# Patient Record
Sex: FEMALE | Race: WHITE | Employment: UNEMPLOYED
[De-identification: names, ages, dates, MRNs, and addresses within clinical notes are randomized per-mention and may not be internally consistent; named-entity substitution may affect disease eponyms.]

---

## 2016-02-23 LAB
CONTROL: NORMAL
HEMOCCULT STL QL: NORMAL

## 2017-01-02 PROBLEM — E87.1 HYPONATREMIA: Status: ACTIVE | Noted: 2017-01-02

## 2017-01-02 PROBLEM — J15.7 PNEUMONIA OF RIGHT LOWER LOBE DUE TO MYCOPLASMA PNEUMONIAE: Status: ACTIVE | Noted: 2017-01-02

## 2017-01-02 PROBLEM — E87.6 HYPOKALEMIA: Status: ACTIVE | Noted: 2017-01-02

## 2017-01-03 PROBLEM — J84.9 INTERSTITIAL PNEUMONIA (HCC): Status: ACTIVE | Noted: 2017-01-02

## 2017-01-04 PROBLEM — J96.00 ACUTE RESPIRATORY FAILURE (HCC): Status: ACTIVE | Noted: 2017-01-04

## 2017-01-09 ENCOUNTER — TELEPHONE (OUTPATIENT)
Dept: INTERNAL MEDICINE | Facility: CLINIC | Age: 62
End: 2017-01-09

## 2017-01-23 ENCOUNTER — OFFICE VISIT (OUTPATIENT)
Dept: INTERNAL MEDICINE | Facility: CLINIC | Age: 62
End: 2017-01-23

## 2017-01-23 VITALS
HEIGHT: 70 IN | WEIGHT: 163 LBS | BODY MASS INDEX: 23.34 KG/M2 | DIASTOLIC BLOOD PRESSURE: 72 MMHG | TEMPERATURE: 97.6 F | SYSTOLIC BLOOD PRESSURE: 114 MMHG | RESPIRATION RATE: 18 BRPM | HEART RATE: 91 BPM

## 2017-01-23 DIAGNOSIS — G44.201 ACUTE INTRACTABLE TENSION-TYPE HEADACHE: ICD-10-CM

## 2017-01-23 DIAGNOSIS — I10 ESSENTIAL HYPERTENSION: ICD-10-CM

## 2017-01-23 DIAGNOSIS — J18.9 PRIMARY ATYPICAL PNEUMONIA: Primary | ICD-10-CM

## 2017-01-23 DIAGNOSIS — E03.9 ACQUIRED HYPOTHYROIDISM: ICD-10-CM

## 2017-01-23 DIAGNOSIS — E78.00 PURE HYPERCHOLESTEROLEMIA: ICD-10-CM

## 2017-01-23 DIAGNOSIS — K21.9 GASTROESOPHAGEAL REFLUX DISEASE, ESOPHAGITIS PRESENCE NOT SPECIFIED: ICD-10-CM

## 2017-01-23 PROCEDURE — 99214 OFFICE O/P EST MOD 30 MIN: CPT | Performed by: HOSPITALIST

## 2017-01-23 RX ORDER — OMEPRAZOLE 20 MG/1
20 CAPSULE, DELAYED RELEASE ORAL DAILY
Qty: 30 CAPSULE | Refills: 11 | Status: SHIPPED | OUTPATIENT
Start: 2017-01-23 | End: 2017-05-01 | Stop reason: SDUPTHER

## 2017-01-23 RX ORDER — LEVOTHYROXINE SODIUM 112 UG/1
112 TABLET ORAL DAILY
Qty: 30 TABLET | Refills: 0 | Status: SHIPPED | OUTPATIENT
Start: 2017-01-23 | End: 2017-05-01 | Stop reason: SDUPTHER

## 2017-01-23 RX ORDER — SUMATRIPTAN 100 MG/1
TABLET, FILM COATED ORAL
Qty: 30 TABLET | Refills: 3 | Status: SHIPPED | OUTPATIENT
Start: 2017-01-23 | End: 2017-05-01 | Stop reason: SDUPTHER

## 2017-01-23 RX ORDER — ATORVASTATIN CALCIUM 40 MG/1
40 TABLET, FILM COATED ORAL DAILY
Qty: 90 TABLET | Refills: 3 | Status: SHIPPED | OUTPATIENT
Start: 2017-01-23 | End: 2017-05-01 | Stop reason: SDUPTHER

## 2017-01-23 RX ORDER — AMLODIPINE BESYLATE 10 MG/1
10 TABLET ORAL DAILY
Qty: 30 TABLET | Refills: 5 | Status: SHIPPED | OUTPATIENT
Start: 2017-01-23 | End: 2017-05-01 | Stop reason: ALTCHOICE

## 2017-02-09 ENCOUNTER — HOSPITAL ENCOUNTER (OUTPATIENT)
Age: 62
Discharge: HOME | End: 2017-02-09
Payer: COMMERCIAL

## 2017-02-09 DIAGNOSIS — E03.9 ACQUIRED HYPOTHYROIDISM: ICD-10-CM

## 2017-02-09 LAB
THYROXINE, FREE: 1.54 NG/DL (ref 0.93–1.7)
TSH SERPL DL<=0.05 MIU/L-ACNC: 0.01 MIU/L (ref 0.3–5)

## 2017-02-09 PROCEDURE — 84443 ASSAY THYROID STIM HORMONE: CPT

## 2017-02-09 PROCEDURE — 84439 ASSAY OF FREE THYROXINE: CPT

## 2017-02-09 PROCEDURE — 36415 COLL VENOUS BLD VENIPUNCTURE: CPT

## 2017-02-24 ENCOUNTER — OFFICE VISIT (OUTPATIENT)
Dept: PODIATRY | Facility: CLINIC | Age: 62
End: 2017-02-24

## 2017-02-24 ENCOUNTER — HOSPITAL ENCOUNTER (EMERGENCY)
Age: 62
Discharge: HOME | End: 2017-02-24
Attending: EMERGENCY MEDICINE
Payer: COMMERCIAL

## 2017-02-24 VITALS
DIASTOLIC BLOOD PRESSURE: 71 MMHG | BODY MASS INDEX: 23.48 KG/M2 | HEART RATE: 93 BPM | SYSTOLIC BLOOD PRESSURE: 108 MMHG | WEIGHT: 164 LBS | HEIGHT: 70 IN

## 2017-02-24 VITALS
DIASTOLIC BLOOD PRESSURE: 72 MMHG | HEART RATE: 92 BPM | HEIGHT: 70 IN | BODY MASS INDEX: 23.48 KG/M2 | WEIGHT: 164 LBS | TEMPERATURE: 98 F | OXYGEN SATURATION: 96 % | RESPIRATION RATE: 16 BRPM | SYSTOLIC BLOOD PRESSURE: 116 MMHG

## 2017-02-24 DIAGNOSIS — I82.402 ACUTE DEEP VEIN THROMBOSIS (DVT) OF LEFT LOWER EXTREMITY, UNSPECIFIED VEIN (HCC): Primary | ICD-10-CM

## 2017-02-24 DIAGNOSIS — M79.605 PAIN IN LEFT LEG: ICD-10-CM

## 2017-02-24 DIAGNOSIS — R60.0 BILATERAL LEG EDEMA: Primary | ICD-10-CM

## 2017-02-24 DIAGNOSIS — I89.0 LYMPHEDEMA OF LEFT LOWER EXTREMITY: ICD-10-CM

## 2017-02-24 DIAGNOSIS — I87.2 VENOUS INSUFFICIENCY: ICD-10-CM

## 2017-02-24 PROCEDURE — 93970 EXTREMITY STUDY: CPT

## 2017-02-24 PROCEDURE — 99283 EMERGENCY DEPT VISIT LOW MDM: CPT

## 2017-02-24 PROCEDURE — 99213 OFFICE O/P EST LOW 20 MIN: CPT | Performed by: PODIATRIST

## 2017-02-24 ASSESSMENT — ENCOUNTER SYMPTOMS
VOMITING: 0
BACK PAIN: 0
DIARRHEA: 0
SORE THROAT: 0
SHORTNESS OF BREATH: 0
EYE PAIN: 0
ABDOMINAL PAIN: 0
COLOR CHANGE: 0
BACK PAIN: 0
COUGH: 0
DIARRHEA: 0
NAUSEA: 0
NAUSEA: 0
SHORTNESS OF BREATH: 0

## 2017-03-02 DIAGNOSIS — Z12.11 COLON CANCER SCREENING: Primary | ICD-10-CM

## 2017-04-03 ENCOUNTER — HOSPITAL ENCOUNTER (OUTPATIENT)
Dept: GENERAL RADIOLOGY | Age: 62
Discharge: HOME | End: 2017-04-03
Payer: COMMERCIAL

## 2017-04-03 ENCOUNTER — HOSPITAL ENCOUNTER (OUTPATIENT)
Age: 62
Discharge: HOME | End: 2017-04-03
Payer: COMMERCIAL

## 2017-04-03 DIAGNOSIS — J90 PLEURAL EFFUSION: ICD-10-CM

## 2017-04-03 PROCEDURE — 71020 XR CHEST STANDARD TWO VW: CPT

## 2017-04-20 DIAGNOSIS — E03.9 ACQUIRED HYPOTHYROIDISM: ICD-10-CM

## 2017-04-20 RX ORDER — LEVOTHYROXINE SODIUM 112 UG/1
TABLET ORAL
Qty: 30 TABLET | Refills: 0 | OUTPATIENT
Start: 2017-04-20

## 2017-05-01 ENCOUNTER — OFFICE VISIT (OUTPATIENT)
Dept: INTERNAL MEDICINE | Age: 62
End: 2017-05-01
Payer: COMMERCIAL

## 2017-05-01 VITALS
HEIGHT: 70 IN | HEART RATE: 86 BPM | BODY MASS INDEX: 24.62 KG/M2 | SYSTOLIC BLOOD PRESSURE: 112 MMHG | DIASTOLIC BLOOD PRESSURE: 75 MMHG | WEIGHT: 172 LBS

## 2017-05-01 DIAGNOSIS — E03.9 ACQUIRED HYPOTHYROIDISM: ICD-10-CM

## 2017-05-01 DIAGNOSIS — G44.201 ACUTE INTRACTABLE TENSION-TYPE HEADACHE: ICD-10-CM

## 2017-05-01 DIAGNOSIS — Z12.39 BREAST CANCER SCREENING: Primary | ICD-10-CM

## 2017-05-01 DIAGNOSIS — I10 ESSENTIAL HYPERTENSION: ICD-10-CM

## 2017-05-01 DIAGNOSIS — E78.00 PURE HYPERCHOLESTEROLEMIA: ICD-10-CM

## 2017-05-01 DIAGNOSIS — K21.9 GASTROESOPHAGEAL REFLUX DISEASE, ESOPHAGITIS PRESENCE NOT SPECIFIED: ICD-10-CM

## 2017-05-01 PROCEDURE — 99213 OFFICE O/P EST LOW 20 MIN: CPT | Performed by: HOSPITALIST

## 2017-05-01 RX ORDER — SUMATRIPTAN 100 MG/1
TABLET, FILM COATED ORAL
Qty: 30 TABLET | Refills: 3 | Status: SHIPPED | OUTPATIENT
Start: 2017-05-01 | End: 2017-06-12 | Stop reason: SDUPTHER

## 2017-05-01 RX ORDER — LEVOTHYROXINE SODIUM 112 UG/1
112 TABLET ORAL DAILY
Qty: 30 TABLET | Refills: 0 | Status: CANCELLED | OUTPATIENT
Start: 2017-05-01

## 2017-05-01 RX ORDER — OMEPRAZOLE 20 MG/1
20 CAPSULE, DELAYED RELEASE ORAL DAILY
Qty: 30 CAPSULE | Refills: 11 | Status: SHIPPED | OUTPATIENT
Start: 2017-05-01 | End: 2017-06-12 | Stop reason: SDUPTHER

## 2017-05-01 RX ORDER — HYDROCHLOROTHIAZIDE 12.5 MG/1
12.5 TABLET ORAL DAILY
Qty: 30 TABLET | Refills: 0 | Status: SHIPPED | OUTPATIENT
Start: 2017-05-01 | End: 2017-05-31 | Stop reason: SDUPTHER

## 2017-05-01 RX ORDER — LEVOTHYROXINE SODIUM 0.1 MG/1
100 TABLET ORAL DAILY
Qty: 60 TABLET | Refills: 1 | Status: SHIPPED | OUTPATIENT
Start: 2017-05-01 | End: 2017-06-12 | Stop reason: SDUPTHER

## 2017-05-01 RX ORDER — AMLODIPINE BESYLATE 10 MG/1
10 TABLET ORAL DAILY
Qty: 30 TABLET | Refills: 5 | Status: CANCELLED | OUTPATIENT
Start: 2017-05-01

## 2017-05-01 RX ORDER — ATORVASTATIN CALCIUM 40 MG/1
40 TABLET, FILM COATED ORAL DAILY
Qty: 90 TABLET | Refills: 3 | Status: SHIPPED | OUTPATIENT
Start: 2017-05-01 | End: 2017-06-12 | Stop reason: SDUPTHER

## 2017-05-01 ASSESSMENT — PATIENT HEALTH QUESTIONNAIRE - PHQ9
SUM OF ALL RESPONSES TO PHQ QUESTIONS 1-9: 5
7. TROUBLE CONCENTRATING ON THINGS, SUCH AS READING THE NEWSPAPER OR WATCHING TELEVISION: 1
1. LITTLE INTEREST OR PLEASURE IN DOING THINGS: 1
4. FEELING TIRED OR HAVING LITTLE ENERGY: 1
SUM OF ALL RESPONSES TO PHQ9 QUESTIONS 1 & 2: 2
10. IF YOU CHECKED OFF ANY PROBLEMS, HOW DIFFICULT HAVE THESE PROBLEMS MADE IT FOR YOU TO DO YOUR WORK, TAKE CARE OF THINGS AT HOME, OR GET ALONG WITH OTHER PEOPLE: 0
5. POOR APPETITE OR OVEREATING: 1
8. MOVING OR SPEAKING SO SLOWLY THAT OTHER PEOPLE COULD HAVE NOTICED. OR THE OPPOSITE, BEING SO FIGETY OR RESTLESS THAT YOU HAVE BEEN MOVING AROUND A LOT MORE THAN USUAL: 0
6. FEELING BAD ABOUT YOURSELF - OR THAT YOU ARE A FAILURE OR HAVE LET YOURSELF OR YOUR FAMILY DOWN: 0
9. THOUGHTS THAT YOU WOULD BE BETTER OFF DEAD, OR OF HURTING YOURSELF: 0
2. FEELING DOWN, DEPRESSED OR HOPELESS: 1
3. TROUBLE FALLING OR STAYING ASLEEP: 0

## 2017-05-25 DIAGNOSIS — G40.909 SEIZURE DISORDER (HCC): ICD-10-CM

## 2017-05-25 DIAGNOSIS — F41.1 ANXIETY STATE: ICD-10-CM

## 2017-05-27 RX ORDER — PAROXETINE HYDROCHLORIDE 25 MG/1
TABLET, FILM COATED, EXTENDED RELEASE ORAL
Qty: 30 TABLET | Refills: 0 | Status: SHIPPED | OUTPATIENT
Start: 2017-05-27 | End: 2017-06-12 | Stop reason: SDUPTHER

## 2017-05-27 RX ORDER — LEVETIRACETAM 1000 MG/1
TABLET ORAL
Qty: 180 TABLET | Refills: 0 | Status: SHIPPED | OUTPATIENT
Start: 2017-05-27 | End: 2017-06-12 | Stop reason: SDUPTHER

## 2017-05-31 DIAGNOSIS — I10 ESSENTIAL HYPERTENSION: ICD-10-CM

## 2017-06-02 RX ORDER — HYDROCHLOROTHIAZIDE 12.5 MG/1
TABLET ORAL
Qty: 30 TABLET | Refills: 0 | Status: SHIPPED | OUTPATIENT
Start: 2017-06-02 | End: 2017-06-28 | Stop reason: SDUPTHER

## 2017-06-08 ENCOUNTER — HOSPITAL ENCOUNTER (OUTPATIENT)
Age: 62
Discharge: HOME | End: 2017-06-08
Payer: COMMERCIAL

## 2017-06-08 DIAGNOSIS — E03.9 ACQUIRED HYPOTHYROIDISM: ICD-10-CM

## 2017-06-08 LAB
THYROXINE, FREE: 1.55 NG/DL (ref 0.93–1.7)
TSH SERPL DL<=0.05 MIU/L-ACNC: <0.01 MIU/L (ref 0.3–5)

## 2017-06-08 PROCEDURE — 84439 ASSAY OF FREE THYROXINE: CPT

## 2017-06-08 PROCEDURE — 36415 COLL VENOUS BLD VENIPUNCTURE: CPT

## 2017-06-08 PROCEDURE — 84443 ASSAY THYROID STIM HORMONE: CPT

## 2017-06-12 ENCOUNTER — OFFICE VISIT (OUTPATIENT)
Dept: INTERNAL MEDICINE | Age: 62
End: 2017-06-12
Payer: COMMERCIAL

## 2017-06-12 VITALS
BODY MASS INDEX: 23.48 KG/M2 | HEART RATE: 95 BPM | HEIGHT: 70 IN | DIASTOLIC BLOOD PRESSURE: 77 MMHG | SYSTOLIC BLOOD PRESSURE: 118 MMHG | WEIGHT: 164 LBS

## 2017-06-12 DIAGNOSIS — R25.1 SHAKING: ICD-10-CM

## 2017-06-12 DIAGNOSIS — I89.0 LYMPHEDEMA OF LEFT LOWER EXTREMITY: ICD-10-CM

## 2017-06-12 DIAGNOSIS — G40.909 SEIZURE DISORDER (HCC): ICD-10-CM

## 2017-06-12 DIAGNOSIS — E03.9 ACQUIRED HYPOTHYROIDISM: ICD-10-CM

## 2017-06-12 DIAGNOSIS — Z86.73 H/O: STROKE: ICD-10-CM

## 2017-06-12 DIAGNOSIS — F41.1 ANXIETY STATE: ICD-10-CM

## 2017-06-12 DIAGNOSIS — K21.9 GASTROESOPHAGEAL REFLUX DISEASE, ESOPHAGITIS PRESENCE NOT SPECIFIED: ICD-10-CM

## 2017-06-12 PROBLEM — J96.00 ACUTE RESPIRATORY FAILURE (HCC): Status: RESOLVED | Noted: 2017-01-04 | Resolved: 2017-06-12

## 2017-06-12 PROBLEM — E87.6 HYPOKALEMIA: Status: RESOLVED | Noted: 2017-01-02 | Resolved: 2017-06-12

## 2017-06-12 PROBLEM — E87.1 HYPONATREMIA: Status: RESOLVED | Noted: 2017-01-02 | Resolved: 2017-06-12

## 2017-06-12 PROCEDURE — 99213 OFFICE O/P EST LOW 20 MIN: CPT | Performed by: STUDENT IN AN ORGANIZED HEALTH CARE EDUCATION/TRAINING PROGRAM

## 2017-06-12 RX ORDER — PAROXETINE HYDROCHLORIDE 25 MG/1
25 TABLET, FILM COATED, EXTENDED RELEASE ORAL EVERY EVENING
Qty: 30 TABLET | Refills: 0 | Status: SHIPPED | OUTPATIENT
Start: 2017-06-12 | End: 2017-08-01 | Stop reason: SDUPTHER

## 2017-06-12 RX ORDER — OMEPRAZOLE 20 MG/1
20 CAPSULE, DELAYED RELEASE ORAL DAILY
Qty: 30 CAPSULE | Refills: 11 | Status: SHIPPED | OUTPATIENT
Start: 2017-06-12 | End: 2018-02-06 | Stop reason: SDUPTHER

## 2017-06-12 RX ORDER — OXYBUTYNIN CHLORIDE 5 MG/1
5 TABLET ORAL 3 TIMES DAILY
Qty: 90 TABLET | Refills: 5 | Status: SHIPPED | OUTPATIENT
Start: 2017-06-12 | End: 2018-02-06 | Stop reason: SDUPTHER

## 2017-06-12 RX ORDER — ATORVASTATIN CALCIUM 40 MG/1
40 TABLET, FILM COATED ORAL DAILY
Qty: 90 TABLET | Refills: 3 | Status: SHIPPED | OUTPATIENT
Start: 2017-06-12 | End: 2017-11-07 | Stop reason: SDUPTHER

## 2017-06-12 RX ORDER — LEVOTHYROXINE SODIUM 0.07 MG/1
75 TABLET ORAL DAILY
Qty: 30 TABLET | Refills: 1 | Status: SHIPPED | OUTPATIENT
Start: 2017-06-12 | End: 2017-09-11 | Stop reason: SDUPTHER

## 2017-06-12 RX ORDER — ASPIRIN 81 MG/1
81 TABLET ORAL DAILY
Qty: 30 TABLET | Refills: 11 | Status: SHIPPED | OUTPATIENT
Start: 2017-06-12 | End: 2018-02-06 | Stop reason: SDUPTHER

## 2017-06-12 RX ORDER — SUMATRIPTAN 100 MG/1
TABLET, FILM COATED ORAL
Qty: 10 TABLET | Refills: 3 | Status: SHIPPED | OUTPATIENT
Start: 2017-06-12 | End: 2017-08-01 | Stop reason: SDUPTHER

## 2017-06-12 RX ORDER — LEVETIRACETAM 1000 MG/1
1000 TABLET ORAL 2 TIMES DAILY
Qty: 180 TABLET | Refills: 0 | Status: SHIPPED | OUTPATIENT
Start: 2017-06-12 | End: 2017-09-11 | Stop reason: SDUPTHER

## 2017-06-27 ENCOUNTER — OFFICE VISIT (OUTPATIENT)
Dept: NEUROLOGY | Age: 62
End: 2017-06-27
Payer: COMMERCIAL

## 2017-06-27 VITALS
HEART RATE: 92 BPM | DIASTOLIC BLOOD PRESSURE: 79 MMHG | WEIGHT: 165 LBS | HEIGHT: 70 IN | SYSTOLIC BLOOD PRESSURE: 113 MMHG | BODY MASS INDEX: 23.62 KG/M2

## 2017-06-27 DIAGNOSIS — G40.909 SEIZURE DISORDER (HCC): Primary | ICD-10-CM

## 2017-06-27 PROCEDURE — 99214 OFFICE O/P EST MOD 30 MIN: CPT | Performed by: PSYCHIATRY & NEUROLOGY

## 2017-06-27 RX ORDER — ACYCLOVIR 400 MG/1
400 TABLET ORAL 4 TIMES DAILY
COMMUNITY
End: 2017-11-07 | Stop reason: ALTCHOICE

## 2017-06-28 DIAGNOSIS — I10 ESSENTIAL HYPERTENSION: ICD-10-CM

## 2017-06-29 ENCOUNTER — TELEPHONE (OUTPATIENT)
Dept: INTERNAL MEDICINE | Age: 62
End: 2017-06-29

## 2017-07-01 RX ORDER — HYDROCHLOROTHIAZIDE 12.5 MG/1
TABLET ORAL
Qty: 30 TABLET | Refills: 1 | Status: SHIPPED | OUTPATIENT
Start: 2017-07-01 | End: 2017-08-01 | Stop reason: SDUPTHER

## 2017-08-01 ENCOUNTER — OFFICE VISIT (OUTPATIENT)
Dept: INTERNAL MEDICINE | Age: 62
End: 2017-08-01
Payer: COMMERCIAL

## 2017-08-01 ENCOUNTER — HOSPITAL ENCOUNTER (OUTPATIENT)
Age: 62
Setting detail: SPECIMEN
Discharge: HOME | End: 2017-08-01
Payer: COMMERCIAL

## 2017-08-01 VITALS
HEART RATE: 78 BPM | HEIGHT: 70 IN | BODY MASS INDEX: 23.48 KG/M2 | WEIGHT: 164 LBS | SYSTOLIC BLOOD PRESSURE: 119 MMHG | DIASTOLIC BLOOD PRESSURE: 78 MMHG

## 2017-08-01 DIAGNOSIS — E03.9 ACQUIRED HYPOTHYROIDISM: ICD-10-CM

## 2017-08-01 DIAGNOSIS — F41.1 ANXIETY STATE: ICD-10-CM

## 2017-08-01 DIAGNOSIS — I10 ESSENTIAL HYPERTENSION: Primary | ICD-10-CM

## 2017-08-01 PROBLEM — J84.9 INTERSTITIAL PNEUMONIA (HCC): Status: RESOLVED | Noted: 2017-01-02 | Resolved: 2017-08-01

## 2017-08-01 LAB
THYROXINE, FREE: 1.19 NG/DL (ref 0.93–1.7)
TSH SERPL DL<=0.05 MIU/L-ACNC: 0.08 MIU/L (ref 0.3–5)

## 2017-08-01 PROCEDURE — 99213 OFFICE O/P EST LOW 20 MIN: CPT | Performed by: STUDENT IN AN ORGANIZED HEALTH CARE EDUCATION/TRAINING PROGRAM

## 2017-08-01 PROCEDURE — 84443 ASSAY THYROID STIM HORMONE: CPT

## 2017-08-01 PROCEDURE — 84439 ASSAY OF FREE THYROXINE: CPT

## 2017-08-01 PROCEDURE — 36415 COLL VENOUS BLD VENIPUNCTURE: CPT

## 2017-08-01 RX ORDER — SUMATRIPTAN 100 MG/1
TABLET, FILM COATED ORAL
Qty: 10 TABLET | Refills: 3 | Status: SHIPPED | OUTPATIENT
Start: 2017-08-01 | End: 2018-02-06 | Stop reason: SDUPTHER

## 2017-08-01 RX ORDER — PAROXETINE HYDROCHLORIDE 25 MG/1
25 TABLET, FILM COATED, EXTENDED RELEASE ORAL EVERY EVENING
Qty: 30 TABLET | Refills: 0 | Status: SHIPPED | OUTPATIENT
Start: 2017-08-01 | End: 2017-08-30 | Stop reason: SDUPTHER

## 2017-08-01 RX ORDER — HYDROCHLOROTHIAZIDE 12.5 MG/1
TABLET ORAL
Qty: 30 TABLET | Refills: 3 | Status: SHIPPED | OUTPATIENT
Start: 2017-08-01 | End: 2017-11-07 | Stop reason: SDUPTHER

## 2017-08-22 ENCOUNTER — TELEPHONE (OUTPATIENT)
Dept: INTERNAL MEDICINE | Age: 62
End: 2017-08-22

## 2017-08-24 ENCOUNTER — TELEPHONE (OUTPATIENT)
Dept: INTERNAL MEDICINE | Age: 62
End: 2017-08-24

## 2017-08-30 DIAGNOSIS — F41.1 ANXIETY STATE: ICD-10-CM

## 2017-08-31 RX ORDER — PAROXETINE HYDROCHLORIDE 25 MG/1
TABLET, FILM COATED, EXTENDED RELEASE ORAL
Qty: 30 TABLET | Refills: 0 | Status: SHIPPED | OUTPATIENT
Start: 2017-08-31 | End: 2017-10-12 | Stop reason: SDUPTHER

## 2017-09-11 DIAGNOSIS — G40.909 SEIZURE DISORDER (HCC): ICD-10-CM

## 2017-09-11 DIAGNOSIS — E03.9 ACQUIRED HYPOTHYROIDISM: ICD-10-CM

## 2017-09-12 RX ORDER — LEVOTHYROXINE SODIUM 0.07 MG/1
75 TABLET ORAL DAILY
Qty: 30 TABLET | Refills: 1 | Status: SHIPPED | OUTPATIENT
Start: 2017-09-12 | End: 2017-11-07 | Stop reason: SDUPTHER

## 2017-09-12 RX ORDER — LEVETIRACETAM 1000 MG/1
1000 TABLET ORAL 2 TIMES DAILY
Qty: 180 TABLET | Refills: 0 | Status: SHIPPED | OUTPATIENT
Start: 2017-09-12 | End: 2018-02-06 | Stop reason: SDUPTHER

## 2017-10-12 DIAGNOSIS — F41.1 ANXIETY STATE: ICD-10-CM

## 2017-10-13 RX ORDER — PAROXETINE HYDROCHLORIDE 25 MG/1
25 TABLET, FILM COATED, EXTENDED RELEASE ORAL EVERY EVENING
Qty: 30 TABLET | Refills: 0 | Status: SHIPPED | OUTPATIENT
Start: 2017-10-13 | End: 2017-10-30 | Stop reason: ALTCHOICE

## 2017-10-30 ENCOUNTER — TELEPHONE (OUTPATIENT)
Dept: INTERNAL MEDICINE | Age: 62
End: 2017-10-30

## 2017-10-30 RX ORDER — PAROXETINE 30 MG/1
30 TABLET, FILM COATED ORAL DAILY
Qty: 30 TABLET | Refills: 3 | Status: SHIPPED | OUTPATIENT
Start: 2017-10-30 | End: 2018-02-06 | Stop reason: SDUPTHER

## 2017-10-30 NOTE — TELEPHONE ENCOUNTER
PA request received for PAXIL ER. This is non-formulary. Pt must try/fail/have contraindication to formulary agent(s):Please order regular paxil not ER.  ER is not covered by insurance

## 2017-11-07 ENCOUNTER — OFFICE VISIT (OUTPATIENT)
Dept: INTERNAL MEDICINE | Age: 62
End: 2017-11-07
Payer: COMMERCIAL

## 2017-11-07 ENCOUNTER — HOSPITAL ENCOUNTER (OUTPATIENT)
Age: 62
Setting detail: SPECIMEN
Discharge: HOME | End: 2017-11-07
Payer: COMMERCIAL

## 2017-11-07 VITALS
HEART RATE: 82 BPM | WEIGHT: 173 LBS | HEIGHT: 70 IN | SYSTOLIC BLOOD PRESSURE: 138 MMHG | OXYGEN SATURATION: 98 % | DIASTOLIC BLOOD PRESSURE: 86 MMHG | BODY MASS INDEX: 24.77 KG/M2

## 2017-11-07 DIAGNOSIS — Z12.9 SCREENING FOR CANCER: ICD-10-CM

## 2017-11-07 DIAGNOSIS — I10 ESSENTIAL HYPERTENSION: Primary | ICD-10-CM

## 2017-11-07 DIAGNOSIS — Z09 FOLLOW UP: ICD-10-CM

## 2017-11-07 DIAGNOSIS — R25.1 TREMOR OF BOTH HANDS: ICD-10-CM

## 2017-11-07 DIAGNOSIS — N39.41 URGE INCONTINENCE: ICD-10-CM

## 2017-11-07 DIAGNOSIS — E03.9 ACQUIRED HYPOTHYROIDISM: ICD-10-CM

## 2017-11-07 DIAGNOSIS — F41.9 ANXIETY: ICD-10-CM

## 2017-11-07 PROCEDURE — 3017F COLORECTAL CA SCREEN DOC REV: CPT | Performed by: STUDENT IN AN ORGANIZED HEALTH CARE EDUCATION/TRAINING PROGRAM

## 2017-11-07 PROCEDURE — G8484 FLU IMMUNIZE NO ADMIN: HCPCS | Performed by: STUDENT IN AN ORGANIZED HEALTH CARE EDUCATION/TRAINING PROGRAM

## 2017-11-07 PROCEDURE — 1036F TOBACCO NON-USER: CPT | Performed by: STUDENT IN AN ORGANIZED HEALTH CARE EDUCATION/TRAINING PROGRAM

## 2017-11-07 PROCEDURE — G8420 CALC BMI NORM PARAMETERS: HCPCS | Performed by: STUDENT IN AN ORGANIZED HEALTH CARE EDUCATION/TRAINING PROGRAM

## 2017-11-07 PROCEDURE — 3014F SCREEN MAMMO DOC REV: CPT | Performed by: STUDENT IN AN ORGANIZED HEALTH CARE EDUCATION/TRAINING PROGRAM

## 2017-11-07 PROCEDURE — G8427 DOCREV CUR MEDS BY ELIG CLIN: HCPCS | Performed by: STUDENT IN AN ORGANIZED HEALTH CARE EDUCATION/TRAINING PROGRAM

## 2017-11-07 PROCEDURE — 99214 OFFICE O/P EST MOD 30 MIN: CPT | Performed by: STUDENT IN AN ORGANIZED HEALTH CARE EDUCATION/TRAINING PROGRAM

## 2017-11-07 RX ORDER — ATORVASTATIN CALCIUM 40 MG/1
40 TABLET, FILM COATED ORAL DAILY
Qty: 90 TABLET | Refills: 3 | Status: SHIPPED | OUTPATIENT
Start: 2017-11-07 | End: 2018-02-06 | Stop reason: SDUPTHER

## 2017-11-07 RX ORDER — LEVOTHYROXINE SODIUM 0.05 MG/1
50 TABLET ORAL DAILY
Qty: 30 TABLET | Refills: 3 | Status: SHIPPED | OUTPATIENT
Start: 2017-11-07 | End: 2018-02-06 | Stop reason: SDUPTHER

## 2017-11-07 RX ORDER — HYDROCHLOROTHIAZIDE 12.5 MG/1
TABLET ORAL
Qty: 30 TABLET | Refills: 3 | Status: SHIPPED | OUTPATIENT
Start: 2017-11-07 | End: 2018-02-06 | Stop reason: SDUPTHER

## 2017-11-07 NOTE — PROGRESS NOTES
Hypertension Visit Information    BP Readings from Last 3 Encounters:   08/01/17 119/78   06/27/17 113/79   06/12/17 118/77       LDL Cholesterol (mg/dL)   Date Value   08/18/2016 99     HDL (mg/dL)   Date Value   08/18/2016 71     BUN (mg/dL)   Date Value   01/08/2017 6 (L)     CREATININE (mg/dL)   Date Value   01/08/2017 0.54     Glucose (mg/dL)   Date Value   01/08/2017 106 (H)   03/27/2012 68 (L)              Have you changed or started any medications since your last visit including any over-the-counter medicines, vitamins, or herbal medicines? no   Are you having any side effects from any of your medications? -  no  Have you stopped taking any of your medications? Is so, why? -  no    Have you seen any other physician or provider since your last visit? No  Have you had any other diagnostic tests since your last visit? Yes - Records Obtained, Pt had labs done on 8/1/17  Have you been seen in the emergency room and/or had an admission to a hospital since we last saw you? No  Have you had your routine dental cleaning in the past 6 months? no    Have you activated your [facility] account? If not, what are your barriers?  No: Declined     Patient Care Team:  Radha Calles MD as PCP - General (Internal Medicine)    Medical History Review  Past Medical, Family, and Social History reviewed and does contribute to the patient presenting condition    Health Maintenance   Topic Date Due    Cervical cancer screen  08/12/2017    Flu vaccine (1) 09/01/2017    Colon Cancer Screen FIT/FOBT  02/01/2018 (Originally 2/23/2017)    Breast cancer screen  01/25/2018    Lipid screen  08/18/2021    DTaP/Tdap/Td vaccine (2 - Td) 11/23/2025    Zostavax vaccine  Completed    Hepatitis C screen  Completed    HIV screen  Completed

## 2017-11-07 NOTE — PROGRESS NOTES
[facility]  [facility] [address] [facility]  [address]  Dept: [phone]  Dept Fax: [phone]    Office Progress/Follow Up Note  Date of patient's visit: 11/7/2017  Patient's Name:  Roxana Bolanos YOB: 1955            Patient Care Team:  Anatoliy Eastman MD as PCP - General (Internal Medicine)    REASON FOR VISIT: Routine outpatient follow up    HISTORY OF PRESENT ILLNESS:      Chief Complaint   Patient presents with    Hypertension     Pt last seen on 8/1/17   [facility] Maintenance     Due for Colon Screen, PAP, and Flu Shot. Discuss with pt. History was obtained from the patient. Roxana Bolanos is a 58 y.o. is here for a blood pressure follow up. Blood pressure controlled on HCTZ. Patient has hand tremors, worsened when she is more anxious, saw neurologist recommended observation. patient has appointment with Dr. Zeinab Yen on 11/9 but he is out of town so they have to move the appointment. Patient taking synthroid for hypothyroidism. Patient followed with neurologist for right cortical ischemic stroke in 1998 leading to seizures. Patient takes Keppra 1 g BID. No breakthrough seizure.         Patient Active Problem List   Diagnosis    Hypothyroidism    Hypertension    Seizure disorder ([facility] [address]    Urge incontinence    Anxiety    GERD (gastroesophageal reflux disease)    H/O: CVA (cerebrovascular accident)    Headache    Hammer toe of left foot    Tremor of both hands         Health Maintenance Due   Topic Date Due    Colon Cancer Screen FIT/FOBT  02/23/2017    Cervical cancer screen  08/12/2017    Flu vaccine (1) 09/01/2017         No Known Allergies      MEDICATIONS:      Current Outpatient Prescriptions   Medication Sig Dispense Refill    PARoxetine (PAXIL) 30 MG tablet Take 1 tablet by mouth daily 30 tablet 3    levothyroxine (SYNTHROID) 75 MCG tablet Take 1 tablet by mouth daily 30 tablet 1    levETIRAcetam (KEPPRA) 1000 MG tablet Take 1 tablet by mouth 2 times daily 180 tablet 0    hydrochlorothiazide (HYDRODIURIL) 12.5 MG tablet TAKE ONE TABLET BY MOUTH ONCE DAILY 30 tablet 3    SUMAtriptan (IMITREX) 100 MG tablet TAKE ONE TABLET BY MOUTH once daily as needed for migraine headache 10 tablet 3    aspirin EC 81 MG EC tablet Take 1 tablet by mouth daily 30 tablet 11    atorvastatin (LIPITOR) 40 MG tablet Take 1 tablet by mouth daily 90 tablet 3    omeprazole (PRILOSEC) 20 MG delayed release capsule Take 1 capsule by mouth daily 30 capsule 11    oxybutynin (DITROPAN) 5 MG tablet Take 1 tablet by mouth 3 times daily 90 tablet 5     No current facility-administered medications for this visit. SOCIAL HISTORY    Reviewed and no change from previous record. Efrain Burks  reports that she has never smoked.  She has never used smokeless tobacco.    FAMILY HISTORY:    Reviewed and No change from previous visit    REVIEW OF SYSTEMS:    CONSTITUTIONAL: Denies: fever, chills  CARDIOVASCULAR: Denies: chest pain, dyspnea on exertion  RESPIRATORY: Denies: cough, hemoptysis, shortness of breath  GI: Denies: Denies: abdominal pain, flank pain  : Denies: Denies: dysuria, frequency/urgency  NEURO: Denies: dizzy/vertigo, headache  MUSCULOSKELETAL: hand tremor on and off  SKIN: Denies: rash, itching    PHYSICAL EXAM:      Vitals:    11/07/17 0905   BP: 138/86   Site: Right Arm   Position: Sitting   Cuff Size: Medium Adult   Pulse: 82   SpO2: 98%   Weight: 173 lb (78.5 kg)   Height: 5' 10\" (1.778 m)     BP Readings from Last 3 Encounters:   11/07/17 138/86   08/01/17 119/78   06/27/17 113/79      General appearance - alert, well appearing, and in no distress  Chest - clear to auscultation, no wheezes, rales or rhonchi, symmetric air entry  Heart - normal rate, regular rhythm, normal S1, S2, no murmurs, rubs, clicks or gallops  Abdomen - soft, nontender, nondistended, no masses or organomegaly  Neurological - alert, oriented, normal speech, no of prescribed medications. Barriers to medication compliance addressed. All patient questions answered. Pt voiced understanding. · Patient given educational materials - see patient instructions    Cate Zhang MD  Internal Medicine Resident  [facility]  11/7/2017, 9:37 AM   Attending Physician Statement  I have discussed the care of Chalo Espinoza, including pertinent history and exam findings,  with the resident. I have reviewed the key elements of all parts of the encounter with the resident. I agree with the assessment, plan and orders as documented by the resident. Pt seen discussed and examined. (GC Modifier) Resting tremor. TSH low. Will decrease synthroid. Pelvic exam grossly unremarkable. Cervix grossly intact. Paps awaits.

## 2017-11-07 NOTE — PATIENT INSTRUCTIONS
Medications e-scribed to pharmacy of patient's choice. OC-Light hemoccult collection kit given to patient and procedure explained. LABORATORY INSTRUCTIONS    Your doctor has ordered blood or urine testing. You can get this testing done at the Lab located on the first floor of the [facility], or at any other [facility]. Please stop at Main Registration, before going to the lab, as you must be registered first.     Please get this lab done before your next visit. You may eat or drink before this test.    Return To Clinic 3/13/18. After Visit Summary given and reviewed. JF    It is very important for your care that you keep your appointment. If for some reason you are unable to keep your appointment it is equally important that you call our office at [phone] to cancel your appointment and reschedule. Failure to do so may result in your termination from our practice.

## 2017-11-15 LAB — CYTOLOGY REPORT: NORMAL

## 2017-12-11 ENCOUNTER — HOSPITAL ENCOUNTER (OUTPATIENT)
Age: 62
Discharge: HOME | End: 2017-12-11
Payer: COMMERCIAL

## 2017-12-11 DIAGNOSIS — E03.9 ACQUIRED HYPOTHYROIDISM: ICD-10-CM

## 2017-12-11 LAB — TSH SERPL DL<=0.05 MIU/L-ACNC: 2.48 MIU/L (ref 0.3–5)

## 2017-12-11 PROCEDURE — 84443 ASSAY THYROID STIM HORMONE: CPT

## 2017-12-11 PROCEDURE — 36415 COLL VENOUS BLD VENIPUNCTURE: CPT

## 2017-12-20 ENCOUNTER — TELEPHONE (OUTPATIENT)
Dept: INTERNAL MEDICINE | Age: 62
End: 2017-12-20

## 2017-12-20 NOTE — TELEPHONE ENCOUNTER
Pt given OC-Light Hemoccult test kit 11/7/17; it has not been returned within  2 weeks. PC to pt; LM; letter sent to pt.

## 2018-01-14 ENCOUNTER — HOSPITAL ENCOUNTER (INPATIENT)
Age: 63
LOS: 7 days | Discharge: NURSING, REHAB OR LONG-TERM CARE | DRG: 720 | End: 2018-01-21
Attending: EMERGENCY MEDICINE | Admitting: INTERNAL MEDICINE
Payer: COMMERCIAL

## 2018-01-14 ENCOUNTER — APPOINTMENT (OUTPATIENT)
Dept: GENERAL RADIOLOGY | Age: 63
DRG: 720 | End: 2018-01-14
Payer: COMMERCIAL

## 2018-01-14 ENCOUNTER — APPOINTMENT (OUTPATIENT)
Dept: CT IMAGING | Age: 63
DRG: 720 | End: 2018-01-14
Payer: COMMERCIAL

## 2018-01-14 ENCOUNTER — ANESTHESIA (OUTPATIENT)
Dept: ICU | Age: 63
DRG: 720 | End: 2018-01-14
Payer: COMMERCIAL

## 2018-01-14 ENCOUNTER — ANESTHESIA EVENT (OUTPATIENT)
Dept: ICU | Age: 63
DRG: 720 | End: 2018-01-14
Payer: COMMERCIAL

## 2018-01-14 DIAGNOSIS — A41.9 SEPSIS, DUE TO UNSPECIFIED ORGANISM: ICD-10-CM

## 2018-01-14 DIAGNOSIS — R79.89 INCREASED AMMONIA LEVEL: ICD-10-CM

## 2018-01-14 DIAGNOSIS — N30.00 ACUTE CYSTITIS WITHOUT HEMATURIA: ICD-10-CM

## 2018-01-14 DIAGNOSIS — R56.9 SEIZURE (HCC): ICD-10-CM

## 2018-01-14 DIAGNOSIS — R50.9 FEVER, UNSPECIFIED FEVER CAUSE: ICD-10-CM

## 2018-01-14 DIAGNOSIS — R41.82 ALTERED MENTAL STATUS, UNSPECIFIED ALTERED MENTAL STATUS TYPE: Primary | ICD-10-CM

## 2018-01-14 DIAGNOSIS — R77.8 ELEVATED TROPONIN: ICD-10-CM

## 2018-01-14 PROBLEM — R65.21 SEPTIC SHOCK (HCC): Status: ACTIVE | Noted: 2018-01-14

## 2018-01-14 PROBLEM — N39.0 UTI (URINARY TRACT INFECTION): Status: ACTIVE | Noted: 2018-01-14

## 2018-01-14 LAB
-: ABNORMAL
ABSOLUTE EOS #: 0.2 K/UL (ref 0–0.4)
ABSOLUTE IMMATURE GRANULOCYTE: ABNORMAL K/UL (ref 0–0.3)
ABSOLUTE LYMPH #: 1.1 K/UL (ref 1–4.8)
ABSOLUTE MONO #: 0.4 K/UL (ref 0.1–1.3)
ACETAMINOPHEN LEVEL: <10 UG/ML (ref 10–30)
ALBUMIN SERPL-MCNC: 3.5 G/DL (ref 3.5–5.2)
ALBUMIN/GLOBULIN RATIO: NORMAL (ref 1–2.5)
ALLEN TEST: ABNORMAL
ALLEN TEST: ABNORMAL
ALP BLD-CCNC: 82 U/L (ref 35–104)
ALT SERPL-CCNC: 18 U/L (ref 5–33)
AMMONIA: 100 UMOL/L (ref 11–51)
AMORPHOUS: ABNORMAL
ANION GAP SERPL CALCULATED.3IONS-SCNC: 16 MMOL/L (ref 9–17)
APPEARANCE CSF: CLEAR
AST SERPL-CCNC: 30 U/L
BACTERIA: ABNORMAL
BASOPHILS # BLD: 1 % (ref 0–2)
BASOPHILS ABSOLUTE: 0.1 K/UL (ref 0–0.2)
BILIRUB SERPL-MCNC: 0.56 MG/DL (ref 0.3–1.2)
BILIRUBIN URINE: NEGATIVE
BNP INTERPRETATION: NORMAL
BUN BLDV-MCNC: 10 MG/DL (ref 8–23)
BUN/CREAT BLD: NORMAL (ref 9–20)
CALCIUM SERPL-MCNC: 8.8 MG/DL (ref 8.6–10.4)
CARBOXYHEMOGLOBIN: 0.7 % (ref 0–5)
CARBOXYHEMOGLOBIN: 7.5 % (ref 0–5)
CASTS UA: ABNORMAL /LPF
CHLORIDE BLD-SCNC: 103 MMOL/L (ref 98–107)
CO2: 23 MMOL/L (ref 20–31)
COLOR: YELLOW
COMMENT UA: ABNORMAL
CREAT SERPL-MCNC: 0.63 MG/DL (ref 0.5–0.9)
CREAT SERPL-MCNC: 0.76 MG/DL (ref 0.5–0.9)
CRYPTOCOCCUS NEOFORMANS/GATTI CSF FILM ARR.: NOT DETECTED
CRYSTALS, UA: ABNORMAL /HPF
CYTOMEGALOVIRUS (CMV) CSF FILM ARRAY: NOT DETECTED
DIFFERENTIAL TYPE: ABNORMAL
DIRECT EXAM: NORMAL
ENTEROVIRUS CSF FILM ARRAY: NOT DETECTED
EOSINOPHILS RELATIVE PERCENT: 4 % (ref 0–4)
EPITHELIAL CELLS UA: ABNORMAL /HPF
ESCHERICHIA COLI K1 CSF FILM ARRAY: NOT DETECTED
ETHANOL PERCENT: <0.01 %
ETHANOL: <10 MG/DL
FIO2: 100
FIO2: ABNORMAL
GFR AFRICAN AMERICAN: >60 ML/MIN
GFR AFRICAN AMERICAN: >60 ML/MIN
GFR NON-AFRICAN AMERICAN: >60 ML/MIN
GFR NON-AFRICAN AMERICAN: >60 ML/MIN
GFR SERPL CREATININE-BSD FRML MDRD: NORMAL ML/MIN/{1.73_M2}
GLUCOSE BLD-MCNC: 97 MG/DL (ref 70–99)
GLUCOSE URINE: NEGATIVE
GLUCOSE, CSF: 55 MG/DL (ref 40–70)
HAEMOPHILUS INFLUENZA CSF FILM ARRAY: NOT DETECTED
HCO3 ARTERIAL: 25.6 MMOL/L (ref 22–26)
HCO3 VENOUS: 21.6 MMOL/L (ref 24–30)
HCT VFR BLD CALC: 41.7 % (ref 36–46)
HEMOGLOBIN: 13.9 G/DL (ref 12–16)
HHV-6 (HERPESVIRUS 6) CSF FILM ARRAY: NOT DETECTED
HSV-1 CSF FILM ARRAY: NOT DETECTED
HSV-2 CSF FILM ARRAY: NOT DETECTED
IMMATURE GRANULOCYTES: ABNORMAL %
INR BLD: 1.1
KEPPRA: 31 UG/ML
KETONES, URINE: NEGATIVE
LACTIC ACID, WHOLE BLOOD: NORMAL MMOL/L (ref 0.7–2.1)
LACTIC ACID: 1.6 MMOL/L (ref 0.5–2.2)
LACTIC ACID: 2.9 MMOL/L (ref 0.5–2.2)
LEUKOCYTE ESTERASE, URINE: ABNORMAL
LIPASE: 38 U/L (ref 13–60)
LISTERIA MONOCYTOGENES CSF FILM ARRAY: NOT DETECTED
LYMPHOCYTES # BLD: 22 % (ref 24–44)
Lab: NORMAL
MAGNESIUM: 1.8 MG/DL (ref 1.6–2.6)
MCH RBC QN AUTO: 30.8 PG (ref 26–34)
MCHC RBC AUTO-ENTMCNC: 33.3 G/DL (ref 31–37)
MCV RBC AUTO: 92.6 FL (ref 80–100)
METHEMOGLOBIN: 0.9 % (ref 0–1.9)
METHEMOGLOBIN: 1 % (ref 0–1.9)
MODE: ABNORMAL
MODE: ABNORMAL
MONOCYTES # BLD: 7 % (ref 1–7)
MUCUS: ABNORMAL
NEGATIVE BASE EXCESS, ART: ABNORMAL MMOL/L (ref 0–2)
NEGATIVE BASE EXCESS, VEN: 1.5 MMOL/L (ref 0–2)
NEISSERIA MENIGITIDIS CSF FILM ARRAY: NOT DETECTED
NITRITE, URINE: POSITIVE
NOTIFICATION TIME: ABNORMAL
NOTIFICATION TIME: ABNORMAL
NOTIFICATION: ABNORMAL
NOTIFICATION: ABNORMAL
O2 DEVICE/FLOW/%: ABNORMAL
O2 DEVICE/FLOW/%: ABNORMAL
O2 SAT, ARTERIAL: 98.7 % (ref 95–98)
O2 SAT, VEN: 91.8 % (ref 60–85)
OTHER OBSERVATIONS UA: ABNORMAL
OXYHEMOGLOBIN: ABNORMAL % (ref 95–98)
OXYHEMOGLOBIN: ABNORMAL % (ref 95–98)
PARECHOVIRUS CSF FILM ARRAY: NOT DETECTED
PARTIAL THROMBOPLASTIN TIME: 21.3 SEC (ref 23–31)
PATIENT TEMP: 37
PATIENT TEMP: 37
PCO2 ARTERIAL: 40.7 MMHG (ref 35–45)
PCO2, ART, TEMP ADJ: ABNORMAL (ref 35–45)
PCO2, VEN, TEMP ADJ: ABNORMAL MMHG (ref 39–55)
PCO2, VEN: 27.2 (ref 39–55)
PDW BLD-RTO: 14 % (ref 11.5–14.9)
PEEP/CPAP: 5
PEEP/CPAP: ABNORMAL
PH ARTERIAL: 7.41 (ref 7.35–7.45)
PH UA: 6.5 (ref 5–8)
PH VENOUS: 7.51 (ref 7.32–7.42)
PH, ART, TEMP ADJ: ABNORMAL (ref 7.35–7.45)
PH, VEN, TEMP ADJ: ABNORMAL (ref 7.32–7.42)
PHENYTOIN DATE LAST DOSE: ABNORMAL
PHENYTOIN DOSE AMOUNT: ABNORMAL
PHENYTOIN DOSE TIME: ABNORMAL
PHENYTOIN FREE: <0.1 UG/ML (ref 1–2)
PHENYTOIN LEVEL: <0.8 UG/ML (ref 10–20)
PLATELET # BLD: 235 K/UL (ref 150–450)
PLATELET ESTIMATE: ABNORMAL
PMV BLD AUTO: 9.6 FL (ref 6–12)
PO2 ARTERIAL: 398 MMHG (ref 80–100)
PO2, ART, TEMP ADJ: ABNORMAL MMHG (ref 80–100)
PO2, VEN, TEMP ADJ: ABNORMAL MMHG (ref 30–50)
PO2, VEN: 115 (ref 30–50)
POSITIVE BASE EXCESS, ART: 0.9 MMOL/L (ref 0–2)
POSITIVE BASE EXCESS, VEN: ABNORMAL MMOL/L (ref 0–2)
POTASSIUM SERPL-SCNC: 4 MMOL/L (ref 3.7–5.3)
PRO-BNP: 229 PG/ML
PROTEIN CSF: 25 MG/DL (ref 15–45)
PROTEIN UA: NEGATIVE
PROTHROMBIN TIME: 11.4 SEC (ref 9.7–12)
PSV: ABNORMAL
PSV: ABNORMAL
PT. POSITION: ABNORMAL
PT. POSITION: ABNORMAL
RBC # BLD: 4.51 M/UL (ref 4–5.2)
RBC # BLD: ABNORMAL 10*6/UL
RBC CSF: 14 /MM3
RBC UA: ABNORMAL /HPF
RENAL EPITHELIAL, UA: ABNORMAL /HPF
RESPIRATORY RATE: 18
RESPIRATORY RATE: ABNORMAL
SALICYLATE LEVEL: <1 MG/DL (ref 3–10)
SAMPLE SITE: ABNORMAL
SAMPLE SITE: ABNORMAL
SEG NEUTROPHILS: 66 % (ref 36–66)
SEGMENTED NEUTROPHILS ABSOLUTE COUNT: 3.3 K/UL (ref 1.3–9.1)
SET RATE: 18
SET RATE: ABNORMAL
SODIUM BLD-SCNC: 142 MMOL/L (ref 135–144)
SOURCE: NORMAL
SPECIFIC GRAVITY UA: 1.01 (ref 1–1.03)
SPECIMEN DESCRIPTION: NORMAL
SPECIMEN DESCRIPTION: NORMAL
STATUS: NORMAL
STREPTOCOCCUS AGALACTIAE CSF FILM ARRAY: NOT DETECTED
STREPTOCOCCUS PNEUMONIAE CSF FILM ARRAY: NOT DETECTED
SUPERNAT COLOR CSF: COLORLESS
TEXT FOR RESPIRATORY: ABNORMAL
TEXT FOR RESPIRATORY: ABNORMAL
TOTAL HB: ABNORMAL G/DL (ref 12–16)
TOTAL HB: ABNORMAL G/DL (ref 12–16)
TOTAL PROTEIN: 6.6 G/DL (ref 6.4–8.3)
TOTAL RATE: 18
TOTAL RATE: ABNORMAL
TRICHOMONAS: ABNORMAL
TRICYCLIC ANTIDEP,URINE: NEGATIVE
TROPONIN INTERP: ABNORMAL
TROPONIN INTERP: ABNORMAL
TROPONIN T: 0.04 NG/ML
TROPONIN T: 0.11 NG/ML
TUBE NUMBER CSF: 3
TURBIDITY: ABNORMAL
URINE HGB: ABNORMAL
UROBILINOGEN, URINE: NORMAL
VARICELLA-ZOSTER CSF FILM ARRAY: NOT DETECTED
VOLUME CSF: 4.5
VT: 500
VT: ABNORMAL
WBC # BLD: 5 K/UL (ref 3.5–11)
WBC # BLD: ABNORMAL 10*3/UL
WBC CSF: 0 /MM3
WBC UA: ABNORMAL /HPF
XANTHOCHROMIA: ABNORMAL
YEAST: ABNORMAL

## 2018-01-14 PROCEDURE — 2580000003 HC RX 258: Performed by: FAMILY MEDICINE

## 2018-01-14 PROCEDURE — 2580000003 HC RX 258: Performed by: PSYCHIATRY & NEUROLOGY

## 2018-01-14 PROCEDURE — 99285 EMERGENCY DEPT VISIT HI MDM: CPT

## 2018-01-14 PROCEDURE — 0BH18EZ INSERTION OF ENDOTRACHEAL AIRWAY INTO TRACHEA, VIA NATURAL OR ARTIFICIAL OPENING ENDOSCOPIC: ICD-10-PCS | Performed by: INTERNAL MEDICINE

## 2018-01-14 PROCEDURE — 71045 X-RAY EXAM CHEST 1 VIEW: CPT

## 2018-01-14 PROCEDURE — 36620 INSERTION CATHETER ARTERY: CPT | Performed by: ANESTHESIOLOGY

## 2018-01-14 PROCEDURE — 87186 SC STD MICRODIL/AGAR DIL: CPT

## 2018-01-14 PROCEDURE — 82565 ASSAY OF CREATININE: CPT

## 2018-01-14 PROCEDURE — 6370000000 HC RX 637 (ALT 250 FOR IP): Performed by: EMERGENCY MEDICINE

## 2018-01-14 PROCEDURE — 80186 ASSAY OF PHENYTOIN FREE: CPT

## 2018-01-14 PROCEDURE — 82805 BLOOD GASES W/O2 SATURATION: CPT

## 2018-01-14 PROCEDURE — 87205 SMEAR GRAM STAIN: CPT

## 2018-01-14 PROCEDURE — 2500000003 HC RX 250 WO HCPCS

## 2018-01-14 PROCEDURE — 5A1945Z RESPIRATORY VENTILATION, 24-96 CONSECUTIVE HOURS: ICD-10-PCS | Performed by: INTERNAL MEDICINE

## 2018-01-14 PROCEDURE — 85610 PROTHROMBIN TIME: CPT

## 2018-01-14 PROCEDURE — 99223 1ST HOSP IP/OBS HIGH 75: CPT | Performed by: INTERNAL MEDICINE

## 2018-01-14 PROCEDURE — 87149 DNA/RNA DIRECT PROBE: CPT

## 2018-01-14 PROCEDURE — 86403 PARTICLE AGGLUT ANTBDY SCRN: CPT

## 2018-01-14 PROCEDURE — 83605 ASSAY OF LACTIC ACID: CPT

## 2018-01-14 PROCEDURE — 6360000002 HC RX W HCPCS: Performed by: EMERGENCY MEDICINE

## 2018-01-14 PROCEDURE — 82140 ASSAY OF AMMONIA: CPT

## 2018-01-14 PROCEDURE — 84157 ASSAY OF PROTEIN OTHER: CPT

## 2018-01-14 PROCEDURE — 36415 COLL VENOUS BLD VENIPUNCTURE: CPT

## 2018-01-14 PROCEDURE — 87086 URINE CULTURE/COLONY COUNT: CPT

## 2018-01-14 PROCEDURE — 2500000003 HC RX 250 WO HCPCS: Performed by: FAMILY MEDICINE

## 2018-01-14 PROCEDURE — 2580000003 HC RX 258: Performed by: EMERGENCY MEDICINE

## 2018-01-14 PROCEDURE — 36556 INSERT NON-TUNNEL CV CATH: CPT

## 2018-01-14 PROCEDURE — 82800 BLOOD PH: CPT

## 2018-01-14 PROCEDURE — G0480 DRUG TEST DEF 1-7 CLASSES: HCPCS

## 2018-01-14 PROCEDURE — 80185 ASSAY OF PHENYTOIN TOTAL: CPT

## 2018-01-14 PROCEDURE — 94002 VENT MGMT INPAT INIT DAY: CPT

## 2018-01-14 PROCEDURE — 87804 INFLUENZA ASSAY W/OPTIC: CPT

## 2018-01-14 PROCEDURE — 83735 ASSAY OF MAGNESIUM: CPT

## 2018-01-14 PROCEDURE — 83880 ASSAY OF NATRIURETIC PEPTIDE: CPT

## 2018-01-14 PROCEDURE — S0028 INJECTION, FAMOTIDINE, 20 MG: HCPCS | Performed by: FAMILY MEDICINE

## 2018-01-14 PROCEDURE — 87070 CULTURE OTHR SPECIMN AEROBIC: CPT

## 2018-01-14 PROCEDURE — 94762 N-INVAS EAR/PLS OXIMTRY CONT: CPT

## 2018-01-14 PROCEDURE — 6360000002 HC RX W HCPCS

## 2018-01-14 PROCEDURE — 6360000002 HC RX W HCPCS: Performed by: PSYCHIATRY & NEUROLOGY

## 2018-01-14 PROCEDURE — 85025 COMPLETE CBC W/AUTO DIFF WBC: CPT

## 2018-01-14 PROCEDURE — 70450 CT HEAD/BRAIN W/O DYE: CPT

## 2018-01-14 PROCEDURE — 87015 SPECIMEN INFECT AGNT CONCNTJ: CPT

## 2018-01-14 PROCEDURE — 87088 URINE BACTERIA CULTURE: CPT

## 2018-01-14 PROCEDURE — 2000000000 HC ICU R&B

## 2018-01-14 PROCEDURE — 84484 ASSAY OF TROPONIN QUANT: CPT

## 2018-01-14 PROCEDURE — 89050 BODY FLUID CELL COUNT: CPT

## 2018-01-14 PROCEDURE — 36600 WITHDRAWAL OF ARTERIAL BLOOD: CPT

## 2018-01-14 PROCEDURE — 85730 THROMBOPLASTIN TIME PARTIAL: CPT

## 2018-01-14 PROCEDURE — 36556 INSERT NON-TUNNEL CV CATH: CPT | Performed by: ANESTHESIOLOGY

## 2018-01-14 PROCEDURE — 87483 CNS DNA AMP PROBE TYPE 12-25: CPT

## 2018-01-14 PROCEDURE — 93005 ELECTROCARDIOGRAM TRACING: CPT

## 2018-01-14 PROCEDURE — 31500 INSERT EMERGENCY AIRWAY: CPT

## 2018-01-14 PROCEDURE — 83690 ASSAY OF LIPASE: CPT

## 2018-01-14 PROCEDURE — 82945 GLUCOSE OTHER FLUID: CPT

## 2018-01-14 PROCEDURE — 87040 BLOOD CULTURE FOR BACTERIA: CPT

## 2018-01-14 PROCEDURE — 80307 DRUG TEST PRSMV CHEM ANLYZR: CPT

## 2018-01-14 PROCEDURE — 02HV33Z INSERTION OF INFUSION DEVICE INTO SUPERIOR VENA CAVA, PERCUTANEOUS APPROACH: ICD-10-PCS | Performed by: INTERNAL MEDICINE

## 2018-01-14 PROCEDURE — 36620 INSERTION CATHETER ARTERY: CPT

## 2018-01-14 PROCEDURE — 62270 DX LMBR SPI PNXR: CPT

## 2018-01-14 PROCEDURE — 80053 COMPREHEN METABOLIC PANEL: CPT

## 2018-01-14 PROCEDURE — 81001 URINALYSIS AUTO W/SCOPE: CPT

## 2018-01-14 PROCEDURE — 87102 FUNGUS ISOLATION CULTURE: CPT

## 2018-01-14 PROCEDURE — 80177 DRUG SCRN QUAN LEVETIRACETAM: CPT

## 2018-01-14 PROCEDURE — 99291 CRITICAL CARE FIRST HOUR: CPT | Performed by: INTERNAL MEDICINE

## 2018-01-14 PROCEDURE — 94770 HC ETCO2 MONITOR DAILY: CPT

## 2018-01-14 PROCEDURE — 72125 CT NECK SPINE W/O DYE: CPT

## 2018-01-14 RX ORDER — AMLODIPINE BESYLATE 10 MG/1
10 TABLET ORAL DAILY
COMMUNITY
End: 2018-02-06 | Stop reason: SDUPTHER

## 2018-01-14 RX ORDER — CHLORHEXIDINE GLUCONATE 0.12 MG/ML
15 RINSE ORAL 2 TIMES DAILY
Status: DISCONTINUED | OUTPATIENT
Start: 2018-01-15 | End: 2018-01-17

## 2018-01-14 RX ORDER — ASPIRIN 300 MG/1
300 SUPPOSITORY RECTAL ONCE
Status: COMPLETED | OUTPATIENT
Start: 2018-01-14 | End: 2018-01-14

## 2018-01-14 RX ORDER — LEVETIRACETAM 500 MG/1
1000 TABLET ORAL 2 TIMES DAILY
Status: DISCONTINUED | OUTPATIENT
Start: 2018-01-14 | End: 2018-01-14

## 2018-01-14 RX ORDER — PHENYTOIN SODIUM 50 MG/ML
1000 INJECTION, SOLUTION INTRAMUSCULAR; INTRAVENOUS ONCE
Status: COMPLETED | OUTPATIENT
Start: 2018-01-14 | End: 2018-01-14

## 2018-01-14 RX ORDER — 0.9 % SODIUM CHLORIDE 0.9 %
1000 INTRAVENOUS SOLUTION INTRAVENOUS ONCE
Status: COMPLETED | OUTPATIENT
Start: 2018-01-14 | End: 2018-01-14

## 2018-01-14 RX ORDER — CIPROFLOXACIN 2 MG/ML
400 INJECTION, SOLUTION INTRAVENOUS ONCE
Status: DISCONTINUED | OUTPATIENT
Start: 2018-01-14 | End: 2018-01-14

## 2018-01-14 RX ORDER — FENTANYL CITRATE 50 UG/ML
25 INJECTION, SOLUTION INTRAMUSCULAR; INTRAVENOUS
Status: DISCONTINUED | OUTPATIENT
Start: 2018-01-14 | End: 2018-01-17

## 2018-01-14 RX ORDER — SODIUM CHLORIDE 0.9 % (FLUSH) 0.9 %
10 SYRINGE (ML) INJECTION PRN
Status: DISCONTINUED | OUTPATIENT
Start: 2018-01-14 | End: 2018-01-21 | Stop reason: HOSPADM

## 2018-01-14 RX ORDER — POTASSIUM CHLORIDE 20MEQ/15ML
40 LIQUID (ML) ORAL PRN
Status: DISCONTINUED | OUTPATIENT
Start: 2018-01-14 | End: 2018-01-15

## 2018-01-14 RX ORDER — LEVOTHYROXINE SODIUM 0.05 MG/1
50 TABLET ORAL DAILY
Status: DISCONTINUED | OUTPATIENT
Start: 2018-01-14 | End: 2018-01-14

## 2018-01-14 RX ORDER — LEVOTHYROXINE SODIUM ANHYDROUS 100 UG/5ML
25 INJECTION, POWDER, LYOPHILIZED, FOR SOLUTION INTRAVENOUS
Status: DISCONTINUED | OUTPATIENT
Start: 2018-01-15 | End: 2018-01-18

## 2018-01-14 RX ORDER — ACETAMINOPHEN 500 MG
1000 TABLET ORAL ONCE
Status: DISCONTINUED | OUTPATIENT
Start: 2018-01-14 | End: 2018-01-14

## 2018-01-14 RX ORDER — ALBUTEROL SULFATE 90 UG/1
4 AEROSOL, METERED RESPIRATORY (INHALATION) EVERY 6 HOURS PRN
Status: DISCONTINUED | OUTPATIENT
Start: 2018-01-14 | End: 2018-01-21 | Stop reason: HOSPADM

## 2018-01-14 RX ORDER — ETOMIDATE 2 MG/ML
INJECTION INTRAVENOUS
Status: DISCONTINUED
Start: 2018-01-14 | End: 2018-01-14 | Stop reason: WASHOUT

## 2018-01-14 RX ORDER — SODIUM CHLORIDE 0.9 % (FLUSH) 0.9 %
10 SYRINGE (ML) INJECTION EVERY 12 HOURS SCHEDULED
Status: DISCONTINUED | OUTPATIENT
Start: 2018-01-14 | End: 2018-01-21 | Stop reason: HOSPADM

## 2018-01-14 RX ORDER — DOPAMINE HYDROCHLORIDE 160 MG/100ML
2.5 INJECTION, SOLUTION INTRAVENOUS CONTINUOUS
Status: DISCONTINUED | OUTPATIENT
Start: 2018-01-14 | End: 2018-01-14

## 2018-01-14 RX ORDER — PHENYTOIN SODIUM 50 MG/ML
1000 INJECTION, SOLUTION INTRAMUSCULAR; INTRAVENOUS ONCE
Status: DISCONTINUED | OUTPATIENT
Start: 2018-01-14 | End: 2018-01-14

## 2018-01-14 RX ORDER — SODIUM CHLORIDE 9 MG/ML
INJECTION, SOLUTION INTRAVENOUS CONTINUOUS
Status: DISCONTINUED | OUTPATIENT
Start: 2018-01-14 | End: 2018-01-17

## 2018-01-14 RX ORDER — POTASSIUM CHLORIDE 20 MEQ/1
40 TABLET, EXTENDED RELEASE ORAL PRN
Status: DISCONTINUED | OUTPATIENT
Start: 2018-01-14 | End: 2018-01-15

## 2018-01-14 RX ORDER — ONDANSETRON 2 MG/ML
4 INJECTION INTRAMUSCULAR; INTRAVENOUS EVERY 6 HOURS PRN
Status: DISCONTINUED | OUTPATIENT
Start: 2018-01-14 | End: 2018-01-21 | Stop reason: HOSPADM

## 2018-01-14 RX ORDER — ASPIRIN 81 MG/1
324 TABLET, CHEWABLE ORAL ONCE
Status: DISCONTINUED | OUTPATIENT
Start: 2018-01-14 | End: 2018-01-14

## 2018-01-14 RX ORDER — DOCUSATE SODIUM 100 MG/1
100 CAPSULE, LIQUID FILLED ORAL 2 TIMES DAILY PRN
Status: DISCONTINUED | OUTPATIENT
Start: 2018-01-14 | End: 2018-01-21 | Stop reason: HOSPADM

## 2018-01-14 RX ORDER — POTASSIUM CHLORIDE 7.45 MG/ML
10 INJECTION INTRAVENOUS PRN
Status: DISCONTINUED | OUTPATIENT
Start: 2018-01-14 | End: 2018-01-15

## 2018-01-14 RX ADMIN — Medication 10 ML: at 22:00

## 2018-01-14 RX ADMIN — ACETAMINOPHEN 975 MG: 650 SUPPOSITORY RECTAL at 14:48

## 2018-01-14 RX ADMIN — MIDAZOLAM HYDROCHLORIDE 2 MG/HR: 5 INJECTION, SOLUTION INTRAMUSCULAR; INTRAVENOUS at 13:48

## 2018-01-14 RX ADMIN — Medication 1000 MG: at 21:47

## 2018-01-14 RX ADMIN — SODIUM CHLORIDE 1000 ML: 9 INJECTION, SOLUTION INTRAVENOUS at 16:30

## 2018-01-14 RX ADMIN — CEFTRIAXONE SODIUM 2 G: 2 INJECTION, POWDER, FOR SOLUTION INTRAMUSCULAR; INTRAVENOUS at 14:51

## 2018-01-14 RX ADMIN — SODIUM CHLORIDE 1000 ML: 9 INJECTION, SOLUTION INTRAVENOUS at 13:15

## 2018-01-14 RX ADMIN — ASPIRIN 300 MG: 300 SUPPOSITORY RECTAL at 14:49

## 2018-01-14 RX ADMIN — PHENYTOIN SODIUM 1000 MG: 50 INJECTION INTRAMUSCULAR; INTRAVENOUS at 15:02

## 2018-01-14 RX ADMIN — ACYCLOVIR SODIUM 785 MG: 50 INJECTION, SOLUTION INTRAVENOUS at 16:01

## 2018-01-14 RX ADMIN — VANCOMYCIN HYDROCHLORIDE 1250 MG: 10 INJECTION, POWDER, LYOPHILIZED, FOR SOLUTION INTRAVENOUS at 15:56

## 2018-01-14 RX ADMIN — FAMOTIDINE 20 MG: 10 INJECTION, SOLUTION INTRAVENOUS at 22:04

## 2018-01-14 ASSESSMENT — PULMONARY FUNCTION TESTS
PIF_VALUE: 10
PIF_VALUE: 18
PIF_VALUE: 17
PIF_VALUE: 11
PIF_VALUE: 15
PIF_VALUE: 19
PIF_VALUE: 18
PIF_VALUE: 16
PIF_VALUE: 23

## 2018-01-14 ASSESSMENT — PAIN SCALES - GENERAL: PAINLEVEL_OUTOF10: 0

## 2018-01-14 NOTE — ED PROVIDER NOTES
[address] [facility] ED  eMERGENCY dEPARTMENT eNCOUnter    Pt Name: Grace Kent  MRN: [id number]  YOB: 1955  Date of evaluation: 1/14/18  PCP: Chato Vicente MD    [address] [place] [address]       Chief Complaint   Patient presents with    Altered Mental Status       HISTORY OF PRESENT ILLNESS    Grace Kent is a 58 y.o. female who presents With altered mental status. Patient has a history of seizures. She was found upstairs by family minimally responsive. EMS states that when they arrived patient was minimally responsive to painful stimuli. She did have 2 witnessed 5 minute seizures. Unsure how much time was in between each seizure. History is very limited. Unsure if there is any fall or trauma. EMS did give 2 doses of intranasal Versed prior to arrival.  Blood sugar was normal per EMS. Patient was found to be febrile by EMS. She does have a history of seizures and apparently takes Keppra and Dilantin. EMS states she has been minimally responsive to pain throughout entire transport. She has been nonverbal.  No other additional history able to be obtained. Family is not present. History is limited due to patient condition. REVIEW OF SYSTEMS       Unable to obtain due to patient condition. PAST MEDICAL HISTORY    has a past medical history of Ankle fracture, left; Anxiety; Arthritis; ETOH abuse; Frequency of urination; GERD (gastroesophageal reflux disease); Head injury; Headache(784.0); Hypertension; Hypotension; Hypothyroidism; Numbness and tingling; Osteoporosis; Peripheral vascular disease ([address] Pneumonia; Reflux; Seizures ([address] Shingles; Stroke [facility]); TIA (transient ischemic attack); Tremor; UTI (urinary tract infection); Varicose veins; and Wears glasses. SURGICAL HISTORY      has a past surgical history that includes Bunionectomy (Right); Tubal ligation; other surgical history (2/23/15); and Hammer toe surgery (Left, 07/06/2016).       CURRENT MEDICATIONS       Previous Medications distress. She has no wheezes. Abdominal: Soft. Bowel sounds are normal. She exhibits no distension. There is no tenderness. Musculoskeletal: She exhibits no edema. Lymphadenopathy:     She has no cervical adenopathy. Neurological: She appears lethargic. Unresponsive to verbal or painful stimuli. Not withdrawing or making any posturing movements to pain. Pupils are 3 mm and sluggish. No corneal response on my exam.   Skin: Skin is warm and dry. No rash noted. Nursing note and vitals reviewed. DIFFERENTIAL DIAGNOSIS/MDM:   Differential Diagnosis for Altered Mental Status:    Differential diagnosis is extensive, and includes but is not limited to: Ingestion, infectious, trauma, seizure, electrolytes, encephalopathy, insulin, opiates, uremia, toxins, tumor, thyrotoxicosis, psychiatric, sepsis, stroke, N/V, seizure, headache, elderly age, alcohol / drugs / toxidromes, signs of trauma    In this patient She is febrile and toxic in appearance. Unsure if she is postictal at this point or altered from some other reason. We'll get very broad workup including trauma workup with CT head and cervical spine, septic workup and metabolic workup. We'll also get cardiac workup. At this time patient is protecting her airway. Oxygen saturation was initially 80% on room air but now in the mid 90s on nonrebreather. She is breathing spontaneously. GCS is less than 8 however patient is clearly protecting her airway. We'll observe patient for a short time to see if she makes any improvement in case this is a postictal period or sequela from the Versed administration. If patient shows no improvement over the next several minutes we'll intubate for airway protection and decreased mentation. Sepsis alert also called due to tachycardia, fever and likely source of infection.       DIAGNOSTIC RESULTS     EKG: All EKG's are interpreted by the Emergency Department Physician who either signs or Co-signs this chart in

## 2018-01-14 NOTE — PROGRESS NOTES
[address] [facility]    HISTORY AND PHYSICAL EXAMINATION            Date:   1/14/2018  Patient name:  Mago Olson  Date of admission:  1/14/2018  1:00 PM  MRN:   [id number]  Account:  [de-identified]  YOB: 1955  PCP:    Dandre Asencio MD  Room:   A[facility]  Code Status:    Prior    Chief Complaint:     Chief Complaint   Patient presents with    Altered Mental Status       History Obtained From:     electronic medical record, reason patient could not give history:  altered mental status    History of Present Illness: The patient is a 58 y.o. female with PMH hypothyroidism, HTN, seizure d/o, urge incontinence, h/o CVA, GERD who presents with Altered Mental Status   and she is admitted to the hospital for the management of sepsis and seizures     Pt was found to be unresponsive at her home by EMS and per family, pt had 2 seizures 5 minutes each. She has h/o of seizure disorder on keppra. EMS administered versed x 2, reported temp 105F, FSBS 147. In the ED, pt had temp 103.4, RR 26, , /65, VBG 7.5/27.2/115/21.6, lactic acid 2.9, UA large leuk est and pos nitrites, keppra level wnl.  CT showed remote infarct right hemisphere. Pt was intubated, BCx2 taken, given 1 g dilantin, 1L bolus, started on versed drip and pt started on empiric coverage for meningitis with vanc/rocephin. Lumbar tap to be performed in ED. Past Medical History:     Past Medical History:   Diagnosis Date    Ankle fracture, left     Anxiety 2/4/2014    Arthritis     ETOH abuse     Frequency of urination ?  GERD (gastroesophageal reflux disease) 2/4/2014    Head injury     after seizure/stroke pt fell to fall and hit head-2004    Headache(784.0)     Hypertension     Hypotension ?     Hypothyroidism     Numbness and tingling     fingers    Osteoporosis     Peripheral vascular disease ([address]     Pneumonia     Reflux     Seizures (HCC) last one 2004    Shingles     Stroke [facility]) 1998    stroke and seizure together    TIA (transient ischemic attack)     1996    Tremor     UTI (urinary tract infection)     once    Varicose veins     Wears glasses         Past Surgical History:     Past Surgical History:   Procedure Laterality Date    BUNIONECTOMY Right     HAMMER TOE SURGERY Left 07/06/2016    ON 2ND, 3rd, 4th,  and  5TH TOES OF LEFT FOOT    OTHER SURGICAL HISTORY  2/23/15    orif left ankle with synthes and intraoperative c- arm    TUBAL LIGATION          Medications Prior to Admission:     Prior to Admission medications    Medication Sig Start Date End Date Taking? Authorizing Provider   levothyroxine (SYNTHROID) 50 MCG tablet Take 1 tablet by mouth daily 11/7/17   Jonathan Byrne MD   atorvastatin (LIPITOR) 40 MG tablet Take 1 tablet by mouth daily 11/7/17   Jonathan Byrne MD   hydrochlorothiazide (HYDRODIURIL) 12.5 MG tablet TAKE ONE TABLET BY MOUTH ONCE DAILY 11/7/17   Jonathan Byrne MD   PARoxetine (PAXIL) 30 MG tablet Take 1 tablet by mouth daily 10/30/17   Darren Buck MD   levETIRAcetam (KEPPRA) 1000 MG tablet Take 1 tablet by mouth 2 times daily 9/12/17   Darren Buck MD   SUMAtriptan (IMITREX) 100 MG tablet TAKE ONE TABLET BY MOUTH once daily as needed for migraine headache 8/1/17   Jonathan Byrne MD   aspirin EC 81 MG EC tablet Take 1 tablet by mouth daily 6/12/17   Matt Najjar, MD   omeprazole (PRILOSEC) 20 MG delayed release capsule Take 1 capsule by mouth daily 6/12/17   Matt Najjar, MD   oxybutynin (DITROPAN) 5 MG tablet Take 1 tablet by mouth 3 times daily 6/12/17   Matt Najjar, MD        Allergies:     Review of patient's allergies indicates no known allergies. Social History:     Tobacco:    reports that she has never smoked. She has never used smokeless tobacco.  Alcohol:      reports that she does not drink alcohol. Drug Use:  reports that she does not use drugs.     Family History: Family History   Problem Relation Age of Onset    Cervical Cancer Mother     Heart Disease Father     Heart Disease Maternal Grandfather     Heart Disease Paternal Grandfather        Review of Systems:     Positive and Negative as described in HPI. Review of Systems   Unable to perform ROS: Mental status change         Physical Exam:   BP (!) 88/49   Pulse 97   Temp 103.4 °F (39.7 °C) (Temporal)   Resp 18   Ht 5' 10\" (1.778 m)   Wt 173 lb (78.5 kg)   SpO2 97%   BMI 24.82 kg/m²   Temp (24hrs), Av.4 °F (39.7 °C), Min:103.4 °F (39.7 °C), Max:103.4 °F (39.7 °C)    No results for input(s): POCGLU in the last 72 hours. No intake or output data in the 24 hours ending 18 1529    Physical Exam   Constitutional:   Unresponsive female on vent   HENT:   Head: Normocephalic and atraumatic. Eyes: Pupils are equal, round, and reactive to light. Cardiovascular:   Tachycardic but no M/G/R   Pulmonary/Chest:   Vent sounds but otherwise CTAB   Abdominal: Soft. She exhibits no distension. Musculoskeletal: She exhibits no edema.   CR<2s   Neurological:   unresponsive   Skin: Skin is warm and dry.          Investigations:      Laboratory Testing:  Recent Results (from the past 24 hour(s))   CBC Auto Differential    Collection Time: 18  1:10 PM   Result Value Ref Range    WBC 5.0 3.5 - 11.0 k/uL    RBC 4.51 4.0 - 5.2 m/uL    Hemoglobin 13.9 12.0 - 16.0 g/dL    Hematocrit 41.7 36 - 46 %    MCV 92.6 80 - 100 fL    MCH 30.8 26 - 34 pg    MCHC 33.3 31 - 37 g/dL    RDW 14.0 11.5 - 14.9 %    Platelets 113 233 - 691 k/uL    MPV 9.6 6.0 - 12.0 fL    Differential Type NOT REPORTED     Seg Neutrophils 66 36 - 66 %    Lymphocytes 22 (L) 24 - 44 %    Monocytes 7 1 - 7 %    Eosinophils % 4 0 - 4 %    Basophils 1 0 - 2 %    Immature Granulocytes NOT REPORTED 0 %    Segs Absolute 3.30 1.3 - 9.1 k/uL    Absolute Lymph # 1.10 1.0 - 4.8 k/uL    Absolute Mono # 0.40 0.1 - 1.3 k/uL    Absolute Eos # 0.20 0.0 - 0.4 1:10 PM   Result Value Ref Range    Phenytoin Lvl <0.8 (L) 10.0 - 20.0 ug/mL    Phenytoin Dose Amount UNKNOWN     Phenytoin Date Last Dose UNKNOWN     Phenytoin Dose Time UNKNOWN    Lactic Acid    Collection Time: 01/14/18  1:10 PM   Result Value Ref Range    Lactic Acid 2.9 (H) 0.5 - 2.2 mmol/L   Blood Gas, Venous    Collection Time: 01/14/18  1:15 PM   Result Value Ref Range    pH, Olman 7.508 (H) 7.320 - 7.420    pCO2, Olman 27.2 (L) 39.0 - 55.0    pO2, Olman 115.0 (H) 30.0 - 50.0    HCO3, Venous 21.6 (L) 24.0 - 30.0 mmol/L    Positive Base Excess, Olman NOT REPORTED 0.0 - 2.0 mmol/L    Negative Base Excess, Olman 1.5 0.0 - 2.0 mmol/L    O2 Sat, Olman 91.8 (H) 60.0 - 85.0 %    Total Hb NOT REPORTED 12.0 - 16.0 g/dl    Oxyhemoglobin NOT REPORTED 95.0 - 98.0 %    Carboxyhemoglobin 7.5 (H) 0.0 - 5.0 %    Methemoglobin 0.9 0.0 - 1.9 %    Pt Temp 37.0     pH, Olman, Temp Adj NOT REPORTED 7.320 - 7.420    pCO2, Olman, Temp Adj NOT REPORTED 39.0 - 55.0 mmHg    pO2, Olman, Temp Adj NOT REPORTED 30.0 - 50.0 mmHg    O2 Device/Flow/% NOT REPORTED     Respiratory Rate NOT REPORTED     Panda Test NOT REPORTED     Sample Site NOT REPORTED     Pt.  Position NOT REPORTED     Mode NOT REPORTED     Set Rate NOT REPORTED     Total Rate NOT REPORTED     VT NOT REPORTED     FIO2 NOT REPORTED     Peep/Cpap NOT REPORTED     PSV NOT REPORTED     Text for Respiratory NOT REPORTED     NOTIFICATION NOT REPORTED     NOTIFICATION TIME NOT REPORTED    BLOOD GAS, ARTERIAL    Collection Time: 01/14/18  2:15 PM   Result Value Ref Range    pH, Arterial 7.407 7.350 - 7.450    pCO2, Arterial 40.7 35.0 - 45.0 mmHg    pO2, Arterial 398.0 (H) 80.0 - 100.0 mmHg    HCO3, Arterial 25.6 22.0 - 26.0 mmol/L    Positive Base Excess, Art 0.9 0.0 - 2.0 mmol/L    Negative Base Excess, Art NOT REPORTED 0.0 - 2.0 mmol/L    O2 Sat, Arterial 98.7 (H) 95 - 98 %    Total Hb NOT REPORTED 12.0 - 16.0 g/dl    Oxyhemoglobin NOT REPORTED 95.0 - 98.0 %    Carboxyhemoglobin 0.7 0.0 - 5.0 % ([phone]     Special Requests NOT REPORTED     Direct Exam PRESUMPTIVE NEGATIVE for Influenza A + B antigens. Direct Exam       PCR testing to confirm this result is available upon request.  Specimen will be    Direct Exam        saved in the laboratory for 7 days. Please call [phone] if PCR testing is    Direct Exam  indicated. Status FINAL 01/14/2018        Imaging/Diagnostics:  Ct Head Wo Contrast    Result Date: 1/14/2018  EXAMINATION: CT OF THE HEAD WITHOUT CONTRAST  1/14/2018 1:35 pm TECHNIQUE: CT of the head was performed without the administration of intravenous contrast. Dose modulation, iterative reconstruction, and/or weight based adjustment of the mA/kV was utilized to reduce the radiation dose to as low as reasonably achievable. COMPARISON: MR brain from 03/25/2013, noncontrast head CT performed on 02/27/2008 HISTORY: ORDERING SYSTEM PROVIDED HISTORY: [facility] TECHNOLOGIST PROVIDED HISTORY: Ordering Physician Provided Reason for Exam: Pt came to ER unresponsive, seizing at home unwitnessed, unknown is any injury, pt has had 2 episodes since under medical care EMT/Hospital. Acuity: Acute Type of Exam: Initial 60-year-old female with acute altered mental status ; came to a ER unresponsive and was seizing at home FINDINGS: BRAIN/VENTRICLES: Stable remote infarct involving the right cerebral hemisphere at the right frontal, right temporal, and right parietal lobes. This was present and is unchanged dating back to 02/27/2006. Mild diffuse prominence of the sulci, cisterns, and extra-axial spaces. Foramen magnum and 4th ventricle appear patent. Atherosclerotic calcification of the parasellar carotid arteries. No abnormal extra-axial fluid collection identified. No clear evidence for acute intracranial hemorrhage or midline shift. Underlying mild periventricular and subcortical white matter hypoattenuation, most consistent with mild chronic small vessel ischemic white matter disease.

## 2018-01-14 NOTE — H&P
in bilateral lower lobe opacities, right greater the left. This may represent atelectasis. Xr Chest Portable    Result Date: 1/14/2018  EXAMINATION: SINGLE VIEW OF THE CHEST 1/14/2018 1:36 pm COMPARISON: April 3, 2017 HISTORY: ORDERING SYSTEM PROVIDED HISTORY: [facility] TECHNOLOGIST PROVIDED HISTORY: Reason for exam:->AMS Ordering Physician Provided Reason for Exam: AMS. Acuity: Unknown Type of Exam: Unknown FINDINGS: Shallow inflation. The cardiomediastinal silhouette is unchanged in appearance. Sequela of old granulomatous disease in the mediastinum is noted. Linear opacities in the lung bases are noted. There is no consolidation, pneumothorax, or evidence of edema. No effusion is appreciated. The osseous structures are unchanged in appearance. Shallow lung inflation with findings consistent with subsegmental atelectasis in the lung bases.          Assessment :      Primary Problem  Septic shock [facility])    Active Hospital Problems    Diagnosis Date Noted    Altered mental status [R41.82] 01/14/2018    UTI (urinary tract infection) [N39.0] 01/14/2018    Septic shock ([facility] [address] [A41.9, R65.21] 01/14/2018    H/O: CVA (cerebrovascular accident) [Z86.73] 05/06/2014    GERD (gastroesophageal reflux disease) [K21.9] 02/04/2014    Seizure disorder ([facility] [address] [G40.909] 03/27/2012    Hypertension [I10] 03/27/2012    Urge incontinence [N39.41] 03/27/2012    Hypothyroidism [E03.9] 03/27/2012       Plan:     Patient status Admit as inpatient in the  Medical ICU    Septic shock 2/2 UTI or meningitis  - likely cause of seizures  - temp 103.4, RR 26, , /65, VBG 7.5/27.2/115/21.6, lactic acid 2.9, UA large leuk est and pos nitrites, flu neg, tropx 1 0.04  - pt on vent and versed  - lumbar tap in ED, f/u CSF studies  - IV vanc/rocephin  - 1L bolus, [email or web address]  - BCx2, f/u UCx  - trend trop, lactic acid  - pulm and neuro consult  - resp eval and treat   - I/Os    Elevated trop r/o ACS  - initial trop 0.04 -> 0.11  - CXR: interval inc in bilateral lower lobe opacities R greater than L  - cardio consult  - f/u EKG    Seizure disorder  - 5 min seizure x 2  - CT head: remote infarcts in right hemisphere, atherosclerosis of vessels, no bleed  - on keppra, keppra level wnl  - 1 g dilantin given  - versed drip  - neuro consult    HTN, hypothyroid  - hold PO meds    Consultations:   PHARMACY TO DOSE VANCOMYCIN  IP CONSULT TO NEUROLOGY  IP CONSULT TO INTERNAL MEDICINE  IP CONSULT TO PULMONOLOGY  IP CONSULT TO SOCIAL WORK  PHARMACY TO DOSE VANCOMYCIN    Patient is admitted as inpatient status because of co-morbidities listed above, severity of signs and symptoms as outlined, requirement for current medical therapies and most importantly because of direct risk to patient if care not provided in a hospital setting. Bettyjo Schilder, MD  1/14/2018  3:29 PM    Copy sent to Dr. Virl Closs, MD  Attending Physician Statement  Patient seen and examined  I have discussed the care of the patient, including pertinent history and exam findings,  with the resident. I have reviewed the key elements of all parts of the encounter with the resident. I agree with the assessment, plan and orders as documented by the resident. cc time >30 min    Caleb Pimentel

## 2018-01-14 NOTE — ANESTHESIA PROCEDURE NOTES
Central Venous Line:    A central venous line was placed using surface landmarks, in the ICU for the following indication(s): central venous access. 1/14/2018 3:52 PM1/14/2018 6:02 PM    Sterility preparation included the following: hand hygiene performed prior to procedure, maximum sterile barriers used and sterile technique used to drape from head to toe. The patient was placed in Trendelenburg position. The right subclavian vein was prepped. The site was prepped with Chloraprep. A 7.5 Fr (size), 20 (length), triple lumen was placed. During the procedure, the following specific steps were taken: target vein identified, needle advanced into vein and blood aspirated and guidewire advanced into vein. Post insertion care included: all ports aspirated, all ports flushed easily, guidewire removed intact, Biopatch applied, line sutured in place and dressing applied. During the procedure the patient experienced: patient tolerated procedure well with no complications.       Anesthesia type: local  Staffing  Anesthesiologist: Fanta Butler  Performed: Anesthesiologist   Preanesthetic Checklist  Completed: patient identified, site marked, surgical consent, pre-op evaluation, timeout performed, IV checked, risks and benefits discussed, monitors and equipment checked, anesthesia consent given, oxygen available and patient being monitored

## 2018-01-14 NOTE — PROGRESS NOTES
Pharmacy Note  Vancomycin Consult    Chavo Figueroa is a 58 y.o. female started on Vancomycin; consult received from Dr. Maria Luisa Troy to manage therapy. Also receiving the following antibiotics: rocephin, cipro. Patient Active Problem List   Diagnosis    Hypothyroidism    Hypertension    Se[facility] ([facility] [address]    Urge incontinence    Anxiety    GERD (gastroesophageal reflux disease)    H/O: CVA (cerebrovascular accident)    Headache    Hammer toe of left foot    Tremor of both hands       Allergies:  Review of patient's allergies indicates no known allergies. Temp max: 39.7    Recent Labs      01/14/18   1310   BUN  10       Recent Labs      01/14/18   1310   CREATININE  0.76       Recent Labs      01/14/18   1310   WBC  5.0       No intake or output data in the 24 hours ending 01/14/18 1421      Ht Readings from Last 1 Encounters:   01/14/18 5' 10\" (1.778 m)        Wt Readings from Last 1 Encounters:   01/14/18 173 lb (78.5 kg)         Body mass index is 24.82 kg/m². Estimated Creatinine Clearance: 83 mL/min (based on SCr of 0.76 mg/dL). Assessment/Plan:  Will initiate vancomycin 1250 mg IV every 12 hours. Timing of trough level will be determined based on culture results, renal function, and clinical response. Thank you for the consult. Will continue to follow.    Janes Alexander RPh  1/14/2018  2:22 PM

## 2018-01-15 ENCOUNTER — APPOINTMENT (OUTPATIENT)
Dept: MRI IMAGING | Age: 63
DRG: 720 | End: 2018-01-15
Payer: COMMERCIAL

## 2018-01-15 ENCOUNTER — APPOINTMENT (OUTPATIENT)
Dept: GENERAL RADIOLOGY | Age: 63
DRG: 720 | End: 2018-01-15
Payer: COMMERCIAL

## 2018-01-15 LAB
ABSOLUTE EOS #: 0 K/UL (ref 0–0.4)
ABSOLUTE IMMATURE GRANULOCYTE: ABNORMAL K/UL (ref 0–0.3)
ABSOLUTE LYMPH #: 1 K/UL (ref 1–4.8)
ABSOLUTE MONO #: 1.1 K/UL (ref 0.1–1.3)
ALLEN TEST: NORMAL
ANION GAP SERPL CALCULATED.3IONS-SCNC: 12 MMOL/L (ref 9–17)
BASOPHILS # BLD: 0 % (ref 0–2)
BASOPHILS ABSOLUTE: 0 K/UL (ref 0–0.2)
BUN BLDV-MCNC: 10 MG/DL (ref 8–23)
BUN/CREAT BLD: ABNORMAL (ref 9–20)
CALCIUM SERPL-MCNC: 6.9 MG/DL (ref 8.6–10.4)
CARBOXYHEMOGLOBIN: 0.8 %
CHLORIDE BLD-SCNC: 110 MMOL/L (ref 98–107)
CO2: 21 MMOL/L (ref 20–31)
CREAT SERPL-MCNC: 0.59 MG/DL (ref 0.5–0.9)
CULTURE: ABNORMAL
CULTURE: ABNORMAL
DIFFERENTIAL TYPE: ABNORMAL
EKG ATRIAL RATE: 136 BPM
EKG P AXIS: 156 DEGREES
EKG P-R INTERVAL: 104 MS
EKG Q-T INTERVAL: 314 MS
EKG QRS DURATION: 106 MS
EKG QTC CALCULATION (BAZETT): 472 MS
EKG R AXIS: 22 DEGREES
EKG T AXIS: -136 DEGREES
EKG VENTRICULAR RATE: 136 BPM
EOSINOPHILS RELATIVE PERCENT: 0 % (ref 0–4)
FIO2: 35
GFR AFRICAN AMERICAN: >60 ML/MIN
GFR NON-AFRICAN AMERICAN: >60 ML/MIN
GFR SERPL CREATININE-BSD FRML MDRD: ABNORMAL ML/MIN/{1.73_M2}
GFR SERPL CREATININE-BSD FRML MDRD: ABNORMAL ML/MIN/{1.73_M2}
GLUCOSE BLD-MCNC: 84 MG/DL (ref 70–99)
HCO3 ARTERIAL: 22.1 MMOL/L
HCT VFR BLD CALC: 33.8 % (ref 36–46)
HEMOGLOBIN: 11.2 G/DL (ref 12–16)
IMMATURE GRANULOCYTES: ABNORMAL %
LV EF: 55 %
LVEF MODALITY: NORMAL
LYMPHOCYTES # BLD: 12 % (ref 24–44)
Lab: ABNORMAL
MAGNESIUM: 1.5 MG/DL (ref 1.6–2.6)
MCH RBC QN AUTO: 30.5 PG (ref 26–34)
MCHC RBC AUTO-ENTMCNC: 33.2 G/DL (ref 31–37)
MCV RBC AUTO: 91.7 FL (ref 80–100)
METHEMOGLOBIN: 0.6 %
MODE: NORMAL
MONOCYTES # BLD: 14 % (ref 1–7)
NEGATIVE BASE EXCESS, ART: 1.7 MMOL/L (ref 0–2)
NOTIFICATION TIME: NORMAL
NOTIFICATION: NORMAL
O2 DEVICE/FLOW/%: NORMAL
O2 SAT, ARTERIAL: 94.6 %
ORGANISM: ABNORMAL
OXYHEMOGLOBIN: NORMAL % (ref 95–98)
PATIENT TEMP: 37
PCO2 ARTERIAL: 31.2 MMHG
PCO2, ART, TEMP ADJ: NORMAL
PDW BLD-RTO: 14.2 % (ref 11.5–14.9)
PEEP/CPAP: 5
PH ARTERIAL: 7.46
PH, ART, TEMP ADJ: NORMAL
PLATELET # BLD: 148 K/UL (ref 150–450)
PLATELET ESTIMATE: ABNORMAL
PMV BLD AUTO: 9.6 FL (ref 6–12)
PO2 ARTERIAL: 71.8 MMHG
PO2, ART, TEMP ADJ: NORMAL MMHG
POSITIVE BASE EXCESS, ART: NORMAL MMOL/L (ref 0–2)
POTASSIUM SERPL-SCNC: 3.1 MMOL/L (ref 3.7–5.3)
PSV: NORMAL
PT. POSITION: NORMAL
RBC # BLD: 3.69 M/UL (ref 4–5.2)
RBC # BLD: ABNORMAL 10*6/UL
RESPIRATORY RATE: NORMAL
SAMPLE SITE: NORMAL
SEG NEUTROPHILS: 74 % (ref 36–66)
SEGMENTED NEUTROPHILS ABSOLUTE COUNT: 5.7 K/UL (ref 1.3–9.1)
SET RATE: 18
SODIUM BLD-SCNC: 143 MMOL/L (ref 135–144)
SPECIMEN DESCRIPTION: ABNORMAL
SPECIMEN DESCRIPTION: ABNORMAL
STATUS: ABNORMAL
TEXT FOR RESPIRATORY: NORMAL
TOTAL HB: NORMAL G/DL (ref 12–16)
TOTAL RATE: 18
TROPONIN INTERP: ABNORMAL
TROPONIN T: 0.08 NG/ML
TSH SERPL DL<=0.05 MIU/L-ACNC: 1.5 MIU/L (ref 0.3–5)
VT: 500
WBC # BLD: 7.8 K/UL (ref 3.5–11)
WBC # BLD: ABNORMAL 10*3/UL

## 2018-01-15 PROCEDURE — 87899 AGENT NOS ASSAY W/OPTIC: CPT

## 2018-01-15 PROCEDURE — 6360000002 HC RX W HCPCS: Performed by: INTERNAL MEDICINE

## 2018-01-15 PROCEDURE — 6370000000 HC RX 637 (ALT 250 FOR IP): Performed by: INTERNAL MEDICINE

## 2018-01-15 PROCEDURE — 6360000002 HC RX W HCPCS: Performed by: EMERGENCY MEDICINE

## 2018-01-15 PROCEDURE — 87449 NOS EACH ORGANISM AG IA: CPT

## 2018-01-15 PROCEDURE — 87186 SC STD MICRODIL/AGAR DIL: CPT

## 2018-01-15 PROCEDURE — 82805 BLOOD GASES W/O2 SATURATION: CPT

## 2018-01-15 PROCEDURE — 6360000002 HC RX W HCPCS: Performed by: RADIOLOGY

## 2018-01-15 PROCEDURE — 80048 BASIC METABOLIC PNL TOTAL CA: CPT

## 2018-01-15 PROCEDURE — 2580000003 HC RX 258: Performed by: RADIOLOGY

## 2018-01-15 PROCEDURE — 86738 MYCOPLASMA ANTIBODY: CPT

## 2018-01-15 PROCEDURE — 2000000000 HC ICU R&B

## 2018-01-15 PROCEDURE — 2580000003 HC RX 258: Performed by: PSYCHIATRY & NEUROLOGY

## 2018-01-15 PROCEDURE — 97161 PT EVAL LOW COMPLEX 20 MIN: CPT

## 2018-01-15 PROCEDURE — 2500000003 HC RX 250 WO HCPCS: Performed by: INTERNAL MEDICINE

## 2018-01-15 PROCEDURE — 93306 TTE W/DOPPLER COMPLETE: CPT

## 2018-01-15 PROCEDURE — 99291 CRITICAL CARE FIRST HOUR: CPT | Performed by: INTERNAL MEDICINE

## 2018-01-15 PROCEDURE — 87205 SMEAR GRAM STAIN: CPT

## 2018-01-15 PROCEDURE — 94762 N-INVAS EAR/PLS OXIMTRY CONT: CPT

## 2018-01-15 PROCEDURE — G8979 MOBILITY GOAL STATUS: HCPCS

## 2018-01-15 PROCEDURE — 83735 ASSAY OF MAGNESIUM: CPT

## 2018-01-15 PROCEDURE — 2500000003 HC RX 250 WO HCPCS: Performed by: FAMILY MEDICINE

## 2018-01-15 PROCEDURE — S0028 INJECTION, FAMOTIDINE, 20 MG: HCPCS | Performed by: FAMILY MEDICINE

## 2018-01-15 PROCEDURE — G8978 MOBILITY CURRENT STATUS: HCPCS

## 2018-01-15 PROCEDURE — 94003 VENT MGMT INPAT SUBQ DAY: CPT

## 2018-01-15 PROCEDURE — 2580000003 HC RX 258: Performed by: EMERGENCY MEDICINE

## 2018-01-15 PROCEDURE — 2580000003 HC RX 258: Performed by: INTERNAL MEDICINE

## 2018-01-15 PROCEDURE — 87070 CULTURE OTHR SPECIMN AEROBIC: CPT

## 2018-01-15 PROCEDURE — 94770 HC ETCO2 MONITOR DAILY: CPT

## 2018-01-15 PROCEDURE — 86403 PARTICLE AGGLUT ANTBDY SCRN: CPT

## 2018-01-15 PROCEDURE — 36600 WITHDRAWAL OF ARTERIAL BLOOD: CPT

## 2018-01-15 PROCEDURE — 6360000002 HC RX W HCPCS: Performed by: FAMILY MEDICINE

## 2018-01-15 PROCEDURE — 36415 COLL VENOUS BLD VENIPUNCTURE: CPT

## 2018-01-15 PROCEDURE — 71045 X-RAY EXAM CHEST 1 VIEW: CPT

## 2018-01-15 PROCEDURE — 2580000003 HC RX 258: Performed by: FAMILY MEDICINE

## 2018-01-15 PROCEDURE — 6360000002 HC RX W HCPCS: Performed by: PSYCHIATRY & NEUROLOGY

## 2018-01-15 PROCEDURE — 85025 COMPLETE CBC W/AUTO DIFF WBC: CPT

## 2018-01-15 PROCEDURE — 84443 ASSAY THYROID STIM HORMONE: CPT

## 2018-01-15 PROCEDURE — 84484 ASSAY OF TROPONIN QUANT: CPT

## 2018-01-15 RX ORDER — ACETAMINOPHEN 650 MG/1
650 SUPPOSITORY RECTAL EVERY 4 HOURS PRN
Status: DISCONTINUED | OUTPATIENT
Start: 2018-01-15 | End: 2018-01-21 | Stop reason: HOSPADM

## 2018-01-15 RX ORDER — MAGNESIUM SULFATE 1 G/100ML
1 INJECTION INTRAVENOUS ONCE
Status: COMPLETED | OUTPATIENT
Start: 2018-01-15 | End: 2018-01-15

## 2018-01-15 RX ORDER — POTASSIUM CHLORIDE 7.45 MG/ML
10 INJECTION INTRAVENOUS PRN
Status: DISCONTINUED | OUTPATIENT
Start: 2018-01-15 | End: 2018-01-18

## 2018-01-15 RX ORDER — 0.9 % SODIUM CHLORIDE 0.9 %
1000 INTRAVENOUS SOLUTION INTRAVENOUS ONCE
Status: COMPLETED | OUTPATIENT
Start: 2018-01-15 | End: 2018-01-15

## 2018-01-15 RX ADMIN — LEVOTHYROXINE SODIUM ANHYDROUS 25 MCG: 100 INJECTION, POWDER, LYOPHILIZED, FOR SOLUTION INTRAVENOUS at 10:12

## 2018-01-15 RX ADMIN — Medication 15 ML: at 20:38

## 2018-01-15 RX ADMIN — MIDAZOLAM HYDROCHLORIDE 2 MG/HR: 5 INJECTION, SOLUTION INTRAMUSCULAR; INTRAVENOUS at 18:06

## 2018-01-15 RX ADMIN — Medication 1000 MG: at 10:12

## 2018-01-15 RX ADMIN — NOREPINEPHRINE BITARTRATE 2 MCG/MIN: 1 INJECTION, SOLUTION, CONCENTRATE INTRAVENOUS at 05:27

## 2018-01-15 RX ADMIN — VANCOMYCIN HYDROCHLORIDE 1250 MG: 1 INJECTION, POWDER, LYOPHILIZED, FOR SOLUTION INTRAVENOUS at 04:21

## 2018-01-15 RX ADMIN — ENOXAPARIN SODIUM 40 MG: 40 INJECTION SUBCUTANEOUS at 10:20

## 2018-01-15 RX ADMIN — POTASSIUM CHLORIDE: 149 INJECTION, SOLUTION, CONCENTRATE INTRAVENOUS at 10:12

## 2018-01-15 RX ADMIN — VANCOMYCIN HYDROCHLORIDE 1250 MG: 1 INJECTION, POWDER, LYOPHILIZED, FOR SOLUTION INTRAVENOUS at 18:49

## 2018-01-15 RX ADMIN — ACYCLOVIR SODIUM 850 MG: 50 INJECTION, SOLUTION INTRAVENOUS at 10:35

## 2018-01-15 RX ADMIN — SODIUM CHLORIDE: 9 INJECTION, SOLUTION INTRAVENOUS at 18:05

## 2018-01-15 RX ADMIN — PIPERACILLIN SODIUM,TAZOBACTAM SODIUM 3.38 G: 3; .375 INJECTION, POWDER, FOR SOLUTION INTRAVENOUS at 13:12

## 2018-01-15 RX ADMIN — Medication 10 ML: at 10:38

## 2018-01-15 RX ADMIN — Medication 1000 MG: at 20:57

## 2018-01-15 RX ADMIN — FAMOTIDINE 20 MG: 10 INJECTION, SOLUTION INTRAVENOUS at 20:41

## 2018-01-15 RX ADMIN — MAGNESIUM SULFATE HEPTAHYDRATE 1 G: 1 INJECTION, SOLUTION INTRAVENOUS at 06:37

## 2018-01-15 RX ADMIN — PIPERACILLIN SODIUM,TAZOBACTAM SODIUM 3.38 G: 3; .375 INJECTION, POWDER, FOR SOLUTION INTRAVENOUS at 20:38

## 2018-01-15 RX ADMIN — SODIUM CHLORIDE 1000 ML: 9 INJECTION, SOLUTION INTRAVENOUS at 13:14

## 2018-01-15 RX ADMIN — Medication 15 ML: at 01:47

## 2018-01-15 RX ADMIN — FAMOTIDINE 20 MG: 10 INJECTION, SOLUTION INTRAVENOUS at 10:20

## 2018-01-15 RX ADMIN — Medication 10 ML: at 20:41

## 2018-01-15 RX ADMIN — SODIUM CHLORIDE: 9 INJECTION, SOLUTION INTRAVENOUS at 04:21

## 2018-01-15 ASSESSMENT — PAIN SCALES - GENERAL
PAINLEVEL_OUTOF10: 0

## 2018-01-15 ASSESSMENT — PULMONARY FUNCTION TESTS
PIF_VALUE: 9
PIF_VALUE: 17
PIF_VALUE: 32
PIF_VALUE: 14
PIF_VALUE: 17
PIF_VALUE: 34
PIF_VALUE: 17
PIF_VALUE: 15
PIF_VALUE: 10
PIF_VALUE: 15
PIF_VALUE: 19
PIF_VALUE: 17
PIF_VALUE: 7
PIF_VALUE: 15
PIF_VALUE: 17
PIF_VALUE: 18
PIF_VALUE: 7
PIF_VALUE: 19

## 2018-01-15 NOTE — CONSULTS
throughout entire transport. She has been nonverbal.  No other additional history able to be obtained. Family is not present.     History is limited due to patient condition.     REVIEW OF SYSTEMS        Unable to obtain due to patient condition.     PAST MEDICAL HISTORY    has a past medical history of Ankle fracture, left; Anxiety; Arthritis; ETOH abuse; Frequency of urination; GERD (gastroesophageal reflux disease); Head injury; Headache(784.0); Hypertension; Hypotension; Hypothyroidism; Numbness and tingling; Osteoporosis; Peripheral vascular disease ([facility] [address] Pneumonia; Reflux; Seizures ([facility] [address] Shingles; Stroke [facility]); TIA (transient ischemic attack); Tremor; UTI (urinary tract infection); Varicose veins; and Wears glasses.        SURGICAL HISTORY      has a past surgical history that includes Bunionectomy (Right); Tubal ligation; other surgical history (2/23/15); and Hammer toe surgery (Left, 07/06/2016).             PHYSICAL EXAM:       BP (!) 119/94   Pulse 99   Temp 100.4 °F (38 °C) (Core)   Resp 22   Ht 5' 10\" (1.778 m)   Wt 187 lb 6.3 oz (85 kg)   SpO2 94%   BMI 26.89 kg/m²       LABS AND IMAGING:       Admission on 01/14/2018   No results displayed because visit has over 200 results. Results for Laurie Naik (MRN [id number]) as of 1/15/2018 16:01   Ref.  Range 1/14/2018 13:10   Acetaminophen Level Latest Ref Range: 10 - 30 ug/mL <10 (L)   Levetiracetam Latest Units: ug/mL 31   Phenytoin Lvl Latest Ref Range: 10.0 - 20.0 ug/mL <0.8 (L)   Phenytoin, Free Latest Ref Range: 1.0 - 2.0 ug/mL <2.6 (L)   Salicylate Lvl Latest Ref Range: 3 - 10 mg/dL <1 (L)     Radiology Review:  Ct Head Wo Contrast    Result Date: 1/14/2018  EXAMINATION: CT OF THE HEAD WITHOUT CONTRAST  1/14/2018 1:35 pm TECHNIQUE: CT of the head was performed without the administration of intravenous contrast. Dose modulation, iterative reconstruction, and/or weight based adjustment of the mA/kV was utilized to reduce the radiation assuming there are no contraindications to MRI. 2. No clear evidence for acute intracranial hemorrhage or midline shift. 3. Soft tissue swelling of the left eyelid. Please correlate with direct visualization/ exam.     Ct Cervical Spine Wo Contrast    Result Date: 1/14/2018  EXAMINATION: CT OF THE CERVICAL SPINE WITHOUT CONTRAST 1/14/2018 1:35 pm TECHNIQUE: CT of the cervical spine was performed without the administration of intravenous contrast. Multiplanar reformatted images are provided for review. Dose modulation, iterative reconstruction, and/or weight based adjustment of the mA/kV was utilized to reduce the radiation dose to as low as reasonably achievable. COMPARISON: CT brain today and MRI brain March 25, 2013 HISTORY: ORDERING SYSTEM PROVIDED HISTORY: AMS, ?fall TECHNOLOGIST PROVIDED HISTORY: Ordering Physician Provided Reason for Exam: Pt came to ER unresponsive, seizing at home unwitnessed, unknown is any injury, pt has had 2 episodes since under medical care EMT/Hospital. Acuity: Acute Type of Exam: Initial FINDINGS: BONES/ALIGNMENT: There is no evidence of an acute cervical spine fracture. There is normal alignment of the cervical spine. DEGENERATIVE CHANGES: Severe degenerative disc disease and marginal osteophyte formation is present throughout the cervical spine involving C5-6, C6-7 and to a less degree C7-T1. Degenerative endplate changes with findings of chronicity of the superior endplate of T1 are noted. There is multilevel mild to moderate facet arthropathy, most notable at C4-5 on the left. SOFT TISSUES: There is no prevertebral soft tissue swelling. The visualized intracranial compartment reveals encephalomalacia in the right MCA territory, as seen on brain MRI in 2013. The visualized skull base and paranasal sinuses reveal no acute findings. Severe degenerative change is present in the temporomandibular joints. Carotid calcified atheromatous plaque is present.      1.   No acute

## 2018-01-15 NOTE — PROGRESS NOTES
[facility]    Progress Note    1/15/2018    9:57 AM    Name:   Jony Bowers  MRN:     [id number]     Acct:      [de-identified]   Room:   2002/2002-01  IP Day:  1  Admit Date:  1/14/2018  1:00 PM    PCP:   Ana M Ervin MD  Code Status:  Full Code    Subjective:     C/C:   Chief Complaint   Patient presents with    Altered Mental Status     Interval History Status: improved. Pt seen and examined at bedside. Pt responds to pain this morning. Levophed drip is at 4 mcg and MAP is stable. Pt has good UO. She has had Tmax 100.9 after transfer to ICU. On ventilator. Brief History:     The patient is a 58 y.o. female with PMH hypothyroidism, HTN, seizure d/o, urge incontinence, h/o CVA, GERD who presents with Altered Mental Status   and she is admitted to the hospital for the management of sepsis and seizures      Pt was found to be unresponsive at her home by EMS and per family, pt had 2 seizures 5 minutes each. She has h/o of seizure disorder on keppra. EMS administered versed x 2, reported temp 105F, FSBS 147. In the ED, pt had temp 103.4, RR 26, , /65, VBG 7.5/27.2/115/21.6, lactic acid 2.9, UA large leuk est and pos nitrites, keppra level wnl.  CT showed remote infarct right hemisphere. Pt was intubated, BCx2 taken, given 1 g dilantin, 1L bolus, started on versed drip and pt started on empiric coverage for meningitis with vanc/rocephin. Lumbar tap to be performed in ED. Review of Systems:     Review of Systems   Unable to perform ROS: Mental status change         Medications:      Allergies:  No Known Allergies    Current Meds:   Scheduled Meds:    piperacillin-tazobactam (ZOSYN) 3.375 g in dextrose 5% IVPB extended infusion (mini-bag)  3.375 g Intravenous Q8H    vancomycin (VANCOCIN) intermittent dosing (placeholder)   Other RX Placeholder    vancomycin  1,250 mg Intravenous Q12H    PARoxetine  30 mg Oral Daily    sodium chloride flush  10 mL Intravenous 2 times per day    famotidine (PEPCID) injection  20 mg Intravenous BID    enoxaparin  40 mg Subcutaneous Daily    acyclovir  10 mg/kg Intravenous Q8H    levothyroxine  25 mcg Intravenous QAM AC    levetiracetam  1,000 mg Intravenous Q12H    chlorhexidine  15 mL Mouth/Throat BID     Continuous Infusions:    IV infusion builder      midazolam 2 mg/hr (18 1740)    sodium chloride 100 mL/hr at 01/15/18 0421    norepinephrine 4 mcg/min (01/15/18 0714)     PRN Meds: acetaminophen, potassium chloride, sodium chloride flush, docusate sodium, ondansetron, albuterol sulfate HFA, fentanNYL    Data:     Past Medical History:   has a past medical history of Ankle fracture, left; Anxiety; Arthritis; ETOH abuse; Frequency of urination; GERD (gastroesophageal reflux disease); Head injury; Headache(784.0); Hypertension; Hypotension; Hypothyroidism; Numbness and tingling; Osteoporosis; Peripheral vascular disease ([facility] [address] Pneumonia; Reflux; Seizures ([facility] [address] Shingles; Stroke [facility]); TIA (transient ischemic attack); Tremor; UTI (urinary tract infection); Varicose veins; and Wears glasses. Social History:   reports that she has never smoked. She has never used smokeless tobacco. She reports that she does not drink alcohol or use drugs. Family History:   Family History   Problem Relation Age of Onset    Cervical Cancer Mother     Heart Disease Father     Heart Disease Maternal Grandfather     Heart Disease Paternal Grandfather        Vitals:  BP (!) 99/57   Pulse 99   Temp 100.6 °F (38.1 °C) (Core)   Resp 19   Ht 5' 10\" (1.778 m)   Wt 187 lb 6.3 oz (85 kg)   SpO2 92%   BMI 26.89 kg/m²   Temp (24hrs), Av.8 °F (37.7 °C), Min:97.8 °F (36.6 °C), Max:103.4 °F (39.7 °C)    No results for input(s): POCGLU in the last 72 hours. I/O (24Hr):     Intake/Output Summary (Last 24 hours) at 01/15/18 0934  Last data filed at 01/15/18 0456   Gross per 24 hour   Intake           2691.9 Visualized osseous structures remain unchanged. 1. Interval worsening of right basilar airspace disease, likely infiltrate/pneumonia, with small right-sided pleural effusion. Follow-up is recommended to document resolution. 2. Tubes and line as detailed above. Xr Chest Portable    Result Date: 1/15/2018  EXAMINATION: SINGLE VIEW OF THE CHEST 1/14/2018 6:41 pm COMPARISON: January 14, 2018. HISTORY: ORDERING SYSTEM PROVIDED HISTORY: CVC placement verification TECHNOLOGIST PROVIDED HISTORY: Reason for exam:->CVC placement verification Ordering Physician Provided Reason for Exam: CVC line placement Acuity: Acute Type of Exam: Initial FINDINGS: Tip of the endotracheal tube is at the level of the clavicular heads. Nasogastric tube courses below the diaphragm, with the tip not visualized. Tip of the right subclavian central venous catheter projects over the SVC. Right basilar opacity may represent atelectasis, pneumonia, and/or pleural effusion. Left lung appears clear. No evidence of pneumothorax. Cardiac and mediastinal contours are unchanged. 1. Tip of the right subclavian catheter projects over the SVC. Please note that there is a kinking of the subclavian catheter at the level of the mid right clavicle. 2. No evidence of pneumothorax. 3. Right basilar opacity may represent combination of atelectasis, pneumonia, and/or pleural effusion. Recommend radiographic follow-up to complete resolution. Xr Chest Portable    Result Date: 1/14/2018  EXAMINATION: SINGLE VIEW OF THE CHEST 1/14/2018 2:07 pm COMPARISON: Same day at 1:35 p.m. HISTORY: ORDERING SYSTEM PROVIDED HISTORY: intubation TECHNOLOGIST PROVIDED HISTORY: Reason for exam:->intubation Ordering Physician Provided Reason for Exam: et tube placement Acuity: Acute Type of Exam: Initial FINDINGS: The endotracheal tube terminates at the level of thoracic inlet, 7 cm above the german. This could be further advanced 2 cm for optimal placement.   An      Assessment:        Primary Problem  Septic shock [facility])    Active Hospital Problems    Diagnosis Date Noted    Altered mental status [R41.82] 01/14/2018    UTI (urinary tract infection) [N39.0] 01/14/2018    [facility] [A41.9, R65.21] 01/14/2018    Elevated troponin [R74.8] 01/14/2018    H/O: CVA (cerebrovascular accident) [Z86.73] 05/06/2014    GERD (gastroesophageal reflux disease) [K21.9] 02/04/2014    Seizure disorder ([facility] [G40.909] 03/27/2012    Hypertension [I10] 03/27/2012    Urge incontinence [N39.41] 03/27/2012    Hypothyroidism [E03.9] 03/27/2012       Plan:        Septic shock 2/2 UTI or PNA - stable MAP  - likely cause of seizures  - pt on vent and versed, right IJ line, receiving levophed 4 mcg/hr; Tmax 100.9 in ICU; UO 1L  - BCx x1: coag neg staph  - UCx: E. Coli >[id number]  - CXR: worsening R basilar dz concerning for PNA  - CSF studies do not show evidence of meningitis  - Lactic acid 2.9 -> 1.6  - IV vanc, start IV zosyn  - [email or web address]  - pulm consulted  - ID consult     Elevated trop  - initial trop 0.04 -> 0.11  - EKG: sinus tach, inferior infarct age undetermined  - f/u ECHO  - cardio consult: follow up 3rd troponin, most likely type 2 MI from sepsis and seizures     Seizure disorder  - 5 min seizure x 2  - CT head: remote infarcts in right hemisphere, atherosclerosis of vessels, no bleed  - on keppra, keppra level wnl  - neuro consult: IV keppra     Hypomagnesemia, hypokalemia  - Mg 1.5. K 3.1  - MgSO4 1g  - K replacement scale    HTN, hypothyroid  - hold PO meds  - IV levothyroxine  - f/u TSH    Bettyjo Schilder, MD  1/15/2018  9:57 AM   Attending Physician Statement  Patient seen and examined  I have discussed the care of the patient, including pertinent history and exam findings,  with the resident. I have reviewed the key elements of all parts of the encounter with the resident. I agree with the assessment, plan and orders as documented by the resident. cc time > 30

## 2018-01-15 NOTE — FLOWSHEET NOTE
01/14/18 1645   Vitals   Pulse 98   Resp 20   BP (!) 78/32   MAP (mmHg) 47     Residents notified regarding vitals, see orders.     Electronically signed by Marcellus Valentin RN on 1/14/2018 at 7:27 PM

## 2018-01-15 NOTE — PROGRESS NOTES
Pt admitted to ICU room 2. Pt hypotensive, fluid bolus administered (see MAR). Residents notified of continued hypotension.     Electronically signed by Karissa Barber RN on 1/14/2018 at 7:56 PM

## 2018-01-15 NOTE — PROGRESS NOTES
Dr. Froylan Vivas at bedside for arterial and central lines placement. Pt tolerated well. Discrepancy noted between BP cuff pressure and arterial line blood pressure readings. Residents notified, okay to titrate Levophed based on arterial line pressure.     Electronically signed by Yunier Patel RN on 1/14/2018 at 7:48 PM

## 2018-01-15 NOTE — FLOWSHEET NOTE
Prayer at bedside, patient vented sedated unable to respond and no family is present     01/15/18 1153   Encounter Summary   Services provided to: Patient   Referral/Consult From: [facility] Family members   Complexity of Encounter Low   Length of Encounter 15 minutes   Spiritual/Shinto   Type Spiritual support   Assessment Unable to respond   Intervention Prayer   Outcome Did not respond

## 2018-01-15 NOTE — PROGRESS NOTES
Physical Therapy    Facility/Department: [facility] ICU  Initial Assessment    NAME: Marcelino Logan  : 1955  MRN: [id number]    Date of Service: 1/15/2018    Patient Diagnosis(es): The primary encounter diagnosis was Altered mental status, unspecified altered mental status type. Diagnoses of Sepsis, due to unspecified organism [facility]), Acute cystitis without hematuria, Increased ammonia level, Elevated troponin, Seizure ([facility] [address] and Fever, unspecified fever cause were also pertinent to this visit. has a past medical history of Ankle fracture, left; Anxiety; Arthritis; ETOH abuse; Frequency of urination; GERD (gastroesophageal reflux disease); Head injury; Headache(784.0); Hypertension; Hypotension; Hypothyroidism; Numbness and tingling; Osteoporosis; Peripheral vascular disease ([facility] [address] Pneumonia; Reflux; Seizures ([facility] [address] Shingles; Stroke [facility]); TIA (transient ischemic attack); Tremor; UTI (urinary tract infection); Varicose veins; and Wears glasses. has a past surgical history that includes Bunionectomy (Right); Tubal ligation; other surgical history (2/23/15); and Hammer toe surgery (Left, 2016).     Restrictions  Restrictions/Precautions  Restrictions/Precautions: Fall Risk, Seizure (on vent, right subclavian catheter- art line, ET tube, (+) troponins 0.11, hx head injury 2004, hx CVA and alcohol abuse)  Implants present? : Metal implants (ORIF left ankle)  Vision/Hearing  Vision: Impaired  Vision Exceptions: Wears glasses at all times     Subjective  General  Patient assessed for rehabilitation services?: Yes  Response To Previous Treatment: Not applicable  Family / Caregiver Present: No  Referring Practitioner: Dr. Andrey Roblero  Referral Date : 18  Diagnosis: altered mental status, sepsis, acute cystitis, increased ammonia level, elevated troponins, seizure, fever  Follows Commands: Impaired (sedated and on vent)  Other (Comment): OK PER NURSE EPI W- proceed w/ PT evautaion but no ROM to right UE due to debilitation  Specific instructions for Next Treatment: 1-15-18 PROM bilateral LEs x 5-10 reps, sedated and on vent, NO ACTIVE ROM RIGHT UE DUE TO ART LINE  Prognosis: Fair  Decision Making: Low Complexity  History: pt was found unresponsive by family in upstairs bedroom  Exam: ROM  Clinical Presentation: bedside eval only for PROM as pt is sedated and on vent  Patient Education: PROM  Barriers to Learning: sedation for vent  REQUIRES PT FOLLOW UP: Yes  Activity Tolerance  Activity Tolerance: Patient limited by cognitive status (sedated and on vent)  PT Equipment Recommendations  Equipment Needed:  (undetermined)     Discharge Recommendations:  [address]  Times per week: 3-5 times per week/ 2 weeks  Times per day:  (3-5 times per week/ 2 weeks)  Specific instructions for Next Treatment: 1-15-18 PROM bilateral LEs x 5-10 reps, sedated and on vent, NO ACTIVE ROM RIGHT UE DUE TO ART LINE  Current Treatment Recommendations: ROM, Transfer Training, Functional Mobility Training, Patient/Caregiver Education & Training, Balance Training, Endurance Training, Safety Education & Training  Safety Devices  Type of devices: Left in bed    G-Code  PT G-Codes  Functional Assessment Tool Used: [place] Functional Outcomes  Score: 0  Functional Limitation: Mobility: Walking and moving around  Mobility: Walking and Moving Around Current Status (): 100 percent impaired, limited or restricted  Mobility: Walking and Moving Around Goal Status ():  At least 80 percent but less than 100 percent impaired, limited or restricted  OutComes Score                                           AM-PAC Score             Goals  Short term goals  Time Frame for Short term goals: 3-5 times per week/ 2 weeks  Short term goal 1: pt to tolerate PROM and progress AA/AROM bilateral LEs completeing 10-20 reps  Short term goal 2: pt to tolerate 1/2 hour of therapuetic exercise and activity  Short term goal 3: max x

## 2018-01-15 NOTE — PROGRESS NOTES
[facility]: DEVAN DE LEON   OCCUPATIONAL THERAPY MISSED TREATMENT NOTE   INPATIENT   Date: 1/15/18  Patient Name: Prabha Torres       Room:   MRN: [id number]   Account #: [de-identified]    : 1955  (64 y.o.)  Gender: female      REASON FOR MISSED TREATMENT:  Patient unable to participate   -   Other - Pt on a vent and in restraints. Defer OT evaluation until patient able to participate in functional activities.      Keenan Dominguez

## 2018-01-15 NOTE — FLOWSHEET NOTE
01/15/18 1345   Provider Notification   Reason for Communication Evaluate;New orders   Provider Name Dr Hazel Mon   Provider Notification Physician   Method of Communication Call   Response See orders   updated that patient left side and right leg responding to painful stimuli. Right upper extremity not responding to painful stimuli. Nothing in chart stating this is patients baseline (history of CVA). Family could not provide information about any CVA residual either. Patient intubated and on 2mg versed. Orders for EEG and continue to monitor at this time.

## 2018-01-15 NOTE — CONSULTS
Pulmonary & Critical Care Medicine ICU Consultation    [facility] / HPI:                The patient is a 58 y.o. female with significant past medical history of seizures, aclochol abuse, CVA, and pneumonia who presented to the ED yesterday s/p having 2 witnessed 5 minute seizures. Pt was found to be unresponsive at her home by EMS. She has h/o of seizure disorder on keppra. In the ED, pt had temp 103.4, RR 26, , /65, VBG 7.5/27.2/115/21.6, lactic acid 2.9, UA large leuk est and pos nitrites, keppra level wnl. Pt was intubated and is currently on vent. Blood culture 1/2  positive for coag neg  Staph, urine culture positive for E. coli. CXR shows right basilar airspace disease most likely pneumonia. Pt is on versed drip  At 2 mg/h and pt started on empiric Abx with ID consultation. Pt is now on  Levophed for septic shock with sources including pneumonia and UTI. Past Medical History:      Diagnosis Date    Ankle fracture, left     Anxiety 2/4/2014    Arthritis     ETOH abuse     Frequency of urination ?  GERD (gastroesophageal reflux disease) 2/4/2014    Head injury     after seizure/stroke pt fell to fall and hit head-2004    Headache(784.0)     Hypertension     Hypotension ?     Hypothyroidism     Numbness and tingling     fingers    Osteoporosis     Peripheral vascular disease ([address]     Pneumonia     Reflux     Seizures ([address]     last one 2004    Shingles     Stroke [facility]) 1998    stroke and seizure together    TIA (transient ischemic attack)     1996    Tremor     UTI (urinary tract infection)     once    Varicose veins     Wears glasses       Past Surgical History:        Procedure Laterality Date    BUNIONECTOMY Right     HAMMER TOE SURGERY Left 07/06/2016    ON 2ND, 3rd, 4th,  and  5TH TOES OF LEFT FOOT    OTHER SURGICAL HISTORY  2/23/15    orif left ankle with synthes and intraoperative c- arm    TUBAL LIGATION         Social History:    TOBACCO:

## 2018-01-16 ENCOUNTER — APPOINTMENT (OUTPATIENT)
Dept: GENERAL RADIOLOGY | Age: 63
DRG: 720 | End: 2018-01-16
Payer: COMMERCIAL

## 2018-01-16 LAB
ABSOLUTE EOS #: 0 K/UL (ref 0–0.4)
ABSOLUTE IMMATURE GRANULOCYTE: ABNORMAL K/UL (ref 0–0.3)
ABSOLUTE LYMPH #: 0.9 K/UL (ref 1–4.8)
ABSOLUTE MONO #: 0.9 K/UL (ref 0.1–1.3)
ALLEN TEST: ABNORMAL
ANION GAP SERPL CALCULATED.3IONS-SCNC: 11 MMOL/L (ref 9–17)
BAL DIFF COMMENT: ABNORMAL
BASOPHILS # BLD: 0 % (ref 0–2)
BASOPHILS ABSOLUTE: 0 K/UL (ref 0–0.2)
BUN BLDV-MCNC: 9 MG/DL (ref 8–23)
BUN/CREAT BLD: ABNORMAL (ref 9–20)
CALCIUM SERPL-MCNC: 6.7 MG/DL (ref 8.6–10.4)
CARBOXYHEMOGLOBIN: 0.8 %
CHLORIDE BLD-SCNC: 111 MMOL/L (ref 98–107)
CO2: 21 MMOL/L (ref 20–31)
COLUMNAR EPIS BAL: PRESENT
CREAT SERPL-MCNC: 0.49 MG/DL (ref 0.5–0.9)
DIFFERENTIAL TYPE: ABNORMAL
DIRECT EXAM: NORMAL
EKG ATRIAL RATE: 99 BPM
EKG P AXIS: 36 DEGREES
EKG P-R INTERVAL: 84 MS
EKG Q-T INTERVAL: 374 MS
EKG QRS DURATION: 94 MS
EKG QTC CALCULATION (BAZETT): 479 MS
EKG R AXIS: 31 DEGREES
EKG T AXIS: -87 DEGREES
EKG VENTRICULAR RATE: 99 BPM
EOSINOPHILS BAL: 2 % (ref 0–1)
EOSINOPHILS RELATIVE PERCENT: 0 % (ref 0–4)
FIO2: 35
GFR AFRICAN AMERICAN: >60 ML/MIN
GFR NON-AFRICAN AMERICAN: >60 ML/MIN
GFR SERPL CREATININE-BSD FRML MDRD: ABNORMAL ML/MIN/{1.73_M2}
GFR SERPL CREATININE-BSD FRML MDRD: ABNORMAL ML/MIN/{1.73_M2}
GLUCOSE BLD-MCNC: 99 MG/DL (ref 70–99)
HCO3 ARTERIAL: 21.9 MMOL/L
HCT VFR BLD CALC: 32.3 % (ref 36–46)
HEMOGLOBIN: 10.7 G/DL (ref 12–16)
IMMATURE GRANULOCYTES: ABNORMAL %
LYMPHOCYTES # BLD: 11 % (ref 24–44)
LYMPHOCYTES, BAL: 5 % (ref 8–12)
Lab: NORMAL
Lab: NORMAL
MACROPHAGES, BAL: 2 % (ref 85–95)
MAGNESIUM: 1.8 MG/DL (ref 1.6–2.6)
MCH RBC QN AUTO: 30.9 PG (ref 26–34)
MCHC RBC AUTO-ENTMCNC: 33.3 G/DL (ref 31–37)
MCV RBC AUTO: 92.8 FL (ref 80–100)
METHEMOGLOBIN: 0.5 %
MODE: ABNORMAL
MONOCYTES # BLD: 12 % (ref 1–7)
NEGATIVE BASE EXCESS, ART: 2.2 MMOL/L (ref 0–2)
NOTIFICATION TIME: ABNORMAL
NOTIFICATION: ABNORMAL
NRBC AUTOMATED: ABNORMAL PER 100 WBC
O2 DEVICE/FLOW/%: ABNORMAL
O2 SAT, ARTERIAL: 96.2 %
OXYHEMOGLOBIN: ABNORMAL % (ref 95–98)
PATIENT TEMP: 37
PCO2 ARTERIAL: 31.6 MMHG
PCO2, ART, TEMP ADJ: ABNORMAL
PDW BLD-RTO: 14.4 % (ref 11.5–14.9)
PEEP/CPAP: 5
PH ARTERIAL: 7.45
PH, ART, TEMP ADJ: ABNORMAL
PLATELET # BLD: 125 K/UL (ref 150–450)
PLATELET ESTIMATE: ABNORMAL
PMV BLD AUTO: 9.7 FL (ref 6–12)
PO2 ARTERIAL: 83.2 MMHG
PO2, ART, TEMP ADJ: ABNORMAL MMHG
POSITIVE BASE EXCESS, ART: ABNORMAL MMOL/L (ref 0–2)
POTASSIUM SERPL-SCNC: 2.9 MMOL/L (ref 3.7–5.3)
POTASSIUM SERPL-SCNC: 3.6 MMOL/L (ref 3.7–5.3)
PSV: ABNORMAL
PT. POSITION: ABNORMAL
RBC # BLD: 3.48 M/UL (ref 4–5.2)
RBC # BLD: ABNORMAL 10*6/UL
RESPIRATORY RATE: 17
SAMPLE SITE: ABNORMAL
SEG NEUTROPHILS: 77 % (ref 36–66)
SEGMENTED NEUTROPHILS ABSOLUTE COUNT: 5.8 K/UL (ref 1.3–9.1)
SEGMENTED NEUTROPHILS, BAL: 91 % (ref 0–10)
SET RATE: 14
SODIUM BLD-SCNC: 143 MMOL/L (ref 135–144)
SPECIMEN DESCRIPTION: NORMAL
STATUS: NORMAL
STATUS: NORMAL
TEXT FOR RESPIRATORY: ABNORMAL
TOTAL HB: ABNORMAL G/DL (ref 12–16)
TOTAL RATE: 17
VANCOMYCIN TROUGH DATE LAST DOSE: NORMAL
VANCOMYCIN TROUGH DOSE AMOUNT: NORMAL
VANCOMYCIN TROUGH TIME LAST DOSE: 553
VANCOMYCIN TROUGH: 15.8 UG/ML (ref 10–20)
VT: 500
WBC # BLD: 7.6 K/UL (ref 3.5–11)
WBC # BLD: ABNORMAL 10*3/UL

## 2018-01-16 PROCEDURE — 99291 CRITICAL CARE FIRST HOUR: CPT | Performed by: INTERNAL MEDICINE

## 2018-01-16 PROCEDURE — 6360000002 HC RX W HCPCS: Performed by: INTERNAL MEDICINE

## 2018-01-16 PROCEDURE — 94003 VENT MGMT INPAT SUBQ DAY: CPT

## 2018-01-16 PROCEDURE — 80048 BASIC METABOLIC PNL TOTAL CA: CPT

## 2018-01-16 PROCEDURE — 87300 AG DETECTION POLYVAL IF: CPT

## 2018-01-16 PROCEDURE — 88108 CYTOPATH CONCENTRATE TECH: CPT

## 2018-01-16 PROCEDURE — 2500000003 HC RX 250 WO HCPCS: Performed by: INTERNAL MEDICINE

## 2018-01-16 PROCEDURE — 94762 N-INVAS EAR/PLS OXIMTRY CONT: CPT

## 2018-01-16 PROCEDURE — 2580000003 HC RX 258: Performed by: STUDENT IN AN ORGANIZED HEALTH CARE EDUCATION/TRAINING PROGRAM

## 2018-01-16 PROCEDURE — S0028 INJECTION, FAMOTIDINE, 20 MG: HCPCS | Performed by: FAMILY MEDICINE

## 2018-01-16 PROCEDURE — 6370000000 HC RX 637 (ALT 250 FOR IP): Performed by: INTERNAL MEDICINE

## 2018-01-16 PROCEDURE — 89051 BODY FLUID CELL COUNT: CPT

## 2018-01-16 PROCEDURE — 87205 SMEAR GRAM STAIN: CPT

## 2018-01-16 PROCEDURE — 80202 ASSAY OF VANCOMYCIN: CPT

## 2018-01-16 PROCEDURE — 87106 FUNGI IDENTIFICATION YEAST: CPT

## 2018-01-16 PROCEDURE — 85025 COMPLETE CBC W/AUTO DIFF WBC: CPT

## 2018-01-16 PROCEDURE — 94640 AIRWAY INHALATION TREATMENT: CPT

## 2018-01-16 PROCEDURE — 2000000000 HC ICU R&B

## 2018-01-16 PROCEDURE — 84132 ASSAY OF SERUM POTASSIUM: CPT

## 2018-01-16 PROCEDURE — 87206 SMEAR FLUORESCENT/ACID STAI: CPT

## 2018-01-16 PROCEDURE — 87102 FUNGUS ISOLATION CULTURE: CPT

## 2018-01-16 PROCEDURE — 2580000003 HC RX 258: Performed by: FAMILY MEDICINE

## 2018-01-16 PROCEDURE — 2580000003 HC RX 258: Performed by: INTERNAL MEDICINE

## 2018-01-16 PROCEDURE — 71045 X-RAY EXAM CHEST 1 VIEW: CPT

## 2018-01-16 PROCEDURE — 6360000002 HC RX W HCPCS: Performed by: FAMILY MEDICINE

## 2018-01-16 PROCEDURE — 2580000003 HC RX 258: Performed by: PSYCHIATRY & NEUROLOGY

## 2018-01-16 PROCEDURE — 87015 SPECIMEN INFECT AGNT CONCNTJ: CPT

## 2018-01-16 PROCEDURE — 93005 ELECTROCARDIOGRAM TRACING: CPT

## 2018-01-16 PROCEDURE — 2500000003 HC RX 250 WO HCPCS: Performed by: FAMILY MEDICINE

## 2018-01-16 PROCEDURE — 87116 MYCOBACTERIA CULTURE: CPT

## 2018-01-16 PROCEDURE — 6360000002 HC RX W HCPCS: Performed by: EMERGENCY MEDICINE

## 2018-01-16 PROCEDURE — 36600 WITHDRAWAL OF ARTERIAL BLOOD: CPT

## 2018-01-16 PROCEDURE — 87140 CULTURE TYPE IMMUNOFLUORESC: CPT

## 2018-01-16 PROCEDURE — 87255 GENET VIRUS ISOLATE HSV: CPT

## 2018-01-16 PROCEDURE — 2580000003 HC RX 258: Performed by: EMERGENCY MEDICINE

## 2018-01-16 PROCEDURE — 0B9F8ZX DRAINAGE OF RIGHT LOWER LUNG LOBE, VIA NATURAL OR ARTIFICIAL OPENING ENDOSCOPIC, DIAGNOSTIC: ICD-10-PCS | Performed by: INTERNAL MEDICINE

## 2018-01-16 PROCEDURE — 87252 VIRUS INOCULATION TISSUE: CPT

## 2018-01-16 PROCEDURE — 6370000000 HC RX 637 (ALT 250 FOR IP): Performed by: FAMILY MEDICINE

## 2018-01-16 PROCEDURE — 82805 BLOOD GASES W/O2 SATURATION: CPT

## 2018-01-16 PROCEDURE — 6360000002 HC RX W HCPCS: Performed by: STUDENT IN AN ORGANIZED HEALTH CARE EDUCATION/TRAINING PROGRAM

## 2018-01-16 PROCEDURE — 6360000002 HC RX W HCPCS: Performed by: PSYCHIATRY & NEUROLOGY

## 2018-01-16 PROCEDURE — 87081 CULTURE SCREEN ONLY: CPT

## 2018-01-16 PROCEDURE — 3609027000 HC BRONCHOSCOPY: Performed by: INTERNAL MEDICINE

## 2018-01-16 PROCEDURE — 87070 CULTURE OTHR SPECIMN AEROBIC: CPT

## 2018-01-16 PROCEDURE — 99152 MOD SED SAME PHYS/QHP 5/>YRS: CPT | Performed by: INTERNAL MEDICINE

## 2018-01-16 PROCEDURE — 83735 ASSAY OF MAGNESIUM: CPT

## 2018-01-16 PROCEDURE — 94770 HC ETCO2 MONITOR DAILY: CPT

## 2018-01-16 PROCEDURE — 36415 COLL VENOUS BLD VENIPUNCTURE: CPT

## 2018-01-16 PROCEDURE — 97110 THERAPEUTIC EXERCISES: CPT

## 2018-01-16 RX ORDER — MIDAZOLAM HYDROCHLORIDE 1 MG/ML
INJECTION INTRAMUSCULAR; INTRAVENOUS PRN
Status: DISCONTINUED | OUTPATIENT
Start: 2018-01-16 | End: 2018-01-16 | Stop reason: HOSPADM

## 2018-01-16 RX ORDER — FENTANYL CITRATE 50 UG/ML
INJECTION, SOLUTION INTRAMUSCULAR; INTRAVENOUS PRN
Status: DISCONTINUED | OUTPATIENT
Start: 2018-01-16 | End: 2018-01-16 | Stop reason: HOSPADM

## 2018-01-16 RX ORDER — 0.9 % SODIUM CHLORIDE 0.9 %
500 INTRAVENOUS SOLUTION INTRAVENOUS ONCE
Status: COMPLETED | OUTPATIENT
Start: 2018-01-16 | End: 2018-01-16

## 2018-01-16 RX ORDER — POTASSIUM CHLORIDE 7.45 MG/ML
60 INJECTION INTRAVENOUS ONCE
Status: DISCONTINUED | OUTPATIENT
Start: 2018-01-16 | End: 2018-01-16

## 2018-01-16 RX ORDER — ALBUTEROL SULFATE 2.5 MG/3ML
2.5 SOLUTION RESPIRATORY (INHALATION) EVERY 6 HOURS PRN
Status: DISCONTINUED | OUTPATIENT
Start: 2018-01-16 | End: 2018-01-21 | Stop reason: HOSPADM

## 2018-01-16 RX ADMIN — FENTANYL CITRATE 50 MCG: 50 INJECTION INTRAMUSCULAR; INTRAVENOUS at 12:45

## 2018-01-16 RX ADMIN — SODIUM CHLORIDE 500 ML: 9 INJECTION, SOLUTION INTRAVENOUS at 16:42

## 2018-01-16 RX ADMIN — Medication 10 ML: at 20:42

## 2018-01-16 RX ADMIN — Medication 1000 MG: at 20:39

## 2018-01-16 RX ADMIN — FAMOTIDINE 20 MG: 10 INJECTION, SOLUTION INTRAVENOUS at 09:58

## 2018-01-16 RX ADMIN — VANCOMYCIN HYDROCHLORIDE 1250 MG: 1 INJECTION, POWDER, LYOPHILIZED, FOR SOLUTION INTRAVENOUS at 16:42

## 2018-01-16 RX ADMIN — Medication 15 ML: at 20:42

## 2018-01-16 RX ADMIN — PAROXETINE HYDROCHLORIDE 30 MG: 20 TABLET, FILM COATED ORAL at 09:59

## 2018-01-16 RX ADMIN — FAMOTIDINE 20 MG: 10 INJECTION, SOLUTION INTRAVENOUS at 20:42

## 2018-01-16 RX ADMIN — POTASSIUM CHLORIDE: 2 INJECTION, SOLUTION, CONCENTRATE INTRAVENOUS at 11:31

## 2018-01-16 RX ADMIN — PIPERACILLIN SODIUM,TAZOBACTAM SODIUM 3.38 G: 3; .375 INJECTION, POWDER, FOR SOLUTION INTRAVENOUS at 18:21

## 2018-01-16 RX ADMIN — POTASSIUM CHLORIDE: 149 INJECTION, SOLUTION, CONCENTRATE INTRAVENOUS at 06:13

## 2018-01-16 RX ADMIN — Medication 15 ML: at 09:58

## 2018-01-16 RX ADMIN — PIPERACILLIN SODIUM,TAZOBACTAM SODIUM 3.38 G: 3; .375 INJECTION, POWDER, FOR SOLUTION INTRAVENOUS at 09:58

## 2018-01-16 RX ADMIN — Medication 10 ML: at 09:59

## 2018-01-16 RX ADMIN — PIPERACILLIN SODIUM,TAZOBACTAM SODIUM 3.38 G: 3; .375 INJECTION, POWDER, FOR SOLUTION INTRAVENOUS at 02:31

## 2018-01-16 RX ADMIN — VANCOMYCIN HYDROCHLORIDE 1250 MG: 1 INJECTION, POWDER, LYOPHILIZED, FOR SOLUTION INTRAVENOUS at 05:53

## 2018-01-16 RX ADMIN — LEVOTHYROXINE SODIUM ANHYDROUS 25 MCG: 100 INJECTION, POWDER, LYOPHILIZED, FOR SOLUTION INTRAVENOUS at 09:58

## 2018-01-16 RX ADMIN — Medication 1000 MG: at 11:28

## 2018-01-16 RX ADMIN — ENOXAPARIN SODIUM 40 MG: 40 INJECTION SUBCUTANEOUS at 09:58

## 2018-01-16 RX ADMIN — SODIUM CHLORIDE: 9 INJECTION, SOLUTION INTRAVENOUS at 04:13

## 2018-01-16 RX ADMIN — ALBUTEROL SULFATE 2.5 MG: 2.5 SOLUTION RESPIRATORY (INHALATION) at 15:29

## 2018-01-16 ASSESSMENT — PULMONARY FUNCTION TESTS
PIF_VALUE: 13
PIF_VALUE: 13
PIF_VALUE: 12
PIF_VALUE: 13
PIF_VALUE: 13
PIF_VALUE: 12
PIF_VALUE: 8
PIF_VALUE: 13
PIF_VALUE: 12
PIF_VALUE: 10
PIF_VALUE: 13
PIF_VALUE: 12
PIF_VALUE: 12
PIF_VALUE: 9
PIF_VALUE: 12
PIF_VALUE: 13
PIF_VALUE: 14
PIF_VALUE: 12
PIF_VALUE: 12
PIF_VALUE: 16
PIF_VALUE: 10
PIF_VALUE: 13
PIF_VALUE: 13
PIF_VALUE: 12
PIF_VALUE: 13
PIF_VALUE: 12
PIF_VALUE: 11
PIF_VALUE: 13
PIF_VALUE: 17
PIF_VALUE: 11
PIF_VALUE: 12
PIF_VALUE: 12
PIF_VALUE: 17
PIF_VALUE: 12
PIF_VALUE: 13

## 2018-01-16 ASSESSMENT — PAIN SCALES - GENERAL
PAINLEVEL_OUTOF10: 0
PAINLEVEL_OUTOF10: 5

## 2018-01-16 NOTE — CONSULTS
Keppra at this time. Her orders reflect the use of  Keppra only. We will follow clinically with you. MRI scan of the brain without and with contrast is recommended as well as  EEG is ordered for today.     Thank you for asking us to see the patient,        Live Bell    D: 01/15/2018 16:17:54       T: 01/15/2018 16:20:37     [email or web address]  Job#: [id number]     Doc#: [id number]    CC:  Odalis Houser MD

## 2018-01-16 NOTE — PROGRESS NOTES
1.1       Objective:   Vitals: /75   Pulse 85   Temp 99.4 °F (37.4 °C) (Axillary)   Resp 20   Ht 5' 10\" (1.778 m)   Wt 187 lb 6.3 oz (85 kg)   SpO2 99%   BMI 26.89 kg/m²   General appearance: intubated on sedation  HEENT: Head: Normocephalic, no lesions, without obvious abnormality.   Neck: no adenopathy, no carotid bruit, no JVD, supple, symmetrical, trachea midline and thyroid not enlarged, symmetric, no tenderness/mass/nodules  Lungs: clear to auscultation bilaterally  Heart: regular rate and rhythm, S1, S2 normal, no murmur, click, rub or gallop  Abdomen: soft, non-tender; bowel sounds normal; no masses,  no organomegaly  Extremities: extremities normal, atraumatic, no cyanosis or edema  Neurologic: Mental status: Alert, oriented, thought content appropriate    Echo 1/15/18-  EF > 55%, mild LVH, DD, LAD, MAC, Mild MR, Mild TR, elevated CVP      Assessment / Acute Cardiac Problems:   Patient Active Problem List:     Hypothyroidism     Hypertension     Seizure disorder ([facility]     Urge incontinence     Anxiety     GERD (gastroesophageal reflux disease)     H/O: CVA (cerebrovascular accident)     Headache     Hammer toe of left foot     Tremor of both hands     Altered mental status     UTI (urinary tract infection)     Septic shock (HCC)     Elevated troponin    - Elevated troponin- likely type 2 due to current medication condition- no trend to suggest ischemia  - Ac Resp failure  - Sepsis, hypotension, resolving  - Seizures  - AMS  - Echo preserved LV EF  - Abnormal ECG concerning for ischemia- however, there was some artifact that may affect interpretation     Plan of Treatment:   - repeat ECG today  - plan for possible stress testing when acute issues improve  - pulm/cc following- on vent and IV ABX  - neurology following- EEG/MRI planned  - will continue to follow    Thank you for allowing me to participate in the care of this patient, please do not hesitate to call if you have any

## 2018-01-16 NOTE — FLOWSHEET NOTE
Spoke back with Dr. Usama Perez and instructed to administer bolus per Dr. Milena Khalil.  Electronically signed by Chris Quinteros RN on 1/16/2018 at 5:44 PM

## 2018-01-16 NOTE — PROGRESS NOTES
ICU Progress Note (Vent)   Pulmonary and Critical Care Specialists    Patient - Kary Mcclellan,  Age - 58 y.o.    - 1955      Room Number -    MRN -  [id number]   [facility]  Date of Admission -  2018  1:00 PM    Events of Past 24 Hours   Pt sedated on vent, on 2 mg/h Versed drip  Opens eyes to voice, and following commands  Temp   Moderate tan secretions  No tube feed, no BM  Adequate urine output    Vitals    height is 5' 10\" (1.778 m) and weight is 187 lb 6.3 oz (85 kg). Her axillary temperature is 99.4 °F (37.4 °C). Her blood pressure is 126/75 and her pulse is 91. Her respiration is 19 and oxygen saturation is 100%. Temperature Range: Temp: 99.4 °F (37.4 °C) Temp  Av.4 °F (38 °C)  Min: 99.4 °F (37.4 °C)  Max: 101.3 °F (38.5 °C)  BP Range:  Systolic (93MJM), JVW:056 , Min:111 , FAL:909     Diastolic (95VLV), SNO:04, Min:59, Max:94    Pulse Range: Pulse  Av.9  Min: 82  Max: 103  Respiration Range: Resp  Av.5  Min: 16  Max: 24  Current Pulse Ox[de-identified]  SpO2: 100 %  24HR Pulse Ox Range:  SpO2  Av.4 %  Min: 93 %  Max: 100 %  Oxygen Amount and Delivery: O2 Flow Rate (L/min): 15 L/min      Wt Readings from Last 3 Encounters:   18 187 lb 6.3 oz (85 kg)   17 173 lb (78.5 kg)   17 164 lb (74.4 kg)     I/O     Intake/Output Summary (Last 24 hours) at 18 0841  Last data filed at 18 0421   Gross per 24 hour   Intake          4450.78 ml   Output             1350 ml   Net          3100.78 ml     I/O last 3 completed shifts: In: 4450.8 [I.V.:4300.8;  IV Piggyback:150]  Out: 1350 [Urine:1350]     DRAIN/TUBE OUTPUT:     Invasive Lines   ETT Day -   3  Arterial line - day 2    Mechanical Ventilation Data   SETTINGS (Comprehensive)  Vent Information  Ventilator Started: Yes  Ventilation Day(s): 1  Vent Type: Servo i  Vent Mode: PS/CPAP  Vt Ordered: 500 mL  Vt Exhaled: 518 mL  Rate Set: 12 bmp  Rate Measured: 21.1 br/min  Minute Volume: 11.5 Liters  Pressure Support: 7 cmH20  FiO2 : 30 %  Peak Inspiratory Pressure: 13 cmH2O  I:E Ratio: 1:1.33  Sensitivity: 5  PEEP/CPAP: 5  I Time/ I Time %: 1.3 s  High Pressure Alarm: 40 cmH2O  Low Minute Volume Alarm: 5 L/min  High Respiratory Rate: 32 br/min  Mean Airway Pressure: 7.8 cmH20  Plateau Pressure: 19 cmH20  Dynamic Compliance: 79 mL/cmH2O  Total PEEP: 10 cmH20  Additional Respiratory  Assessments  Pulse: 91  Resp: 19  SpO2: 100 %  End Tidal CO2: 22 (%)  Position: Semi-Crane's  HME (Heat moisture exchanger): Yes  Circuit Condensation: Drained  Oral Care: Lip moisturizer applied, Mouth swabbed, Mouth moisturizer, Mouth suctioned  Subglottic Suction Done?: Yes  Cuff Pressure (cm H2O): 22 cm H2O       ABGs: Lab Results   Component Value Date    PHART 7.448 01/16/2018    PO2ART 83.2 01/16/2018    DLS5RPM 31.6 01/16/2018       Lab Results   Component Value Date    MODE PRVC 01/16/2018         Medications   IV   midazolam 3 mg/hr (01/15/18 7177)    sodium chloride 100 mL/hr at 01/16/18 0413    norepinephrine Stopped (01/15/18 1317)      potassium chloride  60 mEq Intravenous Once    piperacillin-tazobactam (ZOSYN) 3.375 g in dextrose 5% IVPB extended infusion (mini-bag)  3.375 g Intravenous Q8H    vancomycin (VANCOCIN) intermittent dosing (placeholder)   Other RX Placeholder    vancomycin  1,250 mg Intravenous Q12H    PARoxetine  30 mg Oral Daily    sodium chloride flush  10 mL Intravenous 2 times per day    famotidine (PEPCID) injection  20 mg Intravenous BID    enoxaparin  40 mg Subcutaneous Daily    levothyroxine  25 mcg Intravenous QAM AC    levetiracetam  1,000 mg Intravenous Q12H    chlorhexidine  15 mL Mouth/Throat BID       Diet/Nutrition        Exam   VITALS    height is 5' 10\" (1.778 m) and weight is 187 lb 6.3 oz (85 kg). Her axillary temperature is 99.4 °F (37.4 °C). Her blood pressure is 126/75 and her pulse is 91.  Her respiration is 19 and oxygen saturation

## 2018-01-16 NOTE — FLOWSHEET NOTE
01/15/18 1935   Encounter Summary   Services provided to: Patient   Referral/Consult From: Rounding   Complexity of Encounter Low   Length of Encounter 15 minutes   Spiritual/Islam   Type Spiritual support   Assessment Unable to respond   Intervention Prayer   Outcome Did not respond

## 2018-01-17 ENCOUNTER — APPOINTMENT (OUTPATIENT)
Dept: MRI IMAGING | Age: 63
DRG: 720 | End: 2018-01-17
Payer: COMMERCIAL

## 2018-01-17 ENCOUNTER — APPOINTMENT (OUTPATIENT)
Dept: GENERAL RADIOLOGY | Age: 63
DRG: 720 | End: 2018-01-17
Payer: COMMERCIAL

## 2018-01-17 LAB
ABSOLUTE EOS #: 0.1 K/UL (ref 0–0.4)
ABSOLUTE IMMATURE GRANULOCYTE: ABNORMAL K/UL (ref 0–0.3)
ABSOLUTE LYMPH #: 1.2 K/UL (ref 1–4.8)
ABSOLUTE MONO #: 0.7 K/UL (ref 0.1–1.3)
ANION GAP SERPL CALCULATED.3IONS-SCNC: 11 MMOL/L (ref 9–17)
BASOPHILS # BLD: 1 % (ref 0–2)
BASOPHILS ABSOLUTE: 0 K/UL (ref 0–0.2)
BUN BLDV-MCNC: 8 MG/DL (ref 8–23)
BUN/CREAT BLD: ABNORMAL (ref 9–20)
CALCIUM IONIZED: 1.15 MMOL/L (ref 1.13–1.33)
CALCIUM SERPL-MCNC: 7 MG/DL (ref 8.6–10.4)
CHLORIDE BLD-SCNC: 112 MMOL/L (ref 98–107)
CO2: 18 MMOL/L (ref 20–31)
CREAT SERPL-MCNC: 0.43 MG/DL (ref 0.5–0.9)
CULTURE: ABNORMAL
CULTURE: NORMAL
CULTURE: NORMAL
DIFFERENTIAL TYPE: ABNORMAL
DIRECT EXAM: ABNORMAL
DIRECT EXAM: NORMAL
EKG ATRIAL RATE: 96 BPM
EKG P-R INTERVAL: 96 MS
EKG Q-T INTERVAL: 364 MS
EKG QRS DURATION: 94 MS
EKG QTC CALCULATION (BAZETT): 459 MS
EKG R AXIS: 32 DEGREES
EKG T AXIS: -33 DEGREES
EKG VENTRICULAR RATE: 96 BPM
EOSINOPHILS RELATIVE PERCENT: 1 % (ref 0–4)
GFR AFRICAN AMERICAN: >60 ML/MIN
GFR NON-AFRICAN AMERICAN: >60 ML/MIN
GFR SERPL CREATININE-BSD FRML MDRD: ABNORMAL ML/MIN/{1.73_M2}
GFR SERPL CREATININE-BSD FRML MDRD: ABNORMAL ML/MIN/{1.73_M2}
GLUCOSE BLD-MCNC: 92 MG/DL (ref 70–99)
HCT VFR BLD CALC: 33.9 % (ref 36–46)
HEMOGLOBIN: 11.1 G/DL (ref 12–16)
IMMATURE GRANULOCYTES: ABNORMAL %
LYMPHOCYTES # BLD: 18 % (ref 24–44)
Lab: ABNORMAL
Lab: NORMAL
MAGNESIUM: 1.8 MG/DL (ref 1.6–2.6)
MCH RBC QN AUTO: 30.6 PG (ref 26–34)
MCHC RBC AUTO-ENTMCNC: 32.9 G/DL (ref 31–37)
MCV RBC AUTO: 93 FL (ref 80–100)
MONOCYTES # BLD: 11 % (ref 1–7)
MYCOPLASMA PNEUMONIAE IGM: 0.86
NRBC AUTOMATED: ABNORMAL PER 100 WBC
ORGANISM: ABNORMAL
PDW BLD-RTO: 14.3 % (ref 11.5–14.9)
PLATELET # BLD: 134 K/UL (ref 150–450)
PLATELET ESTIMATE: ABNORMAL
PMV BLD AUTO: 9.9 FL (ref 6–12)
POTASSIUM SERPL-SCNC: 3.7 MMOL/L (ref 3.7–5.3)
RBC # BLD: 3.64 M/UL (ref 4–5.2)
RBC # BLD: ABNORMAL 10*6/UL
SEG NEUTROPHILS: 69 % (ref 36–66)
SEGMENTED NEUTROPHILS ABSOLUTE COUNT: 4.8 K/UL (ref 1.3–9.1)
SODIUM BLD-SCNC: 141 MMOL/L (ref 135–144)
SPECIMEN DESCRIPTION: ABNORMAL
SPECIMEN DESCRIPTION: ABNORMAL
SPECIMEN DESCRIPTION: NORMAL
SPECIMEN DESCRIPTION: NORMAL
STATUS: ABNORMAL
STATUS: NORMAL
WBC # BLD: 6.8 K/UL (ref 3.5–11)
WBC # BLD: ABNORMAL 10*3/UL

## 2018-01-17 PROCEDURE — 6360000002 HC RX W HCPCS: Performed by: INTERNAL MEDICINE

## 2018-01-17 PROCEDURE — 2060000000 HC ICU INTERMEDIATE R&B

## 2018-01-17 PROCEDURE — 85025 COMPLETE CBC W/AUTO DIFF WBC: CPT

## 2018-01-17 PROCEDURE — 2580000003 HC RX 258: Performed by: FAMILY MEDICINE

## 2018-01-17 PROCEDURE — 2580000003 HC RX 258: Performed by: PSYCHIATRY & NEUROLOGY

## 2018-01-17 PROCEDURE — 6360000002 HC RX W HCPCS: Performed by: PSYCHIATRY & NEUROLOGY

## 2018-01-17 PROCEDURE — 2580000003 HC RX 258: Performed by: EMERGENCY MEDICINE

## 2018-01-17 PROCEDURE — 2580000003 HC RX 258: Performed by: INTERNAL MEDICINE

## 2018-01-17 PROCEDURE — 83735 ASSAY OF MAGNESIUM: CPT

## 2018-01-17 PROCEDURE — 6370000000 HC RX 637 (ALT 250 FOR IP): Performed by: FAMILY MEDICINE

## 2018-01-17 PROCEDURE — 2580000003 HC RX 258: Performed by: RADIOLOGY

## 2018-01-17 PROCEDURE — 80048 BASIC METABOLIC PNL TOTAL CA: CPT

## 2018-01-17 PROCEDURE — 2500000003 HC RX 250 WO HCPCS: Performed by: INTERNAL MEDICINE

## 2018-01-17 PROCEDURE — S0028 INJECTION, FAMOTIDINE, 20 MG: HCPCS | Performed by: FAMILY MEDICINE

## 2018-01-17 PROCEDURE — 6360000004 HC RX CONTRAST MEDICATION: Performed by: INTERNAL MEDICINE

## 2018-01-17 PROCEDURE — 99233 SBSQ HOSP IP/OBS HIGH 50: CPT | Performed by: INTERNAL MEDICINE

## 2018-01-17 PROCEDURE — 94640 AIRWAY INHALATION TREATMENT: CPT

## 2018-01-17 PROCEDURE — 82330 ASSAY OF CALCIUM: CPT

## 2018-01-17 PROCEDURE — A9579 GAD-BASE MR CONTRAST NOS,1ML: HCPCS | Performed by: INTERNAL MEDICINE

## 2018-01-17 PROCEDURE — 6370000000 HC RX 637 (ALT 250 FOR IP): Performed by: RADIOLOGY

## 2018-01-17 PROCEDURE — 94762 N-INVAS EAR/PLS OXIMTRY CONT: CPT

## 2018-01-17 PROCEDURE — 71045 X-RAY EXAM CHEST 1 VIEW: CPT

## 2018-01-17 PROCEDURE — 6360000002 HC RX W HCPCS: Performed by: EMERGENCY MEDICINE

## 2018-01-17 PROCEDURE — 2500000003 HC RX 250 WO HCPCS: Performed by: FAMILY MEDICINE

## 2018-01-17 PROCEDURE — 70551 MRI BRAIN STEM W/O DYE: CPT

## 2018-01-17 PROCEDURE — 6360000002 HC RX W HCPCS: Performed by: FAMILY MEDICINE

## 2018-01-17 RX ORDER — SULFAMETHOXAZOLE AND TRIMETHOPRIM 800; 160 MG/1; MG/1
1 TABLET ORAL EVERY 12 HOURS SCHEDULED
Status: DISCONTINUED | OUTPATIENT
Start: 2018-01-17 | End: 2018-01-21 | Stop reason: HOSPADM

## 2018-01-17 RX ORDER — ASPIRIN 81 MG/1
81 TABLET ORAL DAILY
Status: DISCONTINUED | OUTPATIENT
Start: 2018-01-17 | End: 2018-01-21 | Stop reason: HOSPADM

## 2018-01-17 RX ORDER — AMLODIPINE BESYLATE 10 MG/1
10 TABLET ORAL DAILY
Status: DISCONTINUED | OUTPATIENT
Start: 2018-01-17 | End: 2018-01-21 | Stop reason: HOSPADM

## 2018-01-17 RX ORDER — SODIUM CHLORIDE 450 MG/100ML
INJECTION, SOLUTION INTRAVENOUS CONTINUOUS
Status: DISCONTINUED | OUTPATIENT
Start: 2018-01-17 | End: 2018-01-21 | Stop reason: HOSPADM

## 2018-01-17 RX ORDER — FUROSEMIDE 10 MG/ML
20 INJECTION INTRAMUSCULAR; INTRAVENOUS ONCE
Status: COMPLETED | OUTPATIENT
Start: 2018-01-17 | End: 2018-01-17

## 2018-01-17 RX ORDER — ATORVASTATIN CALCIUM 40 MG/1
40 TABLET, FILM COATED ORAL DAILY
Status: DISCONTINUED | OUTPATIENT
Start: 2018-01-17 | End: 2018-01-21 | Stop reason: HOSPADM

## 2018-01-17 RX ORDER — HYDROCHLOROTHIAZIDE 12.5 MG/1
12.5 CAPSULE, GELATIN COATED ORAL DAILY
Status: DISCONTINUED | OUTPATIENT
Start: 2018-01-17 | End: 2018-01-21 | Stop reason: HOSPADM

## 2018-01-17 RX ADMIN — ALBUTEROL SULFATE 2.5 MG: 2.5 SOLUTION RESPIRATORY (INHALATION) at 00:31

## 2018-01-17 RX ADMIN — LEVOTHYROXINE SODIUM ANHYDROUS 25 MCG: 100 INJECTION, POWDER, LYOPHILIZED, FOR SOLUTION INTRAVENOUS at 06:13

## 2018-01-17 RX ADMIN — ALBUTEROL SULFATE 2.5 MG: 2.5 SOLUTION RESPIRATORY (INHALATION) at 09:00

## 2018-01-17 RX ADMIN — PAROXETINE HYDROCHLORIDE 30 MG: 20 TABLET, FILM COATED ORAL at 08:41

## 2018-01-17 RX ADMIN — SODIUM CHLORIDE: 4.5 INJECTION, SOLUTION INTRAVENOUS at 08:54

## 2018-01-17 RX ADMIN — SODIUM CHLORIDE: 9 INJECTION, SOLUTION INTRAVENOUS at 01:21

## 2018-01-17 RX ADMIN — ASPIRIN 81 MG: 81 TABLET, COATED ORAL at 15:27

## 2018-01-17 RX ADMIN — FAMOTIDINE 20 MG: 10 INJECTION, SOLUTION INTRAVENOUS at 08:41

## 2018-01-17 RX ADMIN — FUROSEMIDE 20 MG: 10 INJECTION, SOLUTION INTRAVENOUS at 10:13

## 2018-01-17 RX ADMIN — SODIUM CHLORIDE: 4.5 INJECTION, SOLUTION INTRAVENOUS at 12:50

## 2018-01-17 RX ADMIN — Medication 10 ML: at 08:22

## 2018-01-17 RX ADMIN — FAMOTIDINE 20 MG: 10 INJECTION, SOLUTION INTRAVENOUS at 21:31

## 2018-01-17 RX ADMIN — Medication 1000 MG: at 08:22

## 2018-01-17 RX ADMIN — Medication 10 ML: at 08:21

## 2018-01-17 RX ADMIN — AMLODIPINE BESYLATE 10 MG: 10 TABLET ORAL at 15:27

## 2018-01-17 RX ADMIN — ENOXAPARIN SODIUM 40 MG: 40 INJECTION SUBCUTANEOUS at 08:41

## 2018-01-17 RX ADMIN — VANCOMYCIN HYDROCHLORIDE 1250 MG: 1 INJECTION, POWDER, LYOPHILIZED, FOR SOLUTION INTRAVENOUS at 05:08

## 2018-01-17 RX ADMIN — Medication 1000 MG: at 21:30

## 2018-01-17 RX ADMIN — PIPERACILLIN SODIUM,TAZOBACTAM SODIUM 3.38 G: 3; .375 INJECTION, POWDER, FOR SOLUTION INTRAVENOUS at 01:23

## 2018-01-17 RX ADMIN — ATORVASTATIN CALCIUM 40 MG: 40 TABLET, FILM COATED ORAL at 15:27

## 2018-01-17 RX ADMIN — PIPERACILLIN SODIUM,TAZOBACTAM SODIUM 3.38 G: 3; .375 INJECTION, POWDER, FOR SOLUTION INTRAVENOUS at 08:06

## 2018-01-17 RX ADMIN — GADOTERIDOL 18 ML: 279.3 INJECTION, SOLUTION INTRAVENOUS at 22:28

## 2018-01-17 RX ADMIN — SULFAMETHOXAZOLE AND TRIMETHOPRIM 1 TABLET: 800; 160 TABLET ORAL at 21:31

## 2018-01-17 RX ADMIN — HYDROCHLOROTHIAZIDE 12.5 MG: 12.5 CAPSULE ORAL at 15:27

## 2018-01-17 ASSESSMENT — PAIN SCALES - GENERAL
PAINLEVEL_OUTOF10: 0

## 2018-01-17 NOTE — PROGRESS NOTES
ICU Progress Note (Non-Vent)  Pulmonary and Critical Care Specialists    Patient - Joshua Mahmood,  Age - 58 y.o.    - 1955      Room Number -    MRN -  [id number]   Acct # - [de-identified]  Date of Admission -  2018  1:00 PM     FU: acute resp failure    Events of Past 24 Hours   Pt was extubated yesterday which was well tolerated. Satting well on 3.5L nasal canula  Pt remains with AMS and is confused  Straight cathed this morning with 350cc palmira urine  Little SOB earlier, not much cough  No fever, chills  On full liquid diet, no dysphagia reported    Vitals    height is 5' 10\" (1.778 m) and weight is 187 lb 6.3 oz (85 kg). Her oral temperature is 98.5 °F (36.9 °C). Her blood pressure is 124/79 and her pulse is 74. Her respiration is 16 and oxygen saturation is 81% (abnormal). Temperature Range: Temp: 98.5 °F (36.9 °C) Temp  Av.2 °F (37.3 °C)  Min: 98.4 °F (36.9 °C)  Max: 100.4 °F (38 °C)  BP Range:  Systolic (26XGX), PXU:775 , Min:95 , XUD:300     Diastolic (94NLQ), SGS:26, Min:54, Max:89    Pulse Range: Pulse  Av  Min: 65  Max: 107  Respiration Range: Resp  Av.4  Min: 12  Max: 28  Current Pulse Ox[de-identified]  SpO2: (!) 81 %  24HR Pulse Ox Range:  SpO2  Av.3 %  Min: 81 %  Max: 100 %  Oxygen Amount and Delivery: O2 Flow Rate (L/min): 3.5 L/min    Wt Readings from Last 3 Encounters:   18 187 lb 6.3 oz (85 kg)   17 173 lb (78.5 kg)   17 164 lb (74.4 kg)     I/O     Intake/Output Summary (Last 24 hours) at 18 0745  Last data filed at 18 0543   Gross per 24 hour   Intake          4216.51 ml   Output             1800 ml   Net          2416.51 ml       Invasive Lines   Extubated yesterday without complication. On 3.5L O2 nasal canula.      Medications      piperacillin-tazobactam (ZOSYN) 3.375 g in dextrose 5% IVPB extended infusion (mini-bag)  3.375 g Intravenous Q8H    vancomycin (VANCOCIN) intermittent dosing (placeholder)   Other RX Placeholder    vancomycin  1,250 mg Intravenous Q12H    PARoxetine  30 mg Oral Daily    sodium chloride flush  10 mL Intravenous 2 times per day    famotidine (PEPCID) injection  20 mg Intravenous BID    enoxaparin  40 mg Subcutaneous Daily    levothyroxine  25 mcg Intravenous QAM AC    levetiracetam  1,000 mg Intravenous Q12H    chlorhexidine  15 mL Mouth/Throat BID     albuterol, dexmedetomidine (PRECEDEX) IV infusion, acetaminophen, potassium chloride, sodium chloride flush, docusate sodium, ondansetron, albuterol sulfate HFA, fentanNYL  IV Drips/Infusions   dexmedetomidine (PRECEDEX) IV infusion      sodium chloride 100 mL/hr at 01/17/18 0121       Exam      Constitutional - Alert, oriented to name and place but unsure why she is here  General Appearance  well developed, well nourished  HEENT -normocephalic, atraumatic. PERRL  Lungs - Chest expands equally. Coarse breath sounds with diffuse wheezing  Cardiovascular - Heart sounds are normal.  normal rate and rhythm regular, no murmur, gallop or rub. Abdomen - soft, nontender, nondistended, no masses or organomegaly  Neurologic - CN II-XII are grossly intact. There are no focal motor or sensory deficits. Patellar reflexes symmetric.   Skin - no bruising or bleeding  Extremities - no cyanosis, clubbing or edema    Lab Results   CBC   Lab Results   Component Value Date    WBC 6.8 01/17/2018    RBC 3.64 01/17/2018    RBC 4.21 10/04/2011    HGB 11.1 01/17/2018    HCT 33.9 01/17/2018     01/17/2018     10/04/2011    MCV 93.0 01/17/2018    MCH 30.6 01/17/2018    MCHC 32.9 01/17/2018    RDW 14.3 01/17/2018    METASPCT 2 01/07/2017    LYMPHOPCT 18 01/17/2018    MONOPCT 11 01/17/2018    MYELOPCT 1 01/08/2017    BASOPCT 1 01/17/2018    MONOSABS 0.70 01/17/2018    LYMPHSABS 1.20 01/17/2018    EOSABS 0.10 01/17/2018    BASOSABS 0.00 01/17/2018    DIFFTYPE NOT REPORTED 01/17/2018       [facility]

## 2018-01-17 NOTE — FLOWSHEET NOTE
01/17/18 1330   Encounter Summary   Services provided to: Patient   Referral/Consult From: Rounding   Continue Visiting (1-17-18)   Complexity of Encounter Low   Length of Encounter 15 minutes   Spiritual/Nondenominational   Type Spiritual support   Assessment Approachable   Intervention Prayer;Sustaining presence/ Ministry of presence   Outcome Did not respond

## 2018-01-17 NOTE — OP NOTE
[address]                   [address] [facility], [address]                                 OPERATIVE REPORT    PATIENT NAME: Neda Paul                    :        1955  MED REC NO:   [id number]                              ROOM:       2002  ACCOUNT NO:   [de-identified]                           ADMIT DATE: 2018  PROVIDER:     Tony Everett        DATE OF PROCEDURE:  2018    PREPROCEDURE DIAGNOSIS:  Acute respiratory failure with hypoxia and  possible right lower lobe pneumonia. POSTPROCEDURE DIAGNOSIS:  Acute respiratory failure with hypoxia and  possible right lower lobe pneumonia. PROCEDURE:  Bedside bronchoscopy. SURGEON:  Tony Everett MD    ANESTHESIA:  Text. INDICATIONS:  Consent was obtained from Charanjit, her sister, she is also the  POA. Discussed with her over the phone, talked to her about the procedure  and side effects of bleeding, infection, lung collapse, and irregular heart  beats. Agreed to proceed. OPERATIVE PROCEDURE:  The patient was on vent and she was placed on 100%  FiO2. She is already on versed 3 mg per hour drip. We gave an extra 1 mg  bolus of versed as well as 50 mcg of fentanyl. The patient tolerated the  procedure well, was given lidocaine 1% through the procedure, like 2-4 mL. She had some small amount of thick secretion in the trachea. She has a  wide german and the right bronchial tree was explored first.  Significant  hyperemia, swelling and redness noted in the right middle lobe and right  lower lobe and a biopsy was obtained from the right lower lobe. The  patient has no bleeding, but she has hyperemia as well as easy to ooze with  any suctioning. A 15 mL of bloody fluid was obtained from the right lower  lobe and stent.   Left bronchial tree was explored and left upper lobe and  also the lingula and the _____ lower lobe, no obvious lesion at the  segmental level and lavage was

## 2018-01-17 NOTE — FLOWSHEET NOTE
01/17/18 0436   Provider Notification   Reason for Communication New orders   Provider Name Chinedu Mendez   Provider Notification Nurse Practitioner   Method of Communication Call   Response See orders   Notification Time 8098   Notified that pt has not urinated for over 12 hours and has been placed on the bedpan multiple times.

## 2018-01-17 NOTE — PROGRESS NOTES
Dr. Filipe Sylvester stated okay to complete pt's MRI as long as there are no contraindications even though EEG has not yet been completed. He will see patient tonight and re-evaluate EEG.     Electronically signed by Demetria Soulier, RN on 1/17/2018 at 4:24 PM

## 2018-01-17 NOTE — PROGRESS NOTES
[facility]   Progress Note                   Date:   1/17/2018  Patient name: Surendra Mittal  Date of admission:  1/14/2018  1:00 PM  MRN:   [id number]  YOB: 1955  PCP: Luisito Jackson MD    Reason for Admission:     Subjective:       Clinical Changes / Abnormalities: extubated yesterday after bronch. In bed, somewhat confused, but more alert than yesterday. Medications:   Scheduled Meds:   piperacillin-tazobactam (ZOSYN) 3.375 g in dextrose 5% IVPB extended infusion (mini-bag)  3.375 g Intravenous Q8H    vancomycin (VANCOCIN) intermittent dosing (placeholder)   Other RX Placeholder    vancomycin  1,250 mg Intravenous Q12H    PARoxetine  30 mg Oral Daily    sodium chloride flush  10 mL Intravenous 2 times per day    famotidine (PEPCID) injection  20 mg Intravenous BID    enoxaparin  40 mg Subcutaneous Daily    levothyroxine  25 mcg Intravenous QAM AC    levetiracetam  1,000 mg Intravenous Q12H     Continuous Infusions:   sodium chloride 40 mL/hr at 01/17/18 0915     CBC:   Recent Labs      01/15/18   0402  01/16/18   0437  01/17/18   0400   WBC  7.8  7.6  6.8   HGB  11.2*  10.7*  11.1*   PLT  148*  125*  134*     BMP:    Recent Labs      01/15/18   0402  01/16/18   0437  01/16/18   1847  01/17/18   0400   NA  143  143   --   141   K  3.1*  2.9*  3.6*  3.7   CL  110*  111*   --   112*   CO2  21  21   --   18*   BUN  10  9   --   8   CREATININE  0.59  0.49*   --   0.43*   GLUCOSE  84  99   --   92     Hepatic:   Recent Labs      01/14/18   1310   AST  30   ALT  18   BILITOT  0.56   ALKPHOS  82     Troponin: No results for input(s): TROPONINI in the last 72 hours. BNP: No results for input(s): BNP in the last 72 hours. Lipids: No results for input(s): CHOL, HDL in the last 72 hours.     Invalid input(s): LDLCALCU  INR:   Recent Labs      01/14/18   1310   INR  1.1       Objective:   Vitals: /79   Pulse 74   Temp 97.6 °F (36.4 °C) (Oral)   Resp 16   Ht 5' 10\"

## 2018-01-17 NOTE — PROGRESS NOTES
2416.51        Labs:    [unfilled]    Lab Results   Component Value Date/Time    SPECIAL NOT REPORTED 01/16/2018 01:44 PM     Lab Results   Component Value Date/Time    CULTURE Pending 01/16/2018 01:44 PM       [unfilled]    Radiology:    Ct Head Wo Contrast    Result Date: 1/14/2018  EXAMINATION: CT OF THE HEAD WITHOUT CONTRAST  1/14/2018 1:35 pm TECHNIQUE: CT of the head was performed without the administration of intravenous contrast. Dose modulation, iterative reconstruction, and/or weight based adjustment of the mA/kV was utilized to reduce the radiation dose to as low as reasonably achievable. COMPARISON: MR brain from 03/25/2013, noncontrast head CT performed on 02/27/2008 HISTORY: ORDERING SYSTEM PROVIDED HISTORY: [facility] TECHNOLOGIST PROVIDED HISTORY: Ordering Physician Provided Reason for Exam: Pt came to ER unresponsive, seizing at home unwitnessed, unknown is any injury, pt has had 2 episodes since under medical care EMT/Hospital. Acuity: Acute Type of Exam: Initial 58-year-old female with acute altered mental status ; came to a ER unresponsive and was seizing at home FINDINGS: BRAIN/VENTRICLES: Stable remote infarct involving the right cerebral hemisphere at the right frontal, right temporal, and right parietal lobes. This was present and is unchanged dating back to 02/27/2006. Mild diffuse prominence of the sulci, cisterns, and extra-axial spaces. Foramen magnum and 4th ventricle appear patent. Atherosclerotic calcification of the parasellar carotid arteries. No abnormal extra-axial fluid collection identified. No clear evidence for acute intracranial hemorrhage or midline shift. Underlying mild periventricular and subcortical white matter hypoattenuation, most consistent with mild chronic small vessel ischemic white matter disease. ORBITS: The visualized portion of the orbits demonstrate no acute abnormality.  SINUSES: The visualized paranasal sinuses and mastoid air cells demonstrate no acute

## 2018-01-17 NOTE — PROGRESS NOTES
[address] Neurology  Progress Note    Requesting Physician:  Ann Kaiser MD/Gaetano Deleon MD     CHIEF COMPLAINT:     Follow up for:      HISTORY OF PRESENT ILLNESS:                 The patient is a 58 y.o. female who presents with     Allergies:  Review of patient's allergies indicates no known allergies. Current Medications:   Scheduled Meds:   amLODIPine  10 mg Oral Daily    aspirin EC  81 mg Oral Daily    atorvastatin  40 mg Oral Daily    hydrochlorothiazide  12.5 mg Oral Daily    sulfamethoxazole-trimethoprim  1 tablet Oral 2 times per day    PARoxetine  30 mg Oral Daily    sodium chloride flush  10 mL Intravenous 2 times per day    famotidine (PEPCID) injection  20 mg Intravenous BID    enoxaparin  40 mg Subcutaneous Daily    levothyroxine  25 mcg Intravenous QAM AC    levetiracetam  1,000 mg Intravenous Q12H     Continuous Infusions:   sodium chloride 40 mL/hr at 01/17/18 1250     PRN Meds:.albuterol, acetaminophen, potassium chloride, sodium chloride flush, docusate sodium, ondansetron, albuterol sulfate HFA     REVIEW OF SYSTEMS:             PHYSICAL EXAM:       BP (!) 151/80   Pulse 90   Temp 98.5 °F (36.9 °C) (Oral)   Resp 17   Ht 5' 10\" (1.778 m)   Wt 187 lb 6.3 oz (85 kg)   SpO2 94%   BMI 26.89 kg/m²       LABS AND IMAGING:       Admission on 01/14/2018   No results displayed because visit has over 200 results. Radiology Review:  Ct Head Wo Contrast    Result Date: 1/14/2018  EXAMINATION: CT OF THE HEAD WITHOUT CONTRAST  1/14/2018 1:35 pm TECHNIQUE: CT of the head was performed without the administration of intravenous contrast. Dose modulation, iterative reconstruction, and/or weight based adjustment of the mA/kV was utilized to reduce the radiation dose to as low as reasonably achievable.  COMPARISON: MR brain from 03/25/2013, noncontrast head CT performed on 02/27/2008 HISTORY: ORDERING SYSTEM PROVIDED HISTORY: [facility] TECHNOLOGIST PROVIDED direct visualization/ exam.     Ct Cervical Spine Wo Contrast    Result Date: 1/14/2018  EXAMINATION: CT OF THE CERVICAL SPINE WITHOUT CONTRAST 1/14/2018 1:35 pm TECHNIQUE: CT of the cervical spine was performed without the administration of intravenous contrast. Multiplanar reformatted images are provided for review. Dose modulation, iterative reconstruction, and/or weight based adjustment of the mA/kV was utilized to reduce the radiation dose to as low as reasonably achievable. COMPARISON: CT brain today and MRI brain March 25, 2013 HISTORY: ORDERING SYSTEM PROVIDED HISTORY: AMS, ?fall TECHNOLOGIST PROVIDED HISTORY: Ordering Physician Provided Reason for Exam: Pt came to ER unresponsive, seizing at home unwitnessed, unknown is any injury, pt has had 2 episodes since under medical care EMT/Hospital. Acuity: Acute Type of Exam: Initial FINDINGS: BONES/ALIGNMENT: There is no evidence of an acute cervical spine fracture. There is normal alignment of the cervical spine. DEGENERATIVE CHANGES: Severe degenerative disc disease and marginal osteophyte formation is present throughout the cervical spine involving C5-6, C6-7 and to a less degree C7-T1. Degenerative endplate changes with findings of chronicity of the superior endplate of T1 are noted. There is multilevel mild to moderate facet arthropathy, most notable at C4-5 on the left. SOFT TISSUES: There is no prevertebral soft tissue swelling. The visualized intracranial compartment reveals encephalomalacia in the right MCA territory, as seen on brain MRI in 2013. The visualized skull base and paranasal sinuses reveal no acute findings. Severe degenerative change is present in the temporomandibular joints. Carotid calcified atheromatous plaque is present. 1.   No acute abnormality of the cervical spine. 2.  Multilevel severe disc disease and moderately severe facet arthropathy. 3.  Chronic findings in the partially visualized brain.          Patient Active

## 2018-01-18 LAB
ABSOLUTE EOS #: 0 K/UL (ref 0–0.4)
ABSOLUTE IMMATURE GRANULOCYTE: ABNORMAL K/UL (ref 0–0.3)
ABSOLUTE LYMPH #: 0.9 K/UL (ref 1–4.8)
ABSOLUTE MONO #: 1.3 K/UL (ref 0.1–1.3)
ANION GAP SERPL CALCULATED.3IONS-SCNC: 13 MMOL/L (ref 9–17)
BASOPHILS # BLD: 0 % (ref 0–2)
BASOPHILS ABSOLUTE: 0 K/UL (ref 0–0.2)
BUN BLDV-MCNC: 5 MG/DL (ref 8–23)
BUN/CREAT BLD: ABNORMAL (ref 9–20)
CALCIUM SERPL-MCNC: 8 MG/DL (ref 8.6–10.4)
CHLORIDE BLD-SCNC: 106 MMOL/L (ref 98–107)
CO2: 24 MMOL/L (ref 20–31)
CREAT SERPL-MCNC: 0.48 MG/DL (ref 0.5–0.9)
CULTURE: ABNORMAL
CULTURE: NO GROWTH
DIFFERENTIAL TYPE: ABNORMAL
DIRECT EXAM: ABNORMAL
DIRECT EXAM: ABNORMAL
EOSINOPHILS RELATIVE PERCENT: 0 % (ref 0–4)
FLUID DIFF COMMENT: NORMAL
GFR AFRICAN AMERICAN: >60 ML/MIN
GFR NON-AFRICAN AMERICAN: >60 ML/MIN
GFR SERPL CREATININE-BSD FRML MDRD: ABNORMAL ML/MIN/{1.73_M2}
GFR SERPL CREATININE-BSD FRML MDRD: ABNORMAL ML/MIN/{1.73_M2}
GLUCOSE BLD-MCNC: 136 MG/DL (ref 70–99)
HCT VFR BLD CALC: 35.9 % (ref 36–46)
HEMOGLOBIN: 12.2 G/DL (ref 12–16)
IMMATURE GRANULOCYTES: ABNORMAL %
LYMPHOCYTES # BLD: 8 % (ref 24–44)
Lab: ABNORMAL
Lab: ABNORMAL
MAGNESIUM: 1.8 MG/DL (ref 1.6–2.6)
MAGNESIUM: 2.1 MG/DL (ref 1.6–2.6)
MCH RBC QN AUTO: 31.3 PG (ref 26–34)
MCHC RBC AUTO-ENTMCNC: 34.1 G/DL (ref 31–37)
MCV RBC AUTO: 91.8 FL (ref 80–100)
MONOCYTES # BLD: 12 % (ref 1–7)
NRBC AUTOMATED: ABNORMAL PER 100 WBC
PDW BLD-RTO: 14.2 % (ref 11.5–14.9)
PLATELET # BLD: 212 K/UL (ref 150–450)
PLATELET ESTIMATE: ABNORMAL
PMV BLD AUTO: 8.8 FL (ref 6–12)
POTASSIUM SERPL-SCNC: 2.6 MMOL/L (ref 3.7–5.3)
POTASSIUM SERPL-SCNC: 3.1 MMOL/L (ref 3.7–5.3)
RBC # BLD: 3.91 M/UL (ref 4–5.2)
RBC # BLD: ABNORMAL 10*6/UL
RBC, BAL: 2250 /MM3
SEG NEUTROPHILS: 80 % (ref 36–66)
SEGMENTED NEUTROPHILS ABSOLUTE COUNT: 8.4 K/UL (ref 1.3–9.1)
SODIUM BLD-SCNC: 143 MMOL/L (ref 135–144)
SPECIMEN DESCRIPTION: ABNORMAL
SPECIMEN TYPE: NORMAL
STATUS: ABNORMAL
STATUS: ABNORMAL
WBC # BLD: 10.6 K/UL (ref 3.5–11)
WBC # BLD: ABNORMAL 10*3/UL
WBC, BAL: 1050 /MM3

## 2018-01-18 PROCEDURE — 97165 OT EVAL LOW COMPLEX 30 MIN: CPT

## 2018-01-18 PROCEDURE — 2500000003 HC RX 250 WO HCPCS: Performed by: INTERNAL MEDICINE

## 2018-01-18 PROCEDURE — S0028 INJECTION, FAMOTIDINE, 20 MG: HCPCS | Performed by: FAMILY MEDICINE

## 2018-01-18 PROCEDURE — 6360000002 HC RX W HCPCS: Performed by: INTERNAL MEDICINE

## 2018-01-18 PROCEDURE — 85025 COMPLETE CBC W/AUTO DIFF WBC: CPT

## 2018-01-18 PROCEDURE — 84132 ASSAY OF SERUM POTASSIUM: CPT

## 2018-01-18 PROCEDURE — 99232 SBSQ HOSP IP/OBS MODERATE 35: CPT | Performed by: INTERNAL MEDICINE

## 2018-01-18 PROCEDURE — 2580000003 HC RX 258: Performed by: INTERNAL MEDICINE

## 2018-01-18 PROCEDURE — 94761 N-INVAS EAR/PLS OXIMETRY MLT: CPT

## 2018-01-18 PROCEDURE — 6370000000 HC RX 637 (ALT 250 FOR IP): Performed by: INTERNAL MEDICINE

## 2018-01-18 PROCEDURE — 80048 BASIC METABOLIC PNL TOTAL CA: CPT

## 2018-01-18 PROCEDURE — G8988 SELF CARE GOAL STATUS: HCPCS

## 2018-01-18 PROCEDURE — 6370000000 HC RX 637 (ALT 250 FOR IP): Performed by: RADIOLOGY

## 2018-01-18 PROCEDURE — 97116 GAIT TRAINING THERAPY: CPT

## 2018-01-18 PROCEDURE — 2060000000 HC ICU INTERMEDIATE R&B

## 2018-01-18 PROCEDURE — 97535 SELF CARE MNGMENT TRAINING: CPT

## 2018-01-18 PROCEDURE — 36592 COLLECT BLOOD FROM PICC: CPT

## 2018-01-18 PROCEDURE — 6360000002 HC RX W HCPCS: Performed by: FAMILY MEDICINE

## 2018-01-18 PROCEDURE — 2580000003 HC RX 258: Performed by: FAMILY MEDICINE

## 2018-01-18 PROCEDURE — 97110 THERAPEUTIC EXERCISES: CPT

## 2018-01-18 PROCEDURE — G8987 SELF CARE CURRENT STATUS: HCPCS

## 2018-01-18 PROCEDURE — 83735 ASSAY OF MAGNESIUM: CPT

## 2018-01-18 PROCEDURE — 6370000000 HC RX 637 (ALT 250 FOR IP): Performed by: FAMILY MEDICINE

## 2018-01-18 PROCEDURE — 2500000003 HC RX 250 WO HCPCS: Performed by: FAMILY MEDICINE

## 2018-01-18 PROCEDURE — 94640 AIRWAY INHALATION TREATMENT: CPT

## 2018-01-18 RX ORDER — POTASSIUM CHLORIDE 20 MEQ/1
40 TABLET, EXTENDED RELEASE ORAL PRN
Status: DISCONTINUED | OUTPATIENT
Start: 2018-01-18 | End: 2018-01-21 | Stop reason: HOSPADM

## 2018-01-18 RX ORDER — LEVOTHYROXINE SODIUM 0.05 MG/1
50 TABLET ORAL DAILY
Status: DISCONTINUED | OUTPATIENT
Start: 2018-01-18 | End: 2018-01-21 | Stop reason: HOSPADM

## 2018-01-18 RX ORDER — POTASSIUM CHLORIDE 20MEQ/15ML
40 LIQUID (ML) ORAL PRN
Status: DISCONTINUED | OUTPATIENT
Start: 2018-01-18 | End: 2018-01-21 | Stop reason: HOSPADM

## 2018-01-18 RX ORDER — POTASSIUM CHLORIDE 7.45 MG/ML
10 INJECTION INTRAVENOUS PRN
Status: DISCONTINUED | OUTPATIENT
Start: 2018-01-18 | End: 2018-01-21 | Stop reason: HOSPADM

## 2018-01-18 RX ORDER — LEVETIRACETAM 500 MG/1
1000 TABLET ORAL 2 TIMES DAILY
Status: DISCONTINUED | OUTPATIENT
Start: 2018-01-18 | End: 2018-01-21 | Stop reason: HOSPADM

## 2018-01-18 RX ORDER — ALBUTEROL SULFATE 2.5 MG/3ML
2.5 SOLUTION RESPIRATORY (INHALATION) 4 TIMES DAILY
Status: DISCONTINUED | OUTPATIENT
Start: 2018-01-18 | End: 2018-01-21 | Stop reason: HOSPADM

## 2018-01-18 RX ADMIN — LEVOTHYROXINE SODIUM ANHYDROUS 25 MCG: 100 INJECTION, POWDER, LYOPHILIZED, FOR SOLUTION INTRAVENOUS at 05:45

## 2018-01-18 RX ADMIN — LEVETIRACETAM 1000 MG: 500 TABLET ORAL at 09:17

## 2018-01-18 RX ADMIN — ALBUTEROL SULFATE 2.5 MG: 2.5 SOLUTION RESPIRATORY (INHALATION) at 11:24

## 2018-01-18 RX ADMIN — FAMOTIDINE 20 MG: 10 INJECTION, SOLUTION INTRAVENOUS at 09:17

## 2018-01-18 RX ADMIN — ENOXAPARIN SODIUM 40 MG: 40 INJECTION SUBCUTANEOUS at 09:17

## 2018-01-18 RX ADMIN — AMLODIPINE BESYLATE 10 MG: 10 TABLET ORAL at 09:16

## 2018-01-18 RX ADMIN — SULFAMETHOXAZOLE AND TRIMETHOPRIM 1 TABLET: 800; 160 TABLET ORAL at 21:31

## 2018-01-18 RX ADMIN — ALBUTEROL SULFATE 2.5 MG: 2.5 SOLUTION RESPIRATORY (INHALATION) at 19:18

## 2018-01-18 RX ADMIN — LEVETIRACETAM 1000 MG: 500 TABLET ORAL at 21:31

## 2018-01-18 RX ADMIN — ALBUTEROL SULFATE 2.5 MG: 2.5 SOLUTION RESPIRATORY (INHALATION) at 06:55

## 2018-01-18 RX ADMIN — SULFAMETHOXAZOLE AND TRIMETHOPRIM 1 TABLET: 800; 160 TABLET ORAL at 09:17

## 2018-01-18 RX ADMIN — LEVOTHYROXINE SODIUM 50 MCG: 50 TABLET ORAL at 09:17

## 2018-01-18 RX ADMIN — ATORVASTATIN CALCIUM 40 MG: 40 TABLET, FILM COATED ORAL at 09:17

## 2018-01-18 RX ADMIN — Medication 10 ML: at 09:18

## 2018-01-18 RX ADMIN — FAMOTIDINE 20 MG: 10 INJECTION, SOLUTION INTRAVENOUS at 21:31

## 2018-01-18 RX ADMIN — PAROXETINE HYDROCHLORIDE 30 MG: 20 TABLET, FILM COATED ORAL at 09:17

## 2018-01-18 RX ADMIN — ALBUTEROL SULFATE 2.5 MG: 2.5 SOLUTION RESPIRATORY (INHALATION) at 15:25

## 2018-01-18 RX ADMIN — ASPIRIN 81 MG: 81 TABLET, COATED ORAL at 09:17

## 2018-01-18 RX ADMIN — Medication 10 ML: at 21:31

## 2018-01-18 RX ADMIN — ACETAMINOPHEN 650 MG: 650 SUPPOSITORY RECTAL at 21:31

## 2018-01-18 RX ADMIN — POTASSIUM CHLORIDE: 149 INJECTION, SOLUTION, CONCENTRATE INTRAVENOUS at 19:05

## 2018-01-18 RX ADMIN — HYDROCHLOROTHIAZIDE 12.5 MG: 12.5 CAPSULE ORAL at 09:17

## 2018-01-18 ASSESSMENT — PAIN SCALES - GENERAL
PAINLEVEL_OUTOF10: 2
PAINLEVEL_OUTOF10: 5
PAINLEVEL_OUTOF10: 0

## 2018-01-18 ASSESSMENT — ENCOUNTER SYMPTOMS
SPUTUM PRODUCTION: 0
VOMITING: 0
NAUSEA: 0
SHORTNESS OF BREATH: 0
ABDOMINAL PAIN: 0
COUGH: 1
DIARRHEA: 0
CONSTIPATION: 0

## 2018-01-18 ASSESSMENT — PAIN DESCRIPTION - LOCATION
LOCATION: GROIN
LOCATION: HEAD

## 2018-01-18 ASSESSMENT — PAIN DESCRIPTION - ORIENTATION: ORIENTATION: RIGHT;LEFT

## 2018-01-18 ASSESSMENT — PAIN DESCRIPTION - DESCRIPTORS: DESCRIPTORS: DISCOMFORT

## 2018-01-18 ASSESSMENT — PAIN DESCRIPTION - PAIN TYPE: TYPE: ACUTE PAIN

## 2018-01-18 ASSESSMENT — PAIN SCALES - WONG BAKER: WONGBAKER_NUMERICALRESPONSE: 4

## 2018-01-18 NOTE — PROGRESS NOTES
F/U for seizures. None further. Remains on Keppra 1000 mg BID. MRI brain w/ old stroke. Nothing acute. Now out of ICU. Alert. Up in bed, eating breakfast and watching TV. Intact speech. Normal CN. Knows \"[facility]. \"  Moving symmetrically. Seems stable neuro-wise. No additional testing from our standpoint. Call please if needed further. Thank you.     /68   Pulse 87   Temp 98 °F (36.7 °C) (Oral)   Resp 18   Ht 5' 10\" (1.778 m)   Wt 187 lb 6.3 oz (85 kg)   SpO2 92%   BMI 26.89 kg/m²

## 2018-01-18 NOTE — PROGRESS NOTES
[id number] [facility]    Progress Note    1/18/2018    9:41 AM    Name:   Kartik Baker  MRN:     [id number]     Acct:      [de-identified]   Room:   [address] [place] [address] Day:  4  Admit Date:  1/14/2018  1:00 PM    PCP:   Ninfa Rosario MD  Code Status:  Full Code    Subjective:     C/C:   Chief Complaint   Patient presents with    Altered Mental Status     Interval History Status: improved. Pt seen and examined at bedside. Pt is responsive, AOx1 (Ether Castor), able to follow commands but still has some confusion/memory issues. Per nurse in ICU she has memory issues at baseline and has h/o of right CVA. Pt states feels much better than yesterday. Denies fevers/chills, CP, SOB. Satting well on 2 L NC. Brief History:     The patient is a 58 y.o. female with PMH hypothyroidism, HTN, seizure d/o, urge incontinence, h/o CVA, GERD who presents with Altered Mental Status   and she is admitted to the hospital for the management of sepsis and seizures      Pt was found to be unresponsive at her home by EMS and per family, pt had 2 seizures 5 minutes each. She has h/o of seizure disorder on keppra. EMS administered versed x 2, reported temp 105F, FSBS 147. In the ED, pt had temp 103.4, RR 26, , /65, VBG 7.5/27.2/115/21.6, lactic acid 2.9, UA large leuk est and pos nitrites, keppra level wnl.  CT showed remote infarct right hemisphere. Pt was intubated, BCx2 taken, given 1 g dilantin, 1L bolus, started on versed drip and pt started on empiric coverage for meningitis with vanc/rocephin. Lumbar tap to be performed in ED. Review of Systems:     Review of Systems   Unable to perform ROS: Mental status change   Constitutional: Negative for chills, fever and malaise/fatigue. Respiratory: Positive for cough. Negative for sputum production and shortness of breath. Cardiovascular: Negative for chest pain.    Gastrointestinal: Negative for abdominal pain, (85 kg)   SpO2 92%   BMI 26.89 kg/m²   Temp (24hrs), Av.3 °F (36.8 °C), Min:98 °F (36.7 °C), Max:98.6 °F (37 °C)    No results for input(s): POCGLU in the last 72 hours. I/O (24Hr): Intake/Output Summary (Last 24 hours) at 18 0941  Last data filed at 18 0544   Gross per 24 hour   Intake              960 ml   Output              451 ml   Net              509 ml       Labs:    [unfilled]    Lab Results   Component Value Date/Time    SPECIAL NOT REPORTED 2018 01:44 PM    SPECIAL NOT REPORTED 2018 01:44 PM    SPECIAL NOT REPORTED 2018 01:44 PM     Lab Results   Component Value Date/Time    CULTURE NO GROWTH 2018 01:44 PM    CULTURE  2018 01:44 PM     Performed at [address] [place] [address] [phone]    CULTURE  2018 01:44 PM     NEGATIVE:  No Herpes Simplex virus detected by Enzyme Linked Virus Inducible    CULTURE  system culture. at day 1 2018 01:44 PM    CULTURE  2018 01:44 PM     Performed at [address] [place] [address] [phone]    CULTURE Pending 2018 01:44 PM       [facility]    Radiology:    Ct Head Wo Contrast    Result Date: 2018  EXAMINATION: CT OF THE HEAD WITHOUT CONTRAST  2018 1:35 pm TECHNIQUE: CT of the head was performed without the administration of intravenous contrast. Dose modulation, iterative reconstruction, and/or weight based adjustment of the mA/kV was utilized to reduce the radiation dose to as low as reasonably achievable.  COMPARISON: MR brain from 2013, noncontrast head CT performed on 2008 HISTORY: ORDERING SYSTEM PROVIDED HISTORY: AMS TECHNOLOGIST PROVIDED HISTORY: Ordering Physician Provided Reason for Exam: Pt came to ER unresponsive, seizing at home unwitnessed, unknown is any injury, pt has had 2 episodes since under medical care EMT/Hospital. Acuity: Acute Type of Exam: Initial 49-year-old female with acute altered mental status ; came to a ER unresponsive and was seizing at home FINDINGS: BRAIN/VENTRICLES: Stable remote infarct involving the right cerebral hemisphere at the right frontal, right temporal, and right parietal lobes. This was present and is unchanged dating back to 02/27/2006. Mild diffuse prominence of the sulci, cisterns, and extra-axial spaces. Foramen magnum and 4th ventricle appear patent. Atherosclerotic calcification of the parasellar carotid arteries. No abnormal extra-axial fluid collection identified. No clear evidence for acute intracranial hemorrhage or midline shift. Underlying mild periventricular and subcortical white matter hypoattenuation, most consistent with mild chronic small vessel ischemic white matter disease. ORBITS: The visualized portion of the orbits demonstrate no acute abnormality. SINUSES: The visualized paranasal sinuses and mastoid air cells demonstrate no acute abnormality. SOFT TISSUES/SKULL:  Calvarium appears grossly intact. Soft tissue swelling of the left eyelid. Visualized soft tissues otherwise grossly unremarkable. 1. Remote infarcts in the right frontal, temporal, and parietal lobes throughout the right cerebral hemisphere, unchanged dating back to 02/27/2006. Mild underlying chronic small vessel ischemic white matter disease. Atherosclerotic calcification of the vasculature. If there is clinical concern for acute on chronic ischemia, an MRI with diffusion-weighted imaging would be a more sensitive exam assuming there are no contraindications to MRI. 2. No clear evidence for acute intracranial hemorrhage or midline shift. 3. Soft tissue swelling of the left eyelid.   Please correlate with direct visualization/ exam.     Ct Cervical Spine Wo Contrast    Result Date: 1/14/2018  EXAMINATION: CT OF THE CERVICAL SPINE WITHOUT CONTRAST 1/14/2018 1:35 pm TECHNIQUE: CT of the cervical spine was performed without the administration of intravenous contrast. Multiplanar reformatted images are provided for review. Dose modulation, iterative reconstruction, and/or weight based adjustment of the mA/kV was utilized to reduce the radiation dose to as low as reasonably achievable. COMPARISON: CT brain today and MRI brain March 25, 2013 HISTORY: ORDERING SYSTEM PROVIDED HISTORY: AMS, ?fall TECHNOLOGIST PROVIDED HISTORY: Ordering Physician Provided Reason for Exam: Pt came to ER unresponsive, seizing at home unwitnessed, unknown is any injury, pt has had 2 episodes since under medical care EMT/Hospital. Acuity: Acute Type of Exam: Initial FINDINGS: BONES/ALIGNMENT: There is no evidence of an acute cervical spine fracture. There is normal alignment of the cervical spine. DEGENERATIVE CHANGES: Severe degenerative disc disease and marginal osteophyte formation is present throughout the cervical spine involving C5-6, C6-7 and to a less degree C7-T1. Degenerative endplate changes with findings of chronicity of the superior endplate of T1 are noted. There is multilevel mild to moderate facet arthropathy, most notable at C4-5 on the left. SOFT TISSUES: There is no prevertebral soft tissue swelling. The visualized intracranial compartment reveals encephalomalacia in the right MCA territory, as seen on brain MRI in 2013. The visualized skull base and paranasal sinuses reveal no acute findings. Severe degenerative change is present in the temporomandibular joints. Carotid calcified atheromatous plaque is present. 1.   No acute abnormality of the cervical spine. 2.  Multilevel severe disc disease and moderately severe facet arthropathy. 3.  Chronic findings in the partially visualized brain.      Xr Chest Portable    Result Date: 1/15/2018  EXAMINATION: SINGLE VIEW OF THE CHEST 1/15/2018 6:36 am COMPARISON: 01/14/2018, 1827 hours HISTORY: ORDERING SYSTEM PROVIDED HISTORY: while on vent TECHNOLOGIST PROVIDED HISTORY: Reason for exam:->while on vent Ordering Physician Provided Reason for Exam: vent appreciated. The osseous structures are unchanged in appearance. Shallow lung inflation with findings consistent with subsegmental atelectasis in the lung bases. Physical Examination:        Physical Exam  Constitutional:   Pt is now off vent and levophed, right IJ line CDI, bird removed. Pt AOx2, in NAD  HENT: right neck has vesicles  Head: Normocephalic and atraumatic. Eyes: Pupils are equal, round, and reactive to light. Cardiovascular: Normal RRR, no MGR  Pulmonary/Chest:   Right sided rales  Abdominal: Soft. She exhibits no distension.    Musculoskeletal: She exhibits no edema.   CR<2s   Neurological:   AOx2, moves extremities spontaneously  Skin: Skin is warm and dry.        Assessment:        Primary Problem  Septic shock [facility])    Active Hospital Problems    Diagnosis Date Noted    Altered mental status [R41.82] 01/14/2018    UTI (urinary tract infection) [N39.0] 01/14/2018    Septic shock ([facility] [A41.9, R65.21] 01/14/2018    Elevated troponin [R74.8] 01/14/2018    Hypokalemia [E87.6] 01/02/2017    H/O: CVA (cerebrovascular accident) [Z86.73] 05/06/2014    GERD (gastroesophageal reflux disease) [K21.9] 02/04/2014    Seizure disorder ([facility] [G40.909] 03/27/2012    Hypertension [I10] 03/27/2012    Urge incontinence [N39.41] 03/27/2012    Hypothyroidism [E03.9] 03/27/2012       Plan:        Septic shock 2/2 UTI and PNA - resolving  - most likely UTI sepsis inducing seizures with aspiration PNA; afebrile, no leukocytosis  - BCx x1: coag neg staph (likely contaminant)  - UCx: E. Coli >[id number]  - Sputum Cx: light MRSA growth  - bactrim PO  - ID and pulm consulted  - discharge planning    Hypokalemia  - K 3.1, most likely from lasix yesterday  - replace per protocol    Herpes zoster?  - right neck has vesicles but pt having no pain  - had 2 doses of IV acyclovir in ED for suspected encephalitis     Seizure disorder  - No further clinical seizures   - MRI: no acute abn, old right CVA  - on keppra BID  - neuro signed off    HTN, hypothyroid  - restart PO meds    Richard Maldonado MD  1/18/2018  9:41 AM   Attending Physician Statement  Patient seen and examined  I have discussed the care of the patient, including pertinent history and exam findings,  with the resident. I have reviewed the key elements of all parts of the encounter with the resident. I agree with the assessment, plan and orders as documented by the resident.     Susan Pimentel

## 2018-01-18 NOTE — PROGRESS NOTES
30 mg Oral Daily    sodium chloride flush  10 mL Intravenous 2 times per day    famotidine (PEPCID) injection  20 mg Intravenous BID    enoxaparin  40 mg Subcutaneous Daily     potassium chloride **OR** potassium chloride **OR** potassium chloride, albuterol, acetaminophen, sodium chloride flush, docusate sodium, ondansetron, albuterol sulfate HFA  IV Drips/Infusions   sodium chloride 10 mL/hr at 01/17/18 1958       Exam      Constitutional - Alert, disoriented to time & person  General Appearance  well developed, well nourished  HEENT -normocephalic, atraumatic. PERRL  Lungs - Chest expands equally. Coarse breath sounds, no wheezing  Cardiovascular - Heart sounds are normal.  normal rate and rhythm regular, no murmur, gallop or rub.   Abdomen - soft, nontender, nondistended, no masses or organomegaly  Neurologic - awake, following commands  Skin - no bruising or bleeding  Extremities - no cyanosis, clubbing or edema    Lab Results   CBC     Lab Results   Component Value Date    WBC 10.6 01/18/2018    RBC 3.91 01/18/2018    RBC 4.21 10/04/2011    HGB 12.2 01/18/2018    HCT 35.9 01/18/2018     01/18/2018     10/04/2011    MCV 91.8 01/18/2018    MCH 31.3 01/18/2018    MCHC 34.1 01/18/2018    RDW 14.2 01/18/2018    METASPCT 2 01/07/2017    LYMPHOPCT 8 01/18/2018    MONOPCT 12 01/18/2018    MYELOPCT 1 01/08/2017    BASOPCT 0 01/18/2018    MONOSABS 1.30 01/18/2018    LYMPHSABS 0.90 01/18/2018    EOSABS 0.00 01/18/2018    BASOSABS 0.00 01/18/2018    DIFFTYPE NOT REPORTED 01/18/2018       BMP   Lab Results   Component Value Date     01/18/2018    K 3.1 01/18/2018     01/18/2018    CO2 24 01/18/2018    BUN 5 01/18/2018    CREATININE 0.48 01/18/2018    GLUCOSE 136 01/18/2018    GLUCOSE 68 03/27/2012       LFTS  Lab Results   Component Value Date    ALKPHOS 82 01/14/2018    ALT 18 01/14/2018    AST 30 01/14/2018    PROT 6.6 01/14/2018    BILITOT 0.56 01/14/2018    BILIDIR 0.10 10/04/2011    BERNABEILI

## 2018-01-18 NOTE — PROGRESS NOTES
Dr. Nohemy Adams via perfect serve to notify that potassium was 2.6 as ordered by sliding scale. No orders received.

## 2018-01-18 NOTE — PROGRESS NOTES
OCCUPATIONAL THERAPY -  PAC  Daily Activity  Used For G-Code Determination  Date: 2018  Patient Name: Reid Miranda      MRN: [id number]  Account #: [de-identified]  : 1955  (58 y.o.)Gender: female     How much help from another person does the patient currently need? (Higher the score, the more Independent) Admission   Score Goal  Score Discharge  Score   1. Putting on and taking off regular lower body clothing? 2 3    2. Bathing (including washing, rinsing, drying)? 2 3    3. Toileting, which includes using toilet, bedpan, or urinal? 2 3    4. Putting on and taking off regular upper body clothing? 2 3    5. Taking care of personal grooming such as brushing teeth? 3 4    6. Eating meals? 3 4    Total Score -  Modifier    14  CL 20  CJ    OT Self-Care Functions Charge #  G Code   [id number]  OT  [id number]  OT  [id number]  OT        1. Unable =  Total/Dependent Assist    2. A Lot =   Maximum/Moderate Assist   3. A Little =  Minimum/Contact Guard Assist/Supervision     4.  None =  Modified [place]/Independent    Raw Score Scale Score Scale Score Standard Error Approximate Degree of Functional Impairment Modifier   24 57.54 7.36 0% CH   23 51.12 5.88 16% CI   22-20 47.10-42.03 4.81-3.55 26%-38% CJ   19-15 40.22-34.69 3.20-2.65 43%-56% CK   14-10 33.39-27.13 2.61-2.96 60%-75% CL   9-7 25.33-20.13 3.17-3.68 80%-92% CM   6 17.07 3.74 100% CN         Randy Vilchis, OT

## 2018-01-18 NOTE — PROGRESS NOTES
[address]   Occupational Therapy Evaluation  Date: 18  Patient Name: Sasha Mckay       Room: 0163/0610-78  MRN: [id number]  Account: [de-identified]   : 1955  (64 y.o.) Gender: female     Referring Practitioner: Dr Radene Osler  Diagnosis: Altered Mental Status, Seizure,  Respiratory Failure   Additional Pertinent Hx: REmote Right MCA Infarct; Ankle fracture with ORIF     Treatment Diagnosis: Impaired ability to care for self    Past Medical History:  has a past medical history of Ankle fracture, left; Anxiety; Arthritis; ETOH abuse; Frequency of urination; GERD (gastroesophageal reflux disease); Head injury; Headache(784.0); Hypertension; Hypotension; Hypothyroidism; Numbness and tingling; Osteoporosis; Peripheral vascular disease ([facility] Pneumonia; Reflux; Seizures ([facility] Shingles; Stroke [facility]); TIA (transient ischemic attack); Tremor; UTI (urinary tract infection); Varicose veins; and Wears glasses. Past Surgical History:   has a past surgical history that includes Bunionectomy (Right); Tubal ligation; other surgical history (2/23/15); Hammer toe surgery (Left, 2016); and [facility] incl fluor gdnce dx w/cell washg spx (N/A, 2018).     Restrictions  Restrictions/Precautions: Fall Risk, Seizure, General Precautions, Isolation, Contact Precautions  Implants present? : Metal implants     Vitals  Temp: 98 °F (36.7 °C)  Pulse: 87  Resp: 18  BP: 137/68  Height: 5' 10\" (177.8 cm)  Weight: 187 lb 6.3 oz (85 kg)  BMI (Calculated): 26.9  Oxygen Therapy  SpO2: 92 %  Pulse Oximeter Device Mode: Intermittent  Pulse Oximeter Device Location: Finger  O2 Device: Nasal cannula  FiO2 : 30 %  O2 Flow Rate (L/min): 2 L/min  End Tidal CO2: 22 (%)  Level of Consciousness: Alert    Subjective  Subjective: Alert   Comments: Displaying some expressive communication problems - repeating the same response to multiple questions but the response is not appropriate as an answer 'she/it just sits there'

## 2018-01-18 NOTE — PROGRESS NOTES
effusion is seen.   Ejection fraction:  >55 %  Stress Test:   Cardiac Angiography:             Assessment / Acute Cardiac Problems:       Patient Active Problem List:     Hypothyroidism     Hypertension     Seizure disorder ([address]     Urge incontinence     Anxiety     GERD (gastroesophageal reflux disease)     H/O: CVA (cerebrovascular accident)     Headache     Hammer toe of left foot     Hypokalemia     Tremor of both hands     Altered mental status     UTI (urinary tract infection)     Septic shock (HCC)     Elevated troponin      Plan of Treatment:   ELEVATED TROP , POSSIBLE TYPE 2   ECHO NORMAL LVEF >55% , NO SEGMENTAL ABNORMALITIES   NEEDS ISCHEMIA WORK UP ONCE MUCH IMPROVED       Kari Cortez [facility] Cardiology  [phone]

## 2018-01-18 NOTE — PROGRESS NOTES
(L/min): (P) 2 L/min        Bed Mobility  Bridging: (P) Supervision  Rolling: (P) Rolling Left;Rolling Right;Stand by assistance  Supine to Sit: (P) Stand by assistance  Sit to Supine: (P) Supervision  Scooting: (P) Stand by assistance  Comment: (P) HOB elevated about 30*      Transfers:  Sit to Stand: (P) Minimal Assistance (Repeated cues required for safe hand placement)  Stand to sit: (P) Contact guard assistance (Cues for hand placement)  Lateral Transfers: (P) Contact guard assistance (Verbal, tactile cues req'd for sequencing. )   Comment: RW        Ambulation 1  Surface: (P) level tile  Device: (P) Rolling Walker  Other Apparatus: (P) O2  Assistance: (P) Contact guard assistance  Quality of Gait: (P) Initially unable to shift weight, move foot (\"thinking about it too much\"). Short lateral steps, cues for sequencing with RW, LEs. No LOB, but pt reports & demo's increased UE tremors after effort. Distance: (P) 3 feet side stepping to [facility]  Comments: (P) Practiced weight-shifting prior to amb. Posture: (P) Good  Sitting - Static: (P) Good (EOB, no UE or back support)  Sitting - Dynamic: (P) Good;- (EOB, no UE or back support)  Standing - Static: (P) Fair (RW)  Standing - Dynamic: (P) Fair;- (Requires assist to move RW appropriately)        Other exercises?: (P) Yes  Other exercises 1: (P) Supine B LE ther ex, 15-20x each, AROM (Cues to maintain progress)  Other exercises 2: (P) Seated B LE ther ex, 15x each, AROM  Other exercises 3: (P) Dangled EOB, 15 min., SBA/Supervision        PT Equipment Recommendations  Other: (P) TBD       Assessment  Activity Tolerance: (P) Patient Tolerated treatment well;Patient limited by endurance; Patient limited by cognitive status   Body structures, Functions, Activity limitations: (P) Decreased functional mobility ; Decreased ROM; Decreased strength;Decreased safe awareness;Decreased cognition;Decreased ADL status; Decreased endurance;Decreased balance  Assessment: (P)

## 2018-01-18 NOTE — CARE COORDINATION
ONGOING DISCHARGE PLAN:    LSW spoke with pt's sister, would like AOO placement at d/c, missy started  Pt more alert today, was extubated, transferred to progressive unit yest  Is here with resolved acute resp failure with hypoxia, septic shock resolved  UTI Pneumonia. cxr in am   Will continue to follow for additional discharge needs.     Electronically signed by Courtney Kenney RN on 1/18/2018 at 4:28 PM

## 2018-01-19 ENCOUNTER — APPOINTMENT (OUTPATIENT)
Dept: GENERAL RADIOLOGY | Age: 63
DRG: 720 | End: 2018-01-19
Payer: COMMERCIAL

## 2018-01-19 ENCOUNTER — APPOINTMENT (OUTPATIENT)
Dept: NUCLEAR MEDICINE | Age: 63
DRG: 720 | End: 2018-01-19
Payer: COMMERCIAL

## 2018-01-19 LAB
ABSOLUTE BANDS #: 0.4 K/UL (ref 0–1)
ABSOLUTE EOS #: 0.63 K/UL (ref 0–0.4)
ABSOLUTE IMMATURE GRANULOCYTE: ABNORMAL K/UL (ref 0–0.3)
ABSOLUTE LYMPH #: 0.87 K/UL (ref 1–4.8)
ABSOLUTE MONO #: 1.03 K/UL (ref 0.1–1.3)
ANION GAP SERPL CALCULATED.3IONS-SCNC: 11 MMOL/L (ref 9–17)
BANDS: 5 % (ref 0–10)
BASOPHILS # BLD: 1 % (ref 0–2)
BASOPHILS ABSOLUTE: 0.08 K/UL (ref 0–0.2)
BUN BLDV-MCNC: 5 MG/DL (ref 8–23)
BUN/CREAT BLD: ABNORMAL (ref 9–20)
CALCIUM SERPL-MCNC: 7.9 MG/DL (ref 8.6–10.4)
CHLORIDE BLD-SCNC: 105 MMOL/L (ref 98–107)
CO2: 25 MMOL/L (ref 20–31)
CREAT SERPL-MCNC: 0.57 MG/DL (ref 0.5–0.9)
DIFFERENTIAL TYPE: ABNORMAL
EOSINOPHILS RELATIVE PERCENT: 8 % (ref 0–4)
GFR AFRICAN AMERICAN: >60 ML/MIN
GFR NON-AFRICAN AMERICAN: >60 ML/MIN
GFR SERPL CREATININE-BSD FRML MDRD: ABNORMAL ML/MIN/{1.73_M2}
GFR SERPL CREATININE-BSD FRML MDRD: ABNORMAL ML/MIN/{1.73_M2}
GLUCOSE BLD-MCNC: 89 MG/DL (ref 70–99)
HCT VFR BLD CALC: 33.5 % (ref 36–46)
HEMOGLOBIN: 11.1 G/DL (ref 12–16)
IMMATURE GRANULOCYTES: ABNORMAL %
LYMPHOCYTES # BLD: 11 % (ref 24–44)
MAGNESIUM: 1.7 MG/DL (ref 1.6–2.6)
MCH RBC QN AUTO: 30.5 PG (ref 26–34)
MCHC RBC AUTO-ENTMCNC: 33.2 G/DL (ref 31–37)
MCV RBC AUTO: 91.9 FL (ref 80–100)
MONOCYTES # BLD: 13 % (ref 1–7)
MORPHOLOGY: ABNORMAL
NRBC AUTOMATED: ABNORMAL PER 100 WBC
PDW BLD-RTO: 14.2 % (ref 11.5–14.9)
PLATELET # BLD: 188 K/UL (ref 150–450)
PLATELET ESTIMATE: ABNORMAL
PMV BLD AUTO: 8.8 FL (ref 6–12)
POTASSIUM SERPL-SCNC: 3.2 MMOL/L (ref 3.7–5.3)
RBC # BLD: 3.65 M/UL (ref 4–5.2)
RBC # BLD: ABNORMAL 10*6/UL
SEG NEUTROPHILS: 62 % (ref 36–66)
SEGMENTED NEUTROPHILS ABSOLUTE COUNT: 4.89 K/UL (ref 1.3–9.1)
SODIUM BLD-SCNC: 141 MMOL/L (ref 135–144)
WBC # BLD: 7.9 K/UL (ref 3.5–11)
WBC # BLD: ABNORMAL 10*3/UL

## 2018-01-19 PROCEDURE — S0028 INJECTION, FAMOTIDINE, 20 MG: HCPCS | Performed by: FAMILY MEDICINE

## 2018-01-19 PROCEDURE — 85025 COMPLETE CBC W/AUTO DIFF WBC: CPT

## 2018-01-19 PROCEDURE — 97530 THERAPEUTIC ACTIVITIES: CPT

## 2018-01-19 PROCEDURE — 6360000002 HC RX W HCPCS: Performed by: INTERNAL MEDICINE

## 2018-01-19 PROCEDURE — 99232 SBSQ HOSP IP/OBS MODERATE 35: CPT | Performed by: INTERNAL MEDICINE

## 2018-01-19 PROCEDURE — 3430000000 HC RX DIAGNOSTIC RADIOPHARMACEUTICAL: Performed by: INTERNAL MEDICINE

## 2018-01-19 PROCEDURE — 6370000000 HC RX 637 (ALT 250 FOR IP): Performed by: RADIOLOGY

## 2018-01-19 PROCEDURE — 83735 ASSAY OF MAGNESIUM: CPT

## 2018-01-19 PROCEDURE — 2580000003 HC RX 258: Performed by: FAMILY MEDICINE

## 2018-01-19 PROCEDURE — A9500 TC99M SESTAMIBI: HCPCS | Performed by: INTERNAL MEDICINE

## 2018-01-19 PROCEDURE — 78452 HT MUSCLE IMAGE SPECT MULT: CPT

## 2018-01-19 PROCEDURE — 97110 THERAPEUTIC EXERCISES: CPT

## 2018-01-19 PROCEDURE — 2580000003 HC RX 258: Performed by: INTERNAL MEDICINE

## 2018-01-19 PROCEDURE — 6360000002 HC RX W HCPCS: Performed by: FAMILY MEDICINE

## 2018-01-19 PROCEDURE — 80048 BASIC METABOLIC PNL TOTAL CA: CPT

## 2018-01-19 PROCEDURE — 2060000000 HC ICU INTERMEDIATE R&B

## 2018-01-19 PROCEDURE — 94640 AIRWAY INHALATION TREATMENT: CPT

## 2018-01-19 PROCEDURE — 71045 X-RAY EXAM CHEST 1 VIEW: CPT

## 2018-01-19 PROCEDURE — 94761 N-INVAS EAR/PLS OXIMETRY MLT: CPT

## 2018-01-19 PROCEDURE — 6370000000 HC RX 637 (ALT 250 FOR IP): Performed by: FAMILY MEDICINE

## 2018-01-19 PROCEDURE — 2500000003 HC RX 250 WO HCPCS: Performed by: FAMILY MEDICINE

## 2018-01-19 RX ORDER — SODIUM CHLORIDE 0.9 % (FLUSH) 0.9 %
10 SYRINGE (ML) INJECTION PRN
Status: DISCONTINUED | OUTPATIENT
Start: 2018-01-19 | End: 2018-01-21 | Stop reason: HOSPADM

## 2018-01-19 RX ADMIN — POTASSIUM CHLORIDE 40 MEQ: 20 TABLET, EXTENDED RELEASE ORAL at 09:11

## 2018-01-19 RX ADMIN — Medication 10 ML: at 20:32

## 2018-01-19 RX ADMIN — SULFAMETHOXAZOLE AND TRIMETHOPRIM 1 TABLET: 800; 160 TABLET ORAL at 20:32

## 2018-01-19 RX ADMIN — SULFAMETHOXAZOLE AND TRIMETHOPRIM 1 TABLET: 800; 160 TABLET ORAL at 09:11

## 2018-01-19 RX ADMIN — ASPIRIN 81 MG: 81 TABLET, COATED ORAL at 09:11

## 2018-01-19 RX ADMIN — FAMOTIDINE 20 MG: 10 INJECTION, SOLUTION INTRAVENOUS at 09:11

## 2018-01-19 RX ADMIN — ALBUTEROL SULFATE 2.5 MG: 2.5 SOLUTION RESPIRATORY (INHALATION) at 08:39

## 2018-01-19 RX ADMIN — AMLODIPINE BESYLATE 10 MG: 10 TABLET ORAL at 09:11

## 2018-01-19 RX ADMIN — LEVETIRACETAM 1000 MG: 500 TABLET ORAL at 09:11

## 2018-01-19 RX ADMIN — TETRAKIS(2-METHOXYISOBUTYLISOCYANIDE)COPPER(I) TETRAFLUOROBORATE 37.3 MILLICURIE: 1 INJECTION, POWDER, LYOPHILIZED, FOR SOLUTION INTRAVENOUS at 12:30

## 2018-01-19 RX ADMIN — FAMOTIDINE 20 MG: 10 INJECTION, SOLUTION INTRAVENOUS at 20:32

## 2018-01-19 RX ADMIN — LEVETIRACETAM 1000 MG: 500 TABLET ORAL at 20:32

## 2018-01-19 RX ADMIN — ALBUTEROL SULFATE 2.5 MG: 2.5 SOLUTION RESPIRATORY (INHALATION) at 16:35

## 2018-01-19 RX ADMIN — HYDROCHLOROTHIAZIDE 12.5 MG: 12.5 CAPSULE ORAL at 09:12

## 2018-01-19 RX ADMIN — PAROXETINE HYDROCHLORIDE 30 MG: 20 TABLET, FILM COATED ORAL at 09:11

## 2018-01-19 RX ADMIN — LEVOTHYROXINE SODIUM 50 MCG: 50 TABLET ORAL at 05:56

## 2018-01-19 RX ADMIN — Medication 10 ML: at 09:10

## 2018-01-19 RX ADMIN — ALBUTEROL SULFATE 2.5 MG: 2.5 SOLUTION RESPIRATORY (INHALATION) at 21:28

## 2018-01-19 RX ADMIN — ATORVASTATIN CALCIUM 40 MG: 40 TABLET, FILM COATED ORAL at 09:11

## 2018-01-19 RX ADMIN — ALBUTEROL SULFATE 2.5 MG: 2.5 SOLUTION RESPIRATORY (INHALATION) at 12:53

## 2018-01-19 RX ADMIN — ENOXAPARIN SODIUM 40 MG: 40 INJECTION SUBCUTANEOUS at 09:10

## 2018-01-19 RX ADMIN — Medication 10 ML: at 12:30

## 2018-01-19 ASSESSMENT — ENCOUNTER SYMPTOMS
SPUTUM PRODUCTION: 0
ABDOMINAL PAIN: 0
CONSTIPATION: 0
DIARRHEA: 0
COUGH: 1
VOMITING: 0
NAUSEA: 0
SHORTNESS OF BREATH: 0

## 2018-01-19 NOTE — PROGRESS NOTES
Physical Therapy  [address]   Physical Therapy Progress Note    Date: 18  Patient Name: July Glasgow       Room: 2394/5543-04  MRN: [id number]   Account: [de-identified]   : 1955  (64 y.o.)   Gender: female     Referring Practitioner: Dr Ashley Roberson  Diagnosis: Altered Mental Status, Seizure,  Respiratory Failure   Restrictions/Precautions: Fall Risk, Seizure  Implants present? : Metal implants   Past Medical History:  has a past medical history of Ankle fracture, left; Anxiety; Arthritis; ETOH abuse; Frequency of urination; GERD (gastroesophageal reflux disease); Head injury; Headache(784.0); Hypertension; Hypotension; Hypothyroidism; Numbness and tingling; Osteoporosis; Peripheral vascular disease ([facility] [address] Pneumonia; Reflux; Seizures ([facility] [address] Shingles; Stroke [facility]); TIA (transient ischemic attack); Tremor; UTI (urinary tract infection); Varicose veins; and Wears glasses. Past Surgical History:   has a past surgical history that includes Bunionectomy (Right); Tubal ligation; other surgical history (2/23/15); Hammer toe surgery (Left, 2016); and [facility] incl fluor gdnce dx w/cell washg spx (N/A, 2018). Overall Orientation Status: Impaired  Orientation Level: Disoriented to time, Oriented to place, Oriented to person, Disoriented to situation  Restrictions/Precautions  Restrictions/Precautions: Fall Risk;Seizure  Implants present? : Metal implants    Subjective: Patient is agreeable for PT this PM.  Reports she has trouble with her short term memory. Hx. of CVA  Comments: OK for PT per RN;  1:1 present in room.     Vital Signs  Pulse: 89  Heart Rate Source: Monitor  Patient Currently in Pain: Denies        Oxygen Therapy  SpO2: 95 %  O2 Device: Nasal cannula  O2 Flow Rate (L/min): 1.5 L/min          Bed Mobility:   Bridging: Supervision  Rolling: Rolling Left;Rolling Right;Stand by assistance  Supine to Sit: Stand by assistance  Sit to Supine: Contact guard assistance  Scooting: Stand by assistance      Transfers:  Sit to Stand: Contact guard assistance (Repeated cues required for safe hand placement)  Stand to sit: Contact guard assistance (Cues for hand placement)           Lateral Transfers: Contact guard assistance (Verbal, tactile cues req'd for sequencing. )     Ambulation 1  Surface: level tile  Device: Rolling Walker  Other Apparatus: O2  Assistance: Contact guard assistance  Quality of Gait: No LOB or knee buckle. Posterior lean when standing. Distance: 2 lateral steps to [facility]  Comments: Practiced weight-shifting prior to amb. Posture: Good  Sitting - Static: Fair (Posterior lean; required bilat. UE support)  Sitting - Dynamic: Fair (Posterior lean; required bilat UE support)  Standing - Static: Fair;- (RW)  Standing - Dynamic: Fair;- (Requires assist to move RW appropriately)     Other exercises?: Yes  Other exercises 2: Seated B LE ther ex, 15x each, AROM  Other exercises 3: Dangled EOB, 10 mins. Posterior lean   CGA            Activity Tolerance: Patient Tolerated treatment well;Patient limited by endurance; Patient limited by cognitive status  PT Equipment Recommendations  Other: TBD       Assessment  Activity Tolerance: Patient Tolerated treatment well;Patient limited by endurance; Patient limited by cognitive status   Body structures, Functions, Activity limitations: Decreased functional mobility ; Decreased ROM; Decreased strength;Decreased safe awareness;Decreased cognition;Decreased ADL status; Decreased endurance;Decreased balance  Assessment: cognition issues, impulsive     Type of devices: Call light within reach;Gait belt;Left in bed (1:1 present in room)  Restraints  Initially in place: No     Plan  Times per week: 3-5 times per week/ 2 weeks  Times per day:  (3-5 times per week/ 2 weeks)  Current Treatment Recommendations: ROM, Transfer Training, Functional Mobility Training, Patient/Caregiver Education & Training, Balance Training,

## 2018-01-19 NOTE — CARE COORDINATION
RUTH received a call from Tiara in admissions with [facility] [place] [facility] and she reported that the pre- cert for this patient has been authorized. Should the patient be ready to DC over the weekend, please complete the followin. Ensure that the ELENA/AVS is completed and signed. 2.  Print and fax the AVS to [facility] at [phone]    3. Set up transportation if needed with [employer] [phone] or [employer] [phone]    4. Call the facility @ [phone] to inform of DC/transport time and to give report. 5. Inform patient and family of DC/transport time. RUTH started the 6708 form, however, it will need completed prior to DC/admission to that facility. It is not necessary to fax updates/clinicals to this facility, the only exception being the AVS.  RUTH following.

## 2018-01-19 NOTE — PROGRESS NOTES
ICU Progress Note (Non-Vent)  Pulmonary and Critical Care Specialists    Patient - Jassi Pandey,  Age - 58 y.o.    - 1955      Room Number - 2124/2124-01   [facility] -  [id number]   [facility]  Date of Admission -  2018  1:00 PM     FU: acute resp failure    Events of Past 24 Hours   extubated  , on 2 L nasal canula  awake, alert  Denies SOB , little cough/dry  No fever, chills  Denies dysphagia    Vitals    height is 5' 10\" (1.778 m) and weight is 211 lb 13.8 oz (96.1 kg). Her oral temperature is 99 °F (37.2 °C). Her blood pressure is 133/88 and her pulse is 100. Her respiration is 18 and oxygen saturation is 96%. Temperature Range: Temp: 99 °F (37.2 °C) Temp  Av °F (37.2 °C)  Min: 98.6 °F (37 °C)  Max: 99.3 °F (37.4 °C)  BP Range:  Systolic (17SUR), UDS:171 , Min:112 , RXW:787     Diastolic (37IUB), ZBA:35, Min:67, Max:88    Pulse Range: Pulse  Av.7  Min: 93  Max: 100  Respiration Range: Resp  Av.7  Min: 16  Max: 18  Current Pulse Ox[de-identified]  SpO2: 96 %  24HR Pulse Ox Range:  SpO2  Av %  Min: 94 %  Max: 99 %  Oxygen Amount and Delivery: O2 Flow Rate (L/min): 1.5 L/min    Wt Readings from Last 3 Encounters:   18 211 lb 13.8 oz (96.1 kg)   17 173 lb (78.5 kg)   17 164 lb (74.4 kg)     I/O       Intake/Output Summary (Last 24 hours) at 18 1443  Last data filed at 18 0604   Gross per 24 hour   Intake              751 ml   Output                0 ml   Net              751 ml       Invasive Lines   Extubated yesterday without complication. On 3.5L O2 nasal canula.      Medications      levothyroxine  50 mcg Oral Daily    levETIRAcetam  1,000 mg Oral BID    albuterol  2.5 mg Nebulization 4x Daily    amLODIPine  10 mg Oral Daily    aspirin EC  81 mg Oral Daily    atorvastatin  40 mg Oral Daily    hydrochlorothiazide  12.5 mg Oral Daily    sulfamethoxazole-trimethoprim  1 tablet Oral 2 times per day    PARoxetine  30 mg Oral Daily    sodium chloride flush  10 mL Intravenous 2 times per day    famotidine (PEPCID) injection  20 mg Intravenous BID    enoxaparin  40 mg Subcutaneous Daily     sodium chloride flush, potassium chloride **OR** potassium chloride **OR** potassium chloride, albuterol, acetaminophen, sodium chloride flush, docusate sodium, ondansetron, albuterol sulfate HFA  IV Drips/Infusions   sodium chloride 10 mL/hr at 01/17/18 1958       Exam      Constitutional - Alert, no distress  General Appearance  well developed, well nourished  HEENT -normocephalic, atraumatic. PERRL  Lungs - Chest expands equally. Coarse sounds, no wheezing  Cardiovascular - Heart sounds are normal.  normal rate and rhythm regular, no murmur, gallop or rub.   Abdomen - soft, nontender, nondistended, no masses or organomegaly  Neurologic - awake, following commands  Skin - no bruising or bleeding  Extremities - no cyanosis, clubbing , + minimal edema    Lab Results   CBC     Lab Results   Component Value Date    WBC 7.9 01/19/2018    RBC 3.65 01/19/2018    RBC 4.21 10/04/2011    HGB 11.1 01/19/2018    HCT 33.5 01/19/2018     01/19/2018     10/04/2011    MCV 91.9 01/19/2018    MCH 30.5 01/19/2018    MCHC 33.2 01/19/2018    RDW 14.2 01/19/2018    METASPCT 2 01/07/2017    LYMPHOPCT 11 01/19/2018    MONOPCT 13 01/19/2018    MYELOPCT 1 01/08/2017    BASOPCT 1 01/19/2018    MONOSABS 1.03 01/19/2018    LYMPHSABS 0.87 01/19/2018    EOSABS 0.63 01/19/2018    BASOSABS 0.08 01/19/2018    DIFFTYPE NOT REPORTED 01/19/2018       BMP   Lab Results   Component Value Date     01/19/2018    K 3.2 01/19/2018     01/19/2018    CO2 25 01/19/2018    BUN 5 01/19/2018    CREATININE 0.57 01/19/2018    GLUCOSE 89 01/19/2018    GLUCOSE 68 03/27/2012       LFTS  Lab Results   Component Value Date    ALKPHOS 82 01/14/2018    ALT 18 01/14/2018    AST 30 01/14/2018    PROT 6.6 01/14/2018    BILITOT 0.56 01/14/2018    BILIDIR 0.10 10/04/2011    IBILI 0.22 10/04/2011    LABALBU 3.5 01/14/2018    LABALBU 4.1 10/04/2011       ABG ABGs:   Lab Results   Component Value Date    PHART 7.448 01/16/2018    PO2ART 83.2 01/16/2018    KKN3SJR 31.6 01/16/2018       Lab Results   Component Value Date    MODE PRVC 01/16/2018         INR  No results for input(s): PROTIME, INR in the last 72 hours. APTT  No results for input(s): APTT in the last 72 hours. Lactic Acid  Lab Results   Component Value Date    LACTA 1.6 01/14/2018    LACTA 2.9 01/14/2018    LACTA 1.2 01/02/2017        Cultures   Respiratory Culture: sputum light growth MRSA, BAL neg so far  Viral Resp. Panel: neg so far  Fungus Respiratory Culture: In Process  Legionella Culture: In Process    Radiology     Much better, small infiltrate R base  (See actual reports for details)         Assessment/Plan         Acute respiratory failure with hypoxia, resolved  -Extubated 1/17,   - Wean from O2 as tolerated     Septic Shock resolved. UTI and Pneumonia. Resp.  Culture MRSA on sputum  - Continue Abx per ID  - Ecoli UTI,          Seizure disorder  - On Keppra  - Neuro on board       Right pleural effusion & infiltrate : much improved      Electronically signed by Vadim Haddad MD on 1/19/2018 at 2:43 PM    Electronically signed by Vadim Haddad MD on 1/19/2018 at 2:43 PM

## 2018-01-19 NOTE — PROGRESS NOTES
[facility]    Progress Note    1/19/2018    9:40 AM    Name:   Chavo Figueroa  MRN:     [id number]     Acct:      [de-identified]   Room:   [facility] Day:  5  Admit Date:  1/14/2018  1:00 PM    PCP:   Asad Melendez MD  Code Status:  Full Code    Subjective:     C/C:   Chief Complaint   Patient presents with    Altered Mental Status     Interval History Status: improved. Pt seen and examined at bedside. Pt is responsive, AOx1 ([employer]), able to follow commands but still has some confusion/memory issues. Per nurse in ICU she has memory issues at baseline and has h/o of right CVA. Pt states feels much better than yesterday. Denies fevers/chills, CP, SOB. Satting well on 2 L NC. Brief History:     The patient is a 58 y.o. female with PMH hypothyroidism, HTN, seizure d/o, urge incontinence, h/o CVA, GERD who presents with Altered Mental Status   and she is admitted to the hospital for the management of sepsis and seizures      Pt was found to be unresponsive at her home by EMS and per family, pt had 2 seizures 5 minutes each. She has h/o of seizure disorder on keppra. EMS administered versed x 2, reported temp 105F, FSBS 147. In the ED, pt had temp 103.4, RR 26, , /65, VBG 7.5/27.2/115/21.6, lactic acid 2.9, UA large leuk est and pos nitrites, keppra level wnl.  CT showed remote infarct right hemisphere. Pt was intubated, BCx2 taken, given 1 g dilantin, 1L bolus, started on versed drip and pt started on empiric coverage for meningitis with vanc/rocephin. Lumbar tap to be performed in ED. feels better   less congested   no fever   no diarrhea     no seizure  Review of Systems:     Review of Systems   Unable to perform ROS: Mental status change   Constitutional: Negative for chills, fever and malaise/fatigue. Respiratory: Positive for cough. Negative for sputum production and shortness of breath.     Cardiovascular:

## 2018-01-20 LAB
ABSOLUTE EOS #: 0.4 K/UL (ref 0–0.4)
ABSOLUTE IMMATURE GRANULOCYTE: ABNORMAL K/UL (ref 0–0.3)
ABSOLUTE LYMPH #: 0.9 K/UL (ref 1–4.8)
ABSOLUTE MONO #: 1.2 K/UL (ref 0.1–1.3)
ANION GAP SERPL CALCULATED.3IONS-SCNC: 13 MMOL/L (ref 9–17)
BASOPHILS # BLD: 0 % (ref 0–2)
BASOPHILS ABSOLUTE: 0 K/UL (ref 0–0.2)
BUN BLDV-MCNC: 7 MG/DL (ref 8–23)
BUN/CREAT BLD: ABNORMAL (ref 9–20)
CALCIUM SERPL-MCNC: 8.1 MG/DL (ref 8.6–10.4)
CHLORIDE BLD-SCNC: 104 MMOL/L (ref 98–107)
CO2: 25 MMOL/L (ref 20–31)
CREAT SERPL-MCNC: 0.59 MG/DL (ref 0.5–0.9)
CULTURE: NORMAL
CULTURE: NORMAL
DIFFERENTIAL TYPE: ABNORMAL
EOSINOPHILS RELATIVE PERCENT: 4 % (ref 0–4)
GFR AFRICAN AMERICAN: >60 ML/MIN
GFR NON-AFRICAN AMERICAN: >60 ML/MIN
GFR SERPL CREATININE-BSD FRML MDRD: ABNORMAL ML/MIN/{1.73_M2}
GFR SERPL CREATININE-BSD FRML MDRD: ABNORMAL ML/MIN/{1.73_M2}
GLUCOSE BLD-MCNC: 80 MG/DL (ref 70–99)
HCT VFR BLD CALC: 34.5 % (ref 36–46)
HEMOGLOBIN: 11.5 G/DL (ref 12–16)
IMMATURE GRANULOCYTES: ABNORMAL %
LYMPHOCYTES # BLD: 11 % (ref 24–44)
Lab: NORMAL
MAGNESIUM: 1.8 MG/DL (ref 1.6–2.6)
MCH RBC QN AUTO: 30.7 PG (ref 26–34)
MCHC RBC AUTO-ENTMCNC: 33.4 G/DL (ref 31–37)
MCV RBC AUTO: 92.1 FL (ref 80–100)
MONOCYTES # BLD: 14 % (ref 1–7)
NRBC AUTOMATED: ABNORMAL PER 100 WBC
PDW BLD-RTO: 14.5 % (ref 11.5–14.9)
PLATELET # BLD: 204 K/UL (ref 150–450)
PLATELET ESTIMATE: ABNORMAL
PMV BLD AUTO: 9.1 FL (ref 6–12)
POTASSIUM SERPL-SCNC: 3.7 MMOL/L (ref 3.7–5.3)
RBC # BLD: 3.75 M/UL (ref 4–5.2)
RBC # BLD: ABNORMAL 10*6/UL
SEG NEUTROPHILS: 71 % (ref 36–66)
SEGMENTED NEUTROPHILS ABSOLUTE COUNT: 6.3 K/UL (ref 1.3–9.1)
SODIUM BLD-SCNC: 142 MMOL/L (ref 135–144)
SPECIMEN DESCRIPTION: NORMAL
SPECIMEN DESCRIPTION: NORMAL
STATUS: NORMAL
WBC # BLD: 8.9 K/UL (ref 3.5–11)
WBC # BLD: ABNORMAL 10*3/UL

## 2018-01-20 PROCEDURE — 6370000000 HC RX 637 (ALT 250 FOR IP): Performed by: FAMILY MEDICINE

## 2018-01-20 PROCEDURE — 6370000000 HC RX 637 (ALT 250 FOR IP): Performed by: RADIOLOGY

## 2018-01-20 PROCEDURE — 80048 BASIC METABOLIC PNL TOTAL CA: CPT

## 2018-01-20 PROCEDURE — 94640 AIRWAY INHALATION TREATMENT: CPT

## 2018-01-20 PROCEDURE — 94761 N-INVAS EAR/PLS OXIMETRY MLT: CPT

## 2018-01-20 PROCEDURE — 1200000000 HC SEMI PRIVATE

## 2018-01-20 PROCEDURE — 85025 COMPLETE CBC W/AUTO DIFF WBC: CPT

## 2018-01-20 PROCEDURE — 36592 COLLECT BLOOD FROM PICC: CPT

## 2018-01-20 PROCEDURE — 6360000002 HC RX W HCPCS: Performed by: FAMILY MEDICINE

## 2018-01-20 PROCEDURE — 2500000003 HC RX 250 WO HCPCS: Performed by: FAMILY MEDICINE

## 2018-01-20 PROCEDURE — 6360000002 HC RX W HCPCS: Performed by: INTERNAL MEDICINE

## 2018-01-20 PROCEDURE — 83735 ASSAY OF MAGNESIUM: CPT

## 2018-01-20 PROCEDURE — 99232 SBSQ HOSP IP/OBS MODERATE 35: CPT | Performed by: INTERNAL MEDICINE

## 2018-01-20 PROCEDURE — S0028 INJECTION, FAMOTIDINE, 20 MG: HCPCS | Performed by: FAMILY MEDICINE

## 2018-01-20 PROCEDURE — 2580000003 HC RX 258: Performed by: FAMILY MEDICINE

## 2018-01-20 RX ORDER — AMINOPHYLLINE DIHYDRATE 25 MG/ML
100 INJECTION, SOLUTION INTRAVENOUS
Status: CANCELLED | OUTPATIENT
Start: 2018-01-22 | End: 2018-01-22

## 2018-01-20 RX ORDER — SULFAMETHOXAZOLE AND TRIMETHOPRIM 400; 80 MG/1; MG/1
1 TABLET ORAL DAILY
Qty: 10 TABLET | Refills: 0 | Status: SHIPPED | OUTPATIENT
Start: 2018-01-20 | End: 2018-01-30

## 2018-01-20 RX ORDER — NITROGLYCERIN 0.4 MG/1
0.4 TABLET SUBLINGUAL EVERY 5 MIN PRN
Status: CANCELLED | OUTPATIENT
Start: 2018-01-20 | End: 2018-01-21

## 2018-01-20 RX ORDER — SODIUM CHLORIDE 0.9 % (FLUSH) 0.9 %
10 SYRINGE (ML) INJECTION PRN
Status: CANCELLED | OUTPATIENT
Start: 2018-01-22 | End: 2018-01-22

## 2018-01-20 RX ORDER — 0.9 % SODIUM CHLORIDE 0.9 %
250 INTRAVENOUS SOLUTION INTRAVENOUS ONCE
Status: CANCELLED | OUTPATIENT
Start: 2018-01-22

## 2018-01-20 RX ORDER — SODIUM CHLORIDE 0.9 % (FLUSH) 0.9 %
10 SYRINGE (ML) INJECTION PRN
Status: CANCELLED | OUTPATIENT
Start: 2018-01-20 | End: 2018-01-21

## 2018-01-20 RX ORDER — 0.9 % SODIUM CHLORIDE 0.9 %
250 INTRAVENOUS SOLUTION INTRAVENOUS ONCE
Status: CANCELLED | OUTPATIENT
Start: 2018-01-20 | End: 2018-01-20

## 2018-01-20 RX ORDER — METOPROLOL TARTRATE 5 MG/5ML
2.5 INJECTION INTRAVENOUS PRN
Status: CANCELLED | OUTPATIENT
Start: 2018-01-20 | End: 2018-01-21

## 2018-01-20 RX ORDER — METOPROLOL TARTRATE 5 MG/5ML
2.5 INJECTION INTRAVENOUS PRN
Status: CANCELLED | OUTPATIENT
Start: 2018-01-22 | End: 2018-01-22

## 2018-01-20 RX ORDER — AMINOPHYLLINE DIHYDRATE 25 MG/ML
100 INJECTION, SOLUTION INTRAVENOUS
Status: CANCELLED | OUTPATIENT
Start: 2018-01-20 | End: 2018-01-20

## 2018-01-20 RX ORDER — NITROGLYCERIN 0.4 MG/1
0.4 TABLET SUBLINGUAL EVERY 5 MIN PRN
Status: CANCELLED | OUTPATIENT
Start: 2018-01-22 | End: 2018-01-22

## 2018-01-20 RX ADMIN — FAMOTIDINE 20 MG: 10 INJECTION, SOLUTION INTRAVENOUS at 11:31

## 2018-01-20 RX ADMIN — ATORVASTATIN CALCIUM 40 MG: 40 TABLET, FILM COATED ORAL at 11:29

## 2018-01-20 RX ADMIN — ALBUTEROL SULFATE 2.5 MG: 2.5 SOLUTION RESPIRATORY (INHALATION) at 07:07

## 2018-01-20 RX ADMIN — HYDROCHLOROTHIAZIDE 12.5 MG: 12.5 CAPSULE ORAL at 11:29

## 2018-01-20 RX ADMIN — ENOXAPARIN SODIUM 40 MG: 40 INJECTION SUBCUTANEOUS at 11:30

## 2018-01-20 RX ADMIN — ALBUTEROL SULFATE 2.5 MG: 2.5 SOLUTION RESPIRATORY (INHALATION) at 19:24

## 2018-01-20 RX ADMIN — SULFAMETHOXAZOLE AND TRIMETHOPRIM 1 TABLET: 800; 160 TABLET ORAL at 11:28

## 2018-01-20 RX ADMIN — AMLODIPINE BESYLATE 10 MG: 10 TABLET ORAL at 11:29

## 2018-01-20 RX ADMIN — LEVOTHYROXINE SODIUM 50 MCG: 50 TABLET ORAL at 05:38

## 2018-01-20 RX ADMIN — FAMOTIDINE 20 MG: 10 INJECTION, SOLUTION INTRAVENOUS at 23:26

## 2018-01-20 RX ADMIN — ALBUTEROL SULFATE 2.5 MG: 2.5 SOLUTION RESPIRATORY (INHALATION) at 14:59

## 2018-01-20 RX ADMIN — Medication 10 ML: at 11:30

## 2018-01-20 RX ADMIN — ASPIRIN 81 MG: 81 TABLET, COATED ORAL at 11:28

## 2018-01-20 RX ADMIN — ALBUTEROL SULFATE 2.5 MG: 2.5 SOLUTION RESPIRATORY (INHALATION) at 10:54

## 2018-01-20 RX ADMIN — SULFAMETHOXAZOLE AND TRIMETHOPRIM 1 TABLET: 800; 160 TABLET ORAL at 23:26

## 2018-01-20 RX ADMIN — PAROXETINE HYDROCHLORIDE 30 MG: 20 TABLET, FILM COATED ORAL at 11:29

## 2018-01-20 RX ADMIN — LEVETIRACETAM 1000 MG: 500 TABLET ORAL at 23:26

## 2018-01-20 RX ADMIN — LEVETIRACETAM 1000 MG: 500 TABLET ORAL at 11:28

## 2018-01-20 NOTE — PROGRESS NOTES
Pulmonary Progress Note  Pulmonary and Critical Care Specialists      Patient - Jimmie Lee,  Age - 58 y.o.    - 1955      Room Number - 2124/2124-01   N -  [id number]   [facility]  Date of Admission -  2018  1:00 PM        Consulting Nikki Gilliam MD  Primary Care Physician - Idalia Gramajo MD     SUBJECTIVE   No distress  On room air    OBJECTIVE   VITALS    height is 5' 10\" (1.778 m) and weight is 210 lb 1.6 oz (95.3 kg). Her oral temperature is 98.2 °F (36.8 °C). Her blood pressure is 138/88 and her pulse is 89. Her respiration is 16 and oxygen saturation is 94%. Body mass index is 30.15 kg/m². Temperature Range: Temp: 98.2 °F (36.8 °C) Temp  Av.3 °F (36.8 °C)  Min: 97.9 °F (36.6 °C)  Max: 98.8 °F (37.1 °C)  BP Range:  Systolic (81WKA), RYT:259 , Min:120 , ASA:682     Diastolic (85SBE), RYE:68, Min:65, Max:89    Pulse Range: Pulse  Av.5  Min: 85  Max: 89  Respiration Range: Resp  Av.3  Min: 16  Max: 18  Current Pulse Ox[de-identified]  SpO2: 94 %  24HR Pulse Ox Range:  SpO2  Av.1 %  Min: 94 %  Max: 97 %  Oxygen Amount and Delivery: O2 Flow Rate (L/min): 2 L/min    Wt Readings from Last 3 Encounters:   18 210 lb 1.6 oz (95.3 kg)   17 173 lb (78.5 kg)   17 164 lb (74.4 kg)       I/O (24 Hours)    Intake/Output Summary (Last 24 hours) at 18 1319  Last data filed at 18 0549   Gross per 24 hour   Intake              328 ml   Output                0 ml   Net              328 ml       EXAM     General Appearance  Awake, alert, oriented, in no acute distress  HEENT - normocephalic, atraumatic. Neck - Supple,  trachea midline   Lungs - trace crackles at bases  Heart Exam:PMI normal. No lifts, heaves, or thrills. RRR. No murmurs, clicks, gallops, or rubs  Abdomen Exam: Abdomen soft, non-tender.  BS normal  Extremity Exam: no edema    MEDS      levothyroxine  50 mcg Oral Daily    levETIRAcetam  1,000 mg Oral BID   

## 2018-01-20 NOTE — PLAN OF CARE
Problem: Pain:  Goal: Control of acute pain  Control of acute pain   Outcome: Met This Shift  Pt. Had no complaints for pain this portion of shift. Problem: Discharge Planning:  Goal: Participates in care planning  Participates in care planning   Outcome: Met This Shift  Pt. States she would like to go to SNF to received therapy after d/c.  Spoke with pt. On what she would like and relayed all wishes to SW.
Problem: Pain:  Goal: Pain level will decrease  Pain level will decrease   Outcome: Ongoing      Problem: Risk for Impaired Skin Integrity  Goal: Tissue integrity - skin and mucous membranes  Structural intactness and normal physiological function of skin and  mucous membranes. Outcome: Ongoing      Problem: Falls - Risk of  Goal: Absence of falls  Outcome: Met This Shift  No falls this shift, fall precautions in place.
Problem: Pain:  Goal: Pain level will decrease  Pain level will decrease   Outcome: Ongoing      Problem: Risk for Impaired Skin Integrity  Goal: Tissue integrity - skin and mucous membranes  Structural intactness and normal physiological function of skin and  mucous membranes. Outcome: Ongoing  No area of skin breakdown noted. Reposition patient frequently to prevent skin breakdown    Problem: Falls - Risk of  Goal: Absence of falls  Outcome: Ongoing   attempts to get oob. Safe environment provided. Bed down and locked. Reminded constantly to stay in bed.  Bed alarm on
Problem: Pain:  Goal: Pain level will decrease  Pain level will decrease   Outcome: Ongoing  Patient denies pain this shift. Problem: Risk for Impaired Skin Integrity  Goal: Tissue integrity - skin and mucous membranes  Structural intactness and normal physiological function of skin and  mucous membranes. Outcome: Ongoing  Patient has no new breakdown this shift. Patient on a waffle mattress.      Problem: Falls - Risk of  Goal: Absence of falls  Outcome: Met This Shift      Problem: Restraint Use - Nonviolent/Non-Self-Destructive Behavior:  Goal: Absence of restraint indications  Absence of restraint indications    Outcome: Met This Shift
Problem: Pain:  Goal: Pain level will decrease  Pain level will decrease   Outcome: Ongoing  Patient denies pain, no medication given this shift    Problem: Risk for Impaired Skin Integrity  Goal: Tissue integrity - skin and mucous membranes  Structural intactness and normal physiological function of skin and  mucous membranes. Outcome: Ongoing  Patient frequently repositions per self, keep skin clean and dry.  Continue to monitor for signs of breakdown    Problem: Falls - Risk of  Goal: Absence of falls  Outcome: Ongoing  Patient continues to be confused, and remains with a telesitter, Continue fall precautions
Ongoing  Patient still fixating on objects not in the room; will continue to monitor pt's anxiety. Problem: Gas Exchange - Impaired:  Goal: Levels of oxygenation will improve  Levels of oxygenation will improve   Outcome: Ongoing  Patient resp rate 18 breath/min; pulse ox 95-96% on 2L oxygen via nasal canula; lung sounds clear bilaterally; will continue to monitor oxygenation. Problem: Mental Status - Impaired:  Goal: Mental status will be restored to baseline  Mental status will be restored to baseline   Outcome: Ongoing  Patient alert and oriented to person, place, and situation; disoriented to time. Will continue to monitor mental status. Problem: Skin Integrity - Impaired:  Goal: Will show no infection signs and symptoms  Will show no infection signs and symptoms   Outcome: Ongoing  Patient remains afebrile; WBC count is within normal limits; no signs of erythema, edema, or warmth. Will continue to monitor for signs/symptoms of infection.

## 2018-01-20 NOTE — PROGRESS NOTES
Writer and EKG tech go up to floor to get patient and bring patient down for stress test by bed. Writer speaks with patient and patient's primary nurse Alo Pardo reports patient needs to be able to sign consent for stress test and be able to give adequate feedback on symptoms as well as be able to mentally process what is going on with the test.  Primary nurse reports patient was seeing dogs yesterday and calling out frequently and therefore now has a 1:1 sitter. Guille Santos RN asks patient orientation questions. Pt is able to state name and place but not month or situation. No POA listed to obtain consent. Writer pages Dr. Garth Mckeon supervising cardiologist and group assigned to patients case and explains situation. Writer meets Dr. Garth Mckeon and Guille Santos RN at patients room. Dr. Garth Mckeon examines patient and asks questions including orientation questions. Patient does not know where she is at this time and does not provide accurate information about where she lives, although she does state her name correctly. Dr. Garth Mckeon makes decision for patient not to have stress test today. Pt needs placement per Guille Santos RN so patient not being discharged today or tomorrow. Dr. Garth Mckeon reports to change stress test order to Monday with NPO after midnight Sunday and Patient will have stress test Monday or Tuesday of if needed as an outpatient. Nuclear medicine aware, Stress lab department aware, patient aware as able.

## 2018-01-20 NOTE — PROGRESS NOTES
[facility]   Progress Note                   Date:   1/20/2018  Patient name: Rhonda Rebolledo  Date of admission:  1/14/2018  1:00 PM  MRN:   [id number]  YOB: 1955  PCP: Virl Closs, MD    Reason for Admission:     Subjective:       Clinical Changes / Abnormalities: No chest pain or SOB. Medications:   Scheduled Meds:   levothyroxine  50 mcg Oral Daily    levETIRAcetam  1,000 mg Oral BID    albuterol  2.5 mg Nebulization 4x Daily    amLODIPine  10 mg Oral Daily    aspirin EC  81 mg Oral Daily    atorvastatin  40 mg Oral Daily    hydrochlorothiazide  12.5 mg Oral Daily    sulfamethoxazole-trimethoprim  1 tablet Oral 2 times per day    PARoxetine  30 mg Oral Daily    sodium chloride flush  10 mL Intravenous 2 times per day    famotidine (PEPCID) injection  20 mg Intravenous BID    enoxaparin  40 mg Subcutaneous Daily     Continuous Infusions:   sodium chloride 10 mL/hr at 01/17/18 1958     CBC:   Recent Labs      01/18/18   0534  01/19/18   0514  01/20/18   0532   WBC  10.6  7.9  8.9   HGB  12.2  11.1*  11.5*   PLT  212  188  204     BMP:    Recent Labs      01/18/18   0534  01/18/18   1625  01/19/18   0514  01/20/18   0532   NA  143   --   141  142   K  3.1*  2.6*  3.2*  3.7   CL  106   --   105  104   CO2  24   --   25  25   BUN  5*   --   5*  7*   CREATININE  0.48*   --   0.57  0.59   GLUCOSE  136*   --   89  80     Hepatic: No results for input(s): AST, ALT, ALB, BILITOT, ALKPHOS in the last 72 hours. Troponin: No results for input(s): TROPONINI in the last 72 hours. BNP: No results for input(s): BNP in the last 72 hours. Lipids: No results for input(s): CHOL, HDL in the last 72 hours. Invalid input(s): LDLCALCU  INR: No results for input(s): INR in the last 72 hours.     Objective:   Vitals: /76   Pulse 89   Temp 97.9 °F (36.6 °C) (Oral)   Resp 18   Ht 5' 10\" (1.778 m)   Wt 210 lb 1.6 oz (95.3 kg)   SpO2 94%   BMI 30.15 kg/m²   General appearance: alert and cooperative with exam  HEENT: Head: Normocephalic, no lesions, without obvious abnormality. Neck: no adenopathy, no carotid bruit, no JVD, supple, symmetrical, trachea midline and thyroid not enlarged, symmetric, no tenderness/mass/nodules  Lungs: clear to auscultation bilaterally  Heart: regular rate and rhythm, S1, S2 normal, no murmur, click, rub or gallop  Abdomen: soft, non-tender; bowel sounds normal; no masses,  no organomegaly  Extremities: extremities normal, atraumatic, no cyanosis or edema    Echo 1/15/2018  Summary  Patient supine, on ventilator. Normal left ventricle size and function with an estimated EF > 55%. No segmental wall motion abnormalities seen. Mild left ventricular hypertrophy. Evidence of diastolic dysfunction. Left atrial dilatation. Normal right ventricular size and function. Mitral annular calcification is seen. Mild mitral regurgitation. Mild tricuspid regurgitation. Estimated right ventricular systolic pressure  is 38 mmHg. Mildly elevated right ventricular systolic pressure. IVC Increased diameter and impaired or no inspiratory variation indicating  elevated RA filling pressure (i.e. CVP) . No significant pericardial effusion is seen. Assessment / Acute Cardiac Problems/Plan   1. Mildly elevated troponin. Most likely type 2 from sepsis. The patient is asymptomatic, echo showed preserved EF, was supposed to have a stress test today, was not NPO and still have mental status changes, will plan for stress test when other issues resolve, possibly on Monday. 2. Sepsis/UTI and hypotension. Treat per primary service.            Taye Antoine MD  [facility]  [phone]

## 2018-01-20 NOTE — DISCHARGE SUMMARY
[address] [facility]    Patient name:  Jassi Pandey  YOB: 1955  Primary Care Physician: Echo Whiting MD    Date of admission:  1/14/2018  1:00 PM  Date of discharge:     DISCHARGE DIAGNOSES       Principal Problem:    Septic shock ([facility] [address]  Active Problems:    Hypothyroidism    Hypertension    Seizure disorder ([facility] [address]    Urge incontinence    GERD (gastroesophageal reflux disease)    H/O: CVA (cerebrovascular accident)    Hypokalemia    Altered mental status    UTI (urinary tract infection)    Elevated troponin      HOSPITAL COURSE       Seizure from uti    sepsis    mrsa   treated  With iv atb    Resolved   stress per cardio in progress   f/u 2nd part     On keppra   d/c on bactrim po  Consultants:  - cardio.  Neurology/ pilm    Procedures:bronchoscopy    DISCHARGE MEDICATIONS        [facility] [place] 2[place] Medication Instructions [id number]    Printed on:01/20/18 1018   Medication Information                      amLODIPine (NORVASC) 10 MG tablet  Take 10 mg by mouth daily             aspirin EC 81 MG EC tablet  Take 1 tablet by mouth daily             atorvastatin (LIPITOR) 40 MG tablet  Take 1 tablet by mouth daily             hydrochlorothiazide (HYDRODIURIL) 12.5 MG tablet  TAKE ONE TABLET BY MOUTH ONCE DAILY             levETIRAcetam (KEPPRA) 1000 MG tablet  Take 1 tablet by mouth 2 times daily             levothyroxine (SYNTHROID) 50 MCG tablet  Take 1 tablet by mouth daily             omeprazole (PRILOSEC) 20 MG delayed release capsule  Take 1 capsule by mouth daily             oxybutynin (DITROPAN) 5 MG tablet  Take 1 tablet by mouth 3 times daily             PARoxetine (PAXIL) 30 MG tablet  Take 1 tablet by mouth daily             sulfamethoxazole-trimethoprim (BACTRIM) 400-80 MG per tablet  Take 1 tablet by mouth daily for 10 days             SUMAtriptan (IMITREX) 100 MG tablet  TAKE ONE TABLET BY MOUTH once daily

## 2018-01-21 ENCOUNTER — APPOINTMENT (OUTPATIENT)
Dept: GENERAL RADIOLOGY | Age: 63
DRG: 720 | End: 2018-01-21
Payer: COMMERCIAL

## 2018-01-21 VITALS
BODY MASS INDEX: 29.01 KG/M2 | WEIGHT: 202.6 LBS | OXYGEN SATURATION: 98 % | HEIGHT: 70 IN | HEART RATE: 91 BPM | RESPIRATION RATE: 16 BRPM | TEMPERATURE: 98.1 F | DIASTOLIC BLOOD PRESSURE: 65 MMHG | SYSTOLIC BLOOD PRESSURE: 117 MMHG

## 2018-01-21 LAB
ABSOLUTE EOS #: 0.5 K/UL (ref 0–0.4)
ABSOLUTE IMMATURE GRANULOCYTE: ABNORMAL K/UL (ref 0–0.3)
ABSOLUTE LYMPH #: 0.8 K/UL (ref 1–4.8)
ABSOLUTE MONO #: 0.8 K/UL (ref 0.1–1.3)
ANION GAP SERPL CALCULATED.3IONS-SCNC: 13 MMOL/L (ref 9–17)
BASOPHILS # BLD: 0 % (ref 0–2)
BASOPHILS ABSOLUTE: 0 K/UL (ref 0–0.2)
BUN BLDV-MCNC: 7 MG/DL (ref 8–23)
BUN/CREAT BLD: ABNORMAL (ref 9–20)
CALCIUM SERPL-MCNC: 8 MG/DL (ref 8.6–10.4)
CHLORIDE BLD-SCNC: 102 MMOL/L (ref 98–107)
CO2: 25 MMOL/L (ref 20–31)
CREAT SERPL-MCNC: 0.53 MG/DL (ref 0.5–0.9)
DIFFERENTIAL TYPE: ABNORMAL
EOSINOPHILS RELATIVE PERCENT: 8 % (ref 0–4)
GFR AFRICAN AMERICAN: >60 ML/MIN
GFR NON-AFRICAN AMERICAN: >60 ML/MIN
GFR SERPL CREATININE-BSD FRML MDRD: ABNORMAL ML/MIN/{1.73_M2}
GFR SERPL CREATININE-BSD FRML MDRD: ABNORMAL ML/MIN/{1.73_M2}
GLUCOSE BLD-MCNC: 83 MG/DL (ref 70–99)
HCT VFR BLD CALC: 32.2 % (ref 36–46)
HEMOGLOBIN: 11 G/DL (ref 12–16)
IMMATURE GRANULOCYTES: ABNORMAL %
LYMPHOCYTES # BLD: 12 % (ref 24–44)
MAGNESIUM: 1.6 MG/DL (ref 1.6–2.6)
MCH RBC QN AUTO: 31.5 PG (ref 26–34)
MCHC RBC AUTO-ENTMCNC: 34.1 G/DL (ref 31–37)
MCV RBC AUTO: 92.5 FL (ref 80–100)
MONOCYTES # BLD: 12 % (ref 1–7)
NRBC AUTOMATED: ABNORMAL PER 100 WBC
PDW BLD-RTO: 14.6 % (ref 11.5–14.9)
PLATELET # BLD: 199 K/UL (ref 150–450)
PLATELET ESTIMATE: ABNORMAL
PMV BLD AUTO: 8.7 FL (ref 6–12)
POTASSIUM SERPL-SCNC: 3.2 MMOL/L (ref 3.7–5.3)
RBC # BLD: 3.49 M/UL (ref 4–5.2)
RBC # BLD: ABNORMAL 10*6/UL
SEG NEUTROPHILS: 68 % (ref 36–66)
SEGMENTED NEUTROPHILS ABSOLUTE COUNT: 4.8 K/UL (ref 1.3–9.1)
SODIUM BLD-SCNC: 140 MMOL/L (ref 135–144)
WBC # BLD: 7 K/UL (ref 3.5–11)
WBC # BLD: ABNORMAL 10*3/UL

## 2018-01-21 PROCEDURE — 80048 BASIC METABOLIC PNL TOTAL CA: CPT

## 2018-01-21 PROCEDURE — 94664 DEMO&/EVAL PT USE INHALER: CPT

## 2018-01-21 PROCEDURE — 6370000000 HC RX 637 (ALT 250 FOR IP): Performed by: FAMILY MEDICINE

## 2018-01-21 PROCEDURE — 94640 AIRWAY INHALATION TREATMENT: CPT

## 2018-01-21 PROCEDURE — 94760 N-INVAS EAR/PLS OXIMETRY 1: CPT

## 2018-01-21 PROCEDURE — 71046 X-RAY EXAM CHEST 2 VIEWS: CPT

## 2018-01-21 PROCEDURE — S0028 INJECTION, FAMOTIDINE, 20 MG: HCPCS | Performed by: FAMILY MEDICINE

## 2018-01-21 PROCEDURE — 85025 COMPLETE CBC W/AUTO DIFF WBC: CPT

## 2018-01-21 PROCEDURE — 99239 HOSP IP/OBS DSCHRG MGMT >30: CPT | Performed by: INTERNAL MEDICINE

## 2018-01-21 PROCEDURE — 2500000003 HC RX 250 WO HCPCS: Performed by: FAMILY MEDICINE

## 2018-01-21 PROCEDURE — 2580000003 HC RX 258: Performed by: FAMILY MEDICINE

## 2018-01-21 PROCEDURE — 83735 ASSAY OF MAGNESIUM: CPT

## 2018-01-21 PROCEDURE — 6360000002 HC RX W HCPCS: Performed by: FAMILY MEDICINE

## 2018-01-21 PROCEDURE — 6370000000 HC RX 637 (ALT 250 FOR IP): Performed by: RADIOLOGY

## 2018-01-21 PROCEDURE — 6360000002 HC RX W HCPCS: Performed by: INTERNAL MEDICINE

## 2018-01-21 RX ORDER — TAMSULOSIN HYDROCHLORIDE 0.4 MG/1
0.4 CAPSULE ORAL NIGHTLY
Status: DISCONTINUED | OUTPATIENT
Start: 2018-01-21 | End: 2018-01-21

## 2018-01-21 RX ADMIN — LEVETIRACETAM 1000 MG: 500 TABLET ORAL at 09:09

## 2018-01-21 RX ADMIN — ASPIRIN 81 MG: 81 TABLET, COATED ORAL at 09:09

## 2018-01-21 RX ADMIN — ALBUTEROL SULFATE 2.5 MG: 2.5 SOLUTION RESPIRATORY (INHALATION) at 11:11

## 2018-01-21 RX ADMIN — Medication 10 ML: at 09:12

## 2018-01-21 RX ADMIN — AMLODIPINE BESYLATE 10 MG: 10 TABLET ORAL at 09:09

## 2018-01-21 RX ADMIN — LEVOTHYROXINE SODIUM 50 MCG: 50 TABLET ORAL at 05:55

## 2018-01-21 RX ADMIN — FAMOTIDINE 20 MG: 10 INJECTION, SOLUTION INTRAVENOUS at 09:10

## 2018-01-21 RX ADMIN — PAROXETINE HYDROCHLORIDE 30 MG: 20 TABLET, FILM COATED ORAL at 14:07

## 2018-01-21 RX ADMIN — ATORVASTATIN CALCIUM 40 MG: 40 TABLET, FILM COATED ORAL at 09:10

## 2018-01-21 RX ADMIN — ALBUTEROL SULFATE 2.5 MG: 2.5 SOLUTION RESPIRATORY (INHALATION) at 16:15

## 2018-01-21 RX ADMIN — ENOXAPARIN SODIUM 40 MG: 40 INJECTION SUBCUTANEOUS at 09:10

## 2018-01-21 RX ADMIN — HYDROCHLOROTHIAZIDE 12.5 MG: 12.5 CAPSULE ORAL at 09:10

## 2018-01-21 RX ADMIN — SULFAMETHOXAZOLE AND TRIMETHOPRIM 1 TABLET: 800; 160 TABLET ORAL at 09:10

## 2018-01-21 NOTE — DISCHARGE SUMMARY
[address] [facility]    Patient name:  James Lombardo  YOB: 1955  Primary Care Physician: Cristian Robles MD    Date of admission:  1/14/2018  1:00 PM  Date of discharge:     DISCHARGE DIAGNOSES       Principal Problem:    Septic shock ([facility] [address]  Active Problems:    Hypothyroidism    Hypertension    Seizure disorder ([facility] [address]    Urge incontinence    GERD (gastroesophageal reflux disease)    H/O: CVA (cerebrovascular accident)    Hypokalemia    Altered mental status    UTI (urinary tract infection)    Elevated troponin      HOSPITAL COURSE       Seizure from uti    sepsis    mrsa   treated  With iv atb    Resolved   stress per cardio in progress   f/u 2nd part     On keppra   d/c on bactrim po  Consultants:  - cardio.  Neurology/ pilm    Procedures:bronchoscopy    DISCHARGE MEDICATIONS        [facility] Medication Instructions [id number]    Printed on:01/21/18 1912   Medication Information                      amLODIPine (NORVASC) 10 MG tablet  Take 10 mg by mouth daily             aspirin EC 81 MG EC tablet  Take 1 tablet by mouth daily             atorvastatin (LIPITOR) 40 MG tablet  Take 1 tablet by mouth daily             hydrochlorothiazide (HYDRODIURIL) 12.5 MG tablet  TAKE ONE TABLET BY MOUTH ONCE DAILY             levETIRAcetam (KEPPRA) 1000 MG tablet  Take 1 tablet by mouth 2 times daily             levothyroxine (SYNTHROID) 50 MCG tablet  Take 1 tablet by mouth daily             omeprazole (PRILOSEC) 20 MG delayed release capsule  Take 1 capsule by mouth daily             oxybutynin (DITROPAN) 5 MG tablet  Take 1 tablet by mouth 3 times daily             PARoxetine (PAXIL) 30 MG tablet  Take 1 tablet by mouth daily             sulfamethoxazole-trimethoprim (BACTRIM) 400-80 MG per tablet  Take 1 tablet by mouth daily for 10 days             SUMAtriptan (IMITREX) 100 MG tablet  TAKE ONE TABLET BY MOUTH once daily as needed for migraine headache                 DISPOSITION AND FOLLOW-UP     Disposition: home    Condition: Stable     Diet:  Regular diet     Activity: As tolerated     Follow-up:   with Asad Melendez MD,    Discharge time spent on pt and paperworki more than [address], [address] [place] [address].    Phone [phone]   Fax: [phone]  Answering Service: [phone]

## 2018-01-21 NOTE — PROGRESS NOTES
levETIRAcetam  1,000 mg Oral BID    albuterol  2.5 mg Nebulization 4x Daily    amLODIPine  10 mg Oral Daily    aspirin EC  81 mg Oral Daily    atorvastatin  40 mg Oral Daily    hydrochlorothiazide  12.5 mg Oral Daily    sulfamethoxazole-trimethoprim  1 tablet Oral 2 times per day    PARoxetine  30 mg Oral Daily    sodium chloride flush  10 mL Intravenous 2 times per day    famotidine (PEPCID) injection  20 mg Intravenous BID    enoxaparin  40 mg Subcutaneous Daily      sodium chloride 10 mL/hr at 01/17/18 1958     sodium chloride flush, potassium chloride **OR** potassium chloride **OR** potassium chloride, albuterol, acetaminophen, sodium chloride flush, docusate sodium, ondansetron, albuterol sulfate HFA    LABS   CBC   Recent Labs      01/21/18   0548   WBC  7.0   HGB  11.0*   HCT  32.2*   MCV  92.5   PLT  199     BMP: Lab Results   Component Value Date     01/21/2018    K 3.2 01/21/2018     01/21/2018    CO2 25 01/21/2018    BUN 7 01/21/2018    LABALBU 3.5 01/14/2018    LABALBU 4.1 10/04/2011    CREATININE 0.53 01/21/2018    CALCIUM 8.0 01/21/2018    GFRAA >60 01/21/2018    LABGLOM >60 01/21/2018     ABGs:  Lab Results   Component Value Date    PHART 7.448 01/16/2018    PO2ART 83.2 01/16/2018    GSR8GIB 31.6 01/16/2018    Lab Results   Component Value Date    MODE PRVC 01/16/2018     Ionized Calcium:  No results found for: IONCA  Magnesium:    Lab Results   Component Value Date    MG 1.6 01/21/2018     Phosphorus:  No results found for: PHOS     LIVER PROFILE No results for input(s): AST, ALT, LIPASE, BILIDIR, BILITOT, ALKPHOS in the last 72 hours. Invalid input(s): AMYLASE,  ALB  INR No results for input(s): INR in the last 72 hours.   PTT   Lab Results   Component Value Date    APTT 21.3 (L) 01/14/2018         RADIOLOGY     (See actual reports for details)    ASSESSMENT/PLAN     Patient Active Problem List   Diagnosis    Hypothyroidism    Hypertension    Seizure disorder ([facility] [address]   

## 2018-01-22 LAB
CULTURE: 2
CULTURE: NORMAL
Lab: NORMAL
SPECIMEN DESCRIPTION: NORMAL
SPECIMEN DESCRIPTION: NORMAL
STATUS: NORMAL

## 2018-01-23 LAB
CULTURE: ABNORMAL
LV EF: 35 %
LVEF MODALITY: NORMAL
Lab: ABNORMAL
SPECIMEN DESCRIPTION: ABNORMAL
SPECIMEN DESCRIPTION: ABNORMAL
STATUS: ABNORMAL

## 2018-02-06 ENCOUNTER — OFFICE VISIT (OUTPATIENT)
Dept: INTERNAL MEDICINE | Age: 63
End: 2018-02-06
Payer: COMMERCIAL

## 2018-02-06 VITALS
BODY MASS INDEX: 24.48 KG/M2 | DIASTOLIC BLOOD PRESSURE: 88 MMHG | HEART RATE: 87 BPM | HEIGHT: 70 IN | WEIGHT: 171 LBS | SYSTOLIC BLOOD PRESSURE: 124 MMHG

## 2018-02-06 DIAGNOSIS — E03.9 ACQUIRED HYPOTHYROIDISM: Primary | ICD-10-CM

## 2018-02-06 DIAGNOSIS — Z86.73 H/O: STROKE: ICD-10-CM

## 2018-02-06 DIAGNOSIS — G43.009 MIGRAINE WITHOUT AURA AND WITHOUT STATUS MIGRAINOSUS, NOT INTRACTABLE: ICD-10-CM

## 2018-02-06 DIAGNOSIS — F41.1 GENERALIZED ANXIETY DISORDER: ICD-10-CM

## 2018-02-06 DIAGNOSIS — Z12.39 BREAST CANCER SCREENING: ICD-10-CM

## 2018-02-06 DIAGNOSIS — K21.9 GASTROESOPHAGEAL REFLUX DISEASE, ESOPHAGITIS PRESENCE NOT SPECIFIED: ICD-10-CM

## 2018-02-06 DIAGNOSIS — N39.41 URGE INCONTINENCE: ICD-10-CM

## 2018-02-06 DIAGNOSIS — E87.6 HYPOKALEMIA: ICD-10-CM

## 2018-02-06 DIAGNOSIS — G40.909 SEIZURE DISORDER (HCC): ICD-10-CM

## 2018-02-06 DIAGNOSIS — I10 ESSENTIAL HYPERTENSION: ICD-10-CM

## 2018-02-06 PROBLEM — R77.8 ELEVATED TROPONIN: Status: RESOLVED | Noted: 2018-01-14 | Resolved: 2018-02-06

## 2018-02-06 PROBLEM — R65.21 SEPTIC SHOCK (HCC): Status: RESOLVED | Noted: 2018-01-14 | Resolved: 2018-02-06

## 2018-02-06 PROBLEM — N39.0 UTI (URINARY TRACT INFECTION): Status: RESOLVED | Noted: 2018-01-14 | Resolved: 2018-02-06

## 2018-02-06 PROBLEM — A41.9 SEPTIC SHOCK (HCC): Status: RESOLVED | Noted: 2018-01-14 | Resolved: 2018-02-06

## 2018-02-06 PROBLEM — R41.82 ALTERED MENTAL STATUS: Status: RESOLVED | Noted: 2018-01-14 | Resolved: 2018-02-06

## 2018-02-06 PROBLEM — R79.89 ELEVATED TROPONIN: Status: RESOLVED | Noted: 2018-01-14 | Resolved: 2018-02-06

## 2018-02-06 PROCEDURE — G8420 CALC BMI NORM PARAMETERS: HCPCS | Performed by: HOSPITALIST

## 2018-02-06 PROCEDURE — G8484 FLU IMMUNIZE NO ADMIN: HCPCS | Performed by: HOSPITALIST

## 2018-02-06 PROCEDURE — 1036F TOBACCO NON-USER: CPT | Performed by: HOSPITALIST

## 2018-02-06 PROCEDURE — 1111F DSCHRG MED/CURRENT MED MERGE: CPT | Performed by: HOSPITALIST

## 2018-02-06 PROCEDURE — 3017F COLORECTAL CA SCREEN DOC REV: CPT | Performed by: HOSPITALIST

## 2018-02-06 PROCEDURE — 99213 OFFICE O/P EST LOW 20 MIN: CPT | Performed by: HOSPITALIST

## 2018-02-06 PROCEDURE — 3014F SCREEN MAMMO DOC REV: CPT | Performed by: HOSPITALIST

## 2018-02-06 PROCEDURE — G8427 DOCREV CUR MEDS BY ELIG CLIN: HCPCS | Performed by: HOSPITALIST

## 2018-02-06 RX ORDER — OMEPRAZOLE 20 MG/1
20 CAPSULE, DELAYED RELEASE ORAL DAILY
Qty: 30 CAPSULE | Refills: 2 | Status: CANCELLED | OUTPATIENT
Start: 2018-02-06

## 2018-02-06 RX ORDER — HYDROCHLOROTHIAZIDE 12.5 MG/1
12.5 TABLET ORAL DAILY
Qty: 30 TABLET | Refills: 2 | Status: CANCELLED | OUTPATIENT
Start: 2018-02-06

## 2018-02-06 RX ORDER — PAROXETINE 30 MG/1
30 TABLET, FILM COATED ORAL DAILY
Qty: 30 TABLET | Refills: 2 | Status: SHIPPED | OUTPATIENT
Start: 2018-02-06 | End: 2018-07-18 | Stop reason: SDUPTHER

## 2018-02-06 RX ORDER — LEVOTHYROXINE SODIUM 0.05 MG/1
50 TABLET ORAL DAILY
Qty: 30 TABLET | Refills: 2 | Status: SHIPPED | OUTPATIENT
Start: 2018-02-06 | End: 2018-07-18 | Stop reason: SDUPTHER

## 2018-02-06 RX ORDER — LEVOTHYROXINE SODIUM 0.05 MG/1
50 TABLET ORAL DAILY
Qty: 30 TABLET | Refills: 2 | Status: CANCELLED | OUTPATIENT
Start: 2018-02-06

## 2018-02-06 RX ORDER — OXYBUTYNIN CHLORIDE 5 MG/1
5 TABLET ORAL 3 TIMES DAILY
Qty: 90 TABLET | Refills: 2 | Status: CANCELLED | OUTPATIENT
Start: 2018-02-06

## 2018-02-06 RX ORDER — LEVETIRACETAM 1000 MG/1
1000 TABLET ORAL 2 TIMES DAILY
Qty: 60 TABLET | Refills: 2 | Status: ON HOLD | OUTPATIENT
Start: 2018-02-06 | End: 2018-05-17 | Stop reason: HOSPADM

## 2018-02-06 RX ORDER — AMLODIPINE BESYLATE 10 MG/1
10 TABLET ORAL DAILY
Qty: 30 TABLET | Refills: 2 | Status: SHIPPED | OUTPATIENT
Start: 2018-02-06 | End: 2018-06-16 | Stop reason: SDUPTHER

## 2018-02-06 RX ORDER — ASPIRIN 81 MG/1
81 TABLET ORAL DAILY
Qty: 30 TABLET | Refills: 2 | Status: CANCELLED | OUTPATIENT
Start: 2018-02-06

## 2018-02-06 RX ORDER — PAROXETINE 30 MG/1
30 TABLET, FILM COATED ORAL DAILY
Qty: 30 TABLET | Refills: 2 | Status: CANCELLED | OUTPATIENT
Start: 2018-02-06

## 2018-02-06 RX ORDER — ATORVASTATIN CALCIUM 40 MG/1
40 TABLET, FILM COATED ORAL DAILY
Qty: 30 TABLET | Refills: 2 | Status: SHIPPED | OUTPATIENT
Start: 2018-02-06 | End: 2018-07-18 | Stop reason: SDUPTHER

## 2018-02-06 RX ORDER — SUMATRIPTAN 100 MG/1
100 TABLET, FILM COATED ORAL DAILY PRN
Qty: 10 TABLET | Refills: 2 | Status: SHIPPED | OUTPATIENT
Start: 2018-02-06 | End: 2019-02-07 | Stop reason: SDUPTHER

## 2018-02-06 RX ORDER — OMEPRAZOLE 20 MG/1
20 CAPSULE, DELAYED RELEASE ORAL DAILY
Qty: 30 CAPSULE | Refills: 2 | Status: SHIPPED | OUTPATIENT
Start: 2018-02-06 | End: 2018-06-16 | Stop reason: SDUPTHER

## 2018-02-06 RX ORDER — AMLODIPINE BESYLATE 10 MG/1
10 TABLET ORAL DAILY
Qty: 30 TABLET | Refills: 2 | Status: CANCELLED | OUTPATIENT
Start: 2018-02-06

## 2018-02-06 RX ORDER — LEVETIRACETAM 1000 MG/1
1000 TABLET ORAL 2 TIMES DAILY
Qty: 60 TABLET | Refills: 2 | Status: CANCELLED | OUTPATIENT
Start: 2018-02-06

## 2018-02-06 RX ORDER — HYDROCHLOROTHIAZIDE 12.5 MG/1
12.5 TABLET ORAL DAILY
Qty: 30 TABLET | Refills: 2 | Status: SHIPPED | OUTPATIENT
Start: 2018-02-06 | End: 2018-07-18 | Stop reason: SDUPTHER

## 2018-02-06 RX ORDER — ASPIRIN 81 MG/1
81 TABLET ORAL DAILY
Qty: 30 TABLET | Refills: 2 | Status: SHIPPED | OUTPATIENT
Start: 2018-02-06 | End: 2019-02-07 | Stop reason: SDUPTHER

## 2018-02-06 RX ORDER — OXYBUTYNIN CHLORIDE 5 MG/1
5 TABLET ORAL 3 TIMES DAILY
Qty: 90 TABLET | Refills: 2 | Status: SHIPPED | OUTPATIENT
Start: 2018-02-06 | End: 2018-06-16 | Stop reason: SDUPTHER

## 2018-02-06 RX ORDER — SUMATRIPTAN 100 MG/1
100 TABLET, FILM COATED ORAL DAILY PRN
Qty: 10 TABLET | Refills: 2 | Status: CANCELLED | OUTPATIENT
Start: 2018-02-06

## 2018-02-06 RX ORDER — ATORVASTATIN CALCIUM 40 MG/1
40 TABLET, FILM COATED ORAL DAILY
Qty: 30 TABLET | Refills: 2 | Status: CANCELLED | OUTPATIENT
Start: 2018-02-06

## 2018-02-06 NOTE — PROGRESS NOTES
Results   Component Value Date    LABA1C 5.1 08/18/2016     MICROALBUMIN URINE: No results found for: MICROALBUR  FASTING LIPID PANEL:  Lab Results   Component Value Date    CHOL 183 08/18/2016    HDL 71 08/18/2016    TRIG 64 08/18/2016     LIVER PROFILE:  Lab Results   Component Value Date    ALT 18 01/14/2018    AST 30 01/14/2018    PROT 6.6 01/14/2018    BILITOT 0.56 01/14/2018    BILIDIR 0.10 10/04/2011    LABALBU 3.5 01/14/2018    LABALBU 4.1 10/04/2011      THYROID FUNCTION:   Lab Results   Component Value Date    TSH 1.50 01/15/2018      URINE ANALYSIS: No results found for: LABURIN  ASSESSMENT AND PLAN:    Patient had a couple of issues that needed addressed:  UTI resolved. Pneumonia Resolved. Refills on medications. 1. Acquired hypothyroidism  - levothyroxine (SYNTHROID) 50 MCG tablet; Take 1 tablet by mouth daily  Dispense: 30 tablet; Refill: 2    2. Essential hypertension  - hydrochlorothiazide (HYDRODIURIL) 12.5 MG tablet; Take 1 tablet by mouth daily  Dispense: 30 tablet; Refill: 2  - amLODIPine (NORVASC) 10 MG tablet; Take 1 tablet by mouth daily  Dispense: 30 tablet; Refill: 2  - DC DISCHARGE MEDS RECONCILED W/ CURRENT OUTPATIENT MED LIST    3. Seizure disorder  - levETIRAcetam (KEPPRA) 1000 MG tablet; Take 1 tablet by mouth 2 times daily  Dispense: 60 tablet; Refill: 2    4. H/O: stroke  - aspirin EC 81 MG EC tablet; Take 1 tablet by mouth daily  Dispense: 30 tablet; Refill: 2    5. Gastroesophageal reflux disease, esophagitis presence not specified  - omeprazole (PRILOSEC) 20 MG delayed release capsule; Take 1 capsule by mouth daily  Dispense: 30 capsule; Refill: 2    6. Urge incontinence  - oxybutynin (DITROPAN) 5 MG tablet; Take 1 tablet by mouth 3 times daily  Dispense: 90 tablet; Refill: 2    7. Hypokalemia  - Basic Metabolic Panel; Future    8. Breast cancer screening  - CAYETANO DIGITAL SCREEN W CAD BILATERAL; Future    9.  Migraine without aura and without status migrainosus, not intractable  On sumatriptan as needed    10. Generalized anxiety disorder  On paxil. Doing fine. FOLLOW UP:       1. Cedric Allen received counseling on the following healthy behaviors: nutrition  2. Patient given educational materials - see patient instructions  3. Discussed use, benefit, and side effects of prescribed medications. Barriers to medication compliance addressed. All patient questions answered. Pt voiced understanding. 4.  Reviewed prior labs and health maintenance  5. Continue current medications, diet and exercise.     [address]  PGY-3  Resident, Internal Medicine  2/6/2018, 9:43 AM

## 2018-02-06 NOTE — PROGRESS NOTES
Visit Information    Have you changed or started any medications since your last visit including any over-the-counter medicines, vitamins, or herbal medicines? yes - vitamins, fish oil, E   Have you stopped taking any of your medications? Is so, why? -  no  Are you having any side effects from any of your medications? - no    Have you seen any other physician or provider since your last visit?  no   Have you had any other diagnostic tests since your last visit? yes -    Have you been seen in the emergency room and/or had an admission in a hospital since we last saw you?  yes - [facility]   Have you had your routine dental cleaning in the past 6 months?  no     Do you have an active MyChart account? If no, what is the barrier?   No:     Patient Care Team:  Stephania Fitzgerald MD as PCP - General (Internal Medicine)    Medical History Review  Past Medical, Family, and Social History reviewed and does contribute to the patient presenting condition    Health Maintenance   Topic Date Due    Colon Cancer Screen FIT/FOBT  02/23/2017    Flu vaccine (1) 09/01/2017    Breast cancer screen  01/25/2018    TSH testing  01/15/2019    Potassium monitoring  01/21/2019    Creatinine monitoring  01/21/2019    Cervical cancer screen  11/07/2020    Lipid screen  08/18/2021    DTaP/Tdap/Td vaccine (2 - Td) 11/23/2025    Zostavax vaccine  Completed    Hepatitis C screen  Completed    HIV screen  Completed

## 2018-02-12 LAB
CULTURE: NORMAL
CULTURE: NORMAL
Lab: NORMAL
SPECIMEN DESCRIPTION: NORMAL
SPECIMEN DESCRIPTION: NORMAL
STATUS: NORMAL

## 2018-02-14 LAB — LEGIONELLA SPECIES CULTURE: NORMAL

## 2018-03-05 LAB
CULTURE: NORMAL
CULTURE: NORMAL
DIRECT EXAM: NORMAL
Lab: NORMAL
SPECIMEN DESCRIPTION: NORMAL
SPECIMEN DESCRIPTION: NORMAL
STATUS: NORMAL

## 2018-05-13 ENCOUNTER — HOSPITAL ENCOUNTER (INPATIENT)
Age: 63
LOS: 5 days | Discharge: NURSING, REHAB OR LONG-TERM CARE | DRG: 053 | End: 2018-05-18
Attending: EMERGENCY MEDICINE | Admitting: INTERNAL MEDICINE
Payer: COMMERCIAL

## 2018-05-13 ENCOUNTER — APPOINTMENT (OUTPATIENT)
Dept: GENERAL RADIOLOGY | Age: 63
DRG: 053 | End: 2018-05-13
Payer: COMMERCIAL

## 2018-05-13 ENCOUNTER — APPOINTMENT (OUTPATIENT)
Dept: CT IMAGING | Age: 63
DRG: 053 | End: 2018-05-13
Payer: COMMERCIAL

## 2018-05-13 DIAGNOSIS — G40.901 STATUS EPILEPTICUS (HCC): Primary | ICD-10-CM

## 2018-05-13 DIAGNOSIS — E87.6 HYPOKALEMIA: ICD-10-CM

## 2018-05-13 LAB
ABSOLUTE EOS #: 0 K/UL (ref 0–0.4)
ABSOLUTE IMMATURE GRANULOCYTE: ABNORMAL K/UL (ref 0–0.3)
ABSOLUTE LYMPH #: 0.78 K/UL (ref 1–4.8)
ABSOLUTE MONO #: 0.24 K/UL (ref 0.1–1.3)
ALBUMIN SERPL-MCNC: 3.4 G/DL (ref 3.5–5.2)
ALBUMIN/GLOBULIN RATIO: ABNORMAL (ref 1–2.5)
ALLEN TEST: ABNORMAL
ALP BLD-CCNC: 54 U/L (ref 35–104)
ALT SERPL-CCNC: 11 U/L (ref 5–33)
AMMONIA: 26 UMOL/L (ref 11–51)
AMPHETAMINE SCREEN URINE: NEGATIVE
ANION GAP SERPL CALCULATED.3IONS-SCNC: 13 MMOL/L (ref 9–17)
AST SERPL-CCNC: 21 U/L
BARBITURATE SCREEN URINE: NEGATIVE
BASOPHILS # BLD: 0 % (ref 0–2)
BASOPHILS ABSOLUTE: 0 K/UL (ref 0–0.2)
BENZODIAZEPINE SCREEN, URINE: NEGATIVE
BILIRUB SERPL-MCNC: 0.47 MG/DL (ref 0.3–1.2)
BILIRUBIN DIRECT: 0.17 MG/DL
BILIRUBIN URINE: NEGATIVE
BILIRUBIN, INDIRECT: 0.3 MG/DL (ref 0–1)
BUN BLDV-MCNC: 6 MG/DL (ref 8–23)
BUN/CREAT BLD: ABNORMAL (ref 9–20)
BUPRENORPHINE URINE: NORMAL
CALCIUM SERPL-MCNC: 8.9 MG/DL (ref 8.6–10.4)
CANNABINOID SCREEN URINE: NEGATIVE
CARBOXYHEMOGLOBIN: 0.7 % (ref 0–5)
CHLORIDE BLD-SCNC: 104 MMOL/L (ref 98–107)
CO2: 27 MMOL/L (ref 20–31)
COCAINE METABOLITE, URINE: NEGATIVE
COLOR: YELLOW
COMMENT UA: NORMAL
CREAT SERPL-MCNC: 0.75 MG/DL (ref 0.5–0.9)
DIFFERENTIAL TYPE: ABNORMAL
EKG ATRIAL RATE: 81 BPM
EKG P AXIS: 55 DEGREES
EKG P-R INTERVAL: 126 MS
EKG Q-T INTERVAL: 398 MS
EKG QRS DURATION: 88 MS
EKG QTC CALCULATION (BAZETT): 462 MS
EKG R AXIS: 33 DEGREES
EKG T AXIS: 26 DEGREES
EKG VENTRICULAR RATE: 81 BPM
EOSINOPHILS RELATIVE PERCENT: 0 % (ref 0–4)
ETHANOL PERCENT: <0.01 %
ETHANOL: <10 MG/DL
FIO2: 40
GFR AFRICAN AMERICAN: >60 ML/MIN
GFR NON-AFRICAN AMERICAN: >60 ML/MIN
GFR SERPL CREATININE-BSD FRML MDRD: ABNORMAL ML/MIN/{1.73_M2}
GFR SERPL CREATININE-BSD FRML MDRD: ABNORMAL ML/MIN/{1.73_M2}
GLOBULIN: ABNORMAL G/DL (ref 1.5–3.8)
GLUCOSE BLD-MCNC: 80 MG/DL (ref 65–105)
GLUCOSE BLD-MCNC: 88 MG/DL (ref 70–99)
GLUCOSE URINE: NEGATIVE
HCO3 ARTERIAL: 26.9 MMOL/L (ref 22–26)
HCT VFR BLD CALC: 38 % (ref 36–46)
HEMOGLOBIN: 12.9 G/DL (ref 12–16)
IMMATURE GRANULOCYTES: ABNORMAL %
KEPPRA: 47 UG/ML
KETONES, URINE: NEGATIVE
LEUKOCYTE ESTERASE, URINE: NEGATIVE
LYMPHOCYTES # BLD: 26 % (ref 24–44)
MAGNESIUM: 1.8 MG/DL (ref 1.6–2.6)
MCH RBC QN AUTO: 30.8 PG (ref 26–34)
MCHC RBC AUTO-ENTMCNC: 33.9 G/DL (ref 31–37)
MCV RBC AUTO: 90.6 FL (ref 80–100)
MDMA URINE: NORMAL
METHADONE SCREEN, URINE: NEGATIVE
METHAMPHETAMINE, URINE: NORMAL
METHEMOGLOBIN: 0.7 % (ref 0–1.9)
MODE: ABNORMAL
MONOCYTES # BLD: 8 % (ref 1–7)
MORPHOLOGY: NORMAL
NEGATIVE BASE EXCESS, ART: ABNORMAL MMOL/L (ref 0–2)
NITRITE, URINE: NEGATIVE
NOTIFICATION TIME: ABNORMAL
NOTIFICATION: ABNORMAL
NRBC AUTOMATED: ABNORMAL PER 100 WBC
O2 DEVICE/FLOW/%: ABNORMAL
O2 SAT, ARTERIAL: 97.9 % (ref 95–98)
OPIATES, URINE: NEGATIVE
OXYCODONE SCREEN URINE: NEGATIVE
OXYHEMOGLOBIN: ABNORMAL % (ref 95–98)
PATIENT TEMP: 37
PCO2 ARTERIAL: 40.1 MMHG (ref 35–45)
PCO2, ART, TEMP ADJ: ABNORMAL (ref 35–45)
PDW BLD-RTO: 13.4 % (ref 11.5–14.9)
PEEP/CPAP: 5
PH ARTERIAL: 7.44 (ref 7.35–7.45)
PH UA: 7.5 (ref 5–8)
PH, ART, TEMP ADJ: ABNORMAL (ref 7.35–7.45)
PHENCYCLIDINE, URINE: NEGATIVE
PLATELET # BLD: 187 K/UL (ref 150–450)
PLATELET ESTIMATE: ABNORMAL
PMV BLD AUTO: 9.3 FL (ref 6–12)
PO2 ARTERIAL: 127 MMHG (ref 80–100)
PO2, ART, TEMP ADJ: ABNORMAL MMHG (ref 80–100)
POSITIVE BASE EXCESS, ART: 2.7 MMOL/L (ref 0–2)
POTASSIUM SERPL-SCNC: 2.9 MMOL/L (ref 3.7–5.3)
POTASSIUM SERPL-SCNC: 3.5 MMOL/L (ref 3.7–5.3)
POTASSIUM SERPL-SCNC: 3.7 MMOL/L (ref 3.7–5.3)
PROPOXYPHENE, URINE: NORMAL
PROTEIN UA: NEGATIVE
PSV: ABNORMAL
PT. POSITION: ABNORMAL
RBC # BLD: 4.2 M/UL (ref 4–5.2)
RBC # BLD: ABNORMAL 10*6/UL
RESPIRATORY RATE: ABNORMAL
SAMPLE SITE: ABNORMAL
SEG NEUTROPHILS: 66 % (ref 36–66)
SEGMENTED NEUTROPHILS ABSOLUTE COUNT: 1.98 K/UL (ref 1.3–9.1)
SET RATE: 14
SODIUM BLD-SCNC: 144 MMOL/L (ref 135–144)
SPECIFIC GRAVITY UA: 1.01 (ref 1–1.03)
TEST INFORMATION: NORMAL
TEXT FOR RESPIRATORY: ABNORMAL
TOTAL CK: 81 U/L (ref 26–192)
TOTAL HB: ABNORMAL G/DL (ref 12–16)
TOTAL PROTEIN: 6.1 G/DL (ref 6.4–8.3)
TOTAL RATE: 14
TRICYCLIC ANTIDEPRESSANTS, UR: NORMAL
TROPONIN INTERP: NORMAL
TROPONIN T: <0.03 NG/ML
TSH SERPL DL<=0.05 MIU/L-ACNC: 2.73 MIU/L (ref 0.3–5)
TURBIDITY: CLEAR
URINE HGB: NEGATIVE
UROBILINOGEN, URINE: NORMAL
VT: 500
WBC # BLD: 3 K/UL (ref 3.5–11)
WBC # BLD: ABNORMAL 10*3/UL

## 2018-05-13 PROCEDURE — 36600 WITHDRAWAL OF ARTERIAL BLOOD: CPT

## 2018-05-13 PROCEDURE — 99223 1ST HOSP IP/OBS HIGH 75: CPT | Performed by: INTERNAL MEDICINE

## 2018-05-13 PROCEDURE — 84484 ASSAY OF TROPONIN QUANT: CPT

## 2018-05-13 PROCEDURE — 82805 BLOOD GASES W/O2 SATURATION: CPT

## 2018-05-13 PROCEDURE — 93005 ELECTROCARDIOGRAM TRACING: CPT

## 2018-05-13 PROCEDURE — 86403 PARTICLE AGGLUT ANTBDY SCRN: CPT

## 2018-05-13 PROCEDURE — 83735 ASSAY OF MAGNESIUM: CPT

## 2018-05-13 PROCEDURE — 84443 ASSAY THYROID STIM HORMONE: CPT

## 2018-05-13 PROCEDURE — 80048 BASIC METABOLIC PNL TOTAL CA: CPT

## 2018-05-13 PROCEDURE — 70450 CT HEAD/BRAIN W/O DYE: CPT

## 2018-05-13 PROCEDURE — 6360000002 HC RX W HCPCS: Performed by: EMERGENCY MEDICINE

## 2018-05-13 PROCEDURE — 85025 COMPLETE CBC W/AUTO DIFF WBC: CPT

## 2018-05-13 PROCEDURE — 2000000000 HC ICU R&B

## 2018-05-13 PROCEDURE — 94770 HC ETCO2 MONITOR DAILY: CPT

## 2018-05-13 PROCEDURE — 82947 ASSAY GLUCOSE BLOOD QUANT: CPT

## 2018-05-13 PROCEDURE — 2580000003 HC RX 258: Performed by: EMERGENCY MEDICINE

## 2018-05-13 PROCEDURE — 6360000002 HC RX W HCPCS: Performed by: INTERNAL MEDICINE

## 2018-05-13 PROCEDURE — 99285 EMERGENCY DEPT VISIT HI MDM: CPT

## 2018-05-13 PROCEDURE — 84132 ASSAY OF SERUM POTASSIUM: CPT

## 2018-05-13 PROCEDURE — 51702 INSERT TEMP BLADDER CATH: CPT

## 2018-05-13 PROCEDURE — 81003 URINALYSIS AUTO W/O SCOPE: CPT

## 2018-05-13 PROCEDURE — 36415 COLL VENOUS BLD VENIPUNCTURE: CPT

## 2018-05-13 PROCEDURE — 80307 DRUG TEST PRSMV CHEM ANLYZR: CPT

## 2018-05-13 PROCEDURE — 5A1945Z RESPIRATORY VENTILATION, 24-96 CONSECUTIVE HOURS: ICD-10-PCS | Performed by: EMERGENCY MEDICINE

## 2018-05-13 PROCEDURE — 2580000003 HC RX 258: Performed by: PSYCHIATRY & NEUROLOGY

## 2018-05-13 PROCEDURE — 82140 ASSAY OF AMMONIA: CPT

## 2018-05-13 PROCEDURE — 6360000002 HC RX W HCPCS: Performed by: STUDENT IN AN ORGANIZED HEALTH CARE EDUCATION/TRAINING PROGRAM

## 2018-05-13 PROCEDURE — 6360000002 HC RX W HCPCS: Performed by: PSYCHIATRY & NEUROLOGY

## 2018-05-13 PROCEDURE — 94002 VENT MGMT INPAT INIT DAY: CPT

## 2018-05-13 PROCEDURE — 71045 X-RAY EXAM CHEST 1 VIEW: CPT

## 2018-05-13 PROCEDURE — 87205 SMEAR GRAM STAIN: CPT

## 2018-05-13 PROCEDURE — G0480 DRUG TEST DEF 1-7 CLASSES: HCPCS

## 2018-05-13 PROCEDURE — 87070 CULTURE OTHR SPECIMN AEROBIC: CPT

## 2018-05-13 PROCEDURE — 82550 ASSAY OF CK (CPK): CPT

## 2018-05-13 PROCEDURE — 2500000003 HC RX 250 WO HCPCS: Performed by: PSYCHIATRY & NEUROLOGY

## 2018-05-13 PROCEDURE — 94640 AIRWAY INHALATION TREATMENT: CPT

## 2018-05-13 PROCEDURE — 0BH17EZ INSERTION OF ENDOTRACHEAL AIRWAY INTO TRACHEA, VIA NATURAL OR ARTIFICIAL OPENING: ICD-10-PCS | Performed by: EMERGENCY MEDICINE

## 2018-05-13 PROCEDURE — 2500000003 HC RX 250 WO HCPCS: Performed by: EMERGENCY MEDICINE

## 2018-05-13 PROCEDURE — 2500000003 HC RX 250 WO HCPCS: Performed by: STUDENT IN AN ORGANIZED HEALTH CARE EDUCATION/TRAINING PROGRAM

## 2018-05-13 PROCEDURE — S0028 INJECTION, FAMOTIDINE, 20 MG: HCPCS | Performed by: STUDENT IN AN ORGANIZED HEALTH CARE EDUCATION/TRAINING PROGRAM

## 2018-05-13 PROCEDURE — 94762 N-INVAS EAR/PLS OXIMTRY CONT: CPT

## 2018-05-13 PROCEDURE — 31500 INSERT EMERGENCY AIRWAY: CPT

## 2018-05-13 PROCEDURE — 87186 SC STD MICRODIL/AGAR DIL: CPT

## 2018-05-13 PROCEDURE — 6360000002 HC RX W HCPCS

## 2018-05-13 PROCEDURE — 80177 DRUG SCRN QUAN LEVETIRACETAM: CPT

## 2018-05-13 PROCEDURE — 72125 CT NECK SPINE W/O DYE: CPT

## 2018-05-13 PROCEDURE — 2580000003 HC RX 258: Performed by: STUDENT IN AN ORGANIZED HEALTH CARE EDUCATION/TRAINING PROGRAM

## 2018-05-13 PROCEDURE — 80076 HEPATIC FUNCTION PANEL: CPT

## 2018-05-13 RX ORDER — LORAZEPAM 2 MG/ML
INJECTION INTRAMUSCULAR
Status: COMPLETED
Start: 2018-05-13 | End: 2018-05-13

## 2018-05-13 RX ORDER — PROPOFOL 10 MG/ML
10 INJECTION, EMULSION INTRAVENOUS ONCE
Status: COMPLETED | OUTPATIENT
Start: 2018-05-13 | End: 2018-05-13

## 2018-05-13 RX ORDER — MIDAZOLAM HYDROCHLORIDE 1 MG/ML
2 INJECTION INTRAMUSCULAR; INTRAVENOUS ONCE
Status: COMPLETED | OUTPATIENT
Start: 2018-05-13 | End: 2018-05-13

## 2018-05-13 RX ORDER — ONDANSETRON 2 MG/ML
4 INJECTION INTRAMUSCULAR; INTRAVENOUS EVERY 6 HOURS PRN
Status: DISCONTINUED | OUTPATIENT
Start: 2018-05-13 | End: 2018-05-18 | Stop reason: HOSPADM

## 2018-05-13 RX ORDER — LEVOTHYROXINE SODIUM ANHYDROUS 100 UG/5ML
25 INJECTION, POWDER, LYOPHILIZED, FOR SOLUTION INTRAVENOUS DAILY
Status: DISCONTINUED | OUTPATIENT
Start: 2018-05-13 | End: 2018-05-16

## 2018-05-13 RX ORDER — ROCURONIUM BROMIDE 10 MG/ML
INJECTION, SOLUTION INTRAVENOUS DAILY PRN
Status: DISCONTINUED | OUTPATIENT
Start: 2018-05-13 | End: 2018-05-18 | Stop reason: HOSPADM

## 2018-05-13 RX ORDER — SODIUM CHLORIDE 0.9 % (FLUSH) 0.9 %
10 SYRINGE (ML) INJECTION EVERY 12 HOURS SCHEDULED
Status: DISCONTINUED | OUTPATIENT
Start: 2018-05-13 | End: 2018-05-18 | Stop reason: HOSPADM

## 2018-05-13 RX ORDER — ALBUTEROL SULFATE 2.5 MG/3ML
2.5 SOLUTION RESPIRATORY (INHALATION) EVERY 6 HOURS
Status: DISCONTINUED | OUTPATIENT
Start: 2018-05-13 | End: 2018-05-15

## 2018-05-13 RX ORDER — POTASSIUM CHLORIDE 7.45 MG/ML
10 INJECTION INTRAVENOUS
Status: DISCONTINUED | OUTPATIENT
Start: 2018-05-13 | End: 2018-05-13

## 2018-05-13 RX ORDER — THIAMINE HYDROCHLORIDE 100 MG/ML
100 INJECTION, SOLUTION INTRAMUSCULAR; INTRAVENOUS DAILY
Status: DISCONTINUED | OUTPATIENT
Start: 2018-05-13 | End: 2018-05-16

## 2018-05-13 RX ORDER — SODIUM CHLORIDE 0.9 % (FLUSH) 0.9 %
10 SYRINGE (ML) INJECTION PRN
Status: DISCONTINUED | OUTPATIENT
Start: 2018-05-13 | End: 2018-05-18 | Stop reason: HOSPADM

## 2018-05-13 RX ORDER — SODIUM CHLORIDE 9 MG/ML
INJECTION, SOLUTION INTRAVENOUS CONTINUOUS
Status: DISCONTINUED | OUTPATIENT
Start: 2018-05-13 | End: 2018-05-18 | Stop reason: HOSPADM

## 2018-05-13 RX ORDER — 0.9 % SODIUM CHLORIDE 0.9 %
1000 INTRAVENOUS SOLUTION INTRAVENOUS ONCE
Status: COMPLETED | OUTPATIENT
Start: 2018-05-13 | End: 2018-05-13

## 2018-05-13 RX ORDER — LORAZEPAM 2 MG/ML
2 INJECTION INTRAMUSCULAR EVERY 5 MIN PRN
Status: DISCONTINUED | OUTPATIENT
Start: 2018-05-13 | End: 2018-05-13

## 2018-05-13 RX ORDER — PROPOFOL 10 MG/ML
INJECTION, EMULSION INTRAVENOUS CONTINUOUS PRN
Status: DISCONTINUED | OUTPATIENT
Start: 2018-05-13 | End: 2018-05-15

## 2018-05-13 RX ORDER — POTASSIUM CHLORIDE 7.45 MG/ML
10 INJECTION INTRAVENOUS PRN
Status: DISCONTINUED | OUTPATIENT
Start: 2018-05-13 | End: 2018-05-18

## 2018-05-13 RX ORDER — PROPOFOL 10 MG/ML
10 INJECTION, EMULSION INTRAVENOUS
Status: DISCONTINUED | OUTPATIENT
Start: 2018-05-13 | End: 2018-05-15

## 2018-05-13 RX ADMIN — POTASSIUM CHLORIDE 10 MEQ: 10 INJECTION, SOLUTION INTRAVENOUS at 19:34

## 2018-05-13 RX ADMIN — MIDAZOLAM HYDROCHLORIDE 2 MG: 2 INJECTION, SOLUTION INTRAMUSCULAR; INTRAVENOUS at 08:42

## 2018-05-13 RX ADMIN — SODIUM CHLORIDE 1000 ML: 9 INJECTION, SOLUTION INTRAVENOUS at 06:27

## 2018-05-13 RX ADMIN — LORAZEPAM 2 MG: 2 INJECTION INTRAMUSCULAR; INTRAVENOUS at 06:14

## 2018-05-13 RX ADMIN — POTASSIUM CHLORIDE 10 MEQ: 10 INJECTION, SOLUTION INTRAVENOUS at 18:40

## 2018-05-13 RX ADMIN — ALBUTEROL SULFATE 2.5 MG: 2.5 SOLUTION RESPIRATORY (INHALATION) at 19:40

## 2018-05-13 RX ADMIN — LEVOTHYROXINE SODIUM ANHYDROUS 25 MCG: 100 INJECTION, POWDER, LYOPHILIZED, FOR SOLUTION INTRAVENOUS at 10:04

## 2018-05-13 RX ADMIN — LORAZEPAM 2 MG: 2 INJECTION INTRAMUSCULAR at 06:14

## 2018-05-13 RX ADMIN — LEVETIRACETAM 1000 MG: 500 INJECTION, SOLUTION, CONCENTRATE INTRAVENOUS at 06:26

## 2018-05-13 RX ADMIN — Medication 10 ML: at 09:20

## 2018-05-13 RX ADMIN — PROPOFOL 150 MG: 10 INJECTION, EMULSION INTRAVENOUS at 06:38

## 2018-05-13 RX ADMIN — ALBUTEROL SULFATE 2.5 MG: 2.5 SOLUTION RESPIRATORY (INHALATION) at 14:53

## 2018-05-13 RX ADMIN — LEVETIRACETAM 500 MG: 100 INJECTION, SOLUTION INTRAVENOUS at 08:22

## 2018-05-13 RX ADMIN — POTASSIUM CHLORIDE 10 MEQ: 10 INJECTION, SOLUTION INTRAVENOUS at 17:25

## 2018-05-13 RX ADMIN — SODIUM CHLORIDE: 9 INJECTION, SOLUTION INTRAVENOUS at 09:24

## 2018-05-13 RX ADMIN — FAMOTIDINE 20 MG: 10 INJECTION INTRAVENOUS at 21:28

## 2018-05-13 RX ADMIN — POTASSIUM CHLORIDE 10 MEQ: 10 INJECTION, SOLUTION INTRAVENOUS at 10:47

## 2018-05-13 RX ADMIN — THIAMINE HYDROCHLORIDE 100 MG: 100 INJECTION, SOLUTION INTRAMUSCULAR; INTRAVENOUS at 10:04

## 2018-05-13 RX ADMIN — ROCURONIUM BROMIDE 75 MG: 10 INJECTION INTRAVENOUS at 06:39

## 2018-05-13 RX ADMIN — POTASSIUM CHLORIDE 10 MEQ: 10 INJECTION, SOLUTION INTRAVENOUS at 12:06

## 2018-05-13 RX ADMIN — PROPOFOL 10 MCG/KG/MIN: 10 INJECTION, EMULSION INTRAVENOUS at 06:55

## 2018-05-13 RX ADMIN — ENOXAPARIN SODIUM 40 MG: 40 INJECTION SUBCUTANEOUS at 10:04

## 2018-05-13 RX ADMIN — LEVETIRACETAM 750 MG: 100 INJECTION, SOLUTION INTRAVENOUS at 21:50

## 2018-05-13 RX ADMIN — FAMOTIDINE 20 MG: 10 INJECTION INTRAVENOUS at 10:04

## 2018-05-13 RX ADMIN — PROPOFOL 40 MCG/KG/MIN: 10 INJECTION, EMULSION INTRAVENOUS at 09:20

## 2018-05-13 RX ADMIN — POTASSIUM CHLORIDE 10 MEQ: 10 INJECTION, SOLUTION INTRAVENOUS at 09:13

## 2018-05-13 RX ADMIN — MIDAZOLAM HYDROCHLORIDE 1 MG/HR: 5 INJECTION, SOLUTION INTRAMUSCULAR; INTRAVENOUS at 09:54

## 2018-05-13 RX ADMIN — LORAZEPAM 2 MG: 2 INJECTION INTRAMUSCULAR at 06:24

## 2018-05-13 RX ADMIN — Medication 10 ML: at 21:28

## 2018-05-13 RX ADMIN — FOLIC ACID: 5 INJECTION, SOLUTION INTRAMUSCULAR; INTRAVENOUS; SUBCUTANEOUS at 10:05

## 2018-05-13 RX ADMIN — LORAZEPAM 2 MG: 2 INJECTION INTRAMUSCULAR; INTRAVENOUS at 06:24

## 2018-05-13 RX ADMIN — POTASSIUM CHLORIDE 10 MEQ: 10 INJECTION, SOLUTION INTRAVENOUS at 08:05

## 2018-05-13 RX ADMIN — POTASSIUM CHLORIDE 10 MEQ: 10 INJECTION, SOLUTION INTRAVENOUS at 20:39

## 2018-05-13 ASSESSMENT — PULMONARY FUNCTION TESTS
PIF_VALUE: 17
PIF_VALUE: 15
PIF_VALUE: 19
PIF_VALUE: 15
PIF_VALUE: 17
PIF_VALUE: 15
PIF_VALUE: 21
PIF_VALUE: 16
PIF_VALUE: 16
PIF_VALUE: 19
PIF_VALUE: 16

## 2018-05-14 ENCOUNTER — APPOINTMENT (OUTPATIENT)
Dept: GENERAL RADIOLOGY | Age: 63
DRG: 053 | End: 2018-05-14
Payer: COMMERCIAL

## 2018-05-14 ENCOUNTER — APPOINTMENT (OUTPATIENT)
Dept: MRI IMAGING | Age: 63
DRG: 053 | End: 2018-05-14
Payer: COMMERCIAL

## 2018-05-14 LAB
ABSOLUTE EOS #: 0 K/UL (ref 0–0.4)
ABSOLUTE IMMATURE GRANULOCYTE: ABNORMAL K/UL (ref 0–0.3)
ABSOLUTE LYMPH #: 1.1 K/UL (ref 1–4.8)
ABSOLUTE MONO #: 1 K/UL (ref 0.1–1.3)
ALLEN TEST: ABNORMAL
ANION GAP SERPL CALCULATED.3IONS-SCNC: 11 MMOL/L (ref 9–17)
BASOPHILS # BLD: 1 % (ref 0–2)
BASOPHILS ABSOLUTE: 0 K/UL (ref 0–0.2)
BUN BLDV-MCNC: 5 MG/DL (ref 8–23)
BUN/CREAT BLD: ABNORMAL (ref 9–20)
CALCIUM SERPL-MCNC: 7.9 MG/DL (ref 8.6–10.4)
CARBOXYHEMOGLOBIN: 2 % (ref 0–5)
CHLORIDE BLD-SCNC: 105 MMOL/L (ref 98–107)
CO2: 24 MMOL/L (ref 20–31)
CREAT SERPL-MCNC: 0.49 MG/DL (ref 0.5–0.9)
DIFFERENTIAL TYPE: ABNORMAL
EOSINOPHILS RELATIVE PERCENT: 1 % (ref 0–4)
FIO2: 25
GFR AFRICAN AMERICAN: >60 ML/MIN
GFR NON-AFRICAN AMERICAN: >60 ML/MIN
GFR SERPL CREATININE-BSD FRML MDRD: ABNORMAL ML/MIN/{1.73_M2}
GFR SERPL CREATININE-BSD FRML MDRD: ABNORMAL ML/MIN/{1.73_M2}
GLUCOSE BLD-MCNC: 101 MG/DL (ref 70–99)
HCO3 ARTERIAL: 24.3 MMOL/L (ref 22–26)
HCT VFR BLD CALC: 38.9 % (ref 36–46)
HEMOGLOBIN: 13.2 G/DL (ref 12–16)
IMMATURE GRANULOCYTES: ABNORMAL %
LYMPHOCYTES # BLD: 14 % (ref 24–44)
MAGNESIUM: 1.9 MG/DL (ref 1.6–2.6)
MCH RBC QN AUTO: 30.5 PG (ref 26–34)
MCHC RBC AUTO-ENTMCNC: 34 G/DL (ref 31–37)
MCV RBC AUTO: 89.8 FL (ref 80–100)
METHEMOGLOBIN: 1 % (ref 0–1.9)
MODE: ABNORMAL
MONOCYTES # BLD: 14 % (ref 1–7)
NEGATIVE BASE EXCESS, ART: ABNORMAL MMOL/L (ref 0–2)
NOTIFICATION TIME: ABNORMAL
NOTIFICATION: ABNORMAL
NRBC AUTOMATED: ABNORMAL PER 100 WBC
O2 DEVICE/FLOW/%: ABNORMAL
O2 SAT, ARTERIAL: 96.5 % (ref 95–98)
OXYHEMOGLOBIN: ABNORMAL % (ref 95–98)
PATIENT TEMP: 37
PCO2 ARTERIAL: 31.4 MMHG (ref 35–45)
PCO2, ART, TEMP ADJ: ABNORMAL (ref 35–45)
PDW BLD-RTO: 13.6 % (ref 11.5–14.9)
PEEP/CPAP: 5
PH ARTERIAL: 7.5 (ref 7.35–7.45)
PH, ART, TEMP ADJ: ABNORMAL (ref 7.35–7.45)
PLATELET # BLD: 174 K/UL (ref 150–450)
PLATELET ESTIMATE: ABNORMAL
PMV BLD AUTO: 9.2 FL (ref 6–12)
PO2 ARTERIAL: 157 MMHG (ref 80–100)
PO2, ART, TEMP ADJ: ABNORMAL MMHG (ref 80–100)
POSITIVE BASE EXCESS, ART: 1 MMOL/L (ref 0–2)
POTASSIUM SERPL-SCNC: 3.3 MMOL/L (ref 3.7–5.3)
PROLACTIN: 19.31 UG/L (ref 4.79–23.3)
PSV: ABNORMAL
PT. POSITION: ABNORMAL
RBC # BLD: 4.33 M/UL (ref 4–5.2)
RBC # BLD: ABNORMAL 10*6/UL
RESPIRATORY RATE: 14
SAMPLE SITE: ABNORMAL
SEG NEUTROPHILS: 70 % (ref 36–66)
SEGMENTED NEUTROPHILS ABSOLUTE COUNT: 5.2 K/UL (ref 1.3–9.1)
SET RATE: 14
SODIUM BLD-SCNC: 140 MMOL/L (ref 135–144)
TEXT FOR RESPIRATORY: ABNORMAL
TOTAL CK: 179 U/L (ref 26–192)
TOTAL HB: ABNORMAL G/DL (ref 12–16)
TOTAL RATE: 14
VT: 500
WBC # BLD: 7.3 K/UL (ref 3.5–11)
WBC # BLD: ABNORMAL 10*3/UL

## 2018-05-14 PROCEDURE — 80048 BASIC METABOLIC PNL TOTAL CA: CPT

## 2018-05-14 PROCEDURE — 94640 AIRWAY INHALATION TREATMENT: CPT

## 2018-05-14 PROCEDURE — 82550 ASSAY OF CK (CPK): CPT

## 2018-05-14 PROCEDURE — 6360000002 HC RX W HCPCS: Performed by: PSYCHIATRY & NEUROLOGY

## 2018-05-14 PROCEDURE — 97161 PT EVAL LOW COMPLEX 20 MIN: CPT

## 2018-05-14 PROCEDURE — 2000000000 HC ICU R&B

## 2018-05-14 PROCEDURE — 2580000003 HC RX 258: Performed by: EMERGENCY MEDICINE

## 2018-05-14 PROCEDURE — 36415 COLL VENOUS BLD VENIPUNCTURE: CPT

## 2018-05-14 PROCEDURE — 2580000003 HC RX 258: Performed by: STUDENT IN AN ORGANIZED HEALTH CARE EDUCATION/TRAINING PROGRAM

## 2018-05-14 PROCEDURE — G8978 MOBILITY CURRENT STATUS: HCPCS

## 2018-05-14 PROCEDURE — 71045 X-RAY EXAM CHEST 1 VIEW: CPT

## 2018-05-14 PROCEDURE — 2500000003 HC RX 250 WO HCPCS: Performed by: STUDENT IN AN ORGANIZED HEALTH CARE EDUCATION/TRAINING PROGRAM

## 2018-05-14 PROCEDURE — 70551 MRI BRAIN STEM W/O DYE: CPT

## 2018-05-14 PROCEDURE — 82805 BLOOD GASES W/O2 SATURATION: CPT

## 2018-05-14 PROCEDURE — 6360000002 HC RX W HCPCS: Performed by: INTERNAL MEDICINE

## 2018-05-14 PROCEDURE — 36600 WITHDRAWAL OF ARTERIAL BLOOD: CPT

## 2018-05-14 PROCEDURE — 94770 HC ETCO2 MONITOR DAILY: CPT

## 2018-05-14 PROCEDURE — 2580000003 HC RX 258: Performed by: PSYCHIATRY & NEUROLOGY

## 2018-05-14 PROCEDURE — 85025 COMPLETE CBC W/AUTO DIFF WBC: CPT

## 2018-05-14 PROCEDURE — 6360000002 HC RX W HCPCS: Performed by: EMERGENCY MEDICINE

## 2018-05-14 PROCEDURE — 96365 THER/PROPH/DIAG IV INF INIT: CPT

## 2018-05-14 PROCEDURE — G8979 MOBILITY GOAL STATUS: HCPCS

## 2018-05-14 PROCEDURE — 99233 SBSQ HOSP IP/OBS HIGH 50: CPT | Performed by: INTERNAL MEDICINE

## 2018-05-14 PROCEDURE — 2700000000 HC OXYGEN THERAPY PER DAY

## 2018-05-14 PROCEDURE — S0028 INJECTION, FAMOTIDINE, 20 MG: HCPCS | Performed by: STUDENT IN AN ORGANIZED HEALTH CARE EDUCATION/TRAINING PROGRAM

## 2018-05-14 PROCEDURE — 2580000003 HC RX 258: Performed by: INTERNAL MEDICINE

## 2018-05-14 PROCEDURE — 84146 ASSAY OF PROLACTIN: CPT

## 2018-05-14 PROCEDURE — 97110 THERAPEUTIC EXERCISES: CPT

## 2018-05-14 PROCEDURE — 2500000003 HC RX 250 WO HCPCS: Performed by: INTERNAL MEDICINE

## 2018-05-14 PROCEDURE — 6360000002 HC RX W HCPCS: Performed by: STUDENT IN AN ORGANIZED HEALTH CARE EDUCATION/TRAINING PROGRAM

## 2018-05-14 PROCEDURE — 94003 VENT MGMT INPAT SUBQ DAY: CPT

## 2018-05-14 PROCEDURE — 83735 ASSAY OF MAGNESIUM: CPT

## 2018-05-14 PROCEDURE — 94762 N-INVAS EAR/PLS OXIMTRY CONT: CPT

## 2018-05-14 PROCEDURE — 96375 TX/PRO/DX INJ NEW DRUG ADDON: CPT

## 2018-05-14 RX ORDER — VECURONIUM BROMIDE 1 MG/ML
10 INJECTION, POWDER, LYOPHILIZED, FOR SOLUTION INTRAVENOUS
Status: COMPLETED | OUTPATIENT
Start: 2018-05-14 | End: 2018-05-14

## 2018-05-14 RX ADMIN — ALBUTEROL SULFATE 2.5 MG: 2.5 SOLUTION RESPIRATORY (INHALATION) at 02:43

## 2018-05-14 RX ADMIN — AMPICILLIN SODIUM AND SULBACTAM SODIUM 3 G: 2; 1 INJECTION, POWDER, FOR SOLUTION INTRAMUSCULAR; INTRAVENOUS at 11:46

## 2018-05-14 RX ADMIN — VECURONIUM BROMIDE 10 MG: 1 INJECTION, POWDER, LYOPHILIZED, FOR SOLUTION INTRAVENOUS at 17:13

## 2018-05-14 RX ADMIN — LEVOTHYROXINE SODIUM ANHYDROUS 25 MCG: 100 INJECTION, POWDER, LYOPHILIZED, FOR SOLUTION INTRAVENOUS at 08:02

## 2018-05-14 RX ADMIN — LEVETIRACETAM 750 MG: 100 INJECTION, SOLUTION INTRAVENOUS at 20:06

## 2018-05-14 RX ADMIN — POTASSIUM CHLORIDE 10 MEQ: 10 INJECTION, SOLUTION INTRAVENOUS at 10:05

## 2018-05-14 RX ADMIN — ALBUTEROL SULFATE 2.5 MG: 2.5 SOLUTION RESPIRATORY (INHALATION) at 07:07

## 2018-05-14 RX ADMIN — POTASSIUM CHLORIDE 10 MEQ: 10 INJECTION, SOLUTION INTRAVENOUS at 07:50

## 2018-05-14 RX ADMIN — POTASSIUM CHLORIDE 10 MEQ: 10 INJECTION, SOLUTION INTRAVENOUS at 08:46

## 2018-05-14 RX ADMIN — ALBUTEROL SULFATE 2.5 MG: 2.5 SOLUTION RESPIRATORY (INHALATION) at 13:14

## 2018-05-14 RX ADMIN — Medication 10 ML: at 08:02

## 2018-05-14 RX ADMIN — MIDAZOLAM HYDROCHLORIDE 5 MG/HR: 5 INJECTION, SOLUTION INTRAMUSCULAR; INTRAVENOUS at 20:06

## 2018-05-14 RX ADMIN — AMPICILLIN SODIUM AND SULBACTAM SODIUM 3 G: 2; 1 INJECTION, POWDER, FOR SOLUTION INTRAMUSCULAR; INTRAVENOUS at 18:15

## 2018-05-14 RX ADMIN — Medication 10 ML: at 20:06

## 2018-05-14 RX ADMIN — ALBUTEROL SULFATE 2.5 MG: 2.5 SOLUTION RESPIRATORY (INHALATION) at 19:37

## 2018-05-14 RX ADMIN — FAMOTIDINE 20 MG: 10 INJECTION INTRAVENOUS at 20:05

## 2018-05-14 RX ADMIN — ENOXAPARIN SODIUM 40 MG: 40 INJECTION SUBCUTANEOUS at 08:02

## 2018-05-14 RX ADMIN — THIAMINE HYDROCHLORIDE 100 MG: 100 INJECTION, SOLUTION INTRAMUSCULAR; INTRAVENOUS at 08:34

## 2018-05-14 RX ADMIN — POTASSIUM CHLORIDE 10 MEQ: 10 INJECTION, SOLUTION INTRAVENOUS at 13:22

## 2018-05-14 RX ADMIN — LEVETIRACETAM 750 MG: 100 INJECTION, SOLUTION INTRAVENOUS at 10:05

## 2018-05-14 RX ADMIN — MIDAZOLAM HYDROCHLORIDE 7 MG/HR: 5 INJECTION, SOLUTION INTRAMUSCULAR; INTRAVENOUS at 03:21

## 2018-05-14 RX ADMIN — FAMOTIDINE 20 MG: 10 INJECTION INTRAVENOUS at 08:02

## 2018-05-14 ASSESSMENT — PULMONARY FUNCTION TESTS
PIF_VALUE: 13
PIF_VALUE: 15
PIF_VALUE: 14
PIF_VALUE: 15
PIF_VALUE: 14
PIF_VALUE: 17
PIF_VALUE: 16

## 2018-05-15 ENCOUNTER — APPOINTMENT (OUTPATIENT)
Dept: GENERAL RADIOLOGY | Age: 63
DRG: 053 | End: 2018-05-15
Payer: COMMERCIAL

## 2018-05-15 LAB
ABSOLUTE EOS #: 0.1 K/UL (ref 0–0.4)
ABSOLUTE IMMATURE GRANULOCYTE: ABNORMAL K/UL (ref 0–0.3)
ABSOLUTE LYMPH #: 0.9 K/UL (ref 1–4.8)
ABSOLUTE MONO #: 0.8 K/UL (ref 0.1–1.3)
ALLEN TEST: NORMAL
ANION GAP SERPL CALCULATED.3IONS-SCNC: 10 MMOL/L (ref 9–17)
BASOPHILS # BLD: 1 % (ref 0–2)
BASOPHILS ABSOLUTE: 0 K/UL (ref 0–0.2)
BUN BLDV-MCNC: 5 MG/DL (ref 8–23)
BUN/CREAT BLD: ABNORMAL (ref 9–20)
CALCIUM IONIZED: 1.18 MMOL/L (ref 1.13–1.33)
CALCIUM SERPL-MCNC: 6.5 MG/DL (ref 8.6–10.4)
CARBOXYHEMOGLOBIN: 1 % (ref 0–5)
CHLORIDE BLD-SCNC: 110 MMOL/L (ref 98–107)
CO2: 20 MMOL/L (ref 20–31)
CREAT SERPL-MCNC: <0.4 MG/DL (ref 0.5–0.9)
DIFFERENTIAL TYPE: ABNORMAL
EOSINOPHILS RELATIVE PERCENT: 1 % (ref 0–4)
FIO2: NORMAL
GFR AFRICAN AMERICAN: ABNORMAL ML/MIN
GFR NON-AFRICAN AMERICAN: ABNORMAL ML/MIN
GFR SERPL CREATININE-BSD FRML MDRD: ABNORMAL ML/MIN/{1.73_M2}
GFR SERPL CREATININE-BSD FRML MDRD: ABNORMAL ML/MIN/{1.73_M2}
GLUCOSE BLD-MCNC: 109 MG/DL (ref 70–99)
HCO3 ARTERIAL: 24 MMOL/L (ref 22–26)
HCT VFR BLD CALC: 36.1 % (ref 36–46)
HEMOGLOBIN: 12.2 G/DL (ref 12–16)
IMMATURE GRANULOCYTES: ABNORMAL %
LYMPHOCYTES # BLD: 15 % (ref 24–44)
MAGNESIUM: 1.9 MG/DL (ref 1.6–2.6)
MCH RBC QN AUTO: 30.8 PG (ref 26–34)
MCHC RBC AUTO-ENTMCNC: 33.7 G/DL (ref 31–37)
MCV RBC AUTO: 91.5 FL (ref 80–100)
METHEMOGLOBIN: 0.4 % (ref 0–1.9)
MODE: NORMAL
MONOCYTES # BLD: 13 % (ref 1–7)
NEGATIVE BASE EXCESS, ART: 0.2 MMOL/L (ref 0–2)
NOTIFICATION TIME: NORMAL
NOTIFICATION: NORMAL
NRBC AUTOMATED: ABNORMAL PER 100 WBC
O2 DEVICE/FLOW/%: NORMAL
O2 SAT, ARTERIAL: 96.6 % (ref 95–98)
OXYHEMOGLOBIN: NORMAL % (ref 95–98)
PATIENT TEMP: 37
PCO2 ARTERIAL: 35.3 MMHG (ref 35–45)
PCO2, ART, TEMP ADJ: NORMAL (ref 35–45)
PDW BLD-RTO: 13.7 % (ref 11.5–14.9)
PEEP/CPAP: 5
PH ARTERIAL: 7.44 (ref 7.35–7.45)
PH, ART, TEMP ADJ: NORMAL (ref 7.35–7.45)
PLATELET # BLD: 144 K/UL (ref 150–450)
PLATELET ESTIMATE: ABNORMAL
PMV BLD AUTO: 9.2 FL (ref 6–12)
PO2 ARTERIAL: 86.5 MMHG (ref 80–100)
PO2, ART, TEMP ADJ: NORMAL MMHG (ref 80–100)
POSITIVE BASE EXCESS, ART: NORMAL MMOL/L (ref 0–2)
POTASSIUM SERPL-SCNC: 3.1 MMOL/L (ref 3.7–5.3)
PSV: NORMAL
PT. POSITION: NORMAL
RBC # BLD: 3.94 M/UL (ref 4–5.2)
RBC # BLD: ABNORMAL 10*6/UL
RESPIRATORY RATE: NORMAL
SAMPLE SITE: NORMAL
SEG NEUTROPHILS: 70 % (ref 36–66)
SEGMENTED NEUTROPHILS ABSOLUTE COUNT: 4.1 K/UL (ref 1.3–9.1)
SET RATE: 14
SODIUM BLD-SCNC: 140 MMOL/L (ref 135–144)
TEXT FOR RESPIRATORY: NORMAL
TOTAL HB: NORMAL G/DL (ref 12–16)
TOTAL RATE: 18
VT: 450
WBC # BLD: 5.9 K/UL (ref 3.5–11)
WBC # BLD: ABNORMAL 10*3/UL

## 2018-05-15 PROCEDURE — 6370000000 HC RX 637 (ALT 250 FOR IP): Performed by: STUDENT IN AN ORGANIZED HEALTH CARE EDUCATION/TRAINING PROGRAM

## 2018-05-15 PROCEDURE — 82805 BLOOD GASES W/O2 SATURATION: CPT

## 2018-05-15 PROCEDURE — 83735 ASSAY OF MAGNESIUM: CPT

## 2018-05-15 PROCEDURE — 51798 US URINE CAPACITY MEASURE: CPT | Performed by: NURSE PRACTITIONER

## 2018-05-15 PROCEDURE — S0028 INJECTION, FAMOTIDINE, 20 MG: HCPCS | Performed by: STUDENT IN AN ORGANIZED HEALTH CARE EDUCATION/TRAINING PROGRAM

## 2018-05-15 PROCEDURE — 94762 N-INVAS EAR/PLS OXIMTRY CONT: CPT

## 2018-05-15 PROCEDURE — 6360000002 HC RX W HCPCS: Performed by: STUDENT IN AN ORGANIZED HEALTH CARE EDUCATION/TRAINING PROGRAM

## 2018-05-15 PROCEDURE — 94760 N-INVAS EAR/PLS OXIMETRY 1: CPT

## 2018-05-15 PROCEDURE — 2580000003 HC RX 258: Performed by: PSYCHIATRY & NEUROLOGY

## 2018-05-15 PROCEDURE — 6360000002 HC RX W HCPCS: Performed by: INTERNAL MEDICINE

## 2018-05-15 PROCEDURE — 82330 ASSAY OF CALCIUM: CPT

## 2018-05-15 PROCEDURE — 71045 X-RAY EXAM CHEST 1 VIEW: CPT

## 2018-05-15 PROCEDURE — 85025 COMPLETE CBC W/AUTO DIFF WBC: CPT

## 2018-05-15 PROCEDURE — 6360000002 HC RX W HCPCS: Performed by: PSYCHIATRY & NEUROLOGY

## 2018-05-15 PROCEDURE — 94640 AIRWAY INHALATION TREATMENT: CPT

## 2018-05-15 PROCEDURE — 2000000000 HC ICU R&B

## 2018-05-15 PROCEDURE — 80048 BASIC METABOLIC PNL TOTAL CA: CPT

## 2018-05-15 PROCEDURE — 94003 VENT MGMT INPAT SUBQ DAY: CPT

## 2018-05-15 PROCEDURE — 2580000003 HC RX 258: Performed by: INTERNAL MEDICINE

## 2018-05-15 PROCEDURE — 36600 WITHDRAWAL OF ARTERIAL BLOOD: CPT

## 2018-05-15 PROCEDURE — 99233 SBSQ HOSP IP/OBS HIGH 50: CPT | Performed by: INTERNAL MEDICINE

## 2018-05-15 PROCEDURE — 94770 HC ETCO2 MONITOR DAILY: CPT

## 2018-05-15 PROCEDURE — 36415 COLL VENOUS BLD VENIPUNCTURE: CPT

## 2018-05-15 PROCEDURE — 51798 US URINE CAPACITY MEASURE: CPT

## 2018-05-15 PROCEDURE — 2500000003 HC RX 250 WO HCPCS: Performed by: STUDENT IN AN ORGANIZED HEALTH CARE EDUCATION/TRAINING PROGRAM

## 2018-05-15 PROCEDURE — 2580000003 HC RX 258: Performed by: STUDENT IN AN ORGANIZED HEALTH CARE EDUCATION/TRAINING PROGRAM

## 2018-05-15 PROCEDURE — 51701 INSERT BLADDER CATHETER: CPT

## 2018-05-15 PROCEDURE — 6370000000 HC RX 637 (ALT 250 FOR IP): Performed by: INTERNAL MEDICINE

## 2018-05-15 RX ORDER — IPRATROPIUM BROMIDE AND ALBUTEROL SULFATE 2.5; .5 MG/3ML; MG/3ML
1 SOLUTION RESPIRATORY (INHALATION) EVERY 4 HOURS PRN
Status: DISCONTINUED | OUTPATIENT
Start: 2018-05-15 | End: 2018-05-18 | Stop reason: HOSPADM

## 2018-05-15 RX ORDER — IPRATROPIUM BROMIDE AND ALBUTEROL SULFATE 2.5; .5 MG/3ML; MG/3ML
1 SOLUTION RESPIRATORY (INHALATION) 3 TIMES DAILY
Status: DISCONTINUED | OUTPATIENT
Start: 2018-05-15 | End: 2018-05-18 | Stop reason: HOSPADM

## 2018-05-15 RX ADMIN — AMPICILLIN SODIUM AND SULBACTAM SODIUM 3 G: 2; 1 INJECTION, POWDER, FOR SOLUTION INTRAMUSCULAR; INTRAVENOUS at 04:59

## 2018-05-15 RX ADMIN — POTASSIUM CHLORIDE 10 MEQ: 10 INJECTION, SOLUTION INTRAVENOUS at 10:53

## 2018-05-15 RX ADMIN — SODIUM CHLORIDE: 9 INJECTION, SOLUTION INTRAVENOUS at 16:46

## 2018-05-15 RX ADMIN — VANCOMYCIN HYDROCHLORIDE 1000 MG: 1 INJECTION, POWDER, LYOPHILIZED, FOR SOLUTION INTRAVENOUS at 17:49

## 2018-05-15 RX ADMIN — IPRATROPIUM BROMIDE AND ALBUTEROL SULFATE 1 AMPULE: .5; 3 SOLUTION RESPIRATORY (INHALATION) at 13:13

## 2018-05-15 RX ADMIN — LEVETIRACETAM 750 MG: 100 INJECTION, SOLUTION INTRAVENOUS at 09:13

## 2018-05-15 RX ADMIN — LEVETIRACETAM 750 MG: 100 INJECTION, SOLUTION INTRAVENOUS at 20:21

## 2018-05-15 RX ADMIN — MAGNESIUM HYDROXIDE 30 ML: 400 SUSPENSION ORAL at 08:33

## 2018-05-15 RX ADMIN — FAMOTIDINE 20 MG: 10 INJECTION INTRAVENOUS at 08:33

## 2018-05-15 RX ADMIN — FAMOTIDINE 20 MG: 10 INJECTION INTRAVENOUS at 20:21

## 2018-05-15 RX ADMIN — ALBUTEROL SULFATE 2.5 MG: 2.5 SOLUTION RESPIRATORY (INHALATION) at 07:40

## 2018-05-15 RX ADMIN — THIAMINE HYDROCHLORIDE 100 MG: 100 INJECTION, SOLUTION INTRAMUSCULAR; INTRAVENOUS at 08:33

## 2018-05-15 RX ADMIN — SODIUM CHLORIDE: 9 INJECTION, SOLUTION INTRAVENOUS at 01:08

## 2018-05-15 RX ADMIN — ENOXAPARIN SODIUM 40 MG: 40 INJECTION SUBCUTANEOUS at 08:33

## 2018-05-15 RX ADMIN — AMPICILLIN SODIUM AND SULBACTAM SODIUM 3 G: 2; 1 INJECTION, POWDER, FOR SOLUTION INTRAMUSCULAR; INTRAVENOUS at 17:13

## 2018-05-15 RX ADMIN — IPRATROPIUM BROMIDE AND ALBUTEROL SULFATE 1 AMPULE: .5; 3 SOLUTION RESPIRATORY (INHALATION) at 19:59

## 2018-05-15 RX ADMIN — POTASSIUM CHLORIDE 10 MEQ: 10 INJECTION, SOLUTION INTRAVENOUS at 09:39

## 2018-05-15 RX ADMIN — ALBUTEROL SULFATE 2.5 MG: 2.5 SOLUTION RESPIRATORY (INHALATION) at 03:20

## 2018-05-15 RX ADMIN — Medication 10 ML: at 20:21

## 2018-05-15 RX ADMIN — LEVOTHYROXINE SODIUM ANHYDROUS 25 MCG: 100 INJECTION, POWDER, LYOPHILIZED, FOR SOLUTION INTRAVENOUS at 05:58

## 2018-05-15 RX ADMIN — POTASSIUM CHLORIDE 10 MEQ: 10 INJECTION, SOLUTION INTRAVENOUS at 08:32

## 2018-05-15 RX ADMIN — AMPICILLIN SODIUM AND SULBACTAM SODIUM 3 G: 2; 1 INJECTION, POWDER, FOR SOLUTION INTRAMUSCULAR; INTRAVENOUS at 11:44

## 2018-05-15 RX ADMIN — AMPICILLIN SODIUM AND SULBACTAM SODIUM 3 G: 2; 1 INJECTION, POWDER, FOR SOLUTION INTRAMUSCULAR; INTRAVENOUS at 01:07

## 2018-05-15 RX ADMIN — POTASSIUM CHLORIDE 10 MEQ: 10 INJECTION, SOLUTION INTRAVENOUS at 07:17

## 2018-05-15 ASSESSMENT — PULMONARY FUNCTION TESTS
PIF_VALUE: 16
PIF_VALUE: 12
PIF_VALUE: 19
PIF_VALUE: 12
PIF_VALUE: 6
PIF_VALUE: 12
PIF_VALUE: 12
PIF_VALUE: 13

## 2018-05-15 ASSESSMENT — PAIN SCALES - WONG BAKER
WONGBAKER_NUMERICALRESPONSE: 0

## 2018-05-16 PROBLEM — E44.0 MODERATE MALNUTRITION (HCC): Chronic | Status: ACTIVE | Noted: 2018-05-16

## 2018-05-16 LAB
ABSOLUTE EOS #: 0 K/UL (ref 0–0.4)
ABSOLUTE IMMATURE GRANULOCYTE: ABNORMAL K/UL (ref 0–0.3)
ABSOLUTE LYMPH #: 0.9 K/UL (ref 1–4.8)
ABSOLUTE MONO #: 0.9 K/UL (ref 0.1–1.3)
ANION GAP SERPL CALCULATED.3IONS-SCNC: 14 MMOL/L (ref 9–17)
BASOPHILS # BLD: 1 % (ref 0–2)
BASOPHILS ABSOLUTE: 0 K/UL (ref 0–0.2)
BUN BLDV-MCNC: 5 MG/DL (ref 8–23)
BUN/CREAT BLD: ABNORMAL (ref 9–20)
CALCIUM SERPL-MCNC: 8.1 MG/DL (ref 8.6–10.4)
CHLORIDE BLD-SCNC: 100 MMOL/L (ref 98–107)
CO2: 22 MMOL/L (ref 20–31)
CREAT SERPL-MCNC: <0.4 MG/DL (ref 0.5–0.9)
DIFFERENTIAL TYPE: ABNORMAL
EOSINOPHILS RELATIVE PERCENT: 1 % (ref 0–4)
GFR AFRICAN AMERICAN: ABNORMAL ML/MIN
GFR NON-AFRICAN AMERICAN: ABNORMAL ML/MIN
GFR SERPL CREATININE-BSD FRML MDRD: ABNORMAL ML/MIN/{1.73_M2}
GFR SERPL CREATININE-BSD FRML MDRD: ABNORMAL ML/MIN/{1.73_M2}
GLUCOSE BLD-MCNC: 103 MG/DL (ref 70–99)
HCT VFR BLD CALC: 39.1 % (ref 36–46)
HEMOGLOBIN: 13.3 G/DL (ref 12–16)
IMMATURE GRANULOCYTES: ABNORMAL %
LYMPHOCYTES # BLD: 12 % (ref 24–44)
MCH RBC QN AUTO: 30.6 PG (ref 26–34)
MCHC RBC AUTO-ENTMCNC: 34.1 G/DL (ref 31–37)
MCV RBC AUTO: 89.8 FL (ref 80–100)
MONOCYTES # BLD: 12 % (ref 1–7)
NRBC AUTOMATED: ABNORMAL PER 100 WBC
PDW BLD-RTO: 13.4 % (ref 11.5–14.9)
PLATELET # BLD: 170 K/UL (ref 150–450)
PLATELET ESTIMATE: ABNORMAL
PMV BLD AUTO: 9.6 FL (ref 6–12)
POTASSIUM SERPL-SCNC: 4.2 MMOL/L (ref 3.7–5.3)
RBC # BLD: 4.36 M/UL (ref 4–5.2)
RBC # BLD: ABNORMAL 10*6/UL
SEG NEUTROPHILS: 74 % (ref 36–66)
SEGMENTED NEUTROPHILS ABSOLUTE COUNT: 5.5 K/UL (ref 1.3–9.1)
SODIUM BLD-SCNC: 136 MMOL/L (ref 135–144)
WBC # BLD: 7.4 K/UL (ref 3.5–11)
WBC # BLD: ABNORMAL 10*3/UL

## 2018-05-16 PROCEDURE — 2060000000 HC ICU INTERMEDIATE R&B

## 2018-05-16 PROCEDURE — 6370000000 HC RX 637 (ALT 250 FOR IP): Performed by: STUDENT IN AN ORGANIZED HEALTH CARE EDUCATION/TRAINING PROGRAM

## 2018-05-16 PROCEDURE — 6360000002 HC RX W HCPCS: Performed by: INTERNAL MEDICINE

## 2018-05-16 PROCEDURE — 94762 N-INVAS EAR/PLS OXIMTRY CONT: CPT

## 2018-05-16 PROCEDURE — S0028 INJECTION, FAMOTIDINE, 20 MG: HCPCS | Performed by: STUDENT IN AN ORGANIZED HEALTH CARE EDUCATION/TRAINING PROGRAM

## 2018-05-16 PROCEDURE — 6370000000 HC RX 637 (ALT 250 FOR IP): Performed by: PSYCHIATRY & NEUROLOGY

## 2018-05-16 PROCEDURE — 6360000002 HC RX W HCPCS: Performed by: STUDENT IN AN ORGANIZED HEALTH CARE EDUCATION/TRAINING PROGRAM

## 2018-05-16 PROCEDURE — 97535 SELF CARE MNGMENT TRAINING: CPT

## 2018-05-16 PROCEDURE — 2580000003 HC RX 258: Performed by: STUDENT IN AN ORGANIZED HEALTH CARE EDUCATION/TRAINING PROGRAM

## 2018-05-16 PROCEDURE — 2580000003 HC RX 258: Performed by: INTERNAL MEDICINE

## 2018-05-16 PROCEDURE — 80048 BASIC METABOLIC PNL TOTAL CA: CPT

## 2018-05-16 PROCEDURE — 2500000003 HC RX 250 WO HCPCS: Performed by: STUDENT IN AN ORGANIZED HEALTH CARE EDUCATION/TRAINING PROGRAM

## 2018-05-16 PROCEDURE — 99233 SBSQ HOSP IP/OBS HIGH 50: CPT | Performed by: INTERNAL MEDICINE

## 2018-05-16 PROCEDURE — 97164 PT RE-EVAL EST PLAN CARE: CPT

## 2018-05-16 PROCEDURE — 85025 COMPLETE CBC W/AUTO DIFF WBC: CPT

## 2018-05-16 PROCEDURE — 97165 OT EVAL LOW COMPLEX 30 MIN: CPT

## 2018-05-16 PROCEDURE — 36415 COLL VENOUS BLD VENIPUNCTURE: CPT

## 2018-05-16 PROCEDURE — 97116 GAIT TRAINING THERAPY: CPT

## 2018-05-16 PROCEDURE — 94640 AIRWAY INHALATION TREATMENT: CPT

## 2018-05-16 PROCEDURE — 6370000000 HC RX 637 (ALT 250 FOR IP): Performed by: INTERNAL MEDICINE

## 2018-05-16 RX ORDER — ASPIRIN 81 MG/1
81 TABLET ORAL DAILY
Status: DISCONTINUED | OUTPATIENT
Start: 2018-05-16 | End: 2018-05-18 | Stop reason: HOSPADM

## 2018-05-16 RX ORDER — LEVETIRACETAM 750 MG/1
750 TABLET ORAL 2 TIMES DAILY
Status: DISCONTINUED | OUTPATIENT
Start: 2018-05-16 | End: 2018-05-18 | Stop reason: HOSPADM

## 2018-05-16 RX ORDER — ATORVASTATIN CALCIUM 40 MG/1
40 TABLET, FILM COATED ORAL NIGHTLY
Status: DISCONTINUED | OUTPATIENT
Start: 2018-05-16 | End: 2018-05-18 | Stop reason: HOSPADM

## 2018-05-16 RX ORDER — HYDROCHLOROTHIAZIDE 25 MG/1
12.5 TABLET ORAL DAILY
Status: DISCONTINUED | OUTPATIENT
Start: 2018-05-16 | End: 2018-05-18 | Stop reason: HOSPADM

## 2018-05-16 RX ORDER — AMLODIPINE BESYLATE 10 MG/1
10 TABLET ORAL DAILY
Status: DISCONTINUED | OUTPATIENT
Start: 2018-05-16 | End: 2018-05-18 | Stop reason: HOSPADM

## 2018-05-16 RX ORDER — LEVOTHYROXINE SODIUM 0.05 MG/1
50 TABLET ORAL DAILY
Status: DISCONTINUED | OUTPATIENT
Start: 2018-05-16 | End: 2018-05-18 | Stop reason: HOSPADM

## 2018-05-16 RX ORDER — OXYBUTYNIN CHLORIDE 5 MG/1
5 TABLET ORAL 3 TIMES DAILY
Status: DISCONTINUED | OUTPATIENT
Start: 2018-05-16 | End: 2018-05-18 | Stop reason: HOSPADM

## 2018-05-16 RX ORDER — THIAMINE MONONITRATE (VIT B1) 100 MG
100 TABLET ORAL DAILY
Status: DISCONTINUED | OUTPATIENT
Start: 2018-05-16 | End: 2018-05-18 | Stop reason: HOSPADM

## 2018-05-16 RX ADMIN — LEVETIRACETAM 750 MG: 750 TABLET ORAL at 09:32

## 2018-05-16 RX ADMIN — IPRATROPIUM BROMIDE AND ALBUTEROL SULFATE 1 AMPULE: .5; 3 SOLUTION RESPIRATORY (INHALATION) at 08:57

## 2018-05-16 RX ADMIN — AMPICILLIN SODIUM AND SULBACTAM SODIUM 3 G: 2; 1 INJECTION, POWDER, FOR SOLUTION INTRAMUSCULAR; INTRAVENOUS at 11:29

## 2018-05-16 RX ADMIN — IPRATROPIUM BROMIDE AND ALBUTEROL SULFATE 1 AMPULE: .5; 3 SOLUTION RESPIRATORY (INHALATION) at 19:46

## 2018-05-16 RX ADMIN — AMPICILLIN SODIUM AND SULBACTAM SODIUM 3 G: 2; 1 INJECTION, POWDER, FOR SOLUTION INTRAMUSCULAR; INTRAVENOUS at 05:02

## 2018-05-16 RX ADMIN — HYDROCHLOROTHIAZIDE 12.5 MG: 25 TABLET ORAL at 09:32

## 2018-05-16 RX ADMIN — OXYBUTYNIN CHLORIDE 5 MG: 5 TABLET ORAL at 20:44

## 2018-05-16 RX ADMIN — VANCOMYCIN HYDROCHLORIDE 1000 MG: 1 INJECTION, POWDER, LYOPHILIZED, FOR SOLUTION INTRAVENOUS at 06:14

## 2018-05-16 RX ADMIN — VANCOMYCIN HYDROCHLORIDE 1000 MG: 1 INJECTION, POWDER, LYOPHILIZED, FOR SOLUTION INTRAVENOUS at 20:43

## 2018-05-16 RX ADMIN — SODIUM CHLORIDE: 9 INJECTION, SOLUTION INTRAVENOUS at 06:15

## 2018-05-16 RX ADMIN — LEVETIRACETAM 750 MG: 750 TABLET ORAL at 20:44

## 2018-05-16 RX ADMIN — ASPIRIN 81 MG: 81 TABLET, COATED ORAL at 09:33

## 2018-05-16 RX ADMIN — IPRATROPIUM BROMIDE AND ALBUTEROL SULFATE 1 AMPULE: .5; 3 SOLUTION RESPIRATORY (INHALATION) at 13:36

## 2018-05-16 RX ADMIN — AMLODIPINE BESYLATE 10 MG: 10 TABLET ORAL at 09:33

## 2018-05-16 RX ADMIN — LEVOTHYROXINE SODIUM ANHYDROUS 25 MCG: 100 INJECTION, POWDER, LYOPHILIZED, FOR SOLUTION INTRAVENOUS at 06:17

## 2018-05-16 RX ADMIN — OXYBUTYNIN CHLORIDE 5 MG: 5 TABLET ORAL at 15:06

## 2018-05-16 RX ADMIN — FAMOTIDINE 20 MG: 10 INJECTION INTRAVENOUS at 09:32

## 2018-05-16 RX ADMIN — ATORVASTATIN CALCIUM 40 MG: 40 TABLET, FILM COATED ORAL at 20:44

## 2018-05-16 RX ADMIN — THIAMINE HCL TAB 100 MG 100 MG: 100 TAB at 11:30

## 2018-05-16 RX ADMIN — PAROXETINE HYDROCHLORIDE 30 MG: 20 TABLET, FILM COATED ORAL at 09:33

## 2018-05-16 RX ADMIN — ENOXAPARIN SODIUM 40 MG: 40 INJECTION SUBCUTANEOUS at 09:32

## 2018-05-16 RX ADMIN — FAMOTIDINE 20 MG: 10 INJECTION INTRAVENOUS at 20:44

## 2018-05-16 RX ADMIN — OXYBUTYNIN CHLORIDE 5 MG: 5 TABLET ORAL at 09:33

## 2018-05-16 RX ADMIN — AMPICILLIN SODIUM AND SULBACTAM SODIUM 3 G: 2; 1 INJECTION, POWDER, FOR SOLUTION INTRAMUSCULAR; INTRAVENOUS at 18:04

## 2018-05-16 RX ADMIN — AMPICILLIN SODIUM AND SULBACTAM SODIUM 3 G: 2; 1 INJECTION, POWDER, FOR SOLUTION INTRAMUSCULAR; INTRAVENOUS at 01:24

## 2018-05-16 RX ADMIN — LEVOTHYROXINE SODIUM 50 MCG: 50 TABLET ORAL at 09:32

## 2018-05-16 ASSESSMENT — PAIN SCALES - WONG BAKER
WONGBAKER_NUMERICALRESPONSE: 0

## 2018-05-16 ASSESSMENT — PAIN DESCRIPTION - PAIN TYPE
TYPE: ACUTE PAIN
TYPE: ACUTE PAIN

## 2018-05-16 ASSESSMENT — PAIN SCALES - GENERAL
PAINLEVEL_OUTOF10: 0
PAINLEVEL_OUTOF10: 8
PAINLEVEL_OUTOF10: 8

## 2018-05-16 ASSESSMENT — PAIN DESCRIPTION - LOCATION
LOCATION: NECK
LOCATION: NECK

## 2018-05-16 ASSESSMENT — ENCOUNTER SYMPTOMS
NAUSEA: 0
ABDOMINAL PAIN: 0
SPUTUM PRODUCTION: 0
SHORTNESS OF BREATH: 0
COUGH: 1
VOMITING: 0

## 2018-05-17 ENCOUNTER — TELEPHONE (OUTPATIENT)
Dept: INTERNAL MEDICINE | Age: 63
End: 2018-05-17

## 2018-05-17 LAB
ABSOLUTE EOS #: 0.1 K/UL (ref 0–0.4)
ABSOLUTE IMMATURE GRANULOCYTE: ABNORMAL K/UL (ref 0–0.3)
ABSOLUTE LYMPH #: 1 K/UL (ref 1–4.8)
ABSOLUTE MONO #: 0.7 K/UL (ref 0.1–1.3)
ANION GAP SERPL CALCULATED.3IONS-SCNC: 12 MMOL/L (ref 9–17)
BASOPHILS # BLD: 0 % (ref 0–2)
BASOPHILS ABSOLUTE: 0 K/UL (ref 0–0.2)
BUN BLDV-MCNC: 8 MG/DL (ref 8–23)
BUN/CREAT BLD: ABNORMAL (ref 9–20)
CALCIUM SERPL-MCNC: 8.4 MG/DL (ref 8.6–10.4)
CHLORIDE BLD-SCNC: 101 MMOL/L (ref 98–107)
CO2: 24 MMOL/L (ref 20–31)
CREAT SERPL-MCNC: 0.47 MG/DL (ref 0.5–0.9)
CULTURE: ABNORMAL
DIFFERENTIAL TYPE: ABNORMAL
DIRECT EXAM: ABNORMAL
EOSINOPHILS RELATIVE PERCENT: 2 % (ref 0–4)
GFR AFRICAN AMERICAN: >60 ML/MIN
GFR NON-AFRICAN AMERICAN: >60 ML/MIN
GFR SERPL CREATININE-BSD FRML MDRD: ABNORMAL ML/MIN/{1.73_M2}
GFR SERPL CREATININE-BSD FRML MDRD: ABNORMAL ML/MIN/{1.73_M2}
GLUCOSE BLD-MCNC: 110 MG/DL (ref 70–99)
HCT VFR BLD CALC: 38.3 % (ref 36–46)
HEMOGLOBIN: 12.7 G/DL (ref 12–16)
IMMATURE GRANULOCYTES: ABNORMAL %
LYMPHOCYTES # BLD: 20 % (ref 24–44)
Lab: ABNORMAL
MCH RBC QN AUTO: 30 PG (ref 26–34)
MCHC RBC AUTO-ENTMCNC: 33.1 G/DL (ref 31–37)
MCV RBC AUTO: 90.8 FL (ref 80–100)
MONOCYTES # BLD: 14 % (ref 1–7)
NRBC AUTOMATED: ABNORMAL PER 100 WBC
ORGANISM: ABNORMAL
PDW BLD-RTO: 13.7 % (ref 11.5–14.9)
PLATELET # BLD: 175 K/UL (ref 150–450)
PLATELET ESTIMATE: ABNORMAL
PMV BLD AUTO: 9.7 FL (ref 6–12)
POTASSIUM SERPL-SCNC: 3.6 MMOL/L (ref 3.7–5.3)
RBC # BLD: 4.22 M/UL (ref 4–5.2)
RBC # BLD: ABNORMAL 10*6/UL
SEG NEUTROPHILS: 64 % (ref 36–66)
SEGMENTED NEUTROPHILS ABSOLUTE COUNT: 3.3 K/UL (ref 1.3–9.1)
SODIUM BLD-SCNC: 137 MMOL/L (ref 135–144)
SPECIMEN DESCRIPTION: ABNORMAL
SPECIMEN DESCRIPTION: ABNORMAL
STATUS: ABNORMAL
VANCOMYCIN TROUGH DATE LAST DOSE: NORMAL
VANCOMYCIN TROUGH DOSE AMOUNT: NORMAL
VANCOMYCIN TROUGH TIME LAST DOSE: 2043
VANCOMYCIN TROUGH: 13.3 UG/ML (ref 10–20)
WBC # BLD: 5.1 K/UL (ref 3.5–11)
WBC # BLD: ABNORMAL 10*3/UL

## 2018-05-17 PROCEDURE — 99239 HOSP IP/OBS DSCHRG MGMT >30: CPT | Performed by: INTERNAL MEDICINE

## 2018-05-17 PROCEDURE — 94761 N-INVAS EAR/PLS OXIMETRY MLT: CPT

## 2018-05-17 PROCEDURE — 2580000003 HC RX 258: Performed by: INTERNAL MEDICINE

## 2018-05-17 PROCEDURE — 6370000000 HC RX 637 (ALT 250 FOR IP): Performed by: STUDENT IN AN ORGANIZED HEALTH CARE EDUCATION/TRAINING PROGRAM

## 2018-05-17 PROCEDURE — 80202 ASSAY OF VANCOMYCIN: CPT

## 2018-05-17 PROCEDURE — 36415 COLL VENOUS BLD VENIPUNCTURE: CPT

## 2018-05-17 PROCEDURE — 6360000002 HC RX W HCPCS: Performed by: STUDENT IN AN ORGANIZED HEALTH CARE EDUCATION/TRAINING PROGRAM

## 2018-05-17 PROCEDURE — 6360000002 HC RX W HCPCS: Performed by: INTERNAL MEDICINE

## 2018-05-17 PROCEDURE — 2580000003 HC RX 258: Performed by: STUDENT IN AN ORGANIZED HEALTH CARE EDUCATION/TRAINING PROGRAM

## 2018-05-17 PROCEDURE — 2500000003 HC RX 250 WO HCPCS: Performed by: STUDENT IN AN ORGANIZED HEALTH CARE EDUCATION/TRAINING PROGRAM

## 2018-05-17 PROCEDURE — 6370000000 HC RX 637 (ALT 250 FOR IP): Performed by: INTERNAL MEDICINE

## 2018-05-17 PROCEDURE — 1200000000 HC SEMI PRIVATE

## 2018-05-17 PROCEDURE — S0028 INJECTION, FAMOTIDINE, 20 MG: HCPCS | Performed by: STUDENT IN AN ORGANIZED HEALTH CARE EDUCATION/TRAINING PROGRAM

## 2018-05-17 PROCEDURE — 80048 BASIC METABOLIC PNL TOTAL CA: CPT

## 2018-05-17 PROCEDURE — 6370000000 HC RX 637 (ALT 250 FOR IP): Performed by: PSYCHIATRY & NEUROLOGY

## 2018-05-17 PROCEDURE — 85025 COMPLETE CBC W/AUTO DIFF WBC: CPT

## 2018-05-17 PROCEDURE — 94640 AIRWAY INHALATION TREATMENT: CPT

## 2018-05-17 RX ORDER — DOXYCYCLINE HYCLATE 100 MG/1
100 CAPSULE ORAL 2 TIMES DAILY
Qty: 20 CAPSULE | Refills: 0 | Status: SHIPPED | OUTPATIENT
Start: 2018-05-17 | End: 2018-05-27

## 2018-05-17 RX ORDER — LEVETIRACETAM 750 MG/1
750 TABLET ORAL 2 TIMES DAILY
Qty: 60 TABLET | Refills: 3 | Status: SHIPPED | OUTPATIENT
Start: 2018-05-17 | End: 2018-07-18 | Stop reason: SDUPTHER

## 2018-05-17 RX ADMIN — OXYBUTYNIN CHLORIDE 5 MG: 5 TABLET ORAL at 16:43

## 2018-05-17 RX ADMIN — VANCOMYCIN HYDROCHLORIDE 1250 MG: 1 INJECTION, POWDER, LYOPHILIZED, FOR SOLUTION INTRAVENOUS at 20:00

## 2018-05-17 RX ADMIN — SODIUM CHLORIDE: 9 INJECTION, SOLUTION INTRAVENOUS at 00:50

## 2018-05-17 RX ADMIN — IPRATROPIUM BROMIDE AND ALBUTEROL SULFATE 1 AMPULE: .5; 3 SOLUTION RESPIRATORY (INHALATION) at 19:54

## 2018-05-17 RX ADMIN — AMPICILLIN SODIUM AND SULBACTAM SODIUM 3 G: 2; 1 INJECTION, POWDER, FOR SOLUTION INTRAMUSCULAR; INTRAVENOUS at 12:34

## 2018-05-17 RX ADMIN — AMPICILLIN SODIUM AND SULBACTAM SODIUM 3 G: 2; 1 INJECTION, POWDER, FOR SOLUTION INTRAMUSCULAR; INTRAVENOUS at 00:49

## 2018-05-17 RX ADMIN — LEVETIRACETAM 750 MG: 750 TABLET ORAL at 21:09

## 2018-05-17 RX ADMIN — IPRATROPIUM BROMIDE AND ALBUTEROL SULFATE 1 AMPULE: .5; 3 SOLUTION RESPIRATORY (INHALATION) at 08:33

## 2018-05-17 RX ADMIN — LEVOTHYROXINE SODIUM 50 MCG: 50 TABLET ORAL at 06:28

## 2018-05-17 RX ADMIN — PAROXETINE HYDROCHLORIDE 30 MG: 20 TABLET, FILM COATED ORAL at 09:23

## 2018-05-17 RX ADMIN — ATORVASTATIN CALCIUM 40 MG: 40 TABLET, FILM COATED ORAL at 21:09

## 2018-05-17 RX ADMIN — AMLODIPINE BESYLATE 10 MG: 10 TABLET ORAL at 09:23

## 2018-05-17 RX ADMIN — Medication 10 ML: at 21:09

## 2018-05-17 RX ADMIN — SODIUM CHLORIDE: 9 INJECTION, SOLUTION INTRAVENOUS at 20:18

## 2018-05-17 RX ADMIN — OXYBUTYNIN CHLORIDE 5 MG: 5 TABLET ORAL at 21:09

## 2018-05-17 RX ADMIN — VANCOMYCIN HYDROCHLORIDE 1250 MG: 1 INJECTION, POWDER, LYOPHILIZED, FOR SOLUTION INTRAVENOUS at 06:28

## 2018-05-17 RX ADMIN — ENOXAPARIN SODIUM 40 MG: 40 INJECTION SUBCUTANEOUS at 09:23

## 2018-05-17 RX ADMIN — ASPIRIN 81 MG: 81 TABLET, COATED ORAL at 09:23

## 2018-05-17 RX ADMIN — AMPICILLIN SODIUM AND SULBACTAM SODIUM 3 G: 2; 1 INJECTION, POWDER, FOR SOLUTION INTRAMUSCULAR; INTRAVENOUS at 16:42

## 2018-05-17 RX ADMIN — FAMOTIDINE 20 MG: 10 INJECTION INTRAVENOUS at 09:23

## 2018-05-17 RX ADMIN — IPRATROPIUM BROMIDE AND ALBUTEROL SULFATE 1 AMPULE: .5; 3 SOLUTION RESPIRATORY (INHALATION) at 12:12

## 2018-05-17 RX ADMIN — OXYBUTYNIN CHLORIDE 5 MG: 5 TABLET ORAL at 09:23

## 2018-05-17 RX ADMIN — LEVETIRACETAM 750 MG: 750 TABLET ORAL at 09:27

## 2018-05-17 RX ADMIN — HYDROCHLOROTHIAZIDE 12.5 MG: 25 TABLET ORAL at 09:23

## 2018-05-17 RX ADMIN — AMPICILLIN SODIUM AND SULBACTAM SODIUM 3 G: 2; 1 INJECTION, POWDER, FOR SOLUTION INTRAMUSCULAR; INTRAVENOUS at 05:25

## 2018-05-17 RX ADMIN — THIAMINE HCL TAB 100 MG 100 MG: 100 TAB at 09:23

## 2018-05-17 RX ADMIN — FAMOTIDINE 20 MG: 10 INJECTION INTRAVENOUS at 21:09

## 2018-05-18 VITALS
OXYGEN SATURATION: 94 % | TEMPERATURE: 97.5 F | SYSTOLIC BLOOD PRESSURE: 106 MMHG | HEART RATE: 78 BPM | RESPIRATION RATE: 14 BRPM | BODY MASS INDEX: 23.05 KG/M2 | HEIGHT: 69 IN | DIASTOLIC BLOOD PRESSURE: 59 MMHG | WEIGHT: 155.65 LBS

## 2018-05-18 LAB
ABSOLUTE EOS #: 0.15 K/UL (ref 0–0.4)
ABSOLUTE IMMATURE GRANULOCYTE: ABNORMAL K/UL (ref 0–0.3)
ABSOLUTE LYMPH #: 1.13 K/UL (ref 1–4.8)
ABSOLUTE MONO #: 0.69 K/UL (ref 0.1–1.3)
ANION GAP SERPL CALCULATED.3IONS-SCNC: 13 MMOL/L (ref 9–17)
BASOPHILS # BLD: 0 % (ref 0–2)
BASOPHILS ABSOLUTE: 0 K/UL (ref 0–0.2)
BUN BLDV-MCNC: 6 MG/DL (ref 8–23)
BUN/CREAT BLD: ABNORMAL (ref 9–20)
CALCIUM SERPL-MCNC: 8.7 MG/DL (ref 8.6–10.4)
CHLORIDE BLD-SCNC: 103 MMOL/L (ref 98–107)
CO2: 23 MMOL/L (ref 20–31)
CREAT SERPL-MCNC: 0.47 MG/DL (ref 0.5–0.9)
DIFFERENTIAL TYPE: ABNORMAL
EOSINOPHILS RELATIVE PERCENT: 3 % (ref 0–4)
GFR AFRICAN AMERICAN: >60 ML/MIN
GFR NON-AFRICAN AMERICAN: >60 ML/MIN
GFR SERPL CREATININE-BSD FRML MDRD: ABNORMAL ML/MIN/{1.73_M2}
GFR SERPL CREATININE-BSD FRML MDRD: ABNORMAL ML/MIN/{1.73_M2}
GLUCOSE BLD-MCNC: 96 MG/DL (ref 70–99)
HCT VFR BLD CALC: 37.6 % (ref 36–46)
HEMOGLOBIN: 12.6 G/DL (ref 12–16)
IMMATURE GRANULOCYTES: ABNORMAL %
LYMPHOCYTES # BLD: 23 % (ref 24–44)
MCH RBC QN AUTO: 30.7 PG (ref 26–34)
MCHC RBC AUTO-ENTMCNC: 33.5 G/DL (ref 31–37)
MCV RBC AUTO: 91.6 FL (ref 80–100)
MONOCYTES # BLD: 14 % (ref 1–7)
MORPHOLOGY: ABNORMAL
NRBC AUTOMATED: ABNORMAL PER 100 WBC
PDW BLD-RTO: 13.5 % (ref 11.5–14.9)
PLATELET # BLD: 181 K/UL (ref 150–450)
PLATELET ESTIMATE: ABNORMAL
PMV BLD AUTO: 9.7 FL (ref 6–12)
POTASSIUM SERPL-SCNC: 3.2 MMOL/L (ref 3.7–5.3)
RBC # BLD: 4.11 M/UL (ref 4–5.2)
RBC # BLD: ABNORMAL 10*6/UL
SEG NEUTROPHILS: 60 % (ref 36–66)
SEGMENTED NEUTROPHILS ABSOLUTE COUNT: 2.93 K/UL (ref 1.3–9.1)
SODIUM BLD-SCNC: 139 MMOL/L (ref 135–144)
WBC # BLD: 4.9 K/UL (ref 3.5–11)
WBC # BLD: ABNORMAL 10*3/UL

## 2018-05-18 PROCEDURE — 80048 BASIC METABOLIC PNL TOTAL CA: CPT

## 2018-05-18 PROCEDURE — 2500000003 HC RX 250 WO HCPCS: Performed by: STUDENT IN AN ORGANIZED HEALTH CARE EDUCATION/TRAINING PROGRAM

## 2018-05-18 PROCEDURE — 99239 HOSP IP/OBS DSCHRG MGMT >30: CPT | Performed by: INTERNAL MEDICINE

## 2018-05-18 PROCEDURE — 36415 COLL VENOUS BLD VENIPUNCTURE: CPT

## 2018-05-18 PROCEDURE — 85025 COMPLETE CBC W/AUTO DIFF WBC: CPT

## 2018-05-18 PROCEDURE — 6370000000 HC RX 637 (ALT 250 FOR IP): Performed by: STUDENT IN AN ORGANIZED HEALTH CARE EDUCATION/TRAINING PROGRAM

## 2018-05-18 PROCEDURE — 6370000000 HC RX 637 (ALT 250 FOR IP): Performed by: INTERNAL MEDICINE

## 2018-05-18 PROCEDURE — 6360000002 HC RX W HCPCS: Performed by: INTERNAL MEDICINE

## 2018-05-18 PROCEDURE — 6360000002 HC RX W HCPCS: Performed by: STUDENT IN AN ORGANIZED HEALTH CARE EDUCATION/TRAINING PROGRAM

## 2018-05-18 PROCEDURE — S0028 INJECTION, FAMOTIDINE, 20 MG: HCPCS | Performed by: STUDENT IN AN ORGANIZED HEALTH CARE EDUCATION/TRAINING PROGRAM

## 2018-05-18 PROCEDURE — 6370000000 HC RX 637 (ALT 250 FOR IP): Performed by: PSYCHIATRY & NEUROLOGY

## 2018-05-18 PROCEDURE — 2580000003 HC RX 258: Performed by: INTERNAL MEDICINE

## 2018-05-18 RX ORDER — POTASSIUM CHLORIDE 20 MEQ/1
40 TABLET, EXTENDED RELEASE ORAL ONCE
Status: COMPLETED | OUTPATIENT
Start: 2018-05-18 | End: 2018-05-18

## 2018-05-18 RX ADMIN — ASPIRIN 81 MG: 81 TABLET, COATED ORAL at 07:50

## 2018-05-18 RX ADMIN — ENOXAPARIN SODIUM 40 MG: 40 INJECTION SUBCUTANEOUS at 07:51

## 2018-05-18 RX ADMIN — AMPICILLIN SODIUM AND SULBACTAM SODIUM 3 G: 2; 1 INJECTION, POWDER, FOR SOLUTION INTRAMUSCULAR; INTRAVENOUS at 06:31

## 2018-05-18 RX ADMIN — FAMOTIDINE 20 MG: 10 INJECTION INTRAVENOUS at 07:50

## 2018-05-18 RX ADMIN — VANCOMYCIN HYDROCHLORIDE 1250 MG: 1 INJECTION, POWDER, LYOPHILIZED, FOR SOLUTION INTRAVENOUS at 07:51

## 2018-05-18 RX ADMIN — OXYBUTYNIN CHLORIDE 5 MG: 5 TABLET ORAL at 07:50

## 2018-05-18 RX ADMIN — THIAMINE HCL TAB 100 MG 100 MG: 100 TAB at 07:50

## 2018-05-18 RX ADMIN — AMPICILLIN SODIUM AND SULBACTAM SODIUM 3 G: 2; 1 INJECTION, POWDER, FOR SOLUTION INTRAMUSCULAR; INTRAVENOUS at 00:37

## 2018-05-18 RX ADMIN — LEVOTHYROXINE SODIUM 50 MCG: 50 TABLET ORAL at 07:50

## 2018-05-18 RX ADMIN — LEVETIRACETAM 750 MG: 750 TABLET ORAL at 07:50

## 2018-05-18 RX ADMIN — POTASSIUM CHLORIDE 40 MEQ: 20 TABLET, EXTENDED RELEASE ORAL at 10:41

## 2018-05-18 RX ADMIN — AMLODIPINE BESYLATE 10 MG: 10 TABLET ORAL at 07:50

## 2018-05-18 RX ADMIN — PAROXETINE HYDROCHLORIDE 30 MG: 20 TABLET, FILM COATED ORAL at 07:50

## 2018-05-18 RX ADMIN — HYDROCHLOROTHIAZIDE 12.5 MG: 25 TABLET ORAL at 07:50

## 2018-05-18 ASSESSMENT — PAIN SCALES - GENERAL: PAINLEVEL_OUTOF10: 0

## 2018-05-19 ENCOUNTER — CARE COORDINATION (OUTPATIENT)
Dept: CASE MANAGEMENT | Age: 63
End: 2018-05-19

## 2018-05-22 ENCOUNTER — CARE COORDINATION (OUTPATIENT)
Dept: CASE MANAGEMENT | Age: 63
End: 2018-05-22

## 2018-06-16 DIAGNOSIS — K21.9 GASTROESOPHAGEAL REFLUX DISEASE, ESOPHAGITIS PRESENCE NOT SPECIFIED: ICD-10-CM

## 2018-06-16 DIAGNOSIS — I10 ESSENTIAL HYPERTENSION: ICD-10-CM

## 2018-06-16 DIAGNOSIS — N39.41 URGE INCONTINENCE: ICD-10-CM

## 2018-06-18 ENCOUNTER — CARE COORDINATION (OUTPATIENT)
Dept: CASE MANAGEMENT | Age: 63
End: 2018-06-18

## 2018-06-18 RX ORDER — OXYBUTYNIN CHLORIDE 5 MG/1
TABLET ORAL
Qty: 90 TABLET | Refills: 2 | Status: SHIPPED | OUTPATIENT
Start: 2018-06-18 | End: 2018-11-05 | Stop reason: SDUPTHER

## 2018-06-18 RX ORDER — AMLODIPINE BESYLATE 10 MG/1
TABLET ORAL
Qty: 30 TABLET | Refills: 2 | Status: SHIPPED | OUTPATIENT
Start: 2018-06-18 | End: 2018-10-14 | Stop reason: SDUPTHER

## 2018-06-18 RX ORDER — OMEPRAZOLE 20 MG/1
CAPSULE, DELAYED RELEASE ORAL
Qty: 30 CAPSULE | Refills: 2 | Status: SHIPPED | OUTPATIENT
Start: 2018-06-18 | End: 2018-09-28 | Stop reason: SDUPTHER

## 2018-06-19 ENCOUNTER — CARE COORDINATION (OUTPATIENT)
Dept: CASE MANAGEMENT | Age: 63
End: 2018-06-19

## 2018-06-21 ENCOUNTER — HOSPITAL ENCOUNTER (OUTPATIENT)
Dept: WOMENS IMAGING | Age: 63
Discharge: HOME | End: 2018-06-23
Payer: COMMERCIAL

## 2018-06-21 DIAGNOSIS — Z12.39 BREAST CANCER SCREENING: ICD-10-CM

## 2018-06-21 PROCEDURE — 77063 BREAST TOMOSYNTHESIS BI: CPT

## 2018-06-27 ENCOUNTER — CARE COORDINATION (OUTPATIENT)
Dept: CASE MANAGEMENT | Age: 63
End: 2018-06-27

## 2018-06-27 DIAGNOSIS — E03.9 ACQUIRED HYPOTHYROIDISM: Primary | Chronic | ICD-10-CM

## 2018-06-27 PROCEDURE — 1111F DSCHRG MED/CURRENT MED MERGE: CPT

## 2018-06-29 ENCOUNTER — HOSPITAL ENCOUNTER (OUTPATIENT)
Dept: WOMENS IMAGING | Age: 63
Discharge: HOME | End: 2018-07-01
Payer: COMMERCIAL

## 2018-06-29 DIAGNOSIS — R92.8 ABNORMAL MAMMOGRAM: ICD-10-CM

## 2018-06-29 PROCEDURE — 76642 ULTRASOUND BREAST LIMITED: CPT

## 2018-06-29 PROCEDURE — G0279 TOMOSYNTHESIS, MAMMO: HCPCS

## 2018-06-29 PROCEDURE — 77065 DX MAMMO INCL CAD UNI: CPT

## 2018-07-18 ENCOUNTER — OFFICE VISIT (OUTPATIENT)
Dept: INTERNAL MEDICINE | Age: 63
End: 2018-07-18
Payer: COMMERCIAL

## 2018-07-18 ENCOUNTER — HOSPITAL ENCOUNTER (OUTPATIENT)
Age: 63
Setting detail: SPECIMEN
Discharge: HOME | End: 2018-07-18
Payer: COMMERCIAL

## 2018-07-18 VITALS
SYSTOLIC BLOOD PRESSURE: 94 MMHG | HEART RATE: 82 BPM | BODY MASS INDEX: 21.9 KG/M2 | WEIGHT: 153 LBS | HEIGHT: 70 IN | DIASTOLIC BLOOD PRESSURE: 66 MMHG

## 2018-07-18 DIAGNOSIS — I10 ESSENTIAL HYPERTENSION: ICD-10-CM

## 2018-07-18 DIAGNOSIS — G40.909 SEIZURE DISORDER (HCC): Primary | Chronic | ICD-10-CM

## 2018-07-18 DIAGNOSIS — Z23 NEED FOR PROPHYLACTIC VACCINATION AND INOCULATION AGAINST VARICELLA: ICD-10-CM

## 2018-07-18 DIAGNOSIS — G43.009 MIGRAINE WITHOUT AURA AND WITHOUT STATUS MIGRAINOSUS, NOT INTRACTABLE: ICD-10-CM

## 2018-07-18 DIAGNOSIS — G40.909 SEIZURE DISORDER (HCC): Chronic | ICD-10-CM

## 2018-07-18 DIAGNOSIS — Z86.73 H/O: CVA (CEREBROVASCULAR ACCIDENT): ICD-10-CM

## 2018-07-18 DIAGNOSIS — E03.9 ACQUIRED HYPOTHYROIDISM: ICD-10-CM

## 2018-07-18 LAB
ALBUMIN SERPL-MCNC: 4.4 G/DL (ref 3.5–5.2)
ALBUMIN/GLOBULIN RATIO: 1.3 (ref 1–2.5)
ALP BLD-CCNC: 64 U/L (ref 35–104)
ALT SERPL-CCNC: 17 U/L (ref 5–33)
ANION GAP SERPL CALCULATED.3IONS-SCNC: 15 MMOL/L (ref 9–17)
AST SERPL-CCNC: 30 U/L
BILIRUB SERPL-MCNC: 0.79 MG/DL (ref 0.3–1.2)
BUN BLDV-MCNC: 6 MG/DL (ref 8–23)
BUN/CREAT BLD: ABNORMAL (ref 9–20)
CALCIUM SERPL-MCNC: 9.5 MG/DL (ref 8.6–10.4)
CHLORIDE BLD-SCNC: 101 MMOL/L (ref 98–107)
CO2: 25 MMOL/L (ref 20–31)
CREAT SERPL-MCNC: 0.67 MG/DL (ref 0.5–0.9)
GFR AFRICAN AMERICAN: >60 ML/MIN
GFR NON-AFRICAN AMERICAN: >60 ML/MIN
GFR SERPL CREATININE-BSD FRML MDRD: ABNORMAL ML/MIN/{1.73_M2}
GFR SERPL CREATININE-BSD FRML MDRD: ABNORMAL ML/MIN/{1.73_M2}
GLUCOSE BLD-MCNC: 88 MG/DL (ref 70–99)
HCT VFR BLD CALC: 43.8 % (ref 36.3–47.1)
HEMOGLOBIN: 14.7 G/DL (ref 11.9–15.1)
KEPPRA: 61 UG/ML
MCH RBC QN AUTO: 29.8 PG (ref 25.2–33.5)
MCHC RBC AUTO-ENTMCNC: 33.6 G/DL (ref 28.4–34.8)
MCV RBC AUTO: 88.7 FL (ref 82.6–102.9)
NRBC AUTOMATED: 0 PER 100 WBC
PDW BLD-RTO: 13.4 % (ref 11.8–14.4)
PLATELET # BLD: NORMAL K/UL (ref 138–453)
PLATELET, FLUORESCENCE: NORMAL K/UL (ref 138–453)
PLATELET, IMMATURE FRACTION: NORMAL % (ref 1.1–10.3)
PMV BLD AUTO: NORMAL FL (ref 8.1–13.5)
POTASSIUM SERPL-SCNC: 3 MMOL/L (ref 3.7–5.3)
RBC # BLD: 4.94 M/UL (ref 3.95–5.11)
SODIUM BLD-SCNC: 141 MMOL/L (ref 135–144)
TOTAL PROTEIN: 7.8 G/DL (ref 6.4–8.3)
WBC # BLD: 5.9 K/UL (ref 3.5–11.3)

## 2018-07-18 PROCEDURE — 80053 COMPREHEN METABOLIC PANEL: CPT

## 2018-07-18 PROCEDURE — 36415 COLL VENOUS BLD VENIPUNCTURE: CPT

## 2018-07-18 PROCEDURE — 3017F COLORECTAL CA SCREEN DOC REV: CPT | Performed by: INTERNAL MEDICINE

## 2018-07-18 PROCEDURE — 99213 OFFICE O/P EST LOW 20 MIN: CPT | Performed by: INTERNAL MEDICINE

## 2018-07-18 PROCEDURE — 85055 RETICULATED PLATELET ASSAY: CPT

## 2018-07-18 PROCEDURE — 99214 OFFICE O/P EST MOD 30 MIN: CPT | Performed by: INTERNAL MEDICINE

## 2018-07-18 PROCEDURE — 1036F TOBACCO NON-USER: CPT | Performed by: INTERNAL MEDICINE

## 2018-07-18 PROCEDURE — G8420 CALC BMI NORM PARAMETERS: HCPCS | Performed by: INTERNAL MEDICINE

## 2018-07-18 PROCEDURE — 85027 COMPLETE CBC AUTOMATED: CPT

## 2018-07-18 PROCEDURE — 80177 DRUG SCRN QUAN LEVETIRACETAM: CPT

## 2018-07-18 PROCEDURE — G8427 DOCREV CUR MEDS BY ELIG CLIN: HCPCS | Performed by: INTERNAL MEDICINE

## 2018-07-18 RX ORDER — LEVETIRACETAM 750 MG/1
750 TABLET ORAL 2 TIMES DAILY
Qty: 60 TABLET | Refills: 3 | Status: SHIPPED | OUTPATIENT
Start: 2018-07-18 | End: 2018-07-25 | Stop reason: SDUPTHER

## 2018-07-18 RX ORDER — LEVOTHYROXINE SODIUM 0.05 MG/1
50 TABLET ORAL DAILY
Qty: 30 TABLET | Refills: 2 | Status: SHIPPED | OUTPATIENT
Start: 2018-07-18 | End: 2018-11-23 | Stop reason: SDUPTHER

## 2018-07-18 RX ORDER — ATORVASTATIN CALCIUM 40 MG/1
40 TABLET, FILM COATED ORAL DAILY
Qty: 30 TABLET | Refills: 2 | Status: SHIPPED | OUTPATIENT
Start: 2018-07-18 | End: 2019-02-07 | Stop reason: SDUPTHER

## 2018-07-18 RX ORDER — PAROXETINE 30 MG/1
30 TABLET, FILM COATED ORAL DAILY
Qty: 30 TABLET | Refills: 2 | Status: SHIPPED | OUTPATIENT
Start: 2018-07-18 | End: 2018-11-05 | Stop reason: SDUPTHER

## 2018-07-18 RX ORDER — HYDROCHLOROTHIAZIDE 12.5 MG/1
12.5 TABLET ORAL DAILY
Qty: 30 TABLET | Refills: 2 | Status: SHIPPED | OUTPATIENT
Start: 2018-07-18 | End: 2019-02-07 | Stop reason: SDUPTHER

## 2018-07-18 RX ORDER — SUMATRIPTAN 100 MG/1
100 TABLET, FILM COATED ORAL DAILY PRN
Qty: 10 TABLET | Refills: 2 | Status: CANCELLED | OUTPATIENT
Start: 2018-07-18

## 2018-07-18 ASSESSMENT — PATIENT HEALTH QUESTIONNAIRE - PHQ9
SUM OF ALL RESPONSES TO PHQ9 QUESTIONS 1 & 2: 0
1. LITTLE INTEREST OR PLEASURE IN DOING THINGS: 0
2. FEELING DOWN, DEPRESSED OR HOPELESS: 0
SUM OF ALL RESPONSES TO PHQ QUESTIONS 1-9: 0

## 2018-07-18 NOTE — PATIENT INSTRUCTIONS
Your medications for this visit were escribed to your preferred pharmacy. Your script for shingrix has been printed and given to you, you can take it to the pharmacy of your choice. OC-Light hemoccult collection kit (ifit) given to patient and procedure explained. Avs was given and reviewed appt card given with next appt. It is very important that you keep this appointment, if you need to cancel please call [phone] with any questions.  MM

## 2018-07-18 NOTE — PROGRESS NOTES
[facility]    Progress Note    Date of patient's visit: 7/18/2018    Patient's Name:  Nitza Soto    YOB: 1955            Patient Care Team:  Teerza Epps MD as PCP - General (Internal Medicine)  Loren Brooke MD as PCP - [facility] Attributed Provider    REASON FOR VISIT: Routine outpatient follow     Chief Complaint   Patient presents with    Hypertension    Health Maintenance     IFIT, shingels pended         HISTORY OF PRESENT ILLNESS:    History was obtained from the patient. Nitza Soto is a 61 y.o. is here for Follow-up. She has a known history of seizure disorder. She has been admitted twice in the last 6 months for status epilepticus. She was intubated this last admission in May and then was also treated for aspiration pneumonia with MRSA. She was in rehab after discharge. She has not had any seizures since then. She was on Keppra in the past which was increased during this hospitalization. She has a follow-up with neurology next month. Currently denies any complaints. She denies alcohol abuse. She says she has been in remission for several years. She is here for follow-up on her chronic medical problems and medication refills. She has a history of CVA in the past.  No other etiology was found in the hospital.    She has a history of migraines and takes Imitrex as needed. She has history of hypertension. She is on amlodipine and HCTZ. She always has hypokalemia. She does says she's been feeling a little dizzy occasionally. I've advised her to hold hhdrochlorothiazide for now. She does have a blood pressure monitor at home and have advised her to check her blood pressures and call within 2 weeks. Past Medical History:   Diagnosis Date    Ankle fracture, left     Anxiety 2/4/2014    Arthritis     ETOH abuse     Frequency of urination ?     GERD (gastroesophageal reflux disease) 2/4/2014    Head injury     after seizure/stroke pt fell to fall and hit head-2004    Headache(784.0)     Hypertension     Hypotension ?  Hypothyroidism     Numbness and tingling     fingers    Osteoporosis     Peripheral vascular disease ([address]     Pneumonia     Reflux     Seizures ([facility] [address]     last one 2004    Shingles     Stroke [facility]) 1998    stroke and seizure together    TIA (transient ischemic attack)     1996    Tremor     UTI (urinary tract infection)     once    Varicose veins     Wears glasses        Past Surgical History:   Procedure Laterality Date    BUNIONECTOMY Right     HAMMER TOE SURGERY Left 07/06/2016    ON 2ND, 3rd, 4th,  and  5TH TOES OF LEFT FOOT    OTHER SURGICAL HISTORY  2/23/15    orif left ankle with synthes and intraoperative c- arm    HI [address] INCL FLUOR GDNCE DX W/CELL WASHG SPX N/A 1/16/2018    BRONCHOSCOPY WITH WASHINGS AT PATIENT BEDSIDE ICU performed by Ruchi Loaiza MD at [address]    No Known Allergies    MEDICATIONS:      Current Outpatient Prescriptions on File Prior to Visit   Medication Sig Dispense Refill    omeprazole (PRILOSEC) 20 MG delayed release capsule TAKE ONE CAPSULE BY MOUTH ONCE DAILY 30 capsule 2    amLODIPine (NORVASC) 10 MG tablet TAKE ONE TABLET BY MOUTH ONCE DAILY 30 tablet 2    oxybutynin (DITROPAN) 5 MG tablet TAKE ONE TABLET BY MOUTH THREE TIMES DAILY 90 tablet 2    SUMAtriptan (IMITREX) 100 MG tablet Take 1 tablet by mouth daily as needed for Migraine 10 tablet 2    aspirin EC 81 MG EC tablet Take 1 tablet by mouth daily 30 tablet 2     No current facility-administered medications on file prior to visit. SOCIAL HISTORY    Reviewed and no change from previous record. Lynn Haro  reports that she has never smoked.  She has never used smokeless tobacco.    FAMILY HISTORY:    Reviewed and No change from previous visit    HEALTH MAINTENANCE DUE:      Health Maintenance Due   Topic Date Due    Shingles Vaccine (1 of 2 - 2 Dose Series) 03/28/2005    Colon Cancer Screen FIT/FOBT  02/23/2017       REVIEW OF SYSTEMS:    12 point review of symptoms completed and found to be normal except noted in the HPI    Review of Systems   Constitutional: Negative for fever, malaise/fatigue and weight loss. HENT: Negative for congestion and sore throat. Eyes: Negative for blurred vision and redness. Respiratory: Negative for cough, sputum production, shortness of breath and stridor. Cardiovascular: Negative for chest pain, palpitations and leg swelling. Gastrointestinal: Negative for abdominal pain, constipation and nausea. Genitourinary: Positive for urgency. Negative for dysuria, frequency and hematuria. Skin: Negative for itching and rash. Neurological: Positive for dizziness, seizures and headaches. Negative for tremors and loss of consciousness. Endo/Heme/Allergies: Negative for polydipsia. Does not bruise/bleed easily. Psychiatric/Behavioral: Negative for depression and substance abuse. PHYSICAL EXAM:      Vitals:    07/18/18 0946   BP: 94/66   Site: Left Arm   Position: Sitting   Cuff Size: Medium Adult   Pulse: 82   Weight: 153 lb (69.4 kg)   Height: 5' 10\" (1.778 m)     Body mass index is 21.95 kg/m². BP Readings from Last 3 Encounters:   07/18/18 94/66   05/18/18 (!) 106/59   02/06/18 124/88        Wt Readings from Last 3 Encounters:   07/18/18 153 lb (69.4 kg)   05/17/18 155 lb 10.3 oz (70.6 kg)   02/06/18 171 lb (77.6 kg)       Physical Exam      HENT:  Normocephalic, Atraumatic, Oropharynx moist, Neck- Normal range of motion, No tenderness, Supple  Eyes:  PERRL, EOMI, Conjunctiva normal, No discharge. Respiratory:  Normal breath sounds, No respiratory distress, No wheezing, No chest tenderness. Cardiovascular:  Normal heart rate, Normal rhythm, No murmurs  GI:  Bowel sounds normal, Soft, No tenderness, No masses  :  No CVA tenderness.   Musculoskeletal:  Intact distal pulses, No edema, No tenderness  Integument:  Warm, Dry, No erythema, No rash. Lymphatic:  No lymphadenopathy noted. Neurologic:  Alert & oriented x 3, Normal motor function, Normal sensory function, No focal deficits noted. Psychiatric:  Affect normal    LABORATORY FINDINGS:    CBC:  Lab Results   Component Value Date    WBC 4.9 05/18/2018    HGB 12.6 05/18/2018     05/18/2018     10/04/2011     BMP:    Lab Results   Component Value Date     05/18/2018    K 3.2 05/18/2018     05/18/2018    CO2 23 05/18/2018    BUN 6 05/18/2018    CREATININE 0.47 05/18/2018    GLUCOSE 96 05/18/2018    GLUCOSE 68 03/27/2012     HEMOGLOBIN A1C:   Lab Results   Component Value Date    LABA1C 5.1 08/18/2016     MICROALBUMIN URINE: No results found for: MICROALBUR  FASTING LIPID PANEL:  Lab Results   Component Value Date    CHOL 183 08/18/2016    HDL 71 08/18/2016    TRIG 64 08/18/2016     Lab Results   Component Value Date    LDLCHOLESTEROL 99 08/18/2016       LIVER PROFILE:  Lab Results   Component Value Date    ALT 11 05/13/2018    AST 21 05/13/2018    PROT 6.1 05/13/2018    BILITOT 0.47 05/13/2018    BILIDIR 0.17 05/13/2018    LABALBU 3.4 05/13/2018    LABALBU 4.1 10/04/2011      THYROID FUNCTION:   Lab Results   Component Value Date    TSH 2.73 05/13/2018      URINE ANALYSIS: No results found for: LABURIN  ASSESSMENT AND PLAN:    1. Seizure disorder (HCC)    - levETIRAcetam (KEPPRA) 750 MG tablet; Take 1 tablet by mouth 2 times daily  Dispense: 60 tablet; Refill: 3  - Comprehensive Metabolic Panel; Future  - CBC; Future  - Levetiracetam Level; Future    2. Essential hypertension    - hydrochlorothiazide (HYDRODIURIL) 12.5 MG tablet; Take 1 tablet by mouth daily  Dispense: 30 tablet; Refill: 2  - Comprehensive Metabolic Panel; Future    3. Acquired hypothyroidism    - levothyroxine (SYNTHROID) 50 MCG tablet; Take 1 tablet by mouth daily  Dispense: 30 tablet; Refill: 2    4.  H/O: CVA (cerebrovascular accident)  Asa    -

## 2018-07-22 ASSESSMENT — ENCOUNTER SYMPTOMS
ABDOMINAL PAIN: 0
SPUTUM PRODUCTION: 0
STRIDOR: 0
BLURRED VISION: 0
COUGH: 0
SHORTNESS OF BREATH: 0
EYE REDNESS: 0
NAUSEA: 0
CONSTIPATION: 0
SORE THROAT: 0

## 2018-07-23 DIAGNOSIS — E87.6 HYPOKALEMIA: Primary | ICD-10-CM

## 2018-07-23 RX ORDER — POTASSIUM CHLORIDE 750 MG/1
20 TABLET, EXTENDED RELEASE ORAL DAILY
Qty: 60 TABLET | Refills: 3 | Status: SHIPPED | OUTPATIENT
Start: 2018-07-23 | End: 2019-02-07 | Stop reason: SDUPTHER

## 2018-07-24 ENCOUNTER — TELEPHONE (OUTPATIENT)
Dept: INTERNAL MEDICINE | Age: 63
End: 2018-07-24

## 2018-07-24 DIAGNOSIS — G40.909 SEIZURE DISORDER (HCC): Chronic | ICD-10-CM

## 2018-07-25 RX ORDER — LEVETIRACETAM 1000 MG/1
1000 TABLET ORAL 2 TIMES DAILY
Qty: 60 TABLET | Refills: 2 | Status: ON HOLD | OUTPATIENT
Start: 2018-07-25 | End: 2018-08-06 | Stop reason: HOSPADM

## 2018-07-25 NOTE — TELEPHONE ENCOUNTER
PC to Dr. Wanda Wagoner only saw patient in the hosptial in May and she will not be following at their office due to insurance restrictions. She saw Dr. Millicent Griffin on 6/27/17 (note in EPIC) and her Keppra was increased to 1000 mg twice daily. Patient said she has been taking this dosage until Keppra refill was called in as 750 mg twice daily. She has an appt with Dr. Millicent Griffin on 8/20/18. Med list is most current med list from hospitalization in May BUT taking Keppra 1000 mg BID instead of Keppra 750 mg.   Please advise

## 2018-07-25 NOTE — TELEPHONE ENCOUNTER
The recent discharge summary from hospital in May is showing Keppra 750 mg 2/day. Please get notes from neurology.  Can I have accurate med list? Thanks  Is she no longer seeing her neurologist?

## 2018-07-30 ENCOUNTER — APPOINTMENT (OUTPATIENT)
Dept: CT IMAGING | Age: 63
DRG: 053 | End: 2018-07-30
Payer: COMMERCIAL

## 2018-07-30 ENCOUNTER — HOSPITAL ENCOUNTER (INPATIENT)
Age: 63
LOS: 8 days | Discharge: NURSING, REHAB OR LONG-TERM CARE | DRG: 053 | End: 2018-08-07
Attending: EMERGENCY MEDICINE | Admitting: INTERNAL MEDICINE
Payer: COMMERCIAL

## 2018-07-30 ENCOUNTER — APPOINTMENT (OUTPATIENT)
Dept: GENERAL RADIOLOGY | Age: 63
DRG: 053 | End: 2018-07-30
Payer: COMMERCIAL

## 2018-07-30 DIAGNOSIS — G40.909 SEIZURE DISORDER (HCC): Chronic | ICD-10-CM

## 2018-07-30 DIAGNOSIS — G40.901 STATUS EPILEPTICUS (HCC): Primary | ICD-10-CM

## 2018-07-30 LAB
-: ABNORMAL
ABSOLUTE EOS #: 0 K/UL (ref 0–0.4)
ABSOLUTE IMMATURE GRANULOCYTE: ABNORMAL K/UL (ref 0–0.3)
ABSOLUTE LYMPH #: 0.31 K/UL (ref 1–4.8)
ABSOLUTE MONO #: 1.09 K/UL (ref 0.1–1.3)
ALBUMIN SERPL-MCNC: 4.3 G/DL (ref 3.5–5.2)
ALBUMIN/GLOBULIN RATIO: ABNORMAL (ref 1–2.5)
ALLEN TEST: ABNORMAL
ALP BLD-CCNC: 62 U/L (ref 35–104)
ALT SERPL-CCNC: 22 U/L (ref 5–33)
AMORPHOUS: ABNORMAL
AMPHETAMINE SCREEN URINE: NEGATIVE
ANION GAP SERPL CALCULATED.3IONS-SCNC: 17 MMOL/L (ref 9–17)
AST SERPL-CCNC: 53 U/L
BACTERIA: ABNORMAL
BARBITURATE SCREEN URINE: NEGATIVE
BASOPHILS # BLD: 0 % (ref 0–2)
BASOPHILS ABSOLUTE: 0 K/UL (ref 0–0.2)
BENZODIAZEPINE SCREEN, URINE: NEGATIVE
BILIRUB SERPL-MCNC: 0.83 MG/DL (ref 0.3–1.2)
BILIRUBIN URINE: NEGATIVE
BUN BLDV-MCNC: 8 MG/DL (ref 8–23)
BUN/CREAT BLD: ABNORMAL (ref 9–20)
BUPRENORPHINE URINE: NORMAL
CALCIUM SERPL-MCNC: 9.4 MG/DL (ref 8.6–10.4)
CANNABINOID SCREEN URINE: NEGATIVE
CARBOXYHEMOGLOBIN: 0.7 % (ref 0–5)
CASTS UA: ABNORMAL /LPF
CHLORIDE BLD-SCNC: 106 MMOL/L (ref 98–107)
CO2: 20 MMOL/L (ref 20–31)
COCAINE METABOLITE, URINE: NEGATIVE
COLOR: YELLOW
COMMENT UA: ABNORMAL
CREAT SERPL-MCNC: 0.69 MG/DL (ref 0.5–0.9)
CRYSTALS, UA: ABNORMAL /HPF
DIFFERENTIAL TYPE: ABNORMAL
EKG ATRIAL RATE: 97 BPM
EKG P AXIS: 70 DEGREES
EKG P-R INTERVAL: 120 MS
EKG Q-T INTERVAL: 366 MS
EKG QRS DURATION: 88 MS
EKG QTC CALCULATION (BAZETT): 464 MS
EKG R AXIS: 27 DEGREES
EKG T AXIS: 16 DEGREES
EKG VENTRICULAR RATE: 97 BPM
EOSINOPHILS RELATIVE PERCENT: 0 % (ref 0–4)
EPITHELIAL CELLS UA: ABNORMAL /HPF
ETHANOL PERCENT: <0.01 %
ETHANOL: <10 MG/DL
FIO2: 35
GFR AFRICAN AMERICAN: >60 ML/MIN
GFR NON-AFRICAN AMERICAN: >60 ML/MIN
GFR SERPL CREATININE-BSD FRML MDRD: ABNORMAL ML/MIN/{1.73_M2}
GFR SERPL CREATININE-BSD FRML MDRD: ABNORMAL ML/MIN/{1.73_M2}
GLUCOSE BLD-MCNC: 127 MG/DL (ref 70–99)
GLUCOSE URINE: NEGATIVE
HCO3 ARTERIAL: 21.8 MMOL/L (ref 22–26)
HCT VFR BLD CALC: 44 % (ref 36–46)
HEMOGLOBIN: 14.6 G/DL (ref 12–16)
IMMATURE GRANULOCYTES: ABNORMAL %
KEPPRA: <2 UG/ML
KETONES, URINE: ABNORMAL
LEUKOCYTE ESTERASE, URINE: NEGATIVE
LYMPHOCYTES # BLD: 2 % (ref 24–44)
MAGNESIUM: 2.2 MG/DL (ref 1.6–2.6)
MCH RBC QN AUTO: 30.5 PG (ref 26–34)
MCHC RBC AUTO-ENTMCNC: 33.1 G/DL (ref 31–37)
MCV RBC AUTO: 92.2 FL (ref 80–100)
MDMA URINE: NORMAL
METHADONE SCREEN, URINE: NEGATIVE
METHAMPHETAMINE, URINE: NORMAL
METHEMOGLOBIN: 0.7 % (ref 0–1.9)
MODE: ABNORMAL
MONOCYTES # BLD: 7 % (ref 1–7)
MORPHOLOGY: NORMAL
MUCUS: ABNORMAL
MYOGLOBIN: 4750 NG/ML (ref 25–58)
NEGATIVE BASE EXCESS, ART: 3.7 MMOL/L (ref 0–2)
NITRITE, URINE: NEGATIVE
NOTIFICATION TIME: ABNORMAL
NOTIFICATION: ABNORMAL
NRBC AUTOMATED: ABNORMAL PER 100 WBC
O2 DEVICE/FLOW/%: ABNORMAL
O2 SAT, ARTERIAL: 97.8 % (ref 95–98)
OPIATES, URINE: NEGATIVE
OTHER OBSERVATIONS UA: ABNORMAL
OXYCODONE SCREEN URINE: NEGATIVE
OXYHEMOGLOBIN: ABNORMAL % (ref 95–98)
PATIENT TEMP: 37
PCO2 ARTERIAL: 39.2 MMHG (ref 35–45)
PCO2, ART, TEMP ADJ: ABNORMAL (ref 35–45)
PDW BLD-RTO: 14.6 % (ref 11.5–14.9)
PEEP/CPAP: 5
PH ARTERIAL: 7.35 (ref 7.35–7.45)
PH UA: 6 (ref 5–8)
PH, ART, TEMP ADJ: ABNORMAL (ref 7.35–7.45)
PHENCYCLIDINE, URINE: NEGATIVE
PHOSPHORUS: 3.4 MG/DL (ref 2.6–4.5)
PLATELET # BLD: 231 K/UL (ref 150–450)
PLATELET ESTIMATE: ABNORMAL
PMV BLD AUTO: 9.1 FL (ref 6–12)
PO2 ARTERIAL: 127 MMHG (ref 80–100)
PO2, ART, TEMP ADJ: ABNORMAL MMHG (ref 80–100)
POSITIVE BASE EXCESS, ART: ABNORMAL MMOL/L (ref 0–2)
POTASSIUM SERPL-SCNC: 3.6 MMOL/L (ref 3.7–5.3)
PROPOXYPHENE, URINE: NORMAL
PROTEIN UA: NEGATIVE
PSV: ABNORMAL
PT. POSITION: ABNORMAL
RBC # BLD: 4.77 M/UL (ref 4–5.2)
RBC # BLD: ABNORMAL 10*6/UL
RBC UA: ABNORMAL /HPF
RENAL EPITHELIAL, UA: ABNORMAL /HPF
RESPIRATORY RATE: ABNORMAL
SAMPLE SITE: ABNORMAL
SEG NEUTROPHILS: 91 % (ref 36–66)
SEGMENTED NEUTROPHILS ABSOLUTE COUNT: 14.1 K/UL (ref 1.3–9.1)
SET RATE: 14
SODIUM BLD-SCNC: 143 MMOL/L (ref 135–144)
SPECIFIC GRAVITY UA: 1.01 (ref 1–1.03)
TEST INFORMATION: NORMAL
TEXT FOR RESPIRATORY: ABNORMAL
TOTAL CK: 1384 U/L (ref 26–192)
TOTAL HB: ABNORMAL G/DL (ref 12–16)
TOTAL PROTEIN: 7.6 G/DL (ref 6.4–8.3)
TOTAL RATE: 18
TRICHOMONAS: ABNORMAL
TRICYCLIC ANTIDEPRESSANTS, UR: NORMAL
TROPONIN INTERP: ABNORMAL
TROPONIN T: <0.03 NG/ML
TSH SERPL DL<=0.05 MIU/L-ACNC: 1.56 MIU/L (ref 0.3–5)
TURBIDITY: ABNORMAL
URINE HGB: ABNORMAL
UROBILINOGEN, URINE: NORMAL
VT: 500
WBC # BLD: 15.5 K/UL (ref 3.5–11)
WBC # BLD: ABNORMAL 10*3/UL
WBC UA: ABNORMAL /HPF
YEAST: ABNORMAL

## 2018-07-30 PROCEDURE — 94002 VENT MGMT INPAT INIT DAY: CPT

## 2018-07-30 PROCEDURE — 83735 ASSAY OF MAGNESIUM: CPT

## 2018-07-30 PROCEDURE — 84484 ASSAY OF TROPONIN QUANT: CPT

## 2018-07-30 PROCEDURE — 82550 ASSAY OF CK (CPK): CPT

## 2018-07-30 PROCEDURE — 71045 X-RAY EXAM CHEST 1 VIEW: CPT

## 2018-07-30 PROCEDURE — 87040 BLOOD CULTURE FOR BACTERIA: CPT

## 2018-07-30 PROCEDURE — 99291 CRITICAL CARE FIRST HOUR: CPT | Performed by: INTERNAL MEDICINE

## 2018-07-30 PROCEDURE — 94770 HC ETCO2 MONITOR DAILY: CPT

## 2018-07-30 PROCEDURE — 84100 ASSAY OF PHOSPHORUS: CPT

## 2018-07-30 PROCEDURE — 2000000000 HC ICU R&B

## 2018-07-30 PROCEDURE — 5A1945Z RESPIRATORY VENTILATION, 24-96 CONSECUTIVE HOURS: ICD-10-PCS | Performed by: EMERGENCY MEDICINE

## 2018-07-30 PROCEDURE — 87070 CULTURE OTHR SPECIMN AEROBIC: CPT

## 2018-07-30 PROCEDURE — 36600 WITHDRAWAL OF ARTERIAL BLOOD: CPT

## 2018-07-30 PROCEDURE — G0480 DRUG TEST DEF 1-7 CLASSES: HCPCS

## 2018-07-30 PROCEDURE — 81001 URINALYSIS AUTO W/SCOPE: CPT

## 2018-07-30 PROCEDURE — 80053 COMPREHEN METABOLIC PANEL: CPT

## 2018-07-30 PROCEDURE — 99285 EMERGENCY DEPT VISIT HI MDM: CPT

## 2018-07-30 PROCEDURE — 70450 CT HEAD/BRAIN W/O DYE: CPT

## 2018-07-30 PROCEDURE — 0BH17EZ INSERTION OF ENDOTRACHEAL AIRWAY INTO TRACHEA, VIA NATURAL OR ARTIFICIAL OPENING: ICD-10-PCS | Performed by: INTERNAL MEDICINE

## 2018-07-30 PROCEDURE — 82805 BLOOD GASES W/O2 SATURATION: CPT

## 2018-07-30 PROCEDURE — 80177 DRUG SCRN QUAN LEVETIRACETAM: CPT

## 2018-07-30 PROCEDURE — 2580000003 HC RX 258: Performed by: EMERGENCY MEDICINE

## 2018-07-30 PROCEDURE — 2500000003 HC RX 250 WO HCPCS: Performed by: STUDENT IN AN ORGANIZED HEALTH CARE EDUCATION/TRAINING PROGRAM

## 2018-07-30 PROCEDURE — 72125 CT NECK SPINE W/O DYE: CPT

## 2018-07-30 PROCEDURE — 80307 DRUG TEST PRSMV CHEM ANLYZR: CPT

## 2018-07-30 PROCEDURE — 95822 EEG COMA OR SLEEP ONLY: CPT

## 2018-07-30 PROCEDURE — 6360000002 HC RX W HCPCS: Performed by: STUDENT IN AN ORGANIZED HEALTH CARE EDUCATION/TRAINING PROGRAM

## 2018-07-30 PROCEDURE — 83874 ASSAY OF MYOGLOBIN: CPT

## 2018-07-30 PROCEDURE — 85025 COMPLETE CBC W/AUTO DIFF WBC: CPT

## 2018-07-30 PROCEDURE — 36415 COLL VENOUS BLD VENIPUNCTURE: CPT

## 2018-07-30 PROCEDURE — 96375 TX/PRO/DX INJ NEW DRUG ADDON: CPT

## 2018-07-30 PROCEDURE — 87205 SMEAR GRAM STAIN: CPT

## 2018-07-30 PROCEDURE — S0028 INJECTION, FAMOTIDINE, 20 MG: HCPCS | Performed by: STUDENT IN AN ORGANIZED HEALTH CARE EDUCATION/TRAINING PROGRAM

## 2018-07-30 PROCEDURE — 94762 N-INVAS EAR/PLS OXIMTRY CONT: CPT

## 2018-07-30 PROCEDURE — 6360000002 HC RX W HCPCS: Performed by: EMERGENCY MEDICINE

## 2018-07-30 PROCEDURE — 0BH17EZ INSERTION OF ENDOTRACHEAL AIRWAY INTO TRACHEA, VIA NATURAL OR ARTIFICIAL OPENING: ICD-10-PCS | Performed by: EMERGENCY MEDICINE

## 2018-07-30 PROCEDURE — 93005 ELECTROCARDIOGRAM TRACING: CPT

## 2018-07-30 PROCEDURE — 6360000002 HC RX W HCPCS

## 2018-07-30 PROCEDURE — 6360000002 HC RX W HCPCS: Performed by: INTERNAL MEDICINE

## 2018-07-30 PROCEDURE — 96365 THER/PROPH/DIAG IV INF INIT: CPT

## 2018-07-30 PROCEDURE — 84443 ASSAY THYROID STIM HORMONE: CPT

## 2018-07-30 PROCEDURE — 2580000003 HC RX 258: Performed by: STUDENT IN AN ORGANIZED HEALTH CARE EDUCATION/TRAINING PROGRAM

## 2018-07-30 PROCEDURE — 2580000003 HC RX 258: Performed by: INTERNAL MEDICINE

## 2018-07-30 RX ORDER — PROPOFOL 10 MG/ML
10 INJECTION, EMULSION INTRAVENOUS ONCE
Status: COMPLETED | OUTPATIENT
Start: 2018-07-30 | End: 2018-07-30

## 2018-07-30 RX ORDER — PROPOFOL 10 MG/ML
INJECTION, EMULSION INTRAVENOUS
Status: DISPENSED
Start: 2018-07-30 | End: 2018-07-31

## 2018-07-30 RX ORDER — LORAZEPAM 2 MG/ML
4 INJECTION INTRAMUSCULAR ONCE
Status: COMPLETED | OUTPATIENT
Start: 2018-07-30 | End: 2018-07-30

## 2018-07-30 RX ORDER — SODIUM CHLORIDE 9 MG/ML
INJECTION, SOLUTION INTRAVENOUS CONTINUOUS
Status: DISCONTINUED | OUTPATIENT
Start: 2018-07-30 | End: 2018-07-30

## 2018-07-30 RX ORDER — LIDOCAINE HYDROCHLORIDE 40 MG/ML
4 INJECTION, SOLUTION RETROBULBAR; TOPICAL
Status: DISCONTINUED | OUTPATIENT
Start: 2018-07-30 | End: 2018-08-07 | Stop reason: HOSPADM

## 2018-07-30 RX ORDER — SODIUM CHLORIDE 0.9 % (FLUSH) 0.9 %
10 SYRINGE (ML) INJECTION EVERY 12 HOURS SCHEDULED
Status: DISCONTINUED | OUTPATIENT
Start: 2018-07-30 | End: 2018-08-06

## 2018-07-30 RX ORDER — LORAZEPAM 2 MG/ML
1 INJECTION INTRAMUSCULAR EVERY 4 HOURS PRN
Status: DISCONTINUED | OUTPATIENT
Start: 2018-07-30 | End: 2018-08-07 | Stop reason: HOSPADM

## 2018-07-30 RX ORDER — PROPOFOL 10 MG/ML
10 INJECTION, EMULSION INTRAVENOUS
Status: DISCONTINUED | OUTPATIENT
Start: 2018-07-30 | End: 2018-08-02

## 2018-07-30 RX ORDER — SODIUM CHLORIDE 0.9 % (FLUSH) 0.9 %
10 SYRINGE (ML) INJECTION PRN
Status: DISCONTINUED | OUTPATIENT
Start: 2018-07-30 | End: 2018-08-06

## 2018-07-30 RX ORDER — ONDANSETRON 2 MG/ML
4 INJECTION INTRAMUSCULAR; INTRAVENOUS EVERY 6 HOURS PRN
Status: DISCONTINUED | OUTPATIENT
Start: 2018-07-30 | End: 2018-08-07 | Stop reason: HOSPADM

## 2018-07-30 RX ORDER — PHENYTOIN SODIUM 50 MG/ML
20 INJECTION, SOLUTION INTRAMUSCULAR; INTRAVENOUS ONCE
Status: DISCONTINUED | OUTPATIENT
Start: 2018-07-30 | End: 2018-07-30 | Stop reason: SDUPTHER

## 2018-07-30 RX ORDER — LORAZEPAM 2 MG/ML
INJECTION INTRAMUSCULAR
Status: DISPENSED
Start: 2018-07-30 | End: 2018-07-31

## 2018-07-30 RX ORDER — SUCCINYLCHOLINE CHLORIDE 20 MG/ML
INJECTION INTRAMUSCULAR; INTRAVENOUS DAILY PRN
Status: COMPLETED | OUTPATIENT
Start: 2018-07-30 | End: 2018-07-30

## 2018-07-30 RX ORDER — LORAZEPAM 2 MG/ML
1 INJECTION INTRAMUSCULAR ONCE
Status: DISCONTINUED | OUTPATIENT
Start: 2018-07-30 | End: 2018-07-30

## 2018-07-30 RX ORDER — PHENYTOIN SODIUM 50 MG/ML
200 INJECTION, SOLUTION INTRAMUSCULAR; INTRAVENOUS 2 TIMES DAILY
Status: DISCONTINUED | OUTPATIENT
Start: 2018-07-31 | End: 2018-07-31

## 2018-07-30 RX ORDER — LEVOTHYROXINE SODIUM ANHYDROUS 100 UG/5ML
25 INJECTION, POWDER, LYOPHILIZED, FOR SOLUTION INTRAVENOUS DAILY
Status: DISCONTINUED | OUTPATIENT
Start: 2018-07-30 | End: 2018-08-04

## 2018-07-30 RX ORDER — ACETYLCYSTEINE 200 MG/ML
600 SOLUTION ORAL; RESPIRATORY (INHALATION)
Status: DISCONTINUED | OUTPATIENT
Start: 2018-07-30 | End: 2018-07-31

## 2018-07-30 RX ORDER — 0.9 % SODIUM CHLORIDE 0.9 %
1000 INTRAVENOUS SOLUTION INTRAVENOUS ONCE
Status: COMPLETED | OUTPATIENT
Start: 2018-07-30 | End: 2018-07-30

## 2018-07-30 RX ORDER — LORAZEPAM 2 MG/ML
2 INJECTION INTRAMUSCULAR ONCE
Status: COMPLETED | OUTPATIENT
Start: 2018-07-30 | End: 2018-07-30

## 2018-07-30 RX ORDER — POTASSIUM CHLORIDE 7.45 MG/ML
10 INJECTION INTRAVENOUS PRN
Status: DISCONTINUED | OUTPATIENT
Start: 2018-07-30 | End: 2018-08-07 | Stop reason: HOSPADM

## 2018-07-30 RX ORDER — ALBUTEROL SULFATE 90 UG/1
2 AEROSOL, METERED RESPIRATORY (INHALATION)
Status: DISCONTINUED | OUTPATIENT
Start: 2018-07-30 | End: 2018-08-07 | Stop reason: HOSPADM

## 2018-07-30 RX ORDER — SODIUM CHLORIDE 0.9 % (FLUSH) 0.9 %
10 SYRINGE (ML) INJECTION PRN
Status: DISCONTINUED | OUTPATIENT
Start: 2018-07-30 | End: 2018-08-07 | Stop reason: HOSPADM

## 2018-07-30 RX ORDER — ALBUTEROL SULFATE 90 UG/1
2 AEROSOL, METERED RESPIRATORY (INHALATION)
Status: DISCONTINUED | OUTPATIENT
Start: 2018-07-30 | End: 2018-07-30

## 2018-07-30 RX ORDER — ACETAMINOPHEN 325 MG/1
650 TABLET ORAL EVERY 4 HOURS PRN
Status: DISCONTINUED | OUTPATIENT
Start: 2018-07-30 | End: 2018-08-07 | Stop reason: HOSPADM

## 2018-07-30 RX ORDER — SODIUM CHLORIDE 9 MG/ML
INJECTION, SOLUTION INTRAVENOUS CONTINUOUS
Status: DISCONTINUED | OUTPATIENT
Start: 2018-07-30 | End: 2018-08-05

## 2018-07-30 RX ORDER — SODIUM CHLORIDE 0.9 % (FLUSH) 0.9 %
10 SYRINGE (ML) INJECTION EVERY 12 HOURS SCHEDULED
Status: DISCONTINUED | OUTPATIENT
Start: 2018-07-30 | End: 2018-08-07 | Stop reason: HOSPADM

## 2018-07-30 RX ORDER — LORAZEPAM 2 MG/ML
2 INJECTION INTRAMUSCULAR EVERY 4 HOURS PRN
Status: DISCONTINUED | OUTPATIENT
Start: 2018-07-30 | End: 2018-08-07 | Stop reason: HOSPADM

## 2018-07-30 RX ADMIN — SODIUM CHLORIDE 1000 ML: 9 INJECTION, SOLUTION INTRAVENOUS at 12:41

## 2018-07-30 RX ADMIN — ENOXAPARIN SODIUM 40 MG: 40 INJECTION SUBCUTANEOUS at 17:24

## 2018-07-30 RX ADMIN — PHENYTOIN SODIUM 1360 MG: 50 INJECTION INTRAMUSCULAR; INTRAVENOUS at 14:13

## 2018-07-30 RX ADMIN — PROPOFOL 15 MCG/KG/MIN: 10 INJECTION, EMULSION INTRAVENOUS at 16:20

## 2018-07-30 RX ADMIN — FOLIC ACID: 5 INJECTION, SOLUTION INTRAMUSCULAR; INTRAVENOUS; SUBCUTANEOUS at 17:23

## 2018-07-30 RX ADMIN — LEVOTHYROXINE SODIUM ANHYDROUS 25 MCG: 100 INJECTION, POWDER, LYOPHILIZED, FOR SOLUTION INTRAVENOUS at 17:23

## 2018-07-30 RX ADMIN — FAMOTIDINE 20 MG: 10 INJECTION, SOLUTION INTRAVENOUS at 20:31

## 2018-07-30 RX ADMIN — SODIUM CHLORIDE 1000 ML: 9 INJECTION, SOLUTION INTRAVENOUS at 14:21

## 2018-07-30 RX ADMIN — LORAZEPAM 4 MG: 2 INJECTION INTRAMUSCULAR; INTRAVENOUS at 13:50

## 2018-07-30 RX ADMIN — LORAZEPAM 2 MG: 2 INJECTION INTRAMUSCULAR; INTRAVENOUS at 12:30

## 2018-07-30 RX ADMIN — SODIUM CHLORIDE: 9 INJECTION, SOLUTION INTRAVENOUS at 16:20

## 2018-07-30 RX ADMIN — LEVETIRACETAM 1000 MG: 500 INJECTION, SOLUTION, CONCENTRATE INTRAVENOUS at 12:41

## 2018-07-30 RX ADMIN — PIPERACILLIN SODIUM AND TAZOBACTAM SODIUM 3.38 G: 3; .375 INJECTION, POWDER, LYOPHILIZED, FOR SOLUTION INTRAVENOUS at 18:37

## 2018-07-30 RX ADMIN — Medication 120 MG: at 13:51

## 2018-07-30 RX ADMIN — PROPOFOL 10 MCG/KG/MIN: 10 INJECTION, EMULSION INTRAVENOUS at 14:04

## 2018-07-30 ASSESSMENT — PULMONARY FUNCTION TESTS
PIF_VALUE: 13
PIF_VALUE: 17
PIF_VALUE: 15
PIF_VALUE: 16

## 2018-07-30 ASSESSMENT — PAIN SCALES - PAIN ASSESSMENT IN ADVANCED DEMENTIA (PAINAD)
FACIALEXPRESSION: 0
NEGVOCALIZATION: 0
CONSOLABILITY: 0
TOTALSCORE: 0
BODYLANGUAGE: 0
BREATHING: 0

## 2018-07-30 NOTE — ED NOTES
Spoke with sister Sanket Mann again. Informed her of pts room #. Informed her of intubation. States pt has been intubated twice recently d/t seizures.      Enrique Hudson RN  07/30/18 1408       Enrique Hudson RN  07/30/18 3734

## 2018-07-30 NOTE — ED NOTES
Patient brought to ED by [facility], received in report that they were called to patients home for a seizure, they stated upon their arrival patient was not seizing. They established an IV but did not give any medications. Patient arrives to ED and withdrawals from pain, respirations are even non-labored.       Shiela Beck RN  07/30/18 8205

## 2018-07-30 NOTE — H&P
once    Varicose veins     Wears glasses         Past Surgical History:     Past Surgical History:   Procedure Laterality Date    BUNIONECTOMY Right     HAMMER TOE SURGERY Left 07/06/2016    ON 2ND, 3rd, 4th,  and  5TH TOES OF LEFT FOOT    OTHER SURGICAL HISTORY  2/23/15    orif left ankle with synthes and intraoperative c- arm    [facility] INCL FLUOR GDNCE DX W/CELL WASHG SPX N/A 1/16/2018    BRONCHOSCOPY WITH WASHINGS AT PATIENT BEDSIDE ICU performed by Ellen Ivory MD at [address] [place] [address]          Medications Prior to Admission:     Prior to Admission medications    Medication Sig Start Date End Date Taking? Authorizing Provider   aspirin EC 81 MG EC tablet Take 1 tablet by mouth daily 2/6/18  Yes Susanna Anderson MD   levETIRAcetam (KEPPRA) 1000 MG tablet Take 1 tablet by mouth 2 times daily 7/25/18   Price Litten, MD   potassium chloride (KLOR-CON M) 10 MEQ extended release tablet Take 2 tablets by mouth daily 7/23/18   Price Litten, MD   PARoxetine (PAXIL) 30 MG tablet Take 1 tablet by mouth daily 7/18/18   Price Litten, MD   hydrochlorothiazide (HYDRODIURIL) 12.5 MG tablet Take 1 tablet by mouth daily 7/18/18   Price Litten, MD   atorvastatin (LIPITOR) 40 MG tablet Take 1 tablet by mouth daily 7/18/18   Price Litten, MD   levothyroxine (SYNTHROID) 50 MCG tablet Take 1 tablet by mouth daily 7/18/18   Price Litten, MD   zoster recombinant adjuvanted vaccine (SHINGRIX) 50 MCG SUSR injection 50 MCG IM then repeat 2-6 months.  7/18/18 7/18/19  Price Litten, MD   omeprazole (PRILOSEC) 20 MG delayed release capsule TAKE ONE CAPSULE BY MOUTH ONCE DAILY 6/18/18   Aiyana Wilkinson MD   amLODIPine (NORVASC) 10 MG tablet TAKE ONE TABLET BY MOUTH ONCE DAILY 6/18/18   Aiyana Wilkinson MD   oxybutynin (DITROPAN) 5 MG tablet TAKE ONE TABLET BY MOUTH THREE TIMES DAILY 6/18/18   Aiyana Wilkinson MD   SUMAtriptan (IMITREX) 100 MG tablet Take 1 tablet by mouth daily as needed for Migraine 2/6/18 Susanna Roth MD        Allergies:     Patient has no known allergies. Social History:     Tobacco:    reports that she has never smoked. She has never used smokeless tobacco.  Alcohol:      reports that she does not drink alcohol. Drug Use:  reports that she does not use drugs. Family History:     Family History   Problem Relation Age of Onset    Cervical Cancer Mother     Heart Disease Father     Heart Disease Maternal Grandfather     Heart Disease Paternal Grandfather        Review of Systems:     Positive and Negative as described in HPI. Review of Systems   Unable to perform ROS: Mental status change       Physical Exam:   BP 98/60   Pulse 81   Temp 99.3 °F (37.4 °C) (Axillary)   Resp 18   Ht 5' 10\" (1.778 m)   Wt 146 lb 3.2 oz (66.3 kg)   SpO2 100%   BMI 20.98 kg/m²   Temp (24hrs), Av.2 °F (36.8 °C), Min:97.4 °F (36.3 °C), Max:99.3 °F (37.4 °C)    No results for input(s): POCGLU in the last 72 hours. Intake/Output Summary (Last 24 hours) at 18 1828  Last data filed at 18 1350   Gross per 24 hour   Intake             1000 ml   Output             1000 ml   Net                0 ml       Physical Exam   Cardiovascular: Normal rate and regular rhythm. Pulmonary/Chest: Effort normal and breath sounds normal. No respiratory distress. Neurological:   Unresponsive, withdraws from pain. Low frequency truncal and limb tremor.         Investigations:      Laboratory Testing:  Recent Results (from the past 24 hour(s))   EKG 12 Lead    Collection Time: 18 12:21 PM   Result Value Ref Range    Ventricular Rate 97 BPM    Atrial Rate 97 BPM    P-R Interval 120 ms    QRS Duration 88 ms    Q-T Interval 366 ms    QTc Calculation (Bazett) 464 ms    P Axis 70 degrees    R Axis 27 degrees    T Axis 16 degrees   CBC Auto Differential    Collection Time: 18 12:40 PM   Result Value Ref Range    WBC 15.5 (H) 3.5 - 11.0 k/uL    RBC 4.77 4.0 - 5.2 m/uL    Hemoglobin 14.6 12.0 - Albumin/Globulin Ratio NOT REPORTED 1.0 - 2.5    GFR Non-African American >60 >60 mL/min    GFR African American >60 >60 mL/min    GFR Comment          GFR Staging NOT REPORTED    TSH without Reflex    Collection Time: 07/30/18 12:40 PM   Result Value Ref Range    TSH 1.56 0.30 - 5.00 mIU/L   PHOSPHORUS    Collection Time: 07/30/18 12:40 PM   Result Value Ref Range    Phosphorus 3.4 2.6 - 4.5 mg/dL   Urinalysis    Collection Time: 07/30/18  1:50 PM   Result Value Ref Range    Color, UA YELLOW YEL    Turbidity UA CLOUDY (A) CLEAR    Glucose, Ur NEGATIVE NEG    Bilirubin Urine NEGATIVE NEG    Ketones, Urine TRACE (A) NEG    Specific Gary, UA 1.010 1.000 - 1.030    Urine Hgb LARGE (A) NEG    pH, UA 6.0 5.0 - 8.0    Protein, UA NEGATIVE NEG    Urobilinogen, Urine Normal NORM    Nitrite, Urine NEGATIVE NEG    Leukocyte Esterase, Urine NEGATIVE NEG    Urinalysis Comments NOT REPORTED    Microscopic Urinalysis    Collection Time: 07/30/18  1:50 PM   Result Value Ref Range    -          WBC, UA 2 TO 5 /HPF    RBC, UA 0 TO 2 /HPF    Casts UA NOT REPORTED /LPF    Crystals UA NOT REPORTED NONE /HPF    Epithelial Cells UA 0 TO 2 /HPF    Renal Epithelial, Urine NOT REPORTED 0 /HPF    Bacteria, UA FEW (A) NONE    Mucus, UA NOT REPORTED NONE    Trichomonas, UA NOT REPORTED NONE    Amorphous, UA 1+ (A) NONE    Other Observations UA NOT REPORTED NREQ    Yeast, UA NOT REPORTED NONE   BLOOD GAS, ARTERIAL    Collection Time: 07/30/18  2:30 PM   Result Value Ref Range    pH, Arterial 7.354 7.350 - 7.450    pCO2, Arterial 39.2 35.0 - 45.0 mmHg    pO2, Arterial 127.0 (H) 80.0 - 100.0 mmHg    HCO3, Arterial 21.8 (L) 22.0 - 26.0 mmol/L    Positive Base Excess, Art NOT REPORTED 0.0 - 2.0 mmol/L    Negative Base Excess, Art 3.7 (H) 0.0 - 2.0 mmol/L    O2 Sat, Arterial 97.8 95 - 98 %    Total Hb NOT REPORTED 12.0 - 16.0 g/dl    Oxyhemoglobin NOT REPORTED 95.0 - 98.0 %    Carboxyhemoglobin 0.7 0 - 5 %    Methemoglobin 0.7 0.0 - 1.9 %    Pt Temp 37.0     pH, Art, Temp Adj NOT REPORTED 7.350 - 7.450    pCO2, Art, Temp Adj NOT REPORTED 35.0 - 45.0    pO2, Art, Temp Adj NOT REPORTED 80.0 - 100.0 mmHg    O2 Device/Flow/% VENTILATOR     Respiratory Rate NOT REPORTED     Panda Test PASS     Sample Site Right Radial Artery     Pt. Position SEMI-FOWLERS     Mode PRVC     Set Rate 14     Total Rate 18          FIO2 35     Peep/Cpap 5     PSV NOT REPORTED     Text for Respiratory NOT REPORTED     NOTIFICATION NOT REPORTED     NOTIFICATION TIME NOT REPORTED    Drug Scr, Abuse, Ur    Collection Time: 07/30/18  5:44 PM   Result Value Ref Range    Amphetamine Screen, Ur NEGATIVE NEG    Barbiturate Screen, Ur NEGATIVE NEG    Benzodiazepine Screen, Urine NEGATIVE NEG    Cocaine Metabolite, Urine NEGATIVE NEG    Methadone Screen, Urine NEGATIVE NEG    Opiates, Urine NEGATIVE NEG    Phencyclidine, Urine NEGATIVE NEG    Propoxyphene, Urine NOT REPORTED NEG    Cannabinoid Scrn, Ur NEGATIVE NEG    Oxycodone Screen, Ur NEGATIVE NEG    Methamphetamine, Urine NOT REPORTED NEG    Tricyclic Antidepressants, Urine NOT REPORTED NEG    MDMA, Urine NOT REPORTED NEG    Buprenorphine Urine NOT REPORTED NEG    Test Information       Assay provides medical screening only. The absence of expected drug(s) and/or       Imaging/Diagnostics:  Ct Head Wo Contrast    Result Date: 7/30/2018  EXAMINATION: CT OF THE HEAD WITHOUT CONTRAST  7/30/2018 12:58 pm TECHNIQUE: CT of the head was performed without the administration of intravenous contrast. Dose modulation, iterative reconstruction, and/or weight based adjustment of the mA/kV was utilized to reduce the radiation dose to as low as reasonably achievable.  COMPARISON: 05/13/2018 HISTORY: ORDERING SYSTEM PROVIDED HISTORY: AMS from seizure TECHNOLOGIST PROVIDED HISTORY: Ordering Physician Provided Reason for Exam: new onset seizures Acuity: Acute Type of Exam: Initial FINDINGS: BRAIN/VENTRICLES: There is no acute intracranial hemorrhage, narrowing at C6-7. Disc osteophyte complexes at C5-C6 and C6-C7 cause mild impingement of the ventral thecal sac at these levels. SOFT TISSUES: There is no prevertebral soft tissue swelling. Visualized lung apices are well aerated. Multilevel degenerative disc disease and hypertrophic degenerative changes of the cervical spine, but no CT evidence for acute osseous abnormality. Xr Chest Portable    Result Date: 7/30/2018  EXAMINATION: SINGLE XRAY VIEW OF THE CHEST 7/30/2018 2:17 pm COMPARISON: Today at 12:55 p.m. and May 15, 2018 HISTORY: ORDERING SYSTEM PROVIDED HISTORY: Chest Pain TECHNOLOGIST PROVIDED HISTORY: Reason for exam:->Chest Pain Ordering Physician Provided Reason for Exam: post intubation Acuity: Acute Type of Exam: Initial FINDINGS: The endotracheal tube terminates at the level of the clavicular heads, 8 cm above the german. The enteric tube terminates in the body of the stomach. Shallow lung inflation. Linear opacities in the lung bases are again noted. There is no evidence for pneumothorax or focal area of consolidation. No effusion is appreciated. The cardiac and mediastinal contours are unchanged in appearance. Sequela of old granulomatous disease is noted. Intubation with the endotracheal tip at the level of the clavicles. Shallow inflation with findings consistent with basilar atelectasis. Xr Chest Portable    Result Date: 7/30/2018  EXAMINATION: SINGLE XRAY VIEW OF THE CHEST 7/30/2018 12:32 pm COMPARISON: May 15, 2018 HISTORY: ORDERING SYSTEM PROVIDED HISTORY: eval TECHNOLOGIST PROVIDED HISTORY: Reason for exam:->eval Ordering Physician Provided Reason for Exam: seizures Acuity: Unknown Type of Exam: Unknown FINDINGS: One image obtained. Cardiac monitoring leads overlie the chest.  Interval extubation and removal of enteric tube. The cardiomediastinal contours are stable. No pleural effusion or pneumothorax. Bibasilar atelectasis without focal airspace consolidation.   No

## 2018-07-30 NOTE — ED NOTES
Bed: 08  Expected date:   Expected time:   Means of arrival:   Comments:     Enrique Hudson RN  07/30/18 [id number]

## 2018-07-30 NOTE — ED NOTES
Spoke with patients sister, Myla Aguilera, who pt lives with. Sister states the LKW was 1030 pm. States since it was so late and she hadn't been out of her room so went to check on her at approx. 11am. States pt was wedged on her right side in a small area between the bed and the wall when she found her.       Destiny Plummer RN  07/30/18 [id number]

## 2018-07-30 NOTE — CONSULTS
[place] [address] [facility]      Requesting Physician:  Estela Reynolds MD/Gaetano Ca MD     CHIEF COMPLAINT:     This lady was previously seen by us however cannot access record. Presented with status epilepticus last known well the night before. Required IV Keppra and Dilantin and multiple doses of lorazepam to control her seizures. Review of her medications at home include Keppra 1000 mg twice a day and propoxyphene. History of previous infarction right hemisphere. Since coming to the ICU has not moved her right side. On ventilation and sedation with propofol. No clinical seizures observed. .  Details of her social past, family history are not known. Well-built middle-aged lady lying in bed. On ventilation and sedation. No response to noxious stimuli however minimal to/5 withdrawal left upper and lower extremity to noxious stimuli later. Trace left corneal reflex, absent right side. Does not gag. No evidence of nuchal stiffness or other meningeal signs. Trace symmetric reflexes. Limited cardiac and carotid auscultation is unremarkable  Assessment:  Status epilepticus. Radiographic evidence of right hemispheric infarction. Clinically displaying the right hemiplegia. Recommendations:  When possible, EEG. Would recommend MRI scan of the brain with contrast in the future. Presently will continue with Keppra at 1250 mg twice a day and Dilantin 200 mg twice a day. Reassessment following withdrawal of sedation and ventilation. EEG pending to rule out non-convulsive status epilepticus. Past records were reviewed. EEG slow before, 2 months ago. Previous EEG 2015 24 hour unremarkable. MRI scan of the brain shows a right hemispheric infarction in May 2018. No family members at bedside. Calcium and magnesium levels were satisfactory including glucose levels.       HISTORY OF PRESENT ILLNESS:                 The patient is a 61 y.o. female who presents with     Allergies:  Patient Morphology 07/30/2018 NOT REPORTED   Final    Platelet Estimate 16/00/3621 NOT REPORTED   Final    Seg Neutrophils 07/30/2018 91* 36 - 66 % Final    Lymphocytes 07/30/2018 2* 24 - 44 % Final    Monocytes 07/30/2018 7  1 - 7 % Final    Eosinophils % 07/30/2018 0  0 - 4 % Final    Basophils 07/30/2018 0  0 - 2 % Final    Segs Absolute 07/30/2018 14.10* 1.3 - 9.1 k/uL Final    Absolute Lymph # 07/30/2018 0.31* 1.0 - 4.8 k/uL Final    Absolute Mono # 07/30/2018 1.09  0.1 - 1.3 k/uL Final    Absolute Eos # 07/30/2018 0.00  0.0 - 0.4 k/uL Final    Basophils # 07/30/2018 0.00  0.0 - 0.2 k/uL Final    Morphology 07/30/2018 Normal   Final    Ventricular Rate 07/30/2018 97  BPM Preliminary    Atrial Rate 07/30/2018 97  BPM Preliminary    P-R Interval 07/30/2018 120  ms Preliminary    QRS Duration 07/30/2018 88  ms Preliminary    Q-T Interval 07/30/2018 366  ms Preliminary    QTc Calculation (Bazett) 07/30/2018 464  ms Preliminary    P Axis 07/30/2018 70  degrees Preliminary    R Axis 07/30/2018 27  degrees Preliminary    T Axis 07/30/2018 16  degrees Preliminary    Ethanol 07/30/2018 <10  <10 mg/dL Final    Ethanol percent 07/30/2018 <0.010  % Final    Magnesium 07/30/2018 2.2  1.6 - 2.6 mg/dL Final    Color, UA 07/30/2018 YELLOW  YEL Final    Turbidity UA 07/30/2018 CLOUDY* CLEAR Final    Glucose, Ur 07/30/2018 NEGATIVE  NEG Final    Bilirubin Urine 07/30/2018 NEGATIVE  NEG Final    Ketones, Urine 07/30/2018 TRACE* NEG Final    Specific Gravity, UA 07/30/2018 1.010  1.000 - 1.030 Final    Urine Hgb 07/30/2018 LARGE* NEG Final    pH, UA 07/30/2018 6.0  5.0 - 8.0 Final    Protein, UA 07/30/2018 NEGATIVE  NEG Final    Urobilinogen, Urine 07/30/2018 Normal  NORM Final    Nitrite, Urine 07/30/2018 NEGATIVE  NEG Final    Leukocyte Esterase, Urine 07/30/2018 NEGATIVE  NEG Final    Urinalysis Comments 07/30/2018 NOT REPORTED   Final    Total CK 07/30/2018 1384* 26 - 192 U/L Final    Troponin T [email or web address]            GFR Staging 07/30/2018 NOT REPORTED   Final    TSH 07/30/2018 1.56  0.30 - 5.00 mIU/L Final    - 07/30/2018        Final    WBC, UA 07/30/2018 2 TO 5  /HPF Final    RBC, UA 07/30/2018 0 TO 2  /HPF Final    Casts UA 07/30/2018 NOT REPORTED  /LPF Final    Crystals UA 07/30/2018 NOT REPORTED  NONE /HPF Final    Epithelial Cells UA 07/30/2018 0 TO 2  /HPF Final    Renal Epithelial, Urine 07/30/2018 NOT REPORTED  0 /HPF Final    Bacteria, UA 07/30/2018 FEW* NONE Final    Mucus, UA 07/30/2018 NOT REPORTED  NONE Final    Trichomonas, UA 07/30/2018 NOT REPORTED  NONE Final    Amorphous, UA 07/30/2018 1+* NONE Final    Other Observations UA 07/30/2018 NOT REPORTED  NREQ Final    Yeast, UA 07/30/2018 NOT REPORTED  NONE Final    pH, Arterial 07/30/2018 7.354  7.350 - 7.450 Final    pCO2, Arterial 07/30/2018 39.2  35.0 - 45.0 mmHg Final    pO2, Arterial 07/30/2018 127.0* 80.0 - 100.0 mmHg Final    HCO3, Arterial 07/30/2018 21.8* 22.0 - 26.0 mmol/L Final    Positive Base Excess, Art 07/30/2018 NOT REPORTED  0.0 - 2.0 mmol/L Final    Negative Base Excess, Art 07/30/2018 3.7* 0.0 - 2.0 mmol/L Final    O2 Sat, Arterial 07/30/2018 97.8  95 - 98 % Final    Total Hb 07/30/2018 NOT REPORTED  12.0 - 16.0 g/dl Final    Oxyhemoglobin 07/30/2018 NOT REPORTED  95.0 - 98.0 % Final    Carboxyhemoglobin 07/30/2018 0.7  0 - 5 % Final    Comment:       Reference Range:  Non-Smokers     0-2%  Average Smoker  2-4%  Heavy Smoker    <10%            Methemoglobin 07/30/2018 0.7  0.0 - 1.9 % Final    Pt Temp 07/30/2018 37.0   Final    pH, Art, Temp Adj 07/30/2018 NOT REPORTED  7.350 - 7.450 Final    pCO2, Art, Temp Adj 07/30/2018 NOT REPORTED  35.0 - 45.0 Final    pO2, Art, Temp Adj 07/30/2018 NOT REPORTED  80.0 - 100.0 mmHg Final    O2 Device/Flow/% 07/30/2018 VENTILATOR   Final    Respiratory Rate 07/30/2018 NOT REPORTED   Final   Michael Cristopher Test 07/30/2018 Date: 7/30/2018  EXAMINATION: CT OF THE CERVICAL SPINE WITHOUT CONTRAST 7/30/2018 12:58 pm TECHNIQUE: CT of the cervical spine was performed without the administration of intravenous contrast. Multiplanar reformatted images are provided for review. Dose modulation, iterative reconstruction, and/or weight based adjustment of the mA/kV was utilized to reduce the radiation dose to as low as reasonably achievable. COMPARISON: Cervical spine CT from 05/13/2018 HISTORY: ORDERING SYSTEM PROVIDED HISTORY: fall TECHNOLOGIST PROVIDED HISTORY: Ordering Physician Provided Reason for Exam: new onset seizures Acuity: Acute Type of Exam: Initial FINDINGS: BONES/ALIGNMENT:  There is straightening of the normal cervical lordosis which may relate to patient positioning or muscle spasm. There is no evidence of an acute cervical spine fracture. There is normal alignment of the cervical spine. DEGENERATIVE CHANGES: There is multilevel degenerative disc disease, advanced at C5-C6 and C6-7. There also hypertrophic degenerative changes facet and uncovertebral joints throughout the cervical spine as well as the atlantoaxial joint. These findings contribute to moderate bilateral neural foraminal narrowing at C5-C6 and moderate left neural foraminal narrowing at C6-7. Disc osteophyte complexes at C5-C6 and C6-C7 cause mild impingement of the ventral thecal sac at these levels. SOFT TISSUES: There is no prevertebral soft tissue swelling. Visualized lung apices are well aerated. Multilevel degenerative disc disease and hypertrophic degenerative changes of the cervical spine, but no CT evidence for acute osseous abnormality.        1. No acute intracranial abnormality.  Specifically, no acute infarction. 2. Right middle cerebral artery territory encephalomalacia related to prior   infarction. 3. Sequela of mild chronic microvascular ischemic changes.           IMPRESSION:  This study is significant for mild diffuse slowing.

## 2018-07-30 NOTE — PROGRESS NOTES
Patient wounds assessed on admission. Abrasions noted on rt elbow x2, rt eyebrow, rt breast, rt hip, rt knuckle, and lt mid back. Rt facial swelling is noted as well. Deep tissue injuries to both the lt inner and rt outer ankles noted. Bruising to the rt lower back noted.

## 2018-07-31 ENCOUNTER — APPOINTMENT (OUTPATIENT)
Dept: GENERAL RADIOLOGY | Age: 63
DRG: 053 | End: 2018-07-31
Payer: COMMERCIAL

## 2018-07-31 LAB
ALLEN TEST: NORMAL
ANION GAP SERPL CALCULATED.3IONS-SCNC: 12 MMOL/L (ref 9–17)
BUN BLDV-MCNC: 7 MG/DL (ref 8–23)
BUN/CREAT BLD: ABNORMAL (ref 9–20)
CALCIUM SERPL-MCNC: 8.2 MG/DL (ref 8.6–10.4)
CARBOXYHEMOGLOBIN: 1.2 % (ref 0–5)
CHLORIDE BLD-SCNC: 107 MMOL/L (ref 98–107)
CO2: 23 MMOL/L (ref 20–31)
CREAT SERPL-MCNC: 0.57 MG/DL (ref 0.5–0.9)
FIO2: 30
GFR AFRICAN AMERICAN: >60 ML/MIN
GFR NON-AFRICAN AMERICAN: >60 ML/MIN
GFR SERPL CREATININE-BSD FRML MDRD: ABNORMAL ML/MIN/{1.73_M2}
GFR SERPL CREATININE-BSD FRML MDRD: ABNORMAL ML/MIN/{1.73_M2}
GLUCOSE BLD-MCNC: 95 MG/DL (ref 70–99)
HCO3 ARTERIAL: 25 MMOL/L (ref 22–26)
HCT VFR BLD CALC: 38.8 % (ref 36–46)
HEMOGLOBIN: 12.8 G/DL (ref 12–16)
LACTIC ACID, WHOLE BLOOD: NORMAL MMOL/L (ref 0.7–2.1)
LACTIC ACID: 1.5 MMOL/L (ref 0.5–2.2)
MAGNESIUM: 2.1 MG/DL (ref 1.6–2.6)
MCH RBC QN AUTO: 30.3 PG (ref 26–34)
MCHC RBC AUTO-ENTMCNC: 33.1 G/DL (ref 31–37)
MCV RBC AUTO: 91.6 FL (ref 80–100)
METHEMOGLOBIN: 0.6 % (ref 0–1.9)
MODE: NORMAL
NEGATIVE BASE EXCESS, ART: NORMAL MMOL/L (ref 0–2)
NOTIFICATION TIME: NORMAL
NOTIFICATION: NORMAL
NRBC AUTOMATED: NORMAL PER 100 WBC
O2 DEVICE/FLOW/%: NORMAL
O2 SAT, ARTERIAL: 96.6 % (ref 95–98)
OXYHEMOGLOBIN: NORMAL % (ref 95–98)
PATIENT TEMP: 37
PCO2 ARTERIAL: 37.7 MMHG (ref 35–45)
PCO2, ART, TEMP ADJ: NORMAL (ref 35–45)
PDW BLD-RTO: 14.9 % (ref 11.5–14.9)
PEEP/CPAP: NORMAL
PH ARTERIAL: 7.43 (ref 7.35–7.45)
PH, ART, TEMP ADJ: NORMAL (ref 7.35–7.45)
PLATELET # BLD: 183 K/UL (ref 150–450)
PMV BLD AUTO: 9.3 FL (ref 6–12)
PO2 ARTERIAL: 91.4 MMHG (ref 80–100)
PO2, ART, TEMP ADJ: NORMAL MMHG (ref 80–100)
POSITIVE BASE EXCESS, ART: 0.7 MMOL/L (ref 0–2)
POTASSIUM SERPL-SCNC: 3.1 MMOL/L (ref 3.7–5.3)
POTASSIUM SERPL-SCNC: 4 MMOL/L (ref 3.7–5.3)
PSV: NORMAL
PT. POSITION: NORMAL
RBC # BLD: 4.24 M/UL (ref 4–5.2)
RESPIRATORY RATE: 14
SAMPLE SITE: NORMAL
SET RATE: 14
SODIUM BLD-SCNC: 142 MMOL/L (ref 135–144)
TEXT FOR RESPIRATORY: NORMAL
TOTAL HB: NORMAL G/DL (ref 12–16)
TOTAL RATE: 14
VT: 500
WBC # BLD: 9 K/UL (ref 3.5–11)

## 2018-07-31 PROCEDURE — 6360000002 HC RX W HCPCS: Performed by: STUDENT IN AN ORGANIZED HEALTH CARE EDUCATION/TRAINING PROGRAM

## 2018-07-31 PROCEDURE — 82805 BLOOD GASES W/O2 SATURATION: CPT

## 2018-07-31 PROCEDURE — 86403 PARTICLE AGGLUT ANTBDY SCRN: CPT

## 2018-07-31 PROCEDURE — 87186 SC STD MICRODIL/AGAR DIL: CPT

## 2018-07-31 PROCEDURE — 99291 CRITICAL CARE FIRST HOUR: CPT | Performed by: INTERNAL MEDICINE

## 2018-07-31 PROCEDURE — 6360000002 HC RX W HCPCS: Performed by: INTERNAL MEDICINE

## 2018-07-31 PROCEDURE — S0028 INJECTION, FAMOTIDINE, 20 MG: HCPCS | Performed by: STUDENT IN AN ORGANIZED HEALTH CARE EDUCATION/TRAINING PROGRAM

## 2018-07-31 PROCEDURE — 2000000000 HC ICU R&B

## 2018-07-31 PROCEDURE — 2580000003 HC RX 258: Performed by: NURSE PRACTITIONER

## 2018-07-31 PROCEDURE — 6360000002 HC RX W HCPCS: Performed by: PSYCHIATRY & NEUROLOGY

## 2018-07-31 PROCEDURE — 85027 COMPLETE CBC AUTOMATED: CPT

## 2018-07-31 PROCEDURE — 36415 COLL VENOUS BLD VENIPUNCTURE: CPT

## 2018-07-31 PROCEDURE — 2580000003 HC RX 258: Performed by: STUDENT IN AN ORGANIZED HEALTH CARE EDUCATION/TRAINING PROGRAM

## 2018-07-31 PROCEDURE — 94762 N-INVAS EAR/PLS OXIMTRY CONT: CPT

## 2018-07-31 PROCEDURE — 83605 ASSAY OF LACTIC ACID: CPT

## 2018-07-31 PROCEDURE — 36600 WITHDRAWAL OF ARTERIAL BLOOD: CPT

## 2018-07-31 PROCEDURE — C9254 INJECTION, LACOSAMIDE: HCPCS | Performed by: PSYCHIATRY & NEUROLOGY

## 2018-07-31 PROCEDURE — 71045 X-RAY EXAM CHEST 1 VIEW: CPT

## 2018-07-31 PROCEDURE — 94003 VENT MGMT INPAT SUBQ DAY: CPT

## 2018-07-31 PROCEDURE — 2500000003 HC RX 250 WO HCPCS: Performed by: STUDENT IN AN ORGANIZED HEALTH CARE EDUCATION/TRAINING PROGRAM

## 2018-07-31 PROCEDURE — 80048 BASIC METABOLIC PNL TOTAL CA: CPT

## 2018-07-31 PROCEDURE — 83735 ASSAY OF MAGNESIUM: CPT

## 2018-07-31 PROCEDURE — 2580000003 HC RX 258: Performed by: PSYCHIATRY & NEUROLOGY

## 2018-07-31 PROCEDURE — 84132 ASSAY OF SERUM POTASSIUM: CPT

## 2018-07-31 PROCEDURE — 94770 HC ETCO2 MONITOR DAILY: CPT

## 2018-07-31 PROCEDURE — 95822 EEG COMA OR SLEEP ONLY: CPT

## 2018-07-31 PROCEDURE — 2580000003 HC RX 258: Performed by: INTERNAL MEDICINE

## 2018-07-31 PROCEDURE — 2700000000 HC OXYGEN THERAPY PER DAY

## 2018-07-31 RX ORDER — 0.9 % SODIUM CHLORIDE 0.9 %
500 INTRAVENOUS SOLUTION INTRAVENOUS ONCE
Status: COMPLETED | OUTPATIENT
Start: 2018-07-31 | End: 2018-07-31

## 2018-07-31 RX ADMIN — SODIUM CHLORIDE: 9 INJECTION, SOLUTION INTRAVENOUS at 21:29

## 2018-07-31 RX ADMIN — PIPERACILLIN SODIUM AND TAZOBACTAM SODIUM 3.38 G: 3; .375 INJECTION, POWDER, LYOPHILIZED, FOR SOLUTION INTRAVENOUS at 17:00

## 2018-07-31 RX ADMIN — LEVETIRACETAM 1250 MG: 100 INJECTION, SOLUTION INTRAVENOUS at 20:53

## 2018-07-31 RX ADMIN — DEXTROSE MONOHYDRATE 100 MG: 50 INJECTION, SOLUTION INTRAVENOUS at 21:23

## 2018-07-31 RX ADMIN — POTASSIUM CHLORIDE 10 MEQ: 10 INJECTION, SOLUTION INTRAVENOUS at 05:42

## 2018-07-31 RX ADMIN — FOLIC ACID: 5 INJECTION, SOLUTION INTRAMUSCULAR; INTRAVENOUS; SUBCUTANEOUS at 09:26

## 2018-07-31 RX ADMIN — FAMOTIDINE 20 MG: 10 INJECTION, SOLUTION INTRAVENOUS at 09:25

## 2018-07-31 RX ADMIN — PROPOFOL 15 MCG/KG/MIN: 10 INJECTION, EMULSION INTRAVENOUS at 04:12

## 2018-07-31 RX ADMIN — PHENYTOIN SODIUM 200 MG: 50 INJECTION INTRAMUSCULAR; INTRAVENOUS at 09:25

## 2018-07-31 RX ADMIN — POTASSIUM CHLORIDE 10 MEQ: 10 INJECTION, SOLUTION INTRAVENOUS at 12:55

## 2018-07-31 RX ADMIN — LEVOTHYROXINE SODIUM ANHYDROUS 25 MCG: 100 INJECTION, POWDER, LYOPHILIZED, FOR SOLUTION INTRAVENOUS at 06:19

## 2018-07-31 RX ADMIN — FAMOTIDINE 20 MG: 10 INJECTION, SOLUTION INTRAVENOUS at 20:52

## 2018-07-31 RX ADMIN — PIPERACILLIN SODIUM AND TAZOBACTAM SODIUM 3.38 G: 3; .375 INJECTION, POWDER, LYOPHILIZED, FOR SOLUTION INTRAVENOUS at 02:00

## 2018-07-31 RX ADMIN — ENOXAPARIN SODIUM 40 MG: 40 INJECTION SUBCUTANEOUS at 09:58

## 2018-07-31 RX ADMIN — LEVETIRACETAM 1250 MG: 100 INJECTION, SOLUTION INTRAVENOUS at 09:25

## 2018-07-31 RX ADMIN — PROPOFOL 10 MCG/KG/MIN: 10 INJECTION, EMULSION INTRAVENOUS at 20:51

## 2018-07-31 RX ADMIN — SODIUM CHLORIDE 500 ML: 9 INJECTION, SOLUTION INTRAVENOUS at 02:57

## 2018-07-31 RX ADMIN — POTASSIUM CHLORIDE 10 MEQ: 10 INJECTION, SOLUTION INTRAVENOUS at 07:28

## 2018-07-31 RX ADMIN — PIPERACILLIN SODIUM AND TAZOBACTAM SODIUM 3.38 G: 3; .375 INJECTION, POWDER, LYOPHILIZED, FOR SOLUTION INTRAVENOUS at 12:33

## 2018-07-31 ASSESSMENT — PULMONARY FUNCTION TESTS
PIF_VALUE: 12
PIF_VALUE: 12
PIF_VALUE: 18
PIF_VALUE: 17
PIF_VALUE: 15
PIF_VALUE: 17
PIF_VALUE: 12
PIF_VALUE: 18
PIF_VALUE: 12
PIF_VALUE: 17
PIF_VALUE: 12

## 2018-07-31 NOTE — PROGRESS NOTES
[facility] Wound Ostomy Continence Nurse  Consult Note       NAME:  Monae Saavedra  MEDICAL RECORD NUMBER:  [id number]  AGE: 61 y.o. GENDER: female  : 1955  TODAY'S DATE:  2018    Subjective   Reason for WOCN Evaluation and Assessment: wounds to right lateral and left medial ankle      Monae Saavedra is a 61 y.o. female referred by:   [] Physician  [] Nursing  [] Other:     Wound Identification:  Wound Type: friction  Contributing Factors: chronic pressure, decreased mobility, decreased tissue oxygenation and frequent sliding of feet over sheets    Wound History: unknown  Current Wound Care Treatment:  mepilex foam dressings    Patient Goal of Care:  [x] Wound Healing  [] Odor Control  [] Palliative Care  [] Pain Control   [] Other:         PAST MEDICAL HISTORY        Diagnosis Date    Ankle fracture, left     Anxiety 2014    Arthritis     ETOH abuse     Frequency of urination ?  GERD (gastroesophageal reflux disease) 2014    Head injury     after seizure/stroke pt fell to fall and hit head-    Headache(784.0)     Hypertension     Hypotension ?     Hypothyroidism     Numbness and tingling     fingers    Osteoporosis     Peripheral vascular disease ([address]     Pneumonia     Reflux     Seizures [facility] [address]     last one     Shingles     Stroke [facility])     stroke and seizure together    TIA (transient ischemic attack)         Tremor     UTI (urinary tract infection)     once    Varicose veins     Wears glasses        PAST SURGICAL HISTORY    Past Surgical History:   Procedure Laterality Date    BUNIONECTOMY Right     HAMMER TOE SURGERY Left 2016    ON 2ND, 3rd, 4th,  and  5TH TOES OF LEFT FOOT    OTHER SURGICAL HISTORY  2/23/15    orif left ankle with synthes and intraoperative c- arm    [facility] INCL FLUOR GDNCE DX W/CELL WASHG SPX N/A 2018    BRONCHOSCOPY WITH WASHINGS AT PATIENT BEDSIDE ICU performed by Samantha Booker MD at [facility] LIGATION         FAMILY HISTORY    Family History   Problem Relation Age of Onset    Cervical Cancer Mother     Heart Disease Father     Heart Disease Maternal Grandfather     Heart Disease Paternal Grandfather        SOCIAL HISTORY    Social History   Substance Use Topics    Smoking status: Never Smoker    Smokeless tobacco: Never Used    Alcohol use No      Comment: quit 5065, was alcoholic       ALLERGIES    No Known Allergies    MEDICATIONS    No current facility-administered medications on file prior to encounter. Current Outpatient Prescriptions on File Prior to Encounter   Medication Sig Dispense Refill    aspirin EC 81 MG EC tablet Take 1 tablet by mouth daily 30 tablet 2    levETIRAcetam (KEPPRA) 1000 MG tablet Take 1 tablet by mouth 2 times daily 60 tablet 2    potassium chloride (KLOR-CON M) 10 MEQ extended release tablet Take 2 tablets by mouth daily 60 tablet 3    PARoxetine (PAXIL) 30 MG tablet Take 1 tablet by mouth daily 30 tablet 2    hydrochlorothiazide (HYDRODIURIL) 12.5 MG tablet Take 1 tablet by mouth daily 30 tablet 2    atorvastatin (LIPITOR) 40 MG tablet Take 1 tablet by mouth daily 30 tablet 2    levothyroxine (SYNTHROID) 50 MCG tablet Take 1 tablet by mouth daily 30 tablet 2    zoster recombinant adjuvanted vaccine (SHINGRIX) 50 MCG SUSR injection 50 MCG IM then repeat 2-6 months.  0.5 mL 1    omeprazole (PRILOSEC) 20 MG delayed release capsule TAKE ONE CAPSULE BY MOUTH ONCE DAILY 30 capsule 2    amLODIPine (NORVASC) 10 MG tablet TAKE ONE TABLET BY MOUTH ONCE DAILY 30 tablet 2    oxybutynin (DITROPAN) 5 MG tablet TAKE ONE TABLET BY MOUTH THREE TIMES DAILY 90 tablet 2    SUMAtriptan (IMITREX) 100 MG tablet Take 1 tablet by mouth daily as needed for Migraine 10 tablet 2       Objective    /61   Pulse 70   Temp 98.2 °F (36.8 °C) (Oral)   Resp 14   Ht 5' 10\" (1.778 m)   Wt 148 lb 3.2 oz (67.2 kg)   SpO2 100%   BMI 21.26 kg/m²     LABS:  WBC:    Lab Results

## 2018-07-31 NOTE — FLOWSHEET NOTE
07/30/18 2147 07/30/18 2150   Vitals   Pulse 78 80   Resp 14 14   /72 98/68   MAP (mmHg) --  77   BP Location Right upper arm Right lower arm   BP Upper/Lower Upper Lower   BP Method Manual Automatic     Patient noted to be having low blood pressure on monitor. Manual /72. RN switched to smaller blood pressure cuff and placed on lower forearm. BP reading 98/68.

## 2018-07-31 NOTE — PROGRESS NOTES
infusion (mini-bag)  3.375 g Intravenous Q8H     Continuous Infusions:    sodium chloride 125 mL/hr at 18 1620    propofol 15 mcg/kg/min (18 0412)     PRN Meds: sodium chloride flush, acetaminophen, sodium chloride flush, magnesium hydroxide, ondansetron, potassium chloride, acetylcysteine, lidocaine PF, albuterol sulfate HFA **AND** ipratropium **AND** MDI Treatment, LORazepam **OR** LORazepam    Data:     Past Medical History:   has a past medical history of Ankle fracture, left; Anxiety; Arthritis; ETOH abuse; Frequency of urination; GERD (gastroesophageal reflux disease); Head injury; Headache(784.0); Hypertension; Hypotension; Hypothyroidism; Numbness and tingling; Osteoporosis; Peripheral vascular disease ([facility] [address] Pneumonia; Reflux; Seizures ([facility] [address] Shingles; Stroke [facility]); TIA (transient ischemic attack); Tremor; UTI (urinary tract infection); Varicose veins; and Wears glasses. Social History:   reports that she has never smoked. She has never used smokeless tobacco. She reports that she does not drink alcohol or use drugs. Family History:   Family History   Problem Relation Age of Onset    Cervical Cancer Mother     Heart Disease Father     Heart Disease Maternal Grandfather     Heart Disease Paternal Grandfather        Vitals:  /61   Pulse 63 Comment: Simultaneous filing. User may not have seen previous data. Temp 98.2 °F (36.8 °C) (Oral)   Resp 14 Comment: Simultaneous filing. User may not have seen previous data. Ht 5' 10\" (1.778 m)   Wt 148 lb 3.2 oz (67.2 kg)   SpO2 100%   BMI 21.26 kg/m²   Temp (24hrs), Av.4 °F (36.9 °C), Min:97.4 °F (36.3 °C), Max:99.3 °F (37.4 °C)    No results for input(s): POCGLU in the last 72 hours. I/O (24Hr):     Intake/Output Summary (Last 24 hours) at 18 0724  Last data filed at 18 0534   Gross per 24 hour   Intake          3410.77 ml   Output             2100 ml   Net          1310.77 ml       Labs:    [unfilled]    Lab dose to as low as reasonably achievable. COMPARISON: Cervical spine CT from 05/13/2018 HISTORY: ORDERING SYSTEM PROVIDED HISTORY: fall TECHNOLOGIST PROVIDED HISTORY: Ordering Physician Provided Reason for Exam: new onset seizures Acuity: Acute Type of Exam: Initial FINDINGS: BONES/ALIGNMENT:  There is straightening of the normal cervical lordosis which may relate to patient positioning or muscle spasm. There is no evidence of an acute cervical spine fracture. There is normal alignment of the cervical spine. DEGENERATIVE CHANGES: There is multilevel degenerative disc disease, advanced at C5-C6 and C6-7. There also hypertrophic degenerative changes facet and uncovertebral joints throughout the cervical spine as well as the atlantoaxial joint. These findings contribute to moderate bilateral neural foraminal narrowing at C5-C6 and moderate left neural foraminal narrowing at C6-7. Disc osteophyte complexes at C5-C6 and C6-C7 cause mild impingement of the ventral thecal sac at these levels. SOFT TISSUES: There is no prevertebral soft tissue swelling. Visualized lung apices are well aerated. Multilevel degenerative disc disease and hypertrophic degenerative changes of the cervical spine, but no CT evidence for acute osseous abnormality. Xr Chest Portable    Result Date: 7/30/2018  EXAMINATION: SINGLE XRAY VIEW OF THE CHEST 7/30/2018 2:17 pm COMPARISON: Today at 12:55 p.m. and May 15, 2018 HISTORY: ORDERING SYSTEM PROVIDED HISTORY: Chest Pain TECHNOLOGIST PROVIDED HISTORY: Reason for exam:->Chest Pain Ordering Physician Provided Reason for Exam: post intubation Acuity: Acute Type of Exam: Initial FINDINGS: The endotracheal tube terminates at the level of the clavicular heads, 8 cm above the german. The enteric tube terminates in the body of the stomach. Shallow lung inflation. Linear opacities in the lung bases are again noted. There is no evidence for pneumothorax or focal area of consolidation.   No

## 2018-07-31 NOTE — CARE COORDINATION
[address] Transitions Interview     2018    Patient: Alyson Goss Patient : 1955   MRN: [id number]  Reason for Admission: There are no discharge diagnoses documented for the most recent discharge. RARS: Readmission Risk Score: 18       Patient on vent, sleeping when writer visited, wean trial today, contact information left in room on table, will follow//JU      Readmission Risk  Patient Active Problem List   Diagnosis    Acquired hypothyroidism    Essential hypertension    Seizure disorder ([facility] [address]    Urge incontinence    Generalized anxiety disorder    Gastroesophageal reflux disease without esophagitis    H/O: CVA (cerebrovascular accident)    Migraine without aura and without status migrainosus, not intractable    Hammer toe of left foot    Tremor of both hands    Status epilepticus ([facility] [address]    Moderate malnutrition [facility])       Inpatient Assessment  Care Transitions Summary    Care Transitions Inpatient Review  Medication Review  Do you have all of your prescriptions and are they filled?:  Yes   Housing Review  Social Support  Durable Medical Equipment  Functional Review  Hearing and Vision  Care Transitions Interventions         Follow Up  Future Appointments  Date Time Provider Carlos Matta   2018 1:00 PM Tammy Olivas MD Neuro Spec [facility]   2018 9:20 AM Julia Eastman MD [address] [place] [address] [address] Maintenance  There are no preventive care reminders to display for this patient.     Whit Castillo RN

## 2018-07-31 NOTE — FLOWSHEET NOTE
07/31/18 0209   Provider Notification   Reason for Communication Review case;New orders   Provider Name Ansley Archer   Provider Notification Nurse Practitioner   Method of Communication Face to face   Response See orders   Notification Time 0200     Korin meza NP notified of low urine output. Order received for 500ml bolus.

## 2018-07-31 NOTE — PROGRESS NOTES
achievable. COMPARISON: Cervical spine CT from 05/13/2018 HISTORY: ORDERING SYSTEM PROVIDED HISTORY: fall TECHNOLOGIST PROVIDED HISTORY: Ordering Physician Provided Reason for Exam: new onset seizures Acuity: Acute Type of Exam: Initial FINDINGS: BONES/ALIGNMENT:  There is straightening of the normal cervical lordosis which may relate to patient positioning or muscle spasm. There is no evidence of an acute cervical spine fracture. There is normal alignment of the cervical spine. DEGENERATIVE CHANGES: There is multilevel degenerative disc disease, advanced at C5-C6 and C6-7. There also hypertrophic degenerative changes facet and uncovertebral joints throughout the cervical spine as well as the atlantoaxial joint. These findings contribute to moderate bilateral neural foraminal narrowing at C5-C6 and moderate left neural foraminal narrowing at C6-7. Disc osteophyte complexes at C5-C6 and C6-C7 cause mild impingement of the ventral thecal sac at these levels. SOFT TISSUES: There is no prevertebral soft tissue swelling. Visualized lung apices are well aerated. Multilevel degenerative disc disease and hypertrophic degenerative changes of the cervical spine, but no CT evidence for acute osseous abnormality. ASSESSMENT AND PLAN:       Patient Active Problem List   Diagnosis    Acquired hypothyroidism    Essential hypertension    Seizure disorder ([address]    Urge incontinence    Generalized anxiety disorder    Gastroesophageal reflux disease without esophagitis    H/O: CVA (cerebrovascular accident)    Migraine without aura and without status migrainosus, not intractable    Hammer toe of left foot    Tremor of both hands    Status epilepticus ([address]    Moderate malnutrition ([address]         Shaye Chandler M.D.   Neurology  7/31/2018  7:23 PM

## 2018-07-31 NOTE — CONSULTS
SYSTEMS:  Cannot be obtained. PHYSICAL EXAMINATION:  GENERAL APPEARANCE:  Middle-aged female, currently on the ventilator,  sedated. VITAL SIGNS:  Her temperature is 99.3. Her respiratory rate is set at 14. She is breathing about 18. Her pulse is in the 80s. Her blood pressure is  110/77 and oxygen saturation is 100% on 30% FIO2. HEENT:  She is intubated orally. LUNGS:  Diminished without any wheezes or rhonchi. CARDIAC SYSTEM:  S1 and S2.  ABDOMINAL EXAM:  Soft, nontender, and nondistended. EXTREMITIES:  As mentioned above, she does have multiple abrasions. There  is no edema. NEUROLOGIC:  She is sedated, currently not actively seizing. LABORATORY DATA:  Electrolytes are notable for potassium of 3.6. Her BUN  is 8 and creatinine 0.69. Her total CK is 1384. Her white count is 15.5  with an hemoglobin and hematocrit of 14.6 and 44, and platelet count of  296. Chest x-ray shows endotracheal tube is too high and there is evidence of  bibasilar what appears to be atelectasis versus infiltrate. Her most recent arterial blood gas after being on the ventilator showed a  pH of 7.35 with a pCO2 of 39 and pO2 127. IMPRESSION:  My impression is that the patient once again has had seizure  status epilepticus and was unresponsive. The issue is that how long she  was unresponsive and whether or not there has been additional damage. She  needs full ventilator support and wean to watch of her active seizing. She  will be on DVT and GI prophylaxis. We will check a sputum culture and  start empirically on Zosyn for possible aspiration. We will defer all  antiseizure management to Neurology. Her endotracheal tube has been  advanced approximately 3 cm. Of note, her tox screen was negative for any  alcohol. There is no family currently here. Critical care time spent was  35 minutes.         Alessio Núñez    D: 07/30/2018 18:06:53       T: 07/30/2018 18:11:02     [email or web address]  Job#: [id number]

## 2018-07-31 NOTE — PROGRESS NOTES
ICU Progress Note (Vent)   [facility]    Patient - Imani Deluca,  Age - 61 y.o.    - 1955      Room Number -    N -  [id number]   [facility]  Date of Admission -  2018 11:50 AM    Events of Past 24 Hours   Episodic low blood pressure low urine output overnight, also possibly seizing    Vitals    height is 5' 10\" (1.778 m) and weight is 148 lb 3.2 oz (67.2 kg). Her oral temperature is 98.2 °F (36.8 °C). Her blood pressure is 118/61 and her pulse is 63. Her respiration is 14 and oxygen saturation is 100%. Temperature Range: Temp: 98.2 °F (36.8 °C) Temp  Av.4 °F (36.9 °C)  Min: 97.4 °F (36.3 °C)  Max: 99.3 °F (37.4 °C)  BP Range:  Systolic (64OLX), KZW:977 , Min:84 , CMR:530     Diastolic (02ODO), DGZ:43, Min:44, Max:84    Pulse Range: Pulse  Av.5  Min: 62  Max: 110  Respiration Range: Resp  Av  Min: 9  Max: 26  Current Pulse Ox[de-identified]  SpO2: 100 %  24HR Pulse Ox Range:  SpO2  Av.4 %  Min: 88 %  Max: 100 %  Oxygen Amount and Delivery: O2 Flow Rate (L/min): 2 L/min      Wt Readings from Last 3 Encounters:   18 148 lb 3.2 oz (67.2 kg)   18 153 lb (69.4 kg)   18 155 lb 10.3 oz (70.6 kg)     I/O     Intake/Output Summary (Last 24 hours) at 18 0742  Last data filed at 18 0534   Gross per 24 hour   Intake          3410.77 ml   Output             2100 ml   Net          1310.77 ml     I/O last 3 completed shifts: In: 3410.8 [I.V.:1810.8;  IV Piggyback:1600]  Out: 2100 [Urine:; Emesis/NG output:75]     DRAIN/TUBE OUTPUT:     Invasive Lines   ETT Day -   2  Lines -  0    ICP PRESSURE RANGE:  No Data Recorded  CVP PRESSURE RANGE:  No Data Recorded  Mechanical Ventilation Data   SETTINGS (Comprehensive)  Vent Information  Ventilator Started: Yes  Ventilation Day(s): 2  Vent Type: Servo i  Vent Mode: PRVC  Vt Ordered: 500 mL  Rate Set: 14 bmp  FiO2 : 30 %  Sensitivity: 5  PEEP/CPAP: 5  I Time/ I Time %: 0.9 s  Cuff Pressure (cm H2O): 24 cm H2O  Humidification Source: HME  Nitric Oxide/Epoprostenol In Use?: No  Additional Respiratory  Assessments  Pulse: 63 (Simultaneous filing. User may not have seen previous data.)  Resp: 14 (Simultaneous filing. User may not have seen previous data.)  SpO2: 100 %  End Tidal CO2: 30 (%)  Position: Semi-Crane's  Humidification Source: HME  Oral Care Completed?: Yes  Oral Care: Mouth suctioned  Cuff Pressure (cm H2O): 24 cm H2O       ABGs: Lab Results   Component Value Date    PHART 7.429 07/31/2018    PO2ART 91.4 07/31/2018    QNR9XEF 37.7 07/31/2018       Lab Results   Component Value Date    MODE PRVC 07/31/2018         Medications   IV   sodium chloride 125 mL/hr at 07/30/18 1620    propofol 10 mcg/kg/min (07/31/18 0739)      sodium chloride flush  10 mL Intravenous 2 times per day    sodium chloride flush  10 mL Intravenous 2 times per day    enoxaparin  40 mg Subcutaneous Daily    famotidine (PEPCID) injection  20 mg Intravenous BID    levothyroxine  25 mcg Intravenous Daily    folic acid, thiamine, multi-vitamin with vitamin K infusion   Intravenous Daily    levetiracetam  1,250 mg Intravenous BID    phenytoin  200 mg Intravenous BID    piperacillin-tazobactam (ZOSYN) 3.375 g in dextrose 5% IVPB extended infusion (mini-bag)  3.375 g Intravenous Q8H       Diet/Nutrition   Diet NPO Effective Now    Exam   VITALS    height is 5' 10\" (1.778 m) and weight is 148 lb 3.2 oz (67.2 kg). Her oral temperature is 98.2 °F (36.8 °C). Her blood pressure is 118/61 and her pulse is 63. Her respiration is 14 and oxygen saturation is 100%. Ventilator Settings (Basic)  Vent Mode: PRVC Rate Set: 14 bmp/Vt Ordered: 500 mL/ /FiO2 : 30 %    Constitutional - Sedated, Unresponsive  General Appearance  well developed, well nourished  HEENT - Life support devices in place (ET, OG),normocephalic, atraumatic.  PERRLA  Lungs - Chest expands equally, no wheezes, rales or

## 2018-08-01 ENCOUNTER — APPOINTMENT (OUTPATIENT)
Dept: GENERAL RADIOLOGY | Age: 63
DRG: 053 | End: 2018-08-01
Payer: COMMERCIAL

## 2018-08-01 LAB
ALLEN TEST: ABNORMAL
ANION GAP SERPL CALCULATED.3IONS-SCNC: 13 MMOL/L (ref 9–17)
BUN BLDV-MCNC: 5 MG/DL (ref 8–23)
BUN/CREAT BLD: ABNORMAL (ref 9–20)
CALCIUM SERPL-MCNC: 7.9 MG/DL (ref 8.6–10.4)
CARBOXYHEMOGLOBIN: 1.2 % (ref 0–5)
CHLORIDE BLD-SCNC: 104 MMOL/L (ref 98–107)
CO2: 22 MMOL/L (ref 20–31)
CREAT SERPL-MCNC: 0.47 MG/DL (ref 0.5–0.9)
FIO2: 30
GFR AFRICAN AMERICAN: >60 ML/MIN
GFR NON-AFRICAN AMERICAN: >60 ML/MIN
GFR SERPL CREATININE-BSD FRML MDRD: ABNORMAL ML/MIN/{1.73_M2}
GFR SERPL CREATININE-BSD FRML MDRD: ABNORMAL ML/MIN/{1.73_M2}
GLUCOSE BLD-MCNC: 90 MG/DL (ref 70–99)
HCO3 ARTERIAL: 24.7 MMOL/L (ref 22–26)
HCT VFR BLD CALC: 39.3 % (ref 36–46)
HEMOGLOBIN: 13.2 G/DL (ref 12–16)
LACTIC ACID, WHOLE BLOOD: NORMAL MMOL/L (ref 0.7–2.1)
LACTIC ACID: 1.3 MMOL/L (ref 0.5–2.2)
MAGNESIUM: 2 MG/DL (ref 1.6–2.6)
MCH RBC QN AUTO: 30.8 PG (ref 26–34)
MCHC RBC AUTO-ENTMCNC: 33.5 G/DL (ref 31–37)
MCV RBC AUTO: 91.9 FL (ref 80–100)
METHEMOGLOBIN: 0.3 % (ref 0–1.9)
MODE: ABNORMAL
NEGATIVE BASE EXCESS, ART: ABNORMAL MMOL/L (ref 0–2)
NOTIFICATION TIME: ABNORMAL
NOTIFICATION: ABNORMAL
NRBC AUTOMATED: NORMAL PER 100 WBC
O2 DEVICE/FLOW/%: ABNORMAL
O2 SAT, ARTERIAL: 98 % (ref 95–98)
OXYHEMOGLOBIN: ABNORMAL % (ref 95–98)
PATIENT TEMP: 37
PCO2 ARTERIAL: 34.4 MMHG (ref 35–45)
PCO2, ART, TEMP ADJ: ABNORMAL (ref 35–45)
PDW BLD-RTO: 14.8 % (ref 11.5–14.9)
PEEP/CPAP: 5
PH ARTERIAL: 7.46 (ref 7.35–7.45)
PH, ART, TEMP ADJ: ABNORMAL (ref 7.35–7.45)
PLATELET # BLD: 169 K/UL (ref 150–450)
PMV BLD AUTO: 9 FL (ref 6–12)
PO2 ARTERIAL: 109 MMHG (ref 80–100)
PO2, ART, TEMP ADJ: ABNORMAL MMHG (ref 80–100)
POSITIVE BASE EXCESS, ART: 0.9 MMOL/L (ref 0–2)
POTASSIUM SERPL-SCNC: 3.2 MMOL/L (ref 3.7–5.3)
POTASSIUM SERPL-SCNC: 3.6 MMOL/L (ref 3.7–5.3)
PSV: ABNORMAL
PT. POSITION: ABNORMAL
RBC # BLD: 4.28 M/UL (ref 4–5.2)
RESPIRATORY RATE: ABNORMAL
SAMPLE SITE: ABNORMAL
SET RATE: 14
SODIUM BLD-SCNC: 139 MMOL/L (ref 135–144)
TEXT FOR RESPIRATORY: ABNORMAL
TOTAL HB: ABNORMAL G/DL (ref 12–16)
TOTAL RATE: 14
VT: 500
WBC # BLD: 7.5 K/UL (ref 3.5–11)

## 2018-08-01 PROCEDURE — 6360000002 HC RX W HCPCS: Performed by: STUDENT IN AN ORGANIZED HEALTH CARE EDUCATION/TRAINING PROGRAM

## 2018-08-01 PROCEDURE — 85027 COMPLETE CBC AUTOMATED: CPT

## 2018-08-01 PROCEDURE — 36415 COLL VENOUS BLD VENIPUNCTURE: CPT

## 2018-08-01 PROCEDURE — 84132 ASSAY OF SERUM POTASSIUM: CPT

## 2018-08-01 PROCEDURE — C9254 INJECTION, LACOSAMIDE: HCPCS | Performed by: PSYCHIATRY & NEUROLOGY

## 2018-08-01 PROCEDURE — 6360000002 HC RX W HCPCS: Performed by: PSYCHIATRY & NEUROLOGY

## 2018-08-01 PROCEDURE — 2500000003 HC RX 250 WO HCPCS: Performed by: NURSE PRACTITIONER

## 2018-08-01 PROCEDURE — 2580000003 HC RX 258: Performed by: STUDENT IN AN ORGANIZED HEALTH CARE EDUCATION/TRAINING PROGRAM

## 2018-08-01 PROCEDURE — 83605 ASSAY OF LACTIC ACID: CPT

## 2018-08-01 PROCEDURE — S0028 INJECTION, FAMOTIDINE, 20 MG: HCPCS | Performed by: STUDENT IN AN ORGANIZED HEALTH CARE EDUCATION/TRAINING PROGRAM

## 2018-08-01 PROCEDURE — 94770 HC ETCO2 MONITOR DAILY: CPT

## 2018-08-01 PROCEDURE — 2500000003 HC RX 250 WO HCPCS: Performed by: INTERNAL MEDICINE

## 2018-08-01 PROCEDURE — 2500000003 HC RX 250 WO HCPCS: Performed by: STUDENT IN AN ORGANIZED HEALTH CARE EDUCATION/TRAINING PROGRAM

## 2018-08-01 PROCEDURE — 2000000000 HC ICU R&B

## 2018-08-01 PROCEDURE — 83735 ASSAY OF MAGNESIUM: CPT

## 2018-08-01 PROCEDURE — 80048 BASIC METABOLIC PNL TOTAL CA: CPT

## 2018-08-01 PROCEDURE — 2580000003 HC RX 258: Performed by: INTERNAL MEDICINE

## 2018-08-01 PROCEDURE — 6360000002 HC RX W HCPCS: Performed by: INTERNAL MEDICINE

## 2018-08-01 PROCEDURE — 36600 WITHDRAWAL OF ARTERIAL BLOOD: CPT

## 2018-08-01 PROCEDURE — 99233 SBSQ HOSP IP/OBS HIGH 50: CPT | Performed by: INTERNAL MEDICINE

## 2018-08-01 PROCEDURE — 2580000003 HC RX 258: Performed by: PSYCHIATRY & NEUROLOGY

## 2018-08-01 PROCEDURE — 82805 BLOOD GASES W/O2 SATURATION: CPT

## 2018-08-01 PROCEDURE — 94003 VENT MGMT INPAT SUBQ DAY: CPT

## 2018-08-01 PROCEDURE — 94762 N-INVAS EAR/PLS OXIMTRY CONT: CPT

## 2018-08-01 PROCEDURE — 71045 X-RAY EXAM CHEST 1 VIEW: CPT

## 2018-08-01 PROCEDURE — 2700000000 HC OXYGEN THERAPY PER DAY

## 2018-08-01 RX ORDER — ENALAPRILAT 2.5 MG/2ML
1.25 INJECTION INTRAVENOUS EVERY 6 HOURS PRN
Status: DISCONTINUED | OUTPATIENT
Start: 2018-08-01 | End: 2018-08-07 | Stop reason: HOSPADM

## 2018-08-01 RX ORDER — METOPROLOL TARTRATE 5 MG/5ML
5 INJECTION INTRAVENOUS EVERY 6 HOURS
Status: DISCONTINUED | OUTPATIENT
Start: 2018-08-01 | End: 2018-08-04

## 2018-08-01 RX ADMIN — METOPROLOL TARTRATE 5 MG: 5 INJECTION, SOLUTION INTRAVENOUS at 15:22

## 2018-08-01 RX ADMIN — POTASSIUM CHLORIDE 10 MEQ: 10 INJECTION, SOLUTION INTRAVENOUS at 08:00

## 2018-08-01 RX ADMIN — LEVETIRACETAM 1250 MG: 100 INJECTION, SOLUTION INTRAVENOUS at 21:13

## 2018-08-01 RX ADMIN — DEXTROSE MONOHYDRATE 100 MG: 50 INJECTION, SOLUTION INTRAVENOUS at 21:13

## 2018-08-01 RX ADMIN — PIPERACILLIN SODIUM AND TAZOBACTAM SODIUM 3.38 G: 3; .375 INJECTION, POWDER, LYOPHILIZED, FOR SOLUTION INTRAVENOUS at 19:10

## 2018-08-01 RX ADMIN — SODIUM CHLORIDE: 9 INJECTION, SOLUTION INTRAVENOUS at 05:54

## 2018-08-01 RX ADMIN — POTASSIUM CHLORIDE 10 MEQ: 10 INJECTION, SOLUTION INTRAVENOUS at 09:52

## 2018-08-01 RX ADMIN — DEXTROSE MONOHYDRATE 100 MG: 50 INJECTION, SOLUTION INTRAVENOUS at 08:52

## 2018-08-01 RX ADMIN — METOPROLOL TARTRATE 5 MG: 5 INJECTION, SOLUTION INTRAVENOUS at 21:13

## 2018-08-01 RX ADMIN — PIPERACILLIN SODIUM AND TAZOBACTAM SODIUM 3.38 G: 3; .375 INJECTION, POWDER, LYOPHILIZED, FOR SOLUTION INTRAVENOUS at 03:00

## 2018-08-01 RX ADMIN — POTASSIUM CHLORIDE 10 MEQ: 10 INJECTION, SOLUTION INTRAVENOUS at 11:05

## 2018-08-01 RX ADMIN — FAMOTIDINE 20 MG: 10 INJECTION, SOLUTION INTRAVENOUS at 08:04

## 2018-08-01 RX ADMIN — METOPROLOL TARTRATE 5 MG: 5 INJECTION, SOLUTION INTRAVENOUS at 09:53

## 2018-08-01 RX ADMIN — FOLIC ACID: 5 INJECTION, SOLUTION INTRAMUSCULAR; INTRAVENOUS; SUBCUTANEOUS at 08:09

## 2018-08-01 RX ADMIN — FAMOTIDINE 20 MG: 10 INJECTION, SOLUTION INTRAVENOUS at 21:13

## 2018-08-01 RX ADMIN — PIPERACILLIN SODIUM AND TAZOBACTAM SODIUM 3.38 G: 3; .375 INJECTION, POWDER, LYOPHILIZED, FOR SOLUTION INTRAVENOUS at 09:52

## 2018-08-01 RX ADMIN — ENALAPRILAT 1.25 MG: 1.25 INJECTION INTRAVENOUS at 11:07

## 2018-08-01 RX ADMIN — LEVOTHYROXINE SODIUM ANHYDROUS 25 MCG: 100 INJECTION, POWDER, LYOPHILIZED, FOR SOLUTION INTRAVENOUS at 06:13

## 2018-08-01 RX ADMIN — POTASSIUM CHLORIDE 10 MEQ: 10 INJECTION, SOLUTION INTRAVENOUS at 06:12

## 2018-08-01 RX ADMIN — SODIUM CHLORIDE: 9 INJECTION, SOLUTION INTRAVENOUS at 17:51

## 2018-08-01 RX ADMIN — Medication 10 ML: at 21:13

## 2018-08-01 RX ADMIN — ENOXAPARIN SODIUM 40 MG: 40 INJECTION SUBCUTANEOUS at 08:04

## 2018-08-01 RX ADMIN — LEVETIRACETAM 1250 MG: 100 INJECTION, SOLUTION INTRAVENOUS at 08:20

## 2018-08-01 ASSESSMENT — PULMONARY FUNCTION TESTS
PIF_VALUE: 12
PIF_VALUE: 17
PIF_VALUE: 12
PIF_VALUE: 17
PIF_VALUE: 12
PIF_VALUE: 17
PIF_VALUE: 16
PIF_VALUE: 16
PIF_VALUE: 12

## 2018-08-01 NOTE — PROGRESS NOTES
ICU Progress Note (Vent)   Pulmonary [facility] Critical Care Specialists    Patient - Janes Roth,  Age - 61 y.o.    - 1955      Room Number -    MRN -  [id number]   Acct # - [de-identified]  Date of Admission -  2018 11:50 AM    Events of Past 24 Hours   No obvious active seizures noted    Vitals    height is 5' 10\" (1.778 m) and weight is 148 lb 3.2 oz (67.2 kg). Her axillary temperature is 97.9 °F (36.6 °C). Her blood pressure is 160/73 (abnormal) and her pulse is 66. Her respiration is 14 and oxygen saturation is 96%. Temperature Range: Temp: 97.9 °F (36.6 °C) Temp  Av.3 °F (36.8 °C)  Min: 97.9 °F (36.6 °C)  Max: 98.9 °F (37.2 °C)  BP Range:  Systolic (40ZJV), TCQ:544 , Min:107 , TLK:648     Diastolic (27YAA), UYK:36, Min:57, Max:94    Pulse Range: Pulse  Av.8  Min: 59  Max: 78  Respiration Range: Resp  Av.7  Min: 11  Max: 22  Current Pulse Ox[de-identified]  SpO2: 96 %  24HR Pulse Ox Range:  SpO2  Av.6 %  Min: 96 %  Max: 100 %  Oxygen Amount and Delivery: O2 Flow Rate (L/min): 2 L/min      Wt Readings from Last 3 Encounters:   18 148 lb 3.2 oz (67.2 kg)   18 153 lb (69.4 kg)   18 155 lb 10.3 oz (70.6 kg)     I/O     Intake/Output Summary (Last 24 hours) at 18 0746  Last data filed at 18 0600   Gross per 24 hour   Intake          1962.49 ml   Output             3525 ml   Net         -1562.51 ml     I/O last 3 completed shifts:   In: 1962.5 [I.V.:1652.5; IV Piggyback:310]  Out: 7039 [Urine:3525]     DRAIN/TUBE OUTPUT:     Invasive Lines   ETT Day -   3  Lines -  0    ICP PRESSURE RANGE:  No Data Recorded  CVP PRESSURE RANGE:  No Data Recorded  Mechanical Ventilation Data   SETTINGS (Comprehensive)  Vent Information  Ventilator Started: Yes  Ventilation Day(s): 3  Vent Type: Servo i  Vent Mode: PRVC  Vt Ordered: 500 mL  Rate Set: 14 bmp  Pressure Support: 7 cmH20  FiO2 : 25 %  Sensitivity: 5  PEEP/CPAP: 5  I Time/ I Time %: 0.9 s  Cuff rub.  Abdomen - soft, nontender, nondistended, no masses or organomegaly  Neurologic -cannot adequately assess due to sedation, nurses report agitation when sedation removed does not currently follow commands for   Skin - no bruising or bleeding, abrasions noted per nursing  Extremities - no cyanosis, clubbing or edema    Lab Results   CBC   Lab Results   Component Value Date    WBC 7.5 08/01/2018    RBC 4.28 08/01/2018    RBC 4.21 10/04/2011    HGB 13.2 08/01/2018    HCT 39.3 08/01/2018     08/01/2018     10/04/2011    MCV 91.9 08/01/2018    MCH 30.8 08/01/2018    MCHC 33.5 08/01/2018    RDW 14.8 08/01/2018    METASPCT 2 01/07/2017    LYMPHOPCT 2 07/30/2018    MONOPCT 7 07/30/2018    MYELOPCT 1 01/08/2017    BASOPCT 0 07/30/2018    MONOSABS 1.09 07/30/2018    LYMPHSABS 0.31 07/30/2018    EOSABS 0.00 07/30/2018    BASOSABS 0.00 07/30/2018    DIFFTYPE NOT REPORTED 07/30/2018       BMP Lab Results   Component Value Date     08/01/2018    K 3.2 08/01/2018     08/01/2018    CO2 22 08/01/2018    BUN 5 08/01/2018    CREATININE 0.47 08/01/2018    GLUCOSE 90 08/01/2018    GLUCOSE 68 03/27/2012    CALCIUM 7.9 08/01/2018       LFTS  Lab Results   Component Value Date    ALKPHOS 62 07/30/2018    ALT 22 07/30/2018    AST 53 07/30/2018    PROT 7.6 07/30/2018    BILITOT 0.83 07/30/2018    BILIDIR 0.17 05/13/2018    IBILI 0.30 05/13/2018    LABALBU 4.3 07/30/2018    LABALBU 4.1 10/04/2011       INR  No results for input(s): PROTIME, INR in the last 72 hours. APTT  No results for input(s): APTT in the last 72 hours. Lactic Acid  Lab Results   Component Value Date    LACTA 1.3 08/01/2018    LACTA 1.5 07/31/2018    LACTA 1.6 01/14/2018        BNP   No results for input(s): BNP in the last 72 hours.      Cultures       Radiology     Plain Films         Chest x-ray shows endotracheal tube in good position, minimal basilar infiltrate     SYSTEM ASSESSMENT    Status epilepticus  Possible anoxia  Acute respiratory failure due to inability to protect airway  Possible aspiration pneumonia    Neuro   Continue antiepileptics per neurology  Minimize sedation to see if there is any seizure activity  Concern for possible anoxia exists, she was found unresponsive by her family    Respiratory   Wean oxygen as tolerated.  Keep O2 sat > 88%  Tolerated CPAP and pressure support yesterday, was placed back on full support only when there was a concern of seizures, this never materialized so we will go ahead and place her back on CPAP and pressure support  We want to make sure she can protect her airway before extubation  If she is agitated made the due to anoxic encephalopathy     Hemodynamics   Blood pressures actually on the high side, will add scheduled beta blocker  Lactic acid normal  Gastrointestinal/Nutrition   If unable to extubate today, we'll start tube feeds    Renal   Decrease IV fluid to 75 L/hr    Infectious Disease   Continue Zosyn for possible aspiration    Hematology/Oncology   On DVT prophylaxis    Endocrine     Blood sugars acceptable  Social/Spiritual/DNR/Disposition/Other     No family here    Critical Care Time   35 min    Electronically signed by Gabriella Whitney MD on 8/1/2018 at 7:46 AM

## 2018-08-01 NOTE — PROGRESS NOTES
chloride 75 mL/hr at 18 0853    propofol Stopped (18 0752)     PRN Meds: enalaprilat, sodium chloride flush, acetaminophen, sodium chloride flush, magnesium hydroxide, ondansetron, potassium chloride, lidocaine PF, albuterol sulfate HFA **AND** ipratropium **AND** MDI Treatment, LORazepam **OR** LORazepam    Data:     Past Medical History:   has a past medical history of Ankle fracture, left; Anxiety; Arthritis; ETOH abuse; Frequency of urination; GERD (gastroesophageal reflux disease); Head injury; Headache(784.0); Hypertension; Hypotension; Hypothyroidism; Numbness and tingling; Osteoporosis; Peripheral vascular disease ([facility] [address] Pneumonia; Reflux; Seizures ([facility] Shingles; Stroke [facility]); TIA (transient ischemic attack); Tremor; UTI (urinary tract infection); Varicose veins; and Wears glasses. Social History:   reports that she has never smoked. She has never used smokeless tobacco. She reports that she does not drink alcohol or use drugs. Family History:   Family History   Problem Relation Age of Onset    Cervical Cancer Mother     Heart Disease Father     Heart Disease Maternal Grandfather     Heart Disease Paternal Grandfather        Vitals:  BP (!) 146/87   Pulse 64   Temp 98 °F (36.7 °C) (Axillary)   Resp 16   Ht 5' 10\" (1.778 m)   Wt 148 lb 3.2 oz (67.2 kg)   SpO2 100%   BMI 21.26 kg/m²   Temp (24hrs), Av.3 °F (36.8 °C), Min:97.9 °F (36.6 °C), Max:98.9 °F (37.2 °C)    No results for input(s): POCGLU in the last 72 hours. I/O (24Hr):     Intake/Output Summary (Last 24 hours) at 18 1601  Last data filed at 18 0600   Gross per 24 hour   Intake          1962.49 ml   Output             3525 ml   Net         -1562.51 ml       Labs:    [unfilled]    Lab Results   Component Value Date/Time    SPECIAL NOT REPORTED 2018 03:50 AM     Lab Results   Component Value Date/Time    CULTURE STAPHYLOCOCCUS AUREUS MODERATE GROWTH (A) 2018 03:50 AM [facility]    Radiology:    Ct Head Wo Contrast    Result Date: 7/30/2018  EXAMINATION: CT OF THE HEAD WITHOUT CONTRAST  7/30/2018 12:58 pm TECHNIQUE: CT of the head was performed without the administration of intravenous contrast. Dose modulation, iterative reconstruction, and/or weight based adjustment of the mA/kV was utilized to reduce the radiation dose to as low as reasonably achievable. COMPARISON: 05/13/2018 HISTORY: ORDERING SYSTEM PROVIDED HISTORY: AMS from seizure TECHNOLOGIST PROVIDED HISTORY: Ordering Physician Provided Reason for Exam: new onset seizures Acuity: Acute Type of Exam: Initial FINDINGS: BRAIN/VENTRICLES: There is no acute intracranial hemorrhage, mass effect or midline shift. No abnormal extra-axial fluid collection. Stable right MCA distribution encephalomalacia with associated mild ex vacuo dilatation right lateral ventricle consistent with remote infarct. No CT evidence of an acute infarct. No evidence of hydrocephalus. ORBITS: The visualized portion of the orbits demonstrate no acute abnormality. SINUSES: The visualized paranasal sinuses and mastoid air cells demonstrate no acute abnormality. SOFT TISSUES/SKULL:  No acute abnormality of the visualized skull or soft tissues. Stable CT brain with no acute intracranial abnormality and re- demonstration of remote right MCA infarct. Ct Cervical Spine Wo Contrast    Result Date: 7/30/2018  EXAMINATION: CT OF THE CERVICAL SPINE WITHOUT CONTRAST 7/30/2018 12:58 pm TECHNIQUE: CT of the cervical spine was performed without the administration of intravenous contrast. Multiplanar reformatted images are provided for review. Dose modulation, iterative reconstruction, and/or weight based adjustment of the mA/kV was utilized to reduce the radiation dose to as low as reasonably achievable.  COMPARISON: Cervical spine CT from 05/13/2018 HISTORY: ORDERING SYSTEM PROVIDED HISTORY: fall TECHNOLOGIST PROVIDED HISTORY: Ordering Physician Provided Reason for Exam: new onset seizures Acuity: Acute Type of Exam: Initial FINDINGS: BONES/ALIGNMENT:  There is straightening of the normal cervical lordosis which may relate to patient positioning or muscle spasm. There is no evidence of an acute cervical spine fracture. There is normal alignment of the cervical spine. DEGENERATIVE CHANGES: There is multilevel degenerative disc disease, advanced at C5-C6 and C6-7. There also hypertrophic degenerative changes facet and uncovertebral joints throughout the cervical spine as well as the atlantoaxial joint. These findings contribute to moderate bilateral neural foraminal narrowing at C5-C6 and moderate left neural foraminal narrowing at C6-7. Disc osteophyte complexes at C5-C6 and C6-C7 cause mild impingement of the ventral thecal sac at these levels. SOFT TISSUES: There is no prevertebral soft tissue swelling. Visualized lung apices are well aerated. Multilevel degenerative disc disease and hypertrophic degenerative changes of the cervical spine, but no CT evidence for acute osseous abnormality. Xr Chest Portable    Result Date: 8/1/2018  EXAMINATION: SINGLE XRAY VIEW OF THE CHEST 8/1/2018 6:54 am COMPARISON: 07/31/2018 HISTORY: ORDERING SYSTEM PROVIDED HISTORY: ETT placement TECHNOLOGIST PROVIDED HISTORY: Reason for exam:->ETT placement Ordering Physician Provided Reason for Exam: Vent protocol. Acuity: Unknown Type of Exam: Unknown Additional signs and symptoms: Vent protocol. FINDINGS: Endotracheal tube is 4.8 cm above the german. Enteric tube extends below the left hemidiaphragm and side hole is overlying the proximal stomach. Cardiomediastinal silhouette and pulmonary vasculature are within normal limits. No focal airspace consolidation, pneumothorax, or pleural effusion. No free air beneath the diaphragm. No evidence of acute osseous abnormality. No significant interval change.      Xr Chest Portable    Result Date: 7/31/2018  EXAMINATION: SINGLE XRAY VIEW OF THE CHEST 7/31/2018 6:41 am COMPARISON: Radiographs from July 30, 2018 HISTORY: ORDERING SYSTEM PROVIDED HISTORY: ETT placement TECHNOLOGIST PROVIDED HISTORY: Reason for exam:->ETT placement Ordering Physician Provided Reason for Exam: vent Acuity: Unknown Type of Exam: Unknown FINDINGS: One image obtained. Stable supporting devices. The cardiomediastinal contours are normal.  No pleural effusion or pneumothorax. Bibasilar atelectasis. No free subdiaphragmatic air. Stable exam with bibasilar atelectasis. Xr Chest Portable    Result Date: 7/30/2018  EXAMINATION: SINGLE XRAY VIEW OF THE CHEST 7/30/2018 2:17 pm COMPARISON: Today at 12:55 p.m. and May 15, 2018 HISTORY: ORDERING SYSTEM PROVIDED HISTORY: Chest Pain TECHNOLOGIST PROVIDED HISTORY: Reason for exam:->Chest Pain Ordering Physician Provided Reason for Exam: post intubation Acuity: Acute Type of Exam: Initial FINDINGS: The endotracheal tube terminates at the level of the clavicular heads, 8 cm above the german. The enteric tube terminates in the body of the stomach. Shallow lung inflation. Linear opacities in the lung bases are again noted. There is no evidence for pneumothorax or focal area of consolidation. No effusion is appreciated. The cardiac and mediastinal contours are unchanged in appearance. Sequela of old granulomatous disease is noted. Intubation with the endotracheal tip at the level of the clavicles. Shallow inflation with findings consistent with basilar atelectasis. Xr Chest Portable    Result Date: 7/30/2018  EXAMINATION: SINGLE XRAY VIEW OF THE CHEST 7/30/2018 12:32 pm COMPARISON: May 15, 2018 HISTORY: ORDERING SYSTEM PROVIDED HISTORY: eval TECHNOLOGIST PROVIDED HISTORY: Reason for exam:->eval Ordering Physician Provided Reason for Exam: seizures Acuity: Unknown Type of Exam: Unknown FINDINGS: One image obtained.  Cardiac monitoring leads overlie the chest.  Interval extubation

## 2018-08-01 NOTE — PLAN OF CARE
Problem: Restraint Use - Nonviolent/Non-Self-Destructive Behavior:  Goal: Absence of restraint indications  Absence of restraint indications   Outcome: Ongoing  Patient continues to reach for ET tube when restraints are released. Goal: Absence of restraint-related injury  Absence of restraint-related injury   Outcome: Ongoing  Skin assessment as charted. Patient's restraints released q2hrs and skin assessed for breakdown. No breakdown noted this shift. ROM performed. Problem: Health Behavior:  Goal: Ability to manage health-related needs will improve  Ability to manage health-related needs will improve   Outcome: Ongoing      Problem: Physical Regulation:  Goal: Signs of adequate cerebral perfusion will increase  Signs of adequate cerebral perfusion will increase   Outcome: Ongoing    Goal: Ability to maintain a stable neurologic state will improve  Ability to maintain a stable neurologic state will improve   Outcome: Ongoing  Patient only responding to painful stimulus at this time. Problem: Safety:  Goal: Ability to remain free from injury will improve  Ability to remain free from injury will improve   Outcome: Ongoing  The patient remained free from injury this shift. Continue to monitor closely.

## 2018-08-01 NOTE — PROGRESS NOTES
Nutrition Assessment    Type and Reason for Visit: Initial (Vent )    Nutrition Recommendations: Continue NPO as ordered. Recommend Alternate Nutrition if not extubated. Malnutrition Assessment:  · Malnutrition Status: Meets the criteria for moderate malnutrition  · Context: Chronic illness  · Findings of the 6 clinical characteristics of malnutrition (Minimum of 2 out of 6 clinical characteristics is required to make the diagnosis of moderate or severe Protein Calorie Malnutrition based on AND/ASPEN Guidelines):  1. Energy Intake-Less than or equal to 50%, greater than or equal to 1 month    2. Weight Loss-2% loss or greater, in 1 week (in 2 weeks)  3. Fat Loss-Mild subcutaneous fat loss, Orbital  4. Muscle Loss-Mild muscle mass loss, Temples (temporalis muscle), Interosseous  5. Fluid Accumulation-No significant fluid accumulation, Extremities  6.  Strength-Not measured    Nutrition Diagnosis:   · Problem: Inadequate oral intake  · Etiology: related to Cognitive or neurological impairment (seizures)     Signs and symptoms:  as evidenced by Intubation, NPO status due to medical condition, Diet history of poor intake, Weight loss    Nutrition Assessment:  · Subjective Assessment: Patient not responsive information obtained from Nurse and EMR. Nurse reported sedation is off patient not very responsive except opens one eye ocassionally, passed intubation weaning trial but was put back on CPAP and pressure support due to concern of seizures, has multiple wounds and wound care on board, if patient not extubated today may start Tube Feeding.    · Nutrition-Focused Physical Findings:    · Wound Type: Stage I, Pressure Ulcer, Multiple (Traumatic)  · Current Nutrition Therapies:  · Oral Diet Orders: NPO   · Oral Diet intake: NPO  · Oral Nutrition Supplement (ONS) Orders: None  · ONS intake: NPO  · Anthropometric Measures:  · Ht: 5' 10\" (177.8 cm)   · Current Body Wt: 148 lb 3.2 oz (67.2 kg)  · Admission Body Wt: 148 lb 3.2 oz (67.2 kg)  · % Weight Change: 3.2%, 4.8% (153 lbs 7/18, 155 lbs 10 oz 5/17),  2 weeks, 10 weeks  · Ideal Body Wt: 150 lb (68 kg), % Ideal Body 99%  · BMI Classification: BMI 18.5 - 24.9 Normal Weight  · Comparative Standards (Estimated Nutrition Needs):  · Estimated Daily Total Kcal: 2010-2144  · Estimated Daily Protein (g): 87-94    Estimated Intake vs Estimated Needs: Intake Less Than Needs    Nutrition Risk Level: High    Nutrition Interventions:   Continue NPO  Continued Inpatient Monitoring, Education Not Indicated, Coordination of Care    Nutrition Evaluation:   · Evaluation: Goals set   · Goals: Adequate nutrition provision    · Monitoring: Diet Progression, NPO Status, Wound Healing, Weight, Pertinent Labs, Chewing/Swallowing    See Adult Nutrition Doc Flowsheet for more detail.      Sara Hills RD, LD  Office phone [phone]

## 2018-08-02 ENCOUNTER — APPOINTMENT (OUTPATIENT)
Dept: GENERAL RADIOLOGY | Age: 63
DRG: 053 | End: 2018-08-02
Payer: COMMERCIAL

## 2018-08-02 ENCOUNTER — TELEPHONE (OUTPATIENT)
Dept: INTERNAL MEDICINE | Age: 63
End: 2018-08-02

## 2018-08-02 LAB
ALLEN TEST: ABNORMAL
ANION GAP SERPL CALCULATED.3IONS-SCNC: 14 MMOL/L (ref 9–17)
BUN BLDV-MCNC: 4 MG/DL (ref 8–23)
BUN/CREAT BLD: ABNORMAL (ref 9–20)
CALCIUM SERPL-MCNC: 8.6 MG/DL (ref 8.6–10.4)
CARBOXYHEMOGLOBIN: 1.5 % (ref 0–5)
CHLORIDE BLD-SCNC: 99 MMOL/L (ref 98–107)
CO2: 24 MMOL/L (ref 20–31)
CREAT SERPL-MCNC: <0.4 MG/DL (ref 0.5–0.9)
CULTURE: ABNORMAL
DIRECT EXAM: ABNORMAL
FIO2: 25
GFR AFRICAN AMERICAN: ABNORMAL ML/MIN
GFR NON-AFRICAN AMERICAN: ABNORMAL ML/MIN
GFR SERPL CREATININE-BSD FRML MDRD: ABNORMAL ML/MIN/{1.73_M2}
GFR SERPL CREATININE-BSD FRML MDRD: ABNORMAL ML/MIN/{1.73_M2}
GLUCOSE BLD-MCNC: 92 MG/DL (ref 70–99)
HCO3 ARTERIAL: 25.9 MMOL/L (ref 22–26)
HCT VFR BLD CALC: 45.9 % (ref 36–46)
HEMOGLOBIN: 15.4 G/DL (ref 12–16)
Lab: ABNORMAL
MAGNESIUM: 2 MG/DL (ref 1.6–2.6)
MCH RBC QN AUTO: 30.5 PG (ref 26–34)
MCHC RBC AUTO-ENTMCNC: 33.6 G/DL (ref 31–37)
MCV RBC AUTO: 90.7 FL (ref 80–100)
METHEMOGLOBIN: 0.4 % (ref 0–1.9)
MODE: ABNORMAL
NEGATIVE BASE EXCESS, ART: ABNORMAL MMOL/L (ref 0–2)
NOTIFICATION TIME: ABNORMAL
NOTIFICATION: ABNORMAL
NRBC AUTOMATED: NORMAL PER 100 WBC
O2 DEVICE/FLOW/%: ABNORMAL
O2 SAT, ARTERIAL: 92.4 % (ref 95–98)
ORGANISM: ABNORMAL
OXYHEMOGLOBIN: ABNORMAL % (ref 95–98)
PATIENT TEMP: 37
PCO2 ARTERIAL: 36.7 MMHG (ref 35–45)
PCO2, ART, TEMP ADJ: ABNORMAL (ref 35–45)
PDW BLD-RTO: 14.5 % (ref 11.5–14.9)
PEEP/CPAP: 5
PH ARTERIAL: 7.46 (ref 7.35–7.45)
PH, ART, TEMP ADJ: ABNORMAL (ref 7.35–7.45)
PLATELET # BLD: 204 K/UL (ref 150–450)
PMV BLD AUTO: 9.3 FL (ref 6–12)
PO2 ARTERIAL: 64 MMHG (ref 80–100)
PO2, ART, TEMP ADJ: ABNORMAL MMHG (ref 80–100)
POSITIVE BASE EXCESS, ART: 2.1 MMOL/L (ref 0–2)
POTASSIUM SERPL-SCNC: 3.1 MMOL/L (ref 3.7–5.3)
PSV: 7
PT. POSITION: ABNORMAL
RBC # BLD: 5.06 M/UL (ref 4–5.2)
RESPIRATORY RATE: ABNORMAL
SAMPLE SITE: ABNORMAL
SET RATE: ABNORMAL
SODIUM BLD-SCNC: 137 MMOL/L (ref 135–144)
SPECIMEN DESCRIPTION: ABNORMAL
STATUS: ABNORMAL
TEXT FOR RESPIRATORY: ABNORMAL
TOTAL HB: ABNORMAL G/DL (ref 12–16)
TOTAL RATE: 18
VT: ABNORMAL
WBC # BLD: 8.7 K/UL (ref 3.5–11)

## 2018-08-02 PROCEDURE — C9254 INJECTION, LACOSAMIDE: HCPCS | Performed by: PSYCHIATRY & NEUROLOGY

## 2018-08-02 PROCEDURE — 6360000002 HC RX W HCPCS: Performed by: PSYCHIATRY & NEUROLOGY

## 2018-08-02 PROCEDURE — S0028 INJECTION, FAMOTIDINE, 20 MG: HCPCS | Performed by: STUDENT IN AN ORGANIZED HEALTH CARE EDUCATION/TRAINING PROGRAM

## 2018-08-02 PROCEDURE — 2000000000 HC ICU R&B

## 2018-08-02 PROCEDURE — 80048 BASIC METABOLIC PNL TOTAL CA: CPT

## 2018-08-02 PROCEDURE — 36415 COLL VENOUS BLD VENIPUNCTURE: CPT

## 2018-08-02 PROCEDURE — 94640 AIRWAY INHALATION TREATMENT: CPT

## 2018-08-02 PROCEDURE — G9162 LANG EXPRESS CURRENT STATUS: HCPCS

## 2018-08-02 PROCEDURE — 6370000000 HC RX 637 (ALT 250 FOR IP): Performed by: INTERNAL MEDICINE

## 2018-08-02 PROCEDURE — G9160 LANG COMP GOAL STATUS: HCPCS

## 2018-08-02 PROCEDURE — G9159 LANG COMP CURRENT STATUS: HCPCS

## 2018-08-02 PROCEDURE — 94762 N-INVAS EAR/PLS OXIMTRY CONT: CPT

## 2018-08-02 PROCEDURE — 94003 VENT MGMT INPAT SUBQ DAY: CPT

## 2018-08-02 PROCEDURE — 36600 WITHDRAWAL OF ARTERIAL BLOOD: CPT

## 2018-08-02 PROCEDURE — 94664 DEMO&/EVAL PT USE INHALER: CPT

## 2018-08-02 PROCEDURE — 6360000002 HC RX W HCPCS: Performed by: STUDENT IN AN ORGANIZED HEALTH CARE EDUCATION/TRAINING PROGRAM

## 2018-08-02 PROCEDURE — 82805 BLOOD GASES W/O2 SATURATION: CPT

## 2018-08-02 PROCEDURE — 2580000003 HC RX 258: Performed by: INTERNAL MEDICINE

## 2018-08-02 PROCEDURE — 71045 X-RAY EXAM CHEST 1 VIEW: CPT

## 2018-08-02 PROCEDURE — 2500000003 HC RX 250 WO HCPCS: Performed by: INTERNAL MEDICINE

## 2018-08-02 PROCEDURE — 85027 COMPLETE CBC AUTOMATED: CPT

## 2018-08-02 PROCEDURE — 92523 SPEECH SOUND LANG COMPREHEN: CPT

## 2018-08-02 PROCEDURE — 2580000003 HC RX 258: Performed by: PSYCHIATRY & NEUROLOGY

## 2018-08-02 PROCEDURE — 6360000002 HC RX W HCPCS: Performed by: INTERNAL MEDICINE

## 2018-08-02 PROCEDURE — 6360000002 HC RX W HCPCS

## 2018-08-02 PROCEDURE — 94770 HC ETCO2 MONITOR DAILY: CPT

## 2018-08-02 PROCEDURE — G9163 LANG EXPRESS GOAL STATUS: HCPCS

## 2018-08-02 PROCEDURE — 83735 ASSAY OF MAGNESIUM: CPT

## 2018-08-02 PROCEDURE — 99233 SBSQ HOSP IP/OBS HIGH 50: CPT | Performed by: INTERNAL MEDICINE

## 2018-08-02 PROCEDURE — 2580000003 HC RX 258: Performed by: STUDENT IN AN ORGANIZED HEALTH CARE EDUCATION/TRAINING PROGRAM

## 2018-08-02 PROCEDURE — 2500000003 HC RX 250 WO HCPCS: Performed by: STUDENT IN AN ORGANIZED HEALTH CARE EDUCATION/TRAINING PROGRAM

## 2018-08-02 RX ORDER — METHYLPREDNISOLONE SODIUM SUCCINATE 125 MG/2ML
60 INJECTION, POWDER, LYOPHILIZED, FOR SOLUTION INTRAMUSCULAR; INTRAVENOUS EVERY 6 HOURS
Status: DISCONTINUED | OUTPATIENT
Start: 2018-08-02 | End: 2018-08-03

## 2018-08-02 RX ORDER — METHYLPREDNISOLONE SODIUM SUCCINATE 125 MG/2ML
INJECTION, POWDER, LYOPHILIZED, FOR SOLUTION INTRAMUSCULAR; INTRAVENOUS
Status: COMPLETED
Start: 2018-08-02 | End: 2018-08-02

## 2018-08-02 RX ORDER — METHYLPREDNISOLONE SODIUM SUCCINATE 125 MG/2ML
125 INJECTION, POWDER, LYOPHILIZED, FOR SOLUTION INTRAMUSCULAR; INTRAVENOUS ONCE
Status: COMPLETED | OUTPATIENT
Start: 2018-08-02 | End: 2018-08-02

## 2018-08-02 RX ADMIN — LEVOTHYROXINE SODIUM ANHYDROUS 25 MCG: 100 INJECTION, POWDER, LYOPHILIZED, FOR SOLUTION INTRAVENOUS at 06:05

## 2018-08-02 RX ADMIN — LEVETIRACETAM 1250 MG: 100 INJECTION, SOLUTION INTRAVENOUS at 08:04

## 2018-08-02 RX ADMIN — DEXTROSE MONOHYDRATE 100 MG: 50 INJECTION, SOLUTION INTRAVENOUS at 09:27

## 2018-08-02 RX ADMIN — SODIUM CHLORIDE: 9 INJECTION, SOLUTION INTRAVENOUS at 06:05

## 2018-08-02 RX ADMIN — RACEPINEPHRINE HYDROCHLORIDE 11.25 MG: 11.25 SOLUTION RESPIRATORY (INHALATION) at 12:00

## 2018-08-02 RX ADMIN — METHYLPREDNISOLONE SODIUM SUCCINATE 125 MG: 125 INJECTION, POWDER, LYOPHILIZED, FOR SOLUTION INTRAMUSCULAR; INTRAVENOUS at 11:56

## 2018-08-02 RX ADMIN — POTASSIUM CHLORIDE 10 MEQ: 10 INJECTION, SOLUTION INTRAVENOUS at 06:05

## 2018-08-02 RX ADMIN — METHYLPREDNISOLONE SODIUM SUCCINATE 60 MG: 125 INJECTION, POWDER, FOR SOLUTION INTRAMUSCULAR; INTRAVENOUS at 18:22

## 2018-08-02 RX ADMIN — Medication 10 ML: at 07:43

## 2018-08-02 RX ADMIN — METOPROLOL TARTRATE 5 MG: 5 INJECTION, SOLUTION INTRAVENOUS at 05:26

## 2018-08-02 RX ADMIN — LEVETIRACETAM 1250 MG: 100 INJECTION, SOLUTION INTRAVENOUS at 21:04

## 2018-08-02 RX ADMIN — METOPROLOL TARTRATE 5 MG: 5 INJECTION, SOLUTION INTRAVENOUS at 07:42

## 2018-08-02 RX ADMIN — POTASSIUM CHLORIDE 10 MEQ: 10 INJECTION, SOLUTION INTRAVENOUS at 09:31

## 2018-08-02 RX ADMIN — METHYLPREDNISOLONE SODIUM SUCCINATE 125 MG: 125 INJECTION, POWDER, FOR SOLUTION INTRAMUSCULAR; INTRAVENOUS at 11:56

## 2018-08-02 RX ADMIN — VANCOMYCIN HYDROCHLORIDE 1000 MG: 1 INJECTION, POWDER, LYOPHILIZED, FOR SOLUTION INTRAVENOUS at 22:04

## 2018-08-02 RX ADMIN — PIPERACILLIN SODIUM AND TAZOBACTAM SODIUM 3.38 G: 3; .375 INJECTION, POWDER, LYOPHILIZED, FOR SOLUTION INTRAVENOUS at 00:39

## 2018-08-02 RX ADMIN — DEXTROSE MONOHYDRATE 100 MG: 50 INJECTION, SOLUTION INTRAVENOUS at 22:00

## 2018-08-02 RX ADMIN — FAMOTIDINE 20 MG: 10 INJECTION, SOLUTION INTRAVENOUS at 07:42

## 2018-08-02 RX ADMIN — FAMOTIDINE 20 MG: 10 INJECTION, SOLUTION INTRAVENOUS at 21:05

## 2018-08-02 RX ADMIN — METOPROLOL TARTRATE 5 MG: 5 INJECTION, SOLUTION INTRAVENOUS at 13:33

## 2018-08-02 RX ADMIN — VANCOMYCIN HYDROCHLORIDE 1000 MG: 1 INJECTION, POWDER, LYOPHILIZED, FOR SOLUTION INTRAVENOUS at 11:33

## 2018-08-02 RX ADMIN — Medication 10 ML: at 21:05

## 2018-08-02 RX ADMIN — SODIUM CHLORIDE: 9 INJECTION, SOLUTION INTRAVENOUS at 11:34

## 2018-08-02 RX ADMIN — POTASSIUM CHLORIDE 10 MEQ: 10 INJECTION, SOLUTION INTRAVENOUS at 07:48

## 2018-08-02 RX ADMIN — ENOXAPARIN SODIUM 40 MG: 40 INJECTION SUBCUTANEOUS at 07:42

## 2018-08-02 RX ADMIN — METOPROLOL TARTRATE 5 MG: 5 INJECTION, SOLUTION INTRAVENOUS at 21:05

## 2018-08-02 RX ADMIN — POTASSIUM CHLORIDE 10 MEQ: 10 INJECTION, SOLUTION INTRAVENOUS at 11:34

## 2018-08-02 ASSESSMENT — PAIN SCALES - GENERAL: PAINLEVEL_OUTOF10: 0

## 2018-08-02 ASSESSMENT — PULMONARY FUNCTION TESTS
PIF_VALUE: 12

## 2018-08-02 NOTE — PROGRESS NOTES
[address] Neurology  Progress Note    Requesting Physician:  Sandra Grullon MD/Gaetano Thompson MD     CHIEF COMPLAINT:     Follow up for:    Extubated. No seizures. Slowly. Right spastic hemiparesis. No overt signs of anticonvulsant toxicity. Rehabilitation. Continue Keppra and Vimpat. Apparently, admission Keppra level was undetectable. HISTORY OF PRESENT ILLNESS:                 The patient is a 61 y.o. female who presents with     Allergies:  Patient has no known allergies. Current Medications:   Scheduled Meds:   vancomycin  1,000 mg Intravenous Q12H    vancomycin (VANCOCIN) intermittent dosing (placeholder)   Other RX Placeholder    methylPREDNISolone  60 mg Intravenous Q6H    racepinephrine HCl        metoprolol  5 mg Intravenous Q6H    lacosamide (VIMPAT) IVPB  100 mg Intravenous BID    sodium chloride flush  10 mL Intravenous 2 times per day    sodium chloride flush  10 mL Intravenous 2 times per day    enoxaparin  40 mg Subcutaneous Daily    famotidine (PEPCID) injection  20 mg Intravenous BID    levothyroxine  25 mcg Intravenous Daily    levetiracetam  1,250 mg Intravenous BID     Continuous Infusions:   sodium chloride 75 mL/hr at 08/02/18 1134     PRN Meds:.racepinephrine HCl, enalaprilat, sodium chloride flush, acetaminophen, sodium chloride flush, magnesium hydroxide, ondansetron, potassium chloride, lidocaine PF, albuterol sulfate HFA **AND** ipratropium **AND** MDI Treatment, LORazepam **OR** LORazepam     REVIEW OF SYSTEMS:             PHYSICAL EXAM:       /79   Pulse 70   Temp 98.7 °F (37.1 °C) (Axillary)   Resp 15   Ht 5' 10\" (1.778 m)   Wt 160 lb 1.6 oz (72.6 kg)   SpO2 97%   BMI 22.97 kg/m²       LABS AND IMAGING:       Admission on 07/30/2018   No results displayed because visit has over 200 results. Results for Areli Albright (MRN [id number]) as of 8/2/2018 19:50   Ref.  Range 1/14/2018 13:10 1/16/2018 17:04 5/13/2018 06:20 ORDERING SYSTEM PROVIDED HISTORY: fall TECHNOLOGIST PROVIDED HISTORY: Ordering Physician Provided Reason for Exam: new onset seizures Acuity: Acute Type of Exam: Initial FINDINGS: BONES/ALIGNMENT:  There is straightening of the normal cervical lordosis which may relate to patient positioning or muscle spasm. There is no evidence of an acute cervical spine fracture. There is normal alignment of the cervical spine. DEGENERATIVE CHANGES: There is multilevel degenerative disc disease, advanced at C5-C6 and C6-7. There also hypertrophic degenerative changes facet and uncovertebral joints throughout the cervical spine as well as the atlantoaxial joint. These findings contribute to moderate bilateral neural foraminal narrowing at C5-C6 and moderate left neural foraminal narrowing at C6-7. Disc osteophyte complexes at C5-C6 and C6-C7 cause mild impingement of the ventral thecal sac at these levels. SOFT TISSUES: There is no prevertebral soft tissue swelling. Visualized lung apices are well aerated. Multilevel degenerative disc disease and hypertrophic degenerative changes of the cervical spine, but no CT evidence for acute osseous abnormality. ASSESSMENT AND PLAN:       Patient Active Problem List   Diagnosis    Acquired hypothyroidism    Essential hypertension    Seizure disorder ([address]    Urge incontinence    Generalized anxiety disorder    Gastroesophageal reflux disease without esophagitis    H/O: CVA (cerebrovascular accident)    Migraine without aura and without status migrainosus, not intractable    Hammer toe of left foot    Tremor of both hands    Status epilepticus ([address]    Moderate malnutrition ([address]         Seda Reina M.D.   Neurology  8/2/2018  7:50 PM

## 2018-08-02 NOTE — PROGRESS NOTES
ICU Progress Note (Vent)   [facility]    Patient - Genet Banks,  Age - 61 y.o.    - 1955      Room Number -    MRN -  [id number]   [facility] - [de-identified]  Date of Admission -  2018 11:50 AM    Events of Past 24 Hours   Patient seen and examined at bedside, no acute events overnight. Patient did not show any seizure like activity. She has been off of sedation and on the seizure meds Keppra and Vimpat, both BID. Tolerated on CPAP with PS overnight and this AM. Will attempt to extubate. Vitals    height is 5' 10\" (1.778 m) and weight is 160 lb 1.6 oz (72.6 kg). Her axillary temperature is 98.2 °F (36.8 °C). Her blood pressure is 119/101 (abnormal) and her pulse is 59. Her respiration is 16 and oxygen saturation is 99%. Temperature Range: Temp: 98.2 °F (36.8 °C) Temp  Av.2 °F (36.8 °C)  Min: 97.9 °F (36.6 °C)  Max: 98.8 °F (37.1 °C)  BP Range:  Systolic (75NGB), LCR:639 , Min:110 , IPZ:645     Diastolic (33IRR), GBK:15, Min:61, Max:101    Pulse Range: Pulse  Av.9  Min: 59  Max: 73  Respiration Range: Resp  Avg: 15.1  Min: 6  Max: 23  Current Pulse Ox[de-identified]  SpO2: 99 %  24HR Pulse Ox Range:  SpO2  Av.6 %  Min: 99 %  Max: 100 %  Oxygen Amount and Delivery: O2 Flow Rate (L/min): 2 L/min      Wt Readings from Last 3 Encounters:   18 160 lb 1.6 oz (72.6 kg)   18 153 lb (69.4 kg)   18 155 lb 10.3 oz (70.6 kg)     I/O       Intake/Output Summary (Last 24 hours) at 18 0857  Last data filed at 18 0825   Gross per 24 hour   Intake             1080 ml   Output             4325 ml   Net            -3245 ml     I/O last 3 completed shifts:   In: 1080 [I.V.:1020; NG/GT:60]  Out: 4010 [Urine:4000; Emesis/NG output:10]     DRAIN/TUBE OUTPUT:     Invasive Lines   ETT Day -   3  Lines -  0    ICP PRESSURE RANGE:  No Data Recorded  CVP PRESSURE RANGE:  No Data Recorded  Mechanical Ventilation Data   SETTINGS (Comprehensive)  Vent Information  Ventilator Started: Yes  Ventilation Day(s): 4  Vent Type: Servo i  Vent Mode: CPAP  Vt Ordered: 500 mL  Rate Set: 14 bmp  Pressure Support: 7 cmH20  FiO2 : 25 %  Sensitivity: 5  PEEP/CPAP: 5  I Time/ I Time %: 0.9 s  Cuff Pressure (cm H2O): 24 cm H2O  Humidification Source: E  Nitric Oxide/Epoprostenol In Use?: No  Additional Respiratory  Assessments  Pulse: 59  Resp: 16  SpO2: 99 %  End Tidal CO2: 35 (%)  Position: Semi-Crane's  Humidification Source: HME  Oral Care Completed?: Yes  Oral Care: Mouth suctioned  Cuff Pressure (cm H2O): 24 cm H2O       ABGs:   Lab Results   Component Value Date    PHART 7.458 08/02/2018    PO2ART 64.0 08/02/2018    BXM1LUN 36.7 08/02/2018       Lab Results   Component Value Date    MODE CPAP 08/02/2018         Medications   IV   sodium chloride 75 mL/hr at 08/02/18 0605    propofol Stopped (08/01/18 0752)      metoprolol  5 mg Intravenous Q6H    lacosamide (VIMPAT) IVPB  100 mg Intravenous BID    sodium chloride flush  10 mL Intravenous 2 times per day    sodium chloride flush  10 mL Intravenous 2 times per day    enoxaparin  40 mg Subcutaneous Daily    famotidine (PEPCID) injection  20 mg Intravenous BID    levothyroxine  25 mcg Intravenous Daily    levetiracetam  1,250 mg Intravenous BID    piperacillin-tazobactam (ZOSYN) 3.375 g in dextrose 5% IVPB extended infusion (mini-bag)  3.375 g Intravenous Q8H       Diet/Nutrition   Diet NPO Effective Now    Exam   VITALS    height is 5' 10\" (1.778 m) and weight is 160 lb 1.6 oz (72.6 kg). Her axillary temperature is 98.2 °F (36.8 °C). Her blood pressure is 119/101 (abnormal) and her pulse is 59. Her respiration is 16 and oxygen saturation is 99%.    Ventilator Settings (Basic)  Vent Mode: CPAP Rate Set: 14 bmp/Vt Ordered: 500 mL/ /FiO2 : 25 %    Constitutional - Sedated  General Appearance  well developed, well nourished  HEENT - Life support devices in place (ET, OG),normocephalic, results for input(s): BNP in the last 72 hours. Cultures       Radiology     Plain Films         Chest x-ray shows endotracheal tube in good position, minimal basilar infiltrate     SYSTEM ASSESSMENT    Status epilepticus  Possible anoxia  Acute respiratory failure due to inability to protect airway  Possible aspiration pneumonia    Neuro   Continue antiepileptics per neurology  Patient is off sedation to see if there is any seizure activity  Concern for possible anoxia exists, she was found unresponsive by her family  Baseline mental status as per chart review is within normal limits    Respiratory   Wean oxygen as tolerated. Keep O2 sat > 88%  Tolerated CPAP and pressure support yesterday and overnight, will attempt to extubate this AM if patient can protect her airway  Hemodynamics   Blood pressures improved overnight and this AM    Gastrointestinal/Nutrition   Plan to extubate patient today, will assess diet afterwards    Renal   IV fluids at 75 mL    Infectious Disease   Stop Zosyn since radiographically there is no evidence of aspiration  Staph aureus moderate growth on sputum culture, CXR showed no consolidation or pleural effusions    Hematology/Oncology   On DVT prophylaxis    Endocrine   Blood glucose within normal limits    Social/Spiritual/DNR/Disposition/Other     ICU nursing staff spoke to sister this AM and explained current status    Critical Care Time   0 min    Electronically signed by Terell Mcgovern MD on 8/2/2018 at 8:57 AM    Patient seen and examined independently by me. Above discussed and I agree with resident note except where indicated in the EMR revision history. Also see my additional comments and changes indicated by discrete font, text color, italics, and/or initials. Labs, cultures, and radiographs where available were reviewed.        She is much more alert and follows commands although there is right upper extremity hemiparesis  No evidence of pneumonia, staph in sputum may be

## 2018-08-02 NOTE — PLAN OF CARE
Problem: Restraint Use - Nonviolent/Non-Self-Destructive Behavior:  Goal: Absence of restraint indications  Absence of restraint indications   Outcome: Ongoing  Pt in wrist restraints. Pt attempts to pull out ET tube when released for ROM. Goal: Absence of restraint-related injury  Absence of restraint-related injury   Outcome: Ongoing  Pt in wrist restraints. Pt attempts to pull out ET tube when released for ROM. Problem: Health Behavior:  Goal: Ability to manage health-related needs will improve  Ability to manage health-related needs will improve   Outcome: Ongoing  No seizure activity noted this shift. Problem: Nutrition  Goal: Optimal nutrition therapy  Outcome: Ongoing  Pt is currently NPO due to intubation. Problem: Risk for Impaired Skin Integrity  Goal: Tissue integrity - skin and mucous membranes  Structural intactness and normal physiological function of skin and  mucous membranes. Outcome: Ongoing  Patient turned q 2 hours when in bed. Pressure redistribution device(s) in use. Barrier cream applied when providing soren care. Problem: Falls - Risk of:  Goal: Will remain free from falls  Will remain free from falls   Outcome: Ongoing  Pt remains free from falls. Call light within reach.

## 2018-08-02 NOTE — PROGRESS NOTES
[address] [facility]    PROGRESS NOTE             8/2/2018    8:09 AM    Name:   Donte Ramos  MRN:     [id number]     Acct:      [de-identified]   Room:   2003/2003-01   Day:  3  Admit Date:  7/30/2018 11:50 AM    PCP:   Darcie Pascual MD  Code Status:  Full Code    Subjective:     C/C:   Chief Complaint   Patient presents with    Seizures   LOC     Interval History Status: improved. Patient had no seizure activity overnight. The patient tolerated CPAP with pressure overnight and has been off sedation. The patient had one period overnight where respiration dropped however the nurse was able to arouse the patient and CPAP was able to be continued. She is following verbal commands today. No major episodes of agitation this morning or overnight. The patient denies pain. Brief History:     Pat Josue is a 61 y.o. Female presenting to the ED with seizures. The patient was found by her sister likely post ictal on 7/30, she was brought to ED by EMS and restarted seizing in the ED. There she was given ativan, dilantin and keppra + intubated and sedated with propofol. She was admitted to the hospital for management of seizure disorder and status epilepticus. The patient has had history of status epilepticus in the past.     Review of Systems:     ROS: Patient is intubated   Denies pain       Medications:      Allergies:  No Known Allergies    Current Meds:   Scheduled Meds:    metoprolol  5 mg Intravenous Q6H    lacosamide (VIMPAT) IVPB  100 mg Intravenous BID    sodium chloride flush  10 mL Intravenous 2 times per day    sodium chloride flush  10 mL Intravenous 2 times per day    enoxaparin  40 mg Subcutaneous Daily    famotidine (PEPCID) injection  20 mg Intravenous BID    levothyroxine  25 mcg Intravenous Daily    levetiracetam  1,250 mg Intravenous BID    piperacillin-tazobactam (ZOSYN) 3.375 g in dextrose 5% IVPB extended infusion stomach. Shallow lung inflation. Linear opacities in the lung bases are again noted. There is no evidence for pneumothorax or focal area of consolidation. No effusion is appreciated. The cardiac and mediastinal contours are unchanged in appearance. Sequela of old granulomatous disease is noted. Intubation with the endotracheal tip at the level of the clavicles. Shallow inflation with findings consistent with basilar atelectasis. Xr Chest Portable    Result Date: 7/30/2018  EXAMINATION: SINGLE XRAY VIEW OF THE CHEST 7/30/2018 12:32 pm COMPARISON: May 15, 2018 HISTORY: ORDERING SYSTEM PROVIDED HISTORY: eval TECHNOLOGIST PROVIDED HISTORY: Reason for exam:->eval Ordering Physician Provided Reason for Exam: seizures Acuity: Unknown Type of Exam: Unknown FINDINGS: One image obtained. Cardiac monitoring leads overlie the chest.  Interval extubation and removal of enteric tube. The cardiomediastinal contours are stable. No pleural effusion or pneumothorax. Bibasilar atelectasis without focal airspace consolidation. No free subdiaphragmatic air. 1. No acute cardiopulmonary disease. 2. Bibasilar atelectasis.          Physical Examination:        Physical Exam  Constitutional:  well developed, well nourished  Neurological: alert, eyes open follows verbal commands   HEENT: normocephalic, ET tube in place, PERRLA, nontraumatic  Cardiovascular: Normal rate, rhythm, no Murmur, gallops or rubs  Pulmonary: breath sounds normal, no wheezes, on CPAP rate set 14, vt ordered 500mL FiO2 25%  Abdomen: soft, non-tender, non-distended   Skin: dry and intact  Extremities: no peripheral edema or cyanosis, right hemipalegia     Assessment:        Primary Problem  Status epilepticus [facility])    Active Hospital Problems    Diagnosis Date Noted    Status epilepticus ([facility] Loli Merck 05/13/2018    H/O: CVA (cerebrovascular accident) [Z86.73] 05/06/2014    Acquired hypothyroidism [E03.9] 03/27/2012    Essential hypertension [I10]

## 2018-08-02 NOTE — PROGRESS NOTES
Residents notified regarding stridor and meds given. To evaluate patient at bedside.     Electronically signed by Nuria Lao RN on 8/2/2018 at 1:23 PM

## 2018-08-02 NOTE — DISCHARGE SUMMARY
[address]    Discharge Summary     Patient ID: Vinie Boas  :  1955   MRN: [id number]     ACCOUNT:  [de-identified]   Patient's PCP: Carlos Grewal MD  Admit Date: 2018   Discharge Date: 2018     Length of Stay: 8  Code Status:  Full Code  Admitting Physician: Justin Vanegas MD  Discharge Physician: Herminio Miller MD     Active Discharge Diagnoses:       Primary Problem  Status epilepticus [facility])      [facility] Problems    Diagnosis Date Noted    Status epilepticus ([facility] [place] 2018    H/O: CVA (cerebrovascular accident) [Z86.73] 2014    Acquired hypothyroidism [E03.9] 2012    Essential hypertension [I10] 2012    [facility]iz[facility] ([facility] [K75.108] 2012       Admission Condition:  poor     Discharged Condition: fair    Hospital Stay:       Hospital Course:  Vinie Boas is a 61 y.o. female who was admitted for the management of  Status epilepticus ([facility] , presented to ER with Seizures    The patient has history of seizure disorder with pervious status epilepticus. She was found down by her sister on  likely post ictal and was brought to the ED by EMS. The patient started seizing again in the ED and was given ativan, dilantin and keppra. She was intubated at this time and admitted to the hospital for management of seizure and status epilepticus. Patient was extubated on 18. Treated with vancomycin for sputum culture positive MRSA. Patient had swallow study and back to normal diet.  Home on Keppra and Vimpat       Significant therapeutic interventions: intubated     Significant Diagnostic Studies:   Labs / Micro:  CBC:   Lab Results   Component Value Date    WBC 9.8 2018    RBC 4.28 2018    RBC 4.21 10/04/2011    HGB 13.3 2018    HCT 39.0 2018    MCV 91.0 2018    MCH 31.1 2018    MCHC 34.1 2018    RDW 14.9 2018  08/07/2018     10/04/2011     BMP:    Lab Results   Component Value Date    GLUCOSE 108 08/07/2018    GLUCOSE 68 03/27/2012     08/07/2018    K 3.6 08/07/2018     08/07/2018    CO2 23 08/07/2018    ANIONGAP 13 08/07/2018    BUN 9 08/07/2018    CREATININE 0.44 08/07/2018    BUNCRER NOT REPORTED 08/07/2018    CALCIUM 8.3 08/07/2018    LABGLOM >60 08/07/2018    GFRAA >60 08/07/2018    GFR      08/07/2018    GFR NOT REPORTED 08/07/2018     TSH:    Lab Results   Component Value Date    TSH 1.56 07/30/2018         blood culture: negative, sputum culture: positive for staph aureus     Radiology:    Ct Head Wo Contrast    Result Date: 7/30/2018  EXAMINATION: CT OF THE HEAD WITHOUT CONTRAST  7/30/2018 12:58 pm TECHNIQUE: CT of the head was performed without the administration of intravenous contrast. Dose modulation, iterative reconstruction, and/or weight based adjustment of the mA/kV was utilized to reduce the radiation dose to as low as reasonably achievable. COMPARISON: 05/13/2018 HISTORY: ORDERING SYSTEM PROVIDED HISTORY: AMS from seizure TECHNOLOGIST PROVIDED HISTORY: Ordering Physician Provided Reason for Exam: new onset seizures Acuity: Acute Type of Exam: Initial FINDINGS: BRAIN/VENTRICLES: There is no acute intracranial hemorrhage, mass effect or midline shift. No abnormal extra-axial fluid collection. Stable right MCA distribution encephalomalacia with associated mild ex vacuo dilatation right lateral ventricle consistent with remote infarct. No CT evidence of an acute infarct. No evidence of hydrocephalus. ORBITS: The visualized portion of the orbits demonstrate no acute abnormality. SINUSES: The visualized paranasal sinuses and mastoid air cells demonstrate no acute abnormality. SOFT TISSUES/SKULL:  No acute abnormality of the visualized skull or soft tissues. Stable CT brain with no acute intracranial abnormality and re- demonstration of remote right MCA infarct.      Ct

## 2018-08-02 NOTE — PROGRESS NOTES
Speech Language Pathology  Facility/Department: [facility] ICU  Initial Speech/Language/Cognitive Assessment    NAME: Sarina Freeman  : 1955   MRN: [id number]  ADMISSION DATE: 2018  ADMITTING DIAGNOSIS: has Acquired hypothyroidism; Essential hypertension; Seizure disorder ([address] Urge incontinence; Generalized anxiety disorder; Gastroesophageal reflux disease without esophagitis; H/O: CVA (cerebrovascular accident); Migraine without aura and without status migrainosus, not intractable; Hammer toe of left foot; Tremor of both hands; Status epilepticus ([address] and Moderate malnutrition ([address] on her problem list.    Date of Eval: 2018   Evaluating Therapist: ROBBI Ramirez    RECENT RESULTS  CT OF HEAD/MRI:   - CT brain-   Stable CT brain with no acute intracranial abnormality and re- demonstration   of remote right MCA infarct. Primary Complaint:   Found on ground at home by her sister, whom she lives with. Unknown unresponsiveness time. H/o seizure disorders. Pt. Was responsive only to painful stimuli upon arrival to ER. Pt. Has H/O CVA. Pt. Was intubated upon arrival to ER, extubated today     Pain:  Pain Assessment  Patient Currently in Pain: Denies    Assessment:      Diagnosis: Pt. demonstrates severe dysphonia for reduced vocal intensity (extubated at 1100 today), moderately reduced comprehension and expression for simple information. Pt. demonstrates very long response latency. ST to treat deficits to bring expression and comprehension to a functional level. Premorbid status is unknown, family was not present to provide. Recommendations:  Requires SLP Intervention: Yes     D/C Recommendations: SNF       Plan:   Goals:  Short-term Goals  Goal 1: Increase following simple commands and y/n questions to 60% accuracy  Goal 2: Increase orientation to 60%. Goal 3: Further assessment of cognition as pt. progress allows.     Patient/family involved in developing goals and

## 2018-08-02 NOTE — TELEPHONE ENCOUNTER
Pt given OC-Light Hemoccult test kit 7/18/18; it has not been returned within  2 weeks. PC to pt; LM; letter sent to pt.

## 2018-08-02 NOTE — LETTER
[address] [facility] [address] [facility]  Phone: [phone]  Fax: [phone]    Tereza Epps MD        August 2, 2018    [place]  [address] [address]      Dear Kristofer Luz:    During your last appointment with us on 7/18/18 with Dr. Lawrence Wong, you were given the OC-Light hemoccult test kit to provide a sample of your stool in lieu of scheduling a colonoscopy. The kit has not yet been returned to us. Please follow the instructions on the envelope provided to you with the kit and return it to the clinic either by mail or in person at your earliest convenience. We are on the 2nd floor. Keep in mind that once a sample has been collected, it is good for up to 15 days in room temperature, or 30 days refrigerated. If you have any questions or concerns, please don't hesitate to call.     Sincerely,        Tereza Epps MD

## 2018-08-03 LAB
ANION GAP SERPL CALCULATED.3IONS-SCNC: 15 MMOL/L (ref 9–17)
BUN BLDV-MCNC: 7 MG/DL (ref 8–23)
BUN/CREAT BLD: ABNORMAL (ref 9–20)
CALCIUM SERPL-MCNC: 8.6 MG/DL (ref 8.6–10.4)
CHLORIDE BLD-SCNC: 102 MMOL/L (ref 98–107)
CO2: 23 MMOL/L (ref 20–31)
CREAT SERPL-MCNC: <0.4 MG/DL (ref 0.5–0.9)
GFR AFRICAN AMERICAN: ABNORMAL ML/MIN
GFR NON-AFRICAN AMERICAN: ABNORMAL ML/MIN
GFR SERPL CREATININE-BSD FRML MDRD: ABNORMAL ML/MIN/{1.73_M2}
GFR SERPL CREATININE-BSD FRML MDRD: ABNORMAL ML/MIN/{1.73_M2}
GLUCOSE BLD-MCNC: 102 MG/DL (ref 70–99)
HCT VFR BLD CALC: 41.7 % (ref 36–46)
HEMOGLOBIN: 14 G/DL (ref 12–16)
KEPPRA: 32 UG/ML
MCH RBC QN AUTO: 30.6 PG (ref 26–34)
MCHC RBC AUTO-ENTMCNC: 33.7 G/DL (ref 31–37)
MCV RBC AUTO: 91 FL (ref 80–100)
NRBC AUTOMATED: NORMAL PER 100 WBC
PDW BLD-RTO: 14.8 % (ref 11.5–14.9)
PLATELET # BLD: 230 K/UL (ref 150–450)
PMV BLD AUTO: 9.6 FL (ref 6–12)
POTASSIUM SERPL-SCNC: 3.6 MMOL/L (ref 3.7–5.3)
PROCALCITONIN: 0.07 NG/ML
RBC # BLD: 4.58 M/UL (ref 4–5.2)
SODIUM BLD-SCNC: 140 MMOL/L (ref 135–144)
WBC # BLD: 8.4 K/UL (ref 3.5–11)

## 2018-08-03 PROCEDURE — 2580000003 HC RX 258: Performed by: STUDENT IN AN ORGANIZED HEALTH CARE EDUCATION/TRAINING PROGRAM

## 2018-08-03 PROCEDURE — 92610 EVALUATE SWALLOWING FUNCTION: CPT

## 2018-08-03 PROCEDURE — 6360000002 HC RX W HCPCS: Performed by: STUDENT IN AN ORGANIZED HEALTH CARE EDUCATION/TRAINING PROGRAM

## 2018-08-03 PROCEDURE — C9254 INJECTION, LACOSAMIDE: HCPCS | Performed by: PSYCHIATRY & NEUROLOGY

## 2018-08-03 PROCEDURE — 6360000002 HC RX W HCPCS: Performed by: INTERNAL MEDICINE

## 2018-08-03 PROCEDURE — S0028 INJECTION, FAMOTIDINE, 20 MG: HCPCS | Performed by: STUDENT IN AN ORGANIZED HEALTH CARE EDUCATION/TRAINING PROGRAM

## 2018-08-03 PROCEDURE — 80048 BASIC METABOLIC PNL TOTAL CA: CPT

## 2018-08-03 PROCEDURE — 85027 COMPLETE CBC AUTOMATED: CPT

## 2018-08-03 PROCEDURE — 2580000003 HC RX 258: Performed by: INTERNAL MEDICINE

## 2018-08-03 PROCEDURE — G8979 MOBILITY GOAL STATUS: HCPCS

## 2018-08-03 PROCEDURE — 6360000002 HC RX W HCPCS: Performed by: PSYCHIATRY & NEUROLOGY

## 2018-08-03 PROCEDURE — 2060000000 HC ICU INTERMEDIATE R&B

## 2018-08-03 PROCEDURE — 99233 SBSQ HOSP IP/OBS HIGH 50: CPT | Performed by: INTERNAL MEDICINE

## 2018-08-03 PROCEDURE — 84145 PROCALCITONIN (PCT): CPT

## 2018-08-03 PROCEDURE — 2580000003 HC RX 258: Performed by: PSYCHIATRY & NEUROLOGY

## 2018-08-03 PROCEDURE — 97163 PT EVAL HIGH COMPLEX 45 MIN: CPT

## 2018-08-03 PROCEDURE — G8978 MOBILITY CURRENT STATUS: HCPCS

## 2018-08-03 PROCEDURE — 80177 DRUG SCRN QUAN LEVETIRACETAM: CPT

## 2018-08-03 PROCEDURE — 2500000003 HC RX 250 WO HCPCS: Performed by: INTERNAL MEDICINE

## 2018-08-03 PROCEDURE — G0480 DRUG TEST DEF 1-7 CLASSES: HCPCS

## 2018-08-03 PROCEDURE — 36415 COLL VENOUS BLD VENIPUNCTURE: CPT

## 2018-08-03 PROCEDURE — 2500000003 HC RX 250 WO HCPCS: Performed by: STUDENT IN AN ORGANIZED HEALTH CARE EDUCATION/TRAINING PROGRAM

## 2018-08-03 RX ORDER — PANTOPRAZOLE SODIUM 40 MG/1
40 TABLET, DELAYED RELEASE ORAL
Status: DISCONTINUED | OUTPATIENT
Start: 2018-08-04 | End: 2018-08-07 | Stop reason: HOSPADM

## 2018-08-03 RX ADMIN — METOPROLOL TARTRATE 5 MG: 5 INJECTION, SOLUTION INTRAVENOUS at 17:28

## 2018-08-03 RX ADMIN — DEXTROSE MONOHYDRATE 100 MG: 50 INJECTION, SOLUTION INTRAVENOUS at 09:24

## 2018-08-03 RX ADMIN — ENOXAPARIN SODIUM 40 MG: 40 INJECTION SUBCUTANEOUS at 08:30

## 2018-08-03 RX ADMIN — Medication 10 ML: at 08:30

## 2018-08-03 RX ADMIN — Medication 10 ML: at 08:35

## 2018-08-03 RX ADMIN — SODIUM CHLORIDE: 9 INJECTION, SOLUTION INTRAVENOUS at 00:50

## 2018-08-03 RX ADMIN — FAMOTIDINE 20 MG: 10 INJECTION, SOLUTION INTRAVENOUS at 08:30

## 2018-08-03 RX ADMIN — LEVETIRACETAM 1250 MG: 100 INJECTION, SOLUTION INTRAVENOUS at 09:24

## 2018-08-03 RX ADMIN — METHYLPREDNISOLONE SODIUM SUCCINATE 60 MG: 125 INJECTION, POWDER, FOR SOLUTION INTRAMUSCULAR; INTRAVENOUS at 01:29

## 2018-08-03 RX ADMIN — METOPROLOL TARTRATE 5 MG: 5 INJECTION, SOLUTION INTRAVENOUS at 03:30

## 2018-08-03 RX ADMIN — VANCOMYCIN HYDROCHLORIDE 1000 MG: 1 INJECTION, POWDER, LYOPHILIZED, FOR SOLUTION INTRAVENOUS at 12:44

## 2018-08-03 RX ADMIN — DEXTROSE MONOHYDRATE 100 MG: 50 INJECTION, SOLUTION INTRAVENOUS at 21:31

## 2018-08-03 RX ADMIN — METHYLPREDNISOLONE SODIUM SUCCINATE 60 MG: 125 INJECTION, POWDER, FOR SOLUTION INTRAMUSCULAR; INTRAVENOUS at 05:31

## 2018-08-03 RX ADMIN — Medication 10 ML: at 19:55

## 2018-08-03 RX ADMIN — METOPROLOL TARTRATE 5 MG: 5 INJECTION, SOLUTION INTRAVENOUS at 08:30

## 2018-08-03 RX ADMIN — SODIUM CHLORIDE: 9 INJECTION, SOLUTION INTRAVENOUS at 17:27

## 2018-08-03 RX ADMIN — LEVOTHYROXINE SODIUM ANHYDROUS 25 MCG: 100 INJECTION, POWDER, LYOPHILIZED, FOR SOLUTION INTRAVENOUS at 05:33

## 2018-08-03 RX ADMIN — LEVETIRACETAM 1250 MG: 100 INJECTION, SOLUTION INTRAVENOUS at 19:55

## 2018-08-03 ASSESSMENT — ENCOUNTER SYMPTOMS
VOMITING: 0
HEARTBURN: 0
ABDOMINAL PAIN: 0
STRIDOR: 0
BLURRED VISION: 0
NAUSEA: 0
CONSTIPATION: 0
COUGH: 0
SORE THROAT: 0
DIARRHEA: 0

## 2018-08-03 ASSESSMENT — PAIN SCALES - GENERAL
PAINLEVEL_OUTOF10: 0

## 2018-08-03 NOTE — PROGRESS NOTES
Nutrition Assessment    Type and Reason for Visit: Reassess    Nutrition Recommendations: Continue diet as ordered. Malnutrition Assessment:  · Malnutrition Status: Meets the criteria for moderate malnutrition  · Context: Chronic illness  · Findings of the 6 clinical characteristics of malnutrition (Minimum of 2 out of 6 clinical characteristics is required to make the diagnosis of moderate or severe Protein Calorie Malnutrition based on AND/ASPEN Guidelines):  1. Energy Intake-Less than or equal to 50%, greater than or equal to 1 month    2. Weight Loss-2% loss or greater, in 1 week (in 2 weeks)  3. Fat Loss-Mild subcutaneous fat loss, Orbital  4. Muscle Loss-Mild muscle mass loss, Temples (temporalis muscle), Interosseous  5. Fluid Accumulation-No significant fluid accumulation, Extremities  6.  Strength-Not measured    Nutrition Diagnosis:   · Problem: Inadequate oral intake  · Etiology: related to Cognitive or neurological impairment (seizures)     Signs and symptoms:  as evidenced by Diet history of poor intake, Weight loss    Nutrition Assessment:  · Subjective Assessment: During ICU interdisciplinary rounds RN reports pt extubated yesterday, speech did bedside swallow study and can have a dysphagia level III diet with thin liquids. Pt transferred to PCU and followed up with nurse to see if pt ate any lunch, she indicated unfortunately didn't see a tray at all so just not sure. Writer attempted to ask pt but she was very slow to respond and unsure of what her response was. .  · Nutrition-Focused Physical Findings:    · Wound Type: Stage I, Pressure Ulcer, Multiple (Traumatic)  · Current Nutrition Therapies:  · Oral Diet Orders: Dysphagia 3   · Oral Diet intake: Unable to assess  · Anthropometric Measures:  · Ht: 5' 10\" (177.8 cm)   · Current Body Wt: 160 lb (72.6 kg) (unsure if accurate-no edema present)  · Admission Body Wt: 148 lb 3.2 oz (67.2 kg)  · % Weight Change: 3.2%, 4.8% (153 lbs 7/18, 155 lbs 10 oz 5/17),  2 weeks, 10 weeks  · Ideal Body Wt: 150 lb (68 kg), % Ideal Body 99%  · BMI Classification: BMI 18.5 - 24.9 Normal Weight  · Comparative Standards (Estimated Nutrition Needs):  · Estimated Daily Total Kcal: 2010-2144  · Estimated Daily Protein (g): 87-94    Estimated Intake vs Estimated Needs: Intake Less Than Needs    Nutrition Risk Level: High    Nutrition Interventions:   Continue current diet  Continued Inpatient Monitoring, Education Not Indicated, Coordination of Care    Nutrition Evaluation:   · Evaluation: Goals set   · Goals: Adequate nutrition provision    · Monitoring: Meal Intake, Weight, Pertinent Labs, Chewing/Swallowing    See Adult Nutrition Doc Flowsheet for more detail. Heraclio Marrero R.D. LWILMAR.   Clinical Dietitian  Pager: [phone]

## 2018-08-03 NOTE — PROGRESS NOTES
Other RX Placeholder    methylPREDNISolone  60 mg Intravenous Q6H    metoprolol  5 mg Intravenous Q6H    lacosamide (VIMPAT) IVPB  100 mg Intravenous BID    sodium chloride flush  10 mL Intravenous 2 times per day    sodium chloride flush  10 mL Intravenous 2 times per day    enoxaparin  40 mg Subcutaneous Daily    famotidine (PEPCID) injection  20 mg Intravenous BID    levothyroxine  25 mcg Intravenous Daily    levetiracetam  1,250 mg Intravenous BID     racepinephrine HCl, enalaprilat, sodium chloride flush, acetaminophen, sodium chloride flush, magnesium hydroxide, ondansetron, potassium chloride, lidocaine PF, albuterol sulfate HFA **AND** ipratropium **AND** MDI Treatment, LORazepam **OR** LORazepam  IV Drips/Infusions   sodium chloride 75 mL/hr at 08/03/18 0050       Diet/Nutrition   Diet NPO Effective Now    Exam      Constitutional - Alert, arousable, and oriented to place and person, not time, no acute distress, resting comfortably  General Appearance  well developed, well nourished  HEENT -normocephalic, atraumatic. PERRLA  Lungs - Chest expands equally, no wheezes, rales or rhonchi. Cardiovascular - Heart sounds are normal.  normal rate and rhythm regular, no murmur, gallop or rub. Abdomen - soft, nontender, nondistended, no masses or organomegaly  Neurologic - Able to move extremities actively. There is still some right sided upper extremity weakness which is improved from yesterday. She is alert and oriented and easily able to fllow commands.   Skin - no bruising or bleeding  Extremities - no cyanosis, clubbing or edema    Lab Results   CBC   Lab Results   Component Value Date    WBC 8.4 08/03/2018    RBC 4.58 08/03/2018    RBC 4.21 10/04/2011    HGB 14.0 08/03/2018    HCT 41.7 08/03/2018     08/03/2018     10/04/2011    MCV 91.0 08/03/2018    MCH 30.6 08/03/2018    MCHC 33.7 08/03/2018    RDW 14.8 08/03/2018    METASPCT 2 01/07/2017    LYMPHOPCT 2 07/30/2018    MONOPCT 7 07/30/2018    MYELOPCT 1 01/08/2017    BASOPCT 0 07/30/2018    MONOSABS 1.09 07/30/2018    LYMPHSABS 0.31 07/30/2018    EOSABS 0.00 07/30/2018    BASOSABS 0.00 07/30/2018    DIFFTYPE NOT REPORTED 07/30/2018       BMP Lab Results   Component Value Date     08/03/2018    K 3.6 08/03/2018     08/03/2018    CO2 23 08/03/2018    BUN 7 08/03/2018    CREATININE <0.40 08/03/2018    GLUCOSE 102 08/03/2018    GLUCOSE 68 03/27/2012       LFTS  Lab Results   Component Value Date    ALKPHOS 62 07/30/2018    ALT 22 07/30/2018    AST 53 07/30/2018    PROT 7.6 07/30/2018    BILITOT 0.83 07/30/2018    BILIDIR 0.17 05/13/2018    IBILI 0.30 05/13/2018    LABALBU 4.3 07/30/2018    LABALBU 4.1 10/04/2011       ABG ABGs: Lab Results   Component Value Date    PHART 7.458 08/02/2018    PO2ART 64.0 08/02/2018    NHS6EJF 36.7 08/02/2018       Lab Results   Component Value Date    MODE CPAP 08/02/2018         INR  No results for input(s): PROTIME, INR in the last 72 hours. APTT  No results for input(s): APTT in the last 72 hours. Lactic Acid  Lab Results   Component Value Date    LACTA 1.3 08/01/2018    LACTA 1.5 07/31/2018    LACTA 1.6 01/14/2018        BNP   No results for input(s): BNP in the last 72 hours. Cultures       Radiology     CXR    No plain films today  CT Scans    (See actual reports for details)      SYSTEMS ASSESSMENT    Status epilepticus  Possible anoxia  Acute respiratory failure due to inability to protect airway  Possible aspiration pneumonia    Neuro   Continue antiepileptics per neurology  Chronic right sided hemiparesis, improved from yesterday however  Baseline mental status is within normal limits as per chart review    Respiratory   Wean oxygen as tolerated.  Keep O2 sat > 88%  S/p extubation yesterday, satting well on room air  Cardiovascular   Blood pressures within normal limits    Gastrointestinal   Extubated yesterday, passed bedside swallow study without choking although swallowing

## 2018-08-03 NOTE — PROGRESS NOTES
Ankle Outer;Right Round,Red (Active)   Wound Type Wound 8/3/2018 12:27 PM   Wound Traumatic 8/2/2018  4:45 AM   Dressing Status Clean;Dry; Intact 8/3/2018 12:27 PM   Dressing Changed Changed/New 7/31/2018  3:18 PM   Dressing/Treatment Alginate; Foam 8/3/2018 12:27 PM   Wound Cleansed Rinsed/Irrigated with saline 8/3/2018  9:42 AM   Dressing Change Due 08/06/18 8/3/2018 12:27 PM   Wound Length (cm) 4.5 cm 8/3/2018  9:42 AM   Wound Width (cm) 4 cm 8/3/2018  9:42 AM   Wound Depth (cm)  0.1 8/3/2018  9:42 AM   Calculated Wound Size (cm^2) (l*w) 18 cm^2 8/3/2018  9:42 AM   Change in Wound Size % (l*w) -125 8/3/2018  9:42 AM   Wound Assessment Fibrin;Yellow;Pink 8/3/2018  9:42 AM   Drainage Amount Moderate 8/3/2018  9:42 AM   Drainage Description Serous 8/3/2018  9:42 AM   Odor None 8/3/2018  9:42 AM   Soren-wound Assessment Maceration 8/3/2018  9:42 AM   [place]%Wound Bed 50 8/3/2018  9:42 AM   Yellow%Wound Bed 50 8/3/2018  9:42 AM   Number of days: 3       Response to treatment:  Well tolerated by patient. soren wound maceration noted.  Will d/c hydrocolloid and change to alginate to wounds covered with foam due to [address] of Care: Wound 07/30/18 Other (Comment) Ankle Inner;Left Round, Red-Dressing/Treatment: Alginate, Foam  Wound 07/30/18 Other (Comment) Ankle Outer;Right Round,Red-Dressing/Treatment: Alginate, Foam    Specialty Bed Required : Yes   [] Low Air Loss   [x] Pressure Redistribution  [] Fluid Immersion  [] Bariatric  [] Total Pressure Relief  [] Other:     Current Diet: DIET GENERAL; Dysphagia III Advanced  Dietician consult:  Yes    Discharge Plan:  Placement for patient upon discharge: skilled nursing    Patient appropriate for Outpatient [address]: No    Referrals:  []   [] [facility]  [] Supplies  [] Other    Patient/Caregiver Teaching:  Level of patient/caregiver understanding able to:   [] Indicates understanding       [x] Needs reinforcement  [] Unsuccessful [] Verbal Understanding  [] Demonstrated understanding       [] No evidence of learning  [] Refused teaching         [] N/A       Electronically signed by OLVIN Bautista - CNP, CWOCN on 8/3/2018 at 1:29 PM

## 2018-08-03 NOTE — PROGRESS NOTES
Swallowing Strategies  Compensatory Swallowing Strategies: Eat/Feed slowly;Upright as possible for all oral intake;Small bites/sips    Treatment/Goals  Short-term Goals  Goal 1: Diet tolerance monitoring, advance diet to regular as pt. tolerates. Dysphagia Goals: The patient will tolerate recommended diet without observed clinical signs of aspiration    General  Behavior/Cognition: Alert; Cooperative  Respiratory Status: Room air  O2 Device: None (Room air)  Follows Directions: Simple  Dentition: Adequate  Patient Positioning: Upright in bed  Baseline Vocal Quality: Dysphonic (Pt. voice mildy dysphonic, decreased intensity, greatly improved since yesterday)  Consistencies Administered: Reg solid;Pudding - teaspoon; Thin - cup           Vision/Hearing  Vision  Vision:  (unable to assess)  Hearing  Hearing: Within functional limits    Oral Motor Deficits  Oral/Motor  Oral Motor: Exceptions to [facility]  Labial ROM: Reduced left; Reduced right  Labial Strength: Reduced    Oral Phase Dysfunction  Oral Phase  Oral Phase: WNL (prolonged mastication of dry solids. )     Indicators of Pharyngeal Phase Dysfunction   Pharyngeal Phase  Pharyngeal Phase: WNL      Education  Patient Education Response: Verbalizes understanding             Therapy Time  SLP Individual Minutes  Time In: 1100  Time Out: [address]  Minutes: 12          Kylie MARQUIS A.CCC/SLP    8/3/2018 11:17 AM

## 2018-08-03 NOTE — PLAN OF CARE
Problem: Safety:  Goal: Ability to remain free from injury will improve  Ability to remain free from injury will improve   Outcome: Ongoing  Fall assessment performed and appropriate measures implemented. Room freed from clutter. Bed in lowest position with wheels locked. Call light in place. ID band in place. Problem: Risk for Impaired Skin Integrity  Goal: Tissue integrity - skin and mucous membranes  Structural intactness and normal physiological function of skin and  mucous membranes. Outcome: Ongoing  Patient turned and repositioned every 2 hours and as needed for comfort. Skin remains dry and intact. No new skin breakdown noted. Problem: Falls - Risk of:  Goal: Will remain free from falls  Will remain free from falls   Outcome: Ongoing  Bed remains in lowest position, call light within reach. Patient remains free of falls at this time. RN will continue to monitor.

## 2018-08-03 NOTE — PROGRESS NOTES
hospitalizations d/t seizures, requiring intubation May 2018. Pt was transferred to [facility] 5/18/18 -5/28/18. Response To Previous Treatment: Not applicable  Family / Caregiver Present: No  Diagnosis: status epilepticus  Follows Commands: Impaired  Other (Comment): Delayed responses, needs extra time to process commands. Improvement noted as session continued. General Comment  Comments: Kayli Rebolledo RN ok's session. States pt is not responding consistently to questioning  Subjective  Subjective: Pt is supine in bed, noted to have head turned to left. Pt is able to rotate head beyond midline to look to R side as writer spoke. Pt denies pain, agreeable to PT  Pain Screening  Patient Currently in Pain: Denies  Vital Signs  Patient Currently in Pain: Denies  Pre Treatment Pain Screening  Intervention List: Patient able to continue with treatment  Comments / Details: no signs of pain before during or after session    Orientation  Orientation  Overall Orientation Status: Impaired  Orientation Level: Oriented to person;Disoriented to situation;Disoriented to time;Disoriented to place    Social/Functional History  Social/Functional History  Lives With: Other (comment) (sister)  Type of Home: House  Home Layout: Two level, Bed/Bath upstairs (railing to get upstairs)  Home Access: Stairs to enter with rails  Entrance Stairs - Number of Steps: 11   ADL Assistance: Independent  Homemaking Assistance: Independent  Ambulation Assistance: Independent  Transfer Assistance: Independent  Additional Comments: Social hx is provided by pt, 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   Objective     Observation/Palpation  Posture: Fair  Observation: drsg over R lateral malleolus and L medial malleolus, IV site dorsum R foot and L side neck  Edema: R distal UE and hand    PROM RLE (degrees)  RLE PROM: WNL  AROM RLE (degrees)  RLE AROM: WFL  RLE General AROM: AAROM R LE WFL  PROM LLE (degrees)  LLE PROM: WNL  AROM LLE (degrees)  LLE AROM : WFL  PROM RUE (degrees)  RUE PROM: WNL  AROM RUE (degrees)  RUE General AROM: minimal AROM noted in shldr flex and elbow ext with attempted reaching, light  strength  PROM LUE (degrees)  LUE PROM: WNL  AROM LUE (degrees)  LUE AROM : WFL  LUE General AROM: AAROM WFL L UE  Strength RLE  Comment: hip flex 4-/5, hip abd 2/5, knee ext 3+/5, knee flex 4-/5, ankle 3+/5  Strength LLE  Comment: hip flex 4-/5, hip abd at least 2/5, knee ext 4/5, knee flex 4/5, ankle 4-/5  Strength RUE  Comment: shldr flex 2-/5, elbow ext 2-/5, wrist 0/5, minimal digit flex, no digit extension  Strength LUE  Comment: shldr flex 3+/5, elbow ext 3+/5, elbow flex 3-/5, fair   Tone RLE  RLE Tone: Normotonic (no tone deficits noted during session)  Tone LLE  LLE Tone: Normotonic (no tone deficits noted during session)  Motor Control  Gross Motor?: Exceptions  Comments: limited AROM R UE and LE  Sensation  Overall Sensation Status:  (unable to assess)  Bed mobility  Supine to Sit: Moderate assistance (assist with trunk elevation)  Sit to Supine: Maximum assistance (with LEs and trunk positioning)  Scooting: Dependent/Total  Comment: Pt initiated sup>sit and sit>supine when given verbal commands  Transfers  Sit to Stand: Moderate Assistance  Stand to sit: Moderate Assistance  Comment: Stong post lean into EOB while standng, pt is unable to correct with cues and assist. Pt demo's full upright posture at trunk and LEs. Pt stood for approximately 30 secs x3        Balance  Posture: Fair  Sitting - Static: Fair  Sitting - Dynamic: Fair;-  Standing - Static: Fair;-  Standing - Dynamic: Fair;-  Comments: leans to L sitting, leans posterior standing        Assessment   Body structures, Functions, Activity limitations: Decreased functional mobility ; Decreased strength;Decreased safe awareness;Decreased cognition;Decreased endurance;Decreased balance  Assessment: Pt presents after multiple seizures requiring intubation.  Pt demo's deficits listed above. Cont per POC to prepare for d/c. Will advance POC to include gait when appropriate. Treatment Diagnosis: status epilepticus  Prognosis: Good  Decision Making: High Complexity  History: see hx  Exam: ROM, MMT, tone, balance, mobility  Patient Education: PT POC, safety, mobility  Barriers to Learning: cognition  REQUIRES PT FOLLOW UP: Yes  Activity Tolerance  Activity Tolerance: Patient Tolerated treatment well         Plan   Plan  Times per week: 6-7x/wk  Times per day: Daily  Current Treatment Recommendations: Strengthening, ROM, Balance Training, Functional Mobility Training, Endurance Training, Transfer Training, Cognitive Reorientation, Equipment Evaluation, Education, & procurement, Safety Education & Training, Positioning  Safety Devices  Type of devices: Call light within reach, Left in bed, Bed alarm in place, Nurse notified, Patient at risk for falls    G-Code  PT G-Codes  Functional Assessment Tool Used: [place] 6 clicks  Functional Limitation: Mobility: Walking and moving around  Mobility: Walking and Moving Around Current Status (): At least 40 percent but less than 60 percent impaired, limited or restricted  Mobility: Walking and Moving Around Goal Status (): At least 1 percent but less than 20 percent impaired, limited or restricted  OutComes Score                                           AM-PAC Score             Goals  Short term goals  Time Frame for Short term goals: 7 days  Short term goal 1: Increase UEs and LEs strength by 1 MMG  Short term goal 2:  Increase endurance to good   Short term goal 3: Improve sitting and standing balance to good to increase safety  Short term goal 4: Improve bed mobility to [place] x1  Short term goal 5: Improve transfers to min A x1 with appropriate device       Therapy Time   Individual Concurrent Group Co-treatment   Time In 1325         Time Out 1415         Minutes [address] Rody Adams, PT

## 2018-08-03 NOTE — PROGRESS NOTES
ICU Progress Note (Vent)   Pulmonary and Critical Care Specialists    Patient - Donte Ramos,  Age - 61 y.o.    - 1955      Room Number -    MRN -  [id number]   [facility]  Date of Admission -  2018 11:50 AM    Events of Past 24 Hours   Patient seen and examined at bedside, no acute events overnight. Patient stated that she felt a lot better, denied any SOB and stated that she slept well overnight. Denied any other concerns at this moment. Bedside swallow was done, passed without choking or coughing but swallowing was slow as per RN. Vitals    height is 5' 10\" (1.778 m) and weight is 160 lb 1.6 oz (72.6 kg). Her oral temperature is 98.2 °F (36.8 °C). Her blood pressure is 135/81 and her pulse is 70. Her respiration is 16 and oxygen saturation is 95%. Temperature Range: Temp: 98.2 °F (36.8 °C) Temp  Av.5 °F (36.9 °C)  Min: 98.2 °F (36.8 °C)  Max: 99 °F (37.2 °C)  BP Range:  Systolic (55CTP), IBU:020 , Min:128 , QKW:881     Diastolic (90ZQH), BKR:30, Min:71, Max:102    Pulse Range: Pulse  Av.5  Min: 64  Max: 75  Respiration Range: Resp  Avg: 15.8  Min: 13  Max: 22  Current Pulse Ox[de-identified]  SpO2: 95 %  24HR Pulse Ox Range:  SpO2  Av.4 %  Min: 94 %  Max: 100 %  Oxygen Amount and Delivery: O2 Flow Rate (L/min): 2 L/min      Wt Readings from Last 3 Encounters:   18 160 lb 1.6 oz (72.6 kg)   18 153 lb (69.4 kg)   18 155 lb 10.3 oz (70.6 kg)     I/O       Intake/Output Summary (Last 24 hours) at 18 0823  Last data filed at 18 0556   Gross per 24 hour   Intake          2836.75 ml   Output             1765 ml   Net          1071.75 ml     I/O last 3 completed shifts:   In: 2836.8 [I.V.:2426.8; IV Piggyback:410]  Out: 8601 [Urine:1765]     DRAIN/TUBE OUTPUT:     Invasive Lines         ICP PRESSURE RANGE:  No Data Recorded  CVP PRESSURE RANGE:  No Data Recorded  Mechanical Ventilation Data   SETTINGS (Comprehensive)  Vent Information  Ventilator Started: Yes  Ventilation Day(s): 4  Vent Type: Servo i  Vent Mode: CPAP  Vt Ordered: 500 mL  Rate Set: 14 bmp  Pressure Support: 7 cmH20  FiO2 : 25 %  Sensitivity: 5  PEEP/CPAP: 5  I Time/ I Time %: 0.9 s  Cuff Pressure (cm H2O): 24 cm H2O  Humidification Source: E  Nitric Oxide/Epoprostenol In Use?: No  Additional Respiratory  Assessments  Pulse: 70  Resp: 16  SpO2: 95 %  End Tidal CO2: 34 (%)  Position: Semi-Crane's  Humidification Source: HME  Oral Care Completed?: Yes  Oral Care: Mouth swabbed  Cuff Pressure (cm H2O): 24 cm H2O       ABGs:   Lab Results   Component Value Date    PHART 7.458 08/02/2018    PO2ART 64.0 08/02/2018    STW6QJM 36.7 08/02/2018       Lab Results   Component Value Date    MODE CPAP 08/02/2018         Medications   IV   sodium chloride 75 mL/hr at 08/03/18 0050      vancomycin  1,000 mg Intravenous Q12H    vancomycin (VANCOCIN) intermittent dosing (placeholder)   Other RX Placeholder    methylPREDNISolone  60 mg Intravenous Q6H    metoprolol  5 mg Intravenous Q6H    lacosamide (VIMPAT) IVPB  100 mg Intravenous BID    sodium chloride flush  10 mL Intravenous 2 times per day    sodium chloride flush  10 mL Intravenous 2 times per day    enoxaparin  40 mg Subcutaneous Daily    famotidine (PEPCID) injection  20 mg Intravenous BID    levothyroxine  25 mcg Intravenous Daily    levetiracetam  1,250 mg Intravenous BID       Diet/Nutrition   Diet NPO Effective Now    Exam   VITALS    height is 5' 10\" (1.778 m) and weight is 160 lb 1.6 oz (72.6 kg). Her oral temperature is 98.2 °F (36.8 °C). Her blood pressure is 135/81 and her pulse is 70. Her respiration is 16 and oxygen saturation is 95%.    Ventilator Settings (Basic)  Vent Mode: CPAP Rate Set: 14 bmp/Vt Ordered: 500 mL/ /FiO2 : 25 %    Constitutional - Alert and oriented to place and person, not time, no acute distress, resting comfortably  General Appearance  well developed, well nourished  HEENT -

## 2018-08-03 NOTE — CARE COORDINATION
DISCHARGE PLANNING NOTE:    Plan is for this patient go to go SNF - Awaiting choice from patient and family - LSW is following for this and is to contact the sister. Awaiting PT/OT/ST recs. Probable transfer out to [facility] today. Bedside swallow today. Remains on IV keppra, vimpat and IV vancomycin. Will continue to follow along with LSW for any discharge needs.      Electronically signed by Linda Pedersen RN on 8/3/2018 at 10:44 AM

## 2018-08-04 LAB
ANION GAP SERPL CALCULATED.3IONS-SCNC: 12 MMOL/L (ref 9–17)
BUN BLDV-MCNC: 8 MG/DL (ref 8–23)
BUN/CREAT BLD: ABNORMAL (ref 9–20)
CALCIUM SERPL-MCNC: 8 MG/DL (ref 8.6–10.4)
CHLORIDE BLD-SCNC: 104 MMOL/L (ref 98–107)
CO2: 23 MMOL/L (ref 20–31)
CREAT SERPL-MCNC: <0.4 MG/DL (ref 0.5–0.9)
GFR AFRICAN AMERICAN: ABNORMAL ML/MIN
GFR NON-AFRICAN AMERICAN: ABNORMAL ML/MIN
GFR SERPL CREATININE-BSD FRML MDRD: ABNORMAL ML/MIN/{1.73_M2}
GFR SERPL CREATININE-BSD FRML MDRD: ABNORMAL ML/MIN/{1.73_M2}
GLUCOSE BLD-MCNC: 100 MG/DL (ref 70–99)
HCT VFR BLD CALC: 40.9 % (ref 36–46)
HEMOGLOBIN: 13.7 G/DL (ref 12–16)
MAGNESIUM: 1.7 MG/DL (ref 1.6–2.6)
MCH RBC QN AUTO: 30.5 PG (ref 26–34)
MCHC RBC AUTO-ENTMCNC: 33.4 G/DL (ref 31–37)
MCV RBC AUTO: 91.2 FL (ref 80–100)
NRBC AUTOMATED: NORMAL PER 100 WBC
PDW BLD-RTO: 14.9 % (ref 11.5–14.9)
PLATELET # BLD: 218 K/UL (ref 150–450)
PMV BLD AUTO: 9.7 FL (ref 6–12)
POTASSIUM SERPL-SCNC: 2.9 MMOL/L (ref 3.7–5.3)
RBC # BLD: 4.48 M/UL (ref 4–5.2)
SODIUM BLD-SCNC: 139 MMOL/L (ref 135–144)
VANCOMYCIN TROUGH DATE LAST DOSE: NORMAL
VANCOMYCIN TROUGH DOSE AMOUNT: 1000
VANCOMYCIN TROUGH TIME LAST DOSE: 1244
VANCOMYCIN TROUGH: 10.4 UG/ML (ref 10–20)
WBC # BLD: 11 K/UL (ref 3.5–11)

## 2018-08-04 PROCEDURE — 83735 ASSAY OF MAGNESIUM: CPT

## 2018-08-04 PROCEDURE — 80202 ASSAY OF VANCOMYCIN: CPT

## 2018-08-04 PROCEDURE — 2500000003 HC RX 250 WO HCPCS: Performed by: INTERNAL MEDICINE

## 2018-08-04 PROCEDURE — 2060000000 HC ICU INTERMEDIATE R&B

## 2018-08-04 PROCEDURE — 6360000002 HC RX W HCPCS: Performed by: PSYCHIATRY & NEUROLOGY

## 2018-08-04 PROCEDURE — 80048 BASIC METABOLIC PNL TOTAL CA: CPT

## 2018-08-04 PROCEDURE — 6360000002 HC RX W HCPCS: Performed by: STUDENT IN AN ORGANIZED HEALTH CARE EDUCATION/TRAINING PROGRAM

## 2018-08-04 PROCEDURE — 2500000003 HC RX 250 WO HCPCS: Performed by: STUDENT IN AN ORGANIZED HEALTH CARE EDUCATION/TRAINING PROGRAM

## 2018-08-04 PROCEDURE — 85027 COMPLETE CBC AUTOMATED: CPT

## 2018-08-04 PROCEDURE — C9254 INJECTION, LACOSAMIDE: HCPCS | Performed by: PSYCHIATRY & NEUROLOGY

## 2018-08-04 PROCEDURE — 36415 COLL VENOUS BLD VENIPUNCTURE: CPT

## 2018-08-04 PROCEDURE — 99233 SBSQ HOSP IP/OBS HIGH 50: CPT | Performed by: INTERNAL MEDICINE

## 2018-08-04 PROCEDURE — 2580000003 HC RX 258: Performed by: PSYCHIATRY & NEUROLOGY

## 2018-08-04 PROCEDURE — 6360000002 HC RX W HCPCS: Performed by: INTERNAL MEDICINE

## 2018-08-04 PROCEDURE — 2580000003 HC RX 258: Performed by: INTERNAL MEDICINE

## 2018-08-04 PROCEDURE — 6370000000 HC RX 637 (ALT 250 FOR IP): Performed by: PSYCHIATRY & NEUROLOGY

## 2018-08-04 PROCEDURE — 6370000000 HC RX 637 (ALT 250 FOR IP): Performed by: STUDENT IN AN ORGANIZED HEALTH CARE EDUCATION/TRAINING PROGRAM

## 2018-08-04 RX ORDER — LEVOTHYROXINE SODIUM 0.05 MG/1
50 TABLET ORAL DAILY
Status: DISCONTINUED | OUTPATIENT
Start: 2018-08-05 | End: 2018-08-07 | Stop reason: HOSPADM

## 2018-08-04 RX ORDER — POTASSIUM CHLORIDE 20MEQ/15ML
40 LIQUID (ML) ORAL PRN
Status: DISCONTINUED | OUTPATIENT
Start: 2018-08-04 | End: 2018-08-07 | Stop reason: HOSPADM

## 2018-08-04 RX ORDER — LACOSAMIDE 100 MG/1
100 TABLET ORAL 2 TIMES DAILY
Status: DISCONTINUED | OUTPATIENT
Start: 2018-08-04 | End: 2018-08-07 | Stop reason: HOSPADM

## 2018-08-04 RX ORDER — POTASSIUM CHLORIDE 20 MEQ/1
40 TABLET, EXTENDED RELEASE ORAL PRN
Status: DISCONTINUED | OUTPATIENT
Start: 2018-08-04 | End: 2018-08-07 | Stop reason: HOSPADM

## 2018-08-04 RX ORDER — POTASSIUM CHLORIDE 7.45 MG/ML
10 INJECTION INTRAVENOUS PRN
Status: DISCONTINUED | OUTPATIENT
Start: 2018-08-04 | End: 2018-08-07 | Stop reason: HOSPADM

## 2018-08-04 RX ORDER — LACOSAMIDE 100 MG/1
50 TABLET ORAL 2 TIMES DAILY
Status: DISCONTINUED | OUTPATIENT
Start: 2018-08-04 | End: 2018-08-04

## 2018-08-04 RX ORDER — AMLODIPINE BESYLATE 10 MG/1
10 TABLET ORAL DAILY
Status: DISCONTINUED | OUTPATIENT
Start: 2018-08-04 | End: 2018-08-07 | Stop reason: HOSPADM

## 2018-08-04 RX ADMIN — METOPROLOL TARTRATE 5 MG: 5 INJECTION, SOLUTION INTRAVENOUS at 01:16

## 2018-08-04 RX ADMIN — DEXTROSE MONOHYDRATE 100 MG: 50 INJECTION, SOLUTION INTRAVENOUS at 08:47

## 2018-08-04 RX ADMIN — LEVETIRACETAM 1250 MG: 100 INJECTION, SOLUTION INTRAVENOUS at 08:47

## 2018-08-04 RX ADMIN — POTASSIUM CHLORIDE 10 MEQ: 10 INJECTION, SOLUTION INTRAVENOUS at 11:10

## 2018-08-04 RX ADMIN — POTASSIUM CHLORIDE 10 MEQ: 10 INJECTION, SOLUTION INTRAVENOUS at 10:03

## 2018-08-04 RX ADMIN — POTASSIUM CHLORIDE 10 MEQ: 10 INJECTION, SOLUTION INTRAVENOUS at 08:46

## 2018-08-04 RX ADMIN — VANCOMYCIN HYDROCHLORIDE 1500 MG: 10 INJECTION, POWDER, LYOPHILIZED, FOR SOLUTION INTRAVENOUS at 02:00

## 2018-08-04 RX ADMIN — POTASSIUM CHLORIDE 10 MEQ: 10 INJECTION, SOLUTION INTRAVENOUS at 12:33

## 2018-08-04 RX ADMIN — LACOSAMIDE 100 MG: 100 TABLET, FILM COATED ORAL at 21:03

## 2018-08-04 RX ADMIN — AMLODIPINE BESYLATE 10 MG: 10 TABLET ORAL at 15:15

## 2018-08-04 RX ADMIN — VANCOMYCIN HYDROCHLORIDE 1500 MG: 10 INJECTION, POWDER, LYOPHILIZED, FOR SOLUTION INTRAVENOUS at 15:15

## 2018-08-04 RX ADMIN — LEVOTHYROXINE SODIUM ANHYDROUS 25 MCG: 100 INJECTION, POWDER, LYOPHILIZED, FOR SOLUTION INTRAVENOUS at 05:35

## 2018-08-04 RX ADMIN — ENOXAPARIN SODIUM 40 MG: 40 INJECTION SUBCUTANEOUS at 08:46

## 2018-08-04 RX ADMIN — POTASSIUM CHLORIDE 10 MEQ: 10 INJECTION, SOLUTION INTRAVENOUS at 14:55

## 2018-08-04 RX ADMIN — POTASSIUM CHLORIDE 10 MEQ: 10 INJECTION, SOLUTION INTRAVENOUS at 13:36

## 2018-08-04 RX ADMIN — LEVETIRACETAM 1250 MG: 750 TABLET ORAL at 21:03

## 2018-08-04 RX ADMIN — METOPROLOL TARTRATE 5 MG: 5 INJECTION, SOLUTION INTRAVENOUS at 12:33

## 2018-08-04 ASSESSMENT — ENCOUNTER SYMPTOMS
SPUTUM PRODUCTION: 0
VOMITING: 0
DIARRHEA: 0
NAUSEA: 0
CONSTIPATION: 0
BLURRED VISION: 0
BACK PAIN: 0

## 2018-08-04 NOTE — PROGRESS NOTES
SW sent referral to [facility] and spoke to Tyson Weiss. Updated  PT/OT will be needed for pre-cert on Monday morning.

## 2018-08-04 NOTE — PLAN OF CARE
Problem: Risk for Impaired Skin Integrity  Goal: Tissue integrity - skin and mucous membranes  Structural intactness and normal physiological function of skin and  mucous membranes. Outcome: Ongoing  Patient turned and repositioned throughout this shift. Problem: Falls - Risk of:  Goal: Will remain free from falls  Will remain free from falls   Outcome: Ongoing  Pt instructed on how to use call light. Pt used call light appropriately. Bed is in lowest and locked position. Non-slip socks in place. No falls noted this shift.

## 2018-08-04 NOTE — PROGRESS NOTES
[address]    PROGRESS NOTE             8/4/2018    8:43 AM    Name:   Laurel Watkins  MRN:     [id number]     Acct:      [de-identified]   Room:   2083/2083[facility] Day:  5  Admit Date:  7/30/2018 11:50 AM    PCP:   Rebecca Salvador MD  Code Status:  Full Code    Subjective:     C/C:   Chief Complaint   Patient presents with    Seizures   LOC     Interval History Status: improved. Patient is seen and examined at bedside, she has not had any evidence of epileptic activity. She has no complaints today, she denies SOB or pain. Her mentation is improving, she is following verbal commands and conversing slowly. Patient states she slept well overnight. Bedside swallow study was performed without choking or coughing but swallowing was slow per RN. Brief History:     Chema Guy is a 61 y.o. Female presenting to the ED with seizures. The patient was found by her sister likely post ictal on 7/30, she was brought to ED by EMS and restarted seizing in the ED. There she was given ativan, dilantin and keppra + intubated and sedated with propofol. She was admitted to the hospital for management of seizure disorder and status epilepticus. The patient has had history of status epilepticus in the past.     Review of Systems:     Review of Systems   Constitutional: Negative for chills and fever. HENT: Negative for tinnitus. Eyes: Negative for blurred vision. Respiratory: Negative for sputum production. Cardiovascular: Negative for chest pain. Gastrointestinal: Negative for constipation, diarrhea, nausea and vomiting. Genitourinary: Negative for urgency. Musculoskeletal: Negative for back pain. Neurological: Negative for headaches. Psychiatric/Behavioral: Negative for depression. The patient does not have insomnia. Medications:      Allergies:  No Known Allergies    Current Meds:   Scheduled Meds:    vancomycin  1,500 mg Intravenous Q12H    pantoprazole  40 mg Oral QAM AC    vancomycin (VANCOCIN) intermittent dosing (placeholder)   Other RX Placeholder    metoprolol  5 mg Intravenous Q6H    lacosamide (VIMPAT) IVPB  100 mg Intravenous BID    sodium chloride flush  10 mL Intravenous 2 times per day    sodium chloride flush  10 mL Intravenous 2 times per day    enoxaparin  40 mg Subcutaneous Daily    levothyroxine  25 mcg Intravenous Daily    levetiracetam  1,250 mg Intravenous BID     Continuous Infusions:    sodium chloride 50 mL/hr at 18 1727     PRN Meds: potassium chloride **OR** potassium chloride **OR** potassium chloride, racepinephrine HCl, enalaprilat, sodium chloride flush, acetaminophen, sodium chloride flush, magnesium hydroxide, ondansetron, potassium chloride, lidocaine PF, albuterol sulfate HFA **AND** ipratropium **AND** MDI Treatment, LORazepam **OR** LORazepam    Data:     Past Medical History:   has a past medical history of Ankle fracture, left; Anxiety; Arthritis; ETOH abuse; Frequency of urination; GERD (gastroesophageal reflux disease); Head injury; Headache(784.0); Hypertension; Hypotension; Hypothyroidism; Numbness and tingling; Osteoporosis; Peripheral vascular disease ([facility] [address] Pneumonia; Reflux; Seizures ([facility] [address] Shingles; Stroke [facility]); TIA (transient ischemic attack); Tremor; UTI (urinary tract infection); Varicose veins; and Wears glasses. Social History:   reports that she has never smoked. She has never used smokeless tobacco. She reports that she does not drink alcohol or use drugs.      Family History:   Family History   Problem Relation Age of Onset    Cervical Cancer Mother     Heart Disease Father     Heart Disease Maternal Grandfather     Heart Disease Paternal Grandfather        Vitals:  /72   Pulse 75   Temp 98.4 °F (36.9 °C) (Oral)   Resp 15   Ht 5' 10\" (1.778 m)   Wt 160 lb 1.6 oz (72.6 kg)   SpO2 94%   BMI 22.97 kg/m²   Temp (24hrs), Av °F (36.7 °C), Min:97 °F (36.1 °C), Max:98.5 °F (36.9 °C)    No results for input(s): POCGLU in the last 72 hours. I/O (24Hr): Intake/Output Summary (Last 24 hours) at 08/04/18 0843  Last data filed at 08/03/18 1856   Gross per 24 hour   Intake             1000 ml   Output              300 ml   Net              700 ml       Labs:    [unfilled]    Lab Results   Component Value Date/Time    SPECIAL NOT REPORTED 07/31/2018 03:50 AM     Lab Results   Component Value Date/Time    CULTURE (A) 07/31/2018 03:50 AM     METHICILLIN RESISTANT STAPHYLOCOCCUS AUREUS MODERATE GROWTH       [unfilled]    Radiology:    Ct Head Wo Contrast    Result Date: 7/30/2018  EXAMINATION: CT OF THE HEAD WITHOUT CONTRAST  7/30/2018 12:58 pm TECHNIQUE: CT of the head was performed without the administration of intravenous contrast. Dose modulation, iterative reconstruction, and/or weight based adjustment of the mA/kV was utilized to reduce the radiation dose to as low as reasonably achievable. COMPARISON: 05/13/2018 HISTORY: ORDERING SYSTEM PROVIDED HISTORY: AMS from seizure TECHNOLOGIST PROVIDED HISTORY: Ordering Physician Provided Reason for Exam: new onset seizures Acuity: Acute Type of Exam: Initial FINDINGS: BRAIN/VENTRICLES: There is no acute intracranial hemorrhage, mass effect or midline shift. No abnormal extra-axial fluid collection. Stable right MCA distribution encephalomalacia with associated mild ex vacuo dilatation right lateral ventricle consistent with remote infarct. No CT evidence of an acute infarct. No evidence of hydrocephalus. ORBITS: The visualized portion of the orbits demonstrate no acute abnormality. SINUSES: The visualized paranasal sinuses and mastoid air cells demonstrate no acute abnormality. SOFT TISSUES/SKULL:  No acute abnormality of the visualized skull or soft tissues. Stable CT brain with no acute intracranial abnormality and re- demonstration of remote right MCA infarct.      Ct Cervical Spine Wo Contrast    Result Date: 7/30/2018  EXAMINATION: CT OF THE CERVICAL SPINE WITHOUT CONTRAST 7/30/2018 12:58 pm TECHNIQUE: CT of the cervical spine was performed without the administration of intravenous contrast. Multiplanar reformatted images are provided for review. Dose modulation, iterative reconstruction, and/or weight based adjustment of the mA/kV was utilized to reduce the radiation dose to as low as reasonably achievable. COMPARISON: Cervical spine CT from 05/13/2018 HISTORY: ORDERING SYSTEM PROVIDED HISTORY: fall TECHNOLOGIST PROVIDED HISTORY: Ordering Physician Provided Reason for Exam: new onset seizures Acuity: Acute Type of Exam: Initial FINDINGS: BONES/ALIGNMENT:  There is straightening of the normal cervical lordosis which may relate to patient positioning or muscle spasm. There is no evidence of an acute cervical spine fracture. There is normal alignment of the cervical spine. DEGENERATIVE CHANGES: There is multilevel degenerative disc disease, advanced at C5-C6 and C6-7. There also hypertrophic degenerative changes facet and uncovertebral joints throughout the cervical spine as well as the atlantoaxial joint. These findings contribute to moderate bilateral neural foraminal narrowing at C5-C6 and moderate left neural foraminal narrowing at C6-7. Disc osteophyte complexes at C5-C6 and C6-C7 cause mild impingement of the ventral thecal sac at these levels. SOFT TISSUES: There is no prevertebral soft tissue swelling. Visualized lung apices are well aerated. Multilevel degenerative disc disease and hypertrophic degenerative changes of the cervical spine, but no CT evidence for acute osseous abnormality.      Xr Chest Portable    Result Date: 8/2/2018  EXAMINATION: SINGLE XRAY VIEW OF THE CHEST 8/2/2018 6:49 am COMPARISON: AP chest from 08/01/2018 HISTORY: ORDERING SYSTEM PROVIDED HISTORY: ETT placement TECHNOLOGIST PROVIDED HISTORY: Reason for exam:->ETT placement Ordering Physician Provided Reason for Exam: on

## 2018-08-04 NOTE — PLAN OF CARE
Problem: Health Behavior:  Goal: Ability to manage health-related needs will improve  Ability to manage health-related needs will improve   Outcome: Ongoing  Pt is working towards goal     Problem: Physical Regulation:  Goal: Signs of adequate cerebral perfusion will increase  Signs of adequate cerebral perfusion will increase   Outcome: Ongoing  Pt is doing better day by day will continue to monitor   Goal: Ability to maintain a stable neurologic state will improve  Ability to maintain a stable neurologic state will improve   Outcome: Ongoing      Problem: Safety:  Goal: Ability to remain free from injury will improve  Ability to remain free from injury will improve   Outcome: Met This Shift  Pt is free from injury     Problem: Nutrition  Goal: Optimal nutrition therapy  Outcome: Ongoing  Pt is working on it     Problem: Risk for Impaired Skin Integrity  Goal: Tissue integrity - skin and mucous membranes  Structural intactness and normal physiological function of skin and  mucous membranes.    Outcome: Ongoing  Pt is sAME ADMISSION     Problem: Falls - Risk of:  Goal: Will remain free from falls  Will remain free from falls   Outcome: Met This Shift  PT IS FREE FROM FALLS   Goal: Absence of physical injury  Absence of physical injury   Outcome: Met This Shift  PT is free from physical injury

## 2018-08-04 NOTE — PROGRESS NOTES
cervical lordosis which may relate to patient positioning or muscle spasm. There is no evidence of an acute cervical spine fracture. There is normal alignment of the cervical spine. DEGENERATIVE CHANGES: There is multilevel degenerative disc disease, advanced at C5-C6 and C6-7. There also hypertrophic degenerative changes facet and uncovertebral joints throughout the cervical spine as well as the atlantoaxial joint. These findings contribute to moderate bilateral neural foraminal narrowing at C5-C6 and moderate left neural foraminal narrowing at C6-7. Disc osteophyte complexes at C5-C6 and C6-C7 cause mild impingement of the ventral thecal sac at these levels. SOFT TISSUES: There is no prevertebral soft tissue swelling. Visualized lung apices are well aerated. Multilevel degenerative disc disease and hypertrophic degenerative changes of the cervical spine, but no CT evidence for acute osseous abnormality. ASSESSMENT AND PLAN:       Patient Active Problem List   Diagnosis    Acquired hypothyroidism    Essential hypertension    Seizure disorder ([address]    Urge incontinence    Generalized anxiety disorder    Gastroesophageal reflux disease without esophagitis    H/O: CVA (cerebrovascular accident)    Migraine without aura and without status migrainosus, not intractable    Hammer toe of left foot    Tremor of both hands    Status epilepticus ([address]    Moderate malnutrition ([address]         Seda Reina M.D.   Neurology  8/4/2018  2:08 PM

## 2018-08-04 NOTE — PROGRESS NOTES
Pulmonary Progress Note  Pulmonary and Critical Care Specialists      Patient - Zayda Dennison,  Age - 61 y.o.    - 1955      Room Number - 2625/0078-56   N -  [id number]   [facility]  Date of Admission -  2018 11:50 AM        Consulting Micha Carmona MD  Primary Care Physician - Joel George MD     SUBJECTIVE   No distress  Resting quietly    OBJECTIVE   VITALS    height is 5' 10\" (1.778 m) and weight is 160 lb 1.6 oz (72.6 kg). Her oral temperature is 98.4 °F (36.9 °C). Her blood pressure is 126/81 and her pulse is 78. Her respiration is 16 and oxygen saturation is 97%. Body mass index is 22.97 kg/m². Temperature Range: Temp: 98.4 °F (36.9 °C) Temp  Av.4 °F (36.9 °C)  Min: 98.2 °F (36.8 °C)  Max: 98.5 °F (36.9 °C)  BP Range:  Systolic (99WYT), SMU:954 , Min:121 , MJR:702     Diastolic (97WGO), XZP:07, Min:71, Max:81    Pulse Range: Pulse  Av.8  Min: 70  Max: 86  Respiration Range: Resp  Avg: 15.8  Min: 15  Max: 16  Current Pulse Ox[de-identified]  SpO2: 97 %  24HR Pulse Ox Range:  SpO2  Av %  Min: 94 %  Max: 97 %  Oxygen Amount and Delivery: O2 Flow Rate (L/min): 2 L/min    Wt Readings from Last 3 Encounters:   18 160 lb 1.6 oz (72.6 kg)   18 153 lb (69.4 kg)   18 155 lb 10.3 oz (70.6 kg)       I/O (24 Hours)    Intake/Output Summary (Last 24 hours) at 18 1324  Last data filed at 18 0859   Gross per 24 hour   Intake             1000 ml   Output             1700 ml   Net             -700 ml       EXAM     General Appearance  Awake, alert, oriented, in no acute distress  HEENT - normocephalic, atraumatic. Neck - Supple,  trachea midline   Lungs - clear -- no wheezes  Heart Exam:PMI normal. No lifts, heaves, or thrills. RRR. No murmurs, clicks, gallops, or rubs  Abdomen Exam: Abdomen soft, non-tender.  BS normal.  Extremity Exam: no edema    MEDS      vancomycin  1,500 mg Intravenous Q12H    pantoprazole  40 mg Oral QAM AC    vancomycin (VANCOCIN) intermittent dosing (placeholder)   Other RX Placeholder    metoprolol  5 mg Intravenous Q6H    lacosamide (VIMPAT) IVPB  100 mg Intravenous BID    sodium chloride flush  10 mL Intravenous 2 times per day    sodium chloride flush  10 mL Intravenous 2 times per day    enoxaparin  40 mg Subcutaneous Daily    levothyroxine  25 mcg Intravenous Daily    levetiracetam  1,250 mg Intravenous BID      sodium chloride 50 mL/hr at 08/03/18 1727     potassium chloride **OR** potassium chloride **OR** potassium chloride, racepinephrine HCl, enalaprilat, sodium chloride flush, acetaminophen, sodium chloride flush, magnesium hydroxide, ondansetron, potassium chloride, lidocaine PF, albuterol sulfate HFA **AND** ipratropium **AND** MDI Treatment, LORazepam **OR** LORazepam    LABS   CBC   Recent Labs      08/04/18   0625   WBC  11.0   HGB  13.7   HCT  40.9   MCV  91.2   PLT  218     BMP: Lab Results   Component Value Date     08/04/2018    K 2.9 08/04/2018     08/04/2018    CO2 23 08/04/2018    BUN 8 08/04/2018    LABALBU 4.3 07/30/2018    LABALBU 4.1 10/04/2011    CREATININE <0.40 08/04/2018    CALCIUM 8.0 08/04/2018    GFRAA CANNOT BE CALCULATED 08/04/2018    LABGLOM CANNOT BE CALCULATED 08/04/2018     ABGs:  Lab Results   Component Value Date    PHART 7.458 08/02/2018    PO2ART 64.0 08/02/2018    JVU6KZL 36.7 08/02/2018    Lab Results   Component Value Date    MODE CPAP 08/02/2018     Ionized Calcium:  No results found for: IONCA  Magnesium:    Lab Results   Component Value Date    MG 1.7 08/04/2018     Phosphorus:    Lab Results   Component Value Date    PHOS 3.4 07/30/2018        LIVER PROFILE No results for input(s): AST, ALT, LIPASE, BILIDIR, BILITOT, ALKPHOS in the last 72 hours. Invalid input(s): AMYLASE,  ALB  INR No results for input(s): INR in the last 72 hours.   PTT   Lab Results   Component Value Date    APTT 21.3 (L) 01/14/2018         RADIOLOGY     (See actual

## 2018-08-05 ENCOUNTER — APPOINTMENT (OUTPATIENT)
Dept: GENERAL RADIOLOGY | Age: 63
DRG: 053 | End: 2018-08-05
Payer: COMMERCIAL

## 2018-08-05 LAB
-: NORMAL
ANION GAP SERPL CALCULATED.3IONS-SCNC: 14 MMOL/L (ref 9–17)
BUN BLDV-MCNC: 4 MG/DL (ref 8–23)
BUN/CREAT BLD: ABNORMAL (ref 9–20)
CALCIUM SERPL-MCNC: 8.6 MG/DL (ref 8.6–10.4)
CHLORIDE BLD-SCNC: 101 MMOL/L (ref 98–107)
CO2: 23 MMOL/L (ref 20–31)
CREAT SERPL-MCNC: <0.4 MG/DL (ref 0.5–0.9)
CULTURE: NORMAL
CULTURE: NORMAL
GFR AFRICAN AMERICAN: ABNORMAL ML/MIN
GFR NON-AFRICAN AMERICAN: ABNORMAL ML/MIN
GFR SERPL CREATININE-BSD FRML MDRD: ABNORMAL ML/MIN/{1.73_M2}
GFR SERPL CREATININE-BSD FRML MDRD: ABNORMAL ML/MIN/{1.73_M2}
GLUCOSE BLD-MCNC: 113 MG/DL (ref 70–99)
HCT VFR BLD CALC: 45 % (ref 36–46)
HEMOGLOBIN: 15 G/DL (ref 12–16)
Lab: NORMAL
Lab: NORMAL
MAGNESIUM: 1.8 MG/DL (ref 1.6–2.6)
MCH RBC QN AUTO: 30.6 PG (ref 26–34)
MCHC RBC AUTO-ENTMCNC: 33.3 G/DL (ref 31–37)
MCV RBC AUTO: 92 FL (ref 80–100)
NRBC AUTOMATED: ABNORMAL PER 100 WBC
PDW BLD-RTO: 15 % (ref 11.5–14.9)
PLATELET # BLD: 238 K/UL (ref 150–450)
PMV BLD AUTO: 9.4 FL (ref 6–12)
POTASSIUM SERPL-SCNC: 2.9 MMOL/L (ref 3.7–5.3)
RBC # BLD: 4.89 M/UL (ref 4–5.2)
REASON FOR REJECTION: NORMAL
SODIUM BLD-SCNC: 138 MMOL/L (ref 135–144)
SPECIMEN DESCRIPTION: NORMAL
SPECIMEN DESCRIPTION: NORMAL
STATUS: NORMAL
STATUS: NORMAL
VANCOMYCIN TROUGH DATE LAST DOSE: NORMAL
VANCOMYCIN TROUGH DOSE AMOUNT: NORMAL
VANCOMYCIN TROUGH TIME LAST DOSE: 324
VANCOMYCIN TROUGH: 17.7 UG/ML (ref 10–20)
WBC # BLD: 9.4 K/UL (ref 3.5–11)
ZZ NTE CLEAN UP: ORDERED TEST: NORMAL
ZZ NTE WITH NAME CLEAN UP: SPECIMEN SOURCE: NORMAL

## 2018-08-05 PROCEDURE — 80202 ASSAY OF VANCOMYCIN: CPT

## 2018-08-05 PROCEDURE — 71046 X-RAY EXAM CHEST 2 VIEWS: CPT

## 2018-08-05 PROCEDURE — 2060000000 HC ICU INTERMEDIATE R&B

## 2018-08-05 PROCEDURE — 80048 BASIC METABOLIC PNL TOTAL CA: CPT

## 2018-08-05 PROCEDURE — 2580000003 HC RX 258: Performed by: INTERNAL MEDICINE

## 2018-08-05 PROCEDURE — 6360000002 HC RX W HCPCS: Performed by: INTERNAL MEDICINE

## 2018-08-05 PROCEDURE — 6360000002 HC RX W HCPCS: Performed by: STUDENT IN AN ORGANIZED HEALTH CARE EDUCATION/TRAINING PROGRAM

## 2018-08-05 PROCEDURE — 83735 ASSAY OF MAGNESIUM: CPT

## 2018-08-05 PROCEDURE — 6370000000 HC RX 637 (ALT 250 FOR IP): Performed by: STUDENT IN AN ORGANIZED HEALTH CARE EDUCATION/TRAINING PROGRAM

## 2018-08-05 PROCEDURE — 99232 SBSQ HOSP IP/OBS MODERATE 35: CPT | Performed by: INTERNAL MEDICINE

## 2018-08-05 PROCEDURE — 2580000003 HC RX 258: Performed by: STUDENT IN AN ORGANIZED HEALTH CARE EDUCATION/TRAINING PROGRAM

## 2018-08-05 PROCEDURE — 85027 COMPLETE CBC AUTOMATED: CPT

## 2018-08-05 PROCEDURE — 6370000000 HC RX 637 (ALT 250 FOR IP): Performed by: INTERNAL MEDICINE

## 2018-08-05 PROCEDURE — 36415 COLL VENOUS BLD VENIPUNCTURE: CPT

## 2018-08-05 PROCEDURE — 84132 ASSAY OF SERUM POTASSIUM: CPT

## 2018-08-05 PROCEDURE — 6370000000 HC RX 637 (ALT 250 FOR IP): Performed by: PSYCHIATRY & NEUROLOGY

## 2018-08-05 RX ORDER — POTASSIUM CHLORIDE 20 MEQ/1
20 TABLET, EXTENDED RELEASE ORAL ONCE
Status: COMPLETED | OUTPATIENT
Start: 2018-08-05 | End: 2018-08-05

## 2018-08-05 RX ADMIN — POTASSIUM CHLORIDE 10 MEQ: 10 INJECTION, SOLUTION INTRAVENOUS at 17:32

## 2018-08-05 RX ADMIN — LACOSAMIDE 100 MG: 100 TABLET, FILM COATED ORAL at 08:18

## 2018-08-05 RX ADMIN — PANTOPRAZOLE SODIUM 40 MG: 40 TABLET, DELAYED RELEASE ORAL at 06:15

## 2018-08-05 RX ADMIN — POTASSIUM CHLORIDE 10 MEQ: 10 INJECTION, SOLUTION INTRAVENOUS at 11:56

## 2018-08-05 RX ADMIN — POTASSIUM CHLORIDE 10 MEQ: 10 INJECTION, SOLUTION INTRAVENOUS at 20:08

## 2018-08-05 RX ADMIN — POTASSIUM CHLORIDE 10 MEQ: 10 INJECTION, SOLUTION INTRAVENOUS at 13:20

## 2018-08-05 RX ADMIN — LEVOTHYROXINE SODIUM 50 MCG: 50 TABLET ORAL at 06:15

## 2018-08-05 RX ADMIN — AMLODIPINE BESYLATE 10 MG: 10 TABLET ORAL at 08:16

## 2018-08-05 RX ADMIN — POTASSIUM CHLORIDE 20 MEQ: 1500 TABLET, EXTENDED RELEASE ORAL at 13:20

## 2018-08-05 RX ADMIN — LEVETIRACETAM 1250 MG: 750 TABLET ORAL at 20:05

## 2018-08-05 RX ADMIN — POTASSIUM CHLORIDE 10 MEQ: 10 INJECTION, SOLUTION INTRAVENOUS at 18:28

## 2018-08-05 RX ADMIN — ENOXAPARIN SODIUM 40 MG: 40 INJECTION SUBCUTANEOUS at 08:16

## 2018-08-05 RX ADMIN — VANCOMYCIN HYDROCHLORIDE 1500 MG: 10 INJECTION, POWDER, LYOPHILIZED, FOR SOLUTION INTRAVENOUS at 03:24

## 2018-08-05 RX ADMIN — LACOSAMIDE 100 MG: 100 TABLET, FILM COATED ORAL at 20:05

## 2018-08-05 RX ADMIN — LEVETIRACETAM 1250 MG: 750 TABLET ORAL at 08:17

## 2018-08-05 RX ADMIN — POTASSIUM CHLORIDE 10 MEQ: 10 INJECTION, SOLUTION INTRAVENOUS at 14:47

## 2018-08-05 RX ADMIN — Medication 10 ML: at 08:17

## 2018-08-05 RX ADMIN — Medication 10 ML: at 08:16

## 2018-08-05 ASSESSMENT — ENCOUNTER SYMPTOMS
VOMITING: 0
STRIDOR: 0
DIARRHEA: 0
COUGH: 0
NAUSEA: 0
BLURRED VISION: 0

## 2018-08-05 ASSESSMENT — PAIN SCALES - GENERAL
PAINLEVEL_OUTOF10: 0
PAINLEVEL_OUTOF10: 0

## 2018-08-05 NOTE — PLAN OF CARE
Problem: Safety:  Goal: Ability to remain free from injury will improve  Ability to remain free from injury will improve   Outcome: Met This Shift  Seizure precautions maintained, bed in lowest position, call light within reach, side rail up and hourly rounding completed     Problem: Risk for Impaired Skin Integrity  Goal: Tissue integrity - skin and mucous membranes  Structural intactness and normal physiological function of skin and  mucous membranes. Outcome: Met This Shift  Skin assessment complete. Waffle mattress in place. Pt turned and repositioned every two hours with assistance from staff. Area kept free from moisture. Proper nourishment and fluids encouraged, as appropriate. Skin remains clean, dry, and intact. Will continue to monitor for additional needs and changes in skin breakdown. Problem: Falls - Risk of:  Goal: Will remain free from falls  Will remain free from falls   Outcome: Met This Shift  The patient remained free from falls this shift, call light within reach, bed in locked and lowest position. Side rails up x2. Continue to monitor closely.

## 2018-08-05 NOTE — PROGRESS NOTES
ETOH abuse and gastroesophageal reflux disease. FINDINGS: ETT tip stable approximately 4.3 cm above the german. Enteric tube tip and side hole project over the gastric fundus. Overlying ECG monitor leads, snaps and respiratory apparatus. Cardiomediastinal shadow stable. No consolidation or pleural effusion. No pneumothorax. Bones and soft tissues intact. No acute finding or interval change. Support tubes satisfactory. Xr Chest Portable    Result Date: 8/1/2018  EXAMINATION: SINGLE XRAY VIEW OF THE CHEST 8/1/2018 6:54 am COMPARISON: 07/31/2018 HISTORY: ORDERING SYSTEM PROVIDED HISTORY: ETT placement TECHNOLOGIST PROVIDED HISTORY: Reason for exam:->ETT placement Ordering Physician Provided Reason for Exam: Vent protocol. Acuity: Unknown Type of Exam: Unknown Additional signs and symptoms: Vent protocol. FINDINGS: Endotracheal tube is 4.8 cm above the german. Enteric tube extends below the left hemidiaphragm and side hole is overlying the proximal stomach. Cardiomediastinal silhouette and pulmonary vasculature are within normal limits. No focal airspace consolidation, pneumothorax, or pleural effusion. No free air beneath the diaphragm. No evidence of acute osseous abnormality. No significant interval change. Xr Chest Portable    Result Date: 7/31/2018  EXAMINATION: SINGLE XRAY VIEW OF THE CHEST 7/31/2018 6:41 am COMPARISON: Radiographs from July 30, 2018 HISTORY: ORDERING SYSTEM PROVIDED HISTORY: ETT placement TECHNOLOGIST PROVIDED HISTORY: Reason for exam:->ETT placement Ordering Physician Provided Reason for Exam: vent Acuity: Unknown Type of Exam: Unknown FINDINGS: One image obtained. Stable supporting devices. The cardiomediastinal contours are normal.  No pleural effusion or pneumothorax. Bibasilar atelectasis. No free subdiaphragmatic air. Stable exam with bibasilar atelectasis.      Xr Chest Portable    Result Date: 7/30/2018  EXAMINATION: SINGLE XRAY VIEW OF THE CHEST 7/30/2018 2:17 pm COMPARISON: Today at 12:55 p.m. and May 15, 2018 HISTORY: ORDERING SYSTEM PROVIDED HISTORY: Chest Pain TECHNOLOGIST PROVIDED HISTORY: Reason for exam:->Chest Pain Ordering Physician Provided Reason for Exam: post intubation Acuity: Acute Type of Exam: Initial FINDINGS: The endotracheal tube terminates at the level of the clavicular heads, 8 cm above the german. The enteric tube terminates in the body of the stomach. Shallow lung inflation. Linear opacities in the lung bases are again noted. There is no evidence for pneumothorax or focal area of consolidation. No effusion is appreciated. The cardiac and mediastinal contours are unchanged in appearance. Sequela of old granulomatous disease is noted. Intubation with the endotracheal tip at the level of the clavicles. Shallow inflation with findings consistent with basilar atelectasis. Xr Chest Portable    Result Date: 7/30/2018  EXAMINATION: SINGLE XRAY VIEW OF THE CHEST 7/30/2018 12:32 pm COMPARISON: May 15, 2018 HISTORY: ORDERING SYSTEM PROVIDED HISTORY: eval TECHNOLOGIST PROVIDED HISTORY: Reason for exam:->eval Ordering Physician Provided Reason for Exam: seizures Acuity: Unknown Type of Exam: Unknown FINDINGS: One image obtained. Cardiac monitoring leads overlie the chest.  Interval extubation and removal of enteric tube. The cardiomediastinal contours are stable. No pleural effusion or pneumothorax. Bibasilar atelectasis without focal airspace consolidation. No free subdiaphragmatic air. 1. No acute cardiopulmonary disease. 2. Bibasilar atelectasis. Physical Examination:        Physical Exam   Constitutional: She is well-developed, well-nourished, and in no distress. HENT:   Head: Normocephalic and atraumatic. Eyes: Pupils are equal, round, and reactive to light. Neck: No JVD present. No thyromegaly present. Cardiovascular: Normal rate and regular rhythm.     Pulmonary/Chest: Effort normal and breath sounds normal. No stridor. Abdominal: Soft. Bowel sounds are normal. She exhibits no distension. There is no tenderness. There is no guarding. Skin: Skin is warm and dry. Assessment:        Primary Problem  Status epilepticus [facility])    Active Hospital Problems    Diagnosis Date Noted    Status epilepticus ([facility] Donnie Ogden 05/13/2018    H/O: CVA (cerebrovascular accident) [Z86.73] 05/06/2014    Acquired hypothyroidism [E03.9] 03/27/2012    Essential hypertension [I10] 03/27/2012    [facility] [N17.122] 03/27/2012       Plan:        1. Status epilepticus   -continue keppra and vimpat, increased to 100mg BID per neurology   -h/o prior R sided CVA  2. HTN   -continue current medications   3. Respiratory    -CXR for altered  MS as patient started full diet and oral meds yesterday    -doing well on room air, continue breathing treatments   4. Infectious disease   -procalcitonin normal 0.07   -will de-escalate to doxy   5. Hypothyroid    -oral medications   6. GI   -diet advanced to full  7. Renal   -will discontinue fluids   8. D/C planning    -to [facility] on Monday     Shahbaz Sanchez MD  8/5/2018  8:19 AM       IM Attending    Pt seen and examined today   I have discussed the care of pt , including pertinent history and exam findings,  with the resident. I have reviewed the key elements of all parts of the encounter with the resident. I agree with the assessment, plan and orders as documented by the resident.     Electronically signed by Perri Solis MD on 8/5/2018 at 12:33 PM

## 2018-08-05 NOTE — CARE COORDINATION
DISCHARGE PLANNING NOTE:    Plan is for this patient to go to [facility]. LSW is following. Precert required. PT recs SNF - Will need updated PT notes for Monday. Will continue to follow along.      Electronically signed by José Miguel Cabrera RN on 8/5/2018 at 9:27 AM

## 2018-08-06 LAB
ANION GAP SERPL CALCULATED.3IONS-SCNC: 12 MMOL/L (ref 9–17)
BUN BLDV-MCNC: 6 MG/DL (ref 8–23)
BUN/CREAT BLD: ABNORMAL (ref 9–20)
CALCIUM SERPL-MCNC: 8.9 MG/DL (ref 8.6–10.4)
CHLORIDE BLD-SCNC: 101 MMOL/L (ref 98–107)
CO2: 23 MMOL/L (ref 20–31)
CREAT SERPL-MCNC: 0.42 MG/DL (ref 0.5–0.9)
GFR AFRICAN AMERICAN: >60 ML/MIN
GFR NON-AFRICAN AMERICAN: >60 ML/MIN
GFR SERPL CREATININE-BSD FRML MDRD: ABNORMAL ML/MIN/{1.73_M2}
GFR SERPL CREATININE-BSD FRML MDRD: ABNORMAL ML/MIN/{1.73_M2}
GLUCOSE BLD-MCNC: 106 MG/DL (ref 70–99)
HCT VFR BLD CALC: 42.5 % (ref 36–46)
HEMOGLOBIN: 14.1 G/DL (ref 12–16)
MCH RBC QN AUTO: 30.5 PG (ref 26–34)
MCHC RBC AUTO-ENTMCNC: 33.2 G/DL (ref 31–37)
MCV RBC AUTO: 91.8 FL (ref 80–100)
NRBC AUTOMATED: NORMAL PER 100 WBC
PDW BLD-RTO: 14.8 % (ref 11.5–14.9)
PLATELET # BLD: 237 K/UL (ref 150–450)
PMV BLD AUTO: 9.5 FL (ref 6–12)
POTASSIUM SERPL-SCNC: 3.7 MMOL/L (ref 3.7–5.3)
POTASSIUM SERPL-SCNC: 3.9 MMOL/L (ref 3.7–5.3)
RBC # BLD: 4.63 M/UL (ref 4–5.2)
SODIUM BLD-SCNC: 136 MMOL/L (ref 135–144)
WBC # BLD: 10 K/UL (ref 3.5–11)

## 2018-08-06 PROCEDURE — 80048 BASIC METABOLIC PNL TOTAL CA: CPT

## 2018-08-06 PROCEDURE — 85027 COMPLETE CBC AUTOMATED: CPT

## 2018-08-06 PROCEDURE — 97530 THERAPEUTIC ACTIVITIES: CPT

## 2018-08-06 PROCEDURE — 6370000000 HC RX 637 (ALT 250 FOR IP): Performed by: PSYCHIATRY & NEUROLOGY

## 2018-08-06 PROCEDURE — 6370000000 HC RX 637 (ALT 250 FOR IP): Performed by: STUDENT IN AN ORGANIZED HEALTH CARE EDUCATION/TRAINING PROGRAM

## 2018-08-06 PROCEDURE — 6370000000 HC RX 637 (ALT 250 FOR IP): Performed by: INTERNAL MEDICINE

## 2018-08-06 PROCEDURE — 2060000000 HC ICU INTERMEDIATE R&B

## 2018-08-06 PROCEDURE — 2580000003 HC RX 258: Performed by: STUDENT IN AN ORGANIZED HEALTH CARE EDUCATION/TRAINING PROGRAM

## 2018-08-06 PROCEDURE — 36415 COLL VENOUS BLD VENIPUNCTURE: CPT

## 2018-08-06 PROCEDURE — 97110 THERAPEUTIC EXERCISES: CPT

## 2018-08-06 PROCEDURE — 99232 SBSQ HOSP IP/OBS MODERATE 35: CPT | Performed by: INTERNAL MEDICINE

## 2018-08-06 PROCEDURE — 97535 SELF CARE MNGMENT TRAINING: CPT

## 2018-08-06 PROCEDURE — 97166 OT EVAL MOD COMPLEX 45 MIN: CPT

## 2018-08-06 PROCEDURE — 6360000002 HC RX W HCPCS: Performed by: STUDENT IN AN ORGANIZED HEALTH CARE EDUCATION/TRAINING PROGRAM

## 2018-08-06 RX ORDER — LACOSAMIDE 100 MG/1
100 TABLET ORAL 2 TIMES DAILY
Qty: 60 TABLET | Refills: 1 | Status: SHIPPED | OUTPATIENT
Start: 2018-08-06 | End: 2018-08-24 | Stop reason: SDUPTHER

## 2018-08-06 RX ORDER — LEVETIRACETAM 250 MG/1
1250 TABLET ORAL 2 TIMES DAILY
Qty: 60 TABLET | Refills: 3 | Status: SHIPPED | OUTPATIENT
Start: 2018-08-06 | End: 2018-10-03 | Stop reason: SDUPTHER

## 2018-08-06 RX ADMIN — LEVETIRACETAM 1250 MG: 750 TABLET ORAL at 20:25

## 2018-08-06 RX ADMIN — LACOSAMIDE 100 MG: 100 TABLET, FILM COATED ORAL at 20:25

## 2018-08-06 RX ADMIN — LEVOTHYROXINE SODIUM 50 MCG: 50 TABLET ORAL at 06:08

## 2018-08-06 RX ADMIN — Medication 10 ML: at 11:15

## 2018-08-06 RX ADMIN — PANTOPRAZOLE SODIUM 40 MG: 40 TABLET, DELAYED RELEASE ORAL at 06:08

## 2018-08-06 RX ADMIN — ENOXAPARIN SODIUM 40 MG: 40 INJECTION SUBCUTANEOUS at 11:14

## 2018-08-06 RX ADMIN — LEVETIRACETAM 1250 MG: 750 TABLET ORAL at 11:08

## 2018-08-06 RX ADMIN — LACOSAMIDE 100 MG: 100 TABLET, FILM COATED ORAL at 11:07

## 2018-08-06 RX ADMIN — AMLODIPINE BESYLATE 10 MG: 10 TABLET ORAL at 11:14

## 2018-08-06 RX ADMIN — Medication 10 ML: at 20:25

## 2018-08-06 ASSESSMENT — ENCOUNTER SYMPTOMS
DIARRHEA: 0
VOMITING: 0
BLURRED VISION: 0
COUGH: 0
NAUSEA: 0
SHORTNESS OF BREATH: 0
ABDOMINAL PAIN: 0

## 2018-08-06 NOTE — PROGRESS NOTES
08/06/2018     08/06/2018    CO2 23 08/06/2018    BUN 6 08/06/2018    CREATININE 0.42 08/06/2018    GLUCOSE 106 08/06/2018    GLUCOSE 68 03/27/2012    CALCIUM 8.9 08/06/2018          Lab Results   Component Value Date/Time    CULTURE (A) 07/31/2018 03:50 AM     METHICILLIN RESISTANT STAPHYLOCOCCUS AUREUS MODERATE GROWTH         Radiology:    Xr Chest Standard (2 Vw)    Result Date: 8/5/2018  EXAMINATION: TWO VIEWS OF THE CHEST 8/5/2018 10:18 am COMPARISON: August 2, 2018 HISTORY: ORDERING SYSTEM PROVIDED HISTORY: r/o pneumonia, altered MS TECHNOLOGIST PROVIDED HISTORY: Reason for exam:->r/o pneumonia, altered MS Ordering Physician Provided Reason for Exam: Altered mental status. Possible pneumonia Acuity: Acute Type of Exam: Initial Additional signs and symptoms: Altered mental status. Possible pneumonia FINDINGS: Endotracheal and nasogastric tubes have been removed. Elevation of the left hemidiaphragm is unchanged. No focal consolidation. No cardiomegaly. Tortuous aorta. No pulmonary edema. Removal of the endotracheal and nasogastric tubes. No acute pulmonary process. Ct Head Wo Contrast    Result Date: 7/30/2018  EXAMINATION: CT OF THE HEAD WITHOUT CONTRAST  7/30/2018 12:58 pm TECHNIQUE: CT of the head was performed without the administration of intravenous contrast. Dose modulation, iterative reconstruction, and/or weight based adjustment of the mA/kV was utilized to reduce the radiation dose to as low as reasonably achievable. COMPARISON: 05/13/2018 HISTORY: ORDERING SYSTEM PROVIDED HISTORY: AMS from seizure TECHNOLOGIST PROVIDED HISTORY: Ordering Physician Provided Reason for Exam: new onset seizures Acuity: Acute Type of Exam: Initial FINDINGS: BRAIN/VENTRICLES: There is no acute intracranial hemorrhage, mass effect or midline shift. No abnormal extra-axial fluid collection.   Stable right MCA distribution encephalomalacia with associated mild ex vacuo dilatation right lateral ventricle swelling. Visualized lung apices are well aerated. Multilevel degenerative disc disease and hypertrophic degenerative changes of the cervical spine, but no CT evidence for acute osseous abnormality. Xr Chest Portable    Result Date: 8/2/2018  EXAMINATION: SINGLE XRAY VIEW OF THE CHEST 8/2/2018 6:49 am COMPARISON: AP chest from 08/01/2018 HISTORY: ORDERING SYSTEM PROVIDED HISTORY: ETT placement TECHNOLOGIST PROVIDED HISTORY: Reason for exam:->ETT placement Ordering Physician Provided Reason for Exam: on vent Acuity: Acute Type of Exam: Initial Additional history includes hypertension, ETOH abuse and gastroesophageal reflux disease. FINDINGS: ETT tip stable approximately 4.3 cm above the german. Enteric tube tip and side hole project over the gastric fundus. Overlying ECG monitor leads, snaps and respiratory apparatus. Cardiomediastinal shadow stable. No consolidation or pleural effusion. No pneumothorax. Bones and soft tissues intact. No acute finding or interval change. Support tubes satisfactory. Xr Chest Portable    Result Date: 8/1/2018  EXAMINATION: SINGLE XRAY VIEW OF THE CHEST 8/1/2018 6:54 am COMPARISON: 07/31/2018 HISTORY: ORDERING SYSTEM PROVIDED HISTORY: ETT placement TECHNOLOGIST PROVIDED HISTORY: Reason for exam:->ETT placement Ordering Physician Provided Reason for Exam: Vent protocol. Acuity: Unknown Type of Exam: Unknown Additional signs and symptoms: Vent protocol. FINDINGS: Endotracheal tube is 4.8 cm above the german. Enteric tube extends below the left hemidiaphragm and side hole is overlying the proximal stomach. Cardiomediastinal silhouette and pulmonary vasculature are within normal limits. No focal airspace consolidation, pneumothorax, or pleural effusion. No free air beneath the diaphragm. No evidence of acute osseous abnormality. No significant interval change.      Xr Chest Portable    Result Date: 7/31/2018  EXAMINATION: SINGLE XRAY VIEW OF THE CHEST 7/31/2018 stable. No pleural effusion or pneumothorax. Bibasilar atelectasis without focal airspace consolidation. No free subdiaphragmatic air. 1. No acute cardiopulmonary disease. 2. Bibasilar atelectasis. Physical Examination:        Physical Exam   Constitutional: She is well-developed, well-nourished, and in no distress. Neck: Neck supple. Cardiovascular: Normal rate, regular rhythm and normal heart sounds. Pulmonary/Chest: Effort normal and breath sounds normal.   Abdominal: Soft. Bowel sounds are normal.   Musculoskeletal: She exhibits no edema. Neurological: She is alert. Skin: Skin is warm and dry. Vitals reviewed. Assessment:        Primary Problem  Status epilepticus [facility])    Active Hospital Problems    Diagnosis Date Noted    Status epilepticus ([facility] Vanessa Soria 05/13/2018    H/O: CVA (cerebrovascular accident) [Z86.73] 05/06/2014    Acquired hypothyroidism [E03.9] 03/27/2012    Essential hypertension [I10] 03/27/2012    Seizure disorder ([facility] [S60.514] 03/27/2012       Plan:        1. Status epilepticus   -Patient ready for dishcharge  -Discharge with keppra and vimpat, 100mg BID  -h/o prior R sided CVA  2. HTN              -continue current medications   3. Respiratory    -Chest x-ray shows no new findings               -Full diet and meds               -doing well on room air, continue breathing treatments   4. Infectious disease              -procalcitonin normal 0.07               5. Hypothyroid                -oral medications   6. GI              -diet advanced to full  7. Renal              -discontinued fluids   8. D/C planning               -to [facility], MD  8/6/2018  11:40 AM       IM Attending    Pt seen and examined today   I have discussed the care of pt , including pertinent history and exam findings,  with the resident. I have reviewed the key elements of all parts of the encounter with the resident.   I agree with the assessment, plan and orders as documented by the resident.     Electronically signed by Haydee Israel MD

## 2018-08-06 NOTE — PROGRESS NOTES
Pulmonary Progress Note  Pulmonary and Critical Care Specialists      Patient - Genet Banks,  Age - 61 y.o.    - 1955      Room Number - 1641/6326-73   N -  [id number]   Acct # - [de-identified]  Date of Admission -  2018 11:50 AM        Consulting Maia Medina MD  Primary Care Physician - Donna James MD     SUBJECTIVE   Remains stable--- no distress  Breathing easy    OBJECTIVE   VITALS    height is 5' 10\" (1.778 m) and weight is 163 lb 2.3 oz (74 kg). Her oral temperature is 97.5 °F (36.4 °C). Her blood pressure is 109/64 and her pulse is 90. Her respiration is 17 and oxygen saturation is 94%. Body mass index is 23.41 kg/m². Temperature Range: Temp: 97.5 °F (36.4 °C) Temp  Av °F (36.7 °C)  Min: 97.5 °F (36.4 °C)  Max: 98.6 °F (37 °C)  BP Range:  Systolic (97JZU), BZV:638 , Min:109 , JQE:403     Diastolic (85TTH), NNX:42, Min:64, Max:82    Pulse Range: Pulse  Av.3  Min: 84  Max: 90  Respiration Range: Resp  Av.5  Min: 15  Max: 18  Current Pulse Ox[de-identified]  SpO2: 94 %  24HR Pulse Ox Range:  SpO2  Av.8 %  Min: 94 %  Max: 96 %  Oxygen Amount and Delivery: O2 Flow Rate (L/min): 2 L/min    Wt Readings from Last 3 Encounters:   18 163 lb 2.3 oz (74 kg)   18 153 lb (69.4 kg)   18 155 lb 10.3 oz (70.6 kg)       I/O (24 Hours)    Intake/Output Summary (Last 24 hours) at 18 0945  Last data filed at 18 0806   Gross per 24 hour   Intake              500 ml   Output                0 ml   Net              500 ml       EXAM     General Appearance  Awake, alert, oriented, in no acute distress  HEENT - normocephalic, atraumatic. Neck - Supple,  trachea midline   Lungs - coarse  Heart Exam:PMI normal. No lifts, heaves, or thrills. RRR. No murmurs, clicks, gallops, or rubs  Abdomen Exam: Abdomen soft, non-tender.    Extremity Exam: no edema  MEDS      levETIRAcetam  1,250 mg Oral BID    levothyroxine  50 mcg Oral Daily    amLODIPine 10 mg Oral Daily    lacosamide  100 mg Oral BID    pantoprazole  40 mg Oral QAM AC    sodium chloride flush  10 mL Intravenous 2 times per day    sodium chloride flush  10 mL Intravenous 2 times per day    enoxaparin  40 mg Subcutaneous Daily       potassium chloride **OR** potassium chloride **OR** potassium chloride, racepinephrine HCl, enalaprilat, sodium chloride flush, acetaminophen, sodium chloride flush, magnesium hydroxide, ondansetron, potassium chloride, lidocaine PF, albuterol sulfate HFA **AND** ipratropium **AND** MDI Treatment, LORazepam **OR** LORazepam    LABS   CBC   Recent Labs      08/06/18   0556   WBC  10.0   HGB  14.1   HCT  42.5   MCV  91.8   PLT  237     BMP: Lab Results   Component Value Date     08/06/2018    K 3.7 08/06/2018     08/06/2018    CO2 23 08/06/2018    BUN 6 08/06/2018    LABALBU 4.3 07/30/2018    LABALBU 4.1 10/04/2011    CREATININE 0.42 08/06/2018    CALCIUM 8.9 08/06/2018    GFRAA >60 08/06/2018    LABGLOM >60 08/06/2018     ABGs:  Lab Results   Component Value Date    PHART 7.458 08/02/2018    PO2ART 64.0 08/02/2018    UJA7NST 36.7 08/02/2018    Lab Results   Component Value Date    MODE CPAP 08/02/2018     Ionized Calcium:  No results found for: IONCA  Magnesium:    Lab Results   Component Value Date    MG 1.8 08/05/2018     Phosphorus:    Lab Results   Component Value Date    PHOS 3.4 07/30/2018        LIVER PROFILE No results for input(s): AST, ALT, LIPASE, BILIDIR, BILITOT, ALKPHOS in the last 72 hours. Invalid input(s): AMYLASE,  ALB  INR No results for input(s): INR in the last 72 hours.   PTT   Lab Results   Component Value Date    APTT 21.3 (L) 01/14/2018         RADIOLOGY     (See actual reports for details)    ASSESSMENT/PLAN     Patient Active Problem List   Diagnosis    Acquired hypothyroidism    Essential hypertension    Seizure disorder ([facility]    Urge incontinence    Generalized anxiety disorder    Gastroesophageal reflux disease

## 2018-08-06 NOTE — PROGRESS NOTES
Physical Therapy  Facility/Department: [facility]ESS[facility]  Daily Treatment Note  NAME: Marvin Yancey  : 1955  MRN: [id number]    Date of Service: 2018    Discharge Recommendations:  [address] [facility] (SNF vs Rehab depending on tolerance of future PT sessions and home set up)        Patient Diagnosis(es): The primary encounter diagnosis was Status epilepticus ([facility] A diagnosis of Seizure disorder [facility]) was also pertinent to this visit. has a past medical history of Ankle fracture, left; Anxiety; Arthritis; ETOH abuse; Frequency of urination; GERD (gastroesophageal reflux disease); Head injury; Headache(784.0); Hypertension; Hypotension; Hypothyroidism; Numbness and tingling; Osteoporosis; Peripheral vascular disease ([facility] Pneumonia; Reflux; Seizures ([facility] Shingles; Stroke [facility]); TIA (transient ischemic attack); Tremor; UTI (urinary tract infection); Varicose veins; and Wears glasses. has a past surgical history that includes Bunionectomy (Right); Tubal ligation; other surgical history (2/23/15); Hammer toe surgery (Left, 2016); and [facility] incl fluor gdnce dx w/cell washg spx (N/A, 2018). Restrictions  Restrictions/Precautions  Restrictions/Precautions: Seizure, Fall Risk, Modified Diet, Contact Precautions  Required Braces or Orthoses?: No  Implants present? : Metal implants (ORIF L ankle)  Position Activity Restriction  Other position/activity restrictions: Bed alarm, telemetry, MRSA in sputum, No actvity order documented- pt returned to bed after session d/t seizure precautions  Subjective   General  Additional Pertinent Hx: Pt presents to ER after being found down at home, last known contact 12 hrs prior. Found to be post ictal on admit , sustained second seizure in ER. Pt intubated at that time. Multiple recent hospitalizations d/t seizures, requiring intubation May 2018. Pt was transferred to [facility] 18 -18.   Response To Previous Treatment:

## 2018-08-06 NOTE — CARE COORDINATION
ONGOING DISCHARGE PLAN:    Plan remains for LSW to continue to follow for DC to [facility]. Will need updated PT notes from Today. Will continue to follow for additional discharge needs.     Electronically signed by Lida Mota RN on 8/6/2018 at 11:19 AM

## 2018-08-06 NOTE — CARE COORDINATION
RUTH noted that this patient was agreeable to go to [facility] [employer] [facility]. RUTH called and spoke with Jorge Munoz in admissions and she verified that they had received the referral.  She requested OT and PT notes for the pre-cert process to get started. SW did request that OT and PT see this patient for a pre- cert pending. RUTH will continue to follow.

## 2018-08-06 NOTE — PROGRESS NOTES
[address]   Occupational Therapy Evaluation  Date: 18  Patient Name: Justen Diaz       Room: 5221/7668-27  MRN: [id number]  Account: [de-identified]   : 1955  (64 y.o.) Gender: female     Discharge Recommendations:  Subacute/Skilled Nursing Facility  Equipment Needed: Yes  Other: BSC (for main), pt states she can move bed to main, grab bars for toilet       Diagnosis: seizure, L hemiparesis,  admit , was intubated, history of CVA , per pt Oct 2017 with L weak, CT head negative, Multilevel degenerative disc disease and hypertrophic degenerative changes of the cervical spine, but no CT evidence for acute osseous abnormality. Past Medical History:  has a past medical history of Ankle fracture, left; Anxiety; Arthritis; ETOH abuse; Frequency of urination; GERD (gastroesophageal reflux disease); Head injury; Headache(784.0); Hypertension; Hypotension; Hypothyroidism; Numbness and tingling; Osteoporosis; Peripheral vascular disease ([address] Pneumonia; Reflux; Seizures ([facility] [address] Shingles; Stroke [facility]); TIA (transient ischemic attack); Tremor; UTI (urinary tract infection); Varicose veins; and Wears glasses. Past Surgical History:   has a past surgical history that includes Bunionectomy (Right); Tubal ligation; other surgical history (2/23/15); Hammer toe surgery (Left, 2016); and pr [facility] incl fluor gdnce dx w/cell washg spx (N/A, 2018).     Restrictions  Restrictions/Precautions: Seizure, Fall Risk, Modified Diet, Contact Precautions  Implants present? : Metal implants (ORIF L ankle)  Other position/activity restrictions: Bed alarm, telemetry, MRSA in sputum, No actvity order documented- pt returned to bed after session d/t seizure precautions, diet just upgraded to full  Required Braces or Orthoses?: No     Vitals  Temp: 99.1 °F (37.3 °C)  Pulse: 79  Resp: 16  BP: 121/67  Height: 5' 10\" (177.8 cm)  Weight: 163 lb 2.3 oz (74 kg)  BMI (Calculated): 23.5  Oxygen 5: everett 5-6 min stand with UE support and min A    Plan  Safety Devices  Safety Devices in place: Yes  Type of devices: Bed alarm in place, Left in bed, Call light within reach (seizure precaution- left in bed with padded rails)     Plan  Times per week: 4-5  Times per day: Daily  Current Treatment Recommendations: Strengthening, ROM, Balance Training, Functional Mobility Training, Endurance Training, Cognitive Reorientation, Safety Education & Training, Self-Care / ADL, Cognitive/Perceptual Training, Equipment Evaluation, Education, & procurement, Patient/Caregiver Education & Training    OT Equipment Recommendations  Equipment Needed: Yes  Other: BSC (for main), pt states she can move bed to main, grab bars for toilet  OT Individual Minutes  Time In: 1310  Time Out: [address]  Minutes: 29  OT Co-Treatment Minutes  Time In: 1244  Time Out: [address] [facility] [address]: 26    Electronically signed by Trinidad Estrada OT on 8/6/18 at 3:07 PM

## 2018-08-07 VITALS
RESPIRATION RATE: 19 BRPM | HEIGHT: 70 IN | DIASTOLIC BLOOD PRESSURE: 64 MMHG | BODY MASS INDEX: 23.36 KG/M2 | WEIGHT: 163.14 LBS | SYSTOLIC BLOOD PRESSURE: 100 MMHG | HEART RATE: 86 BPM | OXYGEN SATURATION: 96 % | TEMPERATURE: 98.8 F

## 2018-08-07 LAB
ANION GAP SERPL CALCULATED.3IONS-SCNC: 13 MMOL/L (ref 9–17)
BUN BLDV-MCNC: 9 MG/DL (ref 8–23)
BUN/CREAT BLD: ABNORMAL (ref 9–20)
CALCIUM SERPL-MCNC: 8.3 MG/DL (ref 8.6–10.4)
CHLORIDE BLD-SCNC: 102 MMOL/L (ref 98–107)
CO2: 23 MMOL/L (ref 20–31)
CREAT SERPL-MCNC: 0.44 MG/DL (ref 0.5–0.9)
GFR AFRICAN AMERICAN: >60 ML/MIN
GFR NON-AFRICAN AMERICAN: >60 ML/MIN
GFR SERPL CREATININE-BSD FRML MDRD: ABNORMAL ML/MIN/{1.73_M2}
GFR SERPL CREATININE-BSD FRML MDRD: ABNORMAL ML/MIN/{1.73_M2}
GLUCOSE BLD-MCNC: 108 MG/DL (ref 70–99)
HCT VFR BLD CALC: 39 % (ref 36–46)
HEMOGLOBIN: 13.3 G/DL (ref 12–16)
LACOSAMIDE: 5.8 UG/ML (ref 5–10)
MCH RBC QN AUTO: 31.1 PG (ref 26–34)
MCHC RBC AUTO-ENTMCNC: 34.1 G/DL (ref 31–37)
MCV RBC AUTO: 91 FL (ref 80–100)
NRBC AUTOMATED: NORMAL PER 100 WBC
PDW BLD-RTO: 14.9 % (ref 11.5–14.9)
PLATELET # BLD: 227 K/UL (ref 150–450)
PMV BLD AUTO: 9.7 FL (ref 6–12)
POTASSIUM SERPL-SCNC: 3.6 MMOL/L (ref 3.7–5.3)
RBC # BLD: 4.28 M/UL (ref 4–5.2)
SODIUM BLD-SCNC: 138 MMOL/L (ref 135–144)
WBC # BLD: 9.8 K/UL (ref 3.5–11)

## 2018-08-07 PROCEDURE — 6370000000 HC RX 637 (ALT 250 FOR IP): Performed by: PSYCHIATRY & NEUROLOGY

## 2018-08-07 PROCEDURE — 6370000000 HC RX 637 (ALT 250 FOR IP): Performed by: INTERNAL MEDICINE

## 2018-08-07 PROCEDURE — 97535 SELF CARE MNGMENT TRAINING: CPT

## 2018-08-07 PROCEDURE — 99239 HOSP IP/OBS DSCHRG MGMT >30: CPT | Performed by: INTERNAL MEDICINE

## 2018-08-07 PROCEDURE — 80048 BASIC METABOLIC PNL TOTAL CA: CPT

## 2018-08-07 PROCEDURE — 36415 COLL VENOUS BLD VENIPUNCTURE: CPT

## 2018-08-07 PROCEDURE — 85027 COMPLETE CBC AUTOMATED: CPT

## 2018-08-07 PROCEDURE — 6360000002 HC RX W HCPCS: Performed by: STUDENT IN AN ORGANIZED HEALTH CARE EDUCATION/TRAINING PROGRAM

## 2018-08-07 PROCEDURE — 6370000000 HC RX 637 (ALT 250 FOR IP): Performed by: STUDENT IN AN ORGANIZED HEALTH CARE EDUCATION/TRAINING PROGRAM

## 2018-08-07 PROCEDURE — 2580000003 HC RX 258: Performed by: STUDENT IN AN ORGANIZED HEALTH CARE EDUCATION/TRAINING PROGRAM

## 2018-08-07 PROCEDURE — 92526 ORAL FUNCTION THERAPY: CPT

## 2018-08-07 RX ADMIN — LACOSAMIDE 100 MG: 100 TABLET, FILM COATED ORAL at 10:12

## 2018-08-07 RX ADMIN — ACETAMINOPHEN 650 MG: 325 TABLET, FILM COATED ORAL at 15:23

## 2018-08-07 RX ADMIN — PANTOPRAZOLE SODIUM 40 MG: 40 TABLET, DELAYED RELEASE ORAL at 06:16

## 2018-08-07 RX ADMIN — LEVOTHYROXINE SODIUM 50 MCG: 50 TABLET ORAL at 06:16

## 2018-08-07 RX ADMIN — ENOXAPARIN SODIUM 40 MG: 40 INJECTION SUBCUTANEOUS at 10:12

## 2018-08-07 RX ADMIN — LEVETIRACETAM 1250 MG: 750 TABLET ORAL at 10:12

## 2018-08-07 RX ADMIN — AMLODIPINE BESYLATE 10 MG: 10 TABLET ORAL at 10:12

## 2018-08-07 RX ADMIN — Medication 10 ML: at 10:12

## 2018-08-07 ASSESSMENT — ENCOUNTER SYMPTOMS
VOMITING: 0
HEARTBURN: 0
ABDOMINAL PAIN: 0
NAUSEA: 0
COUGH: 1
SORE THROAT: 0
SPUTUM PRODUCTION: 0
STRIDOR: 0
BACK PAIN: 0
SHORTNESS OF BREATH: 0
BLURRED VISION: 0

## 2018-08-07 ASSESSMENT — PAIN SCALES - GENERAL
PAINLEVEL_OUTOF10: 4
PAINLEVEL_OUTOF10: 5

## 2018-08-07 NOTE — PROGRESS NOTES
[address]    PROGRESS NOTE             8/7/2018    8:05 AM    Name:   Monae Saavedra  MRN:     [id number]     Acct:      [de-identified]   Room:   [facility] [place] [address] Day:  8  Admit Date:  7/30/2018 11:50 AM    PCP:   Hayden Roach MD  Code Status:  Full Code    Subjective:     C/C:   Chief Complaint   Patient presents with    Seizures   LOC   Interval History Status: not changed. Patient was seen and examined at bedside. Patient is alert an oriented and mentation seems improved. She had no acute overnight events. No obvious seizure activity. Patient is tolerating PO intake. Patient does have a headache this morning but she states this is a normal type of headache for her and has not yet received tylenol. Brief History:     Rafiq Santamaria is a 61 y.o. Female presenting to the ED with seizures. The patient was found by her sister likely post ictal on 7/30, she was brought to ED by EMS and restarted seizing in the ED. There she was given ativan, dilantin and keppra + intubated and sedated with propofol. She was admitted to the hospital for management of seizure disorder and status epilepticus. The patient has had history of status epilepticus in the past.     Review of Systems:     Review of Systems   Constitutional: Negative for chills and fever. HENT: Negative for sore throat. Eyes: Negative for blurred vision. Respiratory: Positive for cough. Negative for sputum production, shortness of breath and stridor. Cardiovascular: Negative for chest pain and leg swelling. Gastrointestinal: Negative for abdominal pain, heartburn, nausea and vomiting. Genitourinary: Negative for dysuria and urgency. Musculoskeletal: Negative for back pain. Neurological: Positive for headaches. Medications:      Allergies:  No Known Allergies    Current Meds:   Scheduled Meds:    levETIRAcetam  1,250 mg Oral BID    levothyroxine  50 mcg infarct. No evidence of hydrocephalus. ORBITS: The visualized portion of the orbits demonstrate no acute abnormality. SINUSES: The visualized paranasal sinuses and mastoid air cells demonstrate no acute abnormality. SOFT TISSUES/SKULL:  No acute abnormality of the visualized skull or soft tissues. Stable CT brain with no acute intracranial abnormality and re- demonstration of remote right MCA infarct. Ct Cervical Spine Wo Contrast    Result Date: 7/30/2018  EXAMINATION: CT OF THE CERVICAL SPINE WITHOUT CONTRAST 7/30/2018 12:58 pm TECHNIQUE: CT of the cervical spine was performed without the administration of intravenous contrast. Multiplanar reformatted images are provided for review. Dose modulation, iterative reconstruction, and/or weight based adjustment of the mA/kV was utilized to reduce the radiation dose to as low as reasonably achievable. COMPARISON: Cervical spine CT from 05/13/2018 HISTORY: ORDERING SYSTEM PROVIDED HISTORY: fall TECHNOLOGIST PROVIDED HISTORY: Ordering Physician Provided Reason for Exam: new onset seizures Acuity: Acute Type of Exam: Initial FINDINGS: BONES/ALIGNMENT:  There is straightening of the normal cervical lordosis which may relate to patient positioning or muscle spasm. There is no evidence of an acute cervical spine fracture. There is normal alignment of the cervical spine. DEGENERATIVE CHANGES: There is multilevel degenerative disc disease, advanced at C5-C6 and C6-7. There also hypertrophic degenerative changes facet and uncovertebral joints throughout the cervical spine as well as the atlantoaxial joint. These findings contribute to moderate bilateral neural foraminal narrowing at C5-C6 and moderate left neural foraminal narrowing at C6-7. Disc osteophyte complexes at C5-C6 and C6-C7 cause mild impingement of the ventral thecal sac at these levels. SOFT TISSUES: There is no prevertebral soft tissue swelling. Visualized lung apices are well aerated.      Multilevel degenerative disc disease and hypertrophic degenerative changes of the cervical spine, but no CT evidence for acute osseous abnormality. Xr Chest Portable    Result Date: 8/2/2018  EXAMINATION: SINGLE XRAY VIEW OF THE CHEST 8/2/2018 6:49 am COMPARISON: AP chest from 08/01/2018 HISTORY: ORDERING SYSTEM PROVIDED HISTORY: ETT placement TECHNOLOGIST PROVIDED HISTORY: Reason for exam:->ETT placement Ordering Physician Provided Reason for Exam: on vent Acuity: Acute Type of Exam: Initial Additional history includes hypertension, ETOH abuse and gastroesophageal reflux disease. FINDINGS: ETT tip stable approximately 4.3 cm above the german. Enteric tube tip and side hole project over the gastric fundus. Overlying ECG monitor leads, snaps and respiratory apparatus. Cardiomediastinal shadow stable. No consolidation or pleural effusion. No pneumothorax. Bones and soft tissues intact. No acute finding or interval change. Support tubes satisfactory. Xr Chest Portable    Result Date: 8/1/2018  EXAMINATION: SINGLE XRAY VIEW OF THE CHEST 8/1/2018 6:54 am COMPARISON: 07/31/2018 HISTORY: ORDERING SYSTEM PROVIDED HISTORY: ETT placement TECHNOLOGIST PROVIDED HISTORY: Reason for exam:->ETT placement Ordering Physician Provided Reason for Exam: Vent protocol. Acuity: Unknown Type of Exam: Unknown Additional signs and symptoms: Vent protocol. FINDINGS: Endotracheal tube is 4.8 cm above the german. Enteric tube extends below the left hemidiaphragm and side hole is overlying the proximal stomach. Cardiomediastinal silhouette and pulmonary vasculature are within normal limits. No focal airspace consolidation, pneumothorax, or pleural effusion. No free air beneath the diaphragm. No evidence of acute osseous abnormality. No significant interval change.      Xr Chest Portable    Result Date: 7/31/2018  EXAMINATION: SINGLE XRAY VIEW OF THE CHEST 7/31/2018 6:41 am COMPARISON: Radiographs from July 30, 2018 HISTORY: ORDERING SYSTEM PROVIDED HISTORY: ETT placement TECHNOLOGIST PROVIDED HISTORY: Reason for exam:->ETT placement Ordering Physician Provided Reason for Exam: vent Acuity: Unknown Type of Exam: Unknown FINDINGS: One image obtained. Stable supporting devices. The cardiomediastinal contours are normal.  No pleural effusion or pneumothorax. Bibasilar atelectasis. No free subdiaphragmatic air. Stable exam with bibasilar atelectasis. Xr Chest Portable    Result Date: 7/30/2018  EXAMINATION: SINGLE XRAY VIEW OF THE CHEST 7/30/2018 2:17 pm COMPARISON: Today at 12:55 p.m. and May 15, 2018 HISTORY: ORDERING SYSTEM PROVIDED HISTORY: Chest Pain TECHNOLOGIST PROVIDED HISTORY: Reason for exam:->Chest Pain Ordering Physician Provided Reason for Exam: post intubation Acuity: Acute Type of Exam: Initial FINDINGS: The endotracheal tube terminates at the level of the clavicular heads, 8 cm above the german. The enteric tube terminates in the body of the stomach. Shallow lung inflation. Linear opacities in the lung bases are again noted. There is no evidence for pneumothorax or focal area of consolidation. No effusion is appreciated. The cardiac and mediastinal contours are unchanged in appearance. Sequela of old granulomatous disease is noted. Intubation with the endotracheal tip at the level of the clavicles. Shallow inflation with findings consistent with basilar atelectasis. Xr Chest Portable    Result Date: 7/30/2018  EXAMINATION: SINGLE XRAY VIEW OF THE CHEST 7/30/2018 12:32 pm COMPARISON: May 15, 2018 HISTORY: ORDERING SYSTEM PROVIDED HISTORY: eval TECHNOLOGIST PROVIDED HISTORY: Reason for exam:->eval Ordering Physician Provided Reason for Exam: seizures Acuity: Unknown Type of Exam: Unknown FINDINGS: One image obtained. Cardiac monitoring leads overlie the chest.  Interval extubation and removal of enteric tube. The cardiomediastinal contours are stable. No pleural effusion or pneumothorax.   Bibasilar atelectasis

## 2018-08-07 NOTE — PROGRESS NOTES
[facility] Wound Ostomy Continence Nurse  Consult Note       NAME:  Genet Banks  MEDICAL RECORD NUMBER:  [id number]  AGE: 61 y.o. GENDER: female  : 1955  TODAY'S DATE:  2018    Subjective   Reason for WOCN Evaluation and Assessment:  Wounds to right lateral and left medial ankles      Genet Banks is a 61 y.o. female referred by:   [] Physician  [] Nursing  [] Other:     Wound Identification:  Wound Type: friction  Contributing Factors: chronic pressure, decreased mobility and decreased tissue oxygenation    Wound History: unknown- patient more awake at today's visit and is unable to state how wounds developed  Current Wound Care Treatment:   Calcium alginate to wounds, covered with foam dressings    Patient Goal of Care:  [] Wound Healing  [] Odor Control  [] Palliative Care  [] Pain Control   [] Other:         PAST MEDICAL HISTORY        Diagnosis Date    Ankle fracture, left     Anxiety 2014    Arthritis     ETOH abuse     Frequency of urination ?  GERD (gastroesophageal reflux disease) 2014    Head injury     after seizure/stroke pt fell to fall and hit head-    Headache(784.0)     Hypertension     Hypotension ?     Hypothyroidism     Numbness and tingling     fingers    Osteoporosis     Peripheral vascular disease ([address]     Pneumonia     Reflux     Seizures ([address]     last one     Shingles     Stroke [facility]     stroke and seizure together    TIA (transient ischemic attack)         Tremor     UTI (urinary tract infection)     once    Varicose veins     Wears glasses        PAST SURGICAL HISTORY    Past Surgical History:   Procedure Laterality Date    BUNIONECTOMY Right     HAMMER TOE SURGERY Left 2016    ON 2ND, 3rd, 4th,  and  5TH TOES OF LEFT FOOT    OTHER SURGICAL HISTORY  2/23/15    orif left ankle with synthes and intraoperative c- arm    AR BRNCHSC INCL FLUOR GDNCE DX W/CELL WASHG SPX N/A 2018    BRONCHOSCOPY WITH WASHINGS AT PATIENT BEDSIDE ICU performed by Hayley Demarco MD at [address]    Family History   Problem Relation Age of Onset    Cervical Cancer Mother     Heart Disease Father     Heart Disease Maternal Grandfather     Heart Disease Paternal Grandfather        SOCIAL HISTORY    Social History   Substance Use Topics    Smoking status: Never Smoker    Smokeless tobacco: Never Used    Alcohol use No      Comment: quit 6809, was alcoholic       ALLERGIES    No Known Allergies    MEDICATIONS    No current facility-administered medications on file prior to encounter. Current Outpatient Prescriptions on File Prior to Encounter   Medication Sig Dispense Refill    aspirin EC 81 MG EC tablet Take 1 tablet by mouth daily 30 tablet 2    potassium chloride (KLOR-CON M) 10 MEQ extended release tablet Take 2 tablets by mouth daily 60 tablet 3    PARoxetine (PAXIL) 30 MG tablet Take 1 tablet by mouth daily 30 tablet 2    hydrochlorothiazide (HYDRODIURIL) 12.5 MG tablet Take 1 tablet by mouth daily 30 tablet 2    atorvastatin (LIPITOR) 40 MG tablet Take 1 tablet by mouth daily 30 tablet 2    levothyroxine (SYNTHROID) 50 MCG tablet Take 1 tablet by mouth daily 30 tablet 2    zoster recombinant adjuvanted vaccine (SHINGRIX) 50 MCG SUSR injection 50 MCG IM then repeat 2-6 months.  0.5 mL 1    omeprazole (PRILOSEC) 20 MG delayed release capsule TAKE ONE CAPSULE BY MOUTH ONCE DAILY 30 capsule 2    amLODIPine (NORVASC) 10 MG tablet TAKE ONE TABLET BY MOUTH ONCE DAILY 30 tablet 2    oxybutynin (DITROPAN) 5 MG tablet TAKE ONE TABLET BY MOUTH THREE TIMES DAILY 90 tablet 2    SUMAtriptan (IMITREX) 100 MG tablet Take 1 tablet by mouth daily as needed for Migraine 10 tablet 2       Objective    BP (!) 115/58   Pulse 88   Temp 99 °F (37.2 °C) (Oral)   Resp 19   Ht 5' 10\" (1.778 m)   Wt 163 lb 2.3 oz (74 kg)   SpO2 93%   BMI 23.41 kg/m²     LABS:  WBC:    Lab Results   Component Value Red%Wound Bed 50 8/7/2018  1:56 PM   Yellow%Wound Bed 50 8/7/2018  1:56 PM   Number of days: 7       Wound 07/30/18 Other (Comment) Ankle Outer;Right Round,Red (Active)   Wound Type Wound 8/7/2018  1:56 PM   Wound Traumatic 8/4/2018  8:00 AM   Dressing Status Changed 8/7/2018  1:56 PM   Dressing Changed Changed/New 8/7/2018  1:56 PM   Dressing/Treatment Alginate; Foam 8/7/2018  1:56 PM   Wound Cleansed Rinsed/Irrigated with saline 8/7/2018  1:56 PM   Dressing Change Due 08/10/18 8/7/2018  1:56 PM   Wound Length (cm) 4 cm 8/7/2018  1:56 PM   Wound Width (cm) 4 cm 8/7/2018  1:56 PM   Wound Depth (cm)  0.1 8/3/2018  9:42 AM   Calculated Wound Size (cm^2) (l*w) 16 cm^2 8/7/2018  1:56 PM   Change in Wound Size % (l*w) -100 8/7/2018  1:56 PM   Wound Assessment Clean;Pink;Epithelialization 8/7/2018  1:56 PM   Drainage Amount Small 8/7/2018  1:56 PM   Drainage Description Serous 8/7/2018  1:56 PM   Odor None 8/7/2018  1:56 PM   Corrie-wound Assessment Maceration 8/3/2018  9:42 AM   Sundown%Wound Bed 50 8/3/2018  9:42 AM   Yellow%Wound Bed 50 8/3/2018  9:42 AM   Number of days: 7       Response to treatment:  Well tolerated by patient.          Plan   Plan of Care: Wound 07/30/18 Other (Comment) Ankle Inner;Left Round, Red-Dressing/Treatment: Foam, Alginate  Wound 07/30/18 Other (Comment) Ankle Outer;Right Round,Red-Dressing/Treatment: Alginate, Foam    Specialty Bed Required : Yes   [] Low Air Loss   [x] Pressure Redistribution  [] Fluid Immersion  [] Bariatric  [] Total Pressure Relief  [] Other:     Current Diet: DIET GENERAL;      Discharge Plan:  Placement for patient upon discharge: skilled nursing    Patient appropriate for Outpatient [facility] [place] [address]: No    Referrals:  []   [] [facility]  [] Supplies  [] Other    Patient/Caregiver Teaching:  Level of patient/caregiver understanding able to:   [] Indicates understanding       [] Needs reinforcement  [] Unsuccessful      [x] Verbal Understanding  []

## 2018-08-07 NOTE — PLAN OF CARE
Problem: Health Behavior:  Goal: Ability to manage health-related needs will improve  Ability to manage health-related needs will improve   Outcome: Ongoing      Problem: Risk for Impaired Skin Integrity  Goal: Tissue integrity - skin and mucous membranes  Structural intactness and normal physiological function of skin and  mucous membranes. Outcome: Ongoing  No new skin issues. Pt incontinent of urine and stool. Skin kept clean and dry. Repositioning every two hours and as needed. Problem: Falls - Risk of:  Goal: Will remain free from falls  Will remain free from falls   Outcome: Ongoing  Pt free from falls. Call light within reach. Bed in lowest position with bed wheels locked. Hourly rounding. Bed alarm on.

## 2018-08-07 NOTE — PROGRESS NOTES
18.5 - 24.9 Normal Weight  · Comparative Standards (Estimated Nutrition Needs):  · Estimated Daily Total Kcal: 6114-0761  · Estimated Daily Protein (g): 82-95    Estimated Intake vs Estimated Needs: Intake Meets Needs    Nutrition Risk Level: Moderate    Nutrition Interventions:   Continue current diet  Continued Inpatient Monitoring, Education Not Indicated    Nutrition Evaluation:   · Evaluation: Progressing toward goals   · Goals: Adequate nutrition provision    · Monitoring: Meal Intake, Ascites/Edema, Weight, Pertinent Labs, Diarrhea    See Adult Nutrition Doc Flowsheet for more detail.    Chiquis GARCES R.D, L.D,  Clinical Dietitian  Pager # [phone]

## 2018-08-07 NOTE — CARE COORDINATION
ONGOING DISCHARGE PLAN:    Plan remains for LSW to continue to follow for DC to [facility] at [address]. PT saw yesterday & are Rec SNF. Hopeful to DC today. Will continue to follow for additional discharge needs.     Electronically signed by Walter Amin RN on 8/7/2018 at 7:53 AM

## 2018-08-07 NOTE — PROGRESS NOTES
assistance (everett 15-20 min x 2)  Standing Balance: Minimal assistance (MinAx2 this date c RW)  Standing Balance  Time: 2-3min x2  Activity: ADLs, mobility  Sit to stand: Minimal assistance  Stand to sit: Minimal assistance  Comment: unsteady; legs less shaky today per pt; no LOB noted; was ModA x2 date prior  Functional Mobility  Functional - Mobility Device: Other  Assist Level: Dependent/Total (min x 2)  Functional Mobility Comments: side step 5 feet to R then to L along bed holding onto chair back   ADL  Feeding: Minimal assistance;Setup (per pt, ed re attempting to resume)  Grooming: Minimal assistance  UE Bathing: Stand by assistance  LE Bathing: Stand by assistance;Minimal assistance  UE Dressing: Minimal assistance  LE Dressing: Minimal assistance  Toileting: Dependent/Total (incontient and wearing briefs at this time)  Additional Comments: Pt R motor planning and coordination deficits during tx. VC for  LUE use to stabilize and increase fluiditity of movements. MAxA for brief mgt. Socks only this date c pt using cross legged method and requiring increased time. Difficulty noted c self feeding and grooming as well. REports on some RUE dysfuction prior to recent admit but also contradicts self and notes only L sided issues prior. Transfers  Sit to stand: Minimal assistance  Stand to sit: Minimal assistance       Assessment  Performance deficits / Impairments: Decreased functional mobility ; Decreased ADL status; Decreased ROM; Decreased strength;Decreased safe awareness;Decreased cognition;Decreased endurance;Decreased coordination;Decreased sensation;Decreased balance;Decreased fine motor control  Assessment: pt with significant new mobility deficit post seizure this admit, affecting adls indep  Prognosis: Fair  Discharge Recommendations: Subacute/Skilled Nursing Facility  Activity Tolerance: Patient Tolerated treatment well;Patient limited by fatigue  Safety Devices in place: Yes  Type of devices: Chair

## 2018-08-08 ENCOUNTER — CARE COORDINATION (OUTPATIENT)
Dept: CASE MANAGEMENT | Age: 63
End: 2018-08-08

## 2018-08-16 ENCOUNTER — CARE COORDINATION (OUTPATIENT)
Dept: CASE MANAGEMENT | Age: 63
End: 2018-08-16

## 2018-08-16 NOTE — TELEPHONE ENCOUNTER
Pt given OC-Light Hemoccult test kit 7/18/18; it has not been returned within  4 weeks. PC to pt; LM; letter sent to pt.

## 2018-08-20 ENCOUNTER — OFFICE VISIT (OUTPATIENT)
Dept: NEUROLOGY | Age: 63
End: 2018-08-20
Payer: COMMERCIAL

## 2018-08-20 ENCOUNTER — CARE COORDINATION (OUTPATIENT)
Dept: CASE MANAGEMENT | Age: 63
End: 2018-08-20

## 2018-08-20 VITALS
WEIGHT: 152.4 LBS | DIASTOLIC BLOOD PRESSURE: 78 MMHG | HEART RATE: 83 BPM | BODY MASS INDEX: 21.82 KG/M2 | SYSTOLIC BLOOD PRESSURE: 121 MMHG | HEIGHT: 70 IN

## 2018-08-20 DIAGNOSIS — G40.909 SEIZURE DISORDER (HCC): Primary | ICD-10-CM

## 2018-08-20 PROCEDURE — 1111F DSCHRG MED/CURRENT MED MERGE: CPT | Performed by: PSYCHIATRY & NEUROLOGY

## 2018-08-20 PROCEDURE — G8420 CALC BMI NORM PARAMETERS: HCPCS | Performed by: PSYCHIATRY & NEUROLOGY

## 2018-08-20 PROCEDURE — 99214 OFFICE O/P EST MOD 30 MIN: CPT | Performed by: PSYCHIATRY & NEUROLOGY

## 2018-08-20 PROCEDURE — G8427 DOCREV CUR MEDS BY ELIG CLIN: HCPCS | Performed by: PSYCHIATRY & NEUROLOGY

## 2018-08-20 PROCEDURE — 3017F COLORECTAL CA SCREEN DOC REV: CPT | Performed by: PSYCHIATRY & NEUROLOGY

## 2018-08-20 PROCEDURE — 1036F TOBACCO NON-USER: CPT | Performed by: PSYCHIATRY & NEUROLOGY

## 2018-08-20 NOTE — CARE COORDINATION
[facility] Update Call    2018    Patient: Dru Thornton Patient : 1955   MRN: [id number]  Reason for Admission: There are no discharge diagnoses documented for the most recent discharge. Discharge Date: 18 RARS: Readmission Risk Score: 18       Care Transitions Post Acute Facility Update    Care Transitions Interventions  Post Acute Facility:  [address] Update     Secure e-mail sent to staff @ SNF. Request verification of Skilled Services, number of skilled days used, discharge planning, clinical and functional progress reports.

## 2018-08-21 NOTE — PROGRESS NOTES
[facility]            [place], [address], [address] [place] [address]             Ph: [phone] or [phone]             FAX: [phone]                   Marisela Wisdom M.D.            Aminata García M.D. Scar Yeboah M.D. Neil Ivory M.D. Dear Dr. Ana M Ruiz MD  The patient comes in today as a follow-up visit. She is known to me from her previous history of right cortical ischemic stroke in 1998 and subsequently a history of seizures. The patient was admitted in [facility] in January 2018 with a breakthrough seizure. At that time she presented with status epilepticus. She was found down upstairs by the family unresponsive. When the EMS arrived, they witnessed to 5 minutes seizures which were described as generalized convulsion. Outpatient she was taking Keppra 500 mg twice a day. Upon arrival to the hospital, Keppra level is less than 2. MRI of the brain was negative for acute process. The EEG showed diffuse slowing. The patient was subsequently discharged home. She was again admitted in May 2018 with another breakthrough seizure. In May 2018, she was intubated requiring ICU admission. She was again admitted in July 2018 with another breakthrough seizure. Outpatient she was taking Keppra 1 g twice a day. Her Keppra dose was then increased to 1250 mg twice a day and she was continued on Dilantin 200 mg twice a day. Eventually she was discharged on Keppra 1250 mg twice a day and Vimpat 100 mg twice a day. Since then, she does not report having any further seizures. She reports being compliant with the medications and no side effects.       Lab Results   Component Value Date    LDLCHOLESTEROL 99 08/18/2016     No components found for: CHLPL  Lab Results   Component Value Date    TRIG 64 08/18/2016    TRIG 125 05/07/2015    TRIG 111 11/05/2013     Lab movements     Sensory function Intact to touch, pin, vibration, proprioception     Cerebellar Intact fine motor movement. No involuntary movements or tremors     Reflex function Intact 2+ DTR and symmetric. Negative Babinski     Gait                  Normal station and gait       Assessment and recommendations:    Right MCA embolic stroke in 5438  Simple partial and complex partial seizure secondary to above    Unfortunately the patient has had multiple breakthrough seizures this year initially in January 2018, and then in May 2018 and more recently in July 2018. All of the seizures were described as generalized convulsion. She is currently taking Keppra 1250 mg twice a day and Vimpat 100 mg twice a day for last 6 weeks he has been seizure-free. For now I would continue same dose of medications and with any further seizures, and the dose of Vimpat can further be increased. The patient has been living by herself. Previously she has been told by her family that she may go into this episodes of staring spells when she would lose track of time. It is possible these episodes could be focal seizure with impaired awareness. These episodes possibly could be happening more frequently with outpatient realizing. I will bring the patient into [facility] for continuous EEG monitoring to characterize these spells. MRI of the brain has not shown any new stroke. She will continue with aspirin and Lipitor for secondary stroke prophylaxis. She'll follow-up with me next 3-4 months. Zoey Colón MD I would like to thank you for your consult. Please contact neurology if there remains any further question about the patient care. Scar Orta M.D.           Neurology

## 2018-08-24 DIAGNOSIS — G40.901 STATUS EPILEPTICUS (HCC): ICD-10-CM

## 2018-08-24 DIAGNOSIS — G40.909 SEIZURE DISORDER (HCC): Chronic | ICD-10-CM

## 2018-08-27 ENCOUNTER — CARE COORDINATION (OUTPATIENT)
Dept: CASE MANAGEMENT | Age: 63
End: 2018-08-27

## 2018-08-27 NOTE — CARE COORDINATION
[facility] Update Call    2018    Patient: Abigail Squires Patient : 1955   MRN: [id number]  Reason for Admission: There are no discharge diagnoses documented for the most recent discharge. Discharge Date: 18 RARS: Readmission Risk Score: 18       Care Transitions Post Acute Facility Update    Care Transitions Interventions  Post Acute Facility:  [facility] Update     Secure e-mail sent to staff @ SNF. Request verification of Skilled Services, number of skilled days used, discharge planning, clinical and functional progress reports.

## 2018-08-28 DIAGNOSIS — G40.909 SEIZURE DISORDER (HCC): Chronic | ICD-10-CM

## 2018-08-28 DIAGNOSIS — G40.901 STATUS EPILEPTICUS (HCC): ICD-10-CM

## 2018-08-28 RX ORDER — LACOSAMIDE 100 MG/1
100 TABLET ORAL 2 TIMES DAILY
Qty: 60 TABLET | Refills: 0 | OUTPATIENT
Start: 2018-08-28 | End: 2018-08-28 | Stop reason: SDUPTHER

## 2018-08-28 RX ORDER — LACOSAMIDE 100 MG/1
100 TABLET ORAL 2 TIMES DAILY
Qty: 60 TABLET | Refills: 0 | Status: SHIPPED | OUTPATIENT
Start: 2018-08-28 | End: 2018-09-26 | Stop reason: SDUPTHER

## 2018-08-29 ENCOUNTER — TELEPHONE (OUTPATIENT)
Dept: NEUROLOGY | Age: 63
End: 2018-08-29

## 2018-08-30 ENCOUNTER — CARE COORDINATION (OUTPATIENT)
Dept: CASE MANAGEMENT | Age: 63
End: 2018-08-30

## 2018-08-30 ENCOUNTER — TELEPHONE (OUTPATIENT)
Dept: NEUROLOGY | Age: 63
End: 2018-08-30

## 2018-08-30 DIAGNOSIS — E03.9 ACQUIRED HYPOTHYROIDISM: Primary | Chronic | ICD-10-CM

## 2018-08-30 PROCEDURE — 1111F DSCHRG MED/CURRENT MED MERGE: CPT

## 2018-08-30 RX ORDER — LEVETIRACETAM 1000 MG/1
1000 TABLET ORAL 2 TIMES DAILY
COMMUNITY
End: 2018-10-03 | Stop reason: SDUPTHER

## 2018-09-26 DIAGNOSIS — G40.901 STATUS EPILEPTICUS (HCC): ICD-10-CM

## 2018-09-26 DIAGNOSIS — G40.909 SEIZURE DISORDER (HCC): Chronic | ICD-10-CM

## 2018-09-27 ENCOUNTER — TELEPHONE (OUTPATIENT)
Dept: NEUROLOGY | Age: 63
End: 2018-09-27

## 2018-09-27 RX ORDER — LACOSAMIDE 100 MG/1
TABLET, FILM COATED ORAL
Qty: 60 TABLET | Refills: 3 | Status: SHIPPED | OUTPATIENT
Start: 2018-09-27 | End: 2019-01-23 | Stop reason: ALTCHOICE

## 2018-09-28 DIAGNOSIS — K21.9 GASTROESOPHAGEAL REFLUX DISEASE, ESOPHAGITIS PRESENCE NOT SPECIFIED: ICD-10-CM

## 2018-10-01 RX ORDER — OMEPRAZOLE 20 MG/1
CAPSULE, DELAYED RELEASE ORAL
Qty: 30 CAPSULE | Refills: 2 | Status: SHIPPED | OUTPATIENT
Start: 2018-10-01 | End: 2019-02-07 | Stop reason: SDUPTHER

## 2018-10-02 ENCOUNTER — TELEPHONE (OUTPATIENT)
Dept: NEUROLOGY | Age: 63
End: 2018-10-02

## 2018-10-03 RX ORDER — LEVETIRACETAM 1000 MG/1
1000 TABLET ORAL 2 TIMES DAILY
Qty: 60 TABLET | Refills: 5 | Status: SHIPPED | OUTPATIENT
Start: 2018-10-03 | End: 2019-02-08 | Stop reason: SDUPTHER

## 2018-10-03 RX ORDER — LEVETIRACETAM 250 MG/1
250 TABLET ORAL 2 TIMES DAILY
Qty: 60 TABLET | Refills: 5 | Status: SHIPPED | OUTPATIENT
Start: 2018-10-03 | End: 2019-02-08 | Stop reason: SDUPTHER

## 2018-10-08 ENCOUNTER — TELEPHONE (OUTPATIENT)
Dept: NEUROLOGY | Age: 63
End: 2018-10-08

## 2018-10-08 DIAGNOSIS — G40.909 SEIZURE DISORDER (HCC): Primary | ICD-10-CM

## 2018-10-08 NOTE — TELEPHONE ENCOUNTER
At patient's last appointment with Dr. Cipriano Lyons (8/20/18) he mentioned ordering LTME for the patient. Order was not generated at that time. Will put order in for the LTME.

## 2018-10-09 ENCOUNTER — TELEPHONE (OUTPATIENT)
Dept: NEUROLOGY | Age: 63
End: 2018-10-09

## 2018-10-09 DIAGNOSIS — G40.909 SEIZURE DISORDER (HCC): Primary | ICD-10-CM

## 2018-10-14 DIAGNOSIS — I10 ESSENTIAL HYPERTENSION: ICD-10-CM

## 2018-10-15 ENCOUNTER — HOSPITAL ENCOUNTER (OUTPATIENT)
Dept: NEUROLOGY | Age: 63
Discharge: HOME | DRG: 053 | End: 2018-10-15
Payer: COMMERCIAL

## 2018-10-15 ENCOUNTER — HOSPITAL ENCOUNTER (INPATIENT)
Age: 63
LOS: 4 days | Discharge: HOME | DRG: 053 | End: 2018-10-19
Attending: PSYCHIATRY & NEUROLOGY | Admitting: PSYCHIATRY & NEUROLOGY
Payer: COMMERCIAL

## 2018-10-15 LAB
ABSOLUTE EOS #: 0.08 K/UL (ref 0–0.44)
ABSOLUTE IMMATURE GRANULOCYTE: <0.03 K/UL (ref 0–0.3)
ABSOLUTE LYMPH #: 1.34 K/UL (ref 1.1–3.7)
ABSOLUTE MONO #: 0.37 K/UL (ref 0.1–1.2)
ALBUMIN SERPL-MCNC: 3.7 G/DL (ref 3.5–5.2)
ALBUMIN/GLOBULIN RATIO: 1.3 (ref 1–2.5)
ALP BLD-CCNC: 60 U/L (ref 35–104)
ALT SERPL-CCNC: 18 U/L (ref 5–33)
ANION GAP SERPL CALCULATED.3IONS-SCNC: 12 MMOL/L (ref 9–17)
AST SERPL-CCNC: 33 U/L
BASOPHILS # BLD: 1 % (ref 0–2)
BASOPHILS ABSOLUTE: <0.03 K/UL (ref 0–0.2)
BILIRUB SERPL-MCNC: 0.49 MG/DL (ref 0.3–1.2)
BUN BLDV-MCNC: 11 MG/DL (ref 8–23)
BUN/CREAT BLD: ABNORMAL (ref 9–20)
CALCIUM SERPL-MCNC: 8.9 MG/DL (ref 8.6–10.4)
CHLORIDE BLD-SCNC: 104 MMOL/L (ref 98–107)
CO2: 26 MMOL/L (ref 20–31)
CREAT SERPL-MCNC: 0.68 MG/DL (ref 0.5–0.9)
DIFFERENTIAL TYPE: NORMAL
EOSINOPHILS RELATIVE PERCENT: 2 % (ref 1–4)
GFR AFRICAN AMERICAN: >60 ML/MIN
GFR NON-AFRICAN AMERICAN: >60 ML/MIN
GFR SERPL CREATININE-BSD FRML MDRD: ABNORMAL ML/MIN/{1.73_M2}
GFR SERPL CREATININE-BSD FRML MDRD: ABNORMAL ML/MIN/{1.73_M2}
GLUCOSE BLD-MCNC: 90 MG/DL (ref 70–99)
HCT VFR BLD CALC: 38.3 % (ref 36.3–47.1)
HEMOGLOBIN: 12.8 G/DL (ref 11.9–15.1)
IMMATURE GRANULOCYTES: 0 %
LACTIC ACID, WHOLE BLOOD: 1.4 MMOL/L (ref 0.7–2.1)
LACTIC ACID: NORMAL MMOL/L
LYMPHOCYTES # BLD: 32 % (ref 24–43)
MAGNESIUM: 1.9 MG/DL (ref 1.6–2.6)
MCH RBC QN AUTO: 30 PG (ref 25.2–33.5)
MCHC RBC AUTO-ENTMCNC: 33.4 G/DL (ref 28.4–34.8)
MCV RBC AUTO: 89.7 FL (ref 82.6–102.9)
MONOCYTES # BLD: 9 % (ref 3–12)
NRBC AUTOMATED: 0 PER 100 WBC
PDW BLD-RTO: 13.4 % (ref 11.8–14.4)
PLATELET # BLD: NORMAL K/UL (ref 138–453)
PLATELET ESTIMATE: NORMAL
PLATELET, FLUORESCENCE: NORMAL K/UL (ref 138–453)
PLATELET, IMMATURE FRACTION: NORMAL % (ref 1.1–10.3)
PMV BLD AUTO: NORMAL FL (ref 8.1–13.5)
POTASSIUM SERPL-SCNC: 3.3 MMOL/L (ref 3.7–5.3)
PROLACTIN: 8.93 UG/L (ref 4.79–23.3)
RBC # BLD: 4.27 M/UL (ref 3.95–5.11)
RBC # BLD: NORMAL 10*6/UL
SEG NEUTROPHILS: 56 % (ref 36–65)
SEGMENTED NEUTROPHILS ABSOLUTE COUNT: 2.34 K/UL (ref 1.5–8.1)
SODIUM BLD-SCNC: 142 MMOL/L (ref 135–144)
TOTAL PROTEIN: 6.5 G/DL (ref 6.4–8.3)
WBC # BLD: 4.2 K/UL (ref 3.5–11.3)
WBC # BLD: NORMAL 10*3/UL

## 2018-10-15 PROCEDURE — 80053 COMPREHEN METABOLIC PANEL: CPT

## 2018-10-15 PROCEDURE — 83605 ASSAY OF LACTIC ACID: CPT

## 2018-10-15 PROCEDURE — 95951 HC EEG MONITORING VIDEO RECORDING: CPT

## 2018-10-15 PROCEDURE — 99223 1ST HOSP IP/OBS HIGH 75: CPT | Performed by: PSYCHIATRY & NEUROLOGY

## 2018-10-15 PROCEDURE — 2060000000 HC ICU INTERMEDIATE R&B

## 2018-10-15 PROCEDURE — 4A10X4Z MONITORING OF CENTRAL NERVOUS ELECTRICAL ACTIVITY, EXTERNAL APPROACH: ICD-10-PCS | Performed by: PSYCHIATRY & NEUROLOGY

## 2018-10-15 PROCEDURE — 85025 COMPLETE CBC W/AUTO DIFF WBC: CPT

## 2018-10-15 PROCEDURE — 85055 RETICULATED PLATELET ASSAY: CPT

## 2018-10-15 PROCEDURE — 84146 ASSAY OF PROLACTIN: CPT

## 2018-10-15 PROCEDURE — 83735 ASSAY OF MAGNESIUM: CPT

## 2018-10-15 PROCEDURE — 36415 COLL VENOUS BLD VENIPUNCTURE: CPT

## 2018-10-15 PROCEDURE — 6370000000 HC RX 637 (ALT 250 FOR IP): Performed by: FAMILY MEDICINE

## 2018-10-15 RX ORDER — ASPIRIN 81 MG/1
81 TABLET ORAL DAILY
Status: DISCONTINUED | OUTPATIENT
Start: 2018-10-15 | End: 2018-10-19 | Stop reason: HOSPADM

## 2018-10-15 RX ORDER — AMLODIPINE BESYLATE 10 MG/1
10 TABLET ORAL DAILY
Status: DISCONTINUED | OUTPATIENT
Start: 2018-10-15 | End: 2018-10-19 | Stop reason: HOSPADM

## 2018-10-15 RX ORDER — ACETAMINOPHEN 325 MG/1
650 TABLET ORAL EVERY 4 HOURS PRN
Status: DISCONTINUED | OUTPATIENT
Start: 2018-10-15 | End: 2018-10-19 | Stop reason: HOSPADM

## 2018-10-15 RX ORDER — LEVETIRACETAM 250 MG/1
250 TABLET ORAL 2 TIMES DAILY
Status: DISCONTINUED | OUTPATIENT
Start: 2018-10-15 | End: 2018-10-19 | Stop reason: HOSPADM

## 2018-10-15 RX ORDER — SODIUM CHLORIDE 0.9 % (FLUSH) 0.9 %
10 SYRINGE (ML) INJECTION EVERY 12 HOURS SCHEDULED
Status: DISCONTINUED | OUTPATIENT
Start: 2018-10-15 | End: 2018-10-19 | Stop reason: HOSPADM

## 2018-10-15 RX ORDER — POTASSIUM CHLORIDE 20 MEQ/1
20 TABLET, EXTENDED RELEASE ORAL DAILY
Status: DISCONTINUED | OUTPATIENT
Start: 2018-10-15 | End: 2018-10-19 | Stop reason: HOSPADM

## 2018-10-15 RX ORDER — PANTOPRAZOLE SODIUM 40 MG/1
40 TABLET, DELAYED RELEASE ORAL
Status: DISCONTINUED | OUTPATIENT
Start: 2018-10-16 | End: 2018-10-19 | Stop reason: HOSPADM

## 2018-10-15 RX ORDER — LORAZEPAM 2 MG/ML
2 INJECTION INTRAMUSCULAR EVERY 4 HOURS PRN
Status: DISCONTINUED | OUTPATIENT
Start: 2018-10-15 | End: 2018-10-19 | Stop reason: HOSPADM

## 2018-10-15 RX ORDER — SUMATRIPTAN 50 MG/1
100 TABLET, FILM COATED ORAL DAILY PRN
Status: DISCONTINUED | OUTPATIENT
Start: 2018-10-15 | End: 2018-10-19 | Stop reason: HOSPADM

## 2018-10-15 RX ORDER — SODIUM CHLORIDE 0.9 % (FLUSH) 0.9 %
10 SYRINGE (ML) INJECTION PRN
Status: DISCONTINUED | OUTPATIENT
Start: 2018-10-15 | End: 2018-10-19 | Stop reason: HOSPADM

## 2018-10-15 RX ORDER — HYDROCHLOROTHIAZIDE 25 MG/1
12.5 TABLET ORAL DAILY
Status: DISCONTINUED | OUTPATIENT
Start: 2018-10-15 | End: 2018-10-19 | Stop reason: HOSPADM

## 2018-10-15 RX ORDER — ATORVASTATIN CALCIUM 40 MG/1
40 TABLET, FILM COATED ORAL DAILY
Status: DISCONTINUED | OUTPATIENT
Start: 2018-10-15 | End: 2018-10-19 | Stop reason: HOSPADM

## 2018-10-15 RX ORDER — LACOSAMIDE 100 MG/1
100 TABLET ORAL 2 TIMES DAILY
Status: DISCONTINUED | OUTPATIENT
Start: 2018-10-15 | End: 2018-10-19 | Stop reason: HOSPADM

## 2018-10-15 RX ORDER — ONDANSETRON 2 MG/ML
4 INJECTION INTRAMUSCULAR; INTRAVENOUS EVERY 6 HOURS PRN
Status: DISCONTINUED | OUTPATIENT
Start: 2018-10-15 | End: 2018-10-19 | Stop reason: HOSPADM

## 2018-10-15 RX ORDER — OXYBUTYNIN CHLORIDE 5 MG/1
5 TABLET ORAL 3 TIMES DAILY
Status: DISCONTINUED | OUTPATIENT
Start: 2018-10-15 | End: 2018-10-19 | Stop reason: HOSPADM

## 2018-10-15 RX ORDER — LEVETIRACETAM 500 MG/1
1000 TABLET ORAL 2 TIMES DAILY
Status: DISCONTINUED | OUTPATIENT
Start: 2018-10-15 | End: 2018-10-19 | Stop reason: HOSPADM

## 2018-10-15 RX ORDER — AMLODIPINE BESYLATE 10 MG/1
TABLET ORAL
Qty: 30 TABLET | Refills: 2 | Status: SHIPPED | OUTPATIENT
Start: 2018-10-15 | End: 2019-02-07 | Stop reason: SDUPTHER

## 2018-10-15 RX ORDER — LEVOTHYROXINE SODIUM 0.05 MG/1
50 TABLET ORAL DAILY
Status: DISCONTINUED | OUTPATIENT
Start: 2018-10-15 | End: 2018-10-19 | Stop reason: HOSPADM

## 2018-10-15 RX ADMIN — LEVETIRACETAM 250 MG: 500 TABLET, FILM COATED ORAL at 20:58

## 2018-10-15 RX ADMIN — OXYBUTYNIN CHLORIDE 5 MG: 5 TABLET ORAL at 20:57

## 2018-10-15 RX ADMIN — LEVETIRACETAM 1000 MG: 500 TABLET, FILM COATED ORAL at 20:57

## 2018-10-15 RX ADMIN — LACOSAMIDE 100 MG: 100 TABLET, FILM COATED ORAL at 20:58

## 2018-10-15 ASSESSMENT — PAIN SCALES - GENERAL
PAINLEVEL_OUTOF10: 0
PAINLEVEL_OUTOF10: 0

## 2018-10-15 NOTE — TELEPHONE ENCOUNTER
E-scribe request for AMLODIPINE 10MG TAB. Please review and e-scribe if applicable.      Last Visit Date:  7/18/18  Next Visit Date:  10/16/18    Hemoglobin A1C (%)   Date Value   08/18/2016 5.1   05/07/2015 5.0             ( goal A1C is < 7)   No results found for: LABMICR  LDL Cholesterol (mg/dL)   Date Value   08/18/2016 99       (goal LDL is <100)   AST (U/L)   Date Value   07/30/2018 53 (H)     ALT (U/L)   Date Value   07/30/2018 22     BUN (mg/dL)   Date Value   08/07/2018 9     BP Readings from Last 3 Encounters:   08/20/18 121/78   08/07/18 100/64   07/18/18 94/66          (goal 120/80)        Patient Active Problem List:     Acquired hypothyroidism     Essential hypertension     Seizure disorder (HCC)     Urge incontinence     Generalized anxiety disorder     Gastroesophageal reflux disease without esophagitis     H/O: CVA (cerebrovascular accident)     Migraine without aura and without status migrainosus, not intractable     Hammer toe of left foot     Tremor of both hands     Status epilepticus ([facility] [address]      ----JF

## 2018-10-16 LAB
ABSOLUTE EOS #: 0.15 K/UL (ref 0–0.44)
ABSOLUTE IMMATURE GRANULOCYTE: <0.03 K/UL (ref 0–0.3)
ABSOLUTE LYMPH #: 1.71 K/UL (ref 1.1–3.7)
ABSOLUTE MONO #: 0.45 K/UL (ref 0.1–1.2)
ANION GAP SERPL CALCULATED.3IONS-SCNC: 11 MMOL/L (ref 9–17)
BASOPHILS # BLD: 1 % (ref 0–2)
BASOPHILS ABSOLUTE: 0.03 K/UL (ref 0–0.2)
BUN BLDV-MCNC: 9 MG/DL (ref 8–23)
BUN/CREAT BLD: NORMAL (ref 9–20)
CALCIUM SERPL-MCNC: 8.8 MG/DL (ref 8.6–10.4)
CHLORIDE BLD-SCNC: 107 MMOL/L (ref 98–107)
CO2: 25 MMOL/L (ref 20–31)
CREAT SERPL-MCNC: 0.56 MG/DL (ref 0.5–0.9)
DIFFERENTIAL TYPE: NORMAL
EOSINOPHILS RELATIVE PERCENT: 4 % (ref 1–4)
GFR AFRICAN AMERICAN: >60 ML/MIN
GFR NON-AFRICAN AMERICAN: >60 ML/MIN
GFR SERPL CREATININE-BSD FRML MDRD: NORMAL ML/MIN/{1.73_M2}
GFR SERPL CREATININE-BSD FRML MDRD: NORMAL ML/MIN/{1.73_M2}
GLUCOSE BLD-MCNC: 80 MG/DL (ref 70–99)
HCT VFR BLD CALC: 36.9 % (ref 36.3–47.1)
HEMOGLOBIN: 12 G/DL (ref 11.9–15.1)
IMMATURE GRANULOCYTES: 0 %
LYMPHOCYTES # BLD: 43 % (ref 24–43)
MCH RBC QN AUTO: 30.3 PG (ref 25.2–33.5)
MCHC RBC AUTO-ENTMCNC: 32.5 G/DL (ref 28.4–34.8)
MCV RBC AUTO: 93.2 FL (ref 82.6–102.9)
MONOCYTES # BLD: 11 % (ref 3–12)
NRBC AUTOMATED: 0 PER 100 WBC
PDW BLD-RTO: 13.5 % (ref 11.8–14.4)
PLATELET # BLD: 171 K/UL (ref 138–453)
PLATELET ESTIMATE: NORMAL
PMV BLD AUTO: 11.8 FL (ref 8.1–13.5)
POTASSIUM SERPL-SCNC: 3.7 MMOL/L (ref 3.7–5.3)
RBC # BLD: 3.96 M/UL (ref 3.95–5.11)
RBC # BLD: NORMAL 10*6/UL
SEG NEUTROPHILS: 41 % (ref 36–65)
SEGMENTED NEUTROPHILS ABSOLUTE COUNT: 1.64 K/UL (ref 1.5–8.1)
SODIUM BLD-SCNC: 143 MMOL/L (ref 135–144)
WBC # BLD: 4 K/UL (ref 3.5–11.3)
WBC # BLD: NORMAL 10*3/UL

## 2018-10-16 PROCEDURE — 80048 BASIC METABOLIC PNL TOTAL CA: CPT

## 2018-10-16 PROCEDURE — 6370000000 HC RX 637 (ALT 250 FOR IP): Performed by: FAMILY MEDICINE

## 2018-10-16 PROCEDURE — 36415 COLL VENOUS BLD VENIPUNCTURE: CPT

## 2018-10-16 PROCEDURE — 99233 SBSQ HOSP IP/OBS HIGH 50: CPT | Performed by: PSYCHIATRY & NEUROLOGY

## 2018-10-16 PROCEDURE — 2060000000 HC ICU INTERMEDIATE R&B

## 2018-10-16 PROCEDURE — 85025 COMPLETE CBC W/AUTO DIFF WBC: CPT

## 2018-10-16 PROCEDURE — 2580000003 HC RX 258: Performed by: FAMILY MEDICINE

## 2018-10-16 PROCEDURE — 95951 HC EEG MONITORING VIDEO RECORDING: CPT

## 2018-10-16 PROCEDURE — 6360000002 HC RX W HCPCS: Performed by: FAMILY MEDICINE

## 2018-10-16 RX ADMIN — DESMOPRESSIN ACETATE 40 MG: 0.2 TABLET ORAL at 09:10

## 2018-10-16 RX ADMIN — OXYBUTYNIN CHLORIDE 5 MG: 5 TABLET ORAL at 21:25

## 2018-10-16 RX ADMIN — PANTOPRAZOLE SODIUM 40 MG: 40 TABLET, DELAYED RELEASE ORAL at 09:10

## 2018-10-16 RX ADMIN — Medication 10 ML: at 21:28

## 2018-10-16 RX ADMIN — LACOSAMIDE 100 MG: 100 TABLET, FILM COATED ORAL at 09:10

## 2018-10-16 RX ADMIN — LEVETIRACETAM 250 MG: 500 TABLET, FILM COATED ORAL at 21:25

## 2018-10-16 RX ADMIN — LEVETIRACETAM 1000 MG: 500 TABLET, FILM COATED ORAL at 21:24

## 2018-10-16 RX ADMIN — ASPIRIN 81 MG: 81 TABLET ORAL at 09:10

## 2018-10-16 RX ADMIN — OXYBUTYNIN CHLORIDE 5 MG: 5 TABLET ORAL at 09:09

## 2018-10-16 RX ADMIN — HYDROCHLOROTHIAZIDE 12.5 MG: 25 TABLET ORAL at 09:11

## 2018-10-16 RX ADMIN — LACOSAMIDE 100 MG: 100 TABLET, FILM COATED ORAL at 21:24

## 2018-10-16 RX ADMIN — PAROXETINE HYDROCHLORIDE HEMIHYDRATE 30 MG: 20 TABLET, FILM COATED ORAL at 09:09

## 2018-10-16 RX ADMIN — OXYBUTYNIN CHLORIDE 5 MG: 5 TABLET ORAL at 15:49

## 2018-10-16 RX ADMIN — ENOXAPARIN SODIUM 40 MG: 100 INJECTION SUBCUTANEOUS at 09:10

## 2018-10-16 RX ADMIN — LEVETIRACETAM 250 MG: 500 TABLET, FILM COATED ORAL at 09:12

## 2018-10-16 RX ADMIN — Medication 10 ML: at 09:11

## 2018-10-16 RX ADMIN — POTASSIUM CHLORIDE 20 MEQ: 1500 TABLET, EXTENDED RELEASE ORAL at 09:10

## 2018-10-16 RX ADMIN — LEVETIRACETAM 1000 MG: 500 TABLET, FILM COATED ORAL at 09:10

## 2018-10-16 RX ADMIN — LEVOTHYROXINE SODIUM 50 MCG: 50 TABLET ORAL at 09:09

## 2018-10-16 ASSESSMENT — PAIN SCALES - GENERAL: PAINLEVEL_OUTOF10: 0

## 2018-10-16 NOTE — PLAN OF CARE
Problem: Falls - Risk of:  Goal: Will remain free from falls  Will remain free from falls   Outcome: Ongoing  Siderails up x 2  Hourly rounding  Call light in reach  Instructed to call for assist before attempting out of bed.   Remains free from falls and accidental injury at this time   Floor free from obstacles  Bed is locked and in lowest position  Adequate lighting provided  Bed alarm on, [place] star and Stay with Me signs posted

## 2018-10-17 LAB
ABSOLUTE EOS #: 0.17 K/UL (ref 0–0.44)
ABSOLUTE IMMATURE GRANULOCYTE: <0.03 K/UL (ref 0–0.3)
ABSOLUTE LYMPH #: 1.59 K/UL (ref 1.1–3.7)
ABSOLUTE MONO #: 0.55 K/UL (ref 0.1–1.2)
ANION GAP SERPL CALCULATED.3IONS-SCNC: 11 MMOL/L (ref 9–17)
BASOPHILS # BLD: 1 % (ref 0–2)
BASOPHILS ABSOLUTE: 0.03 K/UL (ref 0–0.2)
BUN BLDV-MCNC: 8 MG/DL (ref 8–23)
BUN/CREAT BLD: NORMAL (ref 9–20)
CALCIUM SERPL-MCNC: 8.9 MG/DL (ref 8.6–10.4)
CHLORIDE BLD-SCNC: 101 MMOL/L (ref 98–107)
CO2: 27 MMOL/L (ref 20–31)
CREAT SERPL-MCNC: 0.7 MG/DL (ref 0.5–0.9)
DIFFERENTIAL TYPE: NORMAL
EOSINOPHILS RELATIVE PERCENT: 4 % (ref 1–4)
GFR AFRICAN AMERICAN: >60 ML/MIN
GFR NON-AFRICAN AMERICAN: >60 ML/MIN
GFR SERPL CREATININE-BSD FRML MDRD: NORMAL ML/MIN/{1.73_M2}
GFR SERPL CREATININE-BSD FRML MDRD: NORMAL ML/MIN/{1.73_M2}
GLUCOSE BLD-MCNC: 86 MG/DL (ref 70–99)
HCT VFR BLD CALC: 37.4 % (ref 36.3–47.1)
HEMOGLOBIN: 12.3 G/DL (ref 11.9–15.1)
IMMATURE GRANULOCYTES: 0 %
LYMPHOCYTES # BLD: 35 % (ref 24–43)
MCH RBC QN AUTO: 29.9 PG (ref 25.2–33.5)
MCHC RBC AUTO-ENTMCNC: 32.9 G/DL (ref 28.4–34.8)
MCV RBC AUTO: 91 FL (ref 82.6–102.9)
MONOCYTES # BLD: 12 % (ref 3–12)
NRBC AUTOMATED: 0 PER 100 WBC
PDW BLD-RTO: 13.5 % (ref 11.8–14.4)
PLATELET # BLD: 187 K/UL (ref 138–453)
PLATELET ESTIMATE: NORMAL
PMV BLD AUTO: 11.2 FL (ref 8.1–13.5)
POTASSIUM SERPL-SCNC: 4.2 MMOL/L (ref 3.7–5.3)
RBC # BLD: 4.11 M/UL (ref 3.95–5.11)
RBC # BLD: NORMAL 10*6/UL
SEG NEUTROPHILS: 48 % (ref 36–65)
SEGMENTED NEUTROPHILS ABSOLUTE COUNT: 2.21 K/UL (ref 1.5–8.1)
SODIUM BLD-SCNC: 139 MMOL/L (ref 135–144)
WBC # BLD: 4.6 K/UL (ref 3.5–11.3)
WBC # BLD: NORMAL 10*3/UL

## 2018-10-17 PROCEDURE — 85025 COMPLETE CBC W/AUTO DIFF WBC: CPT

## 2018-10-17 PROCEDURE — 6370000000 HC RX 637 (ALT 250 FOR IP): Performed by: FAMILY MEDICINE

## 2018-10-17 PROCEDURE — 95951 HC EEG MONITORING VIDEO RECORDING: CPT

## 2018-10-17 PROCEDURE — 36415 COLL VENOUS BLD VENIPUNCTURE: CPT

## 2018-10-17 PROCEDURE — 2060000000 HC ICU INTERMEDIATE R&B

## 2018-10-17 PROCEDURE — 99232 SBSQ HOSP IP/OBS MODERATE 35: CPT | Performed by: PSYCHIATRY & NEUROLOGY

## 2018-10-17 PROCEDURE — 2580000003 HC RX 258: Performed by: FAMILY MEDICINE

## 2018-10-17 PROCEDURE — 80048 BASIC METABOLIC PNL TOTAL CA: CPT

## 2018-10-17 PROCEDURE — 6360000002 HC RX W HCPCS: Performed by: FAMILY MEDICINE

## 2018-10-17 RX ADMIN — ENOXAPARIN SODIUM 40 MG: 100 INJECTION SUBCUTANEOUS at 09:35

## 2018-10-17 RX ADMIN — ACETAMINOPHEN 650 MG: 325 TABLET ORAL at 12:25

## 2018-10-17 RX ADMIN — LEVETIRACETAM 1000 MG: 500 TABLET, FILM COATED ORAL at 20:18

## 2018-10-17 RX ADMIN — OXYBUTYNIN CHLORIDE 5 MG: 5 TABLET ORAL at 13:54

## 2018-10-17 RX ADMIN — LEVETIRACETAM 1000 MG: 500 TABLET, FILM COATED ORAL at 09:35

## 2018-10-17 RX ADMIN — Medication 10 ML: at 09:36

## 2018-10-17 RX ADMIN — LEVETIRACETAM 250 MG: 500 TABLET, FILM COATED ORAL at 20:19

## 2018-10-17 RX ADMIN — OXYBUTYNIN CHLORIDE 5 MG: 5 TABLET ORAL at 09:35

## 2018-10-17 RX ADMIN — LACOSAMIDE 100 MG: 100 TABLET, FILM COATED ORAL at 20:19

## 2018-10-17 RX ADMIN — LACOSAMIDE 100 MG: 100 TABLET, FILM COATED ORAL at 09:33

## 2018-10-17 RX ADMIN — LEVOTHYROXINE SODIUM 50 MCG: 50 TABLET ORAL at 09:34

## 2018-10-17 RX ADMIN — Medication 10 ML: at 20:18

## 2018-10-17 RX ADMIN — LEVETIRACETAM 250 MG: 500 TABLET, FILM COATED ORAL at 09:37

## 2018-10-17 RX ADMIN — PANTOPRAZOLE SODIUM 40 MG: 40 TABLET, DELAYED RELEASE ORAL at 09:35

## 2018-10-17 RX ADMIN — POTASSIUM CHLORIDE 20 MEQ: 1500 TABLET, EXTENDED RELEASE ORAL at 09:34

## 2018-10-17 RX ADMIN — HYDROCHLOROTHIAZIDE 12.5 MG: 25 TABLET ORAL at 09:34

## 2018-10-17 RX ADMIN — OXYBUTYNIN CHLORIDE 5 MG: 5 TABLET ORAL at 20:18

## 2018-10-17 RX ADMIN — DESMOPRESSIN ACETATE 40 MG: 0.2 TABLET ORAL at 09:35

## 2018-10-17 RX ADMIN — ASPIRIN 81 MG: 81 TABLET ORAL at 09:35

## 2018-10-17 RX ADMIN — PAROXETINE HYDROCHLORIDE HEMIHYDRATE 30 MG: 20 TABLET, FILM COATED ORAL at 09:34

## 2018-10-17 ASSESSMENT — PAIN SCALES - GENERAL
PAINLEVEL_OUTOF10: 0
PAINLEVEL_OUTOF10: 3
PAINLEVEL_OUTOF10: 0

## 2018-10-18 LAB
ABSOLUTE EOS #: 0.2 K/UL (ref 0–0.44)
ABSOLUTE IMMATURE GRANULOCYTE: <0.03 K/UL (ref 0–0.3)
ABSOLUTE LYMPH #: 1.57 K/UL (ref 1.1–3.7)
ABSOLUTE MONO #: 0.48 K/UL (ref 0.1–1.2)
ANION GAP SERPL CALCULATED.3IONS-SCNC: 12 MMOL/L (ref 9–17)
BASOPHILS # BLD: 1 % (ref 0–2)
BASOPHILS ABSOLUTE: <0.03 K/UL (ref 0–0.2)
BUN BLDV-MCNC: 9 MG/DL (ref 8–23)
BUN/CREAT BLD: NORMAL (ref 9–20)
CALCIUM SERPL-MCNC: 8.9 MG/DL (ref 8.6–10.4)
CHLORIDE BLD-SCNC: 100 MMOL/L (ref 98–107)
CO2: 27 MMOL/L (ref 20–31)
CREAT SERPL-MCNC: 0.56 MG/DL (ref 0.5–0.9)
DIFFERENTIAL TYPE: ABNORMAL
EOSINOPHILS RELATIVE PERCENT: 5 % (ref 1–4)
GFR AFRICAN AMERICAN: >60 ML/MIN
GFR NON-AFRICAN AMERICAN: >60 ML/MIN
GFR SERPL CREATININE-BSD FRML MDRD: NORMAL ML/MIN/{1.73_M2}
GFR SERPL CREATININE-BSD FRML MDRD: NORMAL ML/MIN/{1.73_M2}
GLUCOSE BLD-MCNC: 93 MG/DL (ref 70–99)
HCT VFR BLD CALC: 38.3 % (ref 36.3–47.1)
HEMOGLOBIN: 12.7 G/DL (ref 11.9–15.1)
IMMATURE GRANULOCYTES: 0 %
LYMPHOCYTES # BLD: 38 % (ref 24–43)
MCH RBC QN AUTO: 30.1 PG (ref 25.2–33.5)
MCHC RBC AUTO-ENTMCNC: 33.2 G/DL (ref 28.4–34.8)
MCV RBC AUTO: 90.8 FL (ref 82.6–102.9)
MONOCYTES # BLD: 12 % (ref 3–12)
NRBC AUTOMATED: 0 PER 100 WBC
PDW BLD-RTO: 13.4 % (ref 11.8–14.4)
PLATELET # BLD: 180 K/UL (ref 138–453)
PLATELET ESTIMATE: ABNORMAL
PMV BLD AUTO: 12 FL (ref 8.1–13.5)
POTASSIUM SERPL-SCNC: 4.1 MMOL/L (ref 3.7–5.3)
RBC # BLD: 4.22 M/UL (ref 3.95–5.11)
RBC # BLD: ABNORMAL 10*6/UL
SEG NEUTROPHILS: 44 % (ref 36–65)
SEGMENTED NEUTROPHILS ABSOLUTE COUNT: 1.83 K/UL (ref 1.5–8.1)
SODIUM BLD-SCNC: 139 MMOL/L (ref 135–144)
WBC # BLD: 4.1 K/UL (ref 3.5–11.3)
WBC # BLD: ABNORMAL 10*3/UL

## 2018-10-18 PROCEDURE — 2060000000 HC ICU INTERMEDIATE R&B

## 2018-10-18 PROCEDURE — 2580000003 HC RX 258: Performed by: FAMILY MEDICINE

## 2018-10-18 PROCEDURE — 85025 COMPLETE CBC W/AUTO DIFF WBC: CPT

## 2018-10-18 PROCEDURE — 6370000000 HC RX 637 (ALT 250 FOR IP): Performed by: FAMILY MEDICINE

## 2018-10-18 PROCEDURE — 6360000002 HC RX W HCPCS: Performed by: FAMILY MEDICINE

## 2018-10-18 PROCEDURE — 80048 BASIC METABOLIC PNL TOTAL CA: CPT

## 2018-10-18 PROCEDURE — 95951 HC EEG MONITORING VIDEO RECORDING: CPT

## 2018-10-18 PROCEDURE — 99232 SBSQ HOSP IP/OBS MODERATE 35: CPT | Performed by: PSYCHIATRY & NEUROLOGY

## 2018-10-18 PROCEDURE — 36415 COLL VENOUS BLD VENIPUNCTURE: CPT

## 2018-10-18 RX ADMIN — ASPIRIN 81 MG: 81 TABLET ORAL at 09:25

## 2018-10-18 RX ADMIN — LACOSAMIDE 100 MG: 100 TABLET, FILM COATED ORAL at 09:25

## 2018-10-18 RX ADMIN — LACOSAMIDE 100 MG: 100 TABLET, FILM COATED ORAL at 19:37

## 2018-10-18 RX ADMIN — POTASSIUM CHLORIDE 20 MEQ: 1500 TABLET, EXTENDED RELEASE ORAL at 09:24

## 2018-10-18 RX ADMIN — LEVETIRACETAM 250 MG: 500 TABLET, FILM COATED ORAL at 09:24

## 2018-10-18 RX ADMIN — DESMOPRESSIN ACETATE 40 MG: 0.2 TABLET ORAL at 09:25

## 2018-10-18 RX ADMIN — HYDROCHLOROTHIAZIDE 12.5 MG: 25 TABLET ORAL at 09:23

## 2018-10-18 RX ADMIN — Medication 10 ML: at 19:38

## 2018-10-18 RX ADMIN — ENOXAPARIN SODIUM 40 MG: 100 INJECTION SUBCUTANEOUS at 09:25

## 2018-10-18 RX ADMIN — LEVETIRACETAM 1000 MG: 500 TABLET, FILM COATED ORAL at 09:26

## 2018-10-18 RX ADMIN — OXYBUTYNIN CHLORIDE 5 MG: 5 TABLET ORAL at 14:19

## 2018-10-18 RX ADMIN — PANTOPRAZOLE SODIUM 40 MG: 40 TABLET, DELAYED RELEASE ORAL at 09:24

## 2018-10-18 RX ADMIN — OXYBUTYNIN CHLORIDE 5 MG: 5 TABLET ORAL at 09:24

## 2018-10-18 RX ADMIN — OXYBUTYNIN CHLORIDE 5 MG: 5 TABLET ORAL at 19:37

## 2018-10-18 RX ADMIN — LEVETIRACETAM 1000 MG: 500 TABLET, FILM COATED ORAL at 19:37

## 2018-10-18 RX ADMIN — Medication 10 ML: at 09:27

## 2018-10-18 RX ADMIN — LEVOTHYROXINE SODIUM 50 MCG: 50 TABLET ORAL at 09:25

## 2018-10-18 RX ADMIN — PAROXETINE HYDROCHLORIDE HEMIHYDRATE 30 MG: 20 TABLET, FILM COATED ORAL at 09:24

## 2018-10-18 RX ADMIN — LEVETIRACETAM 250 MG: 500 TABLET, FILM COATED ORAL at 19:39

## 2018-10-18 ASSESSMENT — PAIN SCALES - GENERAL
PAINLEVEL_OUTOF10: 0
PAINLEVEL_OUTOF10: 0

## 2018-10-19 VITALS
TEMPERATURE: 98.3 F | OXYGEN SATURATION: 91 % | RESPIRATION RATE: 12 BRPM | SYSTOLIC BLOOD PRESSURE: 116 MMHG | BODY MASS INDEX: 22.19 KG/M2 | DIASTOLIC BLOOD PRESSURE: 73 MMHG | HEIGHT: 70 IN | HEART RATE: 72 BPM | WEIGHT: 155 LBS

## 2018-10-19 LAB
ABSOLUTE EOS #: 0.26 K/UL (ref 0–0.44)
ABSOLUTE IMMATURE GRANULOCYTE: <0.03 K/UL (ref 0–0.3)
ABSOLUTE LYMPH #: 1.55 K/UL (ref 1.1–3.7)
ABSOLUTE MONO #: 0.62 K/UL (ref 0.1–1.2)
ANION GAP SERPL CALCULATED.3IONS-SCNC: 10 MMOL/L (ref 9–17)
BASOPHILS # BLD: 1 % (ref 0–2)
BASOPHILS ABSOLUTE: 0.04 K/UL (ref 0–0.2)
BUN BLDV-MCNC: 9 MG/DL (ref 8–23)
BUN/CREAT BLD: NORMAL (ref 9–20)
CALCIUM SERPL-MCNC: 9.1 MG/DL (ref 8.6–10.4)
CHLORIDE BLD-SCNC: 103 MMOL/L (ref 98–107)
CO2: 29 MMOL/L (ref 20–31)
CREAT SERPL-MCNC: 0.67 MG/DL (ref 0.5–0.9)
DIFFERENTIAL TYPE: ABNORMAL
EOSINOPHILS RELATIVE PERCENT: 6 % (ref 1–4)
GFR AFRICAN AMERICAN: >60 ML/MIN
GFR NON-AFRICAN AMERICAN: >60 ML/MIN
GFR SERPL CREATININE-BSD FRML MDRD: NORMAL ML/MIN/{1.73_M2}
GFR SERPL CREATININE-BSD FRML MDRD: NORMAL ML/MIN/{1.73_M2}
GLUCOSE BLD-MCNC: 82 MG/DL (ref 70–99)
HCT VFR BLD CALC: 38.7 % (ref 36.3–47.1)
HEMOGLOBIN: 12.3 G/DL (ref 11.9–15.1)
IMMATURE GRANULOCYTES: 0 %
LYMPHOCYTES # BLD: 35 % (ref 24–43)
MCH RBC QN AUTO: 29.6 PG (ref 25.2–33.5)
MCHC RBC AUTO-ENTMCNC: 31.8 G/DL (ref 28.4–34.8)
MCV RBC AUTO: 93.3 FL (ref 82.6–102.9)
MONOCYTES # BLD: 14 % (ref 3–12)
NRBC AUTOMATED: 0 PER 100 WBC
PDW BLD-RTO: 13.5 % (ref 11.8–14.4)
PLATELET # BLD: 180 K/UL (ref 138–453)
PLATELET ESTIMATE: ABNORMAL
PMV BLD AUTO: 11.6 FL (ref 8.1–13.5)
POTASSIUM SERPL-SCNC: 4 MMOL/L (ref 3.7–5.3)
RBC # BLD: 4.15 M/UL (ref 3.95–5.11)
RBC # BLD: ABNORMAL 10*6/UL
SEG NEUTROPHILS: 44 % (ref 36–65)
SEGMENTED NEUTROPHILS ABSOLUTE COUNT: 2.01 K/UL (ref 1.5–8.1)
SODIUM BLD-SCNC: 142 MMOL/L (ref 135–144)
WBC # BLD: 4.5 K/UL (ref 3.5–11.3)
WBC # BLD: ABNORMAL 10*3/UL

## 2018-10-19 PROCEDURE — 85025 COMPLETE CBC W/AUTO DIFF WBC: CPT

## 2018-10-19 PROCEDURE — 6370000000 HC RX 637 (ALT 250 FOR IP): Performed by: FAMILY MEDICINE

## 2018-10-19 PROCEDURE — 95951 HC EEG MONITORING VIDEO RECORDING: CPT

## 2018-10-19 PROCEDURE — 6360000002 HC RX W HCPCS: Performed by: FAMILY MEDICINE

## 2018-10-19 PROCEDURE — 36415 COLL VENOUS BLD VENIPUNCTURE: CPT

## 2018-10-19 PROCEDURE — 80048 BASIC METABOLIC PNL TOTAL CA: CPT

## 2018-10-19 PROCEDURE — 95951 PR EEG MONITORING/VIDEORECORD: CPT | Performed by: PSYCHIATRY & NEUROLOGY

## 2018-10-19 PROCEDURE — 2580000003 HC RX 258: Performed by: FAMILY MEDICINE

## 2018-10-19 PROCEDURE — 99239 HOSP IP/OBS DSCHRG MGMT >30: CPT | Performed by: PSYCHIATRY & NEUROLOGY

## 2018-10-19 RX ADMIN — DESMOPRESSIN ACETATE 40 MG: 0.2 TABLET ORAL at 11:09

## 2018-10-19 RX ADMIN — Medication 10 ML: at 11:03

## 2018-10-19 RX ADMIN — PANTOPRAZOLE SODIUM 40 MG: 40 TABLET, DELAYED RELEASE ORAL at 11:04

## 2018-10-19 RX ADMIN — LEVETIRACETAM 1000 MG: 500 TABLET, FILM COATED ORAL at 11:07

## 2018-10-19 RX ADMIN — OXYBUTYNIN CHLORIDE 5 MG: 5 TABLET ORAL at 11:04

## 2018-10-19 RX ADMIN — HYDROCHLOROTHIAZIDE 12.5 MG: 25 TABLET ORAL at 11:06

## 2018-10-19 RX ADMIN — LEVETIRACETAM 250 MG: 500 TABLET, FILM COATED ORAL at 11:08

## 2018-10-19 RX ADMIN — ASPIRIN 81 MG: 81 TABLET ORAL at 11:09

## 2018-10-19 RX ADMIN — ENOXAPARIN SODIUM 40 MG: 100 INJECTION SUBCUTANEOUS at 11:08

## 2018-10-19 RX ADMIN — AMLODIPINE BESYLATE 10 MG: 10 TABLET ORAL at 11:09

## 2018-10-19 RX ADMIN — LACOSAMIDE 100 MG: 100 TABLET, FILM COATED ORAL at 11:08

## 2018-10-19 RX ADMIN — OXYBUTYNIN CHLORIDE 5 MG: 5 TABLET ORAL at 15:49

## 2018-10-19 RX ADMIN — POTASSIUM CHLORIDE 20 MEQ: 1500 TABLET, EXTENDED RELEASE ORAL at 11:03

## 2018-10-19 RX ADMIN — PAROXETINE HYDROCHLORIDE HEMIHYDRATE 30 MG: 20 TABLET, FILM COATED ORAL at 11:04

## 2018-10-19 RX ADMIN — LEVOTHYROXINE SODIUM 50 MCG: 50 TABLET ORAL at 11:05

## 2018-10-19 NOTE — PROGRESS NOTES
pulse 81, temperature 97.1 °F (36.2 °C), temperature source Oral, resp. rate 14, height 5' 10\" (1.778 m), SpO2 96 %. General Examination    General Awake   Head Normocephalic, without obvious abnormality, atraumatic   Neck Supple, symmetrical, trachea midline, No mass   Lungs Respirations unlabored   Chest Wall No deformity   Heart Regular rate and rhythm   Abdomen No masses   Extremities Extremities normal, atraumatic, no cyanosis or edema     Pulses 2+ and symmetric   Skin: Skin color, texture, turgor normal, no rashes or lesions             Mental status   Alert and oriented; intact memory with no confusion, speech or language problems; no hallucinations or delusions     Cranial nerves   II - visual fields intact to confrontation                                                III, IV, VI - extra-ocular muscles full: PERRLA ; no ARSH, no nystagmus, no ptosis , disc flat                                                                   V - normal facial sensation                                                               VII - normal facial symmetry                                                             VIII - intact hearing                                                                             IX, X - symmetrical palate                                                                  XI - symmetrical shoulder shrug                                                       XII - midline tongue without atrophy or fasciculation     Motor function  Normal muscle bulk and tone; normal power 5/5   Sensory function Intact to touch     Cerebellar Intact fine motor movement.  No involuntary movements or tremors     Reflex function Intact 2+ DTR and symmetric with no pathologic reflex or Babinski sign     Gait                  Not tested due to LTME              DATA      Lab Results   Component Value Date    WBC 4.6 10/17/2018    HGB 12.3 10/17/2018    HCT 37.4 10/17/2018     10/17/2018    CHOL 183
DATA      Lab Results   Component Value Date    WBC 4.5 10/19/2018    HGB 12.3 10/19/2018    HCT 38.7 10/19/2018     10/19/2018    CHOL 183 08/18/2016    TRIG 64 08/18/2016    HDL 71 08/18/2016    ALT 18 10/15/2018    AST 33 (H) 10/15/2018    NA Pending 10/19/2018    K Pending 10/19/2018    CL Pending 10/19/2018    CREATININE 0.67 10/19/2018    BUN 9 10/19/2018    CO2 29 10/19/2018    TSH 1.56 07/30/2018    INR 1.1 01/14/2018    LABA1C 5.1 08/18/2016         ASSESSMENT:  61 y.o.  female with history of seizures was admitted for LTME monitoring. 1. Seizure disorder  2. History of stroke  3. History of generalized anxiety disorder  4. History of hypertension  5. History of hypothyroidism  6. History of migraine headache    PLAN:    1. LTME  will be discontinued today  2. continue Vimpat 100 mg twice a day and Keppra 1250 mg twice a day  3. Ativan when necessary seizure activity  4. Monitor on telemetry  5. Continue aspirin and Plavix for secondary stroke prevention    Electronically signed by Selvin Smart DO on 10/19/2018 at 8:24 AM   Attending Physician Statement:    I have discussed the care of Lyudmila Ruiz, including pertinent history and examination findings, with the medical residents. I have independently seen and examined the patient and the key elements of all parts of the encounter have been performed by me. I have reviewed medications, clinical laboratory, X rays and other diagnostic tests with the residents. I agree with the assessment, plan and orders as documented by the resident.      Marleen Lanza MD 10/19/2018 12:16 PM

## 2018-10-19 NOTE — PLAN OF CARE
Problem: Falls - Risk of:  Goal: Will remain free from falls  Will remain free from falls    Outcome: Met This Shift  Patient remained free from falls this shift. Bed locked and in lowest position, call light within reach, and bed alarm on.

## 2018-11-05 ENCOUNTER — OFFICE VISIT (OUTPATIENT)
Dept: NEUROLOGY | Age: 63
End: 2018-11-05
Payer: COMMERCIAL

## 2018-11-05 VITALS
BODY MASS INDEX: 20.96 KG/M2 | HEART RATE: 82 BPM | SYSTOLIC BLOOD PRESSURE: 102 MMHG | WEIGHT: 146.4 LBS | DIASTOLIC BLOOD PRESSURE: 72 MMHG | HEIGHT: 70 IN

## 2018-11-05 DIAGNOSIS — N39.41 URGE INCONTINENCE: ICD-10-CM

## 2018-11-05 DIAGNOSIS — G40.909 SEIZURE DISORDER (HCC): Primary | ICD-10-CM

## 2018-11-05 PROCEDURE — 1036F TOBACCO NON-USER: CPT | Performed by: PSYCHIATRY & NEUROLOGY

## 2018-11-05 PROCEDURE — G8420 CALC BMI NORM PARAMETERS: HCPCS | Performed by: PSYCHIATRY & NEUROLOGY

## 2018-11-05 PROCEDURE — 1111F DSCHRG MED/CURRENT MED MERGE: CPT | Performed by: PSYCHIATRY & NEUROLOGY

## 2018-11-05 PROCEDURE — 3017F COLORECTAL CA SCREEN DOC REV: CPT | Performed by: PSYCHIATRY & NEUROLOGY

## 2018-11-05 PROCEDURE — G8484 FLU IMMUNIZE NO ADMIN: HCPCS | Performed by: PSYCHIATRY & NEUROLOGY

## 2018-11-05 PROCEDURE — 99214 OFFICE O/P EST MOD 30 MIN: CPT | Performed by: PSYCHIATRY & NEUROLOGY

## 2018-11-05 PROCEDURE — G8427 DOCREV CUR MEDS BY ELIG CLIN: HCPCS | Performed by: PSYCHIATRY & NEUROLOGY

## 2018-11-05 RX ORDER — PAROXETINE 30 MG/1
TABLET, FILM COATED ORAL
Qty: 30 TABLET | Refills: 2 | Status: SHIPPED | OUTPATIENT
Start: 2018-11-05 | End: 2019-02-07 | Stop reason: SDUPTHER

## 2018-11-05 RX ORDER — OXYBUTYNIN CHLORIDE 5 MG/1
TABLET ORAL
Qty: 90 TABLET | Refills: 2 | Status: SHIPPED | OUTPATIENT
Start: 2018-11-05 | End: 2019-02-07 | Stop reason: SDUPTHER

## 2018-11-05 NOTE — PROGRESS NOTES
[facility]            [place], [facility] [address], [address] [place] [address]             Ph: [phone] or [phone]             FAX: [phone]                   Anastacia Arriaga M.D.            Malachi Mendes M.D. Scar Rendon M.D. Rubin Pride M.D. Dear Dr. King Jayda MD     The patient comes in today for a follow-up visit. She has a previous history of right MCA ischemic stroke in 1998 and subsequent history of seizures. The patient is currently on Keppra 1250 mg twice a day and Vimpat 100 mg twice a day. She reports being compliant with the medications except for last 2 days when she could not get Vimpat refilled. The patient denies having any seizures. No side effects of medications. She was admitted in [facility] for video EEG monitoring for 4 days. The EEG showed frequent left temporal spike or discharges but during the recording, the patient did not have any clinical or subclinical seizures. Her last clinical seizure was in August 2018. She has been seizure-free after the increment of Keppra and addition of Vimpat.         Lab Results   Component Value Date    RPYCQVJBFHTEAU 34 08/18/2016     No components found for: CHLPL  Lab Results   Component Value Date    TRIG 64 08/18/2016    TRIG 125 05/07/2015    TRIG 111 11/05/2013     Lab Results   Component Value Date    HDL 71 08/18/2016    HDL 79 05/07/2015    HDL 95 11/05/2013     No results found for: LDLCALC  No results found for: LABVLDL  Lab Results   Component Value Date    LABA1C 5.1 08/18/2016     Lab Results   Component Value Date     08/18/2016     No results found for: [id number]   Right MCA stroke 1998   Last seizure 2007, however there is concern of intermittent staring episodes with unknown frequency Possibly occurring once a week or so  2 D echo Sep 2012 normal.  MRA head normal,

## 2018-11-23 DIAGNOSIS — E03.9 ACQUIRED HYPOTHYROIDISM: ICD-10-CM

## 2018-11-29 RX ORDER — LEVOTHYROXINE SODIUM 0.05 MG/1
TABLET ORAL
Qty: 30 TABLET | Refills: 2 | Status: SHIPPED | OUTPATIENT
Start: 2018-11-29 | End: 2019-02-07 | Stop reason: SDUPTHER

## 2018-12-21 ENCOUNTER — TELEPHONE (OUTPATIENT)
Dept: NEUROLOGY | Age: 63
End: 2018-12-21

## 2018-12-26 ENCOUNTER — TELEPHONE (OUTPATIENT)
Dept: NEUROLOGY | Age: 63
End: 2018-12-26

## 2019-01-11 DIAGNOSIS — I10 ESSENTIAL HYPERTENSION: ICD-10-CM

## 2019-01-16 RX ORDER — LACOSAMIDE 200 MG/1
200 TABLET ORAL 2 TIMES DAILY
Qty: 60 TABLET | Refills: 1 | OUTPATIENT
Start: 2019-01-16 | End: 2019-01-23 | Stop reason: SDUPTHER

## 2019-01-16 RX ORDER — HYDROCHLOROTHIAZIDE 12.5 MG/1
TABLET ORAL
Qty: 30 TABLET | Refills: 2 | Status: SHIPPED | OUTPATIENT
Start: 2019-01-16 | End: 2019-02-07 | Stop reason: SDUPTHER

## 2019-01-23 ENCOUNTER — OFFICE VISIT (OUTPATIENT)
Dept: NEUROLOGY | Age: 64
End: 2019-01-23
Payer: COMMERCIAL

## 2019-01-23 VITALS
SYSTOLIC BLOOD PRESSURE: 107 MMHG | WEIGHT: 146.6 LBS | BODY MASS INDEX: 20.99 KG/M2 | HEART RATE: 90 BPM | HEIGHT: 70 IN | DIASTOLIC BLOOD PRESSURE: 75 MMHG

## 2019-01-23 DIAGNOSIS — G40.909 SEIZURE DISORDER (HCC): ICD-10-CM

## 2019-01-23 PROCEDURE — 1036F TOBACCO NON-USER: CPT | Performed by: PSYCHIATRY & NEUROLOGY

## 2019-01-23 PROCEDURE — G8484 FLU IMMUNIZE NO ADMIN: HCPCS | Performed by: PSYCHIATRY & NEUROLOGY

## 2019-01-23 PROCEDURE — 99213 OFFICE O/P EST LOW 20 MIN: CPT | Performed by: PSYCHIATRY & NEUROLOGY

## 2019-01-23 PROCEDURE — G8427 DOCREV CUR MEDS BY ELIG CLIN: HCPCS | Performed by: PSYCHIATRY & NEUROLOGY

## 2019-01-23 PROCEDURE — 3017F COLORECTAL CA SCREEN DOC REV: CPT | Performed by: PSYCHIATRY & NEUROLOGY

## 2019-01-23 PROCEDURE — G8420 CALC BMI NORM PARAMETERS: HCPCS | Performed by: PSYCHIATRY & NEUROLOGY

## 2019-01-24 RX ORDER — LACOSAMIDE 200 MG/1
200 TABLET ORAL 2 TIMES DAILY
Qty: 180 TABLET | Refills: 2 | Status: SHIPPED | OUTPATIENT
Start: 2019-01-24 | End: 2020-07-14 | Stop reason: SDUPTHER

## 2019-02-07 ENCOUNTER — OFFICE VISIT (OUTPATIENT)
Dept: INTERNAL MEDICINE | Age: 64
End: 2019-02-07
Payer: COMMERCIAL

## 2019-02-07 VITALS
HEART RATE: 88 BPM | HEIGHT: 70 IN | BODY MASS INDEX: 20.62 KG/M2 | WEIGHT: 144 LBS | SYSTOLIC BLOOD PRESSURE: 108 MMHG | DIASTOLIC BLOOD PRESSURE: 76 MMHG

## 2019-02-07 DIAGNOSIS — E87.6 HYPOKALEMIA: ICD-10-CM

## 2019-02-07 DIAGNOSIS — G43.009 MIGRAINE WITHOUT AURA AND WITHOUT STATUS MIGRAINOSUS, NOT INTRACTABLE: ICD-10-CM

## 2019-02-07 DIAGNOSIS — L84 FOOT CALLUS: ICD-10-CM

## 2019-02-07 DIAGNOSIS — G40.901 STATUS EPILEPTICUS (HCC): ICD-10-CM

## 2019-02-07 DIAGNOSIS — Z86.73 H/O: STROKE: ICD-10-CM

## 2019-02-07 DIAGNOSIS — E03.9 ACQUIRED HYPOTHYROIDISM: Chronic | ICD-10-CM

## 2019-02-07 DIAGNOSIS — I10 ESSENTIAL HYPERTENSION: Primary | Chronic | ICD-10-CM

## 2019-02-07 DIAGNOSIS — M20.42 HAMMER TOE OF LEFT FOOT: ICD-10-CM

## 2019-02-07 DIAGNOSIS — Z86.73 H/O: CVA (CEREBROVASCULAR ACCIDENT): Chronic | ICD-10-CM

## 2019-02-07 DIAGNOSIS — R25.1 TREMOR OF BOTH HANDS: ICD-10-CM

## 2019-02-07 DIAGNOSIS — K21.9 GASTROESOPHAGEAL REFLUX DISEASE WITHOUT ESOPHAGITIS: ICD-10-CM

## 2019-02-07 DIAGNOSIS — F41.1 GENERALIZED ANXIETY DISORDER: ICD-10-CM

## 2019-02-07 DIAGNOSIS — Z23 NEED FOR PROPHYLACTIC VACCINATION AGAINST STREPTOCOCCUS PNEUMONIAE (PNEUMOCOCCUS): ICD-10-CM

## 2019-02-07 DIAGNOSIS — G40.909 SEIZURE DISORDER (HCC): Chronic | ICD-10-CM

## 2019-02-07 DIAGNOSIS — K21.9 GASTROESOPHAGEAL REFLUX DISEASE, ESOPHAGITIS PRESENCE NOT SPECIFIED: ICD-10-CM

## 2019-02-07 DIAGNOSIS — N39.41 URGE INCONTINENCE: ICD-10-CM

## 2019-02-07 DIAGNOSIS — Z12.11 SCREENING FOR COLON CANCER: ICD-10-CM

## 2019-02-07 PROCEDURE — 99214 OFFICE O/P EST MOD 30 MIN: CPT | Performed by: STUDENT IN AN ORGANIZED HEALTH CARE EDUCATION/TRAINING PROGRAM

## 2019-02-07 PROCEDURE — G0009 ADMIN PNEUMOCOCCAL VACCINE: HCPCS | Performed by: INTERNAL MEDICINE

## 2019-02-07 PROCEDURE — G8484 FLU IMMUNIZE NO ADMIN: HCPCS | Performed by: STUDENT IN AN ORGANIZED HEALTH CARE EDUCATION/TRAINING PROGRAM

## 2019-02-07 PROCEDURE — G8420 CALC BMI NORM PARAMETERS: HCPCS | Performed by: STUDENT IN AN ORGANIZED HEALTH CARE EDUCATION/TRAINING PROGRAM

## 2019-02-07 PROCEDURE — 1036F TOBACCO NON-USER: CPT | Performed by: STUDENT IN AN ORGANIZED HEALTH CARE EDUCATION/TRAINING PROGRAM

## 2019-02-07 PROCEDURE — 99211 OFF/OP EST MAY X REQ PHY/QHP: CPT | Performed by: INTERNAL MEDICINE

## 2019-02-07 PROCEDURE — G8427 DOCREV CUR MEDS BY ELIG CLIN: HCPCS | Performed by: STUDENT IN AN ORGANIZED HEALTH CARE EDUCATION/TRAINING PROGRAM

## 2019-02-07 PROCEDURE — 3017F COLORECTAL CA SCREEN DOC REV: CPT | Performed by: STUDENT IN AN ORGANIZED HEALTH CARE EDUCATION/TRAINING PROGRAM

## 2019-02-07 RX ORDER — POTASSIUM CHLORIDE 750 MG/1
20 TABLET, EXTENDED RELEASE ORAL DAILY
Qty: 60 TABLET | Refills: 3 | Status: SHIPPED | OUTPATIENT
Start: 2019-02-07 | End: 2019-02-07 | Stop reason: SDUPTHER

## 2019-02-07 RX ORDER — HYDROCHLOROTHIAZIDE 12.5 MG/1
TABLET ORAL
Qty: 30 TABLET | Refills: 6 | Status: CANCELLED | OUTPATIENT
Start: 2019-02-07

## 2019-02-07 RX ORDER — ASPIRIN 81 MG/1
81 TABLET ORAL DAILY
Qty: 90 TABLET | Refills: 1 | Status: SHIPPED | OUTPATIENT
Start: 2019-02-07 | End: 2019-07-25 | Stop reason: SDUPTHER

## 2019-02-07 RX ORDER — OMEPRAZOLE 20 MG/1
CAPSULE, DELAYED RELEASE ORAL
Qty: 90 CAPSULE | Refills: 1 | Status: SHIPPED | OUTPATIENT
Start: 2019-02-07 | End: 2019-02-07 | Stop reason: SDUPTHER

## 2019-02-07 RX ORDER — ATORVASTATIN CALCIUM 40 MG/1
40 TABLET, FILM COATED ORAL DAILY
Qty: 90 TABLET | Refills: 1 | Status: SHIPPED | OUTPATIENT
Start: 2019-02-07 | End: 2019-06-20 | Stop reason: SDUPTHER

## 2019-02-07 RX ORDER — PAROXETINE 30 MG/1
30 TABLET, FILM COATED ORAL EVERY MORNING
Qty: 90 TABLET | Refills: 1 | Status: SHIPPED | OUTPATIENT
Start: 2019-02-07 | End: 2019-02-07 | Stop reason: SDUPTHER

## 2019-02-07 RX ORDER — SUMATRIPTAN 100 MG/1
100 TABLET, FILM COATED ORAL DAILY PRN
Qty: 10 TABLET | Refills: 2 | Status: SHIPPED | OUTPATIENT
Start: 2019-02-07 | End: 2019-02-07 | Stop reason: SDUPTHER

## 2019-02-07 RX ORDER — HYDROCHLOROTHIAZIDE 12.5 MG/1
12.5 TABLET ORAL DAILY
Qty: 90 TABLET | Refills: 1 | Status: SHIPPED | OUTPATIENT
Start: 2019-02-07 | End: 2019-05-30 | Stop reason: ALTCHOICE

## 2019-02-07 RX ORDER — AMLODIPINE BESYLATE 10 MG/1
TABLET ORAL
Qty: 90 TABLET | Refills: 1 | Status: SHIPPED | OUTPATIENT
Start: 2019-02-07 | End: 2019-06-20 | Stop reason: SDUPTHER

## 2019-02-07 RX ORDER — ASPIRIN 81 MG/1
81 TABLET ORAL DAILY
Qty: 90 TABLET | Refills: 1 | Status: SHIPPED | OUTPATIENT
Start: 2019-02-07 | End: 2019-02-07 | Stop reason: SDUPTHER

## 2019-02-07 RX ORDER — PAROXETINE 30 MG/1
30 TABLET, FILM COATED ORAL EVERY MORNING
Qty: 90 TABLET | Refills: 1 | Status: SHIPPED | OUTPATIENT
Start: 2019-02-07 | End: 2019-07-25 | Stop reason: SDUPTHER

## 2019-02-07 RX ORDER — SUMATRIPTAN 100 MG/1
100 TABLET, FILM COATED ORAL DAILY PRN
Qty: 10 TABLET | Refills: 2 | Status: SHIPPED | OUTPATIENT
Start: 2019-02-07 | End: 2019-06-20 | Stop reason: SDUPTHER

## 2019-02-07 RX ORDER — AMLODIPINE BESYLATE 10 MG/1
TABLET ORAL
Qty: 90 TABLET | Refills: 1 | Status: SHIPPED | OUTPATIENT
Start: 2019-02-07 | End: 2019-02-07 | Stop reason: SDUPTHER

## 2019-02-07 RX ORDER — OXYBUTYNIN CHLORIDE 5 MG/1
TABLET ORAL
Qty: 270 TABLET | Refills: 1 | Status: SHIPPED | OUTPATIENT
Start: 2019-02-07 | End: 2019-07-25 | Stop reason: SDUPTHER

## 2019-02-07 RX ORDER — HYDROCHLOROTHIAZIDE 12.5 MG/1
12.5 TABLET ORAL DAILY
Qty: 90 TABLET | Refills: 1 | Status: SHIPPED | OUTPATIENT
Start: 2019-02-07 | End: 2019-02-07 | Stop reason: SDUPTHER

## 2019-02-07 RX ORDER — LEVOTHYROXINE SODIUM 0.05 MG/1
TABLET ORAL
Qty: 90 TABLET | Refills: 1 | Status: SHIPPED | OUTPATIENT
Start: 2019-02-07 | End: 2019-02-07 | Stop reason: SDUPTHER

## 2019-02-07 RX ORDER — LEVOTHYROXINE SODIUM 0.05 MG/1
TABLET ORAL
Qty: 90 TABLET | Refills: 1 | Status: SHIPPED | OUTPATIENT
Start: 2019-02-07 | End: 2019-06-20 | Stop reason: SDUPTHER

## 2019-02-07 RX ORDER — OMEPRAZOLE 20 MG/1
CAPSULE, DELAYED RELEASE ORAL
Qty: 90 CAPSULE | Refills: 1 | Status: SHIPPED | OUTPATIENT
Start: 2019-02-07 | End: 2019-07-25 | Stop reason: SDUPTHER

## 2019-02-07 RX ORDER — POTASSIUM CHLORIDE 750 MG/1
20 TABLET, EXTENDED RELEASE ORAL DAILY
Qty: 60 TABLET | Refills: 3 | Status: SHIPPED | OUTPATIENT
Start: 2019-02-07 | End: 2019-05-22 | Stop reason: SDUPTHER

## 2019-02-07 RX ORDER — ATORVASTATIN CALCIUM 40 MG/1
40 TABLET, FILM COATED ORAL DAILY
Qty: 90 TABLET | Refills: 1 | Status: SHIPPED | OUTPATIENT
Start: 2019-02-07 | End: 2019-02-07 | Stop reason: SDUPTHER

## 2019-02-07 ASSESSMENT — PATIENT HEALTH QUESTIONNAIRE - PHQ9
SUM OF ALL RESPONSES TO PHQ QUESTIONS 1-9: 1
SUM OF ALL RESPONSES TO PHQ9 QUESTIONS 1 & 2: 1
SUM OF ALL RESPONSES TO PHQ QUESTIONS 1-9: 1
1. LITTLE INTEREST OR PLEASURE IN DOING THINGS: 0
2. FEELING DOWN, DEPRESSED OR HOPELESS: 1

## 2019-02-08 DIAGNOSIS — G40.909 SEIZURE DISORDER (HCC): Primary | ICD-10-CM

## 2019-02-08 DIAGNOSIS — E03.9 ACQUIRED HYPOTHYROIDISM: Chronic | ICD-10-CM

## 2019-02-09 RX ORDER — LEVETIRACETAM 1000 MG/1
1000 TABLET ORAL 2 TIMES DAILY
Qty: 180 TABLET | Refills: 0 | Status: SHIPPED | OUTPATIENT
Start: 2019-02-09 | End: 2019-04-26 | Stop reason: SDUPTHER

## 2019-02-09 RX ORDER — LACOSAMIDE 100 MG/1
100 TABLET ORAL 2 TIMES DAILY
Qty: 180 TABLET | Refills: 0 | Status: SHIPPED | OUTPATIENT
Start: 2019-02-09 | End: 2019-07-18 | Stop reason: SDUPTHER

## 2019-02-09 RX ORDER — LEVETIRACETAM 250 MG/1
250 TABLET ORAL 2 TIMES DAILY
Qty: 180 TABLET | Refills: 0 | Status: SHIPPED | OUTPATIENT
Start: 2019-02-09 | End: 2019-04-26 | Stop reason: SDUPTHER

## 2019-02-11 ENCOUNTER — TELEPHONE (OUTPATIENT)
Dept: NEUROLOGY | Age: 64
End: 2019-02-11

## 2019-02-20 ENCOUNTER — TELEPHONE (OUTPATIENT)
Dept: INTERNAL MEDICINE | Age: 64
End: 2019-02-20

## 2019-03-11 ENCOUNTER — HOSPITAL ENCOUNTER (OUTPATIENT)
Dept: GENERAL RADIOLOGY | Age: 64
Discharge: HOME | End: 2019-03-13
Payer: COMMERCIAL

## 2019-03-11 ENCOUNTER — HOSPITAL ENCOUNTER (OUTPATIENT)
Age: 64
Discharge: HOME | End: 2019-03-13
Payer: COMMERCIAL

## 2019-03-11 ENCOUNTER — OFFICE VISIT (OUTPATIENT)
Dept: PODIATRY | Age: 64
End: 2019-03-11
Payer: COMMERCIAL

## 2019-03-11 VITALS
WEIGHT: 144.6 LBS | SYSTOLIC BLOOD PRESSURE: 111 MMHG | HEART RATE: 84 BPM | BODY MASS INDEX: 20.7 KG/M2 | DIASTOLIC BLOOD PRESSURE: 75 MMHG | HEIGHT: 70 IN

## 2019-03-11 DIAGNOSIS — M79.672 FOOT PAIN, BILATERAL: ICD-10-CM

## 2019-03-11 DIAGNOSIS — M21.41 PES PLANUS OF BOTH FEET: Primary | ICD-10-CM

## 2019-03-11 DIAGNOSIS — M79.671 FOOT PAIN, BILATERAL: ICD-10-CM

## 2019-03-11 DIAGNOSIS — M21.42 PES PLANUS OF BOTH FEET: ICD-10-CM

## 2019-03-11 DIAGNOSIS — M21.42 PES PLANUS OF BOTH FEET: Primary | ICD-10-CM

## 2019-03-11 DIAGNOSIS — M21.41 PES PLANUS OF BOTH FEET: ICD-10-CM

## 2019-03-11 PROCEDURE — 3017F COLORECTAL CA SCREEN DOC REV: CPT | Performed by: STUDENT IN AN ORGANIZED HEALTH CARE EDUCATION/TRAINING PROGRAM

## 2019-03-11 PROCEDURE — 73630 X-RAY EXAM OF FOOT: CPT

## 2019-03-11 PROCEDURE — 99203 OFFICE O/P NEW LOW 30 MIN: CPT | Performed by: STUDENT IN AN ORGANIZED HEALTH CARE EDUCATION/TRAINING PROGRAM

## 2019-03-11 PROCEDURE — G8484 FLU IMMUNIZE NO ADMIN: HCPCS | Performed by: STUDENT IN AN ORGANIZED HEALTH CARE EDUCATION/TRAINING PROGRAM

## 2019-03-11 PROCEDURE — G8427 DOCREV CUR MEDS BY ELIG CLIN: HCPCS | Performed by: STUDENT IN AN ORGANIZED HEALTH CARE EDUCATION/TRAINING PROGRAM

## 2019-03-11 PROCEDURE — 1036F TOBACCO NON-USER: CPT | Performed by: STUDENT IN AN ORGANIZED HEALTH CARE EDUCATION/TRAINING PROGRAM

## 2019-03-11 PROCEDURE — G8420 CALC BMI NORM PARAMETERS: HCPCS | Performed by: STUDENT IN AN ORGANIZED HEALTH CARE EDUCATION/TRAINING PROGRAM

## 2019-03-21 ENCOUNTER — TELEPHONE (OUTPATIENT)
Dept: PODIATRY | Age: 64
End: 2019-03-21

## 2019-04-01 ENCOUNTER — OFFICE VISIT (OUTPATIENT)
Dept: PODIATRY | Age: 64
End: 2019-04-01
Payer: COMMERCIAL

## 2019-04-01 VITALS
DIASTOLIC BLOOD PRESSURE: 64 MMHG | BODY MASS INDEX: 20.33 KG/M2 | WEIGHT: 142 LBS | SYSTOLIC BLOOD PRESSURE: 104 MMHG | HEART RATE: 85 BPM | HEIGHT: 70 IN

## 2019-04-01 DIAGNOSIS — M21.41 PES PLANUS OF BOTH FEET: Primary | ICD-10-CM

## 2019-04-01 DIAGNOSIS — M21.42 PES PLANUS OF BOTH FEET: Primary | ICD-10-CM

## 2019-04-01 DIAGNOSIS — M79.672 PAIN IN BOTH FEET: ICD-10-CM

## 2019-04-01 DIAGNOSIS — M79.671 PAIN IN BOTH FEET: ICD-10-CM

## 2019-04-01 PROCEDURE — 99213 OFFICE O/P EST LOW 20 MIN: CPT | Performed by: STUDENT IN AN ORGANIZED HEALTH CARE EDUCATION/TRAINING PROGRAM

## 2019-04-01 PROCEDURE — G8427 DOCREV CUR MEDS BY ELIG CLIN: HCPCS | Performed by: STUDENT IN AN ORGANIZED HEALTH CARE EDUCATION/TRAINING PROGRAM

## 2019-04-01 PROCEDURE — 3017F COLORECTAL CA SCREEN DOC REV: CPT | Performed by: STUDENT IN AN ORGANIZED HEALTH CARE EDUCATION/TRAINING PROGRAM

## 2019-04-01 PROCEDURE — G8420 CALC BMI NORM PARAMETERS: HCPCS | Performed by: STUDENT IN AN ORGANIZED HEALTH CARE EDUCATION/TRAINING PROGRAM

## 2019-04-01 PROCEDURE — 1036F TOBACCO NON-USER: CPT | Performed by: STUDENT IN AN ORGANIZED HEALTH CARE EDUCATION/TRAINING PROGRAM

## 2019-04-01 NOTE — PROGRESS NOTES
[address]  [address] [place] [address], [address] [facility]  Tel: [phone]   Fax: [phone]    Subjective     CC: Foot pain    HPI:  Steffanie Mc is a 59y.o. year old female who presents to clinic today for orthotics . Has been using felt padding given last visit. No new pedal pain, no n/f/c/v/sob. Primary care physician is Arun Ibrahim MD.    ROS:    Constitutional: Denies nausea, vomiting, fever, chills. Neurologic: Denies numbness, tingling, and burning in the feet. Vascular: Denies symptoms of lower extremity claudication. Skin: Denies open wounds. Otherwise negative except as noted in the HPI. PMH:  Past Medical History:   Diagnosis Date    Ankle fracture, left     Anxiety 2/4/2014    Arthritis     ETOH abuse     Frequency of urination ?  GERD (gastroesophageal reflux disease) 2/4/2014    Head injury     after seizure/stroke pt fell to fall and hit head-2004    Headache(784.0)     Hypertension     Hypotension ?     Hypothyroidism     Numbness and tingling     fingers    Osteoporosis     Peripheral vascular disease ([address]     Pneumonia     Reflux     Seizures ([address]     last one 2004    Shingles     Stroke [facility]) 1998    stroke and seizure together    TIA (transient ischemic attack)     1996    Tremor     UTI (urinary tract infection)     once    Varicose veins     Wears glasses        Surgical History:   Past Surgical History:   Procedure Laterality Date    BUNIONECTOMY Right     HAMMER TOE SURGERY Left 07/06/2016    ON 2ND, 3rd, 4th,  and  5TH TOES OF LEFT FOOT    OTHER SURGICAL HISTORY  2/23/15    orif left ankle with synthes and intraoperative c- arm    VT BRNCC INCL FLUOR GDNCE DX W/CELL WASHG SPX N/A 1/16/2018    BRONCHOSCOPY WITH WASHINGS AT PATIENT BEDSIDE ICU performed by Floyd Crook MD at [address] [place] [address]         Social History:  Social History     Tobacco Use    Smoking status: Never Smoker    Smokeless (SHINGRIX) 50 MCG SUSR injection 50 MCG IM then repeat 2-6 months. 7/18/18 7/18/19 Yes Len Glasgow MD       Objective     Vitals:    04/01/19 1322   BP: 104/64   Pulse: 85       Lab Results   Component Value Date    LABA1C 5.1 08/18/2016       Physical Exam:  General:  Alert and oriented x3. In no acute distress. Lower Extremity Physical Exam:    Vascular: DP and PT pulses are palpable, Bilateral. CFT <3 seconds to all digits, Bilateral.  No edema, Bilateral.  Hair growth is absent to the level of the digits, Bilateral.     Neuro: Saph/sural/SP/DP/plantar sensation intact to light touch. Protective sensation is intact to 10/10 sites as tested with a 5.07g SWMF, Bilateral.     Musculoskeletal: EHL/FHL/GS/TA gross motor intact. No tenderness to palpation. Gross deformity is flexible pes planovalgus deformity of the left. Ankle joint range of motion preserved. Slight decrease in subtalar joint range of motion in eversion and inversion, with no crepitus noted, Left. Dermatologic: No open lesions, Bilateral.  Interdigital maceration absent, Bilateral.  Nails 1-10 are wnl. Mild focal hyperkeratosis noted to the plantar medial arch. Assessment   Raymond Clark is a 59 y.o. female with     Diagnosis Orders   1. Pes planus of both feet     2. Pain in both feet          Plan   · Patient examined and evaluated  · Diagnosis and treatment options discussed in detail  · Etiology of adult acquired flatfoot discussed with the patient in detail. · Orthoses provided, break in period instructions given  · RTC prn    No orders of the defined types were placed in this encounter. No orders of the defined types were placed in this encounter. Carolina Wilson DPM   Podiatric Medicine & Surgery   4/1/2019 at 2:13 PM     I performed a history and physical examination of the patient and discussed management with the resident. I reviewed the residents note and agree with the documented findings and plan of care.

## 2019-04-26 DIAGNOSIS — G40.909 SEIZURE DISORDER (HCC): ICD-10-CM

## 2019-04-30 RX ORDER — LEVETIRACETAM 250 MG/1
TABLET ORAL
Qty: 180 TABLET | Refills: 0 | Status: SHIPPED | OUTPATIENT
Start: 2019-04-30 | End: 2019-07-18 | Stop reason: SDUPTHER

## 2019-04-30 RX ORDER — LEVETIRACETAM 1000 MG/1
TABLET ORAL
Qty: 180 TABLET | Refills: 0 | Status: SHIPPED | OUTPATIENT
Start: 2019-04-30 | End: 2019-07-18 | Stop reason: SDUPTHER

## 2019-05-22 DIAGNOSIS — E87.6 HYPOKALEMIA: ICD-10-CM

## 2019-05-22 NOTE — TELEPHONE ENCOUNTER
Escribe request for potassium chloride    Next Visit Date:  Future Appointments   Date Time Provider Carlos Matta   5/23/2019  1:20 PM Mukesh Baum MD Neuro Spec [id number]   5/30/2019 11:10 AM Rosalia Burkitt, MD [facility] IM [address] Maintenance   Topic Date Due    Colon Cancer Screen FIT/FOBT  02/23/2017    Shingles Vaccine (3 of 3) 09/12/2018    TSH testing  07/30/2019    Flu vaccine (Season Ended) 09/01/2019    Potassium monitoring  10/19/2019    Creatinine monitoring  10/19/2019    Breast cancer screen  06/29/2020    Cervical cancer screen  11/07/2020    Lipid screen  08/18/2021    DTaP/Tdap/Td vaccine (2 - Td) 11/23/2025    Pneumococcal 0-64 years Vaccine  Completed    Hepatitis C screen  Completed    HIV screen  Completed             (applicable per patient's age: Cancer Screenings, Depression Screening, Fall Risk Screening, Immunizations)    Hemoglobin A1C (%)   Date Value   08/18/2016 5.1   05/07/2015 5.0     LDL Cholesterol (mg/dL)   Date Value   08/18/2016 99     AST (U/L)   Date Value   10/15/2018 33 (H)     ALT (U/L)   Date Value   10/15/2018 18     BUN (mg/dL)   Date Value   10/19/2018 9      (goal A1C is < 7)   (goal LDL is <100) need 30-50% reduction from baseline     BP Readings from Last 3 Encounters:   04/01/19 104/64   03/11/19 111/75   02/07/19 108/76    (goal /80)      All Future Testing planned in [facility]:  Lab Frequency Next Occurrence   POCT FECAL IMMUNOCHEMICAL TEST (FIT) Once 02/07/2020            Patient Active Problem List:     Acquired hypothyroidism     Essential hypertension     Seizure disorder ([address]     Urge incontinence     Generalized anxiety disorder     Gastroesophageal reflux disease without esophagitis     H/O: CVA (cerebrovascular accident)     Migraine without aura and without status migrainosus, not intractable     Hammer toe of left foot     Tremor of both hands     Status epilepticus ([address]

## 2019-05-23 ENCOUNTER — OFFICE VISIT (OUTPATIENT)
Dept: NEUROLOGY | Age: 64
End: 2019-05-23
Payer: COMMERCIAL

## 2019-05-23 VITALS
HEART RATE: 82 BPM | HEIGHT: 70 IN | BODY MASS INDEX: 20.1 KG/M2 | SYSTOLIC BLOOD PRESSURE: 106 MMHG | DIASTOLIC BLOOD PRESSURE: 70 MMHG | WEIGHT: 140.4 LBS

## 2019-05-23 DIAGNOSIS — G40.909 SEIZURE DISORDER (HCC): Primary | ICD-10-CM

## 2019-05-23 PROCEDURE — 3017F COLORECTAL CA SCREEN DOC REV: CPT | Performed by: PSYCHIATRY & NEUROLOGY

## 2019-05-23 PROCEDURE — G8427 DOCREV CUR MEDS BY ELIG CLIN: HCPCS | Performed by: PSYCHIATRY & NEUROLOGY

## 2019-05-23 PROCEDURE — G8420 CALC BMI NORM PARAMETERS: HCPCS | Performed by: PSYCHIATRY & NEUROLOGY

## 2019-05-23 PROCEDURE — 99214 OFFICE O/P EST MOD 30 MIN: CPT | Performed by: PSYCHIATRY & NEUROLOGY

## 2019-05-23 PROCEDURE — 1036F TOBACCO NON-USER: CPT | Performed by: PSYCHIATRY & NEUROLOGY

## 2019-05-23 RX ORDER — POTASSIUM CHLORIDE 750 MG/1
TABLET, EXTENDED RELEASE ORAL
Qty: 60 TABLET | Refills: 3 | Status: SHIPPED | OUTPATIENT
Start: 2019-05-23 | End: 2019-06-20 | Stop reason: ALTCHOICE

## 2019-05-23 NOTE — PROGRESS NOTES
Constitutional Weight: absent, Appetite: absent, Fatigue: absent   HEENT Ears: ringing, Eyes: glasses, Visual disturbance: absent   Reespiratory Shortness of breath: absent, Cough: absent   Cardivascular Chest pain: absent, Leg swelling : present   GI Constipation: absent, Diarrhea: absent, Swallowing change: absent    Urinary frequency: absent, Urinary urgency: absent, Urinary incontinence: absent   Musculoskeletal Neck pain: absent, Back pain: absent, Stiffness: absent, Muscle pain: absent, Joint pain: absent   Dermatological Hair loss: absent, Skin changes: absent   Neurological Memory loss: present, Confusion: present, Seizures: present Trouble walking or imbalance: present Dizziness: present, Weakness: absent, Numbness: absent, Tremor: present, Spasm: absent, Speech difficulty: absent, Headache: absent Light sensitivity: absent   Psychiatric Anxiety: absent, Hallucination: absent, Mood disorder: absent,    Hematologic Abnormal bleeding: absent, Anemia: absent, Clotting disorder: absent, Lymph gland changes: absent

## 2019-05-23 NOTE — PROGRESS NOTES
[facility]            [place], [address], [address] [place] [address]             Ph: [phone] or [phone]             FAX: [phone]                   Ashok Du M.D.            Marlo Johnston M.D. Scar Parmar M.D. Collin Keyes M.D. Dear Dr. Waynette Gitelman, MD   The patient comes in today as a follow visit. She has a past medical history of right cortical ischemic stroke and subsequently has a history of seizure disorder. On the last visit she was continued on Keppra 1250 mg twice a day and Vimpat 200 mg twice a day. She reports being compliant with the medications and does not report having any side effects. No seizures. Her last seizure was in August 2018. The patient is living with her sister and nephew. She is independent in ADLs. No cognitive side effects. Patient's long-term EEG monitoring at [facility] for 4 days showed frequent left temporal parietal spike wave discharges but did not have any seizures. Last clinical seizure was in August 2018 and seizure has been under control after increment of Keppra and addition of Vimpat.         Lab Results   Component Value Date    [id number] 79 08/18/2016     No components found for: CHLPL  Lab Results   Component Value Date    TRIG 64 08/18/2016    TRIG 125 05/07/2015    TRIG 111 11/05/2013     Lab Results   Component Value Date    HDL 71 08/18/2016    HDL 79 05/07/2015    HDL 95 11/05/2013     No results found for: LDLCALC  No results found for: LABVLDL  Lab Results   Component Value Date    LABA1C 5.1 08/18/2016     Lab Results   Component Value Date     08/18/2016     No results found for: [id number]   Right MCA stroke 1998   Last seizure 2007, however there is concern of intermittent staring episodes with unknown frequency Possibly occurring once a week or so  2 D echo Sep 2012 normal.  MRA head normal, MRA neck normal  MRI FLAIR March 2013        General examination:    Head: Normocephalic, atraumatic  Eyes: Extraocular movements intact  Lungs: Respirations unlabored, chest wall no deformity  ENT: Normal external ear canals, no sinus tenderness  Heart: Regular rate rhythm  Abdomen: No masses, tenderness  Extremities: No cyanosis or edema, 2+ pulses  Skin: Intact, normal skin color    Neurological examination:    Mental status   Alert and oriented; intact memory with no confusion, speech or language problems; no hallucinations or delusions     Cranial nerves   II - visual fields intact to confrontation                                                III, IV, VI - extra-ocular muscles full: no pupillary defect; no ARSH, no nystagmus, no ptosis                                                                      V - normal facial sensation                                                               VII - normal facial symmetry                                                             VIII - intact hearing                                                                             IX, X - symmetrical palate                                                                  XI - symmetrical shoulder shrug                                                       XII - midline tongue without atrophy or fasciculation     Motor function  Normal muscle bulk and tone; normal power 5/5, including fine motor movements     Sensory function Intact to touch, pin, vibration, proprioception     Cerebellar Intact fine motor movement. No involuntary movements or tremors     Reflex function Intact 2+ DTR and symmetric. Negative Babinski     Gait                  Normal station and gait       Assessment and recommendations:    Right MCA embolic stroke in 6853  Simple partial and complex partial seizure secondary to above    The patient has been neurologically stable.   She denies having any weakness numbness or tingling and no further seizures. Recommend to continue aspirin and Lipitor for secondary stroke prophylaxis. The patient will continue Vimpat 200 mg twice a day and Keppra at 1250 mg twice a day for her seizures. She will follow-up with me in next 1 year. Kanika Bajwa MD I would like to thank you for your consult. Please contact neurology if there remains any further question about the patient care. Scar Beth M.D.           Neurology

## 2019-05-30 ENCOUNTER — HOSPITAL ENCOUNTER (OUTPATIENT)
Age: 64
Setting detail: SPECIMEN
Discharge: HOME | End: 2019-05-30
Payer: COMMERCIAL

## 2019-05-30 ENCOUNTER — OFFICE VISIT (OUTPATIENT)
Dept: INTERNAL MEDICINE | Age: 64
End: 2019-05-30
Payer: COMMERCIAL

## 2019-05-30 VITALS
BODY MASS INDEX: 20.09 KG/M2 | SYSTOLIC BLOOD PRESSURE: 105 MMHG | DIASTOLIC BLOOD PRESSURE: 66 MMHG | HEART RATE: 79 BPM | WEIGHT: 140 LBS

## 2019-05-30 DIAGNOSIS — I10 ESSENTIAL HYPERTENSION: Primary | ICD-10-CM

## 2019-05-30 DIAGNOSIS — N39.41 URGE INCONTINENCE: ICD-10-CM

## 2019-05-30 DIAGNOSIS — G40.909 SEIZURE DISORDER (HCC): ICD-10-CM

## 2019-05-30 DIAGNOSIS — I10 ESSENTIAL HYPERTENSION: ICD-10-CM

## 2019-05-30 DIAGNOSIS — E03.9 ACQUIRED HYPOTHYROIDISM: ICD-10-CM

## 2019-05-30 DIAGNOSIS — K21.9 GASTROESOPHAGEAL REFLUX DISEASE WITHOUT ESOPHAGITIS: ICD-10-CM

## 2019-05-30 DIAGNOSIS — Z86.73 H/O: CVA (CEREBROVASCULAR ACCIDENT): ICD-10-CM

## 2019-05-30 LAB
ANION GAP SERPL CALCULATED.3IONS-SCNC: 14 MMOL/L (ref 9–17)
BUN BLDV-MCNC: 9 MG/DL (ref 8–23)
BUN/CREAT BLD: NORMAL (ref 9–20)
CALCIUM SERPL-MCNC: 9.5 MG/DL (ref 8.6–10.4)
CHLORIDE BLD-SCNC: 103 MMOL/L (ref 98–107)
CO2: 24 MMOL/L (ref 20–31)
CREAT SERPL-MCNC: 0.78 MG/DL (ref 0.5–0.9)
GFR AFRICAN AMERICAN: >60 ML/MIN
GFR NON-AFRICAN AMERICAN: >60 ML/MIN
GFR SERPL CREATININE-BSD FRML MDRD: NORMAL ML/MIN/{1.73_M2}
GFR SERPL CREATININE-BSD FRML MDRD: NORMAL ML/MIN/{1.73_M2}
GLUCOSE BLD-MCNC: 94 MG/DL (ref 70–99)
POTASSIUM SERPL-SCNC: 4.1 MMOL/L (ref 3.7–5.3)
SODIUM BLD-SCNC: 141 MMOL/L (ref 135–144)
TSH SERPL DL<=0.05 MIU/L-ACNC: 2.64 MIU/L (ref 0.3–5)

## 2019-05-30 PROCEDURE — 99211 OFF/OP EST MAY X REQ PHY/QHP: CPT | Performed by: INTERNAL MEDICINE

## 2019-05-30 PROCEDURE — G8427 DOCREV CUR MEDS BY ELIG CLIN: HCPCS | Performed by: STUDENT IN AN ORGANIZED HEALTH CARE EDUCATION/TRAINING PROGRAM

## 2019-05-30 PROCEDURE — 84443 ASSAY THYROID STIM HORMONE: CPT

## 2019-05-30 PROCEDURE — 1036F TOBACCO NON-USER: CPT | Performed by: STUDENT IN AN ORGANIZED HEALTH CARE EDUCATION/TRAINING PROGRAM

## 2019-05-30 PROCEDURE — 80048 BASIC METABOLIC PNL TOTAL CA: CPT

## 2019-05-30 PROCEDURE — 36415 COLL VENOUS BLD VENIPUNCTURE: CPT

## 2019-05-30 PROCEDURE — G8420 CALC BMI NORM PARAMETERS: HCPCS | Performed by: STUDENT IN AN ORGANIZED HEALTH CARE EDUCATION/TRAINING PROGRAM

## 2019-05-30 PROCEDURE — 99213 OFFICE O/P EST LOW 20 MIN: CPT | Performed by: STUDENT IN AN ORGANIZED HEALTH CARE EDUCATION/TRAINING PROGRAM

## 2019-05-30 PROCEDURE — 3017F COLORECTAL CA SCREEN DOC REV: CPT | Performed by: STUDENT IN AN ORGANIZED HEALTH CARE EDUCATION/TRAINING PROGRAM

## 2019-05-30 NOTE — PROGRESS NOTES
Attending Physician Statement  I have discussed the care of Gloria Neil, including pertinent history and exam findings with the resident. I have reviewed the key elements of all parts of the encounter with the resident. I agree with the assessment, and status of the problem list as documented. Diagnosis Orders   1. Essential hypertension  Basic Metabolic Panel   2. H/O: CVA (cerebrovascular accident)     3. Acquired hypothyroidism  TSH With Reflex Ft4   4. Gastroesophageal reflux disease without esophagitis     5. Seizure disorder ([address]     6. Urge incontinence        The plan and orders should include   Orders Placed This Encounter   Procedures    Basic Metabolic Panel    TSH With Reflex Ft4    and this was also documented by the resident. The medication list was reviewed with the resident and is up to date. The return visit should be in 1 month to follow up BP and redo BMP at that time to reevaluate KCl supplements .     Aliyah Sanchez MD, [address] [place] [address] Internal Medicine Associate & [facility]  Associate  Department of Internal Medicine  Internal Medicine Clerkship - Saurabh Martinez  5/30/2019, 11:56 AM

## 2019-05-30 NOTE — PROGRESS NOTES
[facility]  [facility] IM [address] [facility]  [address] [facility] [address]  Dept: [phone]  Dept Fax: [phone]    Office Progress/Follow Up Note  Date of patient's visit: 5/30/2019  Patient's Name:  Myra Villanueva YOB: 1955            Patient Care Team:  Donnie Rice MD as PCP - General (Internal Medicine)  Onofre Sheriff MD as PCP - [facility] Attributed Provider    REASON FOR VISIT: Routine outpatient follow up    HISTORY OF PRESENT ILLNESS:      Chief Complaint   Patient presents with    Hypertension       History was obtained from the patient. Myra Villanueva is a 59 y.o. is here for a follow up. Patient has past medical history of Essential hypertension and takes Norvasc 10 mg QD and hydrochlorothiazide 18.3 mg QD and systolic blood pressure running in 100's . Patient has history of seizures after stroke and takes Keppra and vimpat and follow with neurologist regularly. Patient has history of hypothyroidism and takes Synthroid 50 mcg QD.  left foot callus formation and she's been following with podiatry, patient received shoe insert and its helping her.        Patient Active Problem List   Diagnosis    Acquired hypothyroidism    Essential hypertension    Seizure disorder (HCC)    Urge incontinence    Generalized anxiety disorder    Gastroesophageal reflux disease without esophagitis    H/O: CVA (cerebrovascular accident)    Migraine without aura and without status migrainosus, not intractable    Hammer toe of left foot    Tremor of both hands    Status epilepticus ([facility] [address] [facility]         Health Maintenance Due   Topic Date Due    Colon Cancer Screen FIT/FOBT  02/23/2017    Shingles Vaccine (3 of 3) 09/12/2018         No Known Allergies      MEDICATIONS:      Current Outpatient Medications   Medication Sig Dispense Refill    potassium chloride (KLOR-CON M) 10 MEQ extended release tablet TAKE TWO TABLETS BY MOUTH EVERY DAY 60 tablet 3    levETIRAcetam (KEPPRA) 250 MG tablet TAKE 1 TABLET BY MOUTH 2 TIMES A DAY -- TAKE WITH 1000 MILLIGRAMS TWICE A  tablet 0    levETIRAcetam (KEPPRA) 1000 MG tablet TAKE 1 TABLET BY MOUTH 2 TIMES A DAY -- TAKE WITH 250 MILLIGRAM TABLETS TWICE A  tablet 0    oxybutynin (DITROPAN) 5 MG tablet TAKE 1 TABLET BY MOUTH THREE TIMES DAILY 270 tablet 1    amLODIPine (NORVASC) 10 MG tablet TAKE 1 TABLET BY MOUTH ONCE DAILY 90 tablet 1    aspirin EC 81 MG EC tablet Take 1 tablet by mouth daily 90 tablet 1    atorvastatin (LIPITOR) 40 MG tablet Take 1 tablet by mouth daily 90 tablet 1    levothyroxine (SYNTHROID) 50 MCG tablet TAKE 1 TABLET BY MOUTH ONCE DAILY 90 tablet 1    omeprazole (PRILOSEC) 20 MG delayed release capsule TAKE 1 CAPSULE BY MOUTH ONCE DAILY 90 capsule 1    PARoxetine (PAXIL) 30 MG tablet Take 1 tablet by mouth every morning 90 tablet 1    SUMAtriptan (IMITREX) 100 MG tablet Take 1 tablet by mouth daily as needed for Migraine 10 tablet 2    zoster recombinant adjuvanted vaccine (SHINGRIX) 50 MCG SUSR injection 50 MCG IM then repeat 2-6 months. 0.5 mL 1    lacosamide (VIMPAT) 100 MG TABS tablet Take 1 tablet by mouth 2 times daily for 90 days. . 180 tablet 0    lacosamide (VIMPAT) 200 MG tablet Take 1 tablet by mouth 2 times daily for 90 days. . 180 tablet 2     No current facility-administered medications for this visit. SOCIAL HISTORY    Reviewed and no change from previous record. Delores Phoenix  reports that she has never smoked.  She has never used smokeless tobacco.    FAMILY HISTORY:    Reviewed and No change from previous visit    REVIEW OF SYSTEMS:    CONSTITUTIONAL: Denies: fever, chills  PSYCH: Denies: anxiety, depression  ALLERGIES: Denies: urticaria  EYES: Denies: blurry vision, decreased vision, photophobia  ENT: Denies: sore throat, nasal congestion  CARDIOVASCULAR: Denies: chest pain, dyspnea on exertion  RESPIRATORY: Denies: cough, hemoptysis, shortness of breath  GI: Denies: Denies: abdominal pain, flank pain  : Denies: Denies: dysuria, frequency/urgency  NEURO: Denies: dizzy/vertigo, headache  MUSCULOSKELETAL: Denies: back pain, joint pain  SKIN: Denies: rash, itching    PHYSICAL EXAM:      Vitals:    05/30/19 1120   BP: 105/66   Site: Left Upper Arm   Position: Sitting   Cuff Size: Medium Adult   Pulse: 79   Weight: 140 lb (63.5 kg)     BP Readings from Last 3 Encounters:   05/30/19 105/66   05/23/19 106/70   04/01/19 104/64      General appearance - alert, well appearing, and in no distress  Chest - clear to auscultation, no wheezes, rales or rhonchi, symmetric air entry  Heart - normal rate, regular rhythm, normal S1, S2, no murmurs, rubs, clicks or gallops  Abdomen - soft, nontender, nondistended, no masses or organomegaly  Neurological - alert, oriented, normal speech, no focal findings or movement disorder noted  Musculoskeletal - no joint tenderness, deformity or swelling  Extremities - peripheral pulses normal, no pedal edema, no clubbing or cyanosis  Skin - normal coloration and turgor, no rashes, no suspicious skin lesions noted    LABORATORY FINDINGS:    CBC:  Lab Results   Component Value Date    WBC 4.5 10/19/2018    HGB 12.3 10/19/2018     10/19/2018     10/04/2011       BMP:    Lab Results   Component Value Date     10/19/2018    K 4.0 10/19/2018     10/19/2018    CO2 29 10/19/2018    BUN 9 10/19/2018    CREATININE 0.67 10/19/2018    GLUCOSE 82 10/19/2018    GLUCOSE 68 03/27/2012       HEMOGLOBIN A1C:   Lab Results   Component Value Date    LABA1C 5.1 08/18/2016       FASTING LIPID PANEL:  Lab Results   Component Value Date    CHOL 183 08/18/2016    HDL 71 08/18/2016    TRIG 64 08/18/2016       ASSESSMENT AND PLAN:    Yajaira Hernandez was seen today for hypertension. Diagnoses and all orders for this visit:    Essential hypertension  -     Basic Metabolic Panel;  Future    H/O: CVA (cerebrovascular accident)    Acquired hypothyroidism  -     TSH With Reflex Ft4; Future    Gastroesophageal reflux disease without esophagitis    Seizure disorder (HCC)    Urge incontinence       Foot callus: improved. Essential hypertension. SBP in 100's. Discontinue hydrochlorothiazide. -     amLODIPine (NORVASC) 10 MG tablet; TAKE 1 TABLET BY MOUTH ONCE DAILY     Acquired hypothyroidism: TSH 1.56 on 7-30-18. Repeat TSH  -     levothyroxine (SYNTHROID) 50 MCG tablet; TAKE 1 TABLET BY MOUTH ONCE DAILY     H/O: CVA (cerebrovascular accident)  -   aspirin and atorvastatin (LIPITOR) 40 MG tablet; Take 1 tablet by mouth daily     Status epilepticus ([address] Patient follows with neurologist regularly. Patient take Keppra and vimpat. Gastroesophageal reflux disease, esophagitis presence not specified  -     omeprazole (PRILOSEC) 20 MG delayed release capsule; TAKE 1 CAPSULE BY MOUTH ONCE DAILY     Hypokalemia. Repeat  BMP   -     potassium chloride (KLOR-CON M) 10 MEQ extended release tablet; Take 2 tablets by mouth daily     Other orders  -     PARoxetine (PAXIL) 30 MG tablet; Take 1 tablet by mouth every morning  -     SUMAtriptan (IMITREX) 100 MG tablet; Take 1 tablet by mouth daily as needed for Migraine       FOLLOW UP AND INSTRUCTIONS:   Return in about 1 month (around 6/30/2019). · Mitch He received counseling on the following healthy behaviors: nutrition, exercise and medication adherence    · Discussed use, benefit, and side effects of prescribed medications. Barriers to medication compliance addressed. All patient questions answered. Pt voiced understanding.      · Patient given educational materials - see patient instructions    Darrian Sánchez MD  Internal Medicine Resident  [facility]  5/30/2019, 11:58 AM

## 2019-05-30 NOTE — PROGRESS NOTES
HYPERTENSION visit     BP Readings from Last 3 Encounters:   05/23/19 106/70   04/01/19 104/64   03/11/19 111/75       LDL Cholesterol (mg/dL)   Date Value   08/18/2016 99     HDL (mg/dL)   Date Value   08/18/2016 71     BUN (mg/dL)   Date Value   10/19/2018 9     CREATININE (mg/dL)   Date Value   10/19/2018 0.67     Glucose (mg/dL)   Date Value   10/19/2018 82   03/27/2012 68 (L)              Have you changed or started any medications since your last visit including any over-the-counter medicines, vitamins, or herbal medicines? no   Have you stopped taking any of your medications? Is so, why? -  no  Are you having any side effects from any of your medications? - no  How often do you miss doses of your medication? rare      Have you seen any other physician or provider since your last visit? yes    Have you had any other diagnostic tests since your last visit? yes    Have you been seen in the emergency room and/or had an admission in a hospital since we last saw you?  no   Have you had your routine dental cleaning in the past 6 months?  no     Do you have an active [employer] account? If no, what is the barrier?   Yes    Patient Care Team:  Yimi Ahmadi MD as PCP - General (Internal Medicine)  Shaheed Thornton MD as PCP - [facility] Attributed Provider    Medical History Review  Past Medical, Family, and Social History reviewed and does contribute to the patient presenting condition    Health Maintenance   Topic Date Due    Colon Cancer Screen FIT/FOBT  02/23/2017    Shingles Vaccine (3 of 3) 09/12/2018    TSH testing  07/30/2019    Flu vaccine (Season Ended) 09/01/2019    Potassium monitoring  10/19/2019    Creatinine monitoring  10/19/2019    Breast cancer screen  06/29/2020    Cervical cancer screen  11/07/2020    Lipid screen  08/18/2021    DTaP/Tdap/Td vaccine (2 - Td) 11/23/2025    Pneumococcal 0-64 years Vaccine  Completed    Hepatitis C screen  Completed    HIV screen  Completed

## 2019-06-20 ENCOUNTER — OFFICE VISIT (OUTPATIENT)
Dept: INTERNAL MEDICINE | Age: 64
End: 2019-06-20
Payer: COMMERCIAL

## 2019-06-20 VITALS
SYSTOLIC BLOOD PRESSURE: 106 MMHG | BODY MASS INDEX: 19.81 KG/M2 | DIASTOLIC BLOOD PRESSURE: 73 MMHG | HEIGHT: 70 IN | WEIGHT: 138.4 LBS | HEART RATE: 78 BPM

## 2019-06-20 DIAGNOSIS — G40.909 SEIZURE DISORDER (HCC): ICD-10-CM

## 2019-06-20 DIAGNOSIS — Z86.73 H/O: CVA (CEREBROVASCULAR ACCIDENT): ICD-10-CM

## 2019-06-20 DIAGNOSIS — I10 ESSENTIAL HYPERTENSION: Primary | ICD-10-CM

## 2019-06-20 DIAGNOSIS — E03.9 ACQUIRED HYPOTHYROIDISM: ICD-10-CM

## 2019-06-20 DIAGNOSIS — G43.009 MIGRAINE WITHOUT AURA AND WITHOUT STATUS MIGRAINOSUS, NOT INTRACTABLE: ICD-10-CM

## 2019-06-20 DIAGNOSIS — Z23 NEED FOR PROPHYLACTIC VACCINATION AND INOCULATION AGAINST VARICELLA: ICD-10-CM

## 2019-06-20 DIAGNOSIS — F41.1 GENERALIZED ANXIETY DISORDER: ICD-10-CM

## 2019-06-20 PROCEDURE — G8427 DOCREV CUR MEDS BY ELIG CLIN: HCPCS | Performed by: STUDENT IN AN ORGANIZED HEALTH CARE EDUCATION/TRAINING PROGRAM

## 2019-06-20 PROCEDURE — 1036F TOBACCO NON-USER: CPT | Performed by: STUDENT IN AN ORGANIZED HEALTH CARE EDUCATION/TRAINING PROGRAM

## 2019-06-20 PROCEDURE — 99211 OFF/OP EST MAY X REQ PHY/QHP: CPT | Performed by: INTERNAL MEDICINE

## 2019-06-20 PROCEDURE — 3017F COLORECTAL CA SCREEN DOC REV: CPT | Performed by: STUDENT IN AN ORGANIZED HEALTH CARE EDUCATION/TRAINING PROGRAM

## 2019-06-20 PROCEDURE — 99213 OFFICE O/P EST LOW 20 MIN: CPT | Performed by: STUDENT IN AN ORGANIZED HEALTH CARE EDUCATION/TRAINING PROGRAM

## 2019-06-20 PROCEDURE — G8420 CALC BMI NORM PARAMETERS: HCPCS | Performed by: STUDENT IN AN ORGANIZED HEALTH CARE EDUCATION/TRAINING PROGRAM

## 2019-06-20 RX ORDER — LEVOTHYROXINE SODIUM 0.05 MG/1
TABLET ORAL
Qty: 90 TABLET | Refills: 1 | Status: SHIPPED | OUTPATIENT
Start: 2019-06-20 | End: 2019-11-12 | Stop reason: SDUPTHER

## 2019-06-20 RX ORDER — AMLODIPINE BESYLATE 10 MG/1
TABLET ORAL
Qty: 90 TABLET | Refills: 1 | Status: SHIPPED | OUTPATIENT
Start: 2019-06-20 | End: 2019-11-12 | Stop reason: SDUPTHER

## 2019-06-20 RX ORDER — SUMATRIPTAN 100 MG/1
100 TABLET, FILM COATED ORAL DAILY PRN
Qty: 10 TABLET | Refills: 2 | Status: SHIPPED | OUTPATIENT
Start: 2019-06-20 | End: 2019-08-06 | Stop reason: SDUPTHER

## 2019-06-20 RX ORDER — ATORVASTATIN CALCIUM 40 MG/1
40 TABLET, FILM COATED ORAL DAILY
Qty: 90 TABLET | Refills: 1 | Status: SHIPPED | OUTPATIENT
Start: 2019-06-20 | End: 2019-11-12 | Stop reason: SDUPTHER

## 2019-06-20 NOTE — PATIENT INSTRUCTIONS
Printed script for Shingles signed and given to pt. Return To Clinic 9/24/2019. After Visit Summary  given and reviewed. --MA    It is very important for your care that you keep your appointment. If for some reason you are unable to keep your appointment it is equally important that you call our office at [phone] to cancel your appointment and reschedule. Failure to do so may result in your termination from our practice.

## 2019-06-20 NOTE — PROGRESS NOTES
HYPERTENSION visit     BP Readings from Last 3 Encounters:   05/30/19 105/66   05/23/19 106/70   04/01/19 104/64       LDL Cholesterol (mg/dL)   Date Value   08/18/2016 99     HDL (mg/dL)   Date Value   08/18/2016 71     BUN (mg/dL)   Date Value   05/30/2019 9     CREATININE (mg/dL)   Date Value   05/30/2019 0.78     Glucose (mg/dL)   Date Value   05/30/2019 94   03/27/2012 68 (L)              Have you changed or started any medications since your last visit including any over-the-counter medicines, vitamins, or herbal medicines? no   Have you stopped taking any of your medications? Is so, why? -  no  Are you having any side effects from any of your medications? - no  How often do you miss doses of your medication? rare      Have you seen any other physician or provider since your last visit?  no   Have you had any other diagnostic tests since your last visit? yes - Labs   Have you been seen in the emergency room and/or had an admission in a hospital since we last saw you?  no   Have you had your routine dental cleaning in the past 6 months?  no     Do you have an active MyChart account? If no, what is the barrier?   Yes    Patient Care Team:  Cherylene Jersey, MD as PCP - General (Internal Medicine)  Avtar Pabon MD as PCP - [facility] Provider    Medical History Review  Past Medical, Family, and Social History reviewed and does not contribute to the patient presenting condition    Health Maintenance   Topic Date Due    Colon Cancer Screen FIT/FOBT  02/23/2017    Shingles Vaccine (3 of 3) 09/12/2018    Flu vaccine (Season Ended) 09/01/2019    TSH testing  05/30/2020    Potassium monitoring  05/30/2020    Creatinine monitoring  05/30/2020    Breast cancer screen  06/29/2020    Cervical cancer screen  11/07/2020    Lipid screen  08/18/2021    DTaP/Tdap/Td vaccine (2 - Td) 11/23/2025    Pneumococcal 0-64 years Vaccine  Completed    Hepatitis C screen  Completed    HIV screen  Completed

## 2019-06-20 NOTE — PROGRESS NOTES
Patient here for follow-up on hypertension. Last time hydrochlorthiazide was stopped because of low blood pressure. She is on amlodipine for hypertension. She is on potassium supplements which will be stopped as she is no longer on a diuretic. Rest of meds same  Attending Physician Statement  I have discussed the care of Shanna Livers, including pertinent history and exam findings,  with the resident. I have reviewed the key elements of all parts of the encounter with the resident. I agree with the assessment, plan and orders as documented by the resident.   (GE Modifier)

## 2019-06-20 NOTE — PROGRESS NOTES
[facility]  [facility] IM [address] [address]  Dept: [phone]  Dept Fax: [phone]    Office Progress/Follow Up Note  Date of patient's visit: 6/20/2019  Patient's Name:  Tabitha Hernandez YOB: 1955            Patient Care Team:  Soo Will MD as PCP - General (Internal Medicine)  Blanca Cota MD as PCP - [facility] Empaneled Provider    REASON FOR VISIT: Routine outpatient follow up    HISTORY OF PRESENT ILLNESS:      Chief Complaint   Patient presents with    Hypertension    1 Month Follow-Up    Health Maintenance     Shingles       History was obtained from the patient. Tabitha Hernandez is a 59 y.o. is here for a follow-up. Patient has a history of essential hypertension and was taking Norvasc 10 mg and hydrochlorothiazide 12.5 mg daily. Patient systolic blood pressure was running at 100, on last visit HCTZ was discontinued and patient was asked to follow-up in 1 month. Systolic blood pressure in 100s. We will continue to hold hydrochlorothiazide. Repeat BMP showed potassium 4.1. Patient also take potassium tablets. Patient has history of seizures after stroke and takes Keppra and vimpat and follow with neurologist. Patient has history of hypothyroidism and takes Synthroid 50 mcg QD. Patient has history of stroke and take aspirin and Lipitor. Patient has history of migraine and take Imitrex as needed.         Patient Active Problem List   Diagnosis    Acquired hypothyroidism    Essential hypertension    Seizure disorder (HCC)    Urge incontinence    Generalized anxiety disorder    Gastroesophageal reflux disease without esophagitis    H/O: CVA (cerebrovascular accident)    Migraine without aura and without status migrainosus, not intractable    Hammer toe of left foot    Tremor of both hands    Status epilepticus [facility])         Health Maintenance Due   Topic Date Due    Colon Cancer Screen FIT/FOBT  02/23/2017    Shingles Vaccine (3 of 3) 09/12/2018         No Known Allergies      MEDICATIONS:      Current Outpatient Medications   Medication Sig Dispense Refill    zoster recombinant adjuvanted vaccine (SHINGRIX) 50 MCG/0.5ML SUSR injection Inject 0.5 mLs into the muscle once for 1 dose 50 MCG IM then repeat 2-6 months. 1 each 1    SUMAtriptan (IMITREX) 100 MG tablet Take 1 tablet by mouth daily as needed for Migraine 10 tablet 2    levETIRAcetam (KEPPRA) 250 MG tablet TAKE 1 TABLET BY MOUTH 2 TIMES A DAY -- TAKE WITH 1000 MILLIGRAMS TWICE A  tablet 0    levETIRAcetam (KEPPRA) 1000 MG tablet TAKE 1 TABLET BY MOUTH 2 TIMES A DAY -- TAKE WITH 250 MILLIGRAM TABLETS TWICE A  tablet 0    lacosamide (VIMPAT) 100 MG TABS tablet Take 1 tablet by mouth 2 times daily for 90 days. . 180 tablet 0    oxybutynin (DITROPAN) 5 MG tablet TAKE 1 TABLET BY MOUTH THREE TIMES DAILY 270 tablet 1    amLODIPine (NORVASC) 10 MG tablet TAKE 1 TABLET BY MOUTH ONCE DAILY 90 tablet 1    aspirin EC 81 MG EC tablet Take 1 tablet by mouth daily 90 tablet 1    atorvastatin (LIPITOR) 40 MG tablet Take 1 tablet by mouth daily 90 tablet 1    levothyroxine (SYNTHROID) 50 MCG tablet TAKE 1 TABLET BY MOUTH ONCE DAILY 90 tablet 1    omeprazole (PRILOSEC) 20 MG delayed release capsule TAKE 1 CAPSULE BY MOUTH ONCE DAILY 90 capsule 1    PARoxetine (PAXIL) 30 MG tablet Take 1 tablet by mouth every morning 90 tablet 1    lacosamide (VIMPAT) 200 MG tablet Take 1 tablet by mouth 2 times daily for 90 days. . 180 tablet 2    zoster recombinant adjuvanted vaccine (SHINGRIX) 50 MCG SUSR injection 50 MCG IM then repeat 2-6 months. 0.5 mL 1     No current facility-administered medications for this visit. SOCIAL HISTORY    Reviewed and no change from previous record. Dena Gabriel  reports that she has never smoked.  She has never used smokeless tobacco.    FAMILY HISTORY:    Reviewed and No change from previous visit    REVIEW OF SYSTEMS:    CONSTITUTIONAL: 08/18/2016       ASSESSMENT AND PLAN:    Grace Malagon was seen today for hypertension, 1 month follow-up and health maintenance. Diagnoses and all orders for this visit:    Need for prophylactic vaccination and inoculation against varicella  -     zoster recombinant adjuvanted vaccine [facility]) 50 MCG/0.5ML SUSR injection; Inject 0.5 mLs into the muscle once for 1 dose 50 MCG IM then repeat 2-6 months. Migraine without aura and without status migrainosus, not intractable  -     SUMAtriptan (IMITREX) 100 MG tablet; Take 1 tablet by mouth daily as needed for Migraine    Essential hypertension. SBP in 100's. -     amLODIPine (NORVASC) 10 MG tablet; TAKE 1 TABLET BY MOUTH ONCE DAILY  Discontinue the potassium tablet as hydrochlorothiazide is discontinued. Will monitor BMP.     Acquired hypothyroidism: TSH 2.64  -     levothyroxine (SYNTHROID) 50 MCG tablet; TAKE 1 TABLET BY MOUTH ONCE DAILY     H/O: CVA (cerebrovascular accident)  -   aspirin and atorvastatin (LIPITOR) 40 MG tablet; Take 1 tablet by mouth daily     Status epilepticus ([address]   Patient follows with neurologist regularly.  Patient take Keppra and vimpat.     Gastroesophageal reflux disease, esophagitis presence not specified  -     omeprazole (PRILOSEC) 20 MG delayed release capsule; TAKE 1 CAPSULE BY MOUTH ONCE DAILY         FOLLOW UP AND INSTRUCTIONS:   Return in about 3 months (around 9/20/2019). · Grace Malagon received counseling on the following healthy behaviors: nutrition, exercise and medication adherence    · Discussed use, benefit, and side effects of prescribed medications. Barriers to medication compliance addressed. All patient questions answered. Pt voiced understanding.      · Patient given educational materials - see patient instructions    Antonio Mcmahon MD  Internal Medicine Resident  [facility]  6/20/2019, 9:23 AM

## 2019-07-18 DIAGNOSIS — G40.909 SEIZURE DISORDER (HCC): ICD-10-CM

## 2019-07-22 DIAGNOSIS — I10 ESSENTIAL HYPERTENSION: Chronic | ICD-10-CM

## 2019-07-22 DIAGNOSIS — Z86.73 H/O: STROKE: ICD-10-CM

## 2019-07-22 DIAGNOSIS — K21.9 GASTROESOPHAGEAL REFLUX DISEASE, ESOPHAGITIS PRESENCE NOT SPECIFIED: ICD-10-CM

## 2019-07-22 DIAGNOSIS — N39.41 URGE INCONTINENCE: ICD-10-CM

## 2019-07-22 RX ORDER — LACOSAMIDE 100 MG/1
TABLET, FILM COATED ORAL
Qty: 180 TABLET | Refills: 0 | Status: ON HOLD | OUTPATIENT
Start: 2019-07-22 | End: 2020-06-23 | Stop reason: HOSPADM

## 2019-07-22 RX ORDER — LEVETIRACETAM 250 MG/1
TABLET ORAL
Qty: 180 TABLET | Refills: 2 | Status: SHIPPED | OUTPATIENT
Start: 2019-07-22 | End: 2020-03-30

## 2019-07-22 RX ORDER — LEVETIRACETAM 1000 MG/1
TABLET ORAL
Qty: 180 TABLET | Refills: 2 | Status: SHIPPED | OUTPATIENT
Start: 2019-07-22 | End: 2020-03-30

## 2019-07-22 NOTE — TELEPHONE ENCOUNTER
Faxed request request for medications. Pt has future appt.        Health Maintenance   Topic Date Due    Colon Cancer Screen FIT/FOBT  02/23/2017    Shingles Vaccine (3 of 3) 09/12/2018    Flu vaccine (1) 09/01/2019    TSH testing  05/30/2020    Potassium monitoring  05/30/2020    Creatinine monitoring  05/30/2020    Breast cancer screen  06/29/2020    Cervical cancer screen  11/07/2020    Lipid screen  08/18/2021    DTaP/Tdap/Td vaccine (2 - Td) 11/23/2025    Pneumococcal 0-64 years Vaccine  Completed    Hepatitis C screen  Completed    HIV screen  Completed             (applicable per patient's age: Cancer Screenings, Depression Screening, Fall Risk Screening, Immunizations)    Hemoglobin A1C (%)   Date Value   08/18/2016 5.1   05/07/2015 5.0     LDL Cholesterol (mg/dL)   Date Value   08/18/2016 99     AST (U/L)   Date Value   10/15/2018 33 (H)     ALT (U/L)   Date Value   10/15/2018 18     BUN (mg/dL)   Date Value   05/30/2019 9      (goal A1C is < 7)   (goal LDL is <100) need 30-50% reduction from baseline     BP Readings from Last 3 Encounters:   06/20/19 106/73   05/30/19 105/66   05/23/19 106/70    (goal /80)      All Future Testing planned in [facility]:  Lab Frequency Next Occurrence   POCT FECAL IMMUNOCHEMICAL TEST (FIT) Once 02/07/2020       Next Visit Date:  Future Appointments   Date Time Provider Carlos Sigrid   9[facility]24/2019  1:25 [facility]   5/25/2020  1:00 PM Xin Foote MD Neuro Spec [facility]            Patient Active Problem List:     Acquired hypothyroidism     Essential hypertension     Seizure disorder ([facility] [address] [facility]     Urge incontinence     Generalized anxiety disorder     Gastroesophageal reflux disease without esophagitis     H/O: CVA (cerebrovascular accident)     Migraine without aura and without status migrainosus, not intractable     Hammer toe of left foot     Tremor of both hands     Status epilepticus ([address]

## 2019-07-25 RX ORDER — OMEPRAZOLE 20 MG/1
CAPSULE, DELAYED RELEASE ORAL
Qty: 90 CAPSULE | Refills: 1 | Status: SHIPPED | OUTPATIENT
Start: 2019-07-25 | End: 2020-01-08

## 2019-07-25 RX ORDER — ASPIRIN 81 MG/1
81 TABLET ORAL DAILY
Qty: 90 TABLET | Refills: 1 | Status: SHIPPED | OUTPATIENT
Start: 2019-07-25 | End: 2020-01-08

## 2019-07-25 RX ORDER — OXYBUTYNIN CHLORIDE 5 MG/1
TABLET ORAL
Qty: 270 TABLET | Refills: 1 | Status: SHIPPED | OUTPATIENT
Start: 2019-07-25 | End: 2020-01-08

## 2019-07-25 RX ORDER — PAROXETINE 30 MG/1
30 TABLET, FILM COATED ORAL EVERY MORNING
Qty: 90 TABLET | Refills: 1 | Status: SHIPPED | OUTPATIENT
Start: 2019-07-25 | End: 2020-01-08

## 2019-07-25 RX ORDER — HYDROCHLOROTHIAZIDE 12.5 MG/1
12.5 TABLET ORAL DAILY
Qty: 90 TABLET | Refills: 1 | Status: SHIPPED | OUTPATIENT
Start: 2019-07-25 | End: 2020-01-08

## 2019-08-01 DIAGNOSIS — G43.009 MIGRAINE WITHOUT AURA AND WITHOUT STATUS MIGRAINOSUS, NOT INTRACTABLE: ICD-10-CM

## 2019-08-06 RX ORDER — SUMATRIPTAN 100 MG/1
100 TABLET, FILM COATED ORAL DAILY PRN
Qty: 10 TABLET | Refills: 2 | Status: SHIPPED | OUTPATIENT
Start: 2019-08-06 | End: 2019-09-16 | Stop reason: SDUPTHER

## 2019-09-01 NOTE — DISCHARGE SUMMARY
Neurology Discharge Summary     Patient Identification:  Tee Collins is a 61 y.o. female. :  1955  Admit Date:  10/15/2018  Discharge date and time: 10/19/18  Attending Provider: Dr. Emilia Mejia Number: [phone]]                                   Admission Diagnoses:   Seizure disorder [facility]) [U36.678]    Discharge Diagnoses: Active Problems:    Seizure disorder [facility])  Resolved Problems:    * No resolved hospital problems. *      Discharge Medications:    Current Discharge Medication List           Details   amLODIPine (NORVASC) 10 MG tablet TAKE 1 TABLET BY MOUTH ONCE DAILY  Qty: 30 tablet, Refills: 2    Comments: Please consider 90 day supplies to promote better adherence  Associated Diagnoses: Essential hypertension      !! levETIRAcetam (KEPPRA) 1000 MG tablet Take 1 tablet by mouth 2 times daily Takes along with 250 mg bid. Qty: 60 tablet, Refills: 5      !! levETIRAcetam (KEPPRA) 250 MG tablet Take 1 tablet by mouth 2 times daily Takes along with 1000 mg bid  Qty: 60 tablet, Refills: 5      omeprazole (PRILOSEC) 20 MG delayed release capsule TAKE 1 CAPSULE BY MOUTH ONCE DAILY  Qty: 30 capsule, Refills: 2    Comments: Please consider 90 day supplies to promote better adherence  Associated Diagnoses: Gastroesophageal reflux disease, esophagitis presence not specified      VIMPAT 100 MG TABS tablet TAKE 1 TABLET BY MOUTH TWICE DAILY  Qty: 60 tablet, Refills: 3    Associated Diagnoses: Status epilepticus ([address]  Seizure disorder ([facility] [address]      potassium chloride (KLOR-CON M) 10 MEQ extended release tablet Take 2 tablets by mouth daily  Qty: 60 tablet, Refills: 3    Associated Diagnoses: Hypokalemia      PARoxetine (PAXIL) 30 MG tablet Take 1 tablet by mouth daily  Qty: 30 tablet, Refills: 2      hydrochlorothiazide (HYDRODIURIL) 12.5 MG tablet Take 1 tablet by mouth daily  Qty: 30 tablet, Refills: 2    Associated Diagnoses: Essential hypertension      atorvastatin (LIPITOR) 40 MG tablet Take 1 tablet by mouth daily  Qty: 30 tablet, Refills: 2    Associated Diagnoses: H/O: CVA (cerebrovascular accident)      levothyroxine (SYNTHROID) 50 MCG tablet Take 1 tablet by mouth daily  Qty: 30 tablet, Refills: 2    Associated Diagnoses: Acquired hypothyroidism      zoster recombinant adjuvanted vaccine (SHINGRIX) 50 MCG SUSR injection 50 MCG IM then repeat 2-6 months. Qty: 0.5 mL, Refills: 1    Associated Diagnoses: Need for prophylactic vaccination and inoculation against varicella      oxybutynin (DITROPAN) 5 MG tablet TAKE ONE TABLET BY MOUTH THREE TIMES DAILY  Qty: 90 tablet, Refills: 2    Comments: Please consider 90 day supplies to promote better adherence  Associated Diagnoses: Urge incontinence      SUMAtriptan (IMITREX) 100 MG tablet Take 1 tablet by mouth daily as needed for Migraine  Qty: 10 tablet, Refills: 2      aspirin EC 81 MG EC tablet Take 1 tablet by mouth daily  Qty: 30 tablet, Refills: 2    Associated Diagnoses: H/O: stroke       !! - Potential duplicate medications found. Please discuss with provider. Current Discharge Medication List            Consults:   none    Hospital Course: This is a 59-year-old right-handed female ,with past history of hypertension, generalized anxiety disorder, hypothyroidism, generalized tonic-clonic seizure, who  presents for LTME. The patient's seizures are described as generalized body shaking, eye rolling, tongue biting, loss of bowel/bladder bladder control lasting  up to 5 minutes. After the seizure events, the patient experiences post-ictal  confusion. She was admitted for LTME  monitoring and  restarted on her home antiepileptic medications. Patient had LTME for 4 days and no epileptiform activities were picked up on the monitor. No seizure events occurred during hospitalization. The patient is stable to be discharged home. She  was instructed to continue  her antiepileptic medications and follow up with outpatient neurology.      Significant Neuro

## 2019-09-13 DIAGNOSIS — E87.6 HYPOKALEMIA: ICD-10-CM

## 2019-09-16 DIAGNOSIS — G43.009 MIGRAINE WITHOUT AURA AND WITHOUT STATUS MIGRAINOSUS, NOT INTRACTABLE: ICD-10-CM

## 2019-09-16 NOTE — TELEPHONE ENCOUNTER
Refill request for Imitrex. If appropriate please send medication(s) to patients pharmacy.     Next appt: 09/24/2019    Health Maintenance   Topic Date Due    Colon Cancer Screen FIT/FOBT  02/23/2017    Shingles Vaccine (3 of 3) 09/12/2018    Flu vaccine (1) 09/01/2019    TSH testing  05/30/2020    Potassium monitoring  05/30/2020    Creatinine monitoring  05/30/2020    Breast cancer screen  06/29/2020    Cervical cancer screen  11/07/2020    Lipid screen  08/18/2021    DTaP/Tdap/Td vaccine (2 - Td) 11/23/2025    Pneumococcal 0-64 years Vaccine  Completed    Hepatitis C screen  Completed    HIV screen  Completed       Hemoglobin A1C (%)   Date Value   08/18/2016 5.1   05/07/2015 5.0             ( goal A1C is < 7)   No results found for: LABMICR  LDL Cholesterol (mg/dL)   Date Value   08/18/2016 99       (goal LDL is <100)   AST (U/L)   Date Value   10/15/2018 33 (H)     ALT (U/L)   Date Value   10/15/2018 18     BUN (mg/dL)   Date Value   05/30/2019 9     BP Readings from Last 3 Encounters:   06/20/19 106/73   05/30/19 105/66   05/23/19 106/70          (goal 120/80)          Patient Active Problem List:     Acquired hypothyroidism     Essential hypertension     Seizure disorder (HCC)     Urge incontinence     Generalized anxiety disorder     Gastroesophageal reflux disease without esophagitis     H/O: CVA (cerebrovascular accident)     Migraine without aura and without status migrainosus, not intractable     Hammer toe of left foot     Tremor of both hands     Status epilepticus ([address]

## 2019-09-17 RX ORDER — POTASSIUM CHLORIDE 750 MG/1
TABLET, EXTENDED RELEASE ORAL
Qty: 60 TABLET | Refills: 3 | OUTPATIENT
Start: 2019-09-17

## 2019-09-17 RX ORDER — SUMATRIPTAN 100 MG/1
TABLET, FILM COATED ORAL
Qty: 10 TABLET | Refills: 0 | Status: SHIPPED | OUTPATIENT
Start: 2019-09-17 | End: 2020-06-03

## 2019-11-12 ENCOUNTER — OFFICE VISIT (OUTPATIENT)
Dept: INTERNAL MEDICINE | Age: 64
End: 2019-11-12
Payer: COMMERCIAL

## 2019-11-12 VITALS
DIASTOLIC BLOOD PRESSURE: 71 MMHG | WEIGHT: 136 LBS | HEART RATE: 74 BPM | SYSTOLIC BLOOD PRESSURE: 101 MMHG | HEIGHT: 70 IN | BODY MASS INDEX: 19.47 KG/M2

## 2019-11-12 DIAGNOSIS — Z23 NEED FOR INFLUENZA VACCINATION: ICD-10-CM

## 2019-11-12 DIAGNOSIS — I10 ESSENTIAL HYPERTENSION: Primary | ICD-10-CM

## 2019-11-12 DIAGNOSIS — Z86.73 H/O: CVA (CEREBROVASCULAR ACCIDENT): ICD-10-CM

## 2019-11-12 DIAGNOSIS — Z12.11 COLON CANCER SCREENING: ICD-10-CM

## 2019-11-12 DIAGNOSIS — E78.5 HYPERLIPIDEMIA, UNSPECIFIED HYPERLIPIDEMIA TYPE: ICD-10-CM

## 2019-11-12 DIAGNOSIS — E03.9 ACQUIRED HYPOTHYROIDISM: ICD-10-CM

## 2019-11-12 DIAGNOSIS — Z00.00 HEALTH CARE MAINTENANCE: ICD-10-CM

## 2019-11-12 DIAGNOSIS — G43.009 MIGRAINE WITHOUT AURA AND WITHOUT STATUS MIGRAINOSUS, NOT INTRACTABLE: ICD-10-CM

## 2019-11-12 DIAGNOSIS — Z12.39 SCREENING BREAST EXAMINATION: ICD-10-CM

## 2019-11-12 DIAGNOSIS — G40.909 SEIZURE DISORDER (HCC): ICD-10-CM

## 2019-11-12 PROCEDURE — 99213 OFFICE O/P EST LOW 20 MIN: CPT | Performed by: STUDENT IN AN ORGANIZED HEALTH CARE EDUCATION/TRAINING PROGRAM

## 2019-11-12 PROCEDURE — G8482 FLU IMMUNIZE ORDER/ADMIN: HCPCS | Performed by: STUDENT IN AN ORGANIZED HEALTH CARE EDUCATION/TRAINING PROGRAM

## 2019-11-12 PROCEDURE — G8420 CALC BMI NORM PARAMETERS: HCPCS | Performed by: STUDENT IN AN ORGANIZED HEALTH CARE EDUCATION/TRAINING PROGRAM

## 2019-11-12 PROCEDURE — G8427 DOCREV CUR MEDS BY ELIG CLIN: HCPCS | Performed by: STUDENT IN AN ORGANIZED HEALTH CARE EDUCATION/TRAINING PROGRAM

## 2019-11-12 PROCEDURE — 3017F COLORECTAL CA SCREEN DOC REV: CPT | Performed by: STUDENT IN AN ORGANIZED HEALTH CARE EDUCATION/TRAINING PROGRAM

## 2019-11-12 PROCEDURE — 99211 OFF/OP EST MAY X REQ PHY/QHP: CPT | Performed by: INTERNAL MEDICINE

## 2019-11-12 PROCEDURE — G0008 ADMIN INFLUENZA VIRUS VAC: HCPCS | Performed by: STUDENT IN AN ORGANIZED HEALTH CARE EDUCATION/TRAINING PROGRAM

## 2019-11-12 PROCEDURE — 1036F TOBACCO NON-USER: CPT | Performed by: STUDENT IN AN ORGANIZED HEALTH CARE EDUCATION/TRAINING PROGRAM

## 2019-11-12 RX ORDER — LEVOTHYROXINE SODIUM 0.05 MG/1
TABLET ORAL
Qty: 90 TABLET | Refills: 1 | Status: ON HOLD | OUTPATIENT
Start: 2019-11-12 | End: 2020-06-21

## 2019-11-12 RX ORDER — ATORVASTATIN CALCIUM 40 MG/1
40 TABLET, FILM COATED ORAL DAILY
Qty: 90 TABLET | Refills: 1 | Status: ON HOLD | OUTPATIENT
Start: 2019-11-12 | End: 2020-06-21

## 2019-11-12 RX ORDER — AMLODIPINE BESYLATE 10 MG/1
TABLET ORAL
Qty: 90 TABLET | Refills: 1 | Status: ON HOLD | OUTPATIENT
Start: 2019-11-12 | End: 2020-06-21

## 2019-12-06 ENCOUNTER — TELEPHONE (OUTPATIENT)
Dept: INTERNAL MEDICINE | Age: 64
End: 2019-12-06

## 2019-12-20 ENCOUNTER — TELEPHONE (OUTPATIENT)
Dept: INTERNAL MEDICINE | Age: 64
End: 2019-12-20

## 2020-01-03 NOTE — TELEPHONE ENCOUNTER
E-scribing request for 5 medications. Pt has future appt.        Health Maintenance   Topic Date Due    Colon Cancer Screen FIT/FOBT  02/23/2017    Lipid screen  08/18/2017    Shingles Vaccine (3 of 3) 09/12/2018    TSH testing  05/30/2020    Potassium monitoring  05/30/2020    Creatinine monitoring  05/30/2020    Breast cancer screen  06/29/2020    Cervical cancer screen  11/07/2020    DTaP/Tdap/Td vaccine (2 - Td) 11/23/2025    Flu vaccine  Completed    Hepatitis C screen  Completed    HIV screen  Completed    Pneumococcal 0-64 years Vaccine  Aged Out             (applicable per patient's age: Cancer Screenings, Depression Screening, Fall Risk Screening, Immunizations)    Hemoglobin A1C (%)   Date Value   08/18/2016 5.1   05/07/2015 5.0     LDL Cholesterol (mg/dL)   Date Value   08/18/2016 99     AST (U/L)   Date Value   10/15/2018 33 (H)     ALT (U/L)   Date Value   10/15/2018 18     BUN (mg/dL)   Date Value   05/30/2019 9      (goal A1C is < 7)   (goal LDL is <100) need 30-50% reduction from baseline     BP Readings from Last 3 Encounters:   11/12/19 101/71   06/20/19 106/73   05/30/19 105/66    (goal /80)      All Future Testing planned in [facility]:  Lab Frequency Next Occurrence   Lipid, Fasting Once 56/42/3586   Basic Metabolic Panel Once 02/94/9983   CAYETANO DIGITAL SCREENING AUGMENTED BILATERAL Once 02/27/2020   POCT Fecal Immunochemical Test (FIT) Once 02/12/2020       Next Visit Date:  Future Appointments   Date Time Provider Carlos Matta   2/18/2020  1:25 PM Marcela Arcos MD [facility]   5/26/2020 10:40 AM Carlos Rosa MD Neuro Spec [facility]            Patient Active Problem List:     Acquired hypothyroidism     Essential hypertension     Seizure disorder ([facility] [address]     Urge incontinence     Generalized anxiety disorder     Gastroesophageal reflux disease without esophagitis     H/O: CVA (cerebrovascular accident)     Migraine without aura and without status migrainosus, not

## 2020-01-08 RX ORDER — OMEPRAZOLE 20 MG/1
CAPSULE, DELAYED RELEASE ORAL
Qty: 90 CAPSULE | Refills: 1 | Status: ON HOLD | OUTPATIENT
Start: 2020-01-08 | End: 2020-06-21

## 2020-01-08 RX ORDER — PAROXETINE 30 MG/1
TABLET, FILM COATED ORAL
Qty: 90 TABLET | Refills: 1 | Status: ON HOLD | OUTPATIENT
Start: 2020-01-08 | End: 2020-06-21

## 2020-01-08 RX ORDER — HYDROCHLOROTHIAZIDE 12.5 MG/1
TABLET ORAL
Qty: 90 TABLET | Refills: 1 | Status: ON HOLD | OUTPATIENT
Start: 2020-01-08 | End: 2020-06-21

## 2020-01-08 RX ORDER — ASPIRIN 81 MG/1
TABLET ORAL
Qty: 90 TABLET | Refills: 1 | Status: ON HOLD | OUTPATIENT
Start: 2020-01-08 | End: 2020-06-21

## 2020-01-08 RX ORDER — OXYBUTYNIN CHLORIDE 5 MG/1
TABLET ORAL
Qty: 270 TABLET | Refills: 1 | Status: ON HOLD | OUTPATIENT
Start: 2020-01-08 | End: 2020-06-21

## 2020-02-21 ENCOUNTER — TELEPHONE (OUTPATIENT)
Dept: INTERNAL MEDICINE | Age: 65
End: 2020-02-21

## 2020-03-03 ENCOUNTER — HOSPITAL ENCOUNTER (OUTPATIENT)
Age: 65
Setting detail: SPECIMEN
Discharge: HOME | End: 2020-03-03
Payer: COMMERCIAL

## 2020-03-03 LAB
ANION GAP SERPL CALCULATED.3IONS-SCNC: 16 MMOL/L (ref 9–17)
BUN BLDV-MCNC: 7 MG/DL (ref 8–23)
BUN/CREAT BLD: ABNORMAL (ref 9–20)
CALCIUM SERPL-MCNC: 9.6 MG/DL (ref 8.6–10.4)
CHLORIDE BLD-SCNC: 101 MMOL/L (ref 98–107)
CHOLESTEROL, FASTING: 140 MG/DL
CHOLESTEROL/HDL RATIO: 1.9
CO2: 25 MMOL/L (ref 20–31)
CREAT SERPL-MCNC: 0.65 MG/DL (ref 0.5–0.9)
GFR AFRICAN AMERICAN: >60 ML/MIN
GFR NON-AFRICAN AMERICAN: >60 ML/MIN
GFR SERPL CREATININE-BSD FRML MDRD: ABNORMAL ML/MIN/{1.73_M2}
GFR SERPL CREATININE-BSD FRML MDRD: ABNORMAL ML/MIN/{1.73_M2}
GLUCOSE BLD-MCNC: 73 MG/DL (ref 70–99)
HDLC SERPL-MCNC: 74 MG/DL
LDL CHOLESTEROL: 53 MG/DL (ref 0–130)
POTASSIUM SERPL-SCNC: 3.7 MMOL/L (ref 3.7–5.3)
SODIUM BLD-SCNC: 142 MMOL/L (ref 135–144)
TRIGLYCERIDE, FASTING: 65 MG/DL
VLDLC SERPL CALC-MCNC: NORMAL MG/DL (ref 1–30)

## 2020-03-03 PROCEDURE — 80061 LIPID PANEL: CPT

## 2020-03-03 PROCEDURE — 36415 COLL VENOUS BLD VENIPUNCTURE: CPT

## 2020-03-03 PROCEDURE — 80048 BASIC METABOLIC PNL TOTAL CA: CPT

## 2020-03-30 RX ORDER — LEVETIRACETAM 1000 MG/1
TABLET ORAL
Qty: 180 TABLET | Refills: 2 | Status: SHIPPED | OUTPATIENT
Start: 2020-03-30 | End: 2020-12-28

## 2020-03-30 RX ORDER — LEVETIRACETAM 250 MG/1
TABLET ORAL
Qty: 180 TABLET | Refills: 2 | Status: SHIPPED | OUTPATIENT
Start: 2020-03-30 | End: 2020-12-28

## 2020-06-03 RX ORDER — SUMATRIPTAN 100 MG/1
TABLET, FILM COATED ORAL
Qty: 10 TABLET | Refills: 0 | Status: SHIPPED | OUTPATIENT
Start: 2020-06-03 | End: 2021-01-13

## 2020-06-03 NOTE — TELEPHONE ENCOUNTER
Request for sumatriptan - medication pended. Please fill if appropriate.       Next Visit Date:  Future Appointments   Date Time Provider Carlos Matta   6/9/2020 11:20 AM Niru Shaw MD Neuro Spec [address] Maintenance   Topic Date Due    Colon Cancer Screen FIT/FOBT  02/23/2017    Shingles Vaccine (3 of 3) 09/12/2018    TSH testing  05/30/2020    Breast cancer screen  06/29/2020    Cervical cancer screen  11/07/2020    Lipid screen  03/03/2021    Potassium monitoring  03/03/2021    Creatinine monitoring  03/03/2021    Pneumococcal 65+ years Vaccine (1 of 1 - PPSV23) 02/07/2024    DTaP/Tdap/Td vaccine (2 - Td) 11/23/2025    DEXA (modify frequency per FRAX score)  Completed    Flu vaccine  Completed    Hepatitis C screen  Completed    HIV screen  Completed    Hepatitis A vaccine  Aged Out    Hepatitis B vaccine  Aged Out    Hib vaccine  Aged Out    Meningococcal (ACWY) vaccine  Aged Out       Hemoglobin A1C (%)   Date Value   08/18/2016 5.1   05/07/2015 5.0             ( goal A1C is < 7)   No results found for: LABMICR  LDL Cholesterol (mg/dL)   Date Value   03/03/2020 53       (goal LDL is <100)   AST (U/L)   Date Value   10/15/2018 33 (H)     ALT (U/L)   Date Value   10/15/2018 18     BUN (mg/dL)   Date Value   03/03/2020 7 (L)     BP Readings from Last 3 Encounters:   11/12/19 101/71   06/20/19 106/73   05/30/19 105/66          (goal 120/80)    All Future Testing planned in [facility]  Lab Frequency Next Occurrence   CAYETANO DIGITAL SCREENING AUGMENTED BILATERAL Once 06/13/2020         Patient Active Problem List:     Acquired hypothyroidism     Essential hypertension     Seizure disorder ([facility] [address]     Urge incontinence     Generalized anxiety disorder     Gastroesophageal reflux disease without esophagitis     H/O: CVA (cerebrovascular accident)     Migraine without aura and without status migrainosus, not intractable     Hammer toe of left foot     Tremor of both hands     Status

## 2020-06-09 ENCOUNTER — OFFICE VISIT (OUTPATIENT)
Dept: NEUROLOGY | Age: 65
End: 2020-06-09
Payer: COMMERCIAL

## 2020-06-09 VITALS
SYSTOLIC BLOOD PRESSURE: 117 MMHG | DIASTOLIC BLOOD PRESSURE: 77 MMHG | BODY MASS INDEX: 20.04 KG/M2 | HEART RATE: 71 BPM | WEIGHT: 140 LBS | HEIGHT: 70 IN | TEMPERATURE: 97 F

## 2020-06-09 PROCEDURE — 1036F TOBACCO NON-USER: CPT | Performed by: PSYCHIATRY & NEUROLOGY

## 2020-06-09 PROCEDURE — 3017F COLORECTAL CA SCREEN DOC REV: CPT | Performed by: PSYCHIATRY & NEUROLOGY

## 2020-06-09 PROCEDURE — G8427 DOCREV CUR MEDS BY ELIG CLIN: HCPCS | Performed by: PSYCHIATRY & NEUROLOGY

## 2020-06-09 PROCEDURE — G8399 PT W/DXA RESULTS DOCUMENT: HCPCS | Performed by: PSYCHIATRY & NEUROLOGY

## 2020-06-09 PROCEDURE — G8420 CALC BMI NORM PARAMETERS: HCPCS | Performed by: PSYCHIATRY & NEUROLOGY

## 2020-06-09 PROCEDURE — 1090F PRES/ABSN URINE INCON ASSESS: CPT | Performed by: PSYCHIATRY & NEUROLOGY

## 2020-06-09 PROCEDURE — 1123F ACP DISCUSS/DSCN MKR DOCD: CPT | Performed by: PSYCHIATRY & NEUROLOGY

## 2020-06-09 PROCEDURE — 99213 OFFICE O/P EST LOW 20 MIN: CPT | Performed by: PSYCHIATRY & NEUROLOGY

## 2020-06-09 PROCEDURE — 4040F PNEUMOC VAC/ADMIN/RCVD: CPT | Performed by: PSYCHIATRY & NEUROLOGY

## 2020-06-09 NOTE — PROGRESS NOTES
Dear Dr. Yahir Tejada MD   Candace Olivera is a 72 y.o. female who comes in today as a follow visit. She has a past medical history of right cortical ischemic stroke and subsequently has a history of seizure disorder. She reports compliance with Keppra 1250 mg twice a day without side effects. Patient is no longer taking Vimpat 200 mg twice daily. She gets her medications delivered to her, and she cannot remember when she had stopped taking it. No new seizures. The patient is still living with her sister and nephew. Her family has not witnessed any seizure-like activity. Stress is under control and her mood is stable. No restlessness or agitation. No anxiety or depression. Patient's long-term EEG monitoring at [facility] for 4 days showed frequent left temporal parietal spike wave discharges but did not have any seizures. Last clinical seizure was in August 2018 and seizure has been under control after increment of Keppra and addition of Vimpat.   Her most recent seizure was August 2018    Lab Results   Component Value Date    LDLCHOLESTEROL 53 03/03/2020     No components found for: CHLPL  Lab Results   Component Value Date    TRIG 64 08/18/2016    TRIG 125 05/07/2015    TRIG 111 11/05/2013     Lab Results   Component Value Date    HDL 74 03/03/2020    HDL 71 08/18/2016    HDL 79 05/07/2015     No results found for: LDLCALC  No results found for: LABVLDL  Lab Results   Component Value Date    LABA1C 5.1 08/18/2016     Lab Results   Component Value Date     08/18/2016     No results found for: [id number]   Right MCA stroke 1998   Last seizure 2007, however there is concern of intermittent staring episodes with unknown frequency Possibly occurring once a week or so  2 D echo Sep 2012 normal.  MRA head normal, MRA neck normal  MRI FLAIR March 2013      General examination:    Head: Normocephalic, atraumatic  Eyes: Extraocular movements intact  Lungs: Respirations unlabored, chest wall no

## 2020-06-19 ENCOUNTER — HOSPITAL ENCOUNTER (OUTPATIENT)
Age: 65
Setting detail: OBSERVATION
Discharge: ANOTHER HOSPITAL | DRG: 053 | End: 2020-06-20
Attending: EMERGENCY MEDICINE | Admitting: INTERNAL MEDICINE
Payer: COMMERCIAL

## 2020-06-19 ENCOUNTER — APPOINTMENT (OUTPATIENT)
Dept: CT IMAGING | Age: 65
DRG: 053 | End: 2020-06-19
Payer: COMMERCIAL

## 2020-06-19 PROBLEM — R56.9 SEIZURE (HCC): Status: ACTIVE | Noted: 2020-06-19

## 2020-06-19 LAB
-: NORMAL
ABSOLUTE EOS #: 0 K/UL (ref 0–0.4)
ABSOLUTE IMMATURE GRANULOCYTE: ABNORMAL K/UL (ref 0–0.3)
ABSOLUTE LYMPH #: 1 K/UL (ref 1–4.8)
ABSOLUTE MONO #: 0.3 K/UL (ref 0.1–1.3)
ALBUMIN SERPL-MCNC: 3.8 G/DL (ref 3.5–5.2)
ALBUMIN/GLOBULIN RATIO: NORMAL (ref 1–2.5)
ALP BLD-CCNC: 49 U/L (ref 35–104)
ALT SERPL-CCNC: 15 U/L (ref 5–33)
ANION GAP SERPL CALCULATED.3IONS-SCNC: 13 MMOL/L (ref 9–17)
AST SERPL-CCNC: 21 U/L
BASOPHILS # BLD: 1 % (ref 0–2)
BASOPHILS ABSOLUTE: 0 K/UL (ref 0–0.2)
BILIRUB SERPL-MCNC: 0.66 MG/DL (ref 0.3–1.2)
BILIRUBIN URINE: NEGATIVE
BUN BLDV-MCNC: 8 MG/DL (ref 8–23)
BUN/CREAT BLD: NORMAL (ref 9–20)
CALCIUM SERPL-MCNC: 9.5 MG/DL (ref 8.6–10.4)
CHLORIDE BLD-SCNC: 103 MMOL/L (ref 98–107)
CO2: 24 MMOL/L (ref 20–31)
COLOR: YELLOW
COMMENT UA: NORMAL
CREAT SERPL-MCNC: 0.69 MG/DL (ref 0.5–0.9)
DIFFERENTIAL TYPE: ABNORMAL
EOSINOPHILS RELATIVE PERCENT: 0 % (ref 0–4)
GFR AFRICAN AMERICAN: >60 ML/MIN
GFR NON-AFRICAN AMERICAN: >60 ML/MIN
GFR SERPL CREATININE-BSD FRML MDRD: NORMAL ML/MIN/{1.73_M2}
GFR SERPL CREATININE-BSD FRML MDRD: NORMAL ML/MIN/{1.73_M2}
GLUCOSE BLD-MCNC: 93 MG/DL (ref 70–99)
GLUCOSE URINE: NEGATIVE
HCT VFR BLD CALC: 44.4 % (ref 36–46)
HEMOGLOBIN: 15.3 G/DL (ref 12–16)
IMMATURE GRANULOCYTES: ABNORMAL %
KEPPRA: 32 UG/ML
KETONES, URINE: NEGATIVE
LEUKOCYTE ESTERASE, URINE: NEGATIVE
LYMPHOCYTES # BLD: 23 % (ref 24–44)
MCH RBC QN AUTO: 32.5 PG (ref 26–34)
MCHC RBC AUTO-ENTMCNC: 34.4 G/DL (ref 31–37)
MCV RBC AUTO: 94.5 FL (ref 80–100)
MONOCYTES # BLD: 8 % (ref 1–7)
NITRITE, URINE: NEGATIVE
NRBC AUTOMATED: ABNORMAL PER 100 WBC
PDW BLD-RTO: 13 % (ref 11.5–14.9)
PH UA: 8 (ref 5–8)
PLATELET # BLD: 227 K/UL (ref 150–450)
PLATELET ESTIMATE: ABNORMAL
PMV BLD AUTO: 8.8 FL (ref 6–12)
POTASSIUM SERPL-SCNC: 3.9 MMOL/L (ref 3.7–5.3)
PROTEIN UA: NEGATIVE
RBC # BLD: 4.7 M/UL (ref 4–5.2)
RBC # BLD: ABNORMAL 10*6/UL
REASON FOR REJECTION: NORMAL
SEG NEUTROPHILS: 68 % (ref 36–66)
SEGMENTED NEUTROPHILS ABSOLUTE COUNT: 2.9 K/UL (ref 1.3–9.1)
SODIUM BLD-SCNC: 140 MMOL/L (ref 135–144)
SPECIFIC GRAVITY UA: 1.01 (ref 1–1.03)
TOTAL PROTEIN: 6.8 G/DL (ref 6.4–8.3)
TURBIDITY: CLEAR
URINE HGB: NEGATIVE
UROBILINOGEN, URINE: NORMAL
WBC # BLD: 4.3 K/UL (ref 3.5–11)
WBC # BLD: ABNORMAL 10*3/UL
ZZ NTE CLEAN UP: ORDERED TEST: NORMAL
ZZ NTE WITH NAME CLEAN UP: SPECIMEN SOURCE: NORMAL

## 2020-06-19 PROCEDURE — 2580000003 HC RX 258: Performed by: EMERGENCY MEDICINE

## 2020-06-19 PROCEDURE — 2580000003 HC RX 258: Performed by: STUDENT IN AN ORGANIZED HEALTH CARE EDUCATION/TRAINING PROGRAM

## 2020-06-19 PROCEDURE — 96372 THER/PROPH/DIAG INJ SC/IM: CPT

## 2020-06-19 PROCEDURE — 36415 COLL VENOUS BLD VENIPUNCTURE: CPT

## 2020-06-19 PROCEDURE — 99223 1ST HOSP IP/OBS HIGH 75: CPT | Performed by: INTERNAL MEDICINE

## 2020-06-19 PROCEDURE — 80177 DRUG SCRN QUAN LEVETIRACETAM: CPT

## 2020-06-19 PROCEDURE — G0378 HOSPITAL OBSERVATION PER HR: HCPCS

## 2020-06-19 PROCEDURE — 6370000000 HC RX 637 (ALT 250 FOR IP): Performed by: STUDENT IN AN ORGANIZED HEALTH CARE EDUCATION/TRAINING PROGRAM

## 2020-06-19 PROCEDURE — 81003 URINALYSIS AUTO W/O SCOPE: CPT

## 2020-06-19 PROCEDURE — 6360000002 HC RX W HCPCS: Performed by: STUDENT IN AN ORGANIZED HEALTH CARE EDUCATION/TRAINING PROGRAM

## 2020-06-19 PROCEDURE — 85025 COMPLETE CBC W/AUTO DIFF WBC: CPT

## 2020-06-19 PROCEDURE — 70450 CT HEAD/BRAIN W/O DYE: CPT

## 2020-06-19 PROCEDURE — 99285 EMERGENCY DEPT VISIT HI MDM: CPT

## 2020-06-19 PROCEDURE — 2060000000 HC ICU INTERMEDIATE R&B

## 2020-06-19 PROCEDURE — 80053 COMPREHEN METABOLIC PANEL: CPT

## 2020-06-19 RX ORDER — HYDROCHLOROTHIAZIDE 25 MG/1
12.5 TABLET ORAL DAILY
Status: DISCONTINUED | OUTPATIENT
Start: 2020-06-19 | End: 2020-06-20 | Stop reason: HOSPADM

## 2020-06-19 RX ORDER — AMLODIPINE BESYLATE 10 MG/1
10 TABLET ORAL DAILY
Status: DISCONTINUED | OUTPATIENT
Start: 2020-06-19 | End: 2020-06-20 | Stop reason: HOSPADM

## 2020-06-19 RX ORDER — 0.9 % SODIUM CHLORIDE 0.9 %
1000 INTRAVENOUS SOLUTION INTRAVENOUS ONCE
Status: COMPLETED | OUTPATIENT
Start: 2020-06-19 | End: 2020-06-19

## 2020-06-19 RX ORDER — LEVETIRACETAM 250 MG/1
250 TABLET ORAL DAILY
Status: DISCONTINUED | OUTPATIENT
Start: 2020-06-19 | End: 2020-06-20

## 2020-06-19 RX ORDER — SUMATRIPTAN 100 MG/1
100 TABLET, FILM COATED ORAL DAILY PRN
Status: DISCONTINUED | OUTPATIENT
Start: 2020-06-19 | End: 2020-06-20 | Stop reason: HOSPADM

## 2020-06-19 RX ORDER — SODIUM CHLORIDE 0.9 % (FLUSH) 0.9 %
10 SYRINGE (ML) INJECTION EVERY 12 HOURS SCHEDULED
Status: DISCONTINUED | OUTPATIENT
Start: 2020-06-19 | End: 2020-06-20 | Stop reason: HOSPADM

## 2020-06-19 RX ORDER — ACETAMINOPHEN 650 MG/1
650 SUPPOSITORY RECTAL EVERY 6 HOURS PRN
Status: DISCONTINUED | OUTPATIENT
Start: 2020-06-19 | End: 2020-06-20 | Stop reason: HOSPADM

## 2020-06-19 RX ORDER — LEVETIRACETAM 500 MG/1
1000 TABLET ORAL DAILY
Status: DISCONTINUED | OUTPATIENT
Start: 2020-06-20 | End: 2020-06-20

## 2020-06-19 RX ORDER — ASPIRIN 81 MG/1
81 TABLET ORAL DAILY
Status: DISCONTINUED | OUTPATIENT
Start: 2020-06-19 | End: 2020-06-20 | Stop reason: HOSPADM

## 2020-06-19 RX ORDER — ACETAMINOPHEN 325 MG/1
650 TABLET ORAL EVERY 6 HOURS PRN
Status: DISCONTINUED | OUTPATIENT
Start: 2020-06-19 | End: 2020-06-20 | Stop reason: HOSPADM

## 2020-06-19 RX ORDER — SODIUM CHLORIDE 0.9 % (FLUSH) 0.9 %
10 SYRINGE (ML) INJECTION PRN
Status: DISCONTINUED | OUTPATIENT
Start: 2020-06-19 | End: 2020-06-20 | Stop reason: HOSPADM

## 2020-06-19 RX ORDER — PROMETHAZINE HYDROCHLORIDE 25 MG/1
12.5 TABLET ORAL EVERY 6 HOURS PRN
Status: DISCONTINUED | OUTPATIENT
Start: 2020-06-19 | End: 2020-06-20 | Stop reason: HOSPADM

## 2020-06-19 RX ORDER — POLYETHYLENE GLYCOL 3350 17 G/17G
17 POWDER, FOR SOLUTION ORAL DAILY PRN
Status: DISCONTINUED | OUTPATIENT
Start: 2020-06-19 | End: 2020-06-20 | Stop reason: HOSPADM

## 2020-06-19 RX ORDER — LACOSAMIDE 100 MG/1
200 TABLET ORAL 2 TIMES DAILY
Status: DISCONTINUED | OUTPATIENT
Start: 2020-06-19 | End: 2020-06-20 | Stop reason: HOSPADM

## 2020-06-19 RX ORDER — LEVOTHYROXINE SODIUM 0.05 MG/1
50 TABLET ORAL DAILY
Status: DISCONTINUED | OUTPATIENT
Start: 2020-06-19 | End: 2020-06-20 | Stop reason: HOSPADM

## 2020-06-19 RX ORDER — ATORVASTATIN CALCIUM 40 MG/1
40 TABLET, FILM COATED ORAL DAILY
Status: DISCONTINUED | OUTPATIENT
Start: 2020-06-19 | End: 2020-06-20 | Stop reason: HOSPADM

## 2020-06-19 RX ORDER — ONDANSETRON 2 MG/ML
4 INJECTION INTRAMUSCULAR; INTRAVENOUS EVERY 6 HOURS PRN
Status: DISCONTINUED | OUTPATIENT
Start: 2020-06-19 | End: 2020-06-20 | Stop reason: HOSPADM

## 2020-06-19 RX ORDER — OXYBUTYNIN CHLORIDE 5 MG/1
5 TABLET ORAL 3 TIMES DAILY
Status: DISCONTINUED | OUTPATIENT
Start: 2020-06-19 | End: 2020-06-20 | Stop reason: HOSPADM

## 2020-06-19 RX ADMIN — LACOSAMIDE 200 MG: 100 TABLET, FILM COATED ORAL at 20:50

## 2020-06-19 RX ADMIN — Medication 10 ML: at 20:50

## 2020-06-19 RX ADMIN — SODIUM CHLORIDE 1000 ML: 9 INJECTION, SOLUTION INTRAVENOUS at 12:30

## 2020-06-19 RX ADMIN — ENOXAPARIN SODIUM 40 MG: 40 INJECTION SUBCUTANEOUS at 20:51

## 2020-06-19 RX ADMIN — OXYBUTYNIN CHLORIDE 5 MG: 5 TABLET ORAL at 20:50

## 2020-06-19 ASSESSMENT — PAIN SCALES - WONG BAKER
WONGBAKER_NUMERICALRESPONSE: 0

## 2020-06-19 NOTE — PROGRESS NOTES
recvd pt from ED per w/c; assisted to bed; the pt is not able to answer simple questions such as name,, or place; pt is not able to follow simple commands such as squeezing hands ( she will open her hands and show her palms); she does not comprehend to take a deep breath to assess lung sounds; therefore unable to do admission data base at this time; when asked is she knows where she is pt state \"Well, they'll get it right eventually.: and will inappropriately  Laugh; she is cooperative with care; the bed alarm has been engaged and a telesitter has been brought in; Resident was contacted and informed of above

## 2020-06-19 NOTE — ED NOTES
Bed: C  Expected date:   Expected time:   Means of arrival:   Comments:     Jeannette Barber RN  06/19/20 2105

## 2020-06-19 NOTE — ED PROVIDER NOTES
[facility] [facility]  eMERGENCY dEPARTMENT eNCOUnter      Pt Name: Hiren Roldan  MRN: [id number]  [place] 1955  Date of evaluation: 6/19/20      CHIEF COMPLAINT       Chief Complaint   Patient presents with    Seizures     HISTORY OF PRESENT ILLNESS   HPI 72 y.o. female patient presents after generalized tonic-clonic seizure. EMS was called to the patient's home for a seizure. When they arrived they found the patient seizing. She received 4 mg of intranasal Versed, she continued to seize and IV was placed and she received 4 mg of IV Versed. Spent over 30 minutes and there is no return to the patient's baseline. Patient has a significant medical history of a right cortical ischemic stroke and subsequently she developed a seizure disorder. She is currently on Keppra 1250 mg twice daily. The patient is unable to provide any clinical history at the time of presentation. REVIEW OF SYSTEMS       Review of Systems    Limited secondary to postictal confusion. PAST MEDICAL HISTORY     Past Medical History:   Diagnosis Date    Ankle fracture, left     Anxiety 2/4/2014    Arthritis     ETOH abuse     Frequency of urination ?  GERD (gastroesophageal reflux disease) 2/4/2014    Head injury     after seizure/stroke pt fell to fall and hit head-2004    Headache(784.0)     Hypertension     Hypotension ?     Hypothyroidism     Numbness and tingling     fingers    Osteoporosis     Peripheral vascular disease ([address]     Pneumonia     Reflux     Seizures ([address]     last one 2004    Shingles     Stroke [facility]) 1998    stroke and seizure together    TIA (transient ischemic attack)     1996    Tremor     UTI (urinary tract infection)     once    Varicose veins     Wears glasses        SURGICAL HISTORY       Past Surgical History:   Procedure Laterality Date    BUNIONECTOMY Right     HAMMER TOE SURGERY Left 07/06/2016    ON 2ND, 3rd, 4th,  and  5TH TOES OF LEFT FOOT    OTHER SURGICAL HISTORY

## 2020-06-19 NOTE — PLAN OF CARE
Problem: Pain:  Goal: Pain level will decrease  Description: Pain level will decrease  Outcome: Met This Shift  Goal: Recognizes and communicates pain  Description: Recognizes and communicates pain  Outcome: Met This Shift  Note: No evidence of pain exhibited. Problem: Mobility - Impaired:  Goal: Able to ambulate independently  Description: Able to ambulate independently  Outcome: Met This Shift  Goal: Able to ambulate with minimal assistance  Description: Able to ambulate with minimal assistance  Outcome: Met This Shift     Problem: Injury - Risk of, Healthcare-Acquired Condition:  Goal: Will remain free from falls  Description: Will remain free from falls  Outcome: Met This Shift     Problem: Nutrition Deficit - Risk of:  Goal: Ability to achieve adequate nutritional intake will improve  Description: Ability to achieve adequate nutritional intake will improve  Outcome: Met This Shift  Goal: Maintenance of adequate weight for body size and type will improve to within specified parameters  Description: Maintenance of adequate weight for body size and type will improve to within specified parameters  Outcome: Met This Shift  Note: Fefd self w/o incident.      Problem: Swallowing - Impaired:  Goal: Able to swallow without choking  Description: Able to swallow without choking  Outcome: Met This Shift  Goal: Absence of aspiration  Description: Absence of aspiration  Outcome: Met This Shift     Problem: Aspiration - Risk of:  Goal: Absence of aspiration  Description: Absence of aspiration  Outcome: Met This Shift     Problem: Respiratory Function - Compromised:  Goal: Absence of pulmonary infection  Description: Absence of pulmonary infection  Outcome: Met This Shift  Goal: Levels of oxygenation will improve  Description: Levels of oxygenation will improve  Outcome: Met This Shift  Goal: Ability to maintain a clear airway will improve  Description: Ability to maintain a clear airway will improve  Outcome: Met This

## 2020-06-19 NOTE — H&P
-Last seizure-like activity prior to today: August 2018   -Versed 4 mg IN, 4 mg IV   -1L fluid bolus given in ED  -Patient follows with Dr. Juan Nunez for neurology  -Keppra 1250 mg BID  -Vimpat 200 mg BID   -Keppra levels -32   -CMP unremarkable  -Ammonia level pending     Acute onset altered mental status   -CT head unremarkable  -Ammonia, TSH levels pending   -Urinalysis unremarkable, ruled out UTI     Acquired hypothyroidism  -Snythroid 50 mcg PO daily     Generalized Anxiety Disorder  -Paxil 30 mg PO daily     Hypertension  -Norvasc 10 mg PO daily  -Hydrochlorothiazide 12.5 mg PO daily     Hyperlipidemia  -Lipitor 50 mg PO daily     Migraine without aura and without status migrainosus, not intractable  -Imitrex 100 mg PO daily PRN    DVT px: lovenox 40 mg SC daily     Consultations:   IP CONSULT TO SOCIAL WORK  IP CONSULT TO NEUROLOGY    Patient is admitted as inpatient status because of co-morbiditieslisted above, severity of signs and symptoms as outlined, requirement for current medical therapies and most importantly because of direct risk to patient if care not provided in a hospital setting. Jimmie Garcia MD  6/19/2020  3:19 PM    Copy sent to Dr. Guicho Freeman MD    Attending Physician Statement  I have discussed the care of Xiomara Munoz and I have examined the patient myselft and taken ros and hpi , including pertinent history and exam findings,  with the resident. I have reviewed the key elements of all parts of the encounter with the resident. I agree with the assessment, plan and orders as documented by the resident.     Late note entry    Electronically signed by Llai Moreira MD

## 2020-06-19 NOTE — ED NOTES
The patient arrived by [facility] [address] after having a seizure at home. Pt received Versed 4 mg IN and 4 mg IV per EMS pta. Pt postictal on arrival.  Pt with eyes closed following some commands. Pt not answering any questions at this time, will open her eyes to name.      Haile Buhs RN  06/19/20 [id number]

## 2020-06-19 NOTE — ED NOTES
Dr Jamal Cervantes at bedside- no change in [address], RN  06/19/20 171 Nae Johnson RN  06/19/20 [id number]

## 2020-06-20 ENCOUNTER — APPOINTMENT (OUTPATIENT)
Dept: MRI IMAGING | Age: 65
DRG: 053 | End: 2020-06-20
Payer: COMMERCIAL

## 2020-06-20 ENCOUNTER — HOSPITAL ENCOUNTER (INPATIENT)
Age: 65
LOS: 3 days | Discharge: NURSING, REHAB OR LONG-TERM CARE | DRG: 058 | End: 2020-06-23
Attending: PSYCHIATRY & NEUROLOGY | Admitting: PSYCHIATRY & NEUROLOGY
Payer: COMMERCIAL

## 2020-06-20 ENCOUNTER — APPOINTMENT (OUTPATIENT)
Dept: GENERAL RADIOLOGY | Age: 65
DRG: 058 | End: 2020-06-20
Attending: PSYCHIATRY & NEUROLOGY
Payer: COMMERCIAL

## 2020-06-20 VITALS
RESPIRATION RATE: 14 BRPM | OXYGEN SATURATION: 98 % | WEIGHT: 135.36 LBS | SYSTOLIC BLOOD PRESSURE: 105 MMHG | HEART RATE: 73 BPM | DIASTOLIC BLOOD PRESSURE: 66 MMHG | BODY MASS INDEX: 19.38 KG/M2 | HEIGHT: 70 IN | TEMPERATURE: 98.4 F

## 2020-06-20 LAB
ABSOLUTE EOS #: 0.09 K/UL (ref 0–0.44)
ABSOLUTE IMMATURE GRANULOCYTE: <0.03 K/UL (ref 0–0.3)
ABSOLUTE LYMPH #: 1.77 K/UL (ref 1.1–3.7)
ABSOLUTE MONO #: 0.59 K/UL (ref 0.1–1.2)
ALBUMIN SERPL-MCNC: 3.8 G/DL (ref 3.5–5.2)
ALBUMIN/GLOBULIN RATIO: 1.3 (ref 1–2.5)
ALP BLD-CCNC: 49 U/L (ref 35–104)
ALT SERPL-CCNC: 15 U/L (ref 5–33)
AMMONIA: 29 UMOL/L (ref 11–51)
ANION GAP SERPL CALCULATED.3IONS-SCNC: 12 MMOL/L (ref 9–17)
AST SERPL-CCNC: 22 U/L
BASOPHILS # BLD: 1 % (ref 0–2)
BASOPHILS ABSOLUTE: 0.05 K/UL (ref 0–0.2)
BILIRUB SERPL-MCNC: 0.56 MG/DL (ref 0.3–1.2)
BUN BLDV-MCNC: 10 MG/DL (ref 8–23)
BUN/CREAT BLD: ABNORMAL (ref 9–20)
CALCIUM SERPL-MCNC: 8.9 MG/DL (ref 8.6–10.4)
CHLORIDE BLD-SCNC: 101 MMOL/L (ref 98–107)
CO2: 22 MMOL/L (ref 20–31)
CREAT SERPL-MCNC: 0.77 MG/DL (ref 0.5–0.9)
DIFFERENTIAL TYPE: NORMAL
EOSINOPHILS RELATIVE PERCENT: 1 % (ref 1–4)
GFR AFRICAN AMERICAN: >60 ML/MIN
GFR NON-AFRICAN AMERICAN: >60 ML/MIN
GFR SERPL CREATININE-BSD FRML MDRD: ABNORMAL ML/MIN/{1.73_M2}
GFR SERPL CREATININE-BSD FRML MDRD: ABNORMAL ML/MIN/{1.73_M2}
GLUCOSE BLD-MCNC: 92 MG/DL (ref 70–99)
HCT VFR BLD CALC: 45.6 % (ref 36.3–47.1)
HEMOGLOBIN: 14.8 G/DL (ref 11.9–15.1)
IMMATURE GRANULOCYTES: 0 %
KEPPRA: 33 UG/ML
LACTIC ACID, WHOLE BLOOD: 1.3 MMOL/L (ref 0.7–2.1)
LACTIC ACID: NORMAL MMOL/L
LIPASE: 40 U/L (ref 13–60)
LYMPHOCYTES # BLD: 29 % (ref 24–43)
MCH RBC QN AUTO: 31.8 PG (ref 25.2–33.5)
MCHC RBC AUTO-ENTMCNC: 32.5 G/DL (ref 28.4–34.8)
MCV RBC AUTO: 98.1 FL (ref 82.6–102.9)
MONOCYTES # BLD: 10 % (ref 3–12)
NRBC AUTOMATED: 0 PER 100 WBC
PDW BLD-RTO: 12.2 % (ref 11.8–14.4)
PLATELET # BLD: 240 K/UL (ref 138–453)
PLATELET ESTIMATE: NORMAL
PMV BLD AUTO: 10.7 FL (ref 8.1–13.5)
POTASSIUM SERPL-SCNC: 3.3 MMOL/L (ref 3.7–5.3)
RBC # BLD: 4.65 M/UL (ref 3.95–5.11)
RBC # BLD: NORMAL 10*6/UL
SEG NEUTROPHILS: 59 % (ref 36–65)
SEGMENTED NEUTROPHILS ABSOLUTE COUNT: 3.7 K/UL (ref 1.5–8.1)
SODIUM BLD-SCNC: 135 MMOL/L (ref 135–144)
THYROXINE, FREE: 0.98 NG/DL (ref 0.93–1.7)
TOTAL PROTEIN: 6.8 G/DL (ref 6.4–8.3)
TSH SERPL DL<=0.05 MIU/L-ACNC: 9.4 MIU/L (ref 0.3–5)
WBC # BLD: 6.2 K/UL (ref 3.5–11.3)
WBC # BLD: NORMAL 10*3/UL

## 2020-06-20 PROCEDURE — 87641 MR-STAPH DNA AMP PROBE: CPT

## 2020-06-20 PROCEDURE — 84439 ASSAY OF FREE THYROXINE: CPT

## 2020-06-20 PROCEDURE — 6370000000 HC RX 637 (ALT 250 FOR IP): Performed by: STUDENT IN AN ORGANIZED HEALTH CARE EDUCATION/TRAINING PROGRAM

## 2020-06-20 PROCEDURE — 85025 COMPLETE CBC W/AUTO DIFF WBC: CPT

## 2020-06-20 PROCEDURE — 6360000002 HC RX W HCPCS: Performed by: PSYCHIATRY & NEUROLOGY

## 2020-06-20 PROCEDURE — 36415 COLL VENOUS BLD VENIPUNCTURE: CPT

## 2020-06-20 PROCEDURE — 70551 MRI BRAIN STEM W/O DYE: CPT

## 2020-06-20 PROCEDURE — 82140 ASSAY OF AMMONIA: CPT

## 2020-06-20 PROCEDURE — 71045 X-RAY EXAM CHEST 1 VIEW: CPT

## 2020-06-20 PROCEDURE — 6360000002 HC RX W HCPCS: Performed by: STUDENT IN AN ORGANIZED HEALTH CARE EDUCATION/TRAINING PROGRAM

## 2020-06-20 PROCEDURE — 80177 DRUG SCRN QUAN LEVETIRACETAM: CPT

## 2020-06-20 PROCEDURE — 2000000003 HC NEURO ICU R&B

## 2020-06-20 PROCEDURE — 2580000003 HC RX 258: Performed by: STUDENT IN AN ORGANIZED HEALTH CARE EDUCATION/TRAINING PROGRAM

## 2020-06-20 PROCEDURE — 99233 SBSQ HOSP IP/OBS HIGH 50: CPT | Performed by: INTERNAL MEDICINE

## 2020-06-20 PROCEDURE — 80053 COMPREHEN METABOLIC PANEL: CPT

## 2020-06-20 PROCEDURE — 96372 THER/PROPH/DIAG INJ SC/IM: CPT

## 2020-06-20 PROCEDURE — 83690 ASSAY OF LIPASE: CPT

## 2020-06-20 PROCEDURE — 99254 IP/OBS CNSLTJ NEW/EST MOD 60: CPT | Performed by: PSYCHIATRY & NEUROLOGY

## 2020-06-20 PROCEDURE — G0378 HOSPITAL OBSERVATION PER HR: HCPCS

## 2020-06-20 PROCEDURE — 2580000003 HC RX 258: Performed by: PSYCHIATRY & NEUROLOGY

## 2020-06-20 PROCEDURE — 95708 EEG WO VID EA 12-26HR UNMNTR: CPT

## 2020-06-20 PROCEDURE — 6370000000 HC RX 637 (ALT 250 FOR IP): Performed by: PSYCHIATRY & NEUROLOGY

## 2020-06-20 PROCEDURE — 83605 ASSAY OF LACTIC ACID: CPT

## 2020-06-20 PROCEDURE — 84443 ASSAY THYROID STIM HORMONE: CPT

## 2020-06-20 PROCEDURE — 96365 THER/PROPH/DIAG IV INF INIT: CPT

## 2020-06-20 RX ORDER — ACETAMINOPHEN 650 MG/1
650 SUPPOSITORY RECTAL EVERY 6 HOURS PRN
Status: DISCONTINUED | OUTPATIENT
Start: 2020-06-20 | End: 2020-06-23 | Stop reason: HOSPADM

## 2020-06-20 RX ORDER — SODIUM CHLORIDE 0.9 % (FLUSH) 0.9 %
10 SYRINGE (ML) INJECTION PRN
Status: DISCONTINUED | OUTPATIENT
Start: 2020-06-20 | End: 2020-06-23 | Stop reason: HOSPADM

## 2020-06-20 RX ORDER — PROMETHAZINE HYDROCHLORIDE 25 MG/1
12.5 TABLET ORAL EVERY 6 HOURS PRN
Status: DISCONTINUED | OUTPATIENT
Start: 2020-06-20 | End: 2020-06-23 | Stop reason: HOSPADM

## 2020-06-20 RX ORDER — LEVETIRACETAM 500 MG/1
1000 TABLET ORAL 2 TIMES DAILY
Status: DISCONTINUED | OUTPATIENT
Start: 2020-06-20 | End: 2020-06-20 | Stop reason: HOSPADM

## 2020-06-20 RX ORDER — PANTOPRAZOLE SODIUM 40 MG/1
40 TABLET, DELAYED RELEASE ORAL
Status: DISCONTINUED | OUTPATIENT
Start: 2020-06-21 | End: 2020-06-23 | Stop reason: HOSPADM

## 2020-06-20 RX ORDER — LEVETIRACETAM 250 MG/1
250 TABLET ORAL 2 TIMES DAILY
Status: DISCONTINUED | OUTPATIENT
Start: 2020-06-20 | End: 2020-06-20 | Stop reason: HOSPADM

## 2020-06-20 RX ORDER — SODIUM CHLORIDE 0.9 % (FLUSH) 0.9 %
10 SYRINGE (ML) INJECTION EVERY 12 HOURS SCHEDULED
Status: DISCONTINUED | OUTPATIENT
Start: 2020-06-20 | End: 2020-06-23 | Stop reason: HOSPADM

## 2020-06-20 RX ORDER — HYDROCHLOROTHIAZIDE 25 MG/1
12.5 TABLET ORAL DAILY
Status: DISCONTINUED | OUTPATIENT
Start: 2020-06-21 | End: 2020-06-23 | Stop reason: HOSPADM

## 2020-06-20 RX ORDER — AMLODIPINE BESYLATE 10 MG/1
10 TABLET ORAL DAILY
Status: DISCONTINUED | OUTPATIENT
Start: 2020-06-21 | End: 2020-06-23 | Stop reason: HOSPADM

## 2020-06-20 RX ORDER — ACETAMINOPHEN 325 MG/1
650 TABLET ORAL EVERY 6 HOURS PRN
Status: DISCONTINUED | OUTPATIENT
Start: 2020-06-20 | End: 2020-06-23 | Stop reason: HOSPADM

## 2020-06-20 RX ORDER — ATORVASTATIN CALCIUM 40 MG/1
40 TABLET, FILM COATED ORAL DAILY
Status: DISCONTINUED | OUTPATIENT
Start: 2020-06-21 | End: 2020-06-23 | Stop reason: HOSPADM

## 2020-06-20 RX ORDER — LEVOTHYROXINE SODIUM 0.05 MG/1
50 TABLET ORAL DAILY
Status: DISCONTINUED | OUTPATIENT
Start: 2020-06-21 | End: 2020-06-23 | Stop reason: HOSPADM

## 2020-06-20 RX ORDER — POLYETHYLENE GLYCOL 3350 17 G/17G
17 POWDER, FOR SOLUTION ORAL DAILY PRN
Status: DISCONTINUED | OUTPATIENT
Start: 2020-06-20 | End: 2020-06-23 | Stop reason: HOSPADM

## 2020-06-20 RX ORDER — ONDANSETRON 2 MG/ML
4 INJECTION INTRAMUSCULAR; INTRAVENOUS EVERY 6 HOURS PRN
Status: DISCONTINUED | OUTPATIENT
Start: 2020-06-20 | End: 2020-06-23 | Stop reason: HOSPADM

## 2020-06-20 RX ORDER — ASPIRIN 81 MG/1
81 TABLET ORAL DAILY
Status: DISCONTINUED | OUTPATIENT
Start: 2020-06-21 | End: 2020-06-23 | Stop reason: HOSPADM

## 2020-06-20 RX ORDER — LACOSAMIDE 100 MG/1
100 TABLET ORAL 2 TIMES DAILY
Status: DISCONTINUED | OUTPATIENT
Start: 2020-06-20 | End: 2020-06-21

## 2020-06-20 RX ORDER — LORAZEPAM 2 MG/ML
2 INJECTION INTRAMUSCULAR EVERY 5 MIN PRN
Status: DISCONTINUED | OUTPATIENT
Start: 2020-06-20 | End: 2020-06-23 | Stop reason: HOSPADM

## 2020-06-20 RX ADMIN — ENOXAPARIN SODIUM 40 MG: 40 INJECTION SUBCUTANEOUS at 09:13

## 2020-06-20 RX ADMIN — LACOSAMIDE 200 MG: 100 TABLET, FILM COATED ORAL at 09:13

## 2020-06-20 RX ADMIN — LACOSAMIDE 100 MG: 100 TABLET, FILM COATED ORAL at 22:18

## 2020-06-20 RX ADMIN — OXYBUTYNIN CHLORIDE 5 MG: 5 TABLET ORAL at 14:02

## 2020-06-20 RX ADMIN — Medication 10 ML: at 09:13

## 2020-06-20 RX ADMIN — ASPIRIN 81 MG: 81 TABLET, COATED ORAL at 09:13

## 2020-06-20 RX ADMIN — LEVETIRACETAM 250 MG: 250 TABLET ORAL at 09:13

## 2020-06-20 RX ADMIN — OXYBUTYNIN CHLORIDE 5 MG: 5 TABLET ORAL at 09:13

## 2020-06-20 RX ADMIN — LEVETIRACETAM 1000 MG: 500 TABLET ORAL at 19:49

## 2020-06-20 RX ADMIN — LEVETIRACETAM 1250 MG: 500 TABLET, FILM COATED ORAL at 22:38

## 2020-06-20 RX ADMIN — LEVOTHYROXINE SODIUM 50 MCG: 0.05 TABLET ORAL at 06:26

## 2020-06-20 RX ADMIN — SODIUM CHLORIDE, PRESERVATIVE FREE 10 ML: 5 INJECTION INTRAVENOUS at 22:41

## 2020-06-20 RX ADMIN — LEVETIRACETAM 500 MG: 100 INJECTION, SOLUTION INTRAVENOUS at 16:07

## 2020-06-20 RX ADMIN — ATORVASTATIN CALCIUM 40 MG: 40 TABLET, FILM COATED ORAL at 09:13

## 2020-06-20 RX ADMIN — PAROXETINE HYDROCHLORIDE 30 MG: 20 TABLET, FILM COATED ORAL at 09:13

## 2020-06-20 RX ADMIN — HYDROCHLOROTHIAZIDE 12.5 MG: 25 TABLET ORAL at 09:13

## 2020-06-20 RX ADMIN — OXYBUTYNIN CHLORIDE 5 MG: 5 TABLET ORAL at 19:49

## 2020-06-20 RX ADMIN — AMLODIPINE BESYLATE 10 MG: 10 TABLET ORAL at 09:13

## 2020-06-20 RX ADMIN — LEVETIRACETAM 1000 MG: 500 TABLET ORAL at 09:14

## 2020-06-20 RX ADMIN — LEVETIRACETAM 250 MG: 250 TABLET ORAL at 19:48

## 2020-06-20 RX ADMIN — Medication 10 ML: at 19:49

## 2020-06-20 RX ADMIN — LACOSAMIDE 200 MG: 100 TABLET, FILM COATED ORAL at 19:48

## 2020-06-20 ASSESSMENT — PAIN SCALES - WONG BAKER
WONGBAKER_NUMERICALRESPONSE: 0

## 2020-06-20 ASSESSMENT — PAIN SCALES - GENERAL: PAINLEVEL_OUTOF10: 0

## 2020-06-20 NOTE — PROGRESS NOTES
Admission questions answered per patient's sister via telephone.  Electronically signed by Roberta Reeves RN on 6/20/2020 at 4:29 PM

## 2020-06-20 NOTE — CONSULTS
Laterality Date    BUNIONECTOMY Right     HAMMER TOE SURGERY Left 07/06/2016    ON 2ND, 3rd, 4th,  and  5TH TOES OF LEFT FOOT    OTHER SURGICAL HISTORY  2/23/15    orif left ankle with synthes and intraoperative c- arm    CT [facility] INCL FLUOR GDNCE DX W/CELL WASHG SPX N/A 1/16/2018    BRONCHOSCOPY WITH WASHINGS AT PATIENT BEDSIDE ICU performed by Carissa Melendez MD at [address] [place] [address] History     Socioeconomic History    Marital status: Single     Spouse name: Not on file    Number of children: Not on file    Years of education: Not on file    Highest education level: Not on file   Occupational History    Not on file   Social Needs    Financial resource strain: Not on file    Food insecurity     Worry: Not on file     Inability: Not on file    Transportation needs     Medical: Not on file     Non-medical: Not on file   Tobacco Use    Smoking status: Never Smoker    Smokeless tobacco: Never Used   Substance and Sexual Activity    Alcohol use: No     Alcohol/week: 0.0 standard drinks     Comment: quit 9482, was alcoholic    Drug use: No    Sexual activity: Never   Lifestyle    Physical activity     Days per week: Not on file     Minutes per session: Not on file    Stress: Not on file   Relationships    Social connections     Talks on phone: Not on file     Gets together: Not on file     Attends Hinduism service: Not on file     Active member of club or organization: Not on file     Attends meetings of clubs or organizations: Not on file     Relationship status: Not on file    Intimate partner violence     Fear of current or ex partner: Not on file     Emotionally abused: Not on file     Physically abused: Not on file     Forced sexual activity: Not on file   Other Topics Concern    Not on file   Social History Narrative    Not on file     Family History   Problem Relation Age of Onset    Cervical Cancer Mother     Heart Disease Father     Heart Disease Maternal Grandfather     Heart Disease Paternal Grandfather       No Known Allergies   /78   Pulse 75   Temp 97.9 °F (36.6 °C) (Oral)   Resp 16   Ht 5' 10\" (1.778 m)   Wt 135 lb 5.8 oz (61.4 kg)   SpO2 97%   BMI 19.42 kg/m²      ROS: Unable to obtain due to patient's condition    Neurological examination:    Mental status   The patient is awake. She has speech perseveration. The patient appears to have receptive aphasia     Cranial nerves   II - visual fields intact to confrontation                                                III, IV, VI - extra-ocular muscles full: no pupillary defect; no ARSH, no nystagmus, no ptosis                                                                      V - normal facial sensation                                                               VII - normal facial symmetry                                                             VIII - intact hearing                                                                             IX, X - symmetrical palate                                                                  XI - symmetrical shoulder shrug                                                       XII - midline tongue without atrophy or fasciculation     Motor function  Normal muscle bulk and tone; normal power 5/5   Sensory function  exam was limited due to patient's cognitive impairment     Cerebellar No involuntary movements or tremors     Reflex function Intact 2+ DTR and symmetric.  Negative Babinski     Gait                  Not tested         Lab Results   Component Value Date    LDLCHOLESTEROL 53 03/03/2020     No components found for: CHLPL  Lab Results   Component Value Date    TRIG 64 08/18/2016    TRIG 125 05/07/2015    TRIG 111 11/05/2013     Lab Results   Component Value Date    HDL 74 03/03/2020    HDL 71 08/18/2016    HDL 79 05/07/2015     No results found for: LDLCALC  No results found for: LABVLDL  Lab Results   Component Value Date    LABA1C 5.1 08/18/2016     Lab Results   Component Value Date     08/18/2016     No results found for: Chava Lim   Patient's long-term EEG monitoring at [facility] for 4 days showed frequent left temporal parietal spike wave discharges but did not have any seizures. Last clinical seizure was in August 2018 and seizure has been under control after increment of Keppra and addition of Vimpat. Her most recent seizure was August 2018             Lab Results   Component Value Date     LDLCHOLESTEROL 53 03/03/2020      No components found for: CHLPL          Lab Results   Component Value Date     TRIG 64 08/18/2016     TRIG 125 05/07/2015     TRIG 111 11/05/2013              Lab Results   Component Value Date     HDL 74 03/03/2020     HDL 71 08/18/2016     HDL 79 05/07/2015      No results found for: LDLCALC  No results found for: LABVLDL          Lab Results   Component Value Date     LABA1C 5.1 08/18/2016              Lab Results   Component Value Date      08/18/2016      No results found for: [id number]   Right MCA stroke 1998   Last seizure 2007, however there is concern of intermittent staring episodes with unknown frequency Possibly occurring once a week or so  2 D echo Sep 2012 normal.  MRA head normal, MRA neck normal  MRI FLAIR March 2013         Assessment and Recommendations:   Right cortical ischemic strok 1998  Patient with a history of seizure secondary to above    The patient had a breakthrough seizure. Recommend to continue Keppra 1250 mg twice daily. Agree with added Vimpat 200 mg twice daily. The patient would need dual antiepileptic medications long-term. At this time, she is aphasic. Likely she is post ictal however a possibility of simple partial status epilepticus cannot be excluded.   If mental status does not improve in next 3-4 hours, will suggest continuous EEG monitoring and for that sake, the patient can be transferred to [address] [place] [address]

## 2020-06-20 NOTE — PROGRESS NOTES
Patient follows commands and will answer questions. Patient asked to state name and she stated \"Yeah\" each time. After couple times she was able to state \"Laura\". When asked to state the name of location and the year, patient states \"yes. \" Patient's answers are slow responses and most of the time patient does not answer questions appropriately.  Electronically signed by Davonte Merrill RN on 6/20/2020 at 8:13 AM

## 2020-06-20 NOTE — PLAN OF CARE
27-Apr-2020 22:19 meals during this shift. 6/20/2020 0539 by Tae Dougherty RN  Outcome: Ongoing  Note: Patient ate 75% or more of her dinner, and fed herself. Goal: Maintenance of adequate weight for body size and type will improve to within specified parameters  Description: Maintenance of adequate weight for body size and type will improve to within specified parameters  Outcome: Met This Shift     Problem: Swallowing - Impaired:  Goal: Able to swallow without choking  Description: Able to swallow without choking  Outcome: Met This Shift  Note: Gag reflex intact. No coughing with pills or liquids. Problem: Aspiration - Risk of:  Goal: Absence of aspiration  Description: Absence of aspiration  Outcome: Met This Shift     Problem: Respiratory Function - Compromised:  Goal: Absence of pulmonary infection  Description: Absence of pulmonary infection  Outcome: Met This Shift  Goal: Levels of oxygenation will improve  Description: Levels of oxygenation will improve  6/20/2020 1553 by Brian Ortiz RN  Outcome: Met This Shift  Note: Patient maintains O2 on RA. No SOB noted. 6/20/2020 0539 by Tae Dougherty RN  Outcome: Ongoing  Note: Patient remained above 93% on room air. Goal: Ability to maintain a clear airway will improve  Description: Ability to maintain a clear airway will improve  Outcome: Met This Shift  Note: Patient is able to cough and deep breathe. Problem: Bowel Function - Altered:  Goal: Bowel elimination is within specified parameters  Description: Bowel elimination is within specified parameters  Outcome: Met This Shift  Goal: Control of bowel function will improve  Description: Control of bowel function will improve  Outcome: Met This Shift  Goal: Ability to maintain normal bowel function will improve  Description: Ability to maintain normal bowel function will improve  Outcome: Met This Shift  Note: BS active. Patient passing flatus. BM  this shift.       Problem: Falls - Risk of:  Goal: Will remain free from falls  Description: Will remain free from falls  6/20/2020 1553 by Oma Carranza RN  Outcome: Met This Shift  Note: Pt assessed as a fall risk this shift. Remains free from falls and accidental injury at this time. Fall precautions in place, including falling star sign and fall risk band on pt. Floor free from obstacles, and bed is locked and in lowest position. Adequate lighting provided. Pt encouraged to call before getting OOB for any need. Bed alarm activated. Will continue to monitor needs during hourly rounding, and reinforce education on use of call light.    6/20/2020 0539 by Chente Mercer RN  Outcome: Ongoing  Note: Patient remained free from falls. Call light within reach. Goal: Absence of physical injury  Description: Absence of physical injury  Outcome: Met This Shift     Problem: Discharge Planning:  Goal: Discharged to appropriate level of care  Description: Discharged to appropriate level of care  Outcome: Ongoing  Note: Patient is to discharge to home with sister when medically cleared.       Problem: Verbal Communication - Impaired:  Goal: Ability to interact with others will improve  Description: Ability to interact with others will improve  Outcome: Ongoing     Problem: Pain:  Goal: Pain level will decrease  Description: Pain level will decrease  Outcome: Ongoing     Problem: Self-Care Deficit:  Goal: Ability to perform activities of daily living will improve  Description: Ability to perform activities of daily living will improve  Outcome: Ongoing  Goal: Able to perform ADL with assistance  Description: Able to perform ADL with assistance  Outcome: Ongoing  Goal: Ability to communicate needs accurately will improve - ADL needs  Description: Ability to communicate needs accurately will improve - ADL needs  Outcome: Ongoing     Problem: Verbal Communication - Impaired:  Goal: Functional communication will improve  Description: Functional communication will

## 2020-06-20 NOTE — PROGRESS NOTES
While doing assessment when RN asked her name, date of birth, location, and situation patient responded \"masha sandra, yes\" to all questions. Patient did not squeeze RN hands or do dorsiflexion/plantar flexion. Patient again responded with \"masha sandra, yes\". Patient took medications whole, and has been drinking water. Patient has gotten up to the restroom with the . Patient's vital signs are stable and patient appears comfortable lying in bed watching TV.

## 2020-06-20 NOTE — DISCHARGE INSTR - COC
Essential hypertension I10    Seizure disorder (HCC) G40.909    Urge incontinence N39.41    Generalized anxiety disorder F41.1    Gastroesophageal reflux disease without esophagitis K21.9    H/O: CVA (cerebrovascular accident) Z80.78    Migraine without aura and without status migrainosus, not intractable G43.009    Hammer toe of left foot M20.42    Tremor of both hands R25.1    Status epilepticus ([address] G40.901    Seizure ([address] R56.9       Isolation/Infection:   Isolation          Contact        Patient Infection Status     Infection Onset Added Last Indicated Last Indicated By Review Planned Expiration Resolved Resolved By    MRSA  01/17/18 01/17/18 Jesus Castro RN        Sputum 7/2018 trach          Nurse Assessment:  Last Vital Signs: /78   Pulse 75   Temp 97.9 °F (36.6 °C) (Oral)   Resp 16   Ht 5' 10\" (1.778 m)   Wt 135 lb 5.8 oz (61.4 kg)   SpO2 97%   BMI 19.42 kg/m²     Last documented pain score (0-10 scale):    Last Weight:   Wt Readings from Last 1 Encounters:   06/20/20 135 lb 5.8 oz (61.4 kg)     Mental Status:  {IP PT MENTAL STATUS:02552}    IV Access:  { ELENA IV ACCESS:489669188}    Nursing Mobility/ADLs:  Walking   {CHP DME BVKP:489366167}  Transfer  {CHP DME ALWP:453544824}  Bathing  {P DME AFRU:348238401}  Dressing  {P DME AHPU:082745731}  Toileting  {CHP DME CISB:307381058}  Feeding  {CHP DME RTNN:428094062}  Med Admin  {P DME KXTX:082415532}  Med Delivery   { ELENA MED Delivery:481588873}    Wound Care Documentation and Therapy:  Wound 07/30/18 Other (Comment) Ankle Inner;Left Round, Red (Active)   Number of days: 691       Wound 07/30/18 Other (Comment) Ankle Outer;Right Round,Red (Active)   Number of days: 691        Elimination:  Continence:   · Bowel: {YES / KM:25563}  · Bladder: {YES / TQ:57375}  Urinary Catheter: {Urinary Catheter:331994098}   Colostomy/Ileostomy/Ileal Conduit: {YES / KF:35426}       Date of Last BM: ***    Intake/Output Summary (Last 24

## 2020-06-20 NOTE — CARE COORDINATION
need to go to [facility] for Continuous EEG testing. LSW notified. Orange header pt risk 8%, ELENA needs signed and completed.  .//pf               Electronically signed by: Vanessa Mcclain RN on 6/20/2020 at 3:53 PM

## 2020-06-20 NOTE — PLAN OF CARE
Problem: Verbal Communication - Impaired:  Goal: Functional communication will improve  Description: Functional communication will improve  6/20/2020 0539 by Hina Casper RN  Outcome: Ongoing  Note: Patient is able to say yes or no. Patient's answer is not always correct. Problem: Mobility - Impaired:  Goal: Able to ambulate independently  Description: Able to ambulate independently  6/20/2020 0539 by Hina Casper RN  Outcome: Ongoing  Note: Patient was able to go to the bathroom on her own. Problem: Injury - Risk of, Healthcare-Acquired Condition:  Goal: Will remain free from falls  Description: Will remain free from falls  6/20/2020 0539 by Hina Casper RN  Outcome: Ongoing  Note: Patient remained free from falls. Call light within reach. Problem: Nutrition Deficit - Risk of:  Goal: Ability to achieve adequate nutritional intake will improve  Description: Ability to achieve adequate nutritional intake will improve  6/20/2020 0539 by Hina Casper RN  Outcome: Ongoing  Note: Patient ate 75% or more of her dinner, and fed herself. Problem: Respiratory Function - Compromised:  Goal: Levels of oxygenation will improve  Description: Levels of oxygenation will improve  6/20/2020 0539 by Hina Casper RN  Outcome: Ongoing  Note: Patient remained above 93% on room air. Problem: Falls - Risk of:  Goal: Will remain free from falls  Description: Will remain free from falls  6/20/2020 0539 by Hina Casper RN  Outcome: Ongoing  Note: Patient remained free from falls. Call light within reach.

## 2020-06-21 LAB
AMPHETAMINE SCREEN URINE: NEGATIVE
BARBITURATE SCREEN URINE: NEGATIVE
BENZODIAZEPINE SCREEN, URINE: NEGATIVE
BUPRENORPHINE URINE: NORMAL
CANNABINOID SCREEN URINE: NEGATIVE
COCAINE METABOLITE, URINE: NEGATIVE
FOLATE: >20 NG/ML
MDMA URINE: NORMAL
METHADONE SCREEN, URINE: NEGATIVE
METHAMPHETAMINE, URINE: NORMAL
OPIATES, URINE: NEGATIVE
OXYCODONE SCREEN URINE: NEGATIVE
PHENCYCLIDINE, URINE: NEGATIVE
PROPOXYPHENE, URINE: NORMAL
SARS-COV-2, PCR: NORMAL
SARS-COV-2, RAPID: NOT DETECTED
SARS-COV-2: NORMAL
SOURCE: NORMAL
TEST INFORMATION: NORMAL
TRICYCLIC ANTIDEPRESSANTS, UR: NORMAL
VITAMIN B-12: 1589 PG/ML (ref 232–1245)
VITAMIN D 25-HYDROXY: 33.1 NG/ML (ref 30–100)

## 2020-06-21 PROCEDURE — 6370000000 HC RX 637 (ALT 250 FOR IP): Performed by: STUDENT IN AN ORGANIZED HEALTH CARE EDUCATION/TRAINING PROGRAM

## 2020-06-21 PROCEDURE — U0002 COVID-19 LAB TEST NON-CDC: HCPCS

## 2020-06-21 PROCEDURE — 2580000003 HC RX 258: Performed by: STUDENT IN AN ORGANIZED HEALTH CARE EDUCATION/TRAINING PROGRAM

## 2020-06-21 PROCEDURE — 80307 DRUG TEST PRSMV CHEM ANLYZR: CPT

## 2020-06-21 PROCEDURE — 36415 COLL VENOUS BLD VENIPUNCTURE: CPT

## 2020-06-21 PROCEDURE — 6360000002 HC RX W HCPCS: Performed by: STUDENT IN AN ORGANIZED HEALTH CARE EDUCATION/TRAINING PROGRAM

## 2020-06-21 PROCEDURE — 2000000003 HC NEURO ICU R&B

## 2020-06-21 PROCEDURE — 82607 VITAMIN B-12: CPT

## 2020-06-21 PROCEDURE — 82746 ASSAY OF FOLIC ACID SERUM: CPT

## 2020-06-21 PROCEDURE — 95720 EEG PHY/QHP EA INCR W/VEEG: CPT | Performed by: PSYCHIATRY & NEUROLOGY

## 2020-06-21 PROCEDURE — 6370000000 HC RX 637 (ALT 250 FOR IP): Performed by: NURSE PRACTITIONER

## 2020-06-21 PROCEDURE — 95714 VEEG EA 12-26 HR UNMNTR: CPT

## 2020-06-21 PROCEDURE — 82306 VITAMIN D 25 HYDROXY: CPT

## 2020-06-21 PROCEDURE — 2580000003 HC RX 258: Performed by: NURSE PRACTITIONER

## 2020-06-21 PROCEDURE — 99291 CRITICAL CARE FIRST HOUR: CPT | Performed by: PSYCHIATRY & NEUROLOGY

## 2020-06-21 RX ORDER — LEVOTHYROXINE SODIUM 0.05 MG/1
TABLET ORAL
Qty: 90 TABLET | Refills: 1 | Status: SHIPPED | OUTPATIENT
Start: 2020-06-21 | End: 2021-01-04

## 2020-06-21 RX ORDER — OXYBUTYNIN CHLORIDE 5 MG/1
TABLET ORAL
Qty: 270 TABLET | Refills: 1 | Status: SHIPPED | OUTPATIENT
Start: 2020-06-21 | End: 2021-01-04

## 2020-06-21 RX ORDER — HYDROCHLOROTHIAZIDE 12.5 MG/1
TABLET ORAL
Qty: 90 TABLET | Refills: 1 | Status: SHIPPED | OUTPATIENT
Start: 2020-06-21 | End: 2021-01-04

## 2020-06-21 RX ORDER — POTASSIUM CHLORIDE 20 MEQ/1
40 TABLET, EXTENDED RELEASE ORAL ONCE
Status: COMPLETED | OUTPATIENT
Start: 2020-06-21 | End: 2020-06-21

## 2020-06-21 RX ORDER — PAROXETINE 30 MG/1
TABLET, FILM COATED ORAL
Qty: 90 TABLET | Refills: 1 | Status: SHIPPED | OUTPATIENT
Start: 2020-06-21 | End: 2021-01-04

## 2020-06-21 RX ORDER — OMEPRAZOLE 20 MG/1
CAPSULE, DELAYED RELEASE ORAL
Qty: 90 CAPSULE | Refills: 1 | Status: SHIPPED | OUTPATIENT
Start: 2020-06-21 | End: 2021-01-04

## 2020-06-21 RX ORDER — AMLODIPINE BESYLATE 10 MG/1
TABLET ORAL
Qty: 90 TABLET | Refills: 1 | Status: SHIPPED | OUTPATIENT
Start: 2020-06-21 | End: 2020-12-30

## 2020-06-21 RX ORDER — ASPIRIN 81 MG/1
TABLET, FILM COATED ORAL
Qty: 90 TABLET | Refills: 1 | Status: SHIPPED | OUTPATIENT
Start: 2020-06-21 | End: 2020-12-30

## 2020-06-21 RX ORDER — ATORVASTATIN CALCIUM 40 MG/1
TABLET, FILM COATED ORAL
Qty: 90 TABLET | Refills: 1 | Status: SHIPPED | OUTPATIENT
Start: 2020-06-21 | End: 2021-01-04

## 2020-06-21 RX ORDER — LACOSAMIDE 100 MG/1
200 TABLET ORAL 2 TIMES DAILY
Status: DISCONTINUED | OUTPATIENT
Start: 2020-06-21 | End: 2020-06-23 | Stop reason: HOSPADM

## 2020-06-21 RX ORDER — SODIUM CHLORIDE 9 MG/ML
INJECTION, SOLUTION INTRAVENOUS CONTINUOUS
Status: DISCONTINUED | OUTPATIENT
Start: 2020-06-21 | End: 2020-06-22

## 2020-06-21 RX ADMIN — LEVETIRACETAM 1250 MG: 500 TABLET, FILM COATED ORAL at 09:44

## 2020-06-21 RX ADMIN — PAROXETINE HYDROCHLORIDE HEMIHYDRATE 30 MG: 20 TABLET, FILM COATED ORAL at 09:44

## 2020-06-21 RX ADMIN — SODIUM CHLORIDE, PRESERVATIVE FREE 10 ML: 5 INJECTION INTRAVENOUS at 09:45

## 2020-06-21 RX ADMIN — LEVOTHYROXINE SODIUM 50 MCG: 50 TABLET ORAL at 09:44

## 2020-06-21 RX ADMIN — SODIUM CHLORIDE: 9 INJECTION, SOLUTION INTRAVENOUS at 11:51

## 2020-06-21 RX ADMIN — LACOSAMIDE 200 MG: 100 TABLET, FILM COATED ORAL at 20:13

## 2020-06-21 RX ADMIN — ENOXAPARIN SODIUM 40 MG: 40 INJECTION SUBCUTANEOUS at 09:44

## 2020-06-21 RX ADMIN — LACOSAMIDE 100 MG: 100 TABLET, FILM COATED ORAL at 10:33

## 2020-06-21 RX ADMIN — HYDROCHLOROTHIAZIDE 12.5 MG: 25 TABLET ORAL at 09:44

## 2020-06-21 RX ADMIN — POTASSIUM CHLORIDE 40 MEQ: 1500 TABLET, EXTENDED RELEASE ORAL at 01:10

## 2020-06-21 RX ADMIN — SODIUM CHLORIDE, PRESERVATIVE FREE 10 ML: 5 INJECTION INTRAVENOUS at 20:12

## 2020-06-21 RX ADMIN — LEVETIRACETAM 1250 MG: 500 TABLET, FILM COATED ORAL at 20:12

## 2020-06-21 RX ADMIN — Medication 81 MG: at 09:44

## 2020-06-21 RX ADMIN — AMLODIPINE BESYLATE 10 MG: 10 TABLET ORAL at 09:44

## 2020-06-21 RX ADMIN — DESMOPRESSIN ACETATE 40 MG: 0.2 TABLET ORAL at 09:44

## 2020-06-21 RX ADMIN — PANTOPRAZOLE SODIUM 40 MG: 40 TABLET, DELAYED RELEASE ORAL at 09:44

## 2020-06-21 ASSESSMENT — PAIN SCALES - GENERAL
PAINLEVEL_OUTOF10: 0
PAINLEVEL_OUTOF10: 0

## 2020-06-21 NOTE — PROGRESS NOTES
Physical Therapy  DATE: 2020    NAME: Amye Frankel  MRN: [id number]   : 1955    Patient not seen this date for Physical Therapy due to:  [] Blood transfusion in progress  [] Hemodialysis  []  Patient Declined  [] Spine Precautions   [x] Strict Bedrest: SBR order in place. PT will check back as time allows or 20. [] Surgery/ Procedure  [] Testing      [] Other        [] PT being discontinued at this time. Patient independent. No further needs. [] PT being discontinued at this time as the patient has been transferred to palliative care. No further needs.     Akira Mendez, PT

## 2020-06-21 NOTE — PROGRESS NOTES
Patient transferred into room 531 in no distress, hooked up to telemetry monitor and vitals taken. Patient's personal belongings with her in a white plastic bag. Gold watch removed and put in a specimen cup on her bedside table. Ring on patient finger noted. Assessment completed and new IV started. Will continue to monitor.

## 2020-06-21 NOTE — PROCEDURES
LONG-TERM EEG-VIDEO 5[address]    Patient: Katerina Quintero  Age: 72 y.o. MRN: [id number]    Referring Physician: Jumana Miller MD  History: The patient is a 72 y.o. female who presented breakthrough seizure/encephalopathy. This long-term video-EEG monitoring study was performed to determine the nature of the patient's clinical events. The patient is on neuroactive medications.    Katerina Quintero   Current Facility-Administered Medications   Medication Dose Route Frequency Provider Last Rate Last Dose    sodium chloride flush 0.9 % injection 10 mL  10 mL Intravenous 2 times per day Veena Diaz MD   10 mL at 06/21/20 0945    sodium chloride flush 0.9 % injection 10 mL  10 mL Intravenous PRN Veena Diaz MD        acetaminophen (TYLENOL) tablet 650 mg  650 mg Oral Q6H PRN Veena Diaz MD        Or   Hannah acetaminophen (TYLENOL) suppository 650 mg  650 mg Rectal Q6H PRN Veena Diaz MD        polyethylene glycol (GLYCOLAX) packet 17 g  17 g Oral Daily PRN Veena Diaz MD        promethazine (PHENERGAN) tablet 12.5 mg  12.5 mg Oral Q6H PRN Veena Diaz MD        Or    [facility]dansetron [facility] injection 4 mg  4 mg Intravenous Q6H PRN Veena Diaz MD        enoxaparin (LOVENOX) injection 40 mg  40 mg Subcutaneous Daily Veena Diaz MD   40 mg at 06/21/20 0944    levETIRAcetam (KEPPRA) tablet 1,250 mg  1,250 mg Oral BID Veena Diaz MD   1,250 mg at 06/21/20 0944    lacosamide (VIMPAT) tablet 100 mg  100 mg Oral BID Veena Diaz MD   100 mg at 06/20/20 2218    atorvastatin (LIPITOR) tablet 40 mg  40 mg Oral Daily Veena Diaz MD   40 mg at 06/21/20 0944    aspirin EC tablet 81 mg  81 mg Oral Daily Veena Diaz MD   81 mg at 06/21/20 0944    amLODIPine (NORVASC) tablet 10 mg  10 mg Oral Daily Veena Diaz MD   10 mg at

## 2020-06-21 NOTE — PLAN OF CARE
Problem: Safety:  Goal: Ability to remain free from injury will improve  Description: Ability to remain free from injury will improve  Outcome: Met This Shift     Problem: Coping:  Goal: Ability to identify appropriate support needs will improve  Description: Ability to identify appropriate support needs will improve  Outcome: Ongoing     Problem: Health Behavior:  Goal: Ability to manage health-related needs will improve  Description: Ability to manage health-related needs will improve  Outcome: Ongoing     Problem: Self-Concept:  Goal: Ability to verbalize feelings about condition will improve  Description: Ability to verbalize feelings about condition will improve  Outcome: Ongoing

## 2020-06-21 NOTE — PROGRESS NOTES
Occupational Therapy    Occupational Therapy Not Seen Note    DATE: 2020  Name: Faisal Miller  : 1955  MRN: [id number]    Patient not available for Occupational Therapy due to:    Strict Bedrest    Next Scheduled Treatment: Attempt on  as appropriate.     Electronically signed by Ozzy John OT on 2020 at 7:35 AM

## 2020-06-21 NOTE — PROGRESS NOTES
Wilfredor attempted to call tele sitting twice for a camera for this seizure precaution patient but none are available in the hospital at this time. Will continue to monitor.

## 2020-06-21 NOTE — PROGRESS NOTES
Patient has departed from the unit via [facility] transport. Report was given to staff upon discharge. Telemetry monitor was removed from patient, cleaned and placed in locked room cabinet. Patient was secured to stretcher with all belongings. All questions were answered.      Electronically signed by Chon Miller RN on 6/20/2020 at 8:21 PM

## 2020-06-22 ENCOUNTER — APPOINTMENT (OUTPATIENT)
Dept: MRI IMAGING | Age: 65
DRG: 058 | End: 2020-06-22
Attending: PSYCHIATRY & NEUROLOGY
Payer: COMMERCIAL

## 2020-06-22 LAB
-: NORMAL
ABSOLUTE EOS #: 0.12 K/UL (ref 0–0.44)
ABSOLUTE IMMATURE GRANULOCYTE: <0.03 K/UL (ref 0–0.3)
ABSOLUTE LYMPH #: 0.93 K/UL (ref 1.1–3.7)
ABSOLUTE MONO #: 0.3 K/UL (ref 0.1–1.2)
ANION GAP SERPL CALCULATED.3IONS-SCNC: 15 MMOL/L (ref 9–17)
BASOPHILS # BLD: 1 % (ref 0–2)
BASOPHILS ABSOLUTE: <0.03 K/UL (ref 0–0.2)
BUN BLDV-MCNC: 8 MG/DL (ref 8–23)
BUN/CREAT BLD: ABNORMAL (ref 9–20)
CALCIUM SERPL-MCNC: 9 MG/DL (ref 8.6–10.4)
CHLORIDE BLD-SCNC: 103 MMOL/L (ref 98–107)
CO2: 23 MMOL/L (ref 20–31)
CREAT SERPL-MCNC: 0.65 MG/DL (ref 0.5–0.9)
DIFFERENTIAL TYPE: ABNORMAL
EOSINOPHILS RELATIVE PERCENT: 3 % (ref 1–4)
GFR AFRICAN AMERICAN: >60 ML/MIN
GFR NON-AFRICAN AMERICAN: >60 ML/MIN
GFR SERPL CREATININE-BSD FRML MDRD: ABNORMAL ML/MIN/{1.73_M2}
GFR SERPL CREATININE-BSD FRML MDRD: ABNORMAL ML/MIN/{1.73_M2}
GLUCOSE BLD-MCNC: 148 MG/DL (ref 70–99)
HCT VFR BLD CALC: 37.7 % (ref 36.3–47.1)
HEMOGLOBIN: 12.5 G/DL (ref 11.9–15.1)
IMMATURE GRANULOCYTES: 0 %
LYMPHOCYTES # BLD: 25 % (ref 24–43)
MCH RBC QN AUTO: 32.2 PG (ref 25.2–33.5)
MCHC RBC AUTO-ENTMCNC: 33.2 G/DL (ref 28.4–34.8)
MCV RBC AUTO: 97.2 FL (ref 82.6–102.9)
MONOCYTES # BLD: 8 % (ref 3–12)
NRBC AUTOMATED: 0 PER 100 WBC
PDW BLD-RTO: 12.2 % (ref 11.8–14.4)
PLATELET # BLD: 197 K/UL (ref 138–453)
PLATELET ESTIMATE: ABNORMAL
PMV BLD AUTO: 10.7 FL (ref 8.1–13.5)
POTASSIUM SERPL-SCNC: 3.6 MMOL/L (ref 3.7–5.3)
RBC # BLD: 3.88 M/UL (ref 3.95–5.11)
RBC # BLD: ABNORMAL 10*6/UL
REASON FOR REJECTION: NORMAL
SARS-COV-2, PCR: NORMAL
SARS-COV-2, RAPID: NORMAL
SARS-COV-2: NOT DETECTED
SEG NEUTROPHILS: 63 % (ref 36–65)
SEGMENTED NEUTROPHILS ABSOLUTE COUNT: 2.35 K/UL (ref 1.5–8.1)
SODIUM BLD-SCNC: 141 MMOL/L (ref 135–144)
SOURCE: NORMAL
WBC # BLD: 3.7 K/UL (ref 3.5–11.3)
WBC # BLD: ABNORMAL 10*3/UL
ZZ NTE CLEAN UP: ORDERED TEST: NORMAL
ZZ NTE WITH NAME CLEAN UP: SPECIMEN SOURCE: NORMAL

## 2020-06-22 PROCEDURE — 70552 MRI BRAIN STEM W/DYE: CPT

## 2020-06-22 PROCEDURE — 36415 COLL VENOUS BLD VENIPUNCTURE: CPT

## 2020-06-22 PROCEDURE — 6360000004 HC RX CONTRAST MEDICATION: Performed by: NURSE PRACTITIONER

## 2020-06-22 PROCEDURE — 6370000000 HC RX 637 (ALT 250 FOR IP): Performed by: STUDENT IN AN ORGANIZED HEALTH CARE EDUCATION/TRAINING PROGRAM

## 2020-06-22 PROCEDURE — 6360000002 HC RX W HCPCS: Performed by: STUDENT IN AN ORGANIZED HEALTH CARE EDUCATION/TRAINING PROGRAM

## 2020-06-22 PROCEDURE — 80048 BASIC METABOLIC PNL TOTAL CA: CPT

## 2020-06-22 PROCEDURE — 85025 COMPLETE CBC W/AUTO DIFF WBC: CPT

## 2020-06-22 PROCEDURE — U0003 INFECTIOUS AGENT DETECTION BY NUCLEIC ACID (DNA OR RNA); SEVERE ACUTE RESPIRATORY SYNDROME CORONAVIRUS 2 (SARS-COV-2) (CORONAVIRUS DISEASE [COVID-19]), AMPLIFIED PROBE TECHNIQUE, MAKING USE OF HIGH THROUGHPUT TECHNOLOGIES AS DESCRIBED BY CMS-2020-01-R: HCPCS

## 2020-06-22 PROCEDURE — 97530 THERAPEUTIC ACTIVITIES: CPT

## 2020-06-22 PROCEDURE — 2580000003 HC RX 258: Performed by: STUDENT IN AN ORGANIZED HEALTH CARE EDUCATION/TRAINING PROGRAM

## 2020-06-22 PROCEDURE — 2000000003 HC NEURO ICU R&B

## 2020-06-22 PROCEDURE — 97162 PT EVAL MOD COMPLEX 30 MIN: CPT

## 2020-06-22 PROCEDURE — 92523 SPEECH SOUND LANG COMPREHEN: CPT

## 2020-06-22 PROCEDURE — 97166 OT EVAL MOD COMPLEX 45 MIN: CPT

## 2020-06-22 PROCEDURE — 95720 EEG PHY/QHP EA INCR W/VEEG: CPT | Performed by: PSYCHIATRY & NEUROLOGY

## 2020-06-22 PROCEDURE — 97535 SELF CARE MNGMENT TRAINING: CPT

## 2020-06-22 PROCEDURE — 6370000000 HC RX 637 (ALT 250 FOR IP): Performed by: NURSE PRACTITIONER

## 2020-06-22 PROCEDURE — 95711 VEEG 2-12 HR UNMONITORED: CPT

## 2020-06-22 PROCEDURE — A9576 INJ PROHANCE MULTIPACK: HCPCS | Performed by: NURSE PRACTITIONER

## 2020-06-22 PROCEDURE — 2060000000 HC ICU INTERMEDIATE R&B

## 2020-06-22 RX ORDER — SODIUM CHLORIDE 0.9 % (FLUSH) 0.9 %
10 SYRINGE (ML) INJECTION PRN
Status: DISCONTINUED | OUTPATIENT
Start: 2020-06-22 | End: 2020-06-23 | Stop reason: HOSPADM

## 2020-06-22 RX ADMIN — SODIUM CHLORIDE, PRESERVATIVE FREE 10 ML: 5 INJECTION INTRAVENOUS at 20:09

## 2020-06-22 RX ADMIN — PAROXETINE HYDROCHLORIDE HEMIHYDRATE 30 MG: 20 TABLET, FILM COATED ORAL at 09:11

## 2020-06-22 RX ADMIN — PANTOPRAZOLE SODIUM 40 MG: 40 TABLET, DELAYED RELEASE ORAL at 07:50

## 2020-06-22 RX ADMIN — ENOXAPARIN SODIUM 40 MG: 40 INJECTION SUBCUTANEOUS at 09:12

## 2020-06-22 RX ADMIN — Medication 81 MG: at 09:12

## 2020-06-22 RX ADMIN — AMLODIPINE BESYLATE 10 MG: 10 TABLET ORAL at 09:12

## 2020-06-22 RX ADMIN — LACOSAMIDE 200 MG: 100 TABLET, FILM COATED ORAL at 20:00

## 2020-06-22 RX ADMIN — LEVOTHYROXINE SODIUM 50 MCG: 50 TABLET ORAL at 09:11

## 2020-06-22 RX ADMIN — GADOTERIDOL 12 ML: 279.3 INJECTION, SOLUTION INTRAVENOUS at 18:24

## 2020-06-22 RX ADMIN — SODIUM CHLORIDE, PRESERVATIVE FREE 10 ML: 5 INJECTION INTRAVENOUS at 09:13

## 2020-06-22 RX ADMIN — LACOSAMIDE 200 MG: 100 TABLET, FILM COATED ORAL at 09:11

## 2020-06-22 RX ADMIN — DESMOPRESSIN ACETATE 40 MG: 0.2 TABLET ORAL at 09:12

## 2020-06-22 RX ADMIN — LEVETIRACETAM 1250 MG: 500 TABLET, FILM COATED ORAL at 19:59

## 2020-06-22 RX ADMIN — HYDROCHLOROTHIAZIDE 12.5 MG: 25 TABLET ORAL at 09:11

## 2020-06-22 RX ADMIN — LEVETIRACETAM 1250 MG: 500 TABLET, FILM COATED ORAL at 09:11

## 2020-06-22 ASSESSMENT — PAIN SCALES - GENERAL
PAINLEVEL_OUTOF10: 0
PAINLEVEL_OUTOF10: 0

## 2020-06-22 NOTE — PROGRESS NOTES
6/20/2020 10:01 pm COMPARISON: 08/05/2018 HISTORY: ORDERING SYSTEM PROVIDED HISTORY: seizure, poss aspiration TECHNOLOGIST PROVIDED HISTORY: -SIRD seizure, poss aspiration Reason for Exam: upr FINDINGS: The cardiomediastinal silhouette is normal in size and contour. The lungs are clear. No pleural effusion or pneumothorax is present. Colonic interposition on the right. Retained stool within the colonic loops. No acute cardiopulmonary process     Mri Brain Wo Contrast    Result Date: 6/20/2020  EXAMINATION: MRI OF THE BRAIN WITHOUT CONTRAST  6/20/2020 3:28 pm TECHNIQUE: Multiplanar multisequence MRI of the brain was performed without the administration of intravenous contrast. COMPARISON: 06/19/2020, CT brain HISTORY: ORDERING SYSTEM PROVIDED HISTORY: AMS TECHNOLOGIST PROVIDED HISTORY: AMS Reason for Exam: pt having altered mental status changes, unable to answer questions appropriately. screening form obtained by pt's sister(POA) FINDINGS: INTRACRANIAL STRUCTURES/VENTRICLES: There are no areas of restricted diffusion identified to suggest an acute infarct. There is no acute intracranial hemorrhage. No mass effect or midline shift is present. There is a remote right MCA infarct. There is mild increased T2/FLAIR signal intensity within the periventricular white matter. There is no sellar or suprasellar mass present. There is no ventriculomegaly or abnormal extra-axial fluid collection present. The proximal portions of the [place] demonstrate normal flow voids. ORBITS: Limited evaluation of the orbits is unremarkable. SINUSES: The paranasal sinuses and mastoid air cells are clear. BONES/SOFT TISSUES: Bone marrow signal intensity is normal.     1. No acute infarct or acute intracranial process identified. 2. Remote right MCA infarct. Labs and Images reviewed with:  [] Dr. Grisel Santos. Scar    [x] Dr. Karson Sahu  [] Dr. Whitney Mayorga  [] There are no new interval images to review.      PHYSICAL EXAM       CONSTITUTIONAL:  Alert and oriented x person, place. Forgetful, but re-orients easily. No dysarthria. Moderate expressive aphasia, slow to respond, some difficulty naming objects. HEAD:  normocephalic, atraumatic    EYES:  PERRL, EOMI   ENT:  moist mucous membranes   NECK:  supple, symmetric   LUNGS:  Equal air entry bilaterally, clear   CARDIOVASCULAR:  normal s1 / s2, RRR, distal pulses intact   ABDOMEN:  Soft, no rigidity   NEUROLOGIC:  Mental Status:  A & O x person and place. Forgetful. Cranial Nerves:    cranial nerves II-XII are grossly intact    Motor Exam:    Drift:  absent  Tone:  normal    Motor exam is symmetrical 5 out of 5 all extremities bilaterally    Sensory:    Touch:    Right Upper Extremity:  normal  Left Upper Extremity:  normal  Right Lower Extremity:  normal  Left Lower Extremity:  normal       DRAINS:  [x] There are no drains for Neuro Critical Care to monitor at this time. ASSESSMENT AND PLAN:       The patient is a 71 yo female with a history of HTN, Hypothyroidism, PVD, and R MCA infarct in 1998 with subsequent seizures who presented as a transfer from [address] [place] [address] for [facility]. Initially presented to outlying facility after having a witnessed seizure at home.     NEUROLOGIC:  - Recurrent seizures  - Continue Keppra 1250mg BID, Vimpat 200mg BID  - MRI brain wo contrast showed no acute abnormality  - LTME with no electrographic seizures, continued left anterior temporal sharp waves and intermittent TIRDA conferred increased risk for seizure  - Stop LTME  - F/U MRI brain w contrast (old stroke is on right, sharp waves from left)  - Goal SBP<160, normotensive  - Home Aspirin 81mg QD, Lipitor 40mg QD for secondary stroke prevention  - Neuro checks per protocol    CARDIOVASCULAR:  - Goal normotension  - Continue home Norvasc 10mg QD, HCTZ 12.5mg QD  - Previous Echo (2018) EF>55%  - Continue telemetry    PULMONARY:  - Maintaining O2 sats on room air  - CXR with no acute pathology    RENAL/FLUID/ELECTROLYTE:  - Normal renal functioning  - BUN 8/ Creatinine 0.65  - Monitor I&O; 606/1700  - IVF: No maintenance IVF  - Hypokalemia, K 3.6; give 40meq KCl  - Replace electrolytes PRN  - Daily BMP    GI/NUTRITION:  NUTRITION:  DIET GENERAL;  - Bowel regimen: Senokot-S daily  - GI prophylaxis: Protonix (sub for home PPI)    ID:  - Afebrile, Tmax 37.3  - WBC 3.7  - Infectious work up: UA and CXR unremarkable  - COVID-19 negative  - Continue to monitor for fevers  - Daily CBC    HEME:   - H&H 12.5/37.7  - Platelets 394  - Daily CBC    ENDOCRINE:  - Continue to monitor blood glucose, goal <180  - Blood glucose controlled  - Hypothyroidism; continue Synthroid 50mcg QD  - TSH 9.40, T4 0.98    OTHER:  - PT/OT/ST  - Discharge planning; [facility]  - Code Status: FULL    PROPHYLAXIS:  Stress ulcer: PPI    DVT PROPHYLAXIS:  - SCD sleeves - Thigh High   - Lovenox    DISPOSITION:  [x] OK for out of ICU from Neuro Critical Care standpoint    We will continue to follow along. For any changes in exam or patient status please contact Neuro Critical Care.       Carlitos Aaron, APRN - [facility]  Neuro Critical Care  Pager [phone]  6/22/2020     8:07 AM

## 2020-06-22 NOTE — PROGRESS NOTES
/ Caregiver Present: No  Diagnosis: Seizure, AMS, R MCA infarct  Patient Currently in Pain: No  Pain Assessment  Pain Assessment: 0-10  Pain Level: 0  Vital Signs  Pulse: 74  Resp: 16  BP: 118/73  MAP (mmHg): 87  Level of Consciousness: Alert  Patient Currently in Pain: No  Oxygen Therapy  SpO2: 96 %  Social/Functional History  Social/Functional History  Lives With: Family(Sister, 28 yo nephew)  Type of Home: House  Home Layout: Two level, Bed/Bath upstairs  Home Access: Stairs to enter without rails  Entrance Stairs - Number of Steps: 1  Bathroom Shower/Tub: Tub only  Bathroom Toilet: Standard  Home Equipment: Standard walker(Doesn't use)  ADL Assistance: Independent  Homemaking Assistance: Independent  Homemaking Responsibilities: Yes  Ambulation Assistance: Independent  Transfer Assistance: Independent  Active : No  Patient's  Info: Seizures, [facility] care bus takes pt   Occupation: Retired  Type of occupation: [employer]  Leisure & Hobbies: tv     Objective   Vision: Impaired  Vision Exceptions: Wears glasses at all times  Hearing: Within functional limits    Orientation  Overall Orientation Status: Within Functional Limits     Balance  Sitting Balance: Modified independent   Standing Balance: Supervision  Standing Balance  Time: 9 min  Activity: Pt stood bedside, x2, sinkside, func mob to/from bathroom  Functional Mobility  Functional - Mobility Device: Rolling Walker  Activity: To/from bathroom  Assist Level: Contact guard assistance  Functional Mobility Comments: Some dizziness/unsteadiness upon first standing, overall pt did well with RW, LTME   ADL  Feeding: Modified independent ; Increased time to complete  Grooming: Supervision; Increased time to complete  UE Bathing: Stand by assistance; Increased time to complete  LE Bathing: Contact guard assistance; Increased time to complete  UE Dressing: Minimal assistance; Increased time to complete  LE Dressing: Minimal assistance; Increased time to complete  Toileting: Minimal assistance; Increased time to complete  Additional Comments: Pt donned B/L socks sitting on EOB, pt stood sinkside during oral care activity, some unsteadiness when standing, doing well overall  Tone RUE  RUE Tone: Normotonic  Tone LUE  LUE Tone: Normotonic  Coordination  Movements Are Fluid And Coordinated: Yes     Bed mobility  Supine to Sit: Supervision  Sit to Supine: Supervision  Scooting: Modified independent  Comment: Pt supine in bed upon arrival/exit this date with alarm on, good bed mob noted  Transfers  Sit to stand: Contact guard assistance  Stand to sit: Stand by assistance     Cognition  Overall Cognitive Status: WFL  Cognition Comment: However, pt often mimicks words after writer says them, pt keeps saying \"I repeat the first letter of the word you said last\", which isn't exactly correct as pt says the full word     Sensation  Overall Sensation Status: WFL      LUE AROM (degrees)  LUE AROM : WFL  RUE AROM (degrees)  RUE AROM : WFL  LUE Strength  Gross LUE Strength: WNL  L Hand General: 5/5  RUE Strength  Gross RUE Strength:  WNL  R Hand General: 5/5      Plan   Plan  Times per week: 3-4x  AM-PAC Inpatient Daily Activity Raw Score: 19 (06/22/20 1309)  AM-PAC Inpatient ADL T-Scale Score : 40.22 (06/22/20 1309)  ADL Inpatient CMS 0-100% Score: 42.8 (06/22/20 1309)  ADL Inpatient CMS G-Code Modifier : CK (06/22/20 1309)    Goals  Short term goals  Time Frame for Short term goals: Pt will by discharge  Short term goal 1: demo good safety awareness during func mob around room using LRD and mod I  Short term goal 2: demo ADL UB/LB dressing/bathing activity with setup, increased time, and mod I  Short term goal 3: demo bending/reaching func activity while standing using LRD and SUP  Short term goal 4: demo activity tolerance for 35 min+  Short term goal 5: demo EC/WS tech's during func activity with 1 vc     Therapy Time   Individual Concurrent Group Co-treatment   Time In 1000 Time Out 1030         Minutes 30         Timed Code Treatment Minutes: 25 Minutes       Narinder Fuller, OTR/L

## 2020-06-22 NOTE — PROGRESS NOTES
Physical Therapy    [address]  Initial Assessment    NAME: Vahe Han  : 1955  MRN: [id number]    Date of Service: 2020  72year-old patient admitted to neuro ICU, transferred from [facility] for management of seizures. Recurrent seizures followed by speech deficits of aphasia and mild encephalopathy concerning for status epilepticus. She was seen by neurology/ at [facility] and he also follows her in the clinic. Discharge Recommendations:  Patient would benefit from continued therapy after discharge   PT Equipment Recommendations  Equipment Needed: No    Assessment   Body structures, Functions, Activity limitations: Decreased functional mobility ; Decreased endurance;Decreased safe awareness;Decreased balance  Prognosis: Good  Decision Making: Medium Complexity  PT Education: PT Role;General Safety  Barriers to Learning: Cognition  REQUIRES PT FOLLOW UP: Yes  Activity Tolerance  Activity Tolerance: Patient Tolerated treatment well       Patient Diagnosis(es): There were no encounter diagnoses. has a past medical history of Ankle fracture, left, Anxiety, Arthritis, ETOH abuse, Frequency of urination, GERD (gastroesophageal reflux disease), Head injury, Headache(784.0), Hypertension, Hypotension, Hypothyroidism, Numbness and tingling, Osteoporosis, Peripheral vascular disease ([address] Pneumonia, Reflux, Seizures ([address] Shingles, Stroke ([address] TIA (transient ischemic attack), Tremor, UTI (urinary tract infection), Varicose veins, and Wears glasses. has a past surgical history that includes Bunionectomy (Right); Tubal ligation; other surgical history (2/23/15); Hammer toe surgery (Left, 2016); and pr [facility] incl fluor gdnce dx w/cell washg spx (N/A, 2018).     Restrictions  Restrictions/Precautions  Restrictions/Precautions: General Precautions, Fall Risk, Seizure  Required Braces or Orthoses?: No  Position Activity Restriction  Other position/activity restrictions: up with A  Vision/Hearing        Subjective  General  Patient assessed for rehabilitation services?: Yes  Follows Commands: Within Functional Limits  Pain Screening  Patient Currently in Pain: No  Vital Signs  Patient Currently in Pain: No       Orientation  Orientation  Overall Orientation Status: Within Functional Limits  Social/Functional History  Social/Functional History  Lives With: Family(Sister, 26 yo nephew)  Type of Home: House  Home Layout: Two level, Bed/Bath upstairs  Home Access: Stairs to enter without rails  Entrance Stairs - Number of Steps: 1  Bathroom Shower/Tub: Tub only  Bathroom Toilet: Standard  Home Equipment: Standard walker(Doesn't use)  ADL Assistance: Independent  Homemaking Assistance: Independent  Homemaking Responsibilities: Yes  Ambulation Assistance: Independent  Transfer Assistance: Independent  Active : No  Patient's  Info: Seizures, Senior care bus takes pt   Occupation: Retired  Type of occupation: [employer]  Leisure & Hobbies: tv  Cognition   Cognition  Overall Cognitive Status: Exceptions  Arousal/Alertness: Appropriate responses to stimuli  Following Commands: Follows one step commands with repetition  Attention Span: Difficulty attending to directions  Safety Judgement: Decreased awareness of need for assistance  Insights: Decreased awareness of deficits  Sequencing: Requires cues for some    Objective          AROM RLE (degrees)  RLE AROM: WFL  AROM LLE (degrees)  LLE AROM : WFL  AROM RUE (degrees)  RUE AROM : WFL  AROM LUE (degrees)  LUE AROM : WFL  Strength RLE  Strength RLE: WFL  Strength LLE  Strength LLE: WFL  Strength RUE  Strength RUE: WFL  Strength LUE  Strength LUE: WFL  Coordination  Finger to Nose: Normal but required multiple cues to follow demonstration. Thumb to fingers: Pt required multiple cues to follow directions.   Sensation  Overall Sensation Status: WFL  Bed mobility  Supine to Sit: Stand by assistance  Scooting:

## 2020-06-22 NOTE — PROGRESS NOTES
Speech Language Pathology  Facility/Department: [address]  Initial Speech/Language/Cognitive Assessment    NAME: Luis E Mathis  : 1955   MRN: [id number]  ADMISSION DATE: 2020  ADMITTING DIAGNOSIS: has Acquired hypothyroidism; Essential hypertension; Encephalopathy acute; Urge incontinence; Generalized anxiety disorder; Gastroesophageal reflux disease without esophagitis; H/O: CVA (cerebrovascular accident); Migraine without aura and without status migrainosus, not intractable; Hammer toe of left foot; Tremor of both hands; Status epilepticus ([address] Seizure ([address] and Speech and language deficits on their problem list.      Date of Eval: 2020   Evaluating Therapist: ROBBI Motley    RECENT RESULTS  CT OF HEAD/MRI:   Impression   1. No acute infarct or acute intracranial process identified. 2. Remote right MCA infarct. Primary Complaint: Multiple seizures  Acute encephalopathy with speech deficits  History of epilepsy     72year-old patient admitted to neuro ICU, transferred from [facility] for management of seizures. Recurrent seizures followed by speech deficits of aphasia and mild encephalopathy concerning for status epilepticus. She was seen by neurology/ at [facility] and he also follows her in the clinic.     LTM EEG monitoring started on arrival to neuro ICU. Antiseizure medications including high-dose Keppra and lacosamide continued. Initial infectious work-up including UA and chest x-ray negative. Brain imaging including MRI brain from this admission reviewed and is unremarkable for any acute ischemic stroke-diffusion restriction or structural abnormalities. Patient has history of right cerebral infarction and encephalomalacia.   Interestingly her epileptiform discharges on EEG monitoring are from left cerebral hemisphere-discussed with EEG attending and was also the case last time she underwent continuous EEG in previous consulted  Consulted and agree with results and recommendations: Patient    Education:  Patient Education: yes  Patient Education Response: Verbalizes understanding          Therapy Time:   Individual Concurrent Group Co-treatment   Time In 0920         Time Out 0935         Minutes 15                 Vanita-Viru 25, SLP  6/22/2020 9:45 AM

## 2020-06-22 NOTE — PROCEDURES
81 mg at 06/22/20 0912    amLODIPine (NORVASC) tablet 10 mg  10 mg Oral Daily Wes Hunter MD   10 mg at 06/22/20 0912    levothyroxine (SYNTHROID) tablet 50 mcg  50 mcg Oral Daily Wes Hunter MD   50 mcg at 06/22/20 0911    pantoprazole (PROTONIX) tablet 40 mg  40 mg Oral QAM AC Wes Hunter MD   40 mg at 06/22/20 0750    PARoxetine (PAXIL) tablet 30 mg  30 mg Oral Daily Wes Hunter MD   30 mg at 06/22/20 0911    hydroCHLOROthiazide (HYDRODIURIL) tablet 12.5 mg  12.5 mg Oral Daily Wes Hunter MD   12.5 mg at 06/22/20 0911    LORazepam (ATIVAN) injection 2 mg  2 mg Intravenous Q5 Min PRN Wes Hunter MD         Technical Description: This is a 21-channel digital EEG recording with time-locked video. Electrodes were placed in accordance with the 10-20 International System of Electrode Placement. Single lead EKG monitoring was included. Baseline EEG Recording:  A formal baseline EEG recording was not obtained. Day 1 - 6/20/20, starting at 23:30    Interictal EEG Samples: The background activity over the right hemisphere was better organized and showed a posterior dominant rhythm of 8 to 9 Hz of alpha activity. Near continuous left frontotemporal polymorphic delta theta slowing of 3 to 5 Hz was seen. Frequent left anterior temporal sharp waves were seen at [facility]. Left temporal intermittent rhythmic delta activity was seen. During behavioral sleep, sleep spindles and vertex sharp waves were seen which were symmetric over both hemispheres. The EKG channel revealed no abnormalities. Ictal EEG Recording / Patient Events: During this period the patient had no events or seizures. Summary: During this day of recording no events were recorded. The interictal EEG was abnormal due to near continuous left frontotemporal polymorphic delta theta slowing suggestive of underlying neuronal dysfunction.   Left anterior temporal sharp waves and temporal intermittent

## 2020-06-23 VITALS
HEART RATE: 87 BPM | TEMPERATURE: 98.2 F | DIASTOLIC BLOOD PRESSURE: 54 MMHG | RESPIRATION RATE: 15 BRPM | BODY MASS INDEX: 19.42 KG/M2 | HEIGHT: 70 IN | OXYGEN SATURATION: 93 % | SYSTOLIC BLOOD PRESSURE: 94 MMHG

## 2020-06-23 PROBLEM — R56.9 SEIZURE (HCC): Status: RESOLVED | Noted: 2020-06-19 | Resolved: 2020-06-23

## 2020-06-23 LAB
ABSOLUTE EOS #: 0.19 K/UL (ref 0–0.44)
ABSOLUTE IMMATURE GRANULOCYTE: <0.03 K/UL (ref 0–0.3)
ABSOLUTE LYMPH #: 1.84 K/UL (ref 1.1–3.7)
ABSOLUTE MONO #: 0.57 K/UL (ref 0.1–1.2)
ANION GAP SERPL CALCULATED.3IONS-SCNC: 12 MMOL/L (ref 9–17)
BASOPHILS # BLD: 1 % (ref 0–2)
BASOPHILS ABSOLUTE: 0.05 K/UL (ref 0–0.2)
BUN BLDV-MCNC: 9 MG/DL (ref 8–23)
BUN/CREAT BLD: ABNORMAL (ref 9–20)
CALCIUM SERPL-MCNC: 8.6 MG/DL (ref 8.6–10.4)
CHLORIDE BLD-SCNC: 103 MMOL/L (ref 98–107)
CO2: 24 MMOL/L (ref 20–31)
CREAT SERPL-MCNC: 0.61 MG/DL (ref 0.5–0.9)
DIFFERENTIAL TYPE: NORMAL
EOSINOPHILS RELATIVE PERCENT: 4 % (ref 1–4)
GFR AFRICAN AMERICAN: >60 ML/MIN
GFR NON-AFRICAN AMERICAN: >60 ML/MIN
GFR SERPL CREATININE-BSD FRML MDRD: ABNORMAL ML/MIN/{1.73_M2}
GFR SERPL CREATININE-BSD FRML MDRD: ABNORMAL ML/MIN/{1.73_M2}
GLUCOSE BLD-MCNC: 79 MG/DL (ref 70–99)
HCT VFR BLD CALC: 40.7 % (ref 36.3–47.1)
HEMOGLOBIN: 13.8 G/DL (ref 11.9–15.1)
IMMATURE GRANULOCYTES: 0 %
LYMPHOCYTES # BLD: 34 % (ref 24–43)
MCH RBC QN AUTO: 32.3 PG (ref 25.2–33.5)
MCHC RBC AUTO-ENTMCNC: 33.9 G/DL (ref 28.4–34.8)
MCV RBC AUTO: 95.3 FL (ref 82.6–102.9)
MONOCYTES # BLD: 10 % (ref 3–12)
NRBC AUTOMATED: 0 PER 100 WBC
PDW BLD-RTO: 12.3 % (ref 11.8–14.4)
PLATELET # BLD: 228 K/UL (ref 138–453)
PLATELET ESTIMATE: NORMAL
PMV BLD AUTO: 10.5 FL (ref 8.1–13.5)
POTASSIUM SERPL-SCNC: 3.4 MMOL/L (ref 3.7–5.3)
RBC # BLD: 4.27 M/UL (ref 3.95–5.11)
RBC # BLD: NORMAL 10*6/UL
SEG NEUTROPHILS: 51 % (ref 36–65)
SEGMENTED NEUTROPHILS ABSOLUTE COUNT: 2.82 K/UL (ref 1.5–8.1)
SODIUM BLD-SCNC: 139 MMOL/L (ref 135–144)
WBC # BLD: 5.5 K/UL (ref 3.5–11.3)
WBC # BLD: NORMAL 10*3/UL

## 2020-06-23 PROCEDURE — 6370000000 HC RX 637 (ALT 250 FOR IP): Performed by: NURSE PRACTITIONER

## 2020-06-23 PROCEDURE — 97110 THERAPEUTIC EXERCISES: CPT

## 2020-06-23 PROCEDURE — 99232 SBSQ HOSP IP/OBS MODERATE 35: CPT | Performed by: PSYCHIATRY & NEUROLOGY

## 2020-06-23 PROCEDURE — 97530 THERAPEUTIC ACTIVITIES: CPT

## 2020-06-23 PROCEDURE — 97116 GAIT TRAINING THERAPY: CPT

## 2020-06-23 PROCEDURE — 2580000003 HC RX 258: Performed by: STUDENT IN AN ORGANIZED HEALTH CARE EDUCATION/TRAINING PROGRAM

## 2020-06-23 PROCEDURE — 6360000002 HC RX W HCPCS: Performed by: STUDENT IN AN ORGANIZED HEALTH CARE EDUCATION/TRAINING PROGRAM

## 2020-06-23 PROCEDURE — 85025 COMPLETE CBC W/AUTO DIFF WBC: CPT

## 2020-06-23 PROCEDURE — 92507 TX SP LANG VOICE COMM INDIV: CPT

## 2020-06-23 PROCEDURE — 97129 THER IVNTJ 1ST 15 MIN: CPT

## 2020-06-23 PROCEDURE — 6370000000 HC RX 637 (ALT 250 FOR IP): Performed by: STUDENT IN AN ORGANIZED HEALTH CARE EDUCATION/TRAINING PROGRAM

## 2020-06-23 PROCEDURE — 36415 COLL VENOUS BLD VENIPUNCTURE: CPT

## 2020-06-23 PROCEDURE — 80048 BASIC METABOLIC PNL TOTAL CA: CPT

## 2020-06-23 RX ORDER — DIAPER,BRIEF,INFANT-TODD,DISP
EACH MISCELLANEOUS 2 TIMES DAILY
Status: DISCONTINUED | OUTPATIENT
Start: 2020-06-23 | End: 2020-06-23 | Stop reason: HOSPADM

## 2020-06-23 RX ORDER — POTASSIUM CHLORIDE 20 MEQ/1
40 TABLET, EXTENDED RELEASE ORAL ONCE
Status: COMPLETED | OUTPATIENT
Start: 2020-06-23 | End: 2020-06-23

## 2020-06-23 RX ORDER — DIAPER,BRIEF,INFANT-TODD,DISP
EACH MISCELLANEOUS 2 TIMES DAILY
Status: DISCONTINUED | OUTPATIENT
Start: 2020-06-23 | End: 2020-06-23

## 2020-06-23 RX ADMIN — PAROXETINE HYDROCHLORIDE HEMIHYDRATE 30 MG: 20 TABLET, FILM COATED ORAL at 08:08

## 2020-06-23 RX ADMIN — LACOSAMIDE 200 MG: 100 TABLET, FILM COATED ORAL at 08:09

## 2020-06-23 RX ADMIN — Medication 81 MG: at 08:09

## 2020-06-23 RX ADMIN — SODIUM CHLORIDE, PRESERVATIVE FREE 10 ML: 5 INJECTION INTRAVENOUS at 09:08

## 2020-06-23 RX ADMIN — POTASSIUM CHLORIDE 40 MEQ: 1500 TABLET, EXTENDED RELEASE ORAL at 14:29

## 2020-06-23 RX ADMIN — ENOXAPARIN SODIUM 40 MG: 40 INJECTION SUBCUTANEOUS at 08:09

## 2020-06-23 RX ADMIN — HYDROCORTISONE: 10 CREAM TOPICAL at 03:48

## 2020-06-23 RX ADMIN — LEVETIRACETAM 1250 MG: 500 TABLET, FILM COATED ORAL at 08:08

## 2020-06-23 RX ADMIN — AMLODIPINE BESYLATE 10 MG: 10 TABLET ORAL at 08:09

## 2020-06-23 RX ADMIN — LEVOTHYROXINE SODIUM 50 MCG: 50 TABLET ORAL at 08:08

## 2020-06-23 RX ADMIN — HYDROCHLOROTHIAZIDE 12.5 MG: 25 TABLET ORAL at 08:09

## 2020-06-23 RX ADMIN — PANTOPRAZOLE SODIUM 40 MG: 40 TABLET, DELAYED RELEASE ORAL at 08:08

## 2020-06-23 NOTE — DISCHARGE SUMMARY
Neuro Critical Care   Discharge Summary      PATIENT NAME: Daniel Francis  YOB: 1955  MEDICAL RECORD NO. [id number]  DATE: 6/23/2020  PRIMARY CARE PHYSICIAN: Garth Ashley MD  DISCHARGE DATE:  6/23/20  DISCHARGE DIAGNOSIS:   Active Problems:    Speech and language deficits  Resolved Problems:    Encephalopathy acute    Seizure [facility])        Tiera Antony is a 72 y.o. yo female who presented to [facility] on 6/20/2020  8:41 PM with history of HTN, Hypothyroidism, PVD, and R MCA infarct in 1998 with subsequent seizures who presents as a transfer from [facility] for [facility]. Patient intially presented to [facility] after having a witnessed seizure at home. EMS administered 4 mg Versed IN followed by 4 mg Versed IV with cessation of the seizure activity. CT head revealed encephalomalacia from old right MCA infarct, no acute findings. Patient follows with Dr. Anna Been in the clinic, last seizure was October 2018. She was eventually weaned off Vimpat and Keppra dosing was increased to maintain monotherapy as she was well controlled. Dr. Anna Been evaluated patient at [facility] and was concerned about possible subclinical status due to persistent encephalopathy and expressive aphasia. She was transferred to [facility] as a direct admit and placed on LTME. MRI brain w wo contrast negative for any acute findings, showed chronic right MCA territory encephalomalacia. LTME revealed left anterior temporal sharp waves and temporal intermittent rhythmic delta activity. Started back on Vimpat 200 mg BID and continues on home Keppra 1250 mg BID. Expressive aphasia improving. PT/OT worked with patient, will discharge to [facility]. Plan for follow up with Dr. Anna Been in 2-4 weeks. Labs and imaging were followed daily. At time of discharge, Daniel Francis was tolerating a DIET GENERAL;, having bowel movements, and is in stable condition to be discharged to SNF.         PROCEDURES: tablet  Commonly known as:  NORVASC  TAKE 1 TABLET BY MOUTH ONE TIME A DAY  What changed:  additional instructions     atorvastatin 40 MG tablet  Commonly known as:  LIPITOR  TAKE 1 TABLET BY MOUTH ONE TIME A DAY  What changed:  See the new instructions. lacosamide 200 MG tablet  Commonly known as:  Vimpat  Take 1 tablet by mouth 2 times daily for 90 days. .  What changed:  Another medication with the same name was removed. Continue taking this medication, and follow the directions you see here. levothyroxine 50 MCG tablet  Commonly known as:  SYNTHROID  TAKE 1 TABLET BY MOUTH ONE TIME A DAY  What changed:  additional instructions        CONTINUE taking these medications    Adult Aspirin Regimen 81 MG EC tablet  Generic drug:  aspirin  TAKE 1 TABLET BY MOUTH ONE TIME A DAY     hydrochlorothiazide 12.5 MG tablet  Commonly known as:  HYDRODIURIL  TAKE 1 TABLET BY MOUTH ONE TIME A DAY     * levETIRAcetam 1000 MG tablet  Commonly known as:  KEPPRA  TAKE 1 TABLET (ALONG WITH 250MG TABLET FOR TOTAL OF 1250MG) BY MOUTH 2 TIMES A DAY     * levETIRAcetam 250 MG tablet  Commonly known as:  KEPPRA  TAKE 1 TABLET (ALONG WITH 1000MG TABLET FOR TOTAL OF 1250MG) BY MOUTH 2 TIMES DAILY     omeprazole 20 MG delayed release capsule  Commonly known as:  PRILOSEC  TAKE 1 CAPSULE BY MOUTH ONE TIME A DAY     oxybutynin 5 MG tablet  Commonly known as:  DITROPAN  TAKE 1 TABLET BY MOUTH 3 TIMES A DAY     PARoxetine 30 MG tablet  Commonly known as:  PAXIL  TAKE ONE TABLET BY MOUTH EVERY MORNING     SUMAtriptan 100 MG tablet  Commonly known as:  IMITREX  TAKE 1 TABLET BY MOUTH ONE TIME A DAY AS NEEDED FOR MIGRAINE. * This list has 2 medication(s) that are the same as other medications prescribed for you. Read the directions carefully, and ask your doctor or other care provider to review them with you.                Where to Get Your Medications      These medications were sent to [address]

## 2020-06-23 NOTE — PLAN OF CARE
Problem: Pain:  Goal: Control of acute pain  Description: Control of acute pain  6/23/2020 0432 by Reece Flores RN  Outcome: Ongoing     Problem: Physical Regulation:  Goal: Signs of adequate cerebral perfusion will increase  Description: Signs of adequate cerebral perfusion will increase  6/23/2020 0432 by Reece Flores RN  Outcome: Ongoing     Problem: Safety:  Goal: Ability to remain free from injury will improve  Description: Ability to remain free from injury will improve  6/23/2020 0432 by Reece Flores RN  Outcome: Ongoing     Problem: Self-Concept:  Goal: Level of anxiety will decrease  Description: Level of anxiety will decrease  6/23/2020 0432 by Reece Flores RN  Outcome: Ongoing     Problem: Falls - Risk of:  Goal: Will remain free from falls  Description: Will remain free from falls  6/23/2020 0432 by Reece Flores RN  Outcome: Ongoing   Reece Flores RN

## 2020-06-29 ENCOUNTER — TELEPHONE (OUTPATIENT)
Dept: INTERNAL MEDICINE | Age: 65
End: 2020-06-29

## 2020-07-14 ENCOUNTER — OFFICE VISIT (OUTPATIENT)
Dept: NEUROLOGY | Age: 65
End: 2020-07-14
Payer: COMMERCIAL

## 2020-07-14 VITALS
DIASTOLIC BLOOD PRESSURE: 74 MMHG | HEART RATE: 74 BPM | TEMPERATURE: 98 F | SYSTOLIC BLOOD PRESSURE: 106 MMHG | BODY MASS INDEX: 19.9 KG/M2 | HEIGHT: 70 IN | WEIGHT: 139 LBS

## 2020-07-14 PROCEDURE — 1111F DSCHRG MED/CURRENT MED MERGE: CPT | Performed by: PSYCHIATRY & NEUROLOGY

## 2020-07-14 PROCEDURE — 1036F TOBACCO NON-USER: CPT | Performed by: PSYCHIATRY & NEUROLOGY

## 2020-07-14 PROCEDURE — 99213 OFFICE O/P EST LOW 20 MIN: CPT | Performed by: PSYCHIATRY & NEUROLOGY

## 2020-07-14 PROCEDURE — G8399 PT W/DXA RESULTS DOCUMENT: HCPCS | Performed by: PSYCHIATRY & NEUROLOGY

## 2020-07-14 PROCEDURE — 1123F ACP DISCUSS/DSCN MKR DOCD: CPT | Performed by: PSYCHIATRY & NEUROLOGY

## 2020-07-14 PROCEDURE — G8427 DOCREV CUR MEDS BY ELIG CLIN: HCPCS | Performed by: PSYCHIATRY & NEUROLOGY

## 2020-07-14 PROCEDURE — G8420 CALC BMI NORM PARAMETERS: HCPCS | Performed by: PSYCHIATRY & NEUROLOGY

## 2020-07-14 PROCEDURE — 1090F PRES/ABSN URINE INCON ASSESS: CPT | Performed by: PSYCHIATRY & NEUROLOGY

## 2020-07-14 PROCEDURE — 3017F COLORECTAL CA SCREEN DOC REV: CPT | Performed by: PSYCHIATRY & NEUROLOGY

## 2020-07-14 PROCEDURE — 4040F PNEUMOC VAC/ADMIN/RCVD: CPT | Performed by: PSYCHIATRY & NEUROLOGY

## 2020-07-14 RX ORDER — LACOSAMIDE 200 MG/1
200 TABLET ORAL 2 TIMES DAILY
Qty: 60 TABLET | Refills: 5 | OUTPATIENT
Start: 2020-07-14 | End: 2020-12-28

## 2020-07-14 NOTE — PROGRESS NOTES
Dear Dr. Sivakumar Hernandez MD   Diana Dsouza is a 72 y.o. female who comes in today as a follow visit. She has a past medical history of right cortical ischemic stroke and subsequently has a history of seizure disorder. On 6/9/2020 the patient stated she stopped Vimpat; this medication was re-initiated. She is currently on Keppra at 1250 mg twice a day and Vimpat 200 mg twice a day. While off Vimpat, she experienced a breakthrough seizure on 6/19/20 and required a hospital stay from 6/19/20 to 6/23/20. Today the patient denies breakthrough seizure. The patient has her medications delivered to her and unfortunatly Vimpat is not currently included as it should be. The patient continues to live with her sister. Patient's long-term EEG monitoring at [facility] for 4 days showed frequent left temporal parietal spike wave discharges but did not have any seizures. Last clinical seizure was in August 2018 and seizure has been under control after increment of Keppra and addition of Vimpat.   Her most recent seizure was 6/19/20    REVIEW OF SYSTEMS   Constitutional Weight: absent, Appetite: absent, Fatigue: absent   HEENT Visual disturbance: absent, Ears: normal   Respiratory Shortness of breath: absent, Cough: absent   Cardiovascular Chest pain: absent, Leg swelling :absent   GI Constipation: absent, Diarrhea: absent, Swallowing change: absent    Urinary frequency: absent, Urinary urgency: absent,    Musculoskeletal Neck pain: absent, Back pain: absent, Stiffness: absent, Muscle pain: absent, Joint pain: absent   Dermatological Hair loss: absent, Skin changes: absent   Neurological Memory loss: absent, Confusion: absent, Seizures: present, Trouble walking or imbalance: absent, Dizziness: absent, Weakness: absent, Numbness: absent Tremor: absent, Spasm: absent, Speech difficulty: absent, Headache: present, Light sensitivity: absent   Psychiatric Anxiety: absent, Hallucination: absent, IX, X - symmetrical palate                                                                  XI - symmetrical shoulder shrug                                                       XII - midline tongue without atrophy or fasciculation     Motor function  Normal muscle bulk and tone; normal power 5/5, including fine motor movements     Sensory function Intact to touch, pin, vibration, proprioception     Cerebellar Intact fine motor movement. No involuntary movements or tremors     Reflex function Intact 2+ DTR and symmetric. Negative Babinski     Gait                  Normal station and gait       Assessment and recommendations:    Right MCA embolic stroke in 8342  Simple partial and complex partial seizure secondary to above    The patient has remained seizure free since being discharged on 6/23/20. The patient should continue Keppra 1250 mg twice a day and Vimpat 200 mg twice a day. Unfortunately, her medications were not updated to include Vimpat. I will have my office insure she is receiving the proper medications. Medication side effects and compliance were discussed and questions were answered. Seizure precautions including alcohol abstinence, sleep deprivation and avoidance of photic stimulation were discussed in details and questions were answered. Driving instructions were also discussed with the patient and questions were answered. The patient understands that she should not drive until he is 6 months seizure-free as per [place]. Please return in 5-6 months. Kole Younger MD I would like to thank you for your consult. Please contact neurology if there remains any further question about the patient care. Scribe Attestation:   By signing my name below, IMee, attest that this documentation has been prepared under the direction and in the presence of Hung Theodore MD.     Electronically Signed: Mee Huizar. 7/14/20. 12:36 PM EDT.      Physician Attestation:   KARTHIK

## 2020-07-14 NOTE — TELEPHONE ENCOUNTER
Dr Arcelia Stewart asked me to contact [address] [place] [address]. she gets bubble pack meds from them and Vimpat is not in her bubble pack but should be. She is on Vimpat 200 mg. 1 bid. She was in hospital and if she does not take it she can go into status. Per Belinda Fernando he did not have an active RX on file. He took my verbal order and ran it through to see if it needed PA. Initially it rejected as fill too soon. But he was able to override that. She must have gotten a RX from another pharmacy when she was sent to [facility]. I talked to [facility] before she left the office. I explained that they will send a bottle to her home. She did tell me that when she was discharged from the [facility] they sent home what was left in her bottle but she wasn't taking it until a day or two ago. I reminded her that she will have to take it from the bottle until it is time for her next bubble pack. She voiced understanding. Per Dr Arcelia Stewart request I will call her in a day or so to make sure she is taking it as prescribed.

## 2020-08-11 ENCOUNTER — TELEPHONE (OUTPATIENT)
Dept: NEUROLOGY | Age: 65
End: 2020-08-11

## 2020-08-11 NOTE — LETTER
[id number] [facility]  [place] [address] [place] [id number]  Phone: [phone]  Fax: [phone]    Genia Liu MD        August 11, 2020    Yumiko Figueroa  [address] [place] [address]      To Whom it May Concern,    The above named should be excused from jury duty due to her neruological condition. If you have any questions or concerns, please don't hesitate to call.     Sincerely,            Genia Liu MD

## 2020-08-11 NOTE — TELEPHONE ENCOUNTER
Dr. Graciela Cotter did approve a jury excuse letter. It was mailed to the home address. I called Felix Waller and let her know that letter was done and is being mailed to her. She voiced understanding.

## 2020-08-11 NOTE — TELEPHONE ENCOUNTER
Shirley with [employer] called saying that Jesenia Menendez has received a notice for jury duty. Marv Fairchild says that Giovanni Fairly not able to follow verbal commands, having memory issues. They are asking for note to excuse her from jury duty. Please advise.

## 2020-12-24 DIAGNOSIS — I10 ESSENTIAL HYPERTENSION: Chronic | ICD-10-CM

## 2020-12-24 DIAGNOSIS — N39.41 URGE INCONTINENCE: ICD-10-CM

## 2020-12-24 DIAGNOSIS — E03.9 ACQUIRED HYPOTHYROIDISM: ICD-10-CM

## 2020-12-24 DIAGNOSIS — Z86.73 H/O: CVA (CEREBROVASCULAR ACCIDENT): ICD-10-CM

## 2020-12-24 DIAGNOSIS — Z86.73 H/O: STROKE: ICD-10-CM

## 2020-12-24 DIAGNOSIS — K21.9 GASTROESOPHAGEAL REFLUX DISEASE: ICD-10-CM

## 2020-12-28 RX ORDER — LACOSAMIDE 200 MG/1
TABLET, FILM COATED ORAL
Qty: 180 TABLET | Refills: 1 | Status: SHIPPED | OUTPATIENT
Start: 2020-12-28 | End: 2021-06-15

## 2020-12-28 RX ORDER — LEVETIRACETAM 250 MG/1
TABLET ORAL
Qty: 180 TABLET | Refills: 2 | Status: SHIPPED | OUTPATIENT
Start: 2020-12-28 | End: 2021-09-10

## 2020-12-28 RX ORDER — LEVETIRACETAM 1000 MG/1
TABLET ORAL
Qty: 180 TABLET | Refills: 2 | Status: SHIPPED | OUTPATIENT
Start: 2020-12-28 | End: 2021-10-06

## 2020-12-28 NOTE — TELEPHONE ENCOUNTER
Pharmacy requesting refill of Vimpat, Levetiracetam 250 and 1000 mg. dose .       Medication active on med list yes      Date of last fill:   Vimpat 7/14/2020  with 5 refills, Levetiracetam 1000 mg  3/30/2020 90d with 2 refills, Levetiracetam 250 mg. 3/30/2020 90 with 2 refills verified on 12/28/2020  verified by JESSICA GREER      Date of last appointment 7/14/2020    Next Visit Date:  1/20/2021

## 2020-12-30 NOTE — TELEPHONE ENCOUNTER
Request for 8 medication refills. Next Visit Date: Last OV 11/12/2019. LM for the patient to call the office back to schedule an appointment. LM with the pharmacy also.   Future Appointments   Date Time Provider Carlos Matta   1/20/2021 11:00 AM Mckenzie Hoover MD Neuro Spec [address] Maintenance   Topic Date Due    Colon Cancer Screen FIT/FOBT  02/23/2017    Shingles Vaccine (3 of 3) 09/12/2018    Breast cancer screen  06/29/2020    Flu vaccine (1) 09/01/2020    Cervical cancer screen  11/07/2020    Lipid screen  03/03/2021    TSH testing  06/20/2021    Potassium monitoring  06/23/2021    Creatinine monitoring  06/23/2021    Pneumococcal 65+ years Vaccine (1 of 1 - PPSV23) 02/07/2024    DTaP/Tdap/Td vaccine (2 - Td) 11/23/2025    DEXA (modify frequency per FRAX score)  Completed    Hepatitis C screen  Completed    HIV screen  Completed    Hepatitis A vaccine  Aged Out    Hepatitis B vaccine  Aged Out    Hib vaccine  Aged Out    Meningococcal (ACWY) vaccine  Aged Out       Hemoglobin A1C (%)   Date Value   08/18/2016 5.1   05/07/2015 5.0             ( goal A1C is < 7)   No results found for: LABMICR  LDL Cholesterol (mg/dL)   Date Value   03/03/2020 53       (goal LDL is <100)   AST (U/L)   Date Value   06/20/2020 22     ALT (U/L)   Date Value   06/20/2020 15     BUN (mg/dL)   Date Value   06/23/2020 9     BP Readings from Last 3 Encounters:   07/14/20 106/74   06/23/20 (!) 94/54   06/20/20 105/66          (goal 120/80)    All Future Testing planned in [facility]  Lab Frequency Next Occurrence         Patient Active Problem List:     Acquired hypothyroidism     Essential hypertension     Urge incontinence     Generalized anxiety disorder     Gastroesophageal reflux disease without esophagitis     H/O: CVA (cerebrovascular accident)     Migraine without aura and without status migrainosus, not intractable     Hammer toe of left foot     Tremor of both hands     Status epilepticus [facility])     Speech and language deficits

## 2021-01-04 RX ORDER — OMEPRAZOLE 20 MG/1
CAPSULE, DELAYED RELEASE ORAL
Qty: 90 CAPSULE | Refills: 1 | Status: SHIPPED | OUTPATIENT
Start: 2021-01-04 | End: 2021-06-18

## 2021-01-04 RX ORDER — HYDROCHLOROTHIAZIDE 12.5 MG/1
TABLET ORAL
Qty: 90 TABLET | Refills: 1 | Status: SHIPPED | OUTPATIENT
Start: 2021-01-04 | End: 2021-06-18

## 2021-01-04 RX ORDER — OXYBUTYNIN CHLORIDE 5 MG/1
TABLET ORAL
Qty: 270 TABLET | Refills: 1 | Status: SHIPPED | OUTPATIENT
Start: 2021-01-04 | End: 2021-06-18

## 2021-01-04 RX ORDER — ATORVASTATIN CALCIUM 40 MG/1
TABLET, FILM COATED ORAL
Qty: 90 TABLET | Refills: 1 | Status: SHIPPED | OUTPATIENT
Start: 2021-01-04 | End: 2021-06-18

## 2021-01-04 RX ORDER — ASPIRIN 81 MG/1
TABLET, COATED ORAL
Qty: 90 TABLET | Refills: 1 | Status: SHIPPED | OUTPATIENT
Start: 2021-01-04 | End: 2021-06-18

## 2021-01-04 RX ORDER — LEVOTHYROXINE SODIUM 0.05 MG/1
TABLET ORAL
Qty: 90 TABLET | Refills: 1 | Status: SHIPPED | OUTPATIENT
Start: 2021-01-04 | End: 2021-06-18

## 2021-01-04 RX ORDER — PAROXETINE 30 MG/1
TABLET, FILM COATED ORAL
Qty: 90 TABLET | Refills: 1 | Status: SHIPPED | OUTPATIENT
Start: 2021-01-04 | End: 2021-06-18

## 2021-01-04 RX ORDER — AMLODIPINE BESYLATE 10 MG/1
TABLET ORAL
Qty: 90 TABLET | Refills: 1 | Status: SHIPPED | OUTPATIENT
Start: 2021-01-04 | End: 2021-06-18

## 2021-01-20 ENCOUNTER — OFFICE VISIT (OUTPATIENT)
Dept: NEUROLOGY | Age: 66
End: 2021-01-20
Payer: COMMERCIAL

## 2021-01-20 ENCOUNTER — TELEPHONE (OUTPATIENT)
Dept: NEUROLOGY | Age: 66
End: 2021-01-20

## 2021-01-20 VITALS
SYSTOLIC BLOOD PRESSURE: 115 MMHG | BODY MASS INDEX: 19.9 KG/M2 | WEIGHT: 139 LBS | TEMPERATURE: 97 F | HEART RATE: 78 BPM | HEIGHT: 70 IN | DIASTOLIC BLOOD PRESSURE: 78 MMHG

## 2021-01-20 DIAGNOSIS — G40.909 SEIZURE DISORDER (HCC): Primary | ICD-10-CM

## 2021-01-20 DIAGNOSIS — G20 PARKINSON'S DISEASE (HCC): ICD-10-CM

## 2021-01-20 PROCEDURE — G8427 DOCREV CUR MEDS BY ELIG CLIN: HCPCS | Performed by: PSYCHIATRY & NEUROLOGY

## 2021-01-20 PROCEDURE — 99214 OFFICE O/P EST MOD 30 MIN: CPT | Performed by: PSYCHIATRY & NEUROLOGY

## 2021-01-20 PROCEDURE — 1090F PRES/ABSN URINE INCON ASSESS: CPT | Performed by: PSYCHIATRY & NEUROLOGY

## 2021-01-20 PROCEDURE — G8420 CALC BMI NORM PARAMETERS: HCPCS | Performed by: PSYCHIATRY & NEUROLOGY

## 2021-01-20 PROCEDURE — 1036F TOBACCO NON-USER: CPT | Performed by: PSYCHIATRY & NEUROLOGY

## 2021-01-20 PROCEDURE — G8484 FLU IMMUNIZE NO ADMIN: HCPCS | Performed by: PSYCHIATRY & NEUROLOGY

## 2021-01-20 PROCEDURE — 4040F PNEUMOC VAC/ADMIN/RCVD: CPT | Performed by: PSYCHIATRY & NEUROLOGY

## 2021-01-20 PROCEDURE — 3017F COLORECTAL CA SCREEN DOC REV: CPT | Performed by: PSYCHIATRY & NEUROLOGY

## 2021-01-20 PROCEDURE — 1123F ACP DISCUSS/DSCN MKR DOCD: CPT | Performed by: PSYCHIATRY & NEUROLOGY

## 2021-01-20 PROCEDURE — G8399 PT W/DXA RESULTS DOCUMENT: HCPCS | Performed by: PSYCHIATRY & NEUROLOGY

## 2021-01-20 NOTE — TELEPHONE ENCOUNTER
Rose Ortega patients sister called in. She is noticing issues with Talon Marrero. She is having trouble with concentration, confusion, memory issues and unsteadiness. She has fallen but not in the past 3 weeks. Rose Ortega was not able to come in due to not feeling well. She also feels she has lost weight despite eating better then she used too. Von Bhagat thinks she can drive but Rose Ortega knows that Dr. Celi Mayo said she should not drive. She has not any more seizures since she had seen Dr. Celi Mayo in July. she is worried that she might have Alzheimer's or other issues and she knows that Talon Marrero will say she is doing fine. She wanted Dr. Celi Mayo to be aware of her concerns. Talon Marrero would be upset if she knew she called.

## 2021-01-20 NOTE — PROGRESS NOTES
Dear Dr. Noah Gates MD   William Bautista is a 72 y.o. female who comes in today as a follow visit. She has a past medical history of right cortical ischemic stroke and subsequently has a history of seizure disorder. On 6/9/2020 the patient stated she stopped Vimpat; this medication was re-initiated. She is currently on Keppra at 1250 mg twice a day and Vimpat 200 mg twice a day. While off Vimpat, she experienced a breakthrough seizure on 6/19/20 and required a hospital stay from 6/19/20 to 6/23/20. Today, the patient denies breakthrough seizure. She denies any seizures. She admits to medication compliance. She reports loss of weight. She admits to normal appetite and eating habits. She reports stress. Patient denies mood changes, memory loss, and depression. She reports bilateral tremor in the upper extremities, worse in right hand. Patient denies neck pain. She reports tremors in the right leg as well. The patient continues to live with her sister. Patient's long-term EEG monitoring at [facility] for 4 days showed frequent left temporal parietal spike wave discharges but did not have any seizures. Last clinical seizure was in August 2018 and seizure has been under control after increment of Keppra and addition of Vimpat.   Her most recent seizure was 6/19/20    REVIEW OF SYSTEMS   Constitutional Weight: absent, Appetite: absent, Fatigue: absent   HEENT Visual disturbance: absent, Ears: normal   Respiratory Shortness of breath: absent, Cough: absent   Cardiovascular Chest pain: absent, Leg swelling :absent   GI Constipation: absent, Diarrhea: absent, Swallowing change: absent    Urinary frequency: absent, Urinary urgency: absent,    Musculoskeletal Neck pain: absent, Back pain: absent, Stiffness: absent, Muscle pain: absent, Joint pain: absent   Dermatological Hair loss: absent, Skin changes: absent   Neurological Memory loss: absent, Confusion: absent, Seizures: present, Trouble walking or imbalance: absent, Dizziness: absent, Weakness: absent, Numbness: absent Tremor: absent, Spasm: absent, Speech difficulty: absent, Headache: present, Light sensitivity: absent   Psychiatric Anxiety: absent, Hallucination: absent, Depression, absent   Hematologic Abnormal bleeding/Bruising: absent, Anemia: absent         Lab Results   Component Value Date    LDLCHOLESTEROL 53 03/03/2020     No components found for: CHLPL  Lab Results   Component Value Date    TRIG 64 08/18/2016    TRIG 125 05/07/2015    TRIG 111 11/05/2013     Lab Results   Component Value Date    HDL 74 03/03/2020    HDL 71 08/18/2016    HDL 79 05/07/2015     No results found for: LDLCALC  No results found for: LABVLDL  Lab Results   Component Value Date    LABA1C 5.1 08/18/2016     Lab Results   Component Value Date     08/18/2016     Lab Results   Component Value Date    [id number] 8353 (H) 06/21/2020      Right MCA stroke 1998   Last seizure 2007, however there is concern of intermittent staring episodes with unknown frequency Possibly occurring once a week or so  2 D echo Sep 2012 normal.  MRA head normal, MRA neck normal  MRI FLAIR March 2013      General examination:    Head: Normocephalic, atraumatic  Eyes: Extraocular movements intact  Lungs: Respirations unlabored, chest wall no deformity  ENT: Normal external ear canals, no sinus tenderness  Heart: Regular rate rhythm  Abdomen: No masses, tenderness  Extremities: No cyanosis or edema, 2+ pulses  Skin: Intact, normal skin color    Neurological examination:    Mental status   Alert and oriented; intact memory with no confusion, speech or language problems; no hallucinations or delusions     Cranial nerves   II - visual fields intact to confrontation                                                III, IV, VI - extra-ocular muscles full: no pupillary defect; no ARSH, no nystagmus, no ptosis                                                                      V - normal facial sensation                                                               VII - normal facial symmetry                                                             VIII - intact hearing                                                                             IX, X - symmetrical palate                                                                  XI - symmetrical shoulder shrug                                                       XII - midline tongue without atrophy or fasciculation     Motor function  Normal muscle bulk and tone; normal power 5/5, including fine motor movements     Sensory function Intact to touch, pin, vibration, proprioception     Cerebellar Intact fine motor movement. No involuntary movements or tremors     Reflex function Intact 2+ DTR and symmetric. Negative Babinski     Gait                  Decreased arm swing on right side   Positive retropulsion test  Positional tremor on right upper extremity, more than left upper extremity     mini-mental status exam  Maximum score 5 Score 5-0 What is the year, season, date, day, month   Maximum score 5 Score 5 Where are we: State, county, town, hospital, floor? Maximum score 3 Score 3-0 Name 3 objects: ball, pen, car. Ask patient to repeat 3 objects. Maximum score 5 Score 5-0 Serial sevens from 100:93, 86, 79, 72, 65   Maximum score 3 Score 3-3 Recall 3 objects   Maximum score 2 Score 2 Name a pencil and watch. Maximum score 1 Score 1 Repeat the following: No ifs ands or buts.      Maximum score 3 Score 3 Follow 3 stage command take a paper in your hand, fold it in half, and put it on the floor. Maximum score 1  Score 1 Read and obey the following: Close your eyes     Maximum score 1 Score 1 Write a sentence. Maximum score 1 Score 1 Copy a design below.        Max 30   Patients score 27        Assessment and recommendations:      Right MCA embolic stroke in 6001  Simple partial and complex partial seizure secondary to above    The patient has remained seizure free since being discharged on 6/23/20. The patient should continue Keppra 1250 mg twice a day and Vimpat 200 mg twice a day. Medication side effects and compliance were discussed and questions were answered. Seizure precautions including alcohol abstinence, sleep deprivation and avoidance of photic stimulation were discussed in details and questions were answered. Driving instructions were also discussed with the patient and questions were answered. The patient understands that she should not drive until she is 6 months seizure-free as per [place]. The patient scored 27/30 on the mini-mental status exam on 1/20/21. Bilateral tremor, worse on right side  I initiated the patient on carbidopa-levodopa  mg twice a day for tremors and possible Parkinson's Disease. Please call me in a week to follow up on effectiveness of the medication. I recommend the patient evaluate labs for TSH with Reflex and Vitamin B12 and obtain a DaTscan for possible Parkinson's Disease. Mild cognitive impairment:     The patient exhibits symptoms of mild cognitive impairment. Will check TSH, free T4, vitamin B12 level. For now, hold off on pharmacological treatment options. Please return in 2 months or sooner if symptoms worsen. Brandon Braun MD I would like to thank you for your consult. Please contact neurology if there remains any further question about the patient care. Scribe Attestation:   By signing my name below, Rody Aguilar, attest that this documentation has been prepared under the direction and in the presence of Amparo Reeder MD.    Electronically Signed: Dionna Dutta. 1/20/2021 11:29 AM    Physician Attestation:  Sanjay Wynne MD, personally performed the services described in this documentation. All medical record entries made by the scribe were at my direction and in my presence.  I have reviewed the chart and discharge instructions (if

## 2021-01-28 ENCOUNTER — HOSPITAL ENCOUNTER (OUTPATIENT)
Dept: NUCLEAR MEDICINE | Age: 66
Discharge: HOME | End: 2021-01-30
Payer: COMMERCIAL

## 2021-01-28 DIAGNOSIS — G20 PARKINSON'S DISEASE (HCC): ICD-10-CM

## 2021-01-28 PROCEDURE — 6370000000 HC RX 637 (ALT 250 FOR IP): Performed by: PSYCHIATRY & NEUROLOGY

## 2021-01-28 PROCEDURE — A9584 IODINE I-123 IOFLUPANE: HCPCS | Performed by: PSYCHIATRY & NEUROLOGY

## 2021-01-28 PROCEDURE — 3430000000 HC RX DIAGNOSTIC RADIOPHARMACEUTICAL: Performed by: PSYCHIATRY & NEUROLOGY

## 2021-01-28 PROCEDURE — 78803 RP LOCLZJ TUM SPECT 1 AREA: CPT

## 2021-01-28 RX ORDER — POTASSIUM IODIDE 65 MG/ML
2 SOLUTION ORAL ONCE
Status: COMPLETED | OUTPATIENT
Start: 2021-01-28 | End: 2021-01-28

## 2021-01-28 RX ADMIN — POTASSIUM IODIDE 130 MG: 65 SOLUTION ORAL at 09:15

## 2021-01-28 RX ADMIN — IOFLUPANE I-123 5 MILLICURIE: 2 INJECTION, SOLUTION INTRAVENOUS at 10:00

## 2021-02-04 NOTE — TELEPHONE ENCOUNTER
Pt called in to get a refill of her Sinemet  mg. She said that she did notice some benefit but not a lot. She was taking it BID. Please advise, thanks.

## 2021-02-09 NOTE — PROGRESS NOTES
@[facility]@    [facility]    Clinic Progress Note    Date of patient's visit: 2/10/2021    Patient's Name:  Leydi Davis    YOB: 1955            Patient Care Team:  Marisol Gunter MD as PCP - General (Internal Medicine)  Dominique Salazar MD as Consulting Physician (Internal Medicine)    REASON FOR VISIT: Bruising to leg after fall. Chief Complaint   Patient presents with    Bleeding/Bruising     Pt has bruise on rt leg    Health Maintenance     Mammogram (pending), Shingrix (pending), Cologuard (pending), pap, flu     HISTORY OF PRESENT ILLNESS:      History was obtained from the patient. Leydi Davis is a 72 y.o. who presented to the clinic today for follow up after fall. States she fell on 2-3 weeks ago and now her leg has multiple bruises. She does take a baby aspirin daily at home. States when she fell she was walking up the stairs tripped on the step while carrying laundry. Denies any pain. REVIEW OF SYSTEMS:      · Constitutional: Negative for Fever, chills. · Eyes: Negative for visual changes, diplopia. · ENT: Negative for ear pain, nose bleeds, sore throat. · Cardiovascular: Negative for lightheadedness ,chest pain, palpitations. · Respiratory: Negative for Shortness of breath,cough or wheezing. · Gastrointestinal : Negative for nausea/vomiting, abdominal pain, diarrhea, constipation. · Genitourinary: Negative for change in bladder habits, dysuria, hematuria. · Musculoskeletal: Negative for joint pain, muscle aches. · Neurological: Negative for headache, change in muscle strength numbness/tingling. PAST MEDICAL HISTORY:     Past Medical History:   Diagnosis Date    Ankle fracture, left     Anxiety 2/4/2014    Arthritis     ETOH abuse     Frequency of urination ?     GERD (gastroesophageal reflux disease) 2/4/2014    Head injury     after seizure/stroke pt fell to fall and hit head-2004    Headache(784.0)     TABLET BY MOUTH (ALONG WITH 250MG TABLET FOR TOTAL OF 1250MG) TWICE DAILY. 180 tablet 2    levETIRAcetam (KEPPRA) 250 MG tablet TAKE 1 TABLET BY MOUTH (ALONG WITH 1000MG TABLET FOR TOTAL OF 1250MG) TWICE DAILY. 180 tablet 2    VIMPAT 200 MG tablet TAKE 1 TABLET BY MOUTH 2 TIMES A  tablet 1    carbidopa-levodopa (SINEMET)  MG per tablet Take 1 tablet by mouth 2 times daily for 14 days 28 tablet 0     No current facility-administered medications on file prior to visit. SOCIAL HISTORY:     Reviewed and no change from previous record. José Miguel Payan  reports that she has never smoked. She has never used smokeless tobacco.    FAMILY HISTORY:      Reviewed and No change from previous visit    HEALTH MAINTENANCE:      Flu vaccine offered. Shingles 3/3 due. Tetanus UTD due 11/23/25  Pneumococcal UTD and completed on 2/7/19    Potassium Screening 3.4 6/23/2020  Creatinine Screening 0.61 6/23/2020  Lipids Screening 3/3/2020    Mammogram due. Pap due previously done 2017 and negative. Will schedule in the future. DEXA complete 7/17/15 left hip -1.0    Colonoscopy due. Cologaurd ordered. PHYSICAL EXAM:      Vitals:    02/10/21 1530   BP: 109/69   Pulse: 84   Temp: 97.1 °F (36.2 °C)   TempSrc: Infrared   Weight: 137 lb 12.8 oz (62.5 kg)   Height: 5' 10\" (1.778 m)     Body mass index is 19.77 kg/m². BP Readings from Last 3 Encounters:   02/10/21 109/69   01/20/21 115/78   07/14/20 106/74        Wt Readings from Last 3 Encounters:   02/10/21 137 lb 12.8 oz (62.5 kg)   01/20/21 139 lb (63 kg)   07/14/20 139 lb (63 kg)       · General appearance: Well Appearing, Cooperative, Appropriately Dressed for the Weather. · Head: Normocephalic, No Lesions, No Obvious Deformity. · ENT: No Icterus or Redness to Eyes. No Epistaxis from nose, No Pharyngeal Erythema or Exudate. · Lungs: Clear to Auscultation Bilaterally, No Rales or Rhonchi.    · Heart: Regular Rate and Rhythm, S1, S2 normal, No Murmur  · Abdomen: Bowel Sounds Present on Auscultation. Soft, Non-Tender. · Extremities: Atraumatic, No Cyanosis or Edema. · Neurological:  Awake, Alert, Oriented to Person, Place and Time. Reflexes normal and symmetric. Sensation grossly normal.  · Integumentary: Ecchymosis around the posterior knee and below the patella anteriorly faintly pink at this time and improving. Atypical nevi to left back under bra strap. Not asymmetric, borders are round, consistent color throughout however is larger than 6 mm and growing per patient. LABORATORY FINDINGS:      CBC:  Lab Results   Component Value Date    WBC 5.5 06/23/2020    HGB 13.8 06/23/2020     06/23/2020     10/04/2011       BMP:    Lab Results   Component Value Date     06/23/2020    K 3.4 06/23/2020     06/23/2020    CO2 24 06/23/2020    BUN 9 06/23/2020    CREATININE 0.61 06/23/2020    GLUCOSE 79 06/23/2020    GLUCOSE 68 03/27/2012       HEMOGLOBIN A1C:   Lab Results   Component Value Date    LABA1C 5.1 08/18/2016       MICROALBUMIN URINE: No results found for: MICROALBUR    FASTING LIPID PANEL:  Lab Results   Component Value Date    CHOL 183 08/18/2016    HDL 74 03/03/2020    TRIG 64 08/18/2016       Lab Results   Component Value Date    LDLCHOLESTEROL 53 03/03/2020       LIVER PROFILE:  Lab Results   Component Value Date    ALT 15 06/20/2020    AST 22 06/20/2020    PROT 6.8 06/20/2020    BILITOT 0.56 06/20/2020    BILIDIR 0.17 05/13/2018    LABALBU 3.8 06/20/2020    LABALBU 4.1 10/04/2011        THYROID FUNCTION:   Lab Results   Component Value Date    TSH 9.40 06/20/2020        URINE ANALYSIS: No results found for: LABURIN    ASSESSMENT AND PLAN:      1. Ecchymosis  - Recent fall  - Resolving. 2. Fall, initial encounter  - Frequent falls. - Follows with neurology. 3. At high risk for falls    4. Screening mammogram for high-risk patient  - CAYETANO DIGITAL SCREEN W OR WO CAD BILATERAL; Future    5.  Colon cancer screening  - Cologuard (For External Results Only); Future    6. Need for shingles vaccine  - zoster recombinant adjuvanted vaccine [facility]) 50 MCG/0.5ML SUSR injection; Inject 0.5 mLs into the muscle See Admin Instructions 1 dose now and repeat in 2-6 months  Dispense: 0.5 mL; Refill: 0    7. Atypical nevus of back  - Dermatology referral.     In summary, in the [address] on 02/10/21, Misty was seen and examined. Their heart rate was 84, blood pressure was 109/69, today's weight 137. We discussed that the bruising is likely from previous fall and to keep an eye on the bruising and let us know if it becomes painful or worse. She voiced her concern for growing nevi on her back at her bra line somewhat painful dermatology referral placed. FOLLOW UP AND INSTRUCTIONS:   No follow-ups on file. 1. Laura received counseling on the following healthy behaviors: nutrition, exercise and medication adherence    2. Reviewed prior labs and health maintenance. 3. Discussed use, benefit, and side effects of prescribed medications. Barriers to medication compliance addressed. All patient questions answered. Pt voiced understanding. 4. Patient given educational materials - see patient instructions    Jim Grady DO  Internal Medicine Resident , PGY-1  [address]  02/10/21 3:58 PM    On the basis of positive falls risk screening, assessment and plan is as follows: home safety tips provided.

## 2021-02-10 ENCOUNTER — OFFICE VISIT (OUTPATIENT)
Dept: INTERNAL MEDICINE | Age: 66
End: 2021-02-10
Payer: COMMERCIAL

## 2021-02-10 VITALS
HEART RATE: 84 BPM | HEIGHT: 70 IN | BODY MASS INDEX: 19.73 KG/M2 | TEMPERATURE: 97.1 F | SYSTOLIC BLOOD PRESSURE: 109 MMHG | DIASTOLIC BLOOD PRESSURE: 69 MMHG | WEIGHT: 137.8 LBS
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
## 2021-02-10 DIAGNOSIS — Z12.31 SCREENING MAMMOGRAM FOR HIGH-RISK PATIENT: ICD-10-CM

## 2021-02-10 DIAGNOSIS — Z12.11 COLON CANCER SCREENING: ICD-10-CM

## 2021-02-10 DIAGNOSIS — Z91.81 AT HIGH RISK FOR FALLS: ICD-10-CM

## 2021-02-10 DIAGNOSIS — R58 ECCHYMOSIS: Primary | ICD-10-CM

## 2021-02-10 DIAGNOSIS — D22.5 ATYPICAL NEVUS OF BACK: ICD-10-CM

## 2021-02-10 DIAGNOSIS — Z23 NEED FOR SHINGLES VACCINE: ICD-10-CM

## 2021-02-10 PROCEDURE — 99213 OFFICE O/P EST LOW 20 MIN: CPT | Performed by: STUDENT IN AN ORGANIZED HEALTH CARE EDUCATION/TRAINING PROGRAM

## 2021-02-10 PROCEDURE — 90688 IIV4 VACCINE SPLT 0.5 ML IM: CPT | Performed by: STUDENT IN AN ORGANIZED HEALTH CARE EDUCATION/TRAINING PROGRAM

## 2021-02-10 PROCEDURE — 4040F PNEUMOC VAC/ADMIN/RCVD: CPT | Performed by: STUDENT IN AN ORGANIZED HEALTH CARE EDUCATION/TRAINING PROGRAM

## 2021-02-10 PROCEDURE — G8427 DOCREV CUR MEDS BY ELIG CLIN: HCPCS | Performed by: STUDENT IN AN ORGANIZED HEALTH CARE EDUCATION/TRAINING PROGRAM

## 2021-02-10 PROCEDURE — 1090F PRES/ABSN URINE INCON ASSESS: CPT | Performed by: STUDENT IN AN ORGANIZED HEALTH CARE EDUCATION/TRAINING PROGRAM

## 2021-02-10 PROCEDURE — G8420 CALC BMI NORM PARAMETERS: HCPCS | Performed by: STUDENT IN AN ORGANIZED HEALTH CARE EDUCATION/TRAINING PROGRAM

## 2021-02-10 PROCEDURE — 1123F ACP DISCUSS/DSCN MKR DOCD: CPT | Performed by: STUDENT IN AN ORGANIZED HEALTH CARE EDUCATION/TRAINING PROGRAM

## 2021-02-10 PROCEDURE — G8399 PT W/DXA RESULTS DOCUMENT: HCPCS | Performed by: STUDENT IN AN ORGANIZED HEALTH CARE EDUCATION/TRAINING PROGRAM

## 2021-02-10 PROCEDURE — G8482 FLU IMMUNIZE ORDER/ADMIN: HCPCS | Performed by: STUDENT IN AN ORGANIZED HEALTH CARE EDUCATION/TRAINING PROGRAM

## 2021-02-10 PROCEDURE — 3017F COLORECTAL CA SCREEN DOC REV: CPT | Performed by: STUDENT IN AN ORGANIZED HEALTH CARE EDUCATION/TRAINING PROGRAM

## 2021-02-10 PROCEDURE — 1036F TOBACCO NON-USER: CPT | Performed by: STUDENT IN AN ORGANIZED HEALTH CARE EDUCATION/TRAINING PROGRAM

## 2021-02-10 RX ORDER — ZOSTER VACCINE RECOMBINANT, ADJUVANTED 50 MCG/0.5
0.5 KIT INTRAMUSCULAR SEE ADMIN INSTRUCTIONS
Qty: 0.5 ML | Refills: 0 | Status: SHIPPED | OUTPATIENT
Start: 2021-02-10 | End: 2021-08-09

## 2021-02-10 SDOH — ECONOMIC STABILITY: FOOD INSECURITY: WITHIN THE PAST 12 MONTHS, YOU WORRIED THAT YOUR FOOD WOULD RUN OUT BEFORE YOU GOT MONEY TO BUY MORE.: NEVER TRUE

## 2021-02-10 ASSESSMENT — PATIENT HEALTH QUESTIONNAIRE - PHQ9
2. FEELING DOWN, DEPRESSED OR HOPELESS: 0
SUM OF ALL RESPONSES TO PHQ QUESTIONS 1-9: 0
SUM OF ALL RESPONSES TO PHQ QUESTIONS 1-9: 0
2. FEELING DOWN, DEPRESSED OR HOPELESS: 0
SUM OF ALL RESPONSES TO PHQ9 QUESTIONS 1 & 2: 0
1. LITTLE INTEREST OR PLEASURE IN DOING THINGS: 0
SUM OF ALL RESPONSES TO PHQ QUESTIONS 1-9: 0
SUM OF ALL RESPONSES TO PHQ QUESTIONS 1-9: 0

## 2021-02-10 NOTE — PROGRESS NOTES
Attending Physician Statement  I have discussed the care of Chelsey Amos, including pertinent history and exam findings with the resident. I have reviewed the key elements of all parts of the encounter with the resident. I agree with the assessment, and status of the problem list as documented. Diagnosis Orders   1. Ecchymosis     2. Fall, initial encounter     3. At high risk for falls     4. Screening mammogram for high-risk patient  CAYETANO DIGITAL SCREEN W OR WO CAD BILATERAL   5. Colon cancer screening  Cologuard (For External Results Only)   6. Need for shingles vaccine  zoster recombinant adjuvanted vaccine [facility]) 50 MCG/0.5ML SUSR injection   7. Atypical nevus of back  Matti Recinos MD, Dermatology, [address]   tripped and fell and had large bruise around knee. Has hx of CVA with no residual weakness of extremities per pt    The plan and orders should include   Orders Placed This Encounter   Procedures    Cologuard (For External Results Only)    CAYETANO DIGITAL SCREEN W OR WO CAD BILATERAL    INFLUENZA, QUADV, 3 YRS AND OLDER, IM, MDV, 0.5ML ([place] Osgood)   Matti Recinos MD, Dermatology, [address]    and this was also documented by the resident. I agree with the referral to derm. The medication list was reviewed with the resident and is up to date.      Dr Jocelyn Franklin MD, [address]  Associate , Department of Internal Medicine  Resident Ambulatory Site Medical Director  [facility] Internal Medicine  [facility] AT [place]  Internal Medicine Clerkship - Joshua Roberts    2/10/2021, 4:08 PM

## 2021-02-10 NOTE — PATIENT INSTRUCTIONS
Printed and signed script for Shingrix given to pt. Script for Mammogram and instructions for CAYETANO given to pt. Scheduling will call with the appointment date and time. Please call [phone] if you haven't heard anything from them. An order for cologuard was placed by your physician, the company will be contacting within the next 2 days and will mail the test. Please contact the clinic at [phone] if not heard from in 1 week   Referral for Dermatology sent to [facility] Dermatology  they will call pt for appt, copy of referral with number and address given to pt. Pt should call referral number if not heard from within a couple of weeks. Patient to return to clinic as needed. AVS reviewed and given to pt. It is very important for your care that you keep your appointment. If for some reason you are unable to keep your appointment it is equally important that you call our office at [phone] to cancel your appointment and reschedule. Failure to do so may result in your termination from our practice.   MB

## 2021-02-10 NOTE — PROGRESS NOTES
Vaccine Information Sheet, \"Influenza - Inactivated\"  given to Austin Boateng, or parent/legal guardian of  Austin Boateng and verbalized understanding. Patient responses:    Is the person being vaccinated sick today? No    Does the person to be vaccinated have an allergy to a component of the vaccine? No    Has the person to be vaccinated ever had a reaction to the flu vaccine in the past?  No    Have you ever had Guillian Sh[place] Side Syndrome? No    Flu vaccine given per order. Please see immunization tab.
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
## 2021-03-31 ENCOUNTER — TELEPHONE (OUTPATIENT)
Dept: INTERNAL MEDICINE | Age: 66
End: 2021-03-31

## 2021-03-31 DIAGNOSIS — R19.5 POSITIVE COLORECTAL CANCER SCREENING USING COLOGUARD TEST: Primary | ICD-10-CM

## 2021-03-31 DIAGNOSIS — Z12.11 COLON CANCER SCREENING: ICD-10-CM

## 2021-03-31 NOTE — TELEPHONE ENCOUNTER
Results of Colorguard are Positive. Referral for GI sent to [facility]. They will contact pt for appt. Copy of Referral mailed out to pt with Number Highlighted. Pt advise to call and schedule appt if not heard by the time they receive referral in mail.  PC to pt Spoke with pt results given

## 2021-04-02 ENCOUNTER — HOSPITAL ENCOUNTER (OUTPATIENT)
Age: 66
Discharge: HOME | End: 2021-04-02
Payer: COMMERCIAL

## 2021-04-02 ENCOUNTER — TELEPHONE (OUTPATIENT)
Dept: GASTROENTEROLOGY | Age: 66
End: 2021-04-02

## 2021-04-02 DIAGNOSIS — G40.909 SEIZURE DISORDER (HCC): ICD-10-CM

## 2021-04-02 LAB
FOLATE: >20 NG/ML
TSH SERPL DL<=0.05 MIU/L-ACNC: 2.42 MIU/L (ref 0.3–5)
VITAMIN B-12: 1449 PG/ML (ref 232–1245)

## 2021-04-02 PROCEDURE — 82607 VITAMIN B-12: CPT

## 2021-04-02 PROCEDURE — 36415 COLL VENOUS BLD VENIPUNCTURE: CPT

## 2021-04-02 PROCEDURE — 84443 ASSAY THYROID STIM HORMONE: CPT

## 2021-04-02 PROCEDURE — 82746 ASSAY OF FOLIC ACID SERUM: CPT

## 2021-04-02 NOTE — TELEPHONE ENCOUNTER
Called pt regarding referral. Pt prefers [facility] for procedure. Based on colon screen questions, pt will not need OV first. Last colon in 2014 with Dr Donal Alex at [facility]; poor prep. Pt will require 2 day prep when scheduled. She will also need to be scheduled several weeks out as she does not have email or access to [employer] at this time and will need prep sheet mailed to her. Pt does not have transportation at this time and will look into finding someone to drive her.

## 2021-04-06 ENCOUNTER — OFFICE VISIT (OUTPATIENT)
Dept: NEUROLOGY | Age: 66
End: 2021-04-06
Payer: COMMERCIAL

## 2021-04-06 VITALS
TEMPERATURE: 97.2 F | DIASTOLIC BLOOD PRESSURE: 72 MMHG | BODY MASS INDEX: 19.33 KG/M2 | WEIGHT: 135 LBS | HEART RATE: 91 BPM | HEIGHT: 70 IN | SYSTOLIC BLOOD PRESSURE: 106 MMHG

## 2021-04-06 DIAGNOSIS — Z91.89 DRIVING SAFETY ISSUE: Primary | ICD-10-CM

## 2021-04-06 PROCEDURE — G8420 CALC BMI NORM PARAMETERS: HCPCS | Performed by: PSYCHIATRY & NEUROLOGY

## 2021-04-06 PROCEDURE — G8399 PT W/DXA RESULTS DOCUMENT: HCPCS | Performed by: PSYCHIATRY & NEUROLOGY

## 2021-04-06 PROCEDURE — 3017F COLORECTAL CA SCREEN DOC REV: CPT | Performed by: PSYCHIATRY & NEUROLOGY

## 2021-04-06 PROCEDURE — 99214 OFFICE O/P EST MOD 30 MIN: CPT | Performed by: PSYCHIATRY & NEUROLOGY

## 2021-04-06 PROCEDURE — 4040F PNEUMOC VAC/ADMIN/RCVD: CPT | Performed by: PSYCHIATRY & NEUROLOGY

## 2021-04-06 PROCEDURE — 1123F ACP DISCUSS/DSCN MKR DOCD: CPT | Performed by: PSYCHIATRY & NEUROLOGY

## 2021-04-06 PROCEDURE — 1090F PRES/ABSN URINE INCON ASSESS: CPT | Performed by: PSYCHIATRY & NEUROLOGY

## 2021-04-06 PROCEDURE — 1036F TOBACCO NON-USER: CPT | Performed by: PSYCHIATRY & NEUROLOGY

## 2021-04-06 PROCEDURE — G8427 DOCREV CUR MEDS BY ELIG CLIN: HCPCS | Performed by: PSYCHIATRY & NEUROLOGY

## 2021-04-06 RX ORDER — PRIMIDONE 50 MG/1
25 TABLET ORAL 2 TIMES DAILY
Qty: 90 TABLET | Refills: 3 | Status: SHIPPED | OUTPATIENT
Start: 2021-04-06 | End: 2021-09-10

## 2021-04-06 NOTE — PROGRESS NOTES
Dear Dr. Mendez Clay MD   Fernando Resendez is a 77 y.o. female who comes in today as a follow visit. She has a past medical history of right cortical ischemic stroke and subsequently has a history of seizure disorder. On 6/9/2020 the patient stated she stopped Vimpat; this medication was re-initiated. She is currently on Keppra at 1250 mg twice a day and Vimpat 200 mg twice a day. While off Vimpat, she experienced a breakthrough seizure on 6/19/20 and required a hospital stay from 6/19/20 to 6/23/20. Today, the patient is accompanied by her sister. She returns following a DaTscan, which came back normal. She denies any changes in tremors since initiation of carbidopa-levodopa. She admits medication compliance. Patient's sister denies medication compliance with carbidopa-levodopa. Patient denies any breakthrough seizures since the last visit. She continues to exhibit mild cognitive impairment. Patient's sister reports noticeable decline in memory since the last visit. The patient continues to live with her sister. Patient's long-term EEG monitoring at [facility] for 4 days showed frequent left temporal parietal spike wave discharges but did not have any seizures. Last clinical seizure was in August 2018 and seizure has been under control after increment of Keppra and addition of Vimpat.   Her most recent seizure was 6/19/20    REVIEW OF SYSTEMS   Constitutional Weight: absent, Appetite: absent, Fatigue: absent   HEENT Visual disturbance: absent, Ears: normal   Respiratory Shortness of breath: absent, Cough: absent   Cardiovascular Chest pain: absent, Leg swelling :absent   GI Constipation: absent, Diarrhea: absent, Swallowing change: absent    Urinary frequency: absent, Urinary urgency: absent,    Musculoskeletal Neck pain: absent, Back pain: absent, Stiffness: absent, Muscle pain: absent, Joint pain: absent   Dermatological Hair loss: absent, Skin changes: absent   Neurological Memory loss: absent, Confusion: absent, Seizures: present, Trouble walking or imbalance: absent, Dizziness: absent, Weakness: absent, Numbness: absent Tremor: absent, Spasm: absent, Speech difficulty: absent, Headache: present, Light sensitivity: absent   Psychiatric Anxiety: absent, Hallucination: absent, Depression, absent   Hematologic Abnormal bleeding/Bruising: absent, Anemia: absent         Lab Results   Component Value Date    LDLCHOLESTEROL 53 03/03/2020     No components found for: CHLPL  Lab Results   Component Value Date    TRIG 64 08/18/2016    TRIG 125 05/07/2015    TRIG 111 11/05/2013     Lab Results   Component Value Date    HDL 74 03/03/2020    HDL 71 08/18/2016    HDL 79 05/07/2015     No results found for: LDLCALC  No results found for: LABVLDL  Lab Results   Component Value Date    LABA1C 5.1 08/18/2016     Lab Results   Component Value Date     08/18/2016     Lab Results   Component Value Date    [id number] 0893 (H) 04/02/2021      Right MCA stroke 1998   Last seizure 2007, however there is concern of intermittent staring episodes with unknown frequency Possibly occurring once a week or so  2 D echo Sep 2012 normal.  MRA head normal, MRA neck normal  MRI FLAIR March 2013      General examination:    Head: Normocephalic, atraumatic  Eyes: Extraocular movements intact  Lungs: Respirations unlabored, chest wall no deformity  ENT: Normal external ear canals, no sinus tenderness  Heart: Regular rate rhythm  Abdomen: No masses, tenderness  Extremities: No cyanosis or edema, 2+ pulses  Skin: Intact, normal skin color    Neurological examination:    Mental status   Alert and oriented; intact memory with no confusion, speech or language problems; no hallucinations or delusions     Cranial nerves   II - visual fields intact to confrontation                                                III, IV, VI - extra-ocular muscles full: no pupillary defect; no ARSH, no nystagmus, no ptosis V - normal facial sensation                                                               VII - normal facial symmetry                                                             VIII - intact hearing                                                                             IX, X - symmetrical palate                                                                  XI - symmetrical shoulder shrug                                                       XII - midline tongue without atrophy or fasciculation     Motor function  Normal muscle bulk and tone; normal power 5/5, including fine motor movements     Sensory function Intact to touch, pin, vibration, proprioception     Cerebellar Intact fine motor movement. No involuntary movements or tremors     Reflex function Intact 2+ DTR and symmetric. Negative Babinski     Gait                  Decreased arm swing on right side   Positive retropulsion test  Positional tremor on right upper extremity, more than left upper extremity     mini-mental status exam  Maximum score 5 Score 5-0 What is the year, season, date, day, month   Maximum score 5 Score 5 Where are we: State, county, town, hospital, floor? Maximum score 3 Score 3-0 Name 3 objects: ball, pen, car. Ask patient to repeat 3 objects. Maximum score 5 Score 5-0 Serial sevens from 100:93, 86, 79, 72, 65   Maximum score 3 Score 3-3 Recall 3 objects   Maximum score 2 Score 2 Name a pencil and watch. Maximum score 1 Score 1 Repeat the following: No ifs ands or buts.      Maximum score 3 Score 3 Follow 3 stage command take a paper in your hand, fold it in half, and put it on the floor. Maximum score 1  Score 1 Read and obey the following: Close your eyes     Maximum score 1 Score 1 Write a sentence. Maximum score 1 Score 1 Copy a design below.        Max 30   Patients score 27        Assessment and recommendations:      Right MCA embolic stroke in 4332, with subsequent remote symptomatic seizures    The patient has remained seizure free since being discharged on 6/23/20. The patient will continue Keppra 1250 mg twice a day and Vimpat 200 mg twice a day. Medication side effects and compliance were discussed and questions were answered. Previous attempt to taper off Vimpat has resulted in breakthrough seizures. Patient does not have any side effect of the medications. Seizure precautions including alcohol abstinence, sleep deprivation and avoidance of photic stimulation were discussed in details and questions were answered. Driving instructions were also discussed with the patient and questions were answered. The patient understands that she should not drive until she is 6 months seizure-free as per [place]. Bilateral tremor, worse on right side  Patient's DaTscan came back normal and she has no success with carbidopa-levodopa. I discontinued carbidopa-levodopa. I initiated the patient on primidone 25 mg twice daily for tremors and advised the patient to contact me next 1 to 2 weeks to report improvement in the tremors. Medication side effects were discussed and questions were answered. Mild cognitive impairment:   The patient scored 27/30 on the mini-mental status exam on 4/6/21 but her sister has reported noticeable decline in memory since the last visit. I referred the patient for a driving evaluation to ensure her safety with the progression of her symptoms. If symptoms progress, I would initiate the patient on Aricept. Reversible causes for cognitive impairment including B12 level, thyroid profile are normal.    Please return in 3 months or sooner if symptoms worsen. Nela Guzman MD I would like to thank you for your consult. Please contact neurology if there remains any further question about the patient care.     Scribe Attestation:   By signing my name below, Kev Gold, attest that this documentation has been prepared under the direction and in the presence of Taylor Sierra MD.    Electronically Signed: Yi Dougherty. 4/6/2021 10:01 AM    Physician Attestation:  Bello Bonds MD, personally performed the services described in this documentation. All medical record entries made by the scribe were at my direction and in my presence. I have reviewed the chart and discharge instructions (if applicable) and agree that the record reflects my personal performance and is accurate and complete.     Electronically Signed: Romeo Adams 4/6/2021 10:01 AM    Diplomate, American Board of Psychiatry and Neurology  Diplomate, American Board of Clinical Neurophysiology  Diplomate, American Board of Epilepsy

## 2021-04-13 ENCOUNTER — TELEPHONE (OUTPATIENT)
Dept: NEUROLOGY | Age: 66
End: 2021-04-13

## 2021-04-14 RX ORDER — POLYETHYLENE GLYCOL 3350 17 G/17G
POWDER, FOR SOLUTION ORAL
Qty: 238 G | Refills: 0 | Status: SHIPPED | OUTPATIENT
Start: 2021-04-14 | End: 2021-09-01

## 2021-04-14 NOTE — TELEPHONE ENCOUNTER
Called pt regarding referral. Pt scheduled for [facility] Dr Vani Evans 5/10/21 at 11am, colon recall (new pt), Miralax/mag/mom, 2 day prep given to pt over phone and mailed to home address.  Covid 5/6/21 at [facility] at 11am, PAT call on 4/27/21 at 10am

## 2021-04-27 ENCOUNTER — HOSPITAL ENCOUNTER (OUTPATIENT)
Dept: PREADMISSION TESTING | Age: 66
Discharge: HOME | End: 2021-05-01
Payer: COMMERCIAL

## 2021-04-27 VITALS — WEIGHT: 139 LBS | HEIGHT: 70 IN | BODY MASS INDEX: 19.9 KG/M2

## 2021-04-27 NOTE — PROGRESS NOTES
Pre-op Instructions For Out-Patient Endoscopy Surgery    Medication Instructions:  · Please stop herbs and any supplements now (includes vitamins and minerals). · Please contact your surgeon and prescribing physician for pre-op instructions for any blood thinners. · If you have inhalers/aerosol treatments at home, please use them the morning of your surgery and bring the inhalers with you to the hospital.    · Please take the following medications the morning of your surgery with a sip of water:   Keppra, Primidone, Vimpat,Mysoline, Amlodipine, Levothyroxine. Surgery Instructions:  1. After midnight before surgery:  Do not eat or drink anything, including water, mints, gum, and hard candy. You may brush your teeth without swallowing. No smoking, chewing tobacco, or street drugs. 2. Please shower or bathe before surgery. 3. Please do not wear any cologne, lotion, powder, jewelry, piercings, perfume, makeup, nail polish, hair accessories, or hair spray on the day of surgery. Wear loose comfortable clothing. 4. Leave your valuables at home. Bring a storage case for any glasses/contacts. 5. An adult who is responsible for you MUST drive you home and should be with you for the first 24 hours after surgery. The Day of Surgery:  · Arrive at [address] [facility] Surgery Entrance at the time directed by your surgeon and check in at the desk. · If you have a living will or healthcare power of , please bring a copy. · You will be taken to the pre-op holding area where you will be prepared for surgery. A physical assessment will be performed by a nurse practitioner or house officer. Your IV will be started and you will meet your anesthesiologist.    · We are currently limiting visitors to only one designated person in the pre-op holding area.   When you go to surgery, your family will be directed to the surgical waiting room, where the doctor should speak with them after your surgery. · After surgery, you will be taken to the recovery room then when you are awake and stable you will go to the short stay unit for preparation to be discharged. Only your one designated person is allowed to come to short stay for your discharge. Instructions read to Pravin Tran and verbalizes understanding.

## 2021-04-27 NOTE — PROGRESS NOTES
Dr. Amparo Davis, anesthesia, was contacted and informed of patient's history and planned surgery. No orders received and no clearance required.

## 2021-05-06 ENCOUNTER — HOSPITAL ENCOUNTER (OUTPATIENT)
Dept: LAB | Age: 66
Setting detail: SPECIMEN
Discharge: HOME | End: 2021-05-06
Payer: COMMERCIAL

## 2021-05-06 ENCOUNTER — TELEPHONE (OUTPATIENT)
Dept: GASTROENTEROLOGY | Age: 66
End: 2021-05-06

## 2021-05-06 DIAGNOSIS — Z01.818 PREOP TESTING: Primary | ICD-10-CM

## 2021-05-06 PROCEDURE — U0003 INFECTIOUS AGENT DETECTION BY NUCLEIC ACID (DNA OR RNA); SEVERE ACUTE RESPIRATORY SYNDROME CORONAVIRUS 2 (SARS-COV-2) (CORONAVIRUS DISEASE [COVID-19]), AMPLIFIED PROBE TECHNIQUE, MAKING USE OF HIGH THROUGHPUT TECHNOLOGIES AS DESCRIBED BY CMS-2020-01-R: HCPCS

## 2021-05-06 PROCEDURE — U0005 INFEC AGEN DETEC AMPLI PROBE: HCPCS

## 2021-05-06 NOTE — TELEPHONE ENCOUNTER
Laura overton voicemail but didn't state the reason for her call. Returned call to Eli May who states she has gone to 3 different stores to purchase Milk of Magnesia and all 3 only sell cherry flavored. She said it is a very light pink color and isn't sure if she should still use it. Will verify with Dr Moshe Doyle and return her call.

## 2021-05-07 ENCOUNTER — ANESTHESIA EVENT (OUTPATIENT)
Dept: ENDOSCOPY | Age: 66
End: 2021-05-07
Payer: COMMERCIAL

## 2021-05-07 LAB
SARS-COV-2: NORMAL
SARS-COV-2: NOT DETECTED
SOURCE: NORMAL

## 2021-05-07 NOTE — TELEPHONE ENCOUNTER
Writer returned call to pt and spoke to her over the phone letting her know Dr Guillaume Willis for her to use the cherry flavored MOM, but to drink lots of water also.

## 2021-05-10 ENCOUNTER — HOSPITAL ENCOUNTER (OUTPATIENT)
Age: 66
Setting detail: OUTPATIENT SURGERY
Discharge: HOME | End: 2021-05-10
Attending: INTERNAL MEDICINE | Admitting: INTERNAL MEDICINE
Payer: COMMERCIAL

## 2021-05-10 ENCOUNTER — ANESTHESIA (OUTPATIENT)
Dept: ENDOSCOPY | Age: 66
End: 2021-05-10
Payer: COMMERCIAL

## 2021-05-10 VITALS
HEART RATE: 65 BPM | WEIGHT: 139 LBS | RESPIRATION RATE: 11 BRPM | HEIGHT: 70 IN | BODY MASS INDEX: 19.9 KG/M2 | TEMPERATURE: 97.3 F | OXYGEN SATURATION: 98 % | SYSTOLIC BLOOD PRESSURE: 110 MMHG | DIASTOLIC BLOOD PRESSURE: 69 MMHG

## 2021-05-10 VITALS — SYSTOLIC BLOOD PRESSURE: 69 MMHG | OXYGEN SATURATION: 99 % | DIASTOLIC BLOOD PRESSURE: 38 MMHG

## 2021-05-10 PROCEDURE — 3609027000 HC COLONOSCOPY: Performed by: INTERNAL MEDICINE

## 2021-05-10 PROCEDURE — 6360000002 HC RX W HCPCS: Performed by: NURSE ANESTHETIST, CERTIFIED REGISTERED

## 2021-05-10 PROCEDURE — 3700000000 HC ANESTHESIA ATTENDED CARE: Performed by: INTERNAL MEDICINE

## 2021-05-10 PROCEDURE — 7100000001 HC PACU RECOVERY - ADDTL 15 MIN: Performed by: INTERNAL MEDICINE

## 2021-05-10 PROCEDURE — 3700000001 HC ADD 15 MINUTES (ANESTHESIA): Performed by: INTERNAL MEDICINE

## 2021-05-10 PROCEDURE — 7100000000 HC PACU RECOVERY - FIRST 15 MIN: Performed by: INTERNAL MEDICINE

## 2021-05-10 PROCEDURE — 45378 DIAGNOSTIC COLONOSCOPY: CPT | Performed by: INTERNAL MEDICINE

## 2021-05-10 PROCEDURE — 2580000003 HC RX 258: Performed by: ANESTHESIOLOGY

## 2021-05-10 PROCEDURE — 2709999900 HC NON-CHARGEABLE SUPPLY: Performed by: INTERNAL MEDICINE

## 2021-05-10 RX ORDER — PROPOFOL 10 MG/ML
INJECTION, EMULSION INTRAVENOUS PRN
Status: DISCONTINUED | OUTPATIENT
Start: 2021-05-10 | End: 2021-05-10 | Stop reason: SDUPTHER

## 2021-05-10 RX ORDER — SODIUM CHLORIDE 0.9 % (FLUSH) 0.9 %
10 SYRINGE (ML) INJECTION PRN
Status: DISCONTINUED | OUTPATIENT
Start: 2021-05-10 | End: 2021-05-10 | Stop reason: HOSPADM

## 2021-05-10 RX ORDER — SODIUM CHLORIDE 0.9 % (FLUSH) 0.9 %
10 SYRINGE (ML) INJECTION EVERY 12 HOURS SCHEDULED
Status: DISCONTINUED | OUTPATIENT
Start: 2021-05-10 | End: 2021-05-10 | Stop reason: HOSPADM

## 2021-05-10 RX ORDER — PROPOFOL 10 MG/ML
INJECTION, EMULSION INTRAVENOUS CONTINUOUS PRN
Status: DISCONTINUED | OUTPATIENT
Start: 2021-05-10 | End: 2021-05-10 | Stop reason: SDUPTHER

## 2021-05-10 RX ORDER — SODIUM CHLORIDE, SODIUM LACTATE, POTASSIUM CHLORIDE, CALCIUM CHLORIDE 600; 310; 30; 20 MG/100ML; MG/100ML; MG/100ML; MG/100ML
INJECTION, SOLUTION INTRAVENOUS CONTINUOUS
Status: DISCONTINUED | OUTPATIENT
Start: 2021-05-10 | End: 2021-05-10 | Stop reason: HOSPADM

## 2021-05-10 RX ORDER — LIDOCAINE HYDROCHLORIDE 10 MG/ML
1 INJECTION, SOLUTION EPIDURAL; INFILTRATION; INTRACAUDAL; PERINEURAL
Status: DISCONTINUED | OUTPATIENT
Start: 2021-05-10 | End: 2021-05-10 | Stop reason: HOSPADM

## 2021-05-10 RX ORDER — SODIUM CHLORIDE 9 MG/ML
25 INJECTION, SOLUTION INTRAVENOUS PRN
Status: DISCONTINUED | OUTPATIENT
Start: 2021-05-10 | End: 2021-05-10 | Stop reason: HOSPADM

## 2021-05-10 RX ADMIN — PROPOFOL 150 MCG/KG/MIN: 10 INJECTION, EMULSION INTRAVENOUS at 10:29

## 2021-05-10 RX ADMIN — PROPOFOL 50 MG: 10 INJECTION, EMULSION INTRAVENOUS at 10:29

## 2021-05-10 RX ADMIN — PROPOFOL 60 MG: 10 INJECTION, EMULSION INTRAVENOUS at 10:31

## 2021-05-10 RX ADMIN — SODIUM CHLORIDE, POTASSIUM CHLORIDE, SODIUM LACTATE AND CALCIUM CHLORIDE: 600; 310; 30; 20 INJECTION, SOLUTION INTRAVENOUS at 10:24

## 2021-05-10 SDOH — HEALTH STABILITY: MENTAL HEALTH: HOW MANY STANDARD DRINKS CONTAINING ALCOHOL DO YOU HAVE ON A TYPICAL DAY?: NOT ASKED

## 2021-05-10 SDOH — HEALTH STABILITY: MENTAL HEALTH: HOW OFTEN DO YOU HAVE A DRINK CONTAINING ALCOHOL?: NOT ASKED

## 2021-05-10 ASSESSMENT — PULMONARY FUNCTION TESTS
PIF_VALUE: 1

## 2021-05-10 ASSESSMENT — PAIN SCALES - GENERAL: PAINLEVEL_OUTOF10: 0

## 2021-05-10 ASSESSMENT — ENCOUNTER SYMPTOMS
TROUBLE SWALLOWING: 0
STRIDOR: 0
SORE THROAT: 0
SHORTNESS OF BREATH: 0
WHEEZING: 0
COUGH: 0
RHINORRHEA: 0

## 2021-05-10 ASSESSMENT — PAIN - FUNCTIONAL ASSESSMENT: PAIN_FUNCTIONAL_ASSESSMENT: 0-10

## 2021-05-10 NOTE — ANESTHESIA PRE PROCEDURE
Department of Anesthesiology  Preprocedure Note       Name:  Madeleine Smith   Age:  77 y.o.  :  1955                                          MRN:  [id number]         Date:  5/10/2021      Surgeon: Ja Guzmán):  Rio Saunders MD    Procedure: Procedure(s):  COLONOSCOPY DIAGNOSTIC    Medications prior to admission:   Prior to Admission medications    Medication Sig Start Date End Date Taking? Authorizing Provider   polyethylene glycol (GLYCOLAX) 17 GM/SCOOP powder Follow instructions provided to you from physician's office.  21   Rio Saunders MD   magnesium citrate solution Take 296 mLs by mouth once for 1 dose 21  Rio Saunders MD   magnesium hydroxide (MILK OF MAGNESIA) 400 MG/5ML suspension Follow directions given to you from provider's office 21   Rio Saunders MD   primidone (MYSOLINE) 50 MG tablet Take 0.5 tablets by mouth 2 times daily 21   Dorothy Mendieta MD   carbidopa-levodopa (SINEMET)  MG per tablet Take 1 tablet by mouth 3 times daily 21   Dorothy Mendieta MD   zoster recombinant adjuvanted vaccine [facility]) 50 MCG/0.5ML SUSR injection Inject 0.5 mLs into the muscle See Admin Instructions 1 dose now and repeat in 2-6 months 2/10/21 8/9/21  Lenin Walker MD   SUMAtriptan (IMITREX) 100 MG tablet TAKE 1 TABLET BY MOUTH ONE TIME A DAY AS NEEDED FOR MIGRAINE 1/15/21   Su Larson MD   omeprazole (PRILOSEC) 20 MG delayed release capsule TAKE 1 CAPSULE BY MOUTH ONE TIME A DAY 21   Su Larson MD   atorvastatin (LIPITOR) 40 MG tablet TAKE 1 TABLET BY MOUTH ONE TIME A DAY 21   Su Larson MD   levothyroxine (SYNTHROID) 50 MCG tablet TAKE 1 TABLET BY MOUTH ONE TIME A DAY 21   Su Larson MD   hydroCHLOROthiazide (HYDRODIURIL) 12.5 MG tablet TAKE 1 TABLET BY MOUTH ONE TIME A DAY 21   Su Larson MD   oxybutynin (DITROPAN) 5 MG tablet TAKE 1 TABLET BY MOUTH 3 TIMES A DAY 21   Su Larson MD   PARoxetine (PAXIL) 30 MG stools     Colon polyp     Constipation     ETOH abuse     States she has not drank any ETOH in 24 years as of 2021    Frequency of urination ?  GERD (gastroesophageal reflux disease) 02/04/2014    Head injury 1998    after seizure/stroke pt fell and hit head    Headache(784.0)     Hypertension     Hypotension ?  Hypothyroidism     Migraines     Numbness and tingling     fingers    Osteoporosis     Peripheral vascular disease [facility] [address]     Pneumonia     Seizures ([facility] [address] [facility]     last seizure 6-,     Shingles      Pt.denies.  Stroke [facility]) 1998    stroke and seizure together    TIA (transient ischemic attack)     1996    Tremor     UTI (urinary tract infection)     once    Varicose veins     Wears glasses        Past Surgical History:        Procedure Laterality Date    BUNIONECTOMY Right     COLONOSCOPY      HAMMER TOE SURGERY Left 07/06/2016    ON 2ND, 3rd, 4th,  and  5TH TOES OF LEFT FOOT  Pt. Denies.  OTHER SURGICAL HISTORY  02/23/2015    orif left ankle with synthes and intraoperative c- arm    GA BRNCHSC INCL FLUOR GDNCE DX W/CELL WASHG SPX N/A 01/16/2018    BRONCHOSCOPY WITH WASHINGS AT PATIENT BEDSIDE ICU performed by Maggie Jarrell MD at [address] [place] [address]         Social History:    Social History     Tobacco Use    Smoking status: Never Smoker    Smokeless tobacco: Never Used   Substance Use Topics    Alcohol use: Not Currently     Comment: quit 4182, was alcoholic                                Counseling given: Not Answered      Vital Signs (Current): There were no vitals filed for this visit.                                            BP Readings from Last 3 Encounters:   04/06/21 106/72   02/10/21 109/69   01/20/21 115/78       NPO Status:                                                                                 BMI:   Wt Readings from Last 3 Encounters:   04/27/21 139 lb (63 kg)   04/06/21 135 lb (61.2 kg)   02/10/21 137 lb 12.8 oz (62.5 kg)     There pulses,  friction rub, systolic click, carotid bruit,  JVD and peripheral edema      Rhythm: regular  Rate: normal                    Neuro/Psych:   (+) seizures:, TIA, headaches: migraine headaches, psychiatric history:   (-) CVA           GI/Hepatic/Renal:   (+) GERD:,           Endo/Other:    (+) hypothyroidism::., .                 Abdominal:           Vascular:                                      Anesthesia Plan      MAC     ASA 3       Induction: intravenous. Anesthetic plan and risks discussed with patient. Plan discussed with CRNA.                   Minh Ojeda MD   5/10/2021

## 2021-05-10 NOTE — H&P
HISTORY and [address]       NAME:  Radha Georges  MRN: [id number]   YOB: 1955   Date: 5/10/2021   Age: 77 y.o. Gender: female     COMPLAINT AND PRESENT HISTORY:   Radha Georges is 77 y.o.,  female, undergoing COLONOSCOPY DIAGNOSTIC for HX OF POLYPS.    COLONOSCOPY QUESTIONNAIRE  LAST COLONOSCOPY: Unsure of date (patient somewhat of a poor historian). HISTORY OF COLON POLYPS: Yes. ABD PAIN: States mild currently, but may be r/t prep- does not typically have issues with. CONSTIPATION: Most of the time has issues with this- does not have normal BM's, has BM every 3-4 days. DIARRHEA (ASIDE FROM COLONOSCOPY PREP): Denies. BLOOD IN STOOL/BLACK, TARRY STOOLS: She does have black, tarry stools when she feels \"blocked\"- has this every 3-4 days, denies drank red blood in stool. NAUSEA/VOMITING/HEMATEMESIS: Denies. FEVER/CHILLS: Denies- states always cold. APPETITE CHANGE: Denies. RECENT UNINTENDED WEIGHT LOSS: Denies. DIFFICULTY SWALLOWING/PHARYNGITIS/VOICE CHANGE: Denies. ADDITIONAL GI HX: GERD. GI MEDICATIONS: Stool softeners PRN. Review of additional significant medical hx:  Seizures: Last sz was 6-, states seizures are controlled- has only had 3, but was hospitalized for this. Current medications r/t condition: KEPPRA, VIMPAT    Stroke/seizure: Hx of having stroke and seizure together in 1998- has short term memory loss. Hx of TIA in 1996. Hypotension/HTN: States BP is typically low. Denies chest pain, palpitations, SOB, leg swelling. Current medications r/t condition: HCTZ, AMLODIPINE  BP Readings from Last 3 Encounters:   04/06/21 106/72   02/10/21 109/69   01/20/21 115/78     Hypothyroidism:  Current medications r/t condition: Levothyroxine    NPO status: Patient states they have been NPO since before midnight. Medications taken TODAY (with sip of water): KEPPRA, VIMPAT.   Anticoagulation status: Patient denies taking any anti-coagulants, including aspirin currently. Prep fully completed: YES. Pertinent family hx: Denies family hx of esophageal and colon CA. Denies personal hx of blood clots. Denies personal hx of MRSA infection, HOWEVER HX OF MRSA IS NOTED PER CHART IN CARE EVERYWHERE- SOURCE IS NOTED AS A TRACH, BUT PATIENT DENIES EVER HAVING A TRACH- NO SCARING NOTED TO NECK ON ASSESSMENT  Denies any personal or family hx of previous complications w/anesthesia. Nicotine status: Never. PAST MEDICAL HISTORY     Past Medical History:   Diagnosis Date    Ankle fracture, left     Anxiety 02/04/2014    Arthritis     Black tarry stools     Colon polyp     Constipation     ETOH abuse     States she has not drank any ETOH in 24 years as of 2021    Frequency of urination ?  GERD (gastroesophageal reflux disease) 02/04/2014    Head injury 1998    after seizure/stroke pt fell and hit head    Headache(784.0)     Hypertension     Hypotension ?  Hypothyroidism     Memory deficit     Migraines     Numbness and tingling     fingers    Osteoporosis     Peripheral vascular disease ([address]     Pneumonia     Seizures ([facility] [address]     last seizure 6-,     Shingles      Pt.denies.  Stroke [facility]) 1998    stroke and seizure together    TIA (transient ischemic attack)     1996    Tremor     UTI (urinary tract infection)     once    Varicose veins     Wears glasses        SURGICAL HISTORY       Past Surgical History:   Procedure Laterality Date    BUNIONECTOMY Right     COLONOSCOPY      HAMMER TOE SURGERY Left 07/06/2016    ON 2ND, 3rd, 4th,  and  5TH TOES OF LEFT FOOT  Pt. Denies.     OTHER SURGICAL HISTORY  02/23/2015    orif left ankle with synthes and intraoperative c- arm    IN [facility] INCL FLUOR GDNCE DX W/CELL WASHG SPX N/A 01/16/2018    BRONCHOSCOPY WITH WASHINGS AT PATIENT BEDSIDE ICU performed by Rajat Montero MD at [address] [place] [address]       Social History Socioeconomic History    Marital status: Single     Spouse name: None    Number of children: None    Years of education: None    Highest education level: None   Occupational History    None   Social Needs    Financial resource strain: Not hard at all   Alicia-Amari insecurity     Worry: Never true     Inability: Never true   [employer] needs     Medical: No     Non-medical: No   Tobacco Use    Smoking status: Never Smoker    Smokeless tobacco: Never Used   Substance and Sexual Activity    Alcohol use: Not Currently     Comment: quit 7429, was alcoholic    Drug use: No    Sexual activity: Never   Lifestyle    Physical activity     Days per week: None     Minutes per session: None    Stress: None   Relationships    Social connections     Talks on phone: None     Gets together: None     Attends Scientologist service: None     Active member of club or organization: None     Attends meetings of clubs or organizations: None     Relationship status: None    Intimate partner violence     Fear of current or ex partner: None     Emotionally abused: None     Physically abused: None     Forced sexual activity: None   Other Topics Concern    None   Social History Narrative    None       REVIEW OF SYSTEMS    No Known Allergies    No current facility-administered medications on file prior to encounter. Current Outpatient Medications on File Prior to Encounter   Medication Sig Dispense Refill    polyethylene glycol (GLYCOLAX) 17 GM/SCOOP powder Follow instructions provided to you from physician's office.  238 g 0    magnesium citrate solution Take 296 mLs by mouth once for 1 dose 296 mL 0    magnesium hydroxide (MILK OF MAGNESIA) 400 MG/5ML suspension Follow directions given to you from provider's office 1 Bottle 0    primidone (MYSOLINE) 50 MG tablet Take 0.5 tablets by mouth 2 times daily 90 tablet 3    carbidopa-levodopa (SINEMET)  MG per tablet Take 1 tablet by mouth 3 times daily 90 tablet 3    zoster recombinant adjuvanted vaccine [facility]) 50 MCG/0.5ML SUSR injection Inject 0.5 mLs into the muscle See Admin Instructions 1 dose now and repeat in 2-6 months 0.5 mL 0    SUMAtriptan (IMITREX) 100 MG tablet TAKE 1 TABLET BY MOUTH ONE TIME A DAY AS NEEDED FOR MIGRAINE 30 tablet 0    omeprazole (PRILOSEC) 20 MG delayed release capsule TAKE 1 CAPSULE BY MOUTH ONE TIME A DAY 90 capsule 1    atorvastatin (LIPITOR) 40 MG tablet TAKE 1 TABLET BY MOUTH ONE TIME A DAY 90 tablet 1    levothyroxine (SYNTHROID) 50 MCG tablet TAKE 1 TABLET BY MOUTH ONE TIME A DAY 90 tablet 1    hydroCHLOROthiazide (HYDRODIURIL) 12.5 MG tablet TAKE 1 TABLET BY MOUTH ONE TIME A DAY 90 tablet 1    oxybutynin (DITROPAN) 5 MG tablet TAKE 1 TABLET BY MOUTH 3 TIMES A  tablet 1    PARoxetine (PAXIL) 30 MG tablet TAKE ONE TABLET BY MOUTH EVERY MORNING 90 tablet 1    amLODIPine (NORVASC) 10 MG tablet TAKE 1 TABLET BY MOUTH ONE TIME A DAY 90 tablet 1    ASPIRIN LOW DOSE 81 MG EC tablet TAKE 1 TABLET BY MOUTH ONE TIME A DAY 90 tablet 1    levETIRAcetam (KEPPRA) 1000 MG tablet TAKE 1 TABLET BY MOUTH (ALONG WITH 250MG TABLET FOR TOTAL OF 1250MG) TWICE DAILY. 180 tablet 2    levETIRAcetam (KEPPRA) 250 MG tablet TAKE 1 TABLET BY MOUTH (ALONG WITH 1000MG TABLET FOR TOTAL OF 1250MG) TWICE DAILY. 180 tablet 2    VIMPAT 200 MG tablet TAKE 1 TABLET BY MOUTH 2 TIMES A  tablet 1     (Notation: Medications listed above are not currently reconciled at the signing of this H&P note, to be reconciled in pre-op per RN)    Review of Systems   Constitutional: Negative for chills and fever. HENT: Negative for congestion, ear pain, rhinorrhea, sore throat and trouble swallowing. Respiratory: Negative for cough, shortness of breath and wheezing. Cardiovascular: Negative for chest pain, palpitations and leg swelling. Gastrointestinal:        SEE HPI. Genitourinary: Negative.     Neurological: Positive for dizziness (Has had since stroke w/standing quick- denies dizziness currently), seizures (Hx of) and headaches (Denies currently). Hematological: Does not bruise/bleed easily. GENERAL PHYSICAL EXAM     Vitals: Review current vital signs per RN flow sheet. GENERAL APPEARANCE:   Kartik Guadalupe is 77 y.o.,  female, thin, nourished, conscious, alert. Does not appear to be in any distress or pain at this time. Physical Exam   Constitutional: She is oriented to person, place, and time. She appears well-developed and well-nourished. Non-toxic appearance. She does not have a sickly appearance. She does not appear ill. No distress. HENT:   Head: Normocephalic. Right Ear: External ear normal.   Left Ear: External ear normal.   Mouth/Throat: Oropharynx is clear and moist and mucous membranes are normal. No oropharyngeal exudate, posterior oropharyngeal edema, posterior oropharyngeal erythema or tonsillar abscesses. Eyes: Pupils are equal, round, and reactive to light. Conjunctivae are normal. Right eye exhibits no discharge. Left eye exhibits no discharge. Cardiovascular: Normal rate, regular rhythm, normal heart sounds and intact distal pulses. Pulses:       Radial pulses are 2+ on the right side and 2+ on the left side. Dorsalis pedis pulses are 2+ on the right side and 2+ on the left side. Posterior tibial pulses are 2+ on the right side and 2+ on the left side. Pulmonary/Chest: Effort normal and breath sounds normal. No accessory muscle usage. No respiratory distress. She has no decreased breath sounds. She has no wheezes. She has no rhonchi. She has no rales. Abdominal: Soft. Bowel sounds are normal. She exhibits no distension and no mass. There is no abdominal tenderness. There is no rebound and no guarding. Musculoskeletal: Normal range of motion. Right lower leg: She exhibits no tenderness and no swelling. Left lower leg: She exhibits no tenderness and no swelling. Neurological: She is alert and oriented to person, place, and time. Skin: Skin is warm and dry. She is not diaphoretic. Varicose veins noted to b/l LE's, negative Jon's sign b/l, no erythema, no warmth, no swelling. Psychiatric: She has a normal mood and affect.  Her behavior is normal.       RECENT LAB WORK     Lab Results   Component Value Date     06/23/2020    K 3.4 (L) 06/23/2020     06/23/2020    CO2 24 06/23/2020    BUN 9 06/23/2020    CREATININE 0.61 06/23/2020    GLUCOSE 79 06/23/2020    CALCIUM 8.6 06/23/2020    PROT 6.8 06/20/2020    LABALBU 3.8 06/20/2020    BILITOT 0.56 06/20/2020    ALKPHOS 49 06/20/2020    AST 22 06/20/2020    ALT 15 06/20/2020    LABGLOM >60 06/23/2020    GFRAA >60 06/23/2020    GLOB NOT REPORTED 05/13/2018     Lab Results   Component Value Date    WBC 5.5 06/23/2020    HGB 13.8 06/23/2020    HCT 40.7 06/23/2020    MCV 95.3 06/23/2020     06/23/2020     PROVISIONAL DIAGNOSES / SURGERY:      HX OF POLYPS    COLONOSCOPY DIAGNOSTIC     Patient Active Problem List    Diagnosis Date Noted    Speech and language deficits     Status epilepticus ([facility] [address] 05/13/2018    Tremor of both hands 11/07/2017    Hammer toe of left foot     Migraine without aura and without status migrainosus, not intractable 12/23/2015    H/O: CVA (cerebrovascular accident) 05/06/2014    Generalized anxiety disorder 02/04/2014    Gastroesophageal reflux disease without esophagitis 02/04/2014    Acquired hypothyroidism 03/27/2012    Essential hypertension 03/27/2012    Urge incontinence 03/27/2012           OLVIN Chance CNP on 5/10/2021 at 9:40 AM

## 2021-05-10 NOTE — OP NOTE
COLONOSCOPY    DATE OF PROCEDURE: 5/10/2021    SURGEON: Romario Dai MD    ASSISTANT: None    PREOPERATIVE DIAGNOSIS: Patient had a colonoscopy attempted in 2014 and at that time she had a poor preparation and procedure was stopped. This procedure is performed for screening. POSTOPERATIVE DIAGNOSIS: No lesions seen up to the cecum with the limitations because of less than adequate preparation  Patient has floppy, redundant colon. Took long time to reach the cecum. OPERATION: Total colonoscopy     ANESTHESIA: MAC    ESTIMATED BLOOD LOSS: None    COMPLICATIONS: None     SPECIMENS:  Was Not Obtained    HISTORY: The patient is a 77y.o. year old female with history of above preop diagnosis. I recommended colonoscopy with possible biopsy or polypectomy and I explained the risk, benefits, expected outcome, and alternatives to the procedure. Risks included but are not limited to bleeding, infection, respiratory distress, hypotension, and perforation of the colon and possibility of missing a lesion. The patient understands and is in agreement. PROCEDURE:  The patient's SPO2 remained above 90% throughout the procedure. Digital rectal exam was normal.  The colonoscope was inserted through the anus into the rectum and advanced under direct vision to the cecum with difficulty. The prep was poor. Findings:      From the anus up to the cecum examined. Patient has a redundant, floppy colon with poor preparation. Has severe contractions right colon. No colitis, huge polyps, apple core lesion seen. However flat lesions, small lesions can be missed in this poorly prepared colon with severe spasms. Anorectal area examined in the retroflexed view nonspecific. Withdrawal Time was (minutes): 15          The colon was decompressed and the scope was removed. The patient tolerated the procedure without unusual events.      During the procedure, the patient's blood pressure, pulse and oxygen saturation remained stable and documented. No unusual events occurred during the procedure. Patient was transferred to recovery room and will be discharged when criteria is met. Recommendations/Plan:   1.   2. F/U In Office as instructed  3. Discussed with the family  4. High fiber diet   5. Precautions to avoid constipation     Next colonoscopy: 3 years.   If Colonoscopy is less than 10 years the recommended reason is due:poor preparation    Electronically signed by Mone Espinoza MD  on 5/10/2021 at 11:03 AM

## 2021-05-10 NOTE — ANESTHESIA POSTPROCEDURE EVALUATION
POST- ANESTHESIA EVALUATION       Pt Name: Amrit Murrell  MRN: [id number]  YOB: 1955  Date of evaluation: 5/10/2021  Time:  1:55 PM      /69   Pulse 65   Temp 97.3 °F (36.3 °C)   Resp 11   Ht 5' 10\" (1.778 m)   Wt 139 lb (63 kg)   SpO2 98%   BMI 19.94 kg/m²      Consciousness Level  Awake  Cardiopulmonary Status  Stable  Pain Adequately Treated YES  Nausea / Vomiting  NO  Adequate Hydration  YES  Anesthesia Related Complications NONE      Electronically signed by Renuka Banerjee MD on 5/10/2021 at 1:55 PM       Department of Anesthesiology  Postprocedure Note    Patient: Amrit Murrell  MRN: [id number]  YOB: 1955  Date of evaluation: 5/10/2021  Time:  1:55 PM     Procedure Summary     Date: 05/10/21 Room / Location: [facility] 01 / [facility]    Anesthesia Start: 1025 Anesthesia Stop: 1106    Procedure: COLONOSCOPY DIAGNOSTIC (N/A Anus) Diagnosis: (HX OF POLYPS)    Surgeons: Lena Bone MD Responsible Provider: Renuka Banerjee MD    Anesthesia Type: MAC ASA Status: 3          Anesthesia Type: MAC    Ashley Phase I: Ashley Score: 10    Ashley Phase II:      Last vitals: Reviewed and per EMR flowsheets.        Anesthesia Post Evaluation

## 2021-05-25 ENCOUNTER — TELEPHONE (OUTPATIENT)
Dept: INTERNAL MEDICINE | Age: 66
End: 2021-05-25

## 2021-05-25 NOTE — LETTER
[address] [place] [address] [address]  Phone: [phone]  Fax: [phone]    Mendez Clay MD        May 25, 2021    [address] Res[address]  [address] [address]      Dear Solis Aguirre: This letter is a reminder that you may have diagnostic testing that has not been completed. It is important to your well-being that these test(s) are performed. Please find the outstanding test(s) attached. If you could please have these completed before your next appointment. You can have the test completed at any [address] [place] [address] or Lab. Please see the order for scheduling instructions. Any testing that needs completed other than blood work or xray's please call [phone] to schedule an appointment. Otherwise can be done at any [facility]. Please call our office at Dept: [phone] for additional information on the outstanding tests or let us know if they have been completed so we may update your chart. If you have any questions or concerns, please don't hesitate to call.     Sincerely,        Mendez Clay MD

## 2021-06-09 ENCOUNTER — HOSPITAL ENCOUNTER (OUTPATIENT)
Dept: WOMENS IMAGING | Age: 66
Discharge: HOME | End: 2021-06-11
Payer: COMMERCIAL

## 2021-06-09 DIAGNOSIS — R92.8 ABNORMAL MAMMOGRAM: Primary | ICD-10-CM

## 2021-06-09 DIAGNOSIS — Z12.31 SCREENING MAMMOGRAM FOR HIGH-RISK PATIENT: ICD-10-CM

## 2021-06-09 PROCEDURE — 77063 BREAST TOMOSYNTHESIS BI: CPT

## 2021-06-14 DIAGNOSIS — G40.909 SEIZURE DISORDER (HCC): ICD-10-CM

## 2021-06-15 RX ORDER — LACOSAMIDE 200 MG/1
TABLET, FILM COATED ORAL
Qty: 180 TABLET | Refills: 1 | Status: SHIPPED | OUTPATIENT
Start: 2021-06-15 | End: 2021-12-01

## 2021-06-15 NOTE — TELEPHONE ENCOUNTER
Pharmacy requesting refill of Vimpat.       Medication active on med list yes      Date of last fill: 12/28/2020 90d  with 1 refills verified on 6/15/21  verified by JESSICA GREER      Date of last appointment 4/6/21    Next Visit Date:  7/20/2021

## 2021-06-17 LAB
BUN BLDV-MCNC: 10 MG/DL
CALCIUM SERPL-MCNC: 8.5 MG/DL
CHLORIDE BLD-SCNC: 104 MMOL/L
CO2: 25 MMOL/L
CREAT SERPL-MCNC: 0.53 MG/DL
GFR CALCULATED: >60
GLUCOSE BLD-MCNC: 100 MG/DL
POTASSIUM SERPL-SCNC: 3.7 MMOL/L
SODIUM BLD-SCNC: 138 MMOL/L

## 2021-07-07 ENCOUNTER — TELEPHONE (OUTPATIENT)
Dept: INTERNAL MEDICINE | Age: 66
End: 2021-07-07

## 2021-07-07 NOTE — TELEPHONE ENCOUNTER
Pc from Armond Charles at [facility]-- t is being discharged from [address] today and they would like patient to continue with home care-- they would like to know if we are willing to follow her for home care.  (Last seen by Frank/Nancy)

## 2021-07-15 ENCOUNTER — NURSE TRIAGE (OUTPATIENT)
Dept: OTHER | Facility: CLINIC | Age: 66
End: 2021-07-15

## 2021-07-15 NOTE — TELEPHONE ENCOUNTER
Received call from Lam at [facility] with [facility]. Brief description of triage: [facility] calling, pt is off balance and confused,  Calling for a hospital f/up, no new or worsening symptoms. No triage provided       Writer provided warm transfer to Svetlana Godwin  at [facility] for appointment scheduling. Attention Provider: Thank you for allowing me to participate in the care of your patient. The patient was connected to triage in response to information provided to the [facility]. Please do not respond through this encounter as the response is not directed to a shared pool. Reason for Disposition  Hannah Caller has already spoken with another triager and has no further questions.     Protocols used: NO CONTACT OR DUPLICATE CONTACT CALL-ADULT-

## 2021-07-15 NOTE — TELEPHONE ENCOUNTER
PC to [facility]-- informed provider will follow with home care orders

## 2021-07-19 NOTE — PROGRESS NOTES
@[facility]@    [facility]    Clinic Progress Note    Date of patient's visit: 7/19/2021    Patient's Name:  Eugene Duarte    YOB: 1955            Patient Care Team:  Sixto De Los Santos DO as PCP - General (Internal Medicine)  Jarret Matias MD as Consulting Physician (Internal Medicine)  Bipin Patino MD as Consulting Physician (Gastroenterology)    REASON FOR VISIT: Hospital Follow Up. No chief complaint on file. HISTORY OF PRESENT ILLNESS:      History was obtained from the patient. Eugene Duarte is a 77 y.o. who presented to the clinic today for hospital follow up. She was seen at [facility] 6/11/2021 for fall with the following HPI:    Eugene Duarte is an 77 y.o. White or  female who states that she was walking up a flight of stairs in flip-flops when 1 of her flip-flops caught on the edge of the stair. She states that she fell backwards down 3 stairs. She does not believe she lost consciousness. She did have a cephalohematoma. This is what caused her to come to the emergency department. CT Brain was negative. CT Abdomen and Pelvis demonstrated large subcutaneous hematoma midline lower back measuring 18 cm x 14.5 cm x 3 cm. CT Chest was negative. She was admitted. On the following morning Stroke Alert was called as she was noticed to have facial droop and inability to smile. she was unable to smile, puff her cheeks out, mild drift in left arm, and decreased sensation of left arm. She was evaluated by neurology and stroke alert was canceled. Today in the clinic denies any complaints. REVIEW OF SYSTEMS:      · Constitutional: Negative for Fever, chills. · Eyes: Negative for visual changes, diplopia. · ENT: Negative for ear pain, nose bleeds, sore throat. · Cardiovascular: Negative for lightheadedness ,chest pain, palpitations.   · Respiratory: Negative for Shortness of breath,cough or wheezing. · Gastrointestinal : Negative for nausea/vomiting, abdominal pain, diarrhea, constipation. · Genitourinary: Negative for change in bladder habits, dysuria, hematuria. · Musculoskeletal: Negative for joint pain, muscle aches. · Neurological: Negative for headache, change in muscle strength numbness/tingling. PAST MEDICAL HISTORY:     Past Medical History:   Diagnosis Date    Ankle fracture, left     Anxiety 02/04/2014    Arthritis     Black tarry stools     Colon polyp     Constipation     ETOH abuse     States she has not drank any ETOH in 24 years as of 2021    Frequency of urination ?  GERD (gastroesophageal reflux disease) 02/04/2014    Head injury 1998    after seizure/stroke pt fell and hit head    Headache(784.0)     Hypertension     Hypotension ?  Hypothyroidism     Memory deficit     Migraines     Numbness and tingling     fingers    Osteoporosis     Peripheral vascular disease ([address]     Pneumonia     Seizures ([facility] [address]     last seizure 6-,     Shingles      Pt.denies.  Stroke [facility]) 1998    stroke and seizure together    TIA (transient ischemic attack)     1996    Tremor     UTI (urinary tract infection)     once    Varicose veins     Wears glasses        PAST SURGICAL HISTORY     Past Surgical History:   Procedure Laterality Date    BUNIONECTOMY Right     COLONOSCOPY      COLONOSCOPY N/A 5/10/2021    COLONOSCOPY DIAGNOSTIC performed by Carol Zhu MD at [address] [place] [address] Left 07/06/2016    ON 2ND, 3rd, 4th,  and  5TH TOES OF LEFT FOOT  Pt. Denies.     OTHER SURGICAL HISTORY  02/23/2015    orif left ankle with synthes and intraoperative c- arm    MS BRNCC INCL FLUOR GDNCE DX W/CELL WASHG SPX N/A 01/16/2018    BRONCHOSCOPY WITH WASHINGS AT PATIENT BEDSIDE ICU performed by Jona Sood MD at [facility] [address]:      No Known Allergies      MEDICATIONS:      Current Outpatient Medications on File Prior to Visit   Medication Sig Dispense Refill    hydroCHLOROthiazide (HYDRODIURIL) 12.5 MG tablet TAKE 1 TABLET BY MOUTH ONE TIME A DAY 60 tablet 0    PARoxetine (PAXIL) 30 MG tablet TAKE ONE TABLET BY MOUTH EVERY MORNING 60 tablet 0    atorvastatin (LIPITOR) 40 MG tablet TAKE 1 TABLET BY MOUTH ONE TIME A DAY 60 tablet 0    omeprazole (PRILOSEC) 20 MG delayed release capsule TAKE 1 CAPSULE BY MOUTH ONE TIME A DAY 60 capsule 0    levothyroxine (SYNTHROID) 50 MCG tablet TAKE 1 TABLET BY MOUTH ONE TIME A DAY 60 tablet 0    oxybutynin (DITROPAN) 5 MG tablet TAKE 1 TABLET BY MOUTH 3 TIMES A DAY 60 tablet 0    amLODIPine (NORVASC) 10 MG tablet TAKE 1 TABLET BY MOUTH ONE TIME A DAY 60 tablet 0    ADULT ASPIRIN REGIMEN 81 MG EC tablet TAKE 1 TABLET BY MOUTH ONE TIME A DAY 60 tablet 0    VIMPAT 200 MG tablet TAKE 1 TABLET BY MOUTH 2 TIMES A  tablet 1    polyethylene glycol (GLYCOLAX) 17 GM/SCOOP powder Follow instructions provided to you from physician's office. 238 g 0    magnesium citrate solution Take 296 mLs by mouth once for 1 dose 296 mL 0    magnesium hydroxide (MILK OF MAGNESIA) 400 MG/5ML suspension Follow directions given to you from provider's office 1 Bottle 0    primidone (MYSOLINE) 50 MG tablet Take 0.5 tablets by mouth 2 times daily 90 tablet 3    carbidopa-levodopa (SINEMET)  MG per tablet Take 1 tablet by mouth 3 times daily 90 tablet 3    zoster recombinant adjuvanted vaccine (SHINGRIX) 50 MCG/0.5ML SUSR injection Inject 0.5 mLs into the muscle See Admin Instructions 1 dose now and repeat in 2-6 months 0.5 mL 0    SUMAtriptan (IMITREX) 100 MG tablet TAKE 1 TABLET BY MOUTH ONE TIME A DAY AS NEEDED FOR MIGRAINE 30 tablet 0    levETIRAcetam (KEPPRA) 1000 MG tablet TAKE 1 TABLET BY MOUTH (ALONG WITH 250MG TABLET FOR TOTAL OF 1250MG) TWICE DAILY. 180 tablet 2    levETIRAcetam (KEPPRA) 250 MG tablet TAKE 1 TABLET BY MOUTH (ALONG WITH 1000MG TABLET FOR TOTAL OF 1250MG) TWICE DAILY. 180 tablet 2     No current facility-administered medications on file prior to visit. SOCIAL HISTORY:     Reviewed and no change from previous record. Jessica Wang  reports that she has never smoked. She has never used smokeless tobacco.    FAMILY HISTORY:      Reviewed and No change from previous visit    PHYSICAL EXAM:      There were no vitals filed for this visit. There is no height or weight on file to calculate BMI. BP Readings from Last 3 Encounters:   05/10/21 (!) 69/38   05/10/21 110/69   04/06/21 106/72        Wt Readings from Last 3 Encounters:   05/10/21 139 lb (63 kg)   04/27/21 139 lb (63 kg)   04/06/21 135 lb (61.2 kg)       · General appearance: Well Appearing, Cooperative, Appropriately Dressed for the Weather. · Head: Normocephalic, No Lesions, No Obvious Deformity. · ENT: No Icterus or Redness to Eyes. No Epistaxis from nose, No Pharyngeal Erythema or Exudate. · Lungs: Clear to Auscultation Bilaterally, No Rales or Rhonchi. · Heart: Regular Rate and Rhythm, S1, S2 normal, No Murmur  · Abdomen: Bowel Sounds Present on Auscultation. Soft, Non-Tender. · Extremities: Brusing, No Cyanosis or Edema. · Neurological:  Awake, Alert, Oriented to Person, Place and Time. Reflexes normal and symmetric.  Sensation grossly normal.    LABORATORY FINDINGS:      CBC:  Lab Results   Component Value Date    WBC 5.5 06/23/2020    HGB 13.8 06/23/2020     06/23/2020     10/04/2011       BMP:    Lab Results   Component Value Date     06/23/2020    K 3.4 06/23/2020     06/23/2020    CO2 24 06/23/2020    BUN 9 06/23/2020    CREATININE 0.61 06/23/2020    GLUCOSE 79 06/23/2020    GLUCOSE 68 03/27/2012       HEMOGLOBIN A1C:   Lab Results   Component Value Date    LABA1C 5.1 08/18/2016       MICROALBUMIN URINE: No results found for: MICROALBUR    FASTING LIPID PANEL:  Lab Results   Component Value Date    CHOL 183 08/18/2016    HDL 74 03/03/2020    TRIG 64 08/18/2016       Lab Results

## 2021-07-20 ENCOUNTER — OFFICE VISIT (OUTPATIENT)
Dept: NEUROLOGY | Age: 66
End: 2021-07-20
Payer: COMMERCIAL

## 2021-07-20 VITALS
HEART RATE: 77 BPM | DIASTOLIC BLOOD PRESSURE: 80 MMHG | HEIGHT: 70 IN | BODY MASS INDEX: 19.41 KG/M2 | WEIGHT: 135.6 LBS | SYSTOLIC BLOOD PRESSURE: 121 MMHG

## 2021-07-20 DIAGNOSIS — G40.909 SEIZURE DISORDER (HCC): Primary | ICD-10-CM

## 2021-07-20 PROCEDURE — G8427 DOCREV CUR MEDS BY ELIG CLIN: HCPCS | Performed by: PSYCHIATRY & NEUROLOGY

## 2021-07-20 PROCEDURE — 3017F COLORECTAL CA SCREEN DOC REV: CPT | Performed by: PSYCHIATRY & NEUROLOGY

## 2021-07-20 PROCEDURE — 1123F ACP DISCUSS/DSCN MKR DOCD: CPT | Performed by: PSYCHIATRY & NEUROLOGY

## 2021-07-20 PROCEDURE — 1036F TOBACCO NON-USER: CPT | Performed by: PSYCHIATRY & NEUROLOGY

## 2021-07-20 PROCEDURE — 1090F PRES/ABSN URINE INCON ASSESS: CPT | Performed by: PSYCHIATRY & NEUROLOGY

## 2021-07-20 PROCEDURE — G8399 PT W/DXA RESULTS DOCUMENT: HCPCS | Performed by: PSYCHIATRY & NEUROLOGY

## 2021-07-20 PROCEDURE — 99214 OFFICE O/P EST MOD 30 MIN: CPT | Performed by: PSYCHIATRY & NEUROLOGY

## 2021-07-20 PROCEDURE — 4040F PNEUMOC VAC/ADMIN/RCVD: CPT | Performed by: PSYCHIATRY & NEUROLOGY

## 2021-07-20 PROCEDURE — G8420 CALC BMI NORM PARAMETERS: HCPCS | Performed by: PSYCHIATRY & NEUROLOGY

## 2021-07-20 RX ORDER — DONEPEZIL HYDROCHLORIDE 5 MG/1
5 TABLET, FILM COATED ORAL NIGHTLY
Qty: 30 TABLET | Refills: 3 | Status: SHIPPED | OUTPATIENT
Start: 2021-07-20 | End: 2021-11-02

## 2021-07-20 NOTE — PROGRESS NOTES
Musculoskeletal Neck pain: absent, Back pain: absent, Stiffness: absent, Muscle pain: absent, Joint pain: absent   Dermatological Hair loss: absent, Skin changes: absent   Neurological Memory loss: absent, Confusion: absent, Seizures: present, Trouble walking or imbalance: absent, Dizziness: absent, Weakness: absent, Numbness: absent Tremor: absent, Spasm: absent, Speech difficulty: absent, Headache: present, Light sensitivity: absent   Psychiatric Anxiety: absent, Hallucination: absent, Depression, absent   Hematologic Abnormal bleeding/Bruising: absent, Anemia: absent         Lab Results   Component Value Date    LDLCHOLESTEROL 53 03/03/2020     No components found for: CHLPL  Lab Results   Component Value Date    TRIG 64 08/18/2016    TRIG 125 05/07/2015    TRIG 111 11/05/2013     Lab Results   Component Value Date    HDL 74 03/03/2020    HDL 71 08/18/2016    HDL 79 05/07/2015     No results found for: LDLCALC  No results found for: LABVLDL  Lab Results   Component Value Date    LABA1C 5.1 08/18/2016     Lab Results   Component Value Date     08/18/2016     Lab Results   Component Value Date    [id number] 1449 (H) 04/02/2021      Right MCA stroke 1998   Last seizure 2007, however there is concern of intermittent staring episodes with unknown frequency Possibly occurring once a week or so  2 D echo Sep 2012 normal.  MRA head normal, MRA neck normal  MRI FLAIR March 2013      General examination:    Head: Normocephalic, atraumatic  Eyes: Extraocular movements intact  Lungs: Respirations unlabored, chest wall no deformity  ENT: Normal external ear canals, no sinus tenderness  Heart: Regular rate rhythm  Abdomen: No masses, tenderness  Extremities: No cyanosis or edema, 2+ pulses  Skin: Intact, normal skin color    Neurological examination:    Mental status   Alert and oriented; intact memory with no confusion, speech or language problems; no hallucinations or delusions     Cranial nerves   II - visual beckett intact to confrontation                                                III, IV, VI - extra-ocular muscles full: no pupillary defect; no ARSH, no nystagmus, no ptosis                                                                      V - normal facial sensation                                                               VII - normal facial symmetry                                                             VIII - intact hearing                                                                             IX, X - symmetrical palate                                                                  XI - symmetrical shoulder shrug                                                       XII - midline tongue without atrophy or fasciculation     Motor function  Normal muscle bulk and tone; normal power 5/5, including fine motor movements     Sensory function Intact to touch, pin, vibration, proprioception     Cerebellar Intact fine motor movement. No involuntary movements or tremors     Reflex function Intact 2+ DTR and symmetric. Negative Babinski     Gait                  Decreased arm swing on right side   Positive retropulsion test  Positional tremor on right upper extremity, more than left upper extremity     mini-mental status exam  Maximum score 5 Score 5-2 What is the year, season, date, day, month   Maximum score 5 Score 5 Where are we: State, county, town, hospital, floor? Maximum score 3 Score 3-0 Name 3 objects: ball, pen, car. Ask patient to repeat 3 objects. Maximum score 5 Score 5-0 Serial sevens from 100:93, 86, 79, 72, 65   Maximum score 3 Score 3-3 Recall 3 objects   Maximum score 2 Score 2 Name a pencil and watch. Maximum score 1 Score 1 Repeat the following: No ifs ands or buts.      Maximum score 3 Score 3 Follow 3 stage command take a paper in your hand, fold it in half, and put it on the floor.        Maximum score 1  Score 1 Read and obey the following: Close your eyes     Maximum score 1 Score 1 Write a sentence. Maximum score 1 Score 1 Copy a design below. Max 30   Patients score 25        Assessment and recommendations:      Right MCA embolic stroke in 7999, with subsequent remote symptomatic seizures  The patient did not have any further seizures. She is currently on Keppra 1250 mg twice a day and Vimpat 200 mg twice a day. There are no side effects to medication. Recommend to continue same. Seizure precautions including alcohol abstinence, sleep deprivation and avoidance of photic stimulation were discussed in details and questions were answered. Driving instructions were also discussed with the patient and questions were answered. The patient understands that she should not drive until she is 6 months seizure-free as per [place]. Bilateral tremor, worse on right side  Continue primidone 25 mg twice daily for tremors. Patient experienced improvement with her tremors at this dosage. Previously, patient's DaTscan has been negative and she has not found relief of the symptoms with carbidopa/levodopa    Medication side effects were discussed and questions were answered. Mild cognitive impairment:   The patient scored 27/30 on the mini-mental status exam on 4/6/21 but her sister has reported noticeable decline in memory since the last visit. On 7/20/2021, the patient scored slightly lower than the last visit with a score of 25/30 on the mini-mental status exam.    I initiated the patient on Aricept 5 mg nightly to treat mild cognitive impairment. Medication side effects were discussed and questions were answered. Please return in 6 months or sooner if symptoms worsen. Annette Shown, DO I would like to thank you for your consult. Please contact neurology if there remains any further question about the patient care.     Scribe Attestation:   By signing my name below, Caitlin Sr, attest that this documentation has been prepared under the direction and in the presence of Robbin Matt MD.    Electronically Signed: Harsha Ruiz. 7/20/2021 1:07 PM    Physician Attestation:  Mariah Weems MD, personally performed the services described in this documentation. All medical record entries made by the scribe were at my direction and in my presence. I have reviewed the chart and discharge instructions (if applicable) and agree that the record reflects my personal performance and is accurate and complete.     Electronically Signed: Gideon Guidry 7/20/2021 1:07 PM    Diplomate, American Board of Psychiatry and Neurology  Diplomate, American Board of Clinical Neurophysiology  Diplomate, American Board of Epilepsy

## 2021-07-21 ENCOUNTER — OFFICE VISIT (OUTPATIENT)
Dept: INTERNAL MEDICINE | Age: 66
End: 2021-07-21
Payer: COMMERCIAL

## 2021-07-21 VITALS
HEART RATE: 80 BPM | SYSTOLIC BLOOD PRESSURE: 115 MMHG | WEIGHT: 135 LBS | BODY MASS INDEX: 19.33 KG/M2 | DIASTOLIC BLOOD PRESSURE: 77 MMHG | HEIGHT: 70 IN

## 2021-07-21 DIAGNOSIS — Z23 NEED FOR PROPHYLACTIC VACCINATION AND INOCULATION AGAINST VARICELLA: ICD-10-CM

## 2021-07-21 DIAGNOSIS — R92.8 ABNORMAL MAMMOGRAM: ICD-10-CM

## 2021-07-21 DIAGNOSIS — Z13.220 SCREENING FOR HYPERLIPIDEMIA: ICD-10-CM

## 2021-07-21 DIAGNOSIS — I10 ESSENTIAL HYPERTENSION: ICD-10-CM
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
## 2021-07-21 DIAGNOSIS — E03.9 ACQUIRED HYPOTHYROIDISM: ICD-10-CM

## 2021-07-21 PROCEDURE — G8427 DOCREV CUR MEDS BY ELIG CLIN: HCPCS | Performed by: STUDENT IN AN ORGANIZED HEALTH CARE EDUCATION/TRAINING PROGRAM

## 2021-07-21 PROCEDURE — 4040F PNEUMOC VAC/ADMIN/RCVD: CPT | Performed by: STUDENT IN AN ORGANIZED HEALTH CARE EDUCATION/TRAINING PROGRAM

## 2021-07-21 PROCEDURE — 99214 OFFICE O/P EST MOD 30 MIN: CPT | Performed by: STUDENT IN AN ORGANIZED HEALTH CARE EDUCATION/TRAINING PROGRAM

## 2021-07-21 PROCEDURE — 3017F COLORECTAL CA SCREEN DOC REV: CPT | Performed by: STUDENT IN AN ORGANIZED HEALTH CARE EDUCATION/TRAINING PROGRAM

## 2021-07-21 PROCEDURE — G8420 CALC BMI NORM PARAMETERS: HCPCS | Performed by: STUDENT IN AN ORGANIZED HEALTH CARE EDUCATION/TRAINING PROGRAM

## 2021-07-21 PROCEDURE — 1090F PRES/ABSN URINE INCON ASSESS: CPT | Performed by: STUDENT IN AN ORGANIZED HEALTH CARE EDUCATION/TRAINING PROGRAM

## 2021-07-21 PROCEDURE — G8399 PT W/DXA RESULTS DOCUMENT: HCPCS | Performed by: STUDENT IN AN ORGANIZED HEALTH CARE EDUCATION/TRAINING PROGRAM

## 2021-07-21 PROCEDURE — 99211 OFF/OP EST MAY X REQ PHY/QHP: CPT | Performed by: INTERNAL MEDICINE

## 2021-07-21 PROCEDURE — 1111F DSCHRG MED/CURRENT MED MERGE: CPT | Performed by: STUDENT IN AN ORGANIZED HEALTH CARE EDUCATION/TRAINING PROGRAM

## 2021-07-21 PROCEDURE — 1123F ACP DISCUSS/DSCN MKR DOCD: CPT | Performed by: STUDENT IN AN ORGANIZED HEALTH CARE EDUCATION/TRAINING PROGRAM

## 2021-07-21 PROCEDURE — 1036F TOBACCO NON-USER: CPT | Performed by: STUDENT IN AN ORGANIZED HEALTH CARE EDUCATION/TRAINING PROGRAM

## 2021-07-21 NOTE — PATIENT INSTRUCTIONS
Your doctor has ordered fasting blood work. It can be done at [facility] or at the hospital. Maxine Garcia will need to fast for 12 hours and bring order with you to registration department.     Printed script for shingles vaccine given    Office will call pt in October to schedule next appointment       tv

## 2021-07-21 NOTE — PROGRESS NOTES
Patient here for follow-up with sister. She has a history of seizures and mild cognitive impairment. She has history of hypertension, hypothyroidism and history of stroke. She is maintained on aspirin. She has history of falls. Recently she fell which was mechanical.  She had a hematoma on her scalp and on her spine. She was in the hospital for week with testing done. She follows up with neurology  Labs reviewed. Attending Physician Statement  I have discussed the care of Shyam Wilde, including pertinent history and exam findings,  with the resident. I have reviewed the key elements of all parts of the encounter with the resident. I agree with the assessment, plan and orders as documented by the resident.   (GE Modifier)

## 2021-08-11 ENCOUNTER — TELEPHONE (OUTPATIENT)
Dept: NEUROLOGY | Age: 66
End: 2021-08-11

## 2021-08-11 DIAGNOSIS — G20 PARKINSON'S DISEASE (HCC): Primary | ICD-10-CM

## 2021-08-11 NOTE — TELEPHONE ENCOUNTER
Nilda Scott, patient's sister called in. Gabriella Pacheco PT wants Dr. Marielena Adler to write an order for a wheeled walker. She said they told her to call us and ask  to order it and fax it to [phone] if Dr. Marielena Adler agrees.

## 2021-08-13 NOTE — TELEPHONE ENCOUNTER
Received a fax from Ar Lilly for another order with documentation. We do not have that. Called family and advised them to contact the PCP.

## 2021-08-16 DIAGNOSIS — N39.41 URGE INCONTINENCE: ICD-10-CM

## 2021-08-16 RX ORDER — OXYBUTYNIN CHLORIDE 5 MG/1
5 TABLET ORAL 3 TIMES DAILY
Qty: 60 TABLET | Refills: 3 | Status: SHIPPED | OUTPATIENT
Start: 2021-08-16 | End: 2021-12-03

## 2021-08-16 NOTE — TELEPHONE ENCOUNTER
Refill request for Oxybutynin. If appropriate please send medication(s) to patients pharmacy.     Next appt: 9/1/2021      Health Maintenance   Topic Date Due    Shingles Vaccine (3 of 3) 09/12/2018    Lipid screen  03/03/2021    Potassium monitoring  06/23/2021    Creatinine monitoring  06/23/2021    COVID-19 Vaccine (2 - Moderna 2-dose series) 07/22/2021    Flu vaccine (1) 09/01/2021    TSH testing  04/02/2022    Breast cancer screen  06/09/2023    Pneumococcal 65+ years Vaccine (1 of 1 - PPSV23) 02/07/2024    DTaP/Tdap/Td vaccine (2 - Td or Tdap) 11/23/2025    Colon cancer screen colonoscopy  05/10/2031    DEXA (modify frequency per FRAX score)  Completed    Hepatitis C screen  Completed    Hepatitis A vaccine  Aged Out    Hepatitis B vaccine  Aged Out    Hib vaccine  Aged Out    Meningococcal (ACWY) vaccine  Aged Out       Hemoglobin A1C (%)   Date Value   08/18/2016 5.1   05/07/2015 5.0             ( goal A1C is < 7)   No results found for: LABMICR  LDL Cholesterol (mg/dL)   Date Value   03/03/2020 53       (goal LDL is <100)   AST (U/L)   Date Value   06/20/2020 22     ALT (U/L)   Date Value   06/20/2020 15     BUN (mg/dL)   Date Value   06/23/2020 9     BP Readings from Last 3 Encounters:   07/21/21 115/77   07/20/21 121/80   05/10/21 (!) 69/38          (goal 120/80)          Patient Active Problem List:     Acquired hypothyroidism     Essential hypertension     Urge incontinence     Generalized anxiety disorder     Gastroesophageal reflux disease without esophagitis     H/O: CVA (cerebrovascular accident)     Migraine without aura and without status migrainosus, not intractable     Hammer toe of left foot     Tremor of both hands     Status epilepticus (HCC)     Speech and language deficits     Fall     Constipation

## 2021-08-16 NOTE — TELEPHONE ENCOUNTER
Oxybutynin refilled.      Oralia Rocha DO  PGY-3, Internal medicine resident  [facility], [place], [place]  8/16/2021 3:31 PM
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
## 2021-08-23 ENCOUNTER — TELEPHONE (OUTPATIENT)
Dept: INTERNAL MEDICINE | Age: 66
End: 2021-08-23

## 2021-08-23 NOTE — TELEPHONE ENCOUNTER
edwin calling office requesting a DME for an rollator walker for pt, pt has an appt on 9/1/21, DME form is in [address] [facility]

## 2021-08-30 ENCOUNTER — HOSPITAL ENCOUNTER (OUTPATIENT)
Age: 66
Discharge: HOME | End: 2021-08-30
Payer: COMMERCIAL

## 2021-08-30 DIAGNOSIS — Z13.220 SCREENING FOR HYPERLIPIDEMIA: ICD-10-CM

## 2021-08-30 LAB
CHOLESTEROL, FASTING: 140 MG/DL
CHOLESTEROL/HDL RATIO: 2
HDLC SERPL-MCNC: 71 MG/DL
LDL CHOLESTEROL: 55 MG/DL (ref 0–130)
TRIGLYCERIDE, FASTING: 72 MG/DL
VLDLC SERPL CALC-MCNC: NORMAL MG/DL (ref 1–30)

## 2021-08-30 PROCEDURE — 36415 COLL VENOUS BLD VENIPUNCTURE: CPT

## 2021-08-30 PROCEDURE — 80061 LIPID PANEL: CPT

## 2021-09-01 ENCOUNTER — OFFICE VISIT (OUTPATIENT)
Dept: INTERNAL MEDICINE | Age: 66
End: 2021-09-01
Payer: COMMERCIAL

## 2021-09-01 VITALS
DIASTOLIC BLOOD PRESSURE: 76 MMHG | TEMPERATURE: 97.3 F | SYSTOLIC BLOOD PRESSURE: 116 MMHG | HEIGHT: 70 IN | WEIGHT: 132.8 LBS | BODY MASS INDEX: 19.01 KG/M2 | HEART RATE: 76 BPM

## 2021-09-01 DIAGNOSIS — Z23 NEED FOR SHINGLES VACCINE: ICD-10-CM

## 2021-09-01 DIAGNOSIS — R29.6 FALLS FREQUENTLY: Primary | ICD-10-CM

## 2021-09-01 PROCEDURE — 99211 OFF/OP EST MAY X REQ PHY/QHP: CPT | Performed by: INTERNAL MEDICINE

## 2021-09-01 PROCEDURE — 1036F TOBACCO NON-USER: CPT | Performed by: STUDENT IN AN ORGANIZED HEALTH CARE EDUCATION/TRAINING PROGRAM

## 2021-09-01 PROCEDURE — 1090F PRES/ABSN URINE INCON ASSESS: CPT | Performed by: STUDENT IN AN ORGANIZED HEALTH CARE EDUCATION/TRAINING PROGRAM

## 2021-09-01 PROCEDURE — G8420 CALC BMI NORM PARAMETERS: HCPCS | Performed by: STUDENT IN AN ORGANIZED HEALTH CARE EDUCATION/TRAINING PROGRAM

## 2021-09-01 PROCEDURE — G8427 DOCREV CUR MEDS BY ELIG CLIN: HCPCS | Performed by: STUDENT IN AN ORGANIZED HEALTH CARE EDUCATION/TRAINING PROGRAM

## 2021-09-01 PROCEDURE — G8399 PT W/DXA RESULTS DOCUMENT: HCPCS | Performed by: STUDENT IN AN ORGANIZED HEALTH CARE EDUCATION/TRAINING PROGRAM

## 2021-09-01 PROCEDURE — 1123F ACP DISCUSS/DSCN MKR DOCD: CPT | Performed by: STUDENT IN AN ORGANIZED HEALTH CARE EDUCATION/TRAINING PROGRAM

## 2021-09-01 PROCEDURE — 99213 OFFICE O/P EST LOW 20 MIN: CPT | Performed by: STUDENT IN AN ORGANIZED HEALTH CARE EDUCATION/TRAINING PROGRAM

## 2021-09-01 PROCEDURE — 4040F PNEUMOC VAC/ADMIN/RCVD: CPT | Performed by: STUDENT IN AN ORGANIZED HEALTH CARE EDUCATION/TRAINING PROGRAM

## 2021-09-01 PROCEDURE — 3017F COLORECTAL CA SCREEN DOC REV: CPT | Performed by: STUDENT IN AN ORGANIZED HEALTH CARE EDUCATION/TRAINING PROGRAM

## 2021-09-01 NOTE — PROGRESS NOTES
@[facility]@    [facility]    Clinic Progress Note    Date of patient's visit: 9/1/2021    Patient's Name:  Kiara Dumont    YOB: 1955            Patient Care Team:  Candelaria Tidwell DO as PCP - General (Internal Medicine)  Keron Wray MD as Consulting Physician (Internal Medicine)  Yue Talley MD as Consulting Physician (Gastroenterology)    REASON FOR VISIT: Need for [place]. Chief Complaint   Patient presents with   [address] Maintenance     Labs pended, Vaccines pended, Olimpia reprinted, US reprinted    Referral - General     Outpt Physical Therapy - balance to prevent falls      HISTORY OF PRESENT ILLNESS:      History was obtained from the patient. Kiara Dumont is a 77 y.o. who presented to the clinic today for face to face for wheeled walker and phyiscal therapy for improved mobility and balance. She requires a wheelchair due to limited mobility which cannot be resolved by use of cane. She can self propel herself and has proper space to move. She is ultimately deficient amended by use of walker. Admits to continued balance issues after falls and continued nears falls. Denies fevers, chills, chest pain, shortness of breath, abdominal pain, nausea or vomiting. REVIEW OF SYSTEMS:      · Constitutional: Negative for Fever, chills. · Eyes: Negative for visual changes, diplopia. · ENT: Negative for ear pain, nose bleeds, sore throat. · Cardiovascular: Negative for lightheadedness ,chest pain, palpitations. · Respiratory: Negative for Shortness of breath,cough or wheezing. · Gastrointestinal : Negative for nausea/vomiting, abdominal pain, diarrhea, constipation. · Genitourinary: Negative for change in bladder habits, dysuria, hematuria. · Musculoskeletal: Negative for joint pain, muscle aches. · Neurological: Positive for imbalance.  Negative for headache, change in muscle strength numbness/tingling. PAST MEDICAL HISTORY:     Past Medical History:   Diagnosis Date    Ankle fracture, left     Anxiety 02/04/2014    Arthritis     Black tarry stools     Colon polyp     Constipation     ETOH abuse     States she has not drank any ETOH in 24 years as of 2021    Frequency of urination ?  GERD (gastroesophageal reflux disease) 02/04/2014    Head injury 1998    after seizure/stroke pt fell and hit head    Headache(784.0)     Hypertension     Hypotension ?  Hypothyroidism     Memory deficit     Migraines     Numbness and tingling     fingers    Osteoporosis     Peripheral vascular disease ([address]     Pneumonia     Seizures ([address]     last seizure 6-,     Shingles      Pt.denies.  Stroke [facility]) 1998    stroke and seizure together    TIA (transient ischemic attack)     1996    Tremor     UTI (urinary tract infection)     once    Varicose veins     Wears glasses      PAST SURGICAL HISTORY     Past Surgical History:   Procedure Laterality Date    BUNIONECTOMY Right     COLONOSCOPY      COLONOSCOPY N/A 5/10/2021    COLONOSCOPY DIAGNOSTIC [facility] 07/06/2016    ON 2ND, 3rd, 4th,  and  5TH TOES OF LEFT FOOT  Pt. Denies.     OTHER SURGICAL HISTORY  02/23/2015    orif left ankle with synthes and intraoperative c- arm    [place] [address] INCL FLUOR GDNCE DX W/CELL WASHG SPX N/A 01/16/2018    BRONCHOSCOPY WITH WASHINGS AT PATIENT BEDSIDE ICU performed by Rajiv Sy MD at [facility]:      No Known Allergies      MEDICATIONS:      Current Outpatient Medications on File Prior to Visit   Medication Sig Dispense Refill    oxybutynin (DITROPAN) 5 MG tablet Take 1 tablet by mouth 3 times daily 60 tablet 3    donepezil (ARICEPT) 5 MG tablet Take 1 tablet by mouth nightly 30 tablet 3    hydroCHLOROthiazide (HYDRODIURIL) 12.5 MG tablet TAKE 1 TABLET BY MOUTH ONE TIME A DAY 60 tablet 0    PARoxetine (PAXIL) 30 MG tablet TAKE ONE TABLET BY MOUTH EVERY MORNING 60 tablet 0    atorvastatin (LIPITOR) 40 MG tablet TAKE 1 TABLET BY MOUTH ONE TIME A DAY 60 tablet 0    omeprazole (PRILOSEC) 20 MG delayed release capsule TAKE 1 CAPSULE BY MOUTH ONE TIME A DAY 60 capsule 0    levothyroxine (SYNTHROID) 50 MCG tablet TAKE 1 TABLET BY MOUTH ONE TIME A DAY 60 tablet 0    amLODIPine (NORVASC) 10 MG tablet TAKE 1 TABLET BY MOUTH ONE TIME A DAY 60 tablet 0    ADULT ASPIRIN REGIMEN 81 MG EC tablet TAKE 1 TABLET BY MOUTH ONE TIME A DAY 60 tablet 0    VIMPAT 200 MG tablet TAKE 1 TABLET BY MOUTH 2 TIMES A  tablet 1    primidone (MYSOLINE) 50 MG tablet Take 0.5 tablets by mouth 2 times daily 90 tablet 3    SUMAtriptan (IMITREX) 100 MG tablet TAKE 1 TABLET BY MOUTH ONE TIME A DAY AS NEEDED FOR MIGRAINE 30 tablet 0    levETIRAcetam (KEPPRA) 1000 MG tablet TAKE 1 TABLET BY MOUTH (ALONG WITH 250MG TABLET FOR TOTAL OF 1250MG) TWICE DAILY. 180 tablet 2    levETIRAcetam (KEPPRA) 250 MG tablet TAKE 1 TABLET BY MOUTH (ALONG WITH 1000MG TABLET FOR TOTAL OF 1250MG) TWICE DAILY. 180 tablet 2    polyethylene glycol (GLYCOLAX) 17 GM/SCOOP powder Follow instructions provided to you from physician's office. (Patient not taking: Reported on 9/1/2021) 238 g 0    magnesium citrate solution Take 296 mLs by mouth once for 1 dose (Patient not taking: Reported on 7/20/2021) 296 mL 0    magnesium hydroxide (MILK OF MAGNESIA) 400 MG/5ML suspension Follow directions given to you from provider's office (Patient not taking: Reported on 9/1/2021) 1 Bottle 0    carbidopa-levodopa (SINEMET)  MG per tablet Take 1 tablet by mouth 3 times daily (Patient not taking: Reported on 9/1/2021) 90 tablet 3     No current facility-administered medications on file prior to visit. SOCIAL HISTORY:     Reviewed and no change from previous record. Samantha Caldwell  reports that she has never smoked.  She has never used smokeless tobacco.    FAMILY HISTORY:      Reviewed and No change from previous visit    HEALTH MAINTENANCE:      Flu vaccine 2/10/2021. Shingles due ordered. Tetanus 11/23/15  Pneumococcal 2/7/19     Potassium Screening 3.6 6/15/2021. Creatinine Screening 0.56 6/15/2021. Lipids Screening okay 8/30/2021.      Mammogram pending 6/9/2021.      Colonoscopy 5/10/2021     States she has received both Covid shots. Will check. PHYSICAL EXAM:      Vitals:    09/01/21 1322   BP: 116/76   Pulse: 76   Temp: 97.3 °F (36.3 °C)   TempSrc: Temporal   Weight: 132 lb 12.8 oz (60.2 kg)   Height: 5' 10\" (1.778 m)     Body mass index is 19.05 kg/m². BP Readings from Last 3 Encounters:   09/01/21 116/76   07/21/21 115/77   07/20/21 121/80        Wt Readings from Last 3 Encounters:   09/01/21 132 lb 12.8 oz (60.2 kg)   07/21/21 135 lb (61.2 kg)   07/20/21 135 lb 9.6 oz (61.5 kg)       · General appearance: Well Appearing, Cooperative, Appropriately Dressed for the Weather. · Head: Normocephalic, No Lesions, No Obvious Deformity. · ENT: No Icterus or Redness to Eyes. No Epistaxis from nose, No Pharyngeal Erythema or Exudate. · Lungs: Clear to Auscultation Bilaterally, No Rales or Rhonchi. · Heart: Regular Rate and Rhythm, S1, S2 normal, No Murmur  · Abdomen: Bowel Sounds Present on Auscultation. Soft, Non-Tender. · Extremities: Atraumatic, No Cyanosis or Edema. · Neurological:  Awake, Alert, Oriented to Person, Place and Time. Reflexes normal and symmetric.  Sensation grossly normal.    LABORATORY FINDINGS:      CBC:  Lab Results   Component Value Date    WBC 5.5 06/23/2020    HGB 13.8 06/23/2020     06/23/2020     10/04/2011       BMP:    Lab Results   Component Value Date     06/23/2020    K 3.4 06/23/2020     06/23/2020    CO2 24 06/23/2020    BUN 9 06/23/2020    CREATININE 0.61 06/23/2020    GLUCOSE 79 06/23/2020    GLUCOSE 68 03/27/2012       HEMOGLOBIN A1C:   Lab Results   Component Value Date    LABA1C 5.1 08/18/2016       MICROALBUMIN URINE: No results found for: MICROALBUR    FASTING LIPID PANEL:  Lab Results   Component Value Date    CHOL 183 08/18/2016    HDL 71 08/30/2021    TRIG 64 08/18/2016       Lab Results   Component Value Date    LDLCHOLESTEROL 55 08/30/2021       LIVER PROFILE:  Lab Results   Component Value Date    ALT 15 06/20/2020    AST 22 06/20/2020    PROT 6.8 06/20/2020    BILITOT 0.56 06/20/2020    BILIDIR 0.17 05/13/2018    LABALBU 3.8 06/20/2020    LABALBU 4.1 10/04/2011        THYROID FUNCTION:   Lab Results   Component Value Date    TSH 2.42 04/02/2021        URINE ANALYSIS: No results found for: LABURIN    ASSESSMENT AND PLAN:      1. Falls frequently  - Wheel chair and PT for balance and strength. 2. Need for shingles vaccine  - zoster recombinant adjuvanted vaccine [facility]) 50 MCG/0.5ML SUSR injection; Inject 0.5 mLs into the muscle once for 1 dose  Dispense: 0.5 mL; Refill: 0      In summary, in the [facility] on 09/01/21, Marie Walker was seen and examined. We discussed importance of wheel chair use and PT for improved balance. FOLLOW UP AND INSTRUCTIONS:   No follow-ups on file. 1. Laura received counseling on the following healthy behaviors: exercise    2. Reviewed prior labs and health maintenance. 3. Discussed use, benefit, and side effects of prescribed medications. Barriers to medication compliance addressed. All patient questions answered. Pt voiced understanding.      4. Patient given educational materials - see patient instructions    Veto Carolyn THURMAN  Internal Medicine Resident , PGY-3  Julian Tellez [address] [place] [address]  09/01/21 1:37 PM

## 2021-09-01 NOTE — PATIENT INSTRUCTIONS
Return To Clinic 12/1/2021 for 3 month follow up in office. Please bring all of your medications with you to this appointment. After Visit Summary given and reviewed. The medication list included in this document is our record of what you are currently taking, including any changes that were made at today's visit. If you find any differences when compared to your medications at home, or have any questions that were not answered at your visit, please contact the office. It is very important for your care that you keep your appointment. If for some reason you are unable to keep your appointment, it is equally important that you call our office at [phone] to cancel your appointment and reschedule. Failure to do so may result in your termination from our practice. Script for Shingrix was printed and given to patient. Patient was instructed to take script(s) to pharmacy to get filled. An order for Danielito Bedolla was faxed along with Medical Necessity Letter to [facility] at [phone]. Form scanned into encounter along with fax confirmation. External referral for Physical Therapy given to patient.  Patient is advised to contact insurance company for a list of specialists within network to be scheduled.       CATHIE Castro

## 2021-09-09 DIAGNOSIS — G40.909 SEIZURE DISORDER (HCC): ICD-10-CM

## 2021-09-10 DIAGNOSIS — Z86.73 H/O: STROKE: ICD-10-CM

## 2021-09-10 DIAGNOSIS — I10 ESSENTIAL HYPERTENSION: Chronic | ICD-10-CM

## 2021-09-10 DIAGNOSIS — E03.9 ACQUIRED HYPOTHYROIDISM: ICD-10-CM

## 2021-09-10 DIAGNOSIS — K21.9 GASTROESOPHAGEAL REFLUX DISEASE: ICD-10-CM

## 2021-09-10 DIAGNOSIS — Z86.73 H/O: CVA (CEREBROVASCULAR ACCIDENT): ICD-10-CM

## 2021-09-10 RX ORDER — HYDROCHLOROTHIAZIDE 12.5 MG/1
TABLET ORAL
Qty: 60 TABLET | Refills: 0 | Status: SHIPPED | OUTPATIENT
Start: 2021-09-10 | End: 2021-09-15 | Stop reason: SDUPTHER

## 2021-09-10 RX ORDER — OMEPRAZOLE 20 MG/1
CAPSULE, DELAYED RELEASE ORAL
Qty: 60 CAPSULE | Refills: 0 | Status: SHIPPED | OUTPATIENT
Start: 2021-09-10 | End: 2021-09-15 | Stop reason: SDUPTHER

## 2021-09-10 RX ORDER — ATORVASTATIN CALCIUM 40 MG/1
TABLET, FILM COATED ORAL
Qty: 60 TABLET | Refills: 0 | Status: SHIPPED | OUTPATIENT
Start: 2021-09-10 | End: 2021-11-03

## 2021-09-10 RX ORDER — ASPIRIN 81 MG/1
TABLET ORAL
Qty: 60 TABLET | Refills: 0 | Status: SHIPPED | OUTPATIENT
Start: 2021-09-10 | End: 2021-11-03

## 2021-09-10 RX ORDER — PRIMIDONE 50 MG/1
TABLET ORAL
Qty: 90 TABLET | Refills: 0 | Status: SHIPPED | OUTPATIENT
Start: 2021-09-10 | End: 2021-09-15

## 2021-09-10 RX ORDER — LEVETIRACETAM 250 MG/1
TABLET ORAL
Qty: 180 TABLET | Refills: 2 | Status: ON HOLD | OUTPATIENT
Start: 2021-09-10 | End: 2022-01-13 | Stop reason: HOSPADM

## 2021-09-10 RX ORDER — AMLODIPINE BESYLATE 10 MG/1
TABLET ORAL
Qty: 60 TABLET | Refills: 0 | Status: SHIPPED | OUTPATIENT
Start: 2021-09-10 | End: 2021-09-15 | Stop reason: SDUPTHER

## 2021-09-10 RX ORDER — LEVOTHYROXINE SODIUM 0.05 MG/1
TABLET ORAL
Qty: 60 TABLET | Refills: 0 | Status: SHIPPED | OUTPATIENT
Start: 2021-09-10 | End: 2021-09-15 | Stop reason: SDUPTHER

## 2021-09-10 RX ORDER — PAROXETINE 30 MG/1
TABLET, FILM COATED ORAL
Qty: 60 TABLET | Refills: 0 | Status: SHIPPED | OUTPATIENT
Start: 2021-09-10 | End: 2021-09-15 | Stop reason: SDUPTHER

## 2021-09-10 NOTE — TELEPHONE ENCOUNTER
Med refilled.      Dave Morales DO  PGY-3, Internal medicine resident  [facility], [place], [place]  9/10/2021 8:54 AM
without status migrainosus, not intractable     Hammer toe of left foot     Tremor of both hands     Status epilepticus ([facility]     Speech and language deficits     Constipation

## 2021-09-10 NOTE — TELEPHONE ENCOUNTER
Pharmacy requesting a  refill of Mysoline 50mg. Medication active on med list yes      Date of last prescription 04/06/2021  with 2 refills verified on 09/10/2021    verified by GO ARREAGA      Date of last appointment 07/20/2021    Next Visit Date:  1/20/2022    Pharmacy requesting a  refill of Keppra 250mg.       Medication active on med list yes      Date of last prescription 12/28/2021  with 2 refills verified on 09/10/2021    verified by GO ARREAGA      Date of last appointment 07/20/2021    Next Visit Date:  1/20/2022

## 2021-09-15 DIAGNOSIS — K21.9 GASTROESOPHAGEAL REFLUX DISEASE: ICD-10-CM

## 2021-09-15 DIAGNOSIS — I10 ESSENTIAL HYPERTENSION: Chronic | ICD-10-CM

## 2021-09-15 DIAGNOSIS — E03.9 ACQUIRED HYPOTHYROIDISM: ICD-10-CM

## 2021-09-15 RX ORDER — LEVOTHYROXINE SODIUM 0.05 MG/1
TABLET ORAL
Qty: 60 TABLET | Refills: 3 | Status: SHIPPED | OUTPATIENT
Start: 2021-09-15 | End: 2022-07-27 | Stop reason: SDUPTHER

## 2021-09-15 RX ORDER — PRIMIDONE 50 MG/1
TABLET ORAL
Qty: 90 TABLET | Refills: 0 | Status: ON HOLD | OUTPATIENT
Start: 2021-09-15 | End: 2022-01-13 | Stop reason: HOSPADM

## 2021-09-15 RX ORDER — HYDROCHLOROTHIAZIDE 12.5 MG/1
TABLET ORAL
Qty: 60 TABLET | Refills: 3 | Status: ON HOLD | OUTPATIENT
Start: 2021-09-15 | End: 2022-01-13 | Stop reason: HOSPADM

## 2021-09-15 RX ORDER — PAROXETINE 30 MG/1
TABLET, FILM COATED ORAL
Qty: 60 TABLET | Refills: 3 | Status: SHIPPED | OUTPATIENT
Start: 2021-09-15 | End: 2021-11-03

## 2021-09-15 RX ORDER — OMEPRAZOLE 20 MG/1
CAPSULE, DELAYED RELEASE ORAL
Qty: 60 CAPSULE | Refills: 3 | Status: SHIPPED | OUTPATIENT
Start: 2021-09-15 | End: 2022-07-01

## 2021-09-15 RX ORDER — AMLODIPINE BESYLATE 10 MG/1
TABLET ORAL
Qty: 60 TABLET | Refills: 3 | Status: SHIPPED | OUTPATIENT
Start: 2021-09-15 | End: 2022-07-01

## 2021-09-15 NOTE — TELEPHONE ENCOUNTER
Medications refilled.      Balaji Donahue DO  PGY-3, Internal medicine resident  [facility], [facility]  9/15/2021 1:22 PM
(cerebrovascular accident)     Migraine without aura and without status migrainosus, not intractable     Hammer toe of left foot     Tremor of both hands     Status epilepticus ([facility]     Speech and language deficits     Constipation

## 2021-10-06 DIAGNOSIS — G40.909 SEIZURE DISORDER (HCC): ICD-10-CM

## 2021-10-06 RX ORDER — LEVETIRACETAM 1000 MG/1
TABLET ORAL
Qty: 180 TABLET | Refills: 2 | Status: ON HOLD | OUTPATIENT
Start: 2021-10-06 | End: 2022-01-13 | Stop reason: HOSPADM

## 2021-10-06 NOTE — TELEPHONE ENCOUNTER
Pharmacy requesting refill of Keppra.       Medication active on med list yes      Date of last fill: 12/28/20  verified on 10/6/2021   verified by [facility] LPN      Date of last appointment 7/20/21    Next Visit Date:  1/20/2022

## 2021-10-21 ENCOUNTER — OFFICE VISIT (OUTPATIENT)
Dept: DERMATOLOGY | Age: 66
End: 2021-10-21
Payer: COMMERCIAL

## 2021-10-21 ENCOUNTER — HOSPITAL ENCOUNTER (OUTPATIENT)
Age: 66
Setting detail: SPECIMEN
Discharge: HOME | End: 2021-10-21
Payer: COMMERCIAL

## 2021-10-21 VITALS
TEMPERATURE: 97.6 F | OXYGEN SATURATION: 97 % | BODY MASS INDEX: 19.3 KG/M2 | HEART RATE: 80 BPM | DIASTOLIC BLOOD PRESSURE: 71 MMHG | WEIGHT: 134.8 LBS | HEIGHT: 70 IN | SYSTOLIC BLOOD PRESSURE: 104 MMHG

## 2021-10-21 DIAGNOSIS — L82.0 INFLAMED SEBORRHEIC KERATOSIS: Primary | ICD-10-CM

## 2021-10-21 DIAGNOSIS — R60.9 SWELLING: ICD-10-CM

## 2021-10-21 DIAGNOSIS — L82.1 SEBORRHEIC KERATOSIS: ICD-10-CM

## 2021-10-21 PROCEDURE — 99202 OFFICE O/P NEW SF 15 MIN: CPT | Performed by: DERMATOLOGY

## 2021-10-21 PROCEDURE — 1036F TOBACCO NON-USER: CPT | Performed by: DERMATOLOGY

## 2021-10-21 PROCEDURE — 4040F PNEUMOC VAC/ADMIN/RCVD: CPT | Performed by: DERMATOLOGY

## 2021-10-21 PROCEDURE — 11302 SHAVE SKIN LESION 1.1-2.0 CM: CPT | Performed by: DERMATOLOGY

## 2021-10-21 PROCEDURE — 1123F ACP DISCUSS/DSCN MKR DOCD: CPT | Performed by: DERMATOLOGY

## 2021-10-21 PROCEDURE — G8399 PT W/DXA RESULTS DOCUMENT: HCPCS | Performed by: DERMATOLOGY

## 2021-10-21 PROCEDURE — G8427 DOCREV CUR MEDS BY ELIG CLIN: HCPCS | Performed by: DERMATOLOGY

## 2021-10-21 PROCEDURE — 3017F COLORECTAL CA SCREEN DOC REV: CPT | Performed by: DERMATOLOGY

## 2021-10-21 PROCEDURE — G8420 CALC BMI NORM PARAMETERS: HCPCS | Performed by: DERMATOLOGY

## 2021-10-21 PROCEDURE — 1090F PRES/ABSN URINE INCON ASSESS: CPT | Performed by: DERMATOLOGY

## 2021-10-21 PROCEDURE — G8484 FLU IMMUNIZE NO ADMIN: HCPCS | Performed by: DERMATOLOGY

## 2021-10-21 RX ORDER — LIDOCAINE HYDROCHLORIDE AND EPINEPHRINE 10; 10 MG/ML; UG/ML
1 INJECTION, SOLUTION INFILTRATION; PERINEURAL ONCE
Status: COMPLETED | OUTPATIENT
Start: 2021-10-21 | End: 2021-10-21

## 2021-10-21 RX ADMIN — LIDOCAINE HYDROCHLORIDE AND EPINEPHRINE 1 ML: 10; 10 INJECTION, SOLUTION INFILTRATION; PERINEURAL at 14:50

## 2021-10-21 NOTE — PROGRESS NOTES
Dermatology Patient Note  [address]  [address]  [address] [address]  Dept: [phone]  Dept Fax: [phone]      VISITDATE: 10/21/2021   REFERRING PROVIDER: No ref. provider found      Hunter Morrison is a 77 y.o. female  who presents today in the office for:    New Patient (NO FBSE just areas of concern: enlarging mole x many years along breast line. Spots on back & on toe. )      HISTORY OF PRESENT ILLNESS:  As above. Reports the spot on her toe keeps getting bigger. MEDICAL PROBLEMS:  Patient Active Problem List    Diagnosis Date Noted    Constipation     Speech and language deficits     Status epilepticus ([facility] [address] 05/13/2018    Tremor of both hands 11/07/2017    Hammer toe of left foot     Migraine without aura and without status migrainosus, not intractable 12/23/2015    H/O: CVA (cerebrovascular accident) 05/06/2014    Generalized anxiety disorder 02/04/2014    Gastroesophageal reflux disease without esophagitis 02/04/2014    Acquired hypothyroidism 03/27/2012    Essential hypertension 03/27/2012    Urge incontinence 03/27/2012       CURRENT MEDICATIONS:   Current Outpatient Medications   Medication Sig Dispense Refill    levETIRAcetam (KEPPRA) 1000 MG tablet TAKE 1 TABLET BY MOUTH (ALONG WITH 250MG TABLET FOR TOTAL OF 1250MG) TWICE A  tablet 2    hydroCHLOROthiazide (HYDRODIURIL) 12.5 MG tablet TAKE 1 TABLET BY MOUTH ONE TIME A DAY 60 tablet 3    levothyroxine (SYNTHROID) 50 MCG tablet TAKE 1 TABLET BY MOUTH ONE TIME A DAY 60 tablet 3    amLODIPine (NORVASC) 10 MG tablet TAKE 1 TABLET BY MOUTH ONE TIME A DAY 60 tablet 3    omeprazole (PRILOSEC) 20 MG delayed release capsule TAKE 1 CAPSULE BY MOUTH ONE TIME A DAY 60 capsule 3    levETIRAcetam (KEPPRA) 250 MG tablet TAKE 1 TABLET BY MOUTH (ALONG WITH 1000MG TABLET FOR TOTAL OF 1250MG) TWICE DAILY.  180 tablet 2    aspirin (ADULT ASPIRIN REGIMEN) 81 MG EC tablet TAKE 1 TABLET BY MOUTH ONE TIME A DAY 60 tablet 0    atorvastatin (LIPITOR) 40 MG tablet TAKE 1 TABLET BY MOUTH ONE TIME A DAY 60 tablet 0    donepezil (ARICEPT) 5 MG tablet Take 1 tablet by mouth nightly 30 tablet 3    SUMAtriptan (IMITREX) 100 MG tablet TAKE 1 TABLET BY MOUTH ONE TIME A DAY AS NEEDED FOR MIGRAINE 30 tablet 0    primidone (MYSOLINE) 50 MG tablet TAKE ONE-HALF TABLET BY MOUTH TWICE DAILY (Patient not taking: Reported on 10/21/2021) 90 tablet 0    PARoxetine (PAXIL) 30 MG tablet TAKE ONE TABLET BY MOUTH EVERY MORNING (Patient not taking: Reported on 10/21/2021) 60 tablet 3    oxybutynin (DITROPAN) 5 MG tablet Take 1 tablet by mouth 3 times daily (Patient not taking: Reported on 10/21/2021) 60 tablet 3    VIMPAT 200 MG tablet TAKE 1 TABLET BY MOUTH 2 TIMES A DAY (Patient not taking: Reported on 10/21/2021) 180 tablet 1     No current facility-administered medications for this visit. ALLERGIES:   No Known Allergies    SOCIAL HISTORY:  Social History     Tobacco Use    Smoking status: Never Smoker    Smokeless tobacco: Never Used   Substance Use Topics    Alcohol use: Not Currently     Comment: quit 7713, was alcoholic       Pertinent ROS:  Review of Systems  Skin: Denies any new changing, growing or bleeding lesions or rashes except as described in the HPI   Constitutional: Denies fevers, chills, and malaise. PHYSICAL EXAM:   /71   Pulse 80   Temp 97.6 °F (36.4 °C)   Ht 5' 10\" (1.778 m)   Wt 134 lb 12.8 oz (61.1 kg)   SpO2 97%   BMI 19.34 kg/m²     The patient is generally well appearing, well nourished, alert and conversational. Affect is normal.    Cutaneous Exam:  Physical Exam  Focused exam of back and right foot was performed    Facial covering was not removed during examination. Diagnoses/exam findings/medical history pertinent to this visit are listed below:    Assessment:   Diagnosis Orders   1. Inflamed seborrheic keratosis     2.  Seborrheic keratosis          Plan:  Inflamed

## 2021-10-25 LAB — DERMATOLOGY PATHOLOGY REPORT: NORMAL

## 2021-10-25 NOTE — RESULT ENCOUNTER NOTE
We have received and reviewed your biopsy results which demonstrated a benign skin lesion called a seborrheic keratosis. No further treatment of this lesion is needed at this time.

## 2021-11-02 DIAGNOSIS — Z86.73 H/O: STROKE: ICD-10-CM

## 2021-11-02 DIAGNOSIS — Z86.73 H/O: CVA (CEREBROVASCULAR ACCIDENT): ICD-10-CM

## 2021-11-02 RX ORDER — DONEPEZIL HYDROCHLORIDE 5 MG/1
TABLET, FILM COATED ORAL
Qty: 30 TABLET | Refills: 3 | Status: SHIPPED | OUTPATIENT
Start: 2021-11-02 | End: 2022-04-08

## 2021-11-02 NOTE — TELEPHONE ENCOUNTER
Refill request for pended medications. If appropriate please send medication(s) to patients pharmacy.     Next appt: 12/1/2021      Health Maintenance   Topic Date Due    Shingles Vaccine (3 of 3) 09/12/2018    Flu vaccine (1) 09/01/2021    TSH testing  04/02/2022    Potassium monitoring  06/17/2022    Creatinine monitoring  06/17/2022    Lipid screen  08/30/2022    Breast cancer screen  06/09/2023    Pneumococcal 65+ years Vaccine (2 of 2 - PPSV23) 02/07/2024    DTaP/Tdap/Td vaccine (2 - Td or Tdap) 11/23/2025    Colon cancer screen colonoscopy  05/10/2031    DEXA (modify frequency per FRAX score)  Completed    COVID-19 Vaccine  Completed    Hepatitis C screen  Completed    Hepatitis A vaccine  Aged Out    Hepatitis B vaccine  Aged Out    Hib vaccine  Aged Out    Meningococcal (ACWY) vaccine  Aged Out       Hemoglobin A1C (%)   Date Value   08/18/2016 5.1   05/07/2015 5.0             ( goal A1C is < 7)   No results found for: LABMICR  LDL Cholesterol (mg/dL)   Date Value   08/30/2021 55       (goal LDL is <100)   AST (U/L)   Date Value   06/20/2020 22     ALT (U/L)   Date Value   06/20/2020 15     BUN (mg/dL)   Date Value   06/17/2021 10     BP Readings from Last 3 Encounters:   10/21/21 104/71   09/01/21 116/76   07/21/21 115/77          (goal 120/80)          Patient Active Problem List:     Acquired hypothyroidism     Essential hypertension     Urge incontinence     Generalized anxiety disorder     Gastroesophageal reflux disease without esophagitis     H/O: CVA (cerebrovascular accident)     Migraine without aura and without status migrainosus, not intractable     Hammer toe of left foot     Tremor of both hands     Status epilepticus (HCC)     Speech and language deficits     Constipation

## 2021-11-03 RX ORDER — PAROXETINE 30 MG/1
TABLET, FILM COATED ORAL
Qty: 60 TABLET | Refills: 3 | Status: SHIPPED | OUTPATIENT
Start: 2021-11-03 | End: 2022-07-01

## 2021-11-03 RX ORDER — ATORVASTATIN CALCIUM 40 MG/1
TABLET, FILM COATED ORAL
Qty: 60 TABLET | Refills: 3 | Status: SHIPPED | OUTPATIENT
Start: 2021-11-03 | End: 2022-07-01

## 2021-11-03 RX ORDER — ASPIRIN 81 MG/1
TABLET ORAL
Qty: 60 TABLET | Refills: 3 | Status: SHIPPED | OUTPATIENT
Start: 2021-11-03 | End: 2022-07-01

## 2021-11-03 NOTE — TELEPHONE ENCOUNTER
Aspirin, Paxil and Lipitor refilled for 6 months.      Sara Orona DO  PGY-3, Internal medicine resident  [facility], [place], [place]  11/3/2021 10:13 AM

## 2021-11-10 NOTE — TELEPHONE ENCOUNTER
Writer spoke to Dr David Sawyer in regards to the [employer] MOM and he says it is okay for her use this, but to drink lots of water also. Alert and oriented to person, place and time

## 2021-12-01 ENCOUNTER — OFFICE VISIT (OUTPATIENT)
Dept: INTERNAL MEDICINE | Age: 66
End: 2021-12-01
Payer: COMMERCIAL

## 2021-12-01 ENCOUNTER — HOSPITAL ENCOUNTER (OUTPATIENT)
Age: 66
Setting detail: SPECIMEN
Discharge: HOME | End: 2021-12-01

## 2021-12-01 VITALS
TEMPERATURE: 98.1 F | BODY MASS INDEX: 19.18 KG/M2 | HEIGHT: 70 IN | WEIGHT: 134 LBS | SYSTOLIC BLOOD PRESSURE: 111 MMHG | HEART RATE: 82 BPM | DIASTOLIC BLOOD PRESSURE: 74 MMHG

## 2021-12-01 DIAGNOSIS — K21.9 GASTROESOPHAGEAL REFLUX DISEASE WITHOUT ESOPHAGITIS: ICD-10-CM

## 2021-12-01 DIAGNOSIS — I10 ESSENTIAL HYPERTENSION: Chronic | ICD-10-CM

## 2021-12-01 DIAGNOSIS — N39.41 URGE INCONTINENCE: ICD-10-CM

## 2021-12-01 DIAGNOSIS — D64.9 ANEMIA, UNSPECIFIED TYPE: ICD-10-CM

## 2021-12-01 DIAGNOSIS — I10 ESSENTIAL HYPERTENSION: Primary | Chronic | ICD-10-CM

## 2021-12-01 DIAGNOSIS — M25.561 CHRONIC PAIN OF RIGHT KNEE: ICD-10-CM

## 2021-12-01 DIAGNOSIS — R92.8 MAMMOGRAM ABNORMAL: ICD-10-CM

## 2021-12-01 DIAGNOSIS — G43.009 MIGRAINE WITHOUT AURA AND WITHOUT STATUS MIGRAINOSUS, NOT INTRACTABLE: ICD-10-CM

## 2021-12-01 DIAGNOSIS — E03.9 ACQUIRED HYPOTHYROIDISM: Chronic | ICD-10-CM

## 2021-12-01 DIAGNOSIS — G89.29 CHRONIC PAIN OF RIGHT KNEE: ICD-10-CM

## 2021-12-01 DIAGNOSIS — F41.1 GENERALIZED ANXIETY DISORDER: ICD-10-CM

## 2021-12-01 DIAGNOSIS — Z23 NEEDS FLU SHOT: ICD-10-CM

## 2021-12-01 LAB
ALBUMIN SERPL-MCNC: 4.3 G/DL (ref 3.5–5.2)
ALBUMIN/GLOBULIN RATIO: 1.6 (ref 1–2.5)
ALP BLD-CCNC: 60 U/L (ref 35–104)
ALT SERPL-CCNC: 15 U/L (ref 5–33)
ANION GAP SERPL CALCULATED.3IONS-SCNC: 12 MMOL/L (ref 9–17)
AST SERPL-CCNC: 18 U/L
BILIRUB SERPL-MCNC: 0.56 MG/DL (ref 0.3–1.2)
BUN BLDV-MCNC: 14 MG/DL (ref 8–23)
BUN/CREAT BLD: NORMAL (ref 9–20)
CALCIUM SERPL-MCNC: 9.6 MG/DL (ref 8.6–10.4)
CHLORIDE BLD-SCNC: 99 MMOL/L (ref 98–107)
CO2: 26 MMOL/L (ref 20–31)
CREAT SERPL-MCNC: 0.67 MG/DL (ref 0.5–0.9)
GFR AFRICAN AMERICAN: >60 ML/MIN
GFR NON-AFRICAN AMERICAN: >60 ML/MIN
GFR SERPL CREATININE-BSD FRML MDRD: NORMAL ML/MIN/{1.73_M2}
GFR SERPL CREATININE-BSD FRML MDRD: NORMAL ML/MIN/{1.73_M2}
GLUCOSE BLD-MCNC: 73 MG/DL (ref 70–99)
HCT VFR BLD CALC: 40.7 % (ref 36.3–47.1)
HEMOGLOBIN: 13.4 G/DL (ref 11.9–15.1)
MCH RBC QN AUTO: 32.8 PG (ref 25.2–33.5)
MCHC RBC AUTO-ENTMCNC: 32.9 G/DL (ref 28.4–34.8)
MCV RBC AUTO: 99.8 FL (ref 82.6–102.9)
NRBC AUTOMATED: 0 PER 100 WBC
PDW BLD-RTO: 12.9 % (ref 11.8–14.4)
PLATELET # BLD: 220 K/UL (ref 138–453)
PMV BLD AUTO: 11.6 FL (ref 8.1–13.5)
POTASSIUM SERPL-SCNC: 3.9 MMOL/L (ref 3.7–5.3)
RBC # BLD: 4.08 M/UL (ref 3.95–5.11)
SODIUM BLD-SCNC: 137 MMOL/L (ref 135–144)
TOTAL PROTEIN: 7 G/DL (ref 6.4–8.3)
WBC # BLD: 4.9 K/UL (ref 3.5–11.3)

## 2021-12-01 PROCEDURE — 99213 OFFICE O/P EST LOW 20 MIN: CPT | Performed by: STUDENT IN AN ORGANIZED HEALTH CARE EDUCATION/TRAINING PROGRAM

## 2021-12-01 PROCEDURE — 90688 IIV4 VACCINE SPLT 0.5 ML IM: CPT | Performed by: STUDENT IN AN ORGANIZED HEALTH CARE EDUCATION/TRAINING PROGRAM

## 2021-12-01 PROCEDURE — G8420 CALC BMI NORM PARAMETERS: HCPCS | Performed by: STUDENT IN AN ORGANIZED HEALTH CARE EDUCATION/TRAINING PROGRAM

## 2021-12-01 PROCEDURE — 1090F PRES/ABSN URINE INCON ASSESS: CPT | Performed by: STUDENT IN AN ORGANIZED HEALTH CARE EDUCATION/TRAINING PROGRAM

## 2021-12-01 PROCEDURE — 1123F ACP DISCUSS/DSCN MKR DOCD: CPT | Performed by: STUDENT IN AN ORGANIZED HEALTH CARE EDUCATION/TRAINING PROGRAM

## 2021-12-01 PROCEDURE — 99211 OFF/OP EST MAY X REQ PHY/QHP: CPT | Performed by: INTERNAL MEDICINE

## 2021-12-01 PROCEDURE — 1036F TOBACCO NON-USER: CPT | Performed by: STUDENT IN AN ORGANIZED HEALTH CARE EDUCATION/TRAINING PROGRAM

## 2021-12-01 PROCEDURE — G8427 DOCREV CUR MEDS BY ELIG CLIN: HCPCS | Performed by: STUDENT IN AN ORGANIZED HEALTH CARE EDUCATION/TRAINING PROGRAM

## 2021-12-01 PROCEDURE — G8482 FLU IMMUNIZE ORDER/ADMIN: HCPCS | Performed by: STUDENT IN AN ORGANIZED HEALTH CARE EDUCATION/TRAINING PROGRAM

## 2021-12-01 PROCEDURE — 3017F COLORECTAL CA SCREEN DOC REV: CPT | Performed by: STUDENT IN AN ORGANIZED HEALTH CARE EDUCATION/TRAINING PROGRAM

## 2021-12-01 PROCEDURE — G8399 PT W/DXA RESULTS DOCUMENT: HCPCS | Performed by: STUDENT IN AN ORGANIZED HEALTH CARE EDUCATION/TRAINING PROGRAM

## 2021-12-01 PROCEDURE — 4040F PNEUMOC VAC/ADMIN/RCVD: CPT | Performed by: STUDENT IN AN ORGANIZED HEALTH CARE EDUCATION/TRAINING PROGRAM

## 2021-12-01 RX ORDER — SUMATRIPTAN 100 MG/1
TABLET, FILM COATED ORAL
Qty: 15 TABLET | Refills: 0 | Status: SHIPPED | OUTPATIENT
Start: 2021-12-01 | End: 2022-04-06 | Stop reason: SDUPTHER

## 2021-12-01 NOTE — PROGRESS NOTES
Get CBC done. If still low, will need further workup. Repeat colonoscopy in 3 years. Colonoscopy 2021     Recommendations/Plan:   1.    2. F/U In Office as instructed  3. Discussed with the family  4. High fiber diet   5. Precautions to avoid constipation      Next colonoscopy: 3 years. If Colonoscopy is less than 10 years the recommended reason is due:poor preparation    Recommendations/Plan:   6.    7. F/U In Office as instructed  8. Discussed with the family  9. High fiber diet   10. Precautions to avoid constipation      Next colonoscopy: 3 years. If Colonoscopy is less than 10 years the recommended reason is due:poor preparation  Attending Physician Statement  I have discussed the care of doretha [facility], including pertinent history and exam findings,  with the resident. I have reviewed the key elements of all parts of the encounter with the resident. I agree with the assessment, plan and orders as documented by the resident.   (GE Modifier)

## 2021-12-01 NOTE — PROGRESS NOTES
@[facility]@    [facility]    Clinic Progress Note    Date of patient's visit: 12/1/2021    Patient's Name:  Altagracia Han    YOB: 1955            Patient Care Team:  Uriah Granados DO as PCP - General (Internal Medicine)  Jammie Garcia MD as Consulting Physician (Internal Medicine)  Ora Dakin, MD as Consulting Physician (Gastroenterology)    REASON FOR VISIT: 3 Month Follow Up. HISTORY OF PRESENT ILLNESS:      History was obtained from the patient. Altagracia Han is a 77 y.o. with a past medical history Depression, Hypertension, Hypothyroidism, Acid reflux and Seizures who presented to the clinic today for a 3 months follow up. Per chart review 6/17/2021 patient has BMP done which demonstrated K 3.7 and creatinine 0.53. CBC with mild anemia 8.6 and Lipid panel done 8/30/2021 within normal limits. Mammogram and US breast pending due to asymmetric density upper-outer quadrant of the left breast.     Today in the clinic has no complaints other than chronic knee pain. Denies fevers, chills, chest pain, shortness of breath, abdominal pain, nausea or vomiting. Will give Voltaren. REVIEW OF SYSTEMS:      · Constitutional: Negative for Fever, chills. · Eyes: Negative for visual changes, diplopia. · ENT: Negative for ear pain, nose bleeds, sore throat. · Cardiovascular: Negative for lightheadedness ,chest pain, palpitations. · Respiratory: Negative for Shortness of breath,cough or wheezing. · Gastrointestinal : Negative for nausea/vomiting, abdominal pain, diarrhea, constipation. · Genitourinary: Negative for change in bladder habits, dysuria, hematuria. · Musculoskeletal: Negative for joint pain, muscle aches. · Neurological: Negative for headache, change in muscle strength numbness/tingling.     PAST MEDICAL HISTORY:     Past Medical History:   Diagnosis Date    Ankle fracture, left     Anxiety 02/04/2014    Arthritis     Black tarry stools     Colon polyp     Constipation     ETOH abuse     States she has not drank any ETOH in 24 years as of 2021    Frequency of urination ?  GERD (gastroesophageal reflux disease) 02/04/2014    Head injury 1998    after seizure/stroke pt fell and hit head    Headache(784.0)     Hypertension     Hypotension ?  Hypothyroidism     Memory deficit     Migraines     Numbness and tingling     fingers    Osteoporosis     Peripheral vascular disease ([address]     Pneumonia     Seizures ([facility] [address]     last seizure 6-,     Shingles      Pt.denies.  Stroke [facility]) 1998    stroke and seizure together    TIA (transient ischemic attack)     1996    Tremor     UTI (urinary tract infection)     once    Varicose veins     Wears glasses        PAST SURGICAL HISTORY     Past Surgical History:   Procedure Laterality Date    BUNIONECTOMY Right     COLONOSCOPY      COLONOSCOPY N/A 5/10/2021    COLONOSCOPY DIAGNOSTIC performed by Chantelle Lorenzo MD at [address] [place] [address] Left 07/06/2016    ON 2ND, 3rd, 4th,  and  5TH TOES OF LEFT FOOT  Pt. Denies.     OTHER SURGICAL HISTORY  02/23/2015    orif left ankle with synthes and intraoperative c- arm    MD [address] INCL FLUOR GDNCE DX W/CELL WASHG SPX N/A 01/16/2018    BRONCHOSCOPY WITH WASHINGS AT PATIENT BEDSIDE ICU performed by Alessio Vilchis MD at [facility] [address]:      No Known Allergies      MEDICATIONS:      Current Outpatient Medications on File Prior to Visit   Medication Sig Dispense Refill    aspirin (ASPIRIN LOW DOSE) 81 MG EC tablet TAKE 1 TABLET BY MOUTH ONE TIME A DAY 60 tablet 3    PARoxetine (PAXIL) 30 MG tablet TAKE ONE TABLET BY MOUTH EVERY MORNING 60 tablet 3    atorvastatin (LIPITOR) 40 MG tablet TAKE 1 TABLET BY MOUTH ONE TIME A DAY 60 tablet 3    donepezil (ARICEPT) 5 MG tablet TAKE ONE TABLET BY MOUTH NIGHTLY 30 tablet 3    levETIRAcetam (KEPPRA) 1000 MG tablet TAKE 1 TABLET BY MOUTH (ALONG WITH 250MG TABLET FOR TOTAL OF 1250MG) TWICE A  tablet 2    primidone (MYSOLINE) 50 MG tablet TAKE ONE-HALF TABLET BY MOUTH TWICE DAILY (Patient not taking: Reported on 10/21/2021) 90 tablet 0    hydroCHLOROthiazide (HYDRODIURIL) 12.5 MG tablet TAKE 1 TABLET BY MOUTH ONE TIME A DAY 60 tablet 3    levothyroxine (SYNTHROID) 50 MCG tablet TAKE 1 TABLET BY MOUTH ONE TIME A DAY 60 tablet 3    amLODIPine (NORVASC) 10 MG tablet TAKE 1 TABLET BY MOUTH ONE TIME A DAY 60 tablet 3    omeprazole (PRILOSEC) 20 MG delayed release capsule TAKE 1 CAPSULE BY MOUTH ONE TIME A DAY 60 capsule 3    levETIRAcetam (KEPPRA) 250 MG tablet TAKE 1 TABLET BY MOUTH (ALONG WITH 1000MG TABLET FOR TOTAL OF 1250MG) TWICE DAILY. 180 tablet 2    oxybutynin (DITROPAN) 5 MG tablet Take 1 tablet by mouth 3 times daily (Patient not taking: Reported on 10/21/2021) 60 tablet 3    VIMPAT 200 MG tablet TAKE 1 TABLET BY MOUTH 2 TIMES A DAY (Patient not taking: Reported on 10/21/2021) 180 tablet 1    SUMAtriptan (IMITREX) 100 MG tablet TAKE 1 TABLET BY MOUTH ONE TIME A DAY AS NEEDED FOR MIGRAINE 30 tablet 0     No current facility-administered medications on file prior to visit. SOCIAL HISTORY:     Reviewed and no change from previous record. Rusty Khanna  reports that she has never smoked. She has never used smokeless tobacco.    FAMILY HISTORY:      Reviewed and No change from previous visit    PHYSICAL EXAM:      There were no vitals filed for this visit. There is no height or weight on file to calculate BMI. BP Readings from Last 3 Encounters:   10/21/21 104/71   09/01/21 116/76   07/21/21 115/77        Wt Readings from Last 3 Encounters:   10/21/21 134 lb 12.8 oz (61.1 kg)   09/01/21 132 lb 12.8 oz (60.2 kg)   07/21/21 135 lb (61.2 kg)       · General appearance: Well Appearing, Cooperative, Appropriately Dressed for the Weather. · Head: Normocephalic, No Lesions, No Obvious Deformity.   · ENT: No Icterus or Redness to Eyes. No Epistaxis from nose, No Pharyngeal Erythema or Exudate. · Lungs: Clear to Auscultation Bilaterally, No Rales or Rhonchi. · Heart: Regular Rate and Rhythm, S1, S2 normal, No Murmur  · Abdomen: Bowel Sounds Present on Auscultation. Soft, Non-Tender. · Extremities: Atraumatic, No Cyanosis or Edema. · Neurological:  Awake, Alert, Oriented to Person, Place and Time. Reflexes normal and symmetric. Sensation grossly normal.    LABORATORY FINDINGS:      CBC:  Lab Results   Component Value Date    WBC 5.5 06/23/2020    HGB 13.8 06/23/2020     06/23/2020     10/04/2011       BMP:    Lab Results   Component Value Date     06/17/2021    K 3.7 06/17/2021     06/17/2021    CO2 25 06/17/2021    BUN 10 06/17/2021    CREATININE 0.53 06/17/2021    GLUCOSE 100 06/17/2021    GLUCOSE 68 03/27/2012       HEMOGLOBIN A1C:   Lab Results   Component Value Date    LABA1C 5.1 08/18/2016       MICROALBUMIN URINE: No results found for: MICROALBUR    FASTING LIPID PANEL:  Lab Results   Component Value Date    CHOL 183 08/18/2016    HDL 71 08/30/2021    TRIG 64 08/18/2016       Lab Results   Component Value Date    LDLCHOLESTEROL 55 08/30/2021       LIVER PROFILE:  Lab Results   Component Value Date    ALT 15 06/20/2020    AST 22 06/20/2020    PROT 6.8 06/20/2020    BILITOT 0.56 06/20/2020    BILIDIR 0.17 05/13/2018    LABALBU 3.8 06/20/2020    LABALBU 4.1 10/04/2011        THYROID FUNCTION:   Lab Results   Component Value Date    TSH 2.42 04/02/2021        URINE ANALYSIS: No results found for: [address]:      Flu vaccine due but will reach out to [facility] and see if she has received. Shingles due has script. Tetanus UTD. Pneumococcal UTD  COVID-19 done x 2. Potassium Screening 3.9  Creatinine Screening 0.53  Lipids Screening within normal limits    Mammogram pending    Colonoscopy 5/10/31. ASSESSMENT AND PLAN:      1.  Essential hypertension  - Continue Norvasc 10 mg, Hydrochlorothiazide 12.5 mg.     2. Gastroesophageal reflux disease without esophagitis  - Continue Prilosec. 3. Status epilepticus ([address]  - Continue current regimen. 4. Acquired hypothyroidism  - Continue Synthroid 50 mcg daily.   - TSH okay in April. 5. Generalized anxiety disorder  - Continue Paxil. 6. Migraine without aura and without status migrainosus, not intractable  - Continue Sumatriptan. 7. Mammogram abnormal  - Repeat mammogram and US.   - Patient educated on importance of repeat mammogram and US and close follow up. In summary, in the [facility] on 12/01/21Petr was seen and examined. Their heart rate was 111/74, blood pressure was 82, today's weight 134*. We discussed HTN, Thyroid and need for mammogram.     FOLLOW UP AND INSTRUCTIONS:   No follow-ups on file. 1. Laura received counseling on the following healthy behaviors: nutrition, exercise and medication adherence    2. Reviewed prior labs and health maintenance. 3. Discussed use, benefit, and side effects of prescribed medications. Barriers to medication compliance addressed. All patient questions answered. Pt voiced understanding.      4. Patient given educational materials - see patient instructions    Abhijeet Vital DO  Internal Medicine Resident , PGY-3  [address], [address] [facility]   12/01/21 12:52 PM

## 2021-12-01 NOTE — PATIENT INSTRUCTIONS
Labs given to patient, they will have them done before their next visit. Medications e-scribed to pharmacy of patient's choice.     Pt will be called in April to schedule next appointment     tv

## 2021-12-02 NOTE — TELEPHONE ENCOUNTER
Request for Oxybutynin.       Next Visit Date:  Future Appointments   Date Time Provider Carlos Matta   1/20/2022 12:20 PM Hung Medina MD Neuro Spec [address] Maintenance   Topic Date Due    Shingles Vaccine (3 of 3) 09/12/2018    COVID-19 Vaccine (2 - Moderna 3-dose booster series) 08/05/2021    TSH testing  04/02/2022    Lipid screen  08/30/2022    Potassium monitoring  12/01/2022    Creatinine monitoring  12/01/2022    Breast cancer screen  06/09/2023    Pneumococcal 65+ years Vaccine (2 of 2 - PPSV23) 02/07/2024    DTaP/Tdap/Td vaccine (2 - Td or Tdap) 11/23/2025    Colon cancer screen colonoscopy  05/10/2031    DEXA (modify frequency per FRAX score)  Completed    Flu vaccine  Completed    Hepatitis C screen  Completed    Hepatitis A vaccine  Aged Out    Hepatitis B vaccine  Aged Out    Hib vaccine  Aged Out    Meningococcal (ACWY) vaccine  Aged Out       Hemoglobin A1C (%)   Date Value   08/18/2016 5.1   05/07/2015 5.0             ( goal A1C is < 7)   No results found for: LABMICR  LDL Cholesterol (mg/dL)   Date Value   08/30/2021 55       (goal LDL is <100)   AST (U/L)   Date Value   12/01/2021 18     ALT (U/L)   Date Value   12/01/2021 15     BUN (mg/dL)   Date Value   12/01/2021 14     BP Readings from Last 3 Encounters:   12/01/21 111/74   10/21/21 104/71   09/01/21 116/76          (goal 120/80)    All Future Testing planned in [facility]  Lab Frequency Next Occurrence   COVID-19 Once 05/06/2021   US BREAST COMPLETE LEFT Once 02/26/2022   CAYETANO DIGITAL DIAGNOSTIC W OR WO CAD LEFT Once 11/11/2021         Patient Active Problem List:     Acquired hypothyroidism     Essential hypertension     Urge incontinence     Generalized anxiety disorder     Gastroesophageal reflux disease without esophagitis     H/O: CVA (cerebrovascular accident)     Migraine without aura and without status migrainosus, not intractable     Hammer toe of left foot     Tremor of both hands     Status epilepticus ([facility] [address]     Speech and language deficits     Constipation

## 2021-12-03 RX ORDER — OXYBUTYNIN CHLORIDE 5 MG/1
TABLET ORAL
Qty: 90 TABLET | Refills: 3 | Status: SHIPPED | OUTPATIENT
Start: 2021-12-03 | End: 2022-04-12

## 2021-12-06 ENCOUNTER — TELEPHONE (OUTPATIENT)
Dept: INTERNAL MEDICINE | Age: 66
End: 2021-12-06

## 2021-12-06 NOTE — TELEPHONE ENCOUNTER
PC to patient some one answered and stated she will have patient give us an phone call back. Patient overdue for Mammogram and US order Reprinted and mailed to patient with instructions.

## 2021-12-06 NOTE — LETTER
[address]  [address] [address]  Phone: [phone]  Fax: Democracia [phone], DO        December 6, 2021    Dionne Moss  [address]  [address] [facility] [address]      Dear Christella Sandhoff: This letter is a reminder that you may have diagnostic Mammogram that has not been completed. It is important to your well-being that this test is performed. Please find the outstanding order attached. You can have the test completed at [place], [place]. [facility] or [facility]. Please see the order for scheduling instructions. Please call [phone] to schedule an appointment. We are also now offering service at our [facility] for more information or to schedule your mammogram call [phone])  Please call our office at Dept: [phone] for additional information on the outstanding test or let us know if they have been completed so we may update your chart. If you have any questions or concerns, please don't hesitate to call.     Sincerely,        Tasia Adhikari, DO

## 2022-01-01 NOTE — ED PROVIDER NOTES
[address]  eMERGENCY dEPARTMENT eNCOUnter      Pt Name: Jolie Dugan  MRN: [id number]  [place] 1955  Date of evaluation: 7/30/2018  PCP:    MD Alberto Valdes       Chief Complaint   Patient presents with    Seizures       HISTORY OF PRESENT ILLNESS      Jolie Patient is a 61 y.o. female who presents [facility] for a seizure. Patient with history of seizure disorders. According the family states that she was down on the ground. According to EMS he thinks she's been down a little bit longer than what they were told. Patient currently only response to painful stimuli. No current convulsions. No medications were given otherwise. No other history reported     REVIEW OF SYSTEMS         Review of Systems   Unable to perform ROS: Acuity of condition       PAST MEDICAL HISTORY     Past Medical History:   Diagnosis Date    Ankle fracture, left     Anxiety 2/4/2014    Arthritis     ETOH abuse     Frequency of urination ?  GERD (gastroesophageal reflux disease) 2/4/2014    Head injury     after seizure/stroke pt fell to fall and hit head-2004    Headache(784.0)     Hypertension     Hypotension ?     Hypothyroidism     Numbness and tingling     fingers    Osteoporosis     Peripheral vascular disease ([address]     Pneumonia     Reflux     Seizures ([address]     last one 2004    Shingles     Stroke [facility]) 1998    stroke and seizure together    TIA (transient ischemic attack)     1996    Tremor     UTI (urinary tract infection)     once    Varicose veins     Wears glasses        SURGICAL HISTORY       Past Surgical History:   Procedure Laterality Date    BUNIONECTOMY Right     HAMMER TOE SURGERY Left 07/06/2016    ON 2ND, 3rd, 4th,  and  5TH TOES OF LEFT FOOT    OTHER SURGICAL HISTORY  2/23/15    orif left ankle with synthes and intraoperative c- arm    [facility]/CELL [facility] SPX N/A 1/16/2018    BRONCHOSCOPY WITH WASHINGS AT PATIENT narrative on file       PHYSICAL EXAM     INITIAL VITALS:  weight is 150 lb (68 kg). Her axillary temperature is 97.4 °F (36.3 °C). Her blood pressure is 122/76 and her pulse is 96. Her respiration is 14 and oxygen saturation is 100%. Physical Exam   Constitutional: She appears well-developed and well-nourished. HENT:   Head: Normocephalic and atraumatic. Nose: Nose normal.   Mouth/Throat: Oropharynx is clear and moist.   Eyes: Conjunctivae and EOM are normal. Pupils are equal, round, and reactive to light. Gaze to the left   Neck: Normal range of motion. Neck supple. Cardiovascular: Normal rate, regular rhythm, normal heart sounds and intact distal pulses. Pulmonary/Chest: Effort normal and breath sounds normal.   Abdominal: Soft. Bowel sounds are normal.   Musculoskeletal: Normal range of motion. All joints were ranged including hips and otherwise unremarkable   Neurological:   Patient responding and attempted to localize to painful stimulation   Skin: Skin is warm and dry. Some bruising and mild skin breakdown on the right side as if she was going on for a period of time   Nursing note and vitals reviewed. DIFFERENTIAL DIAGNOSIS/ MDM:     Patient here in a post ictal however some nonconvulsive seizure activity could be underlying. Patient does localize to painful stimuli. Workup pending    1240: Patient did start having a seizure again. 2 of Ativan was given. Patient will be started the [address] as well. Patient's primary care physicianNot on staff and on-call was called and Dr. Sandro Mcdaniels called back and is accepting the patient only request was called the residence and go ahead and send the patient up. I did talk to the resident's and asked me to put in [facility] orders. No further requests or consults at this time. Patient also pending lab evaluation and CT. Of note patient is continued to maintain her airway with stable vital signs    1325: Residence here to see the patient.   We All other components within normal limits   CK - Abnormal; Notable for the following: Total CK 1,384 (*)     All other components within normal limits   TROP/MYOGLOBIN - Abnormal; Notable for the following:     Myoglobin 4,750 (*)     All other components within normal limits   COMPREHENSIVE METABOLIC PANEL - Abnormal; Notable for the following:     Glucose 127 (*)     Potassium 3.6 (*)     AST 53 (*)     All other components within normal limits   ETHANOL   MAGNESIUM   TSH WITHOUT REFLEX   MICROSCOPIC URINALYSIS           EMERGENCY DEPARTMENT COURSE:   Vitals:    Vitals:    07/30/18 1359 07/30/18 1400 07/30/18 1402 07/30/18 1415   BP:  129/82  122/76   Pulse: 103 100 99 96   Resp: 14 17 14 14   Temp:    97.4 °F (36.3 °C)   TempSrc:    Axillary   SpO2: 100% 100% 100% 100%   Weight:         -------------------------  BP: 122/76, Temp: 97.4 °F (36.3 °C), Pulse: 96, Resp: 14    CC TIME:  Due to the immediate potential for life-threatening deterioration due to status epilepticus , I spent 45 minutes providing critical care. This time is excluding time spent performing procedures. Procedure note;  Rapid sequence intubation with indication for airway protection. With assistance from the admitting resident and respiratory therapy the patient was prepared in a nonrebreather was done for preoxygenation. Both Ativan and succinylcholine were used as medications and subsequently on first attempt using a Mac 4 and a 7.5 ET tube patient was intubated on first pass with ease with good view. Bilateral breath sounds were heard and good end tidal CO2 was seen. Chest x-ray is pending. Patient with a sedation after      FINAL IMPRESSION      1.  Status epilepticus [facility])          DISPOSITION/PLAN    DISPOSITION Admitted 07/30/2018 01:00:07 PM      (Please note that portions of this note were completed with a voice recognition program.  Efforts were made to edit the dictations but occasionally words are (3) Within 24 hours

## 2022-01-08 ENCOUNTER — HOSPITAL ENCOUNTER (INPATIENT)
Age: 67
LOS: 5 days | Discharge: NURSING, REHAB OR LONG-TERM CARE | DRG: 053 | End: 2022-01-13
Attending: EMERGENCY MEDICINE | Admitting: INTERNAL MEDICINE
Payer: COMMERCIAL

## 2022-01-08 ENCOUNTER — APPOINTMENT (OUTPATIENT)
Dept: CT IMAGING | Age: 67
DRG: 053 | End: 2022-01-08
Payer: COMMERCIAL

## 2022-01-08 DIAGNOSIS — R56.9 SEIZURE (HCC): ICD-10-CM

## 2022-01-08 DIAGNOSIS — Z86.73 H/O: CVA (CEREBROVASCULAR ACCIDENT): Chronic | ICD-10-CM

## 2022-01-08 DIAGNOSIS — R56.9 SEIZURES (HCC): ICD-10-CM

## 2022-01-08 DIAGNOSIS — F80.9 SPEECH AND LANGUAGE DEFICITS: ICD-10-CM

## 2022-01-08 DIAGNOSIS — G40.901 STATUS EPILEPTICUS (HCC): Primary | ICD-10-CM

## 2022-01-08 LAB
ABSOLUTE EOS #: 0 K/UL (ref 0–0.4)
ABSOLUTE IMMATURE GRANULOCYTE: ABNORMAL K/UL (ref 0–0.3)
ABSOLUTE LYMPH #: 1.2 K/UL (ref 1–4.8)
ABSOLUTE MONO #: 0.4 K/UL (ref 0.1–1.3)
AMPHETAMINE SCREEN URINE: NEGATIVE
ANION GAP SERPL CALCULATED.3IONS-SCNC: 12 MMOL/L (ref 9–17)
BARBITURATE SCREEN URINE: POSITIVE
BASOPHILS # BLD: 1 % (ref 0–2)
BASOPHILS ABSOLUTE: 0 K/UL (ref 0–0.2)
BENZODIAZEPINE SCREEN, URINE: POSITIVE
BUN BLDV-MCNC: 8 MG/DL (ref 8–23)
BUN/CREAT BLD: NORMAL (ref 9–20)
BUPRENORPHINE URINE: ABNORMAL
CALCIUM SERPL-MCNC: 9.8 MG/DL (ref 8.6–10.4)
CANNABINOID SCREEN URINE: NEGATIVE
CHLORIDE BLD-SCNC: 101 MMOL/L (ref 98–107)
CO2: 26 MMOL/L (ref 20–31)
COCAINE METABOLITE, URINE: NEGATIVE
CREAT SERPL-MCNC: 0.72 MG/DL (ref 0.5–0.9)
DIFFERENTIAL TYPE: ABNORMAL
EOSINOPHILS RELATIVE PERCENT: 1 % (ref 0–4)
ETHANOL PERCENT: <0.01 %
ETHANOL: <10 MG/DL
GFR AFRICAN AMERICAN: >60 ML/MIN
GFR NON-AFRICAN AMERICAN: >60 ML/MIN
GFR SERPL CREATININE-BSD FRML MDRD: NORMAL ML/MIN/{1.73_M2}
GFR SERPL CREATININE-BSD FRML MDRD: NORMAL ML/MIN/{1.73_M2}
GLUCOSE BLD-MCNC: 137 MG/DL (ref 65–105)
GLUCOSE BLD-MCNC: 76 MG/DL (ref 65–105)
GLUCOSE BLD-MCNC: 76 MG/DL (ref 70–99)
HCT VFR BLD CALC: 40.6 % (ref 36–46)
HEMOGLOBIN: 14 G/DL (ref 12–16)
IMMATURE GRANULOCYTES: ABNORMAL %
KEPPRA: 32 UG/ML
LYMPHOCYTES # BLD: 25 % (ref 24–44)
MCH RBC QN AUTO: 32.8 PG (ref 26–34)
MCHC RBC AUTO-ENTMCNC: 34.4 G/DL (ref 31–37)
MCV RBC AUTO: 95.4 FL (ref 80–100)
MDMA URINE: ABNORMAL
METHADONE SCREEN, URINE: NEGATIVE
METHAMPHETAMINE, URINE: ABNORMAL
MONOCYTES # BLD: 9 % (ref 1–7)
NRBC AUTOMATED: ABNORMAL PER 100 WBC
OPIATES, URINE: NEGATIVE
OXYCODONE SCREEN URINE: NEGATIVE
PDW BLD-RTO: 12.9 % (ref 11.5–14.9)
PHENCYCLIDINE, URINE: NEGATIVE
PLATELET # BLD: 182 K/UL (ref 150–450)
PLATELET ESTIMATE: ABNORMAL
PMV BLD AUTO: 8.8 FL (ref 6–12)
POTASSIUM SERPL-SCNC: 4.2 MMOL/L (ref 3.7–5.3)
PROPOXYPHENE, URINE: ABNORMAL
RBC # BLD: 4.25 M/UL (ref 4–5.2)
RBC # BLD: ABNORMAL 10*6/UL
SARS-COV-2, RAPID: NOT DETECTED
SEG NEUTROPHILS: 64 % (ref 36–66)
SEGMENTED NEUTROPHILS ABSOLUTE COUNT: 3.1 K/UL (ref 1.3–9.1)
SODIUM BLD-SCNC: 139 MMOL/L (ref 135–144)
SPECIMEN DESCRIPTION: NORMAL
TEST INFORMATION: ABNORMAL
TRICYCLIC ANTIDEPRESSANTS, UR: ABNORMAL
WBC # BLD: 4.8 K/UL (ref 3.5–11)
WBC # BLD: ABNORMAL 10*3/UL

## 2022-01-08 PROCEDURE — 2580000003 HC RX 258: Performed by: STUDENT IN AN ORGANIZED HEALTH CARE EDUCATION/TRAINING PROGRAM

## 2022-01-08 PROCEDURE — 6360000002 HC RX W HCPCS: Performed by: STUDENT IN AN ORGANIZED HEALTH CARE EDUCATION/TRAINING PROGRAM

## 2022-01-08 PROCEDURE — C9254 INJECTION, LACOSAMIDE: HCPCS | Performed by: PSYCHIATRY & NEUROLOGY

## 2022-01-08 PROCEDURE — 2580000003 HC RX 258: Performed by: PSYCHIATRY & NEUROLOGY

## 2022-01-08 PROCEDURE — 6360000002 HC RX W HCPCS: Performed by: EMERGENCY MEDICINE

## 2022-01-08 PROCEDURE — 80048 BASIC METABOLIC PNL TOTAL CA: CPT

## 2022-01-08 PROCEDURE — 36415 COLL VENOUS BLD VENIPUNCTURE: CPT

## 2022-01-08 PROCEDURE — 96365 THER/PROPH/DIAG IV INF INIT: CPT

## 2022-01-08 PROCEDURE — 80307 DRUG TEST PRSMV CHEM ANLYZR: CPT

## 2022-01-08 PROCEDURE — 2580000003 HC RX 258: Performed by: EMERGENCY MEDICINE

## 2022-01-08 PROCEDURE — 70450 CT HEAD/BRAIN W/O DYE: CPT

## 2022-01-08 PROCEDURE — G0480 DRUG TEST DEF 1-7 CLASSES: HCPCS

## 2022-01-08 PROCEDURE — 80177 DRUG SCRN QUAN LEVETIRACETAM: CPT

## 2022-01-08 PROCEDURE — 85025 COMPLETE CBC W/AUTO DIFF WBC: CPT

## 2022-01-08 PROCEDURE — 2500000003 HC RX 250 WO HCPCS: Performed by: STUDENT IN AN ORGANIZED HEALTH CARE EDUCATION/TRAINING PROGRAM

## 2022-01-08 PROCEDURE — 51701 INSERT BLADDER CATHETER: CPT

## 2022-01-08 PROCEDURE — 1200000000 HC SEMI PRIVATE

## 2022-01-08 PROCEDURE — 99223 1ST HOSP IP/OBS HIGH 75: CPT | Performed by: PSYCHIATRY & NEUROLOGY

## 2022-01-08 PROCEDURE — 99285 EMERGENCY DEPT VISIT HI MDM: CPT

## 2022-01-08 PROCEDURE — 6360000002 HC RX W HCPCS: Performed by: PSYCHIATRY & NEUROLOGY

## 2022-01-08 PROCEDURE — 82947 ASSAY GLUCOSE BLOOD QUANT: CPT

## 2022-01-08 PROCEDURE — 87635 SARS-COV-2 COVID-19 AMP PRB: CPT

## 2022-01-08 RX ORDER — LORAZEPAM 2 MG/ML
2 INJECTION INTRAMUSCULAR ONCE
Status: COMPLETED | OUTPATIENT
Start: 2022-01-08 | End: 2022-01-08

## 2022-01-08 RX ORDER — DEXTROSE MONOHYDRATE 25 G/50ML
25 INJECTION, SOLUTION INTRAVENOUS ONCE
Status: COMPLETED | OUTPATIENT
Start: 2022-01-08 | End: 2022-01-08

## 2022-01-08 RX ORDER — FENTANYL CITRATE 50 UG/ML
INJECTION, SOLUTION INTRAMUSCULAR; INTRAVENOUS
Status: DISCONTINUED
Start: 2022-01-08 | End: 2022-01-08

## 2022-01-08 RX ORDER — SODIUM CHLORIDE 9 MG/ML
INJECTION, SOLUTION INTRAVENOUS CONTINUOUS
Status: DISCONTINUED | OUTPATIENT
Start: 2022-01-08 | End: 2022-01-12

## 2022-01-08 RX ADMIN — DEXTROSE MONOHYDRATE 25 G: 25 INJECTION, SOLUTION INTRAVENOUS at 20:48

## 2022-01-08 RX ADMIN — LORAZEPAM 2 MG: 2 INJECTION INTRAMUSCULAR; INTRAVENOUS at 12:24

## 2022-01-08 RX ADMIN — LORAZEPAM 2 MG: 2 INJECTION, SOLUTION INTRAMUSCULAR; INTRAVENOUS at 12:29

## 2022-01-08 RX ADMIN — LEVETIRACETAM 2000 MG: 100 INJECTION INTRAVENOUS at 09:19

## 2022-01-08 RX ADMIN — LORAZEPAM 2 MG: 2 INJECTION INTRAMUSCULAR; INTRAVENOUS at 12:20

## 2022-01-08 RX ADMIN — SODIUM CHLORIDE: 9 INJECTION, SOLUTION INTRAVENOUS at 19:33

## 2022-01-08 RX ADMIN — LEVETIRACETAM 1500 MG: 100 INJECTION INTRAVENOUS at 23:46

## 2022-01-08 RX ADMIN — DEXTROSE 200 MG: 50 INJECTION, SOLUTION INTRAVENOUS at 13:06

## 2022-01-08 NOTE — CONSULTS
[facility] Neurology   IN-PATIENT SERVICE      NEUROLOGY CONSULT  NOTE            Date:   1/8/2022  Patient name:  Astrid Baker  Date of admission:  1/8/2022  YOB: 1955      Chief Complaint:     Chief Complaint   Patient presents with    Seizures       Reason for Consult:      Breakthrough seizures    History of Present Illness: The patient is a 77 y.o. female who presents with Seizures  . The patient was seen and examined and the chart was reviewed. Patient with history of prior right MCA stroke, prior history of seizures presents with breakthrough seizure early this morning. Witnessed by family apparently son. When she came to the ED she had been given Versed and was reportedly starting to wake up and follow some basic commands. She was loaded with 2 g of IV Keppra. Around 11 AM or so patient was noted to have right facial twitching, decreased responsiveness and occasional rhythmic twitching left upper extremity. She was clenching down at the same time and occasionally desaturating. She was given a total of 6 mg of IV Ativan and this did not resolve the patient's symptoms. Vimpat was then ordered by consultant 200 mg IV load. After this was given patient appeared to stop the twitching and was more somnolent/obtunded. Discussed case with neuro critical care team at [facility] in which they did accept patient for further continuous EEG monitoring and status epilepticus. Patient's respiratory status appears to be stable at this point in time and recommendation to hold off intubation. If she were to start seizing again she would likely need to be intubated prior to arrival.    Past Medical History:     Past Medical History:   Diagnosis Date    Ankle fracture, left     Anxiety 02/04/2014    Arthritis     Black tarry stools     Colon polyp     Constipation     ETOH abuse     States she has not drank any ETOH in 24 years as of 2021    Frequency of urination ?     GERD (gastroesophageal reflux disease) 02/04/2014    Head injury 1998    after seizure/stroke pt fell and hit head    Headache(784.0)     Hypertension     Hypotension ?  Hypothyroidism     Memory deficit     Migraines     Numbness and tingling     fingers    Osteoporosis     Peripheral vascular disease ([address]     Pneumonia     Seizures ([facility] [address]     last seizure 6-,     Shingles      Pt.denies.  Stroke [facility]) 1998    stroke and seizure together    TIA (transient ischemic attack)     1996    Tremor     UTI (urinary tract infection)     once    Varicose veins     Wears glasses         Past Surgical History:     Past Surgical History:   Procedure Laterality Date    BUNIONECTOMY Right     COLONOSCOPY      COLONOSCOPY N/A 5/10/2021    COLONOSCOPY DIAGNOSTIC performed by Mae Pacheco MD at [facility] Left 07/06/2016    ON 2ND, 3rd, 4th,  and  5TH TOES OF LEFT FOOT  Pt. Denies.  OTHER SURGICAL HISTORY  02/23/2015    orif left ankle with synthes and intraoperative c- arm    IN NCC INCL FLUOR GDNCE DX W/CELL WASHG SPX N/A 01/16/2018    BRONCHOSCOPY WITH WASHINGS AT PATIENT BEDSIDE ICU [facility]          Medications Prior to Admission:     Prior to Admission medications    Medication Sig Start Date End Date Taking?  Authorizing Provider   oxybutynin (DITROPAN) 5 MG tablet TAKE 1 TABLET BY MOUTH 3 TIMES A DAY 12/3/21   Zay Avila, DO   diclofenac sodium (VOLTAREN) 1 % GEL Apply 2 g topically 4 times daily as needed for Pain 12/1/21   Jermaine Rubio MD   SUMAtriptan (IMITREX) 100 MG tablet TAKE 1 TABLET BY MOUTH ONE TIME A DAY AS NEEDED FOR MIGRAINE 12/1/21   Jermaine Rubio MD   aspirin (ASPIRIN LOW DOSE) 81 MG EC tablet TAKE 1 TABLET BY MOUTH ONE TIME A DAY 11/3/21   Zay Avila, DO   PARoxetine (PAXIL) 30 MG tablet TAKE ONE TABLET BY MOUTH EVERY MORNING 11/3/21   Zay Avila, DO   atorvastatin (LIPITOR) 40 MG tablet TAKE 1 TABLET BY MOUTH ONE TIME A DAY 11/3/21   Stevphen Mars, DO   donepezil (ARICEPT) 5 MG tablet TAKE ONE TABLET BY MOUTH NIGHTLY 21   Felisha Cota MD   levETIRAcetam (KEPPRA) 1000 MG tablet TAKE 1 TABLET BY MOUTH (ALONG WITH 250MG TABLET FOR TOTAL OF 1250MG) TWICE A DAY 10/6/21   Felisha Cota MD   primidone (MYSOLINE) 50 MG tablet TAKE ONE-HALF TABLET BY MOUTH TWICE DAILY 9/15/21   Felisha Cota MD   hydroCHLOROthiazide (HYDRODIURIL) 12.5 MG tablet TAKE 1 TABLET BY MOUTH ONE TIME A DAY 9/15/21   Stevphen Mars, DO   levothyroxine (SYNTHROID) 50 MCG tablet TAKE 1 TABLET BY MOUTH ONE TIME A DAY 9/15/21   Stevphen Mars, DO   amLODIPine (NORVASC) 10 MG tablet TAKE 1 TABLET BY MOUTH ONE TIME A DAY 9/15/21   Stevphen Mars, DO   omeprazole (PRILOSEC) 20 MG delayed release capsule TAKE 1 CAPSULE BY MOUTH ONE TIME A DAY 9/15/21   Stevphen Mars, DO   levETIRAcetam (KEPPRA) 250 MG tablet TAKE 1 TABLET BY MOUTH (ALONG WITH 1000MG TABLET FOR TOTAL OF 1250MG) TWICE DAILY. 9/10/21   Felisha Cota MD        Allergies:     Patient has no known allergies. Social History:     Tobacco:    reports that she has never smoked. She has never used smokeless tobacco.  Alcohol:      reports previous alcohol use. Drug Use:  reports no history of drug use.     Family History:     Family History   Problem Relation Age of Onset    Cervical Cancer Mother     Heart Disease Father     Heart Disease Maternal Grandfather     Heart Disease Paternal Grandfather        Review of Systems:     Unable to obtain as patient is obtunded    Physical Exam:   /80   Pulse 78   Temp 98.1 °F (36.7 °C) (Axillary)   Resp 14   Ht 5' 10\" (1.778 m)   Wt 134 lb (60.8 kg)   SpO2 95%   BMI 19.23 kg/m²   Temp (24hrs), Av.1 °F (36.7 °C), Min:98.1 °F (36.7 °C), Max:98.1 °F (36.7 °C)        General examination:      General Appearance: Obtunded, ongoing right greater than left facial twitching  HEENT: Normocephalic, atraumatic, drooling with some mixed bloody mucus  Neck: supple, no carotid bruits, (-) nuchal rigidity  Lungs:  Respirations unlabored, chest wall no deformity, BS normal  Cardiovascular: normal rate, regular rhythm  Abdomen: Soft, nontender, nondistended, normal bowel sounds  Skin: No gross lesions, rashes, bruising or bleeding on exposed skin area  Extremities:  peripheral pulses palpable, no cyanosis, clubbing or edema        Neurological examination:    Mental status   Patient is obtunded. Occasionally will open eyes to sternal rub. She does not track or follow commands she is nonverbal.       Cranial nerves   Pupil response:            Present     Oculocephalic reflex:   Present     Corneal Reflex:            Present     Facial grimace to pin:  Present     Gag/Cough reflex:       Present        Motor and sensory function  Occasional spontaneous limb movements left greater than right  No posturing  (+) withdrawal to pin, deep nail bed pressure in left hemibody > right hemibody   tone: Normal      DTR 2/4 throughout  Plantar response: Downgoing  (-) Winters's sign bilaterally    Gait  Not tested          Diagnostics:      Laboratory Testing:  CBC:   Recent Labs     01/08/22  0909   WBC 4.8   HGB 14.0        BMP:    Recent Labs     01/08/22  0909      K 4.2      CO2 26   BUN 8   CREATININE 0.72   GLUCOSE 76         Lab Results   Component Value Date    CHOL 183 08/18/2016    LDLCHOLESTEROL 55 08/30/2021    HDL 71 08/30/2021    TRIG 64 08/18/2016    ALT 15 12/01/2021    AST 18 12/01/2021    TSH 2.42 04/02/2021    INR 1.1 01/14/2018    LABA1C 5.1 08/18/2016    [id number] 1449 (H) 04/02/2021       Lab Results   Component Value Date    PHENYTOIN <0.8 (L) 01/14/2018       Imaging/Diagnostics:      EEG monitoring (10/2018): Left frontal temporal sharp waves conferred an increased risk for focal onset seizures. Continuous left frontotemporal slowing.       CT head: Chronic right hemispheric stroke with encephalomalacia. Diffuse cerebral atrophy and small vessel ischemic changes    MRI brain (6/2020): Remote right MCA distribution infarct. No acute abnormalities. No postcontrast enhancement. I personally reviewed all of the above medications, clinical laboratory, imaging and other diagnostic tests. Impression:      1. Status epilepticus in patient who is noncompliant on Keppra; he noticed decreased responsiveness, right facial twitching and occasional LUE, LLE rhythmic twitching of the extremities. 2. History of chronic right MCA stroke with reported residual left hemiparesis    Plan:     · Status post Keppra and Vimpat load  · Discussed with neuro critical care team, they will accept patient although there may be issue with nursing/staffing shortage  · Seizure precautions  · Ativan as needed  · Close airway monitoring, may need intubation if further seizures occur  · Consider MRI brain to rule out evidence of new stroke  · Will need EEG monitoring  · Discussed with ED team and neuro critical care team       Thank you for this very interesting consultation.       Electronically signed by Justine Moarles DO on 1/8/2022 at 2:56 PM      [facility]  Neurology

## 2022-01-08 NOTE — ED PROVIDER NOTES
[address] ED  Emergency Department Encounter  Emergency Medicine Resident     Pt Name: Anaid Henning  WPT:[id number]  [place] 1955  Date of evaluation: 1/8/22  PCP:  Hanny Lakhani DO    CHIEF COMPLAINT       Chief Complaint   Patient presents with    Seizures       HISTORY OF PRESENT ILLNESS  (Location/Symptom, Timing/Onset, Context/Setting, Quality, Duration, ModifyingFactors, Severity.)      Anaid Henning is a 77 y.o. female known medical history of seizures and CVA presents emerged apartment with 2 witnessed seizure activity events. Patient had a seizure for family at home and another seizure for EMS in route to the hospital.  Patient seizure was broken with 2 oh Versed. Patient on evaluation was severely postictal, not speaking to physicians and not speaking. Patient was loaded with 2 g of Keppra after arriving to the emergency department and then resting comfortably since. Family was contacted that the patient is normally mildly verbal and has some independence. And that this is extremely abnormal for her to act in this particular fashion    PAST MEDICAL / SURGICAL / SOCIAL /FAMILY HISTORY      has a past medical history of Ankle fracture, left, Anxiety, Arthritis, Black tarry stools, Colon polyp, Constipation, ETOH abuse, Frequency of urination, GERD (gastroesophageal reflux disease), Head injury, Headache(784.0), Hypertension, Hypotension, Hypothyroidism, Memory deficit, Migraines, Numbness and tingling, Osteoporosis, Peripheral vascular disease ([address] Pneumonia, Seizures ([address] Shingles, Stroke ([address] TIA (transient ischemic attack), Tremor, UTI (urinary tract infection), Varicose veins, and Wears glasses. No other pertinent PMH on review with patient/guardian. has a past surgical history that includes Bunionectomy (Right); Tubal ligation; other surgical history (02/23/2015);  Hammer toe surgery (Left, 07/06/2016); pr brnchsc incl fluor gdnce dx w/cell washg spx (N/A, 01/16/2018); Colonoscopy; and Colonoscopy (N/A, 5/10/2021). No other pertinent PSH on review with patient/guardian. Social History     Socioeconomic History    Marital status: Single     Spouse name: Not on file    Number of children: Not on file    Years of education: Not on file    Highest education level: Not on file   Occupational History    Not on file   Tobacco Use    Smoking status: Never Smoker    Smokeless tobacco: Never Used   Vaping Use    Vaping Use: Never used   Substance and Sexual Activity    Alcohol use: Not Currently     Comment: quit 1599, was alcoholic    Drug use: No    Sexual activity: Never   Other Topics Concern    Not on file   Social History Narrative    Not on file     Social Determinants of Health     Financial Resource Strain: Low Risk     Difficulty of Paying Living Expenses: Not hard at all   Food Insecurity: No Food Insecurity    Worried About [address] in the Last Year: Never true    [facility] in the Last Year: Never true   Transportation Needs: No Transportation Needs    Lack of Transportation (Medical): No    Lack of Transportation (Non-Medical):  No   Physical Activity:     Days of Exercise per Week: Not on file    Minutes of Exercise per Session: Not on file   Stress:     Feeling of Stress : Not on file   Social Connections:     Frequency of Communication with Friends and Family: Not on file    Frequency of Social Gatherings with Friends and Family: Not on file    Attends Christian Services: Not on file    Active Member of Clubs or Organizations: Not on file    Attends Club or Organization Meetings: Not on file    Marital Status: Not on file   Intimate Partner Violence:     Fear of Current or Ex-Partner: Not on file    Emotionally Abused: Not on file    Physically Abused: Not on file    Sexually Abused: Not on file   Housing Stability:     Unable to Pay for Housing in the Last Year: Not on file    Number of [place] in the Last Year: Not on file    Unstable Housing in the Last Year: Not on file       I counseled the patient against using tobacco products. Family History   Problem Relation Age of Onset    Cervical Cancer Mother     Heart Disease Father     Heart Disease Maternal Grandfather     Heart Disease Paternal Grandfather      No other pertinent FamHx on review with patient/guardian. Allergies:  Patient has no known allergies. Home Medications:  Prior to Admission medications    Medication Sig Start Date End Date Taking?  Authorizing Provider   oxybutynin (DITROPAN) 5 MG tablet TAKE 1 TABLET BY MOUTH 3 TIMES A DAY 12/3/21   Esteban Feliciano, DO   diclofenac sodium (VOLTAREN) 1 % GEL Apply 2 g topically 4 times daily as needed for Pain 12/1/21   Venessa Fernando MD   SUMAtriptan (IMITREX) 100 MG tablet TAKE 1 TABLET BY MOUTH ONE TIME A DAY AS NEEDED FOR MIGRAINE 12/1/21   Venessa Fernando MD   aspirin (ASPIRIN LOW DOSE) 81 MG EC tablet TAKE 1 TABLET BY MOUTH ONE TIME A DAY 11/3/21   Esteban Feliciano, DO   PARoxetine (PAXIL) 30 MG tablet TAKE ONE TABLET BY MOUTH EVERY MORNING 11/3/21   Esteban Feliciano, DO   atorvastatin (LIPITOR) 40 MG tablet TAKE 1 TABLET BY MOUTH ONE TIME A DAY 11/3/21   Esteban Feliciano, DO   donepezil (ARICEPT) 5 MG tablet TAKE ONE TABLET BY MOUTH NIGHTLY 11/2/21   Sandy Liu MD   levETIRAcetam (KEPPRA) 1000 MG tablet TAKE 1 TABLET BY MOUTH (ALONG WITH 250MG TABLET FOR TOTAL OF 1250MG) TWICE A DAY 10/6/21   Sandy Liu MD   primidone (MYSOLINE) 50 MG tablet TAKE ONE-HALF TABLET BY MOUTH TWICE DAILY 9/15/21   Sandy Liu MD   hydroCHLOROthiazide (HYDRODIURIL) 12.5 MG tablet TAKE 1 TABLET BY MOUTH ONE TIME A DAY 9/15/21   Esteban Feliciano, DO   levothyroxine (SYNTHROID) 50 MCG tablet TAKE 1 TABLET BY MOUTH ONE TIME A DAY 9/15/21   Esteban Feliciano, DO   amLODIPine (NORVASC) 10 MG tablet TAKE 1 TABLET BY MOUTH ONE TIME A DAY 9/15/21   Esteban Feliciano, DO   omeprazole (PRILOSEC) 20 MG delayed release capsule TAKE 1 CAPSULE BY MOUTH ONE TIME A DAY 9/15/21   Gini Gains, DO   levETIRAcetam (KEPPRA) 250 MG tablet TAKE 1 TABLET BY MOUTH (ALONG WITH 1000MG TABLET FOR TOTAL OF 1250MG) TWICE DAILY. 9/10/21   Alyson Ortiz MD       REVIEW OF SYSTEMS    (2-9 systems for level 4, 10 ormore for level 5)      Review of Systems   Unable to perform ROS: Acuity of condition       PHYSICAL EXAM   (up to 7 for level 4, 8 or more for level 5)      INITIAL VITALS:   /82   Pulse 91   Temp 98.1 °F (36.7 °C) (Axillary)   Resp 21   Ht 5' 10\" (1.778 m)   Wt 134 lb (60.8 kg)   SpO2 95%   BMI 19.23 kg/m²     Physical Exam  Constitutional:       General: She is not in acute distress. Appearance: She is not toxic-appearing. HENT:      Head: Normocephalic and atraumatic. Nose: Nose normal.      Mouth/Throat:      Mouth: Mucous membranes are moist.      Pharynx: Oropharynx is clear. Eyes:      Pupils: Pupils are equal, round, and reactive to light. Cardiovascular:      Rate and Rhythm: Normal rate and regular rhythm. Pulses: Normal pulses. Heart sounds: Normal heart sounds. Pulmonary:      Effort: Pulmonary effort is normal.      Breath sounds: Normal breath sounds. Abdominal:      General: Abdomen is flat. Palpations: Abdomen is soft. Musculoskeletal:         General: Normal range of motion. Cervical back: Normal range of motion and neck supple. Skin:     General: Skin is warm and dry. Neurological:      Mental Status: She is disoriented.          DIFFERENTIAL  DIAGNOSIS     DDX: Breakthrough seizures, medication compliance  PLAN (LABS / IMAGING / EKG):  Orders Placed This Encounter   Procedures    CT Head WO Contrast    CBC Auto Differential    Basic Metabolic Panel w/ Reflex to MG    LEVETIRACETAM LEVEL    Ethanol Alcohol    URINE DRUG SCREEN    [facility] Inpatient consult to Neurology    Inpatient consult to Internal Medicine       MEDICATIONS ORDERED:  Orders Placed This Encounter   Medications    levETIRAcetam (KEPPRA) 2,000 mg in sodium chloride 0.9 % 100 mL IVPB    DISCONTD: fentaNYL (SUBLIMAZE) 100 MCG/2ML injection     Opalnisreen Epps: cabinet override    LORazepam (ATIVAN) injection 2 mg           DIAGNOSTIC RESULTS / EMERGENCY DEPARTMENT COURSE / MDM     LABS:  Results for orders placed or performed during the hospital encounter of 01/08/22   CBC Auto Differential   Result Value Ref Range    WBC 4.8 3.5 - 11.0 k/uL    RBC 4.25 4.0 - 5.2 m/uL    Hemoglobin 14.0 12.0 - 16.0 g/dL    Hematocrit 40.6 36 - 46 %    MCV 95.4 80 - 100 fL    MCH 32.8 26 - 34 pg    MCHC 34.4 31 - 37 g/dL    RDW 12.9 11.5 - 14.9 %    Platelets 945 549 - 784 k/uL    MPV 8.8 6.0 - 12.0 fL    NRBC Automated NOT REPORTED per 100 WBC    Differential Type NOT REPORTED     Seg Neutrophils 64 36 - 66 %    Lymphocytes 25 24 - 44 %    Monocytes 9 (H) 1 - 7 %    Eosinophils % 1 0 - 4 %    Basophils 1 0 - 2 %    Immature Granulocytes NOT REPORTED 0 %    Segs Absolute 3.10 1.3 - 9.1 k/uL    Absolute Lymph # 1.20 1.0 - 4.8 k/uL    Absolute Mono # 0.40 0.1 - 1.3 k/uL    Absolute Eos # 0.00 0.0 - 0.4 k/uL    Basophils Absolute 0.00 0.0 - 0.2 k/uL    Absolute Immature Granulocyte NOT REPORTED 0.00 - 0.30 k/uL    WBC Morphology NOT REPORTED     RBC Morphology NOT REPORTED     Platelet Estimate NOT REPORTED    Basic Metabolic Panel w/ Reflex to MG   Result Value Ref Range    Glucose 76 70 - 99 mg/dL    BUN 8 8 - 23 mg/dL    CREATININE 0.72 0.50 - 0.90 mg/dL    Bun/Cre Ratio NOT REPORTED 9 - 20    Calcium 9.8 8.6 - 10.4 mg/dL    Sodium 139 135 - 144 mmol/L    Potassium 4.2 3.7 - 5.3 mmol/L    Chloride 101 98 - 107 mmol/L    CO2 26 20 - 31 mmol/L    Anion Gap 12 9 - 17 mmol/L    GFR Non-African American >60 >60 mL/min    GFR African American >60 >60 mL/min    GFR Comment          GFR Staging NOT REPORTED          IMPRESSION/MDM/ED COURSE:  77 y.o. female presented with seizure-like activity.   Patient loaded with 2 g of Keppra. In addition we will give patient Ativan as needed. ED Course as of 01/08/22 1531   Sat Jan 08, 2022   1152 Patient's work-up at this time unremarkable for acute findings. Did speak with patient's family states the patient does speak and act pretty normal with interactions with them. Patient continues have episodes of drooling on herself as well as intermittent times of possible shaking concern for possible status. Will redose Ativan at this time. Spoke with admitting team.  Was stated they would admit the patient at this time with neurology evaluation and consultation. [ES]   1300 Patient began having seizures again. Patient was given 6 additional Ativan. Recommendation at this time is intubate the patient and start her on propofol and transferred to [address] [place] [address] to the neuro ICU. [ES]      ED Course User Index  [ES] Osmar Kaiser MD         RADIOLOGY:  CT Head WO Contrast   Final Result   No acute intracranial abnormality. Redemonstration of old right hemispheric   stroke with encephalomalacia. Atrophy and small vessel disease ischemic   changes. RECOMMENDATIONS:   Unavailable               EKG  None    All EKG's are interpreted by the Emergency Department Physician who either signs or Co-signs this chart in the absence of a cardiologist.      PROCEDURES:  None  CONSULTS:  IP CONSULT TO NEUROLOGY  IP CONSULT TO INTERNAL MEDICINE        FINAL IMPRESSION      1. Seizure ([address]          DISPOSITION / PLAN       DISPOSITION Decision To Admit 01/08/2022 11:51:56 AM        PATIENT REFERREDTO:  No follow-up provider specified.     DISCHARGE MEDICATIONS:  New Prescriptions    No medications on file       Osmar Kaiser MD  PGY 2  Resident Physician Emergency Medicine  01/08/22 11:52 AM        (Please note that portions of this note were completed with a voice recognition program.Efforts were made to edit the dictations but occasionally words are mis-transcribed.)          Osmar Kaiser MD  Resident  01/08/22 1530

## 2022-01-08 NOTE — ED TRIAGE NOTES
Mode of arrival (squad #, walk in, police, etc) : [facility] 8        Chief complaint(s): Seizure        Arrival Note (brief scenario, treatment PTA, etc). : Pt arrives to ED c/o seizure. Patient has a history of seizures for which she takes Dilantin for and is compliant with. Patient was \"Acting different\" this morning per EMS as reported by family and then had a seizure. Upon EMS arrival patient was having a tonic clonic seizure and was given 2mg versed IN. Patient became postictal at that time and remains that way upon arrival to ED. Patient is alert but does not follow any commands at this time. Patient is placed on cardiac monitor and continuous pulse oximetry. Seizure pads placed on stretcher. C= \"Have you ever felt that you should Cut down on your drinking? \"  Refused  A= \"Have people Annoyed you by criticizing your drinking? \"  Rufsed  G= \"Have you ever felt bad or Guilty about your drinking? \"  Refused  E= \"Have you ever had a drink as an Eye-opener first thing in the morning to steady your nerves or to help a hangover? \"  Refused      Deferred []      Reason for deferring: N/A    *If yes to two or more: probable alcohol abuse. *

## 2022-01-08 NOTE — ED NOTES
Bed: 10  Expected date:   Expected time:   Means of arrival:   Comments:    [address] [facility] Rian Gee RN  01/08/22 0016

## 2022-01-08 NOTE — ED PROVIDER NOTES
[address]  eMERGENCY dEPARTMENT eNCOUnter   Attending Attestation     Pt Name: Sera Helms  MRN: [id number]  [place] 1955  Date of evaluation: 1/8/22    History, EXAM, MDM:    Sera Helms is a 77 y.o. female who presents with Seizures  Patient presenting with breakthrough seizures. The patient has a history of cerebrovascular disease, patient had a previous right MCA infarct. She has a known seizure disorder. Home antiepileptics are Keppra. Patient had a witnessed seizure at home, she then again had a seizure by EMS. She was treated with Versed by EMS. Upon arrival to the emergency department, the patient was postictal. Patient was not speaking, she was able to hold her left arm and leg against gravity and follow commands to leave it elevated, she did have drift with her right arm and leg to the bed. Laboratory studies showed a normal white blood cell count, normal renal function and electrolytes, therapeutic Keppra level, CT scan of the head showing no acute abnormalities, and a negative Covid test. Patient evaluated by neurology. Patient also then began having concerning seizure-like activity. She was treated with Ativan and loaded with Vimpat which resolved the seizure-like activity. Dr. Cammy Arthur asks that the patient be admitted to [facility] Neuro ICU and pt was accepted by Dr. Prabhu Zhao. Vitals:   Vitals:    01/08/22 1103 01/08/22 1246 01/08/22 1428 01/08/22 1507   BP: 130/82 (!) 136/96 122/80 131/85   Pulse: 91 91 78 79   Resp: 21 19 14 16   Temp:       TempSrc:       SpO2: 95% 95% 95% 93%   Weight:       Height:         I performed a history and physical examination of the patient and discussed management with the resident. I reviewed the residents note and agree with the documented findings and plan of care. Any areas of disagreement are noted on the chart. I was personally present for the key portions of any procedures.  I have documented in the chart those procedures where I was not present during the key portions. I have personally reviewed all images and agree with the resident's interpretation. I have reviewed the emergency nurses triage note. I agree with the chief complaint, past medical history, past surgical history, allergies, medications, social and family history as documented unless otherwise noted below. Documentation of the HPI, Physical Exam and Medical Decision Making performed by medical students or scribes is based on my personal performance of the HPI, PE and MDM. For Phys Assistant/ Nurse Practitioner cases/documentation I have had a face to face evaluation of this patient and have completed at least one if not all key elements of the E/M (history, physical exam, and MDM). Additional findings are as noted.     Aleks Hester MD  Attending Emergency  Physician               Aleks Hester MD  01/08/22 0857

## 2022-01-08 NOTE — Clinical Note
Patient Class: Inpatient [101]   REQUIRED: Diagnosis: Seiz[facility] ([facility] [750475]   Estimated Length of Stay: Estimated stay of less than 2 midnights

## 2022-01-08 NOTE — ED NOTES
Patient is rhythmically chewing on tongue, resulting in laceration to distil tip of tongue. Attempt to insert a bite block was unsuccessful. Author inserted a yaunkar suction wand to clear secretions with want used to help guard against further damage to patient's tongue. Nurse stood at bedside with patient with several doses of Ativan administered. Patient started on Vimpat at 1306 with suction wand removed at that time.       Sagar Major RN  01/08/22 40 Truman Garcia RN  01/08/22 9852

## 2022-01-08 NOTE — ED PROVIDER NOTES
[address]  Emergency Department  Emergency Medicine Resident Sign-out     Care of Liana Lord was assumed from Dr. Shira Hatch and is being seen for Seizures  . The patient's initial evaluation and plan have been discussed with the prior provider who initially evaluated the patient. EMERGENCY DEPARTMENT COURSE / MEDICAL DECISION MAKING:       MEDICATIONS GIVEN:  Orders Placed This Encounter   Medications    levETIRAcetam (KEPPRA) 2,000 mg in sodium chloride 0.9 % 100 mL IVPB    DISCONTD: fentaNYL (SUBLIMAZE) 100 MCG/2ML injection     Bettyjo Brace: cabinet override    LORazepam (ATIVAN) injection 2 mg    lacosamide (VIMPAT) 200 mg in dextrose 5 % 70 mL IVPB    LORazepam (ATIVAN) injection 2 mg    LORazepam (ATIVAN) injection 2 mg       LABS / RADIOLOGY:     Labs Reviewed   CBC WITH AUTO DIFFERENTIAL - Abnormal; Notable for the following components:       Result Value    Monocytes 9 (*)     All other components within normal limits   COVID-19, RAPID   BASIC METABOLIC PANEL W/ REFLEX TO MG FOR LOW K   LEVETIRACETAM LEVEL   ETHANOL   URINE DRUG SCREEN       CT Head WO Contrast    Result Date: 1/8/2022  EXAMINATION: CT OF THE HEAD WITHOUT CONTRAST  1/8/2022 8:55 am TECHNIQUE: CT of the head was performed without the administration of intravenous contrast. Dose modulation, iterative reconstruction, and/or weight based adjustment of the mA/kV was utilized to reduce the radiation dose to as low as reasonably achievable. COMPARISON: 06/19/2020 HISTORY: ORDERING SYSTEM PROVIDED HISTORY: History of seizures, postictal, history of stroke TECHNOLOGIST PROVIDED HISTORY: History of seizures, postictal, history of stroke Decision Support Exception - unselect if not a suspected or confirmed emergency medical condition->Emergency Medical Condition (MA) Reason for Exam: seizure, ams FINDINGS: BRAIN/VENTRICLES: There is no acute intracranial hemorrhage, mass effect or midline shift.   No abnormal extra-axial fluid collection. The gray-white differentiation is maintained without evidence of an acute infarct. There is no evidence of hydrocephalus. Redemonstration of a right hemispheric stroke also previously noted. Redemonstration of age-appropriate atrophy and small vessel disease ischemic changes. ORBITS: The visualized portion of the orbits demonstrate no acute abnormality. SINUSES: The visualized paranasal sinuses and mastoid air cells demonstrate no acute abnormality. SOFT TISSUES/SKULL:  No acute abnormality of the visualized skull or soft tissues. No acute intracranial abnormality. Redemonstration of old right hemispheric stroke with encephalomalacia. Atrophy and small vessel disease ischemic changes. RECOMMENDATIONS: Unavailable       RECENT VITALS:     Temp: 98.1 °F (36.7 °C),  Pulse: 79, Resp: 16, BP: 131/85, SpO2: 93 %    This patient is a 77 y.o. Female with witnessed seizures per family at home and an additional seizure witnessed by EMS in route to the emergency department. Patient has a history of medication noncompliance. Patient seizures was broken with two of Versed before presenting to the emergency department. While in the emergency department patient had Keppra level drawn in addition to given 2 grams of Keppra loading dose. Patient was comfortable in the room however patient began to seize again. Patient was given six of Ativan and 200 mg of Vimpat with resolution of her convulsions and symptoms. Patient resting in the room, most likely postictal at this time. Patient waiting to be transferred over to [facility] neuro ICU. Patient has at the maximum dose of Ativan for safety protocol per neurology. If patient becomes symptomatic again or believes to have seizure-like activity patient is to be intubated and started on propofol. ED Course as of 01/08/22 2255   Sat Jan 08, 2022   1152 Patient's work-up at this time unremarkable for acute findings.   Did speak with patient's family states the patient does speak and act pretty normal with interactions with them. Patient continues have episodes of drooling on herself as well as intermittent times of possible shaking concern for possible status. Will redose Ativan at this time. Spoke with admitting team.  Was stated they would admit the patient at this time with neurology evaluation and consultation. [ES]   1300 Patient began having seizures again. Patient was given 6 additional Ativan. Recommendation at this time is intubate the patient and start her on propofol and transferred to [facility] to the neuro ICU. [ES]   [id number] She has not received inpatient bed at [facility] due to nursing shortage. Discussed with , Neurology have patient admitted here at [facility] as patient has not had any repeat or breakthrough seizures, patient appears to be improving in mentation. Medications ordered per , Vimpat 100 mg IV, Keppra 1500 mg IV. [MA]      ED Course User Index  [ES] Lovella Castleman, MD  [MA] Elio Snellen, DO           OUTSTANDING TASKS / RECOMMENDATIONS:    1. Await transportation  2. Intubate if needed  3. If unable to secure a bed at [facility]. [facility] admit to ICU here. Per Dr. Cruz Her. FINAL IMPRESSION:     1. Status epilepticus ([address]    2. Seizure ([facility]        DISPOSITION:         DISPOSITION:  []  Discharge   []  Transfer -    [x]  Admission -  Dr. Cruz Her   []  Against Medical Advice   []  Eloped   FOLLOW-UP: No follow-up provider specified.    DISCHARGE MEDICATIONS: New Prescriptions    No medications on file           Elio Snellen, DO  Emergency Medicine Resident  [facility]       Elio Snellen, [place]  Resident  01/08/22 [address] DO  Resident  01/08/22 5502

## 2022-01-09 LAB
-: NORMAL
ANION GAP SERPL CALCULATED.3IONS-SCNC: 10 MMOL/L (ref 9–17)
BILIRUBIN URINE: NEGATIVE
BUN BLDV-MCNC: 7 MG/DL (ref 8–23)
BUN/CREAT BLD: ABNORMAL (ref 9–20)
CALCIUM SERPL-MCNC: 9 MG/DL (ref 8.6–10.4)
CHLORIDE BLD-SCNC: 106 MMOL/L (ref 98–107)
CO2: 24 MMOL/L (ref 20–31)
COLOR: YELLOW
COMMENT UA: ABNORMAL
CREAT SERPL-MCNC: 0.52 MG/DL (ref 0.5–0.9)
GFR AFRICAN AMERICAN: >60 ML/MIN
GFR NON-AFRICAN AMERICAN: >60 ML/MIN
GFR SERPL CREATININE-BSD FRML MDRD: ABNORMAL ML/MIN/{1.73_M2}
GFR SERPL CREATININE-BSD FRML MDRD: ABNORMAL ML/MIN/{1.73_M2}
GLUCOSE BLD-MCNC: 64 MG/DL (ref 65–105)
GLUCOSE BLD-MCNC: 67 MG/DL (ref 65–105)
GLUCOSE BLD-MCNC: 69 MG/DL (ref 65–105)
GLUCOSE BLD-MCNC: 71 MG/DL (ref 65–105)
GLUCOSE BLD-MCNC: 74 MG/DL (ref 65–105)
GLUCOSE BLD-MCNC: 77 MG/DL (ref 65–105)
GLUCOSE BLD-MCNC: 83 MG/DL (ref 70–99)
GLUCOSE URINE: NEGATIVE
HCT VFR BLD CALC: 42.7 % (ref 36–46)
HEMOGLOBIN: 14.6 G/DL (ref 12–16)
INR BLD: 1.1
KETONES, URINE: ABNORMAL
LEUKOCYTE ESTERASE, URINE: NEGATIVE
MCH RBC QN AUTO: 32.8 PG (ref 26–34)
MCHC RBC AUTO-ENTMCNC: 34.2 G/DL (ref 31–37)
MCV RBC AUTO: 95.9 FL (ref 80–100)
NITRITE, URINE: NEGATIVE
NRBC AUTOMATED: NORMAL PER 100 WBC
PDW BLD-RTO: 12.7 % (ref 11.5–14.9)
PH UA: 6.5 (ref 5–8)
PLATELET # BLD: 172 K/UL (ref 150–450)
PMV BLD AUTO: 8.3 FL (ref 6–12)
POTASSIUM SERPL-SCNC: 3.7 MMOL/L (ref 3.7–5.3)
PROTEIN UA: NEGATIVE
PROTHROMBIN TIME: 14.7 SEC (ref 11.8–14.6)
RBC # BLD: 4.45 M/UL (ref 4–5.2)
REASON FOR REJECTION: NORMAL
SODIUM BLD-SCNC: 140 MMOL/L (ref 135–144)
SPECIFIC GRAVITY UA: 1.01 (ref 1–1.03)
TSH SERPL DL<=0.05 MIU/L-ACNC: 3.96 MIU/L (ref 0.3–5)
TURBIDITY: CLEAR
URINE HGB: NEGATIVE
UROBILINOGEN, URINE: NORMAL
WBC # BLD: 5.2 K/UL (ref 3.5–11)
ZZ NTE CLEAN UP: ORDERED TEST: NORMAL
ZZ NTE WITH NAME CLEAN UP: SPECIMEN SOURCE: NORMAL

## 2022-01-09 PROCEDURE — 80048 BASIC METABOLIC PNL TOTAL CA: CPT

## 2022-01-09 PROCEDURE — C9254 INJECTION, LACOSAMIDE: HCPCS | Performed by: STUDENT IN AN ORGANIZED HEALTH CARE EDUCATION/TRAINING PROGRAM

## 2022-01-09 PROCEDURE — 51701 INSERT BLADDER CATHETER: CPT

## 2022-01-09 PROCEDURE — 6360000002 HC RX W HCPCS: Performed by: STUDENT IN AN ORGANIZED HEALTH CARE EDUCATION/TRAINING PROGRAM

## 2022-01-09 PROCEDURE — 51798 US URINE CAPACITY MEASURE: CPT

## 2022-01-09 PROCEDURE — 2580000003 HC RX 258: Performed by: STUDENT IN AN ORGANIZED HEALTH CARE EDUCATION/TRAINING PROGRAM

## 2022-01-09 PROCEDURE — 36415 COLL VENOUS BLD VENIPUNCTURE: CPT

## 2022-01-09 PROCEDURE — 84443 ASSAY THYROID STIM HORMONE: CPT

## 2022-01-09 PROCEDURE — 6370000000 HC RX 637 (ALT 250 FOR IP): Performed by: INTERNAL MEDICINE

## 2022-01-09 PROCEDURE — 81003 URINALYSIS AUTO W/O SCOPE: CPT

## 2022-01-09 PROCEDURE — 51702 INSERT TEMP BLADDER CATH: CPT

## 2022-01-09 PROCEDURE — 1200000000 HC SEMI PRIVATE

## 2022-01-09 PROCEDURE — 2580000003 HC RX 258: Performed by: EMERGENCY MEDICINE

## 2022-01-09 PROCEDURE — 85027 COMPLETE CBC AUTOMATED: CPT

## 2022-01-09 PROCEDURE — 99233 SBSQ HOSP IP/OBS HIGH 50: CPT | Performed by: PSYCHIATRY & NEUROLOGY

## 2022-01-09 PROCEDURE — 82947 ASSAY GLUCOSE BLOOD QUANT: CPT

## 2022-01-09 PROCEDURE — 99223 1ST HOSP IP/OBS HIGH 75: CPT | Performed by: INTERNAL MEDICINE

## 2022-01-09 PROCEDURE — 85610 PROTHROMBIN TIME: CPT

## 2022-01-09 RX ORDER — PANTOPRAZOLE SODIUM 40 MG/1
40 TABLET, DELAYED RELEASE ORAL
Status: DISCONTINUED | OUTPATIENT
Start: 2022-01-10 | End: 2022-01-13 | Stop reason: HOSPADM

## 2022-01-09 RX ORDER — ACETAMINOPHEN 650 MG/1
650 SUPPOSITORY RECTAL EVERY 6 HOURS PRN
Status: DISCONTINUED | OUTPATIENT
Start: 2022-01-09 | End: 2022-01-13 | Stop reason: HOSPADM

## 2022-01-09 RX ORDER — SODIUM CHLORIDE 0.9 % (FLUSH) 0.9 %
5-40 SYRINGE (ML) INJECTION EVERY 12 HOURS SCHEDULED
Status: DISCONTINUED | OUTPATIENT
Start: 2022-01-09 | End: 2022-01-13 | Stop reason: HOSPADM

## 2022-01-09 RX ORDER — POTASSIUM CHLORIDE 20 MEQ/1
40 TABLET, EXTENDED RELEASE ORAL PRN
Status: DISCONTINUED | OUTPATIENT
Start: 2022-01-09 | End: 2022-01-13 | Stop reason: HOSPADM

## 2022-01-09 RX ORDER — ATORVASTATIN CALCIUM 40 MG/1
40 TABLET, FILM COATED ORAL DAILY
Status: DISCONTINUED | OUTPATIENT
Start: 2022-01-09 | End: 2022-01-13 | Stop reason: HOSPADM

## 2022-01-09 RX ORDER — ONDANSETRON 4 MG/1
4 TABLET, ORALLY DISINTEGRATING ORAL EVERY 8 HOURS PRN
Status: DISCONTINUED | OUTPATIENT
Start: 2022-01-09 | End: 2022-01-13 | Stop reason: HOSPADM

## 2022-01-09 RX ORDER — ONDANSETRON 2 MG/ML
4 INJECTION INTRAMUSCULAR; INTRAVENOUS EVERY 6 HOURS PRN
Status: DISCONTINUED | OUTPATIENT
Start: 2022-01-09 | End: 2022-01-13 | Stop reason: HOSPADM

## 2022-01-09 RX ORDER — ASPIRIN 81 MG/1
81 TABLET ORAL DAILY
Status: DISCONTINUED | OUTPATIENT
Start: 2022-01-09 | End: 2022-01-13 | Stop reason: HOSPADM

## 2022-01-09 RX ORDER — MAGNESIUM SULFATE 1 G/100ML
1000 INJECTION INTRAVENOUS PRN
Status: DISCONTINUED | OUTPATIENT
Start: 2022-01-09 | End: 2022-01-13 | Stop reason: HOSPADM

## 2022-01-09 RX ORDER — SODIUM CHLORIDE 9 MG/ML
25 INJECTION, SOLUTION INTRAVENOUS PRN
Status: DISCONTINUED | OUTPATIENT
Start: 2022-01-09 | End: 2022-01-13 | Stop reason: HOSPADM

## 2022-01-09 RX ORDER — POTASSIUM CHLORIDE 7.45 MG/ML
10 INJECTION INTRAVENOUS PRN
Status: DISCONTINUED | OUTPATIENT
Start: 2022-01-09 | End: 2022-01-13 | Stop reason: HOSPADM

## 2022-01-09 RX ORDER — ACETAMINOPHEN 325 MG/1
650 TABLET ORAL EVERY 6 HOURS PRN
Status: DISCONTINUED | OUTPATIENT
Start: 2022-01-09 | End: 2022-01-13 | Stop reason: HOSPADM

## 2022-01-09 RX ORDER — POLYETHYLENE GLYCOL 3350 17 G/17G
17 POWDER, FOR SOLUTION ORAL DAILY PRN
Status: DISCONTINUED | OUTPATIENT
Start: 2022-01-09 | End: 2022-01-13 | Stop reason: HOSPADM

## 2022-01-09 RX ORDER — SODIUM CHLORIDE 0.9 % (FLUSH) 0.9 %
10 SYRINGE (ML) INJECTION PRN
Status: DISCONTINUED | OUTPATIENT
Start: 2022-01-09 | End: 2022-01-13 | Stop reason: HOSPADM

## 2022-01-09 RX ORDER — OXYBUTYNIN CHLORIDE 5 MG/1
5 TABLET ORAL 3 TIMES DAILY
Status: DISCONTINUED | OUTPATIENT
Start: 2022-01-09 | End: 2022-01-13 | Stop reason: HOSPADM

## 2022-01-09 RX ORDER — PRIMIDONE 50 MG/1
75 TABLET ORAL 2 TIMES DAILY
Status: DISCONTINUED | OUTPATIENT
Start: 2022-01-09 | End: 2022-01-13 | Stop reason: HOSPADM

## 2022-01-09 RX ORDER — DONEPEZIL HYDROCHLORIDE 5 MG/1
5 TABLET, FILM COATED ORAL NIGHTLY
Status: DISCONTINUED | OUTPATIENT
Start: 2022-01-09 | End: 2022-01-13 | Stop reason: HOSPADM

## 2022-01-09 RX ORDER — AMLODIPINE BESYLATE 10 MG/1
10 TABLET ORAL DAILY
Status: DISCONTINUED | OUTPATIENT
Start: 2022-01-09 | End: 2022-01-13 | Stop reason: HOSPADM

## 2022-01-09 RX ORDER — LEVOTHYROXINE SODIUM 0.05 MG/1
50 TABLET ORAL DAILY
Status: DISCONTINUED | OUTPATIENT
Start: 2022-01-09 | End: 2022-01-13 | Stop reason: HOSPADM

## 2022-01-09 RX ADMIN — ENOXAPARIN SODIUM 40 MG: 100 INJECTION SUBCUTANEOUS at 09:18

## 2022-01-09 RX ADMIN — SODIUM CHLORIDE: 9 INJECTION, SOLUTION INTRAVENOUS at 23:35

## 2022-01-09 RX ADMIN — DEXTROSE MONOHYDRATE 100 MG: 50 INJECTION, SOLUTION INTRAVENOUS at 22:25

## 2022-01-09 RX ADMIN — OXYBUTYNIN CHLORIDE 5 MG: 5 TABLET ORAL at 20:48

## 2022-01-09 RX ADMIN — SODIUM CHLORIDE, PRESERVATIVE FREE 10 ML: 5 INJECTION INTRAVENOUS at 20:52

## 2022-01-09 RX ADMIN — DEXTROSE MONOHYDRATE 100 MG: 50 INJECTION, SOLUTION INTRAVENOUS at 09:29

## 2022-01-09 RX ADMIN — DEXTROSE MONOHYDRATE 100 MG: 50 INJECTION, SOLUTION INTRAVENOUS at 00:20

## 2022-01-09 RX ADMIN — LEVETIRACETAM 1500 MG: 100 INJECTION INTRAVENOUS at 23:40

## 2022-01-09 RX ADMIN — LEVETIRACETAM 1500 MG: 100 INJECTION INTRAVENOUS at 11:34

## 2022-01-09 RX ADMIN — DONEPEZIL HYDROCHLORIDE 5 MG: 5 TABLET, FILM COATED ORAL at 20:48

## 2022-01-09 RX ADMIN — PRIMIDONE 75 MG: 50 TABLET ORAL at 20:48

## 2022-01-09 NOTE — PLAN OF CARE
Safety maintained, call light is within reach, no s/s or c/o distress, bed is low/locked, side rails are up x2, bed alarm remains on.   Problem: Falls - Risk of:  Goal: Will remain free from falls  Description: Will remain free from falls  Outcome: Ongoing  Goal: Absence of physical injury  Description: Absence of physical injury  Outcome: Ongoing     Problem: Safety:  Goal: Free from accidental physical injury  Description: Free from accidental physical injury  Outcome: Ongoing  Goal: Free from intentional harm  Description: Free from intentional harm  Outcome: Ongoing     Problem: Pain:  Goal: Patient's pain/discomfort is manageable  Description: Patient's pain/discomfort is manageable  Outcome: Ongoing     Problem: Skin Integrity:  Goal: Skin integrity will stabilize  Description: Skin integrity will stabilize  Outcome: Ongoing
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
## 2022-01-09 NOTE — PROGRESS NOTES
RN notified Dr. Jelani Johnston that pt didn't get transferred to [facility]. V's d/t no beds being available. MRI order clarified and Dr. Klaus Cabral ordered it. Dr. Jelani Johnston was also made aware of pt still being out of it and following very limited commands like holding her arms up when the bedside RN asked this morning. RN stated pt doesn't answer questions and hat her last dose of Vimpat was this morning and Keppra was last night but is due in an a hr. Dr. Jelani Johnston would like to be notified if the pt has any twitching or jerking in her face or extremeties.  This RN did correlate this information to the bedside RN [employer]

## 2022-01-09 NOTE — ED NOTES
Attempt to give patient juice orally unsuccessful. Orders received.      Montrell Sevilla RN  01/08/22 2040

## 2022-01-09 NOTE — PROGRESS NOTES
Pt arrived to room 2089 via cart by ED transporter. Vital signs obtained and telemetry applied. Assessment completed however admission history not able to be completed at this time related to post-ictal state. Seizure precautions are in place, tele-sitter initiated, side rails are up x2, bed is low/locked, bed alarm remains on.

## 2022-01-09 NOTE — PROGRESS NOTES
Dr Luke Dsouza notified no urine output for 8 hrs. Bladder scan ordered. Fluids running at 125 ml/hr. Bladder scan shows 673 ml minimum. New order for every shift bladder scans, and one time straight cath.

## 2022-01-09 NOTE — H&P
Counts include 234 beds at the [facility] Internal Medicine    CONSULTATION / HISTORY AND PHYSICAL EXAMINATION            Date:   1/9/2022  Patient name:  Ney Qureshi  Date of admission:  1/8/2022  8:38 AM  MRN:   [id number]  Account:  [de-identified]  YOB: 1955  PCP:    Mike Gaines DO  Room:   2089/2089-01  Code Status:    Full Code    Physician Requesting Consult: Yuan Bowers MD    Reason for Consult:  medical management    Chief Complaint:     Chief Complaint   Patient presents with    Seizures       History Obtained From:     Patient medical record nursing staff    History of Present Illness:   History is very limited, patient is not answering any questions, just making eye contact,  Most of the data was retrieved from E HR, notes from ER physician and neurologist  Patient has past medical history of seizure disorder, stroke with left-sided weakness, patient had multiple witnessed episodes, first noted by family, later on patient has seizure episode while in emergency room  Patient was initially loaded with IV Keppra, later on IV Vimpat was added  Original plan was to transfer patient to [facility] for continuous EEG monitoring, unfortunately there was no bed available, patient has end of getting admitted at [facility]  At the time evaluation, patient history, not answering any questions, just making eye contact,    Past Medical History:     Past Medical History:   Diagnosis Date    Ankle fracture, left     Anxiety 02/04/2014    Arthritis     Black tarry stools     Colon polyp     Constipation     ETOH abuse     States she has not drank any ETOH in 24 years as of 2021    Frequency of urination ?  GERD (gastroesophageal reflux disease) 02/04/2014    Head injury 1998    after seizure/stroke pt fell and hit head    Headache(784.0)     Hypertension     Hypotension ?     Hypothyroidism     Memory deficit     Migraines     Numbness and tingling fingers    Osteoporosis     Peripheral vascular disease ([facility]     Pneumonia     Seizures ([facility]     last seizure 6-,     Shingles      Pt.denies.  Stroke [facility]) 1998    stroke and seizure together    TIA (transient ischemic attack)     1996    Tremor     UTI (urinary tract infection)     once    Varicose veins     Wears glasses         Past Surgical History:     Past Surgical History:   Procedure Laterality Date    BUNIONECTOMY Right     COLONOSCOPY      COLONOSCOPY N/A 5/10/2021    COLONOSCOPY DIAGNOSTIC performed by Handy Snell MD at [facility] Left 07/06/2016    ON 2ND, 3rd, 4th,  and  5TH TOES OF LEFT FOOT  Pt. Denies.  OTHER SURGICAL HISTORY  02/23/2015    orif left ankle with synthes and intraoperative c- arm    [facility] INCL FLUOR GDNCE DX W/CELL WASHG SPX N/A 01/16/2018    BRONCHOSCOPY WITH WASHINGS AT PATIENT BEDSIDE ICU performed by Ernesto Ellison MD at [address] [facility]          Medications Prior to Admission:     Prior to Admission medications    Medication Sig Start Date End Date Taking?  Authorizing Provider   oxybutynin (DITROPAN) 5 MG tablet TAKE 1 TABLET BY MOUTH 3 TIMES A DAY 12/3/21   Katelynn Tamayo DO   diclofenac sodium (VOLTAREN) 1 % GEL Apply 2 g topically 4 times daily as needed for Pain 12/1/21   Von Bobo MD   SUMAtriptan (IMITREX) 100 MG tablet TAKE 1 TABLET BY MOUTH ONE TIME A DAY AS NEEDED FOR MIGRAINE 12/1/21   Von Bobo MD   aspirin (ASPIRIN LOW DOSE) 81 MG EC tablet TAKE 1 TABLET BY MOUTH ONE TIME A DAY 11/3/21   Katelynn Tamayo DO   PARoxetine (PAXIL) 30 MG tablet TAKE ONE TABLET BY MOUTH EVERY MORNING 11/3/21   Katelynn Tamayo DO   atorvastatin (LIPITOR) 40 MG tablet TAKE 1 TABLET BY MOUTH ONE TIME A DAY 11/3/21   Katelynn Tamayo DO   donepezil (ARICEPT) 5 MG tablet TAKE ONE TABLET BY MOUTH NIGHTLY 11/2/21   Catarino Hernandez MD   levETIRAcetam (KEPPRA) 1000 MG tablet TAKE 1 TABLET BY MOUTH (ALONG WITH 250MG TABLET FOR TOTAL OF 1250MG) TWICE A DAY 10/6/21   Alyson Ortiz MD   primidone (MYSOLINE) 50 MG tablet TAKE ONE-HALF TABLET BY MOUTH TWICE DAILY 9/15/21   Alyson Ortiz MD   hydroCHLOROthiazide (HYDRODIURIL) 12.5 MG tablet TAKE 1 TABLET BY MOUTH ONE TIME A DAY 9/15/21   Venia Gains, DO   levothyroxine (SYNTHROID) 50 MCG tablet TAKE 1 TABLET BY MOUTH ONE TIME A DAY 9/15/21   Venia Gains, DO   amLODIPine (NORVASC) 10 MG tablet TAKE 1 TABLET BY MOUTH ONE TIME A DAY 9/15/21   Venia Gains, DO   omeprazole (PRILOSEC) 20 MG delayed release capsule TAKE 1 CAPSULE BY MOUTH ONE TIME A DAY 9/15/21   Venia Gains, DO   levETIRAcetam (KEPPRA) 250 MG tablet TAKE 1 TABLET BY MOUTH (ALONG WITH 1000MG TABLET FOR TOTAL OF 1250MG) TWICE DAILY. 9/10/21   Alyson Ortiz MD        Allergies:     Patient has no known allergies. Social History:     Tobacco:    reports that she has never smoked. She has never used smokeless tobacco.  Alcohol:      reports previous alcohol use. Drug Use:  reports no history of drug use.     Family History:     Family History   Problem Relation Age of Onset    Cervical Cancer Mother     Heart Disease Father     Heart Disease Maternal Grandfather     Heart Disease Paternal Grandfather        Review of Systems:     Cannot be done because the patient cannot    Physical Exam:     /72   Pulse 86   Temp 98.3 °F (36.8 °C) (Oral)   Resp 16   Ht 5' 10\" (1.778 m)   Wt 148 lb 6.4 oz (67.3 kg)   SpO2 100%   BMI 21.29 kg/m²   Temp (24hrs), Av.3 °F (36.8 °C), Min:98.3 °F (36.8 °C), Max:98.3 °F (36.8 °C)    Recent Labs     22  2215 22  0335 22  0830   POCGLU 76 137* 77 69       Intake/Output Summary (Last 24 hours) at 2022 1015  Last data filed at 2022 0852  Gross per 24 hour   Intake 0 ml   Output 300 ml   Net -300 ml       General Appearance: Patient postictal not answering any question neck   mental status: Not oriented to person, place, and time with normal affect  Head:  normocephalic, atraumatic. Eye: no icterus, redness, pupils equal and reactive, extraocular eye movements intact, conjunctiva clear  Ear: normal external ear, no discharge, hearing intact  Nose:  no drainage noted  Mouth: mucous membranes moist  Neck: supple, no carotid bruits, thyroid not palpable  Lungs: Bilateral equal air entry, clear to ausculation, no wheezing, rales or rhonchi, normal effort  Cardiovascular: normal rate, regular rhythm, no murmur, gallop, rub. Abdomen: Soft, nontender, nondistended, normal bowel sounds, no hepatomegaly or splenomegaly  Neurologic: Not cooperative with neurological exam   skin: No gross lesions, rashes, bruising or bleeding on exposed skin area  Extremities:  peripheral pulses palpable, no pedal edema or calf pain with palpation  Psych: Investigations:      Laboratory Testing:  Recent Results (from the past 24 hour(s))   COVID-19, Rapid    Collection Time: 01/08/22  2:23 PM    Specimen: Nasopharyngeal Swab   Result Value Ref Range    Specimen Description . NASOPHARYNGEAL SWAB     SARS-CoV-2, Rapid Not Detected Not Detected   URINE DRUG SCREEN    Collection Time: 01/08/22  3:05 PM   Result Value Ref Range    Amphetamine Screen, Ur NEGATIVE NEGATIVE    Barbiturate Screen, Ur POSITIVE (A) NEGATIVE    Benzodiazepine Screen, Urine POSITIVE (A) NEGATIVE    Cocaine Metabolite, Urine NEGATIVE NEGATIVE    Methadone Screen, Urine NEGATIVE NEGATIVE    Opiates, Urine NEGATIVE NEGATIVE    Phencyclidine, Urine NEGATIVE NEGATIVE    Propoxyphene, Urine NOT REPORTED NEGATIVE    Cannabinoid Scrn, Ur NEGATIVE NEGATIVE    Oxycodone Screen, Ur NEGATIVE NEGATIVE    Methamphetamine, Urine NOT REPORTED NEGATIVE    Tricyclic Antidepressants, Urine NOT REPORTED NEGATIVE    MDMA, Urine NOT REPORTED NEGATIVE    Buprenorphine Urine NOT REPORTED NEGATIVE    Test Information       Assay provides medical screening only.   The absence of expected drug(s) and/or metabolite(s) may indicate diluted or adulterated urine, limitations of testing or timing of collection. Ethanol Alcohol    Collection Time: 01/08/22  3:25 PM   Result Value Ref Range    Ethanol <10 <10 mg/dL    Ethanol percent <0.010 %   POC Glucose Fingerstick    Collection Time: 01/08/22  7:27 PM   Result Value Ref Range    POC Glucose 76 65 - 105 mg/dL   POC Glucose Fingerstick    Collection Time: 01/08/22 10:15 PM   Result Value Ref Range    POC Glucose 137 (H) 65 - 105 mg/dL   POC Glucose Fingerstick    Collection Time: 01/09/22  3:35 AM   Result Value Ref Range    POC Glucose 77 65 - 105 mg/dL   Basic Metabolic Panel w/ Reflex to MG    Collection Time: 01/09/22  8:04 AM   Result Value Ref Range    Glucose 83 70 - 99 mg/dL    BUN 7 (L) 8 - 23 mg/dL    CREATININE 0.52 0.50 - 0.90 mg/dL    Bun/Cre Ratio NOT REPORTED 9 - 20    Calcium 9.0 8.6 - 10.4 mg/dL    Sodium 140 135 - 144 mmol/L    Potassium 3.7 3.7 - 5.3 mmol/L    Chloride 106 98 - 107 mmol/L    CO2 24 20 - 31 mmol/L    Anion Gap 10 9 - 17 mmol/L    GFR Non-African American >60 >60 mL/min    GFR African American >60 >60 mL/min    GFR Comment          GFR Staging NOT REPORTED    SPECIMEN REJECTION    Collection Time: 01/09/22  8:04 AM   Result Value Ref Range    Specimen Source . BLOOD     Ordered Test CBC PT     Reason for Rejection Unable to perform testing: Specimen clotted.      - NOT REPORTED    POC Glucose Fingerstick    Collection Time: 01/09/22  8:30 AM   Result Value Ref Range    POC Glucose 69 65 - 105 mg/dL           Consultations:   IP CONSULT TO NEUROLOGY  IP CONSULT TO INTERNAL MEDICINE  IP CONSULT TO SOCIAL WORK  IP CONSULT TO NEUROLOGY  Assessment :      Primary Problem  <principal problem not specified>    Active Hospital Problems    Diagnosis Date Noted    Seizure ([facility] [R56.9] 01/08/2022    Seizures ([facility] [R56.9] 01/08/2022    H/O: CVA (cerebrovascular accident) [Z86.73] 05/06/2014    Acquired hypothyroidism [E03.9] 03/27/2012    Essential hypertension [I10] 03/27/2012       Plan:     1. Witnessed seizures, there was concern of noncompliance (although Keppra level tested yesterday at 9 AM was okay) , patient loaded with IV Keppra later on IV Vimpat was added, patient still postictal  2. We will keep n.p.o., will continue with IV fluids  3. Ordering MRI brain with and without contrast  4. Discussed with RN, will do bedside swallow study, if patient passes swallow study, will start patient on oral medications  5. On Lovenox for DVT prophylaxis  6. Hypothyroidism last TSH was in April which was okay, retesting TSH  7. History of stroke with residual weakness on left side, although patient not cooperative with neurological exam        Eduardo MD Avtar  1/9/2022  10:15 AM    Copy sent to Dr. Colt Hernandez, DO    Please note that this chart was generated using voice recognition Dragon dictation software. Although every effort was made to ensure the accuracy of this automated transcription, some errors in transcription may have occurred.

## 2022-01-09 NOTE — PROGRESS NOTES
[facility]   IN-PATIENT SERVICE      NEUROLOGY PROGRESS  NOTE            Date:   1/9/2022  Patient name:  Donna Lacy  Date of admission:  1/8/2022  YOB: 1955      Interval History:     No further seizures reported. No beds at [facility], not enough nurses for staffing. Pt now admitted on PCU. Still lethargic. Will arouse, does not seem to be comprehend. Not consistently following commands. Occasional one word replies, perseverating. History of Present Illness: The patient is a 77 y.o. female who presents with Seizures  . The patient was seen and examined and the chart was reviewed. Patient with history of prior right MCA stroke, prior history of seizures presents with breakthrough seizure early this morning. Witnessed by family apparently son. When she came to the ED she had been given Versed and was reportedly starting to wake up and follow some basic commands. She was loaded with 2 g of IV Keppra. Around 11 AM or so patient was noted to have right facial twitching, decreased responsiveness and occasional rhythmic twitching left upper extremity. She was clenching down at the same time and occasionally desaturating. She was given a total of 6 mg of IV Ativan and this did not resolve the patient's symptoms. Vimpat was then ordered by consultant 200 mg IV load. After this was given patient appeared to stop the twitching and was more somnolent/obtunded. Discussed case with neuro critical care team at [place] [address] in which they did accept patient for further continuous EEG monitoring and status epilepticus. Patient's respiratory status appears to be stable at this point in time and recommendation to hold off intubation.   If she were to start seizing again she would likely need to be intubated prior to arrival.    Past Medical History:     Past Medical History:   Diagnosis Date    Ankle fracture, left     Anxiety 02/04/2014    Arthritis     Black tarry stools     Colon polyp     Constipation     ETOH abuse     States she has not drank any ETOH in 24 years as of 2021    Frequency of urination ?  GERD (gastroesophageal reflux disease) 02/04/2014    Head injury 1998    after seizure/stroke pt fell and hit head    Headache(784.0)     Hypertension     Hypotension ?  Hypothyroidism     Memory deficit     Migraines     Numbness and tingling     fingers    Osteoporosis     Peripheral vascular [facility] [address]     Pneumonia     Seizures ([facility] [address]     last seizure 6-,     Shingles      Pt.denies.  Stroke [facility]) 1998    stroke and seizure together    TIA (transient ischemic attack)     1996    Tremor     UTI (urinary tract infection)     once    Varicose veins     Wears glasses         Past Surgical History:     Past Surgical History:   Procedure Laterality Date    BUNIONECTOMY Right     COLONOSCOPY      COLONOSCOPY N/A 5/10/2021    COLONOSCOPY DIAGNOSTIC performed by Ravi Leroy MD at [address] [place] [address] Left 07/06/2016    ON 2ND, 3rd, 4th,  and  5TH TOES OF LEFT FOOT  Pt. Denies.     OTHER SURGICAL HISTORY  02/23/2015    orif left ankle with synthes and intraoperative c- arm    [place] [facility] INCL FLUOR GDNCE DX W/CELL WASHG SPX N/A 01/16/2018    BRONCHOSCOPY WITH WASHINGS AT PATIENT BEDSIDE ICU performed by Mala Knutson MD at [facility] [facility]          Medications during admission:      sodium chloride flush  5-40 mL IntraVENous 2 times per day    amLODIPine  10 mg Oral Daily    aspirin  81 mg Oral Daily    atorvastatin  40 mg Oral Daily    donepezil  5 mg Oral Nightly    levothyroxine  50 mcg Oral Daily    [START ON 1/10/2022] pantoprazole  40 mg Oral QAM AC    oxybutynin  5 mg Oral TID    PARoxetine  30 mg Oral Daily    primidone  75 mg Oral BID    enoxaparin  40 mg SubCUTAneous Daily    lacosamide (VIMPAT) IVPB  100 mg IntraVENous BID    levetiracetam  1,500 mg IntraVENous Q12H         Physical Exam:   BP 123/72   Pulse 68   Temp 98.1 °F (36.7 °C) (Axillary)   Resp 14   Ht 5' 10\" (1.778 m)   Wt 148 lb 6.4 oz (67.3 kg)   SpO2 93%   BMI 21.29 kg/m²   Temp (24hrs), Av.2 °F (36.8 °C), Min:98.1 °F (36.7 °C), Max:98.3 °F (36.8 °C)          Neurological examination:      Mental status   Patient is Lethargic. Easily arouses, will make eye contact and track. Will attempt to answer with one-word reply although not always making sense. She is perseverating at times. Will occasionally follow a basic command, but not consistently. Cranial nerves   Pupil response:            Present     Oculocephalic reflex:   Present     Corneal Reflex:            Present     Facial grimace to pin:  Present     Gag/Cough reflex:       Present        Motor and sensory function  spontaneous limb movements are present bilaterally  (+) withdrawal to pin, deep nail bed pressure in all extremities  Tone: Normal      DTR I2/4 throughout  Plantar response: Downgoing  (-) Winters's sign bilaterally    Gait  Not tested            Diagnostics:      Laboratory Testing:  CBC:   Recent Labs     22  0909 22  1024   WBC 4.8 5.2   HGB 14.0 14.6    172     BMP:    Recent Labs     22  0909 22  0804    140   K 4.2 3.7    106   CO2 26 24   BUN 8 7*   CREATININE 0.72 0.52   GLUCOSE 76 83         Lab Results   Component Value Date    CHOL 183 2016    LDLCHOLESTEROL 55 2021    HDL 71 2021    TRIG 64 2016    ALT 15 2021    AST 18 2021    TSH 3.96 2022    INR 1.1 2022    LABA1C 5.1 2016    [id number] 1449 (H) 2021       Lab Results   Component Value Date    PHENYTOIN <0.8 (L) 2018       Imaging/Diagnostics:    EEG monitoring (10/2018): Left frontal temporal sharp waves conferred an increased risk for focal onset seizures. Continuous left frontotemporal slowing.       CT head: Chronic right hemispheric stroke with encephalomalacia.   Diffuse cerebral atrophy and small vessel ischemic changes     MRI brain (6/2020): Remote right MCA distribution infarct. No acute abnormalities. No postcontrast enhancement. I personally reviewed all of the above medications, clinical laboratory, imaging and other diagnostic tests. Impression:      1. Status epilepticus in patient who is noncompliant on Keppra; he noticed decreased responsiveness, right facial twitching and occasional LUE, LLE rhythmic twitching of the extremities. 2. Acute encephalopathy secondary to postictal state  3. History of chronic right MCA stroke with reported residual left hemiparesis    Plan:      At this time we will continue Vimpat 100 mg every 12 hours, Keppra 1500 mg every 12 hours   Patient overall improved today. We will hold off any transfer to see Jayce De Los Santos at this time   Continue seizure precautions, Ativan as needed   Please contact neurology if any seizure-like activity occurs   Continue aspirin 81 mg for stroke prevention   Will consider MRI brain on Monday if patient condition does not improve.    Will order prolonged EEG for Monday   Will follow        Electronically signed by Michele Riggins DO on 1/9/2022 at 2:25 PM      Michele Riggins [address] [place] [address]  Neurology

## 2022-01-09 NOTE — PLAN OF CARE
Problem: Falls - Risk of:  Goal: Will remain free from falls  Description: Will remain free from falls  1/9/2022 1539 by Jamarcus Kelley RN  Outcome: Ongoing   Telesitter in place    Problem: Safety:  Goal: Free from accidental physical injury  Description: Free from accidental physical injury  1/9/2022 1539 by Jamarcus Kelley RN  Outcome: Ongoing   Seizure and aspiration precautions in place     Problem: Pain:  Goal: Patient's pain/discomfort is manageable  Description: Patient's pain/discomfort is manageable  1/9/2022 1539 by Jamarcus Kelley RN  Outcome: Ongoing   Non pharm measures utilized. Pt very sleepy    Problem: Skin Integrity:  Goal: Skin integrity will stabilize  Description: Skin integrity will stabilize  1/9/2022 1539 by Jamarcus Kelley RN  Outcome: Ongoing   Head to toe charted. Black [place] wound to toe    Problem: Confusion - Acute:  Goal: Absence of continued neurological deterioration signs and symptoms  Description: Absence of continued neurological deterioration signs and symptoms  Outcome: Ongoing   Pt was in a post ictal state most of shift.  More alert now but not answering things with the appropriate words

## 2022-01-09 NOTE — ED NOTES
Patient transferred to room 2089 via stretcher.  Hand off given to PCU CTPs     Zhane Nina  01/09/22 0054

## 2022-01-09 NOTE — PROGRESS NOTES
Pt unable to remain alert to take sips of water for bedside swallow study.  Holding oral pills at this time per dr Pablo Cuba

## 2022-01-09 NOTE — PROGRESS NOTES
[address]    OCCUPATIONAL THERAPY MISSED TREATMENT NOTE   INPATIENT   Date: 22  Patient Name: Joslyn Marley       Room: 7014/7067-05  MRN: [id number]   Account #: [de-identified]    : 1955  (68 y.o.)  Gender: female     REASON FOR MISSED TREATMENT:  Patient unable to participate   -   Pt sleeping soundly, did not respond to verbal/tactile stimuli. Will attempt again later if time allows. 1554  Second attempt - Pt more alert but using the bedpan, 1435.      Desi Money

## 2022-01-09 NOTE — PROGRESS NOTES
Mri screening form filled out with help from sister eunice and utilizing chart hx.   Faxed to [facility]

## 2022-01-10 ENCOUNTER — APPOINTMENT (OUTPATIENT)
Dept: MRI IMAGING | Age: 67
DRG: 053 | End: 2022-01-10
Payer: COMMERCIAL

## 2022-01-10 LAB
ANION GAP SERPL CALCULATED.3IONS-SCNC: 11 MMOL/L (ref 9–17)
BUN BLDV-MCNC: 6 MG/DL (ref 8–23)
BUN/CREAT BLD: ABNORMAL (ref 9–20)
CALCIUM SERPL-MCNC: 8.7 MG/DL (ref 8.6–10.4)
CHLORIDE BLD-SCNC: 105 MMOL/L (ref 98–107)
CO2: 20 MMOL/L (ref 20–31)
CREAT SERPL-MCNC: 0.46 MG/DL (ref 0.5–0.9)
GFR AFRICAN AMERICAN: >60 ML/MIN
GFR NON-AFRICAN AMERICAN: >60 ML/MIN
GFR SERPL CREATININE-BSD FRML MDRD: ABNORMAL ML/MIN/{1.73_M2}
GFR SERPL CREATININE-BSD FRML MDRD: ABNORMAL ML/MIN/{1.73_M2}
GLUCOSE BLD-MCNC: 68 MG/DL (ref 65–105)
GLUCOSE BLD-MCNC: 87 MG/DL (ref 70–99)
HCT VFR BLD CALC: 40.2 % (ref 36–46)
HEMOGLOBIN: 14 G/DL (ref 12–16)
MAGNESIUM: 1.8 MG/DL (ref 1.6–2.6)
MCH RBC QN AUTO: 33.3 PG (ref 26–34)
MCHC RBC AUTO-ENTMCNC: 34.7 G/DL (ref 31–37)
MCV RBC AUTO: 95.8 FL (ref 80–100)
NRBC AUTOMATED: NORMAL PER 100 WBC
PDW BLD-RTO: 12.2 % (ref 11.5–14.9)
PLATELET # BLD: 180 K/UL (ref 150–450)
PMV BLD AUTO: 8.4 FL (ref 6–12)
POTASSIUM SERPL-SCNC: 3.5 MMOL/L (ref 3.7–5.3)
RBC # BLD: 4.2 M/UL (ref 4–5.2)
SODIUM BLD-SCNC: 136 MMOL/L (ref 135–144)
WBC # BLD: 6 K/UL (ref 3.5–11)

## 2022-01-10 PROCEDURE — 82947 ASSAY GLUCOSE BLOOD QUANT: CPT

## 2022-01-10 PROCEDURE — 83735 ASSAY OF MAGNESIUM: CPT

## 2022-01-10 PROCEDURE — 2580000003 HC RX 258: Performed by: PSYCHIATRY & NEUROLOGY

## 2022-01-10 PROCEDURE — C9254 INJECTION, LACOSAMIDE: HCPCS | Performed by: STUDENT IN AN ORGANIZED HEALTH CARE EDUCATION/TRAINING PROGRAM

## 2022-01-10 PROCEDURE — 97530 THERAPEUTIC ACTIVITIES: CPT

## 2022-01-10 PROCEDURE — 97166 OT EVAL MOD COMPLEX 45 MIN: CPT

## 2022-01-10 PROCEDURE — 6360000004 HC RX CONTRAST MEDICATION: Performed by: PSYCHIATRY & NEUROLOGY

## 2022-01-10 PROCEDURE — 6360000002 HC RX W HCPCS: Performed by: STUDENT IN AN ORGANIZED HEALTH CARE EDUCATION/TRAINING PROGRAM

## 2022-01-10 PROCEDURE — 85027 COMPLETE CBC AUTOMATED: CPT

## 2022-01-10 PROCEDURE — 36415 COLL VENOUS BLD VENIPUNCTURE: CPT

## 2022-01-10 PROCEDURE — 1200000000 HC SEMI PRIVATE

## 2022-01-10 PROCEDURE — 6370000000 HC RX 637 (ALT 250 FOR IP): Performed by: INTERNAL MEDICINE

## 2022-01-10 PROCEDURE — 2580000003 HC RX 258: Performed by: STUDENT IN AN ORGANIZED HEALTH CARE EDUCATION/TRAINING PROGRAM

## 2022-01-10 PROCEDURE — 80048 BASIC METABOLIC PNL TOTAL CA: CPT

## 2022-01-10 PROCEDURE — 99233 SBSQ HOSP IP/OBS HIGH 50: CPT | Performed by: PSYCHIATRY & NEUROLOGY

## 2022-01-10 PROCEDURE — 70553 MRI BRAIN STEM W/O & W/DYE: CPT

## 2022-01-10 PROCEDURE — 2580000003 HC RX 258: Performed by: EMERGENCY MEDICINE

## 2022-01-10 PROCEDURE — 99232 SBSQ HOSP IP/OBS MODERATE 35: CPT | Performed by: INTERNAL MEDICINE

## 2022-01-10 PROCEDURE — A9579 GAD-BASE MR CONTRAST NOS,1ML: HCPCS | Performed by: PSYCHIATRY & NEUROLOGY

## 2022-01-10 PROCEDURE — 97162 PT EVAL MOD COMPLEX 30 MIN: CPT

## 2022-01-10 RX ORDER — SODIUM CHLORIDE 0.9 % (FLUSH) 0.9 %
10 SYRINGE (ML) INJECTION PRN
Status: DISCONTINUED | OUTPATIENT
Start: 2022-01-10 | End: 2022-01-12

## 2022-01-10 RX ADMIN — PRIMIDONE 75 MG: 50 TABLET ORAL at 07:44

## 2022-01-10 RX ADMIN — DONEPEZIL HYDROCHLORIDE 5 MG: 5 TABLET, FILM COATED ORAL at 21:04

## 2022-01-10 RX ADMIN — PAROXETINE HYDROCHLORIDE 30 MG: 20 TABLET, FILM COATED ORAL at 07:44

## 2022-01-10 RX ADMIN — OXYBUTYNIN CHLORIDE 5 MG: 5 TABLET ORAL at 21:04

## 2022-01-10 RX ADMIN — ENOXAPARIN SODIUM 40 MG: 100 INJECTION SUBCUTANEOUS at 07:44

## 2022-01-10 RX ADMIN — OXYBUTYNIN CHLORIDE 5 MG: 5 TABLET ORAL at 15:20

## 2022-01-10 RX ADMIN — OXYBUTYNIN CHLORIDE 5 MG: 5 TABLET ORAL at 07:44

## 2022-01-10 RX ADMIN — PRIMIDONE 75 MG: 50 TABLET ORAL at 21:04

## 2022-01-10 RX ADMIN — PANTOPRAZOLE SODIUM 40 MG: 40 TABLET, DELAYED RELEASE ORAL at 06:56

## 2022-01-10 RX ADMIN — LEVOTHYROXINE SODIUM 50 MCG: 0.05 TABLET ORAL at 06:56

## 2022-01-10 RX ADMIN — SODIUM CHLORIDE: 9 INJECTION, SOLUTION INTRAVENOUS at 07:48

## 2022-01-10 RX ADMIN — SODIUM CHLORIDE: 9 INJECTION, SOLUTION INTRAVENOUS at 23:00

## 2022-01-10 RX ADMIN — LEVETIRACETAM 1500 MG: 100 INJECTION INTRAVENOUS at 14:54

## 2022-01-10 RX ADMIN — ATORVASTATIN CALCIUM 40 MG: 40 TABLET, FILM COATED ORAL at 07:44

## 2022-01-10 RX ADMIN — GADOTERIDOL 15 ML: 279.3 INJECTION, SOLUTION INTRAVENOUS at 10:17

## 2022-01-10 RX ADMIN — SODIUM CHLORIDE, PRESERVATIVE FREE 10 ML: 5 INJECTION INTRAVENOUS at 10:18

## 2022-01-10 RX ADMIN — AMLODIPINE BESYLATE 10 MG: 10 TABLET ORAL at 07:44

## 2022-01-10 RX ADMIN — DEXTROSE MONOHYDRATE 100 MG: 50 INJECTION, SOLUTION INTRAVENOUS at 10:19

## 2022-01-10 RX ADMIN — ASPIRIN 81 MG: 81 TABLET, COATED ORAL at 07:44

## 2022-01-10 NOTE — PROGRESS NOTES
Dr. Vicente Brood at bedside to evaluate - patient stable to stay here and doesn't need to transfer.

## 2022-01-10 NOTE — H&P
[facility] Internal Medicine    CONSULTATION / HISTORY AND PHYSICAL EXAMINATION            Date:   1/10/2022  Patient name:  Anaid Henning  Date of admission:  1/8/2022  8:38 AM  MRN:   [id number]  Account:  [de-identified]  YOB: 1955  PCP:    Hanny Lakhani DO  Room:   2089/2089-01  Code Status:    Full Code    Physician Requesting Consult: Colette Kent MD    Reason for Consult:  medical management    Chief Complaint:     Chief Complaint   Patient presents with    Seizures       History Obtained From:     Patient medical record nursing staff    History of Present Illness:   History is very limited, patient is not answering any questions, just making eye contact,  Most of the data was retrieved from E HR, notes from ER physician and neurologist  Patient has past medical history of seizure disorder, stroke with left-sided weakness, patient had multiple witnessed episodes, first noted by family, later on patient has seizure episode while in emergency room  Patient was initially loaded with IV Keppra, later on IV Vimpat was added  Original plan was to transfer patient to [place] for continuous EEG monitoring, unfortunately there was no bed available, patient has end of getting admitted at [facility]  At the time evaluation, patient history, not answering any questions, just making eye contact,    1/9  Patient is awake but not very coherent, does not follow commands quickly, taking almost 10 minutes to even tell her name as per physical therapy,  Moving all extremities    Past Medical History:     Past Medical History:   Diagnosis Date    Ankle fracture, left     Anxiety 02/04/2014    Arthritis     Black tarry stools     Colon polyp     Constipation     ETOH abuse     States she has not drank any ETOH in 24 years as of 2021    Frequency of urination ?     GERD (gastroesophageal reflux disease) 02/04/2014    Head injury 1998    after seizure/stroke pt fell and hit head    Headache(784.0)     Hypertension     Hypotension ?  Hypothyroidism     Memory deficit     Migraines     Numbness and tingling     fingers    Osteoporosis     Peripheral vascular disease ([address] [address]     Pneumonia     Seizures ([facility] [address]     last seizure 6-,     Shingles      Pt.denies.  Stroke [facility]) 1998    stroke and seizure together    TIA (transient ischemic attack)     1996    Tremor     UTI (urinary tract infection)     once    Varicose veins     Wears glasses         Past Surgical History:     Past Surgical History:   Procedure Laterality Date    BUNIONECTOMY Right     COLONOSCOPY      COLONOSCOPY N/A 5/10/2021    COLONOSCOPY DIAGNOSTIC performed by Arcadio Dunbar MD at [facility] Left 07/06/2016    ON 2ND, 3rd, 4th,  and  5TH TOES OF LEFT FOOT  Pt. Denies.  OTHER SURGICAL HISTORY  02/23/2015    orif left ankle with synthes and intraoperative c- arm    [place] [facility] INCL FLUOR GDNCE DX W/CELL WASHG SPX N/A 01/16/2018    BRONCHOSCOPY WITH WASHINGS AT PATIENT BEDSIDE ICU performed by Migdalia Duron MD at [address] [place] [address]          Medications Prior to Admission:     Prior to Admission medications    Medication Sig Start Date End Date Taking?  Authorizing Provider   oxybutynin (DITROPAN) 5 MG tablet TAKE 1 TABLET BY MOUTH 3 TIMES A DAY 12/3/21  Yes Sandra Katz,    diclofenac sodium (VOLTAREN) 1 % GEL Apply 2 g topically 4 times daily as needed for Pain 12/1/21  Yes Raymundo Humphrey MD   SUMAtriptan (IMITREX) 100 MG tablet TAKE 1 TABLET BY MOUTH ONE TIME A DAY AS NEEDED FOR MIGRAINE 12/1/21  Yes Raymundo Humphrey MD   aspirin (ASPIRIN LOW DOSE) 81 MG EC tablet TAKE 1 TABLET BY MOUTH ONE TIME A DAY 11/3/21  Yes Sandra Katz, DO   PARoxetine (PAXIL) 30 MG tablet TAKE ONE TABLET BY MOUTH EVERY MORNING 11/3/21  Yes Sandra Katz, DO   atorvastatin (LIPITOR) 40 MG tablet TAKE 1 TABLET BY MOUTH ONE TIME A DAY 11/3/21  Yes Aleksander Maria DO   donepezil (ARICEPT) 5 MG tablet TAKE ONE TABLET BY MOUTH NIGHTLY 21  Yes Scar Winn MD   levETIRAcetam (KEPPRA) 1000 MG tablet TAKE 1 TABLET BY MOUTH (ALONG WITH 250MG TABLET FOR TOTAL OF 1250MG) TWICE A DAY 10/6/21  Yes Jean Cannon MD   primidone (MYSOLINE) 50 MG tablet TAKE ONE-HALF TABLET BY MOUTH TWICE DAILY 9/15/21  Yes Jean Cannon MD   hydroCHLOROthiazide (HYDRODIURIL) 12.5 MG tablet TAKE 1 TABLET BY MOUTH ONE TIME A DAY 9/15/21  Yes Aleksander Maria DO   levothyroxine (SYNTHROID) 50 MCG tablet TAKE 1 TABLET BY MOUTH ONE TIME A DAY 9/15/21  Yes Aleksander Maria DO   amLODIPine (NORVASC) 10 MG tablet TAKE 1 TABLET BY MOUTH ONE TIME A DAY 9/15/21  Yes Aleksander Maria DO   omeprazole (PRILOSEC) 20 MG delayed release capsule TAKE 1 CAPSULE BY MOUTH ONE TIME A DAY 9/15/21  Yes Aleksander Maria DO   levETIRAcetam (KEPPRA) 250 MG tablet TAKE 1 TABLET BY MOUTH (ALONG WITH 1000MG TABLET FOR TOTAL OF 1250MG) TWICE DAILY. 9/10/21  Yes Jean Cannon MD        Allergies:     Patient has no known allergies. Social History:     Tobacco:    reports that she has never smoked. She has never used smokeless tobacco.  Alcohol:      reports previous alcohol use. Drug Use:  reports no history of drug use.     Family History:     Family History   Problem Relation Age of Onset    Cervical Cancer Mother     Heart Disease Father     Heart Disease Maternal Grandfather     Heart Disease Paternal Grandfather        Review of Systems:     Cannot be done because the patient cannot    Physical Exam:     /87   Pulse 79   Temp 97.8 °F (36.6 °C) (Oral)   Resp 18   Ht 5' 10\" (1.778 m)   Wt 149 lb 14.4 oz (68 kg)   SpO2 100%   BMI 21.51 kg/m²   Temp (24hrs), Av.9 °F (36.6 °C), Min:97.7 °F (36.5 °C), Max:98.1 °F (36.7 °C)    Recent Labs     22  1617 01/09/22  2011 01/09/22  2122 01/10/22  0647   POCGLU 67 64* 74 68       Intake/Output Summary (Last 24 hours) at 1/10/2022 0954  Last data filed at 1/10/2022 0801  Gross per 24 hour   Intake 365 ml   Output 2525 ml   Net -2160 ml       General Appearance: delayed answering   mental status: Not oriented to person, place, and time with normal affect  Head:  normocephalic, atraumatic. Eye: no icterus, redness, pupils equal and reactive, extraocular eye movements intact, conjunctiva clear  Ear: normal external ear, no discharge, hearing intact  Nose:  no drainage noted  Mouth: mucous membranes moist  Neck: supple, no carotid bruits, thyroid not palpable  Lungs: Bilateral equal air entry, clear to ausculation, no wheezing, rales or rhonchi, normal effort  Cardiovascular: normal rate, regular rhythm, no murmur, gallop, rub. Abdomen: Soft, nontender, nondistended, normal bowel sounds, no hepatomegaly or splenomegaly  Neurologic: Not cooperative with neurological exam   skin: No gross lesions, rashes, bruising or bleeding on exposed skin area  Extremities:  peripheral pulses palpable, no pedal edema or calf pain with palpation  Psych:      Investigations:      Laboratory Testing:  Recent Results (from the past 24 hour(s))   CBC    Collection Time: 01/09/22 10:24 AM   Result Value Ref Range    WBC 5.2 3.5 - 11.0 k/uL    RBC 4.45 4.0 - 5.2 m/uL    Hemoglobin 14.6 12.0 - 16.0 g/dL    Hematocrit 42.7 36 - 46 %    MCV 95.9 80 - 100 fL    MCH 32.8 26 - 34 pg    MCHC 34.2 31 - 37 g/dL    RDW 12.7 11.5 - 14.9 %    Platelets 244 926 - 212 k/uL    MPV 8.3 6.0 - 12.0 fL    NRBC Automated NOT REPORTED per 100 WBC   Protime-INR    Collection Time: 01/09/22 10:24 AM   Result Value Ref Range    Protime 14.7 (H) 11.8 - 14.6 sec    INR 1.1    POC Glucose Fingerstick    Collection Time: 01/09/22 11:17 AM   Result Value Ref Range    POC Glucose 71 65 - 105 mg/dL   POC Glucose Fingerstick    Collection Time: 01/09/22  4:17 PM   Result Value Ref Range    POC Glucose 67 65 - 105 mg/dL   Urinalysis Reflex to Culture    Collection Time: 01/09/22  7:27 PM Specimen: Urine, clean catch   Result Value Ref Range    Color, UA Yellow Yellow    Turbidity UA Clear Clear    Glucose, Ur NEGATIVE NEGATIVE    Bilirubin Urine NEGATIVE NEGATIVE    Ketones, Urine SMALL (A) NEGATIVE    Specific Gravity, UA 1.009 1.000 - 1.030    Urine Hgb NEGATIVE NEGATIVE    pH, UA 6.5 5.0 - 8.0    Protein, UA NEGATIVE NEGATIVE    Urobilinogen, Urine Normal Normal    Nitrite, Urine NEGATIVE NEGATIVE    Leukocyte Esterase, Urine NEGATIVE NEGATIVE    Urinalysis Comments       Microscopic exam not performed based on chemical results unless requested in original order.    POC Glucose Fingerstick    Collection Time: 01/09/22  8:11 PM   Result Value Ref Range    POC Glucose 64 (L) 65 - 105 mg/dL   POC Glucose Fingerstick    Collection Time: 01/09/22  9:22 PM   Result Value Ref Range    POC Glucose 74 65 - 105 mg/dL   POC Glucose Fingerstick    Collection Time: 01/10/22  6:47 AM   Result Value Ref Range    POC Glucose 68 65 - 105 mg/dL   Basic Metabolic Panel w/ Reflex to MG    Collection Time: 01/10/22  6:56 AM   Result Value Ref Range    Glucose 87 70 - 99 mg/dL    BUN 6 (L) 8 - 23 mg/dL    CREATININE 0.46 (L) 0.50 - 0.90 mg/dL    Bun/Cre Ratio NOT REPORTED 9 - 20    Calcium 8.7 8.6 - 10.4 mg/dL    Sodium 136 135 - 144 mmol/L    Potassium 3.5 (L) 3.7 - 5.3 mmol/L    Chloride 105 98 - 107 mmol/L    CO2 20 20 - 31 mmol/L    Anion Gap 11 9 - 17 mmol/L    GFR Non-African American >60 >60 mL/min    GFR African American >60 >60 mL/min    GFR Comment          GFR Staging NOT REPORTED    CBC    Collection Time: 01/10/22  6:56 AM   Result Value Ref Range    WBC 6.0 3.5 - 11.0 k/uL    RBC 4.20 4.0 - 5.2 m/uL    Hemoglobin 14.0 12.0 - 16.0 g/dL    Hematocrit 40.2 36 - 46 %    MCV 95.8 80 - 100 fL    MCH 33.3 26 - 34 pg    MCHC 34.7 31 - 37 g/dL    RDW 12.2 11.5 - 14.9 %    Platelets 779 672 - 252 k/uL    MPV 8.4 6.0 - 12.0 fL    NRBC Automated NOT REPORTED per 100 WBC   Magnesium    Collection Time: 01/10/22  6:56 AM   Result Value Ref Range    Magnesium 1.8 1.6 - 2.6 mg/dL           Consultations:   IP CONSULT TO NEUROLOGY  IP CONSULT TO INTERNAL MEDICINE  IP CONSULT TO SOCIAL WORK  IP CONSULT TO NEUROLOGY  Assessment :      Primary Problem  <principal problem not specified>    Active Hospital Problems    Diagnosis Date Noted    Seizure ([facility] [address] [R56.9] 01/08/2022    Seizures ([facility] [address] [R56.9] 01/08/2022    H/O: CVA (cerebrovascular accident) [Z86.73] 05/06/2014    Acquired hypothyroidism [E03.9] 03/27/2012    Essential hypertension [I10] 03/27/2012       Plan:     1. Witnessed seizures, there was concern of noncompliance (although Keppra level tested okay) , on IV Keppra on IV Vimpat , neurology onboard   2. Had  MRI brain with and without contrast  3. On reg diet at this time   4. On Lovenox for DVT prophylaxis  5. Hypothyroidism TSH nml  6. History of stroke with residual weakness on left side, although patient not cooperative with neurological exam  7. PT/OT  8. Rehab placement assessment         Muriel Perez MD  1/10/2022  9:54 AM    Copy sent to Dr. Colt Hernandez, DO    Please note that this chart was generated using voice recognition Dragon dictation software. Although every effort was made to ensure the accuracy of this automated transcription, some errors in transcription may have occurred.

## 2022-01-10 NOTE — PROGRESS NOTES
[address]    Occupational Therapy Evaluation  Date: 1/10/22  Patient Name: Roxanne Ashraf       Room: 2526/2330-95  MRN: [id number]  Account: [de-identified]   : 1955  (68 y.o.) Gender: female     Discharge Recommendations: Continue to assess pending progress  Further Occupational Therapy is recommended upon facility discharge. Referring Practitioner: Mallika Franks MD  Diagnosis: Status Epilepticus       Treatment Diagnosis: Impaired self care status  Past Medical History:  has a past medical history of Ankle fracture, left, Anxiety, Arthritis, Black tarry stools, Colon polyp, Constipation, ETOH abuse, Frequency of urination, GERD (gastroesophageal reflux disease), Head injury, Headache(784.0), Hypertension, Hypotension, Hypothyroidism, Memory deficit, Migraines, Numbness and tingling, Osteoporosis, Peripheral vascular disease ([address] Pneumonia, Seizures ([address] Shingles, Stroke ([address] TIA (transient ischemic attack), Tremor, UTI (urinary tract infection), Varicose veins, and Wears glasses. Past Surgical History:   has a past surgical history that includes Bunionectomy (Right); Tubal ligation; other surgical history (2015); Hammer toe surgery (Left, 2016); pr [facility] incl fluor gdnce dx w/cell washg spx (N/A, 2018); Colonoscopy; and Colonoscopy (N/A, 5/10/2021).     Restrictions  Restrictions/Precautions: Seizure,General Precautions,Fall Risk (Up with Assist)     Vitals  Temp: 97.6 °F (36.4 °C)  Pulse: 82  Resp: 20  BP: 115/79  Height: 5' 10\" (177.8 cm)  Weight: 149 lb 14.4 oz (68 kg)  BMI (Calculated): 21.6  Oxygen Therapy  SpO2: 98 %  Pulse Oximeter Device Mode: Intermittent  Pulse Oximeter Device Location: Finger  O2 Device: None (Room air)  Level of Consciousness: Alert (0)    Subjective  Subjective: Pt resting in bed upon arrival. Pt was agreeable to OT/PT eval  Comments: Ok per [employer] for OT/PT eval  Overall Orientation Status: Impaired  Vision  Vision: Within Functional Limits  Hearing  Hearing: Exceptions to [facility]  Social/Functional History  Lives With: Family (Sister)  Additional Comments: Per chart review pt lives with sister. Pt unable to provide home information due to cognitive barriers. No family present at this time. Objective      Cognition  Overall Cognitive Status: Impaired  Arousal/Alertness: Delayed responses to stimuli  Following Commands: Follows one step commands with repetition  Safety Judgement: Decreased awareness of need for safety  Insights: Decreased awareness of deficits  Sequencing and Organization: Assistance required to identify errors made,Assistance required to generate solutions,Assistance required to implement solutions   Perception  Overall Perceptual Status: Impaired  Initiation: Cues to initiate tasks  Perseveration: Perseverates during conversation   ADL  Feeding: Setup  Grooming: Setup  UE Bathing: Stand by assistance  LE Bathing: Contact guard assistance  UE Dressing: Stand by assistance  LE Dressing: Contact guard assistance  Toileting: Dependent/Total (Flores catheter)  Additional Comments: ADL scores based on clinical reasoning and skilled observation unless otherwise noted. Pt currently limited due to decreased balance, activity tolerace, and cognitive barriers impacting safety and independence with self care tasks.      UE Function           LUE Strength  Gross LUE Strength: WFL  L Hand General: 4+/5     LUE Tone: Normotonic     LUE AROM (degrees)  LUE AROM : WFL     Left Hand AROM (degrees)  Left Hand AROM: WFL  RUE Strength  Gross RUE Strength: WFL  R Hand General: 4+/5      RUE Tone: Normotonic     RUE AROM (degrees)  RUE AROM : WFL     Right Hand AROM (degrees)  Right Hand AROM: WFL    Fine Motor Skills  Coordination  Movements Are Fluid And Coordinated: No  Coordination and Movement description: Decreased speed,Right UE,Left UE                           Mobility  Supine to Sit: Stand by assistance  Sit to Supine: Stand by assistance       Balance  Sitting Balance: Stand by assistance  Standing Balance: Contact guard assistance  Standing Balance  Time: 1-2 minutes x 3  Activity: functional transfers/mobility  Comment: without device. Slight unsteadiness with ability to right self  Functional Mobility  Functional - Mobility Device: No device  Activity: Other (within room)  Assist Level: Contact guard assistance  Functional Mobility Comments: Verbal cues for safety and initiation of task due to cognitive barriers. Slight unsteadiness with ability to right self  Bed mobility  Supine to Sit: Stand by assistance  Sit to Supine: Stand by assistance  Scooting: Stand by assistance  Comment: Bed mobility completed with HOB elevated with verbal cues for initiation and sequencing of task due to cognitive barriers. Transfers  Sit to stand: Contact guard assistance  Stand to sit: Contact guard assistance  Functional Activity Tolerance  Functional Activity Tolerance:  Tolerates 30 min exercise with multiple rests     Assessment  Assessment  Performance deficits / Impairments: Decreased ADL status,Decreased functional mobility ,Decreased safe awareness,Decreased cognition,Decreased endurance,Decreased balance,Decreased high-level IADLs  Treatment Diagnosis: Impaired self care status  Prognosis: Good  Decision Making: Medium Complexity  REQUIRES OT FOLLOW UP: Yes  Discharge Recommendations: Patient would benefit from continued therapy after discharge  Activity Tolerance: Treatment limited secondary to decreased cognition         Functional Outcome Measures  AM-PAC Daily Activity Inpatient   How much help for putting on and taking off regular lower body clothing?: A Little  How much help for Bathing?: A Little  How much help for Toileting?: Total  How much help for putting on and taking off regular upper body clothing?: A Little  How much help for taking care of personal grooming?: A Little  How much help for eating meals?: A

## 2022-01-10 NOTE — CARE COORDINATION
ONGOING DISCHARGE PLAN:    Patient is oriented to elf and time, but not situation. Writer called Patients sister Cherrie Ugalde. Cherrie Ugalde would like Roscoe Gail to go to [address] [place] [address] [address] as she has been there before. Cherrie Ugalde said she is worried about her coming home as she has 15 steps to climb to get to the bathroom and bedroom. Writer notified Pillo Shaw about choice for [facility]. Referral being sent . Pre cert will be needed. Will continue to follow for additional discharge needs.     Electronically signed by Nimesh Robles RN on 1/10/2022 at 4:12 PM
[employer] for [employer], Follow for ride home. Khanh will need signed/completed, will follow. //KB                Electronically signed by: Vincent Rangel RN on 1/9/2022 at 11:29 AM

## 2022-01-10 NOTE — PROGRESS NOTES
[facility]   IN-PATIENT SERVICE      NEUROLOGY PROGRESS  NOTE            Date:   1/10/2022  Patient name:  Livia Ellis  Date of admission:  1/8/2022  YOB: 1955      Interval History:     No seizures overnight    Pt is very awake, sitting on her bed comfortably watching TV        History of Present Illness: The patient is a 77 y.o. female who presents with Seizures  . The patient was seen and examined and the chart was reviewed. Patient with history of prior right MCA stroke, prior history of seizures presents with breakthrough seizure early this morning. Witnessed by family apparently son. When she came to the ED she had been given Versed and was reportedly starting to wake up and follow some basic commands. She was loaded with 2 g of IV Keppra. Around 11 AM or so patient was noted to have right facial twitching, decreased responsiveness and occasional rhythmic twitching left upper extremity. She was clenching down at the same time and occasionally desaturating. She was given a total of 6 mg of IV Ativan and this did not resolve the patient's symptoms. Vimpat was then ordered by consultant 200 mg IV load. After this was given patient appeared to stop the twitching and was more somnolent/obtunded. Discussed case with neuro critical care team at [facility] in which they did accept patient for further continuous EEG monitoring and status epilepticus. Patient's respiratory status appears to be stable at this point in time and recommendation to hold off intubation. If she were to start seizing again she would likely need to be intubated prior to arrival.    Past Medical History:     Past Medical History:   Diagnosis Date    Ankle fracture, left     Anxiety 02/04/2014    Arthritis     Black tarry stools     Colon polyp     Constipation     ETOH abuse     States she has not drank any ETOH in 24 years as of 2021    Frequency of urination ?     GERD (gastroesophageal reflux disease) 2014    Head injury     after seizure/stroke pt fell and hit head    Headache(784.0)     Hypertension     Hypotension ?  Hypothyroidism     Memory deficit     Migraines     Numbness and tingling     fingers    Osteoporosis     [facility] ([facility]     Pneumonia     Seizures ([facility]     last seizure 2020,     Shingles      Pt.denies.  Stroke [facility])     stroke and seizure together    TIA (transient ischemic attack)         Tremor     UTI (urinary tract infection)     once    Varicose veins     Wears glasses         Past Surgical History:     Past Surgical History:   Procedure Laterality Date    BUNIONECTOMY Right     COLONOSCOPY      COLONOSCOPY N/A 5/10/2021    COLONOSCOPY DIAGNOSTIC performed by Jael Segovia MD at [facility] Left 2016    ON 2ND, 3rd, 4th,  and  5TH TOES OF LEFT FOOT  Pt. Denies.     OTHER SURGICAL HISTORY  2015    orif left ankle with synthes and intraoperative c- arm    OK BRNCHSC INCL FLUOR GDNCE DX W/CELL WASHG SPX N/A 2018    BRONCHOSCOPY WITH WASHINGS AT PATIENT BEDSIDE ICU performed by Chris Llanos MD at [facility]          Medications during admission:      sodium chloride flush  5-40 mL IntraVENous 2 times per day    amLODIPine  10 mg Oral Daily    aspirin  81 mg Oral Daily    atorvastatin  40 mg Oral Daily    donepezil  5 mg Oral Nightly    levothyroxine  50 mcg Oral Daily    pantoprazole  40 mg Oral QAM AC    oxybutynin  5 mg Oral TID    PARoxetine  30 mg Oral Daily    primidone  75 mg Oral BID    enoxaparin  40 mg SubCUTAneous Daily    lacosamide (VIMPAT) IVPB  100 mg IntraVENous BID    levetiracetam  1,500 mg IntraVENous Q12H         Physical Exam:   /87   Pulse 79   Temp 97.8 °F (36.6 °C) (Oral)   Resp 18   Ht 5' 10\" (1.778 m)   Wt 149 lb 14.4 oz (68 kg)   SpO2 100%   BMI 21.51 kg/m²   Temp (24hrs), Av.9 °F (36.6 °C), other diagnostic tests.          Impression:      77year old woman with hx of seizure but non compliance, presented with seizures    Plan:     - continue Vimpat 100 mg every 12 hours, Keppra 1500 mg every 12 hours and primidone 75mg BID ok to switch to PO  - pt is significantly improved, and follow all commands, despite being slow, but is appropriate  - given the significant improved, pt will not need EEG  - pt slowness is most likely due to remnant of being post ictal, this will resolve on its own in a few days    - PT OT and rehab  - will follow    Electronically signed by Yaw Sarmiento MD on 1/10/2022 at 9:54 AM      Yaw Sarmiento MD  [facility] [address]  Neurology/Neurocritical Care

## 2022-01-10 NOTE — PLAN OF CARE
Problem: Falls - Risk of:  Goal: Will remain free from falls  Description: Will remain free from falls  1/10/2022 0523 by Loren Pardo RN  Outcome: Met This Shift     Problem: Falls - Risk of:  Goal: Absence of physical injury  Description: Absence of physical injury  1/10/2022 0523 by Loren Pardo RN  Outcome: Met This Shift     Problem: Injury - Risk of, Physical Injury:  Goal: Will remain free from falls  Description: Will remain free from falls  1/10/2022 0523 by Loren Pardo RN  Outcome: Met This Shift     Problem: Injury - Risk of, Physical Injury:  Goal: Absence of physical injury  Description: Absence of physical injury  1/10/2022 0523 by Loren Pardo RN  Outcome: Met This Shift     Problem: Safety:  Goal: Free from accidental physical injury  Description: Free from accidental physical injury  1/10/2022 0523 by Loren Pardo RN  Outcome: Ongoing     Problem: Safety:  Goal: Free from intentional harm  Description: Free from intentional harm  1/10/2022 0523 by Loren Pardo RN  Outcome: Ongoing     Problem: Pain:  Goal: Patient's pain/discomfort is manageable  Description: Patient's pain/discomfort is manageable  1/10/2022 0523 by Loren Pardo RN  Outcome: Ongoing     Problem: Skin Integrity:  Goal: Skin integrity will stabilize  Description: Skin integrity will stabilize  1/10/2022 0523 by Loren Pardo RN  Outcome: Ongoing     Problem: Confusion - Acute:  Goal: Absence of continued neurological deterioration signs and symptoms  Description: Absence of continued neurological deterioration signs and symptoms  1/10/2022 0523 by Loren Pardo RN  Outcome: Ongoing     Problem: Confusion - Acute:  Goal: Mental status will be restored to baseline  Description: Mental status will be restored to baseline  1/10/2022 0523 by Loren Pardo RN  Outcome: Ongoing

## 2022-01-10 NOTE — FLOWSHEET NOTE
PT did not talk at all but Joy Stinson felt that PT welcomed prayer as her eyes were lit after the prayer. 01/10/22 6406   Encounter Summary   Services provided to: Patient   Referral/Consult From: Rounding   Continue Visiting   (1-10-22)   Complexity of Encounter Low   Length of Encounter 15 minutes   Spiritual Assessment Completed Yes   Spiritual/Yarsani   Type Spiritual support   Assessment Calm; Approachable;Coping   Intervention Active listening;Explored feelings, thoughts, concerns;Prayer;Sustaining presence/ Ministry of presence   Outcome Comfort;Encouraged;Receptive

## 2022-01-10 NOTE — PROGRESS NOTES
SW informed pt would like [facility] for SNF choice. SW faxed referral to [facility].

## 2022-01-10 NOTE — PROGRESS NOTES
Physical Therapy    Facility/Department: [address] [place] [address] CARE  Initial Assessment    NAME: Ramila Marks  : 1955  MRN: [id number]    Date of Service: 1/10/2022    Discharge Recommendations:  Patient would benefit from continued therapy after discharge   PT Equipment Recommendations  Equipment Needed:  (TBD)    Assessment   Body structures, Functions, Activity limitations: Decreased functional mobility ; Decreased strength;Decreased safe awareness;Decreased cognition;Decreased balance  Assessment: pt Currently requiring SBA for bed mobility and CGA for transfers and ambulation of short distances with Maximal Cues and encouragement for pt to perform tasks to completion; pt most limited by impaired cognition this date but will be safe to return home with 24/hr supervision for safety; Pt would benefit from continued PT services to address impaired cognition, balance, and strength. Treatment Diagnosis: Impaired Balance and strength 2* seizures  Specific instructions for Next Treatment: Progress ambulation distance with or without device as needed  Prognosis: Fair  Decision Making: Medium Complexity  History: H/O Seizures, CVA in 2014, HTN  Exam: ROM, MMT, Balance, and functional mobility assessments  Clinical Presentation: pt is cooperative with max cueing and encouragement; Demo's need for inc time to process and respond  Barriers to Learning: Cognitive barriers; inc processing time needed  REQUIRES PT FOLLOW UP: Yes  Activity Tolerance  Activity Tolerance: Patient limited by cognitive status  Activity Tolerance: pt Requires inc time and Max cueing and encouragment to perform tasks to completion       Patient Diagnosis(es): The primary encounter diagnosis was Status epilepticus ([facility] [address] A diagnosis of Seizure [facility]) was also pertinent to this visit.      has a past medical history of Ankle fracture, left, Anxiety, Arthritis, Black tarry stools, Colon polyp, Constipation, ETOH abuse, Frequency of urination, GERD (gastroesophageal reflux disease), Head injury, Headache(784.0), Hypertension, Hypotension, Hypothyroidism, Memory deficit, Migraines, Numbness and tingling, Osteoporosis, Peripheral vascular disease ([address] Pneumonia, Seizures ([address] Shingles, Stroke ([address] TIA (transient ischemic attack), Tremor, UTI (urinary tract infection), Varicose veins, and Wears glasses. has a past surgical history that includes Bunionectomy (Right); Tubal ligation; other surgical history (02/23/2015); Hammer toe surgery (Left, 07/06/2016); pr [facility] incl fluor gdnce dx w/cell washg spx (N/A, 01/16/2018); Colonoscopy; and Colonoscopy (N/A, 5/10/2021). Restrictions  Restrictions/Precautions  Restrictions/Precautions: Seizure,General Precautions,Fall Risk (Up with Assist)  Vision/Hearing  Vision: Within Functional Limits  Hearing: Exceptions to [facility]     Subjective  General  Chart Reviewed: Yes  Patient assessed for rehabilitation services?: Yes  Additional Pertinent Hx: Patient has past medical history of seizure disorder, stroke with left-sided weakness, patient had multiple witnessed episodes, first noted by family, later on patient has seizure episode while in emergency room  Response To Previous Treatment: Not applicable  Family / Caregiver Present: No  Referring Practitioner: Jemima Tejeda  Referral Date : 01/09/22  Diagnosis: Status Epilepticus, Seizure  Follows Commands: Impaired  Other (Comment): Significantly delayed processing time; able to follow one step commands with inc time   General Comment  Comments: OK per nurse Beverly Harris to proceed with OT/PT co-eval  Subjective  Subjective: Pt with limited communication capacity requiring increased time to process and respond. Pt is agreeable to therapy assessments. Pain Screening  Patient Currently in Pain: Denies  Vital Signs  Patient Currently in Pain: Denies  Patient Observation  Observations: Pt resting in bed at beginning and retires to bed at end of session;  Pt with limited capacity/ability to comprehend and respond; Requires increased time to process and respond. Orientation  Orientation  Overall Orientation Status: Impaired  Orientation Level: Oriented to person  Social/Functional History  Social/Functional History  Lives With: Family (Sister)  Additional Comments: Per chart review pt lives with sister. Pt unable to provide home information due to cognitive barriers. No family present at this time. Cognition        Objective          AROM RLE (degrees)  RLE AROM: WFL  AROM LLE (degrees)  LLE AROM : WFL  AROM RUE (degrees)  RUE General AROM: See OT assessments  AROM LUE (degrees)  LUE General AROM: See OT assessments  Strength RLE  Strength RLE: WFL  Comment: Grossly 4/5  Strength LLE  Strength LLE: WFL  Comment: Grossly 4/5  Strength RUE  Comment: See OT assessments  Strength LUE  Comment: See OT assessments     Sensation  Overall Sensation Status:  (PAZ to formally assess d/t limited cognition)  Bed mobility  Scooting: Stand by assistance  Transfers  Sit to Stand: Contact guard assistance  Stand to sit: Contact guard assistance  Bed to Chair: Contact guard assistance  Stand Pivot Transfers: Contact guard assistance  Comment: All transfers performed without AD; CGA for safety and steadying; Verbal and tactile cues for safety technique  Ambulation  Ambulation?: Yes  Ambulation 1  Surface: level tile  Device: No Device  Assistance: Contact guard assistance  Quality of Gait: slow pace; Narrow DENI  Gait Deviations: Slow Mariah  Distance: 15'x2  Comments: Verbal and visual cues provided to encourage pt to perform task to completion; CGA for safety and steadying during gait.    Stairs/Curb  Stairs?: No     Balance  Posture: Good  Sitting - Static: Good  Sitting - Dynamic: Fair  Standing - Static: Fair (No AD)  Standing - Dynamic: Fair;- (no AD)  Comments: Standing balance assessed without AD; 2 minor LOB occur during gait with CGA to recover        Plan   Plan  Times per week: 4-5 treatments per week  Specific instructions for Next Treatment: Progress ambulation distance with or without device as needed  Current Treatment Recommendations: Strengthening,Balance Training,Functional Mobility Training,Gait Training,Cognitive Reorientation,Safety Education & Training,Patient/Caregiver Education & Training  Safety Devices  Type of devices: Bed alarm in place,Call light within reach,Gait belt,Patient at risk for falls,Left in bed,Nurse notified (Nurse Tan Mccallum)    G-Code       OutComes Score    AM-PAC Score     AM-PAC Inpatient Mobility without Stair Climbing Raw Score : 15 (01/10/22 0941)  AM-PAC Inpatient without Stair Climbing T-Scale Score : 43.03 (01/10/22 0941)  Mobility Inpatient CMS 0-100% Score: 47.43 (01/10/22 0941)  Mobility Inpatient without Stair CMS G-Code Modifier : CK (01/10/22 0941)       Goals  Short term goals  Time Frame for Short term goals: until D/C  Short term goal 1: Pt to improve BLE strength by 1/2 MMG to improve safety with mobility  Short term goal 2: Pt to improve standing balance to fair with or without appropriate AD to improve safety with mobility   Short term goal 3: Mod I bed mobility to decrease burden of care  Short term goal 4: pt to tolerate 25 minutes of therapeutic exercise/activity to improve tolerance for therapy and daily activities  Patient Goals   Patient goals : Pt does not state goals       Therapy Time   Individual Concurrent Group Co-treatment   Time In 0941         Time Out 1006         Minutes 25         Timed Code Treatment Minutes: 8 Minutes     Due to my current lifting Restrictions, an OT performed functional mobility and provided appropriate levels of assistance with the patient in order to best protect the safety of the patient and myself.  Assist levels documented in this Evaluation accurately reflect what was provided by my OT partner during the encounter where I was presently observing and assessing the patient's functional level and needs.    Nirav Morelos, PT

## 2022-01-10 NOTE — PROGRESS NOTES
Patient's IV infiltrated - Writer attempted to restart X2 attempts with no success. Clin lead notified, she will attempt.

## 2022-01-11 LAB
ANION GAP SERPL CALCULATED.3IONS-SCNC: 14 MMOL/L (ref 9–17)
BUN BLDV-MCNC: 5 MG/DL (ref 8–23)
BUN/CREAT BLD: ABNORMAL (ref 9–20)
CALCIUM SERPL-MCNC: 9.1 MG/DL (ref 8.6–10.4)
CHLORIDE BLD-SCNC: 105 MMOL/L (ref 98–107)
CO2: 22 MMOL/L (ref 20–31)
CREAT SERPL-MCNC: 0.46 MG/DL (ref 0.5–0.9)
GFR AFRICAN AMERICAN: >60 ML/MIN
GFR NON-AFRICAN AMERICAN: >60 ML/MIN
GFR SERPL CREATININE-BSD FRML MDRD: ABNORMAL ML/MIN/{1.73_M2}
GFR SERPL CREATININE-BSD FRML MDRD: ABNORMAL ML/MIN/{1.73_M2}
GLUCOSE BLD-MCNC: 102 MG/DL (ref 65–105)
GLUCOSE BLD-MCNC: 75 MG/DL (ref 65–105)
GLUCOSE BLD-MCNC: 93 MG/DL (ref 65–105)
GLUCOSE BLD-MCNC: 99 MG/DL (ref 70–99)
HCT VFR BLD CALC: 45.7 % (ref 36–46)
HEMOGLOBIN: 15.7 G/DL (ref 12–16)
MAGNESIUM: 2 MG/DL (ref 1.6–2.6)
MCH RBC QN AUTO: 32.5 PG (ref 26–34)
MCHC RBC AUTO-ENTMCNC: 34.3 G/DL (ref 31–37)
MCV RBC AUTO: 94.7 FL (ref 80–100)
NRBC AUTOMATED: NORMAL PER 100 WBC
PDW BLD-RTO: 12.8 % (ref 11.5–14.9)
PLATELET # BLD: 192 K/UL (ref 150–450)
PMV BLD AUTO: 8.4 FL (ref 6–12)
POTASSIUM SERPL-SCNC: 3.2 MMOL/L (ref 3.7–5.3)
RBC # BLD: 4.83 M/UL (ref 4–5.2)
SODIUM BLD-SCNC: 141 MMOL/L (ref 135–144)
WBC # BLD: 4.5 K/UL (ref 3.5–11)

## 2022-01-11 PROCEDURE — 99232 SBSQ HOSP IP/OBS MODERATE 35: CPT | Performed by: INTERNAL MEDICINE

## 2022-01-11 PROCEDURE — 85027 COMPLETE CBC AUTOMATED: CPT

## 2022-01-11 PROCEDURE — 97116 GAIT TRAINING THERAPY: CPT

## 2022-01-11 PROCEDURE — 97110 THERAPEUTIC EXERCISES: CPT

## 2022-01-11 PROCEDURE — 83735 ASSAY OF MAGNESIUM: CPT

## 2022-01-11 PROCEDURE — 2580000003 HC RX 258: Performed by: STUDENT IN AN ORGANIZED HEALTH CARE EDUCATION/TRAINING PROGRAM

## 2022-01-11 PROCEDURE — 2580000003 HC RX 258: Performed by: EMERGENCY MEDICINE

## 2022-01-11 PROCEDURE — 97530 THERAPEUTIC ACTIVITIES: CPT

## 2022-01-11 PROCEDURE — C9254 INJECTION, LACOSAMIDE: HCPCS | Performed by: STUDENT IN AN ORGANIZED HEALTH CARE EDUCATION/TRAINING PROGRAM

## 2022-01-11 PROCEDURE — 99233 SBSQ HOSP IP/OBS HIGH 50: CPT | Performed by: PSYCHIATRY & NEUROLOGY

## 2022-01-11 PROCEDURE — 6360000002 HC RX W HCPCS: Performed by: STUDENT IN AN ORGANIZED HEALTH CARE EDUCATION/TRAINING PROGRAM

## 2022-01-11 PROCEDURE — 82947 ASSAY GLUCOSE BLOOD QUANT: CPT

## 2022-01-11 PROCEDURE — 6370000000 HC RX 637 (ALT 250 FOR IP): Performed by: INTERNAL MEDICINE

## 2022-01-11 PROCEDURE — 1200000000 HC SEMI PRIVATE

## 2022-01-11 PROCEDURE — 36415 COLL VENOUS BLD VENIPUNCTURE: CPT

## 2022-01-11 PROCEDURE — 80048 BASIC METABOLIC PNL TOTAL CA: CPT

## 2022-01-11 PROCEDURE — 6370000000 HC RX 637 (ALT 250 FOR IP): Performed by: STUDENT IN AN ORGANIZED HEALTH CARE EDUCATION/TRAINING PROGRAM

## 2022-01-11 PROCEDURE — 92523 SPEECH SOUND LANG COMPREHEN: CPT

## 2022-01-11 PROCEDURE — 51798 US URINE CAPACITY MEASURE: CPT

## 2022-01-11 RX ADMIN — DEXTROSE MONOHYDRATE 100 MG: 50 INJECTION, SOLUTION INTRAVENOUS at 09:31

## 2022-01-11 RX ADMIN — PRIMIDONE 75 MG: 50 TABLET ORAL at 22:21

## 2022-01-11 RX ADMIN — OXYBUTYNIN CHLORIDE 5 MG: 5 TABLET ORAL at 08:16

## 2022-01-11 RX ADMIN — SODIUM CHLORIDE: 9 INJECTION, SOLUTION INTRAVENOUS at 11:03

## 2022-01-11 RX ADMIN — PANTOPRAZOLE SODIUM 40 MG: 40 TABLET, DELAYED RELEASE ORAL at 06:02

## 2022-01-11 RX ADMIN — DEXTROSE MONOHYDRATE 100 MG: 50 INJECTION, SOLUTION INTRAVENOUS at 00:47

## 2022-01-11 RX ADMIN — AMLODIPINE BESYLATE 10 MG: 10 TABLET ORAL at 08:16

## 2022-01-11 RX ADMIN — DEXTROSE MONOHYDRATE 100 MG: 50 INJECTION, SOLUTION INTRAVENOUS at 22:27

## 2022-01-11 RX ADMIN — DONEPEZIL HYDROCHLORIDE 5 MG: 5 TABLET, FILM COATED ORAL at 22:21

## 2022-01-11 RX ADMIN — LEVETIRACETAM 1500 MG: 100 INJECTION INTRAVENOUS at 12:19

## 2022-01-11 RX ADMIN — POTASSIUM BICARBONATE 40 MEQ: 782 TABLET, EFFERVESCENT ORAL at 19:09

## 2022-01-11 RX ADMIN — ATORVASTATIN CALCIUM 40 MG: 40 TABLET, FILM COATED ORAL at 08:16

## 2022-01-11 RX ADMIN — OXYBUTYNIN CHLORIDE 5 MG: 5 TABLET ORAL at 15:13

## 2022-01-11 RX ADMIN — OXYBUTYNIN CHLORIDE 5 MG: 5 TABLET ORAL at 22:21

## 2022-01-11 RX ADMIN — PRIMIDONE 75 MG: 50 TABLET ORAL at 08:15

## 2022-01-11 RX ADMIN — ASPIRIN 81 MG: 81 TABLET, COATED ORAL at 08:16

## 2022-01-11 RX ADMIN — PAROXETINE HYDROCHLORIDE 30 MG: 20 TABLET, FILM COATED ORAL at 08:15

## 2022-01-11 RX ADMIN — LEVOTHYROXINE SODIUM 50 MCG: 0.05 TABLET ORAL at 06:02

## 2022-01-11 RX ADMIN — ENOXAPARIN SODIUM 40 MG: 100 INJECTION SUBCUTANEOUS at 08:17

## 2022-01-11 RX ADMIN — SODIUM CHLORIDE, PRESERVATIVE FREE 10 ML: 5 INJECTION INTRAVENOUS at 22:21

## 2022-01-11 RX ADMIN — LEVETIRACETAM 1500 MG: 100 INJECTION INTRAVENOUS at 23:25

## 2022-01-11 RX ADMIN — LEVETIRACETAM 1500 MG: 100 INJECTION INTRAVENOUS at 00:04

## 2022-01-11 NOTE — PROGRESS NOTES
severely [place] vs has auditory processing difficulties vs if all d/t receptive language. Assessment very limited d/t pt. comprehension issues. ST to tx to improve pt. expression and receptive language. Unable to assess swallow function at this time as pt. Unable to follow cues to drink from cup. Pt. 1:1 sitter reports pt. Demo. No observable difficulty when swallowing but \"doesn't eat much. \"      Recommendations:  Requires SLP Intervention: Yes             Plan:   Goals:  Short-term Goals  Goal 1: Increase following one step directions and answering simple y/n ?s c 50%  Goal 2: Increase naming simple objects around room to 50%  Goal 3: Further assessment of reading comprehension at the word level. Patient/family involved in developing goals and treatment plan: family not present, pt. Unable to participate in goal setting or tx plan    Subjective:   Previous level of function and limitations: unknown, pt. Lives c sister per chart        Vision  Vision: Within Functional Limits  Hearing  Hearing: Exceptions to [facility] (? severely [place] vs severe comprehension (receptive language) deficit)  Hearing Exceptions: Hard of hearing/hearing concerns           Objective:     Oral/Motor  Oral Motor:  (Pt. unable to follow)    Auditory Comprehension  Comprehension: Exceptions  Yes/No Questions: Severe (2/5 basic questions c repetition, re: self and immediate environment)  One Step Basic Commands: Severe (0/6, 2/6 with max cues)              Verbal Expression  Verbal Expression: Exceptions to functional limits  Automatic Speech:  (Unable to follow cues for task)  Confrontation: Severe (1/5 name objects in room, no increase with cues)  Responsive:  (Unable to follow cues for task d/t receptive language vs [place] deficit)         Motor Speech  Motor Speech:  Within Functional Limits         Cognition:    Unable to assess         Education:  Patient Education Response: No evidence of learning          Therapy Time:   Individual Concurrent Group Co-treatment   Time In 1028         Time Out 1039         Minutes [address] [facility] Zane CALZADA/SLP    1/11/2022 11:07 AM

## 2022-01-11 NOTE — PROGRESS NOTES
[facility] Neurology   IN-PATIENT SERVICE      NEUROLOGY PROGRESS  NOTE            Date:   1/11/2022  Patient name:  Roxanne Ashraf  Date of admission:  1/8/2022  YOB: 1955      Interval History:     No seizures overnight    Pt is very awake, sitting on her bed participating in rehab with PT    Pt is still confused and always try to walk out of her bed on her own    Otherwise pt is directable and follow commands        History of Present Illness: The patient is a 77 y.o. female who presents with Seizures  . The patient was seen and examined and the chart was reviewed. Patient with history of prior right MCA stroke, prior history of seizures presents with breakthrough seizure early this morning. Witnessed by family apparently son. When she came to the ED she had been given Versed and was reportedly starting to wake up and follow some basic commands. She was loaded with 2 g of IV Keppra. Around 11 AM or so patient was noted to have right facial twitching, decreased responsiveness and occasional rhythmic twitching left upper extremity. She was clenching down at the same time and occasionally desaturating. She was given a total of 6 mg of IV Ativan and this did not resolve the patient's symptoms. Vimpat was then ordered by consultant 200 mg IV load. After this was given patient appeared to stop the twitching and was more somnolent/obtunded. Discussed case with neuro critical care team at [facility] in which they did accept patient for further continuous EEG monitoring and status epilepticus. Patient's respiratory status appears to be stable at this point in time and recommendation to hold off intubation.   If she were to start seizing again she would likely need to be intubated prior to arrival.    Past Medical History:     Past Medical History:   Diagnosis Date    Ankle fracture, left     Anxiety 02/04/2014    Arthritis     Black tarry stools     Colon polyp     Constipation     ETOH abuse     States she has not drank any ETOH in 24 years as of 2021    Frequency of urination ?  GERD (gastroesophageal reflux disease) 02/04/2014    Head injury 1998    after seizure/stroke pt fell and hit head    Headache(784.0)     Hypertension     Hypotension ?  Hypothyroidism     Memory deficit     Migraines     Numbness and tingling     fingers    Osteoporosis     Peripheral vascular disease [facility] [address]     Pneumonia     Seizures ([facility] [address]     last seizure 6-,     Shingles      Pt.denies.  Stroke [facility]) 1998    stroke and seizure together    TIA (transient ischemic attack)     1996    Tremor     UTI (urinary tract infection)     once    Varicose veins     Wears glasses         Past Surgical History:     Past Surgical History:   Procedure Laterality Date    BUNIONECTOMY Right     COLONOSCOPY      COLONOSCOPY N/A 5/10/2021    COLONOSCOPY DIAGNOSTIC performed by Candelaria Keen MD at [address] [place] [address] Left 07/06/2016    ON 2ND, 3rd, 4th,  and  5TH TOES OF LEFT FOOT  Pt. Denies.     OTHER SURGICAL HISTORY  02/23/2015    orif left ankle with synthes and intraoperative c- arm    [place] [facility] INCL FLUOR GDNCE DX W/CELL WASHG SPX N/A 01/16/2018    BRONCHOSCOPY WITH WASHINGS AT PATIENT BEDSIDE ICU performed by Naveed Torres MD at [facility] [facility]          Medications during admission:      sodium chloride flush  5-40 mL IntraVENous 2 times per day    amLODIPine  10 mg Oral Daily    aspirin  81 mg Oral Daily    atorvastatin  40 mg Oral Daily    donepezil  5 mg Oral Nightly    levothyroxine  50 mcg Oral Daily    pantoprazole  40 mg Oral QAM AC    oxybutynin  5 mg Oral TID    PARoxetine  30 mg Oral Daily    primidone  75 mg Oral BID    enoxaparin  40 mg SubCUTAneous Daily    lacosamide (VIMPAT) IVPB  100 mg IntraVENous BID    levetiracetam  1,500 mg IntraVENous Q12H         Physical Exam:   /72   Pulse 73   Temp 98.3 °F (36.8 °C) (Oral)   Resp 18   Ht 5' 10\" (1.778 m)   Wt 149 lb 14.4 oz (68 kg)   SpO2 99%   BMI 21.51 kg/m²   Temp (24hrs), Av.8 °F (36.6 °C), Min:97.5 °F (36.4 °C), Max:98.3 °F (36.8 °C)          Neurological examination:      Mental status   Patient is awake sitting up doing PT. She follows three steps slowly, she is able to speak but slow, mildly dysarthric, she is AAO x 1, she does not know the time or place     Cranial nerves   Pupil response:            Present     Oculocephalic reflex:   Present     Corneal Reflex:            Present     Facial grimace to pin:  Present     Gag/Cough reflex:       Present        Motor and sensory function  No drift on arms and legs  Tone: Normal      DTR   Plantar response: Downgoing  (-) Winters's sign bilaterally    Gait  Not tested but pt is sitting up on the bed on her own           Diagnostics:      Laboratory Testing:  CBC:   Recent Labs     22  1024 01/10/22  0656 22  0717   WBC 5.2 6.0 4.5   HGB 14.6 14.0 15.7    180 192     BMP:    Recent Labs     22  0804 01/10/22  0656 22  0717    136 141   K 3.7 3.5* 3.2*    105 105   CO2 24 20 22   BUN 7* 6* 5*   CREATININE 0.52 0.46* 0.46*   GLUCOSE 83 87 99         Lab Results   Component Value Date    CHOL 183 2016    LDLCHOLESTEROL 55 2021    HDL 71 2021    TRIG 64 2016    ALT 15 2021    AST 18 2021    TSH 3.96 2022    INR 1.1 2022    LABA1C 5.1 2016    [id number] 1449 (H) 2021       Lab Results   Component Value Date    PHENYTOIN <0.8 (L) 2018       Imaging/Diagnostics:    EEG monitoring (10/2018): Left frontal temporal sharp waves conferred an increased risk for focal onset seizures. Continuous left frontotemporal slowing.       CT head: Chronic right hemispheric stroke with encephalomalacia. Diffuse cerebral atrophy and small vessel ischemic changes     MRI brain (2020): Remote right MCA distribution infarct.   No acute abnormalities. No postcontrast enhancement. I personally reviewed all of the above medications, clinical laboratory, imaging and other diagnostic tests. Impression:      77year old woman with hx of seizure but non compliance, presented with seizures, now with prolonged but improving post ictal state.     Plan:     - continue Vimpat 100 mg every 12 hours, Keppra 1500 mg every 12 hours and primidone 75mg BID ok to switch to PO when swallowing cleared by SLP  - pt's speech is still a bit slow, in my opinion she may benefit from speech therapy  - pt con't to improve, and follow all commands, despite being slow, but is appropriate  - pt slowness is most likely due to remnant of being post ictal, this will resolve on its own in a few days    - PT OT and rehab  - will follow    Electronically signed by Laurel Zuniga MD on 1/11/2022 at 10:01 AM      Laurel Zuniga MD  [facility]  Neurology/Neurocritical Care

## 2022-01-11 NOTE — PROGRESS NOTES
House supervisor calls to inform writer that she may not be able to make it to the pt's room to attempt IV access. Asks writer to call Access RN. Access RN notified and will call back with an ETA.

## 2022-01-11 NOTE — PROGRESS NOTES
SW informed by Amrit Jones at [facility] that pt is accept. Amrit Jones began pre-cert on this date, 1/97/62.

## 2022-01-11 NOTE — H&P
[facility] Internal Medicine    CONSULTATION / HISTORY AND PHYSICAL EXAMINATION            Date:   1/11/2022  Patient name:  Bhavin Wayne  Date of admission:  1/8/2022  8:38 AM  MRN:   [id number]  Account:  [de-identified]  YOB: 1955  PCP:    Jimmie Gordon DO  Room:   2089/2089-01  Code Status:    Full Code    Physician Requesting Consult: Dale Pleitez MD    Reason for Consult:  medical management    Chief Complaint:     Chief Complaint   Patient presents with    Seizures       History Obtained From:     Patient medical record nursing staff    History of Present Illness:   History is very limited, patient is not answering any questions, just making eye contact,  Most of the data was retrieved from E HR, notes from ER physician and neurologist  Patient has past medical history of seizure disorder, stroke with left-sided weakness, patient had multiple witnessed episodes, first noted by family, later on patient has seizure episode while in emergency room  Patient was initially loaded with IV Keppra, later on IV Vimpat was added  Original plan was to transfer patient to [place] for continuous EEG monitoring, unfortunately there was no bed available, patient has end of getting admitted at [facility]  At the time evaluation, patient history, not answering any questions, just making eye contact,    1/11  Patient is awake but not very coherent, does not follow commands quickly, taking almost 10 minutes to even tell her name as per physical therapy,  Moving all extremities  St eval     Past Medical History:     Past Medical History:   Diagnosis Date    Ankle fracture, left     Anxiety 02/04/2014    Arthritis     Black tarry stools     Colon polyp     Constipation     ETOH abuse     States she has not drank any ETOH in 24 years as of 2021    Frequency of urination ?     GERD (gastroesophageal reflux disease) 02/04/2014    Head injury 1998 after seizure/stroke pt fell and hit head    Headache(784.0)     Hypertension     Hypotension ?  Hypothyroidism     Memory deficit     Migraines     Numbness and tingling     fingers    Osteoporosis     Peripheral vascular disease ([facility]     Pneumonia     Seizures ([facility] [address] [facility]     last seizure 6-,     Shingles      Pt.denies.  Stroke [facility]) 1998    stroke and seizure together    TIA (transient ischemic attack)     1996    Tremor     UTI (urinary tract infection)     once    Varicose veins     Wears glasses         Past Surgical History:     Past Surgical History:   Procedure Laterality Date    BUNIONECTOMY Right     COLONOSCOPY      COLONOSCOPY N/A 5/10/2021    COLONOSCOPY DIAGNOSTIC performed by Shelby Gandara MD at [facility] Left 07/06/2016    ON 2ND, 3rd, 4th,  and  5TH TOES OF LEFT FOOT  Pt. Denies.  OTHER SURGICAL HISTORY  02/23/2015    orif left ankle with synthes and intraoperative c- arm    [place] [facility] INCL FLUOR GDNCE DX W/CELL WASHG SPX N/A 01/16/2018    BRONCHOSCOPY WITH WASHINGS AT PATIENT BEDSIDE ICU performed by Argenis Mesa MD at [address] [place] [address]          Medications Prior to Admission:     Prior to Admission medications    Medication Sig Start Date End Date Taking?  Authorizing Provider   oxybutynin (DITROPAN) 5 MG tablet TAKE 1 TABLET BY MOUTH 3 TIMES A DAY 12/3/21  Yes Mimi Marroquin DO   diclofenac sodium (VOLTAREN) 1 % GEL Apply 2 g topically 4 times daily as needed for Pain 12/1/21  Yes Marquez Cummings MD   SUMAtriptan (IMITREX) 100 MG tablet TAKE 1 TABLET BY MOUTH ONE TIME A DAY AS NEEDED FOR MIGRAINE 12/1/21  Yes Marquez Cummings MD   aspirin (ASPIRIN LOW DOSE) 81 MG EC tablet TAKE 1 TABLET BY MOUTH ONE TIME A DAY 11/3/21  Yes Mimi Marroquin DO   PARoxetine (PAXIL) 30 MG tablet TAKE ONE TABLET BY MOUTH EVERY MORNING 11/3/21  Yes Mimi Marroquin DO   atorvastatin (LIPITOR) 40 MG tablet TAKE 1 TABLET BY MOUTH ONE TIME A DAY 11/3/21  Yes Jin Parikh DO   donepezil (ARICEPT) 5 MG tablet TAKE ONE TABLET BY MOUTH NIGHTLY 21  Yes Scar Blunt MD   levETIRAcetam (KEPPRA) 1000 MG tablet TAKE 1 TABLET BY MOUTH (ALONG WITH 250MG TABLET FOR TOTAL OF 1250MG) TWICE A DAY 10/6/21  Yes Karissa Stephenson MD   primidone (MYSOLINE) 50 MG tablet TAKE ONE-HALF TABLET BY MOUTH TWICE DAILY 9/15/21  Yes Karissa Stephenson MD   hydroCHLOROthiazide (HYDRODIURIL) 12.5 MG tablet TAKE 1 TABLET BY MOUTH ONE TIME A DAY 9/15/21  Yes Jin Parikh DO   levothyroxine (SYNTHROID) 50 MCG tablet TAKE 1 TABLET BY MOUTH ONE TIME A DAY 9/15/21  Yes Jin Parikh DO   amLODIPine (NORVASC) 10 MG tablet TAKE 1 TABLET BY MOUTH ONE TIME A DAY 9/15/21  Yes Jin Parikh DO   omeprazole (PRILOSEC) 20 MG delayed release capsule TAKE 1 CAPSULE BY MOUTH ONE TIME A DAY 9/15/21  Yes Jin Parikh DO   levETIRAcetam (KEPPRA) 250 MG tablet TAKE 1 TABLET BY MOUTH (ALONG WITH 1000MG TABLET FOR TOTAL OF 1250MG) TWICE DAILY. 9/10/21  Yes Karissa Stephenson MD        Allergies:     Patient has no known allergies. Social History:     Tobacco:    reports that she has never smoked. She has never used smokeless tobacco.  Alcohol:      reports previous alcohol use. Drug Use:  reports no history of drug use.     Family History:     Family History   Problem Relation Age of Onset    Cervical Cancer Mother     Heart Disease Father     Heart Disease Maternal Grandfather     Heart Disease Paternal Grandfather        Review of Systems:     Cannot be done because the patient cannot    Physical Exam:     /72   Pulse 73   Temp 98.3 °F (36.8 °C) (Oral)   Resp 18   Ht 5' 10\" (1.778 m)   Wt 149 lb 14.4 oz (68 kg)   SpO2 99%   BMI 21.51 kg/m²   Temp (24hrs), Av.8 °F (36.6 °C), Min:97.5 °F (36.4 °C), Max:98.3 °F (36.8 °C)    Recent Labs     22  1617 01/09/22  2011 01/09/22  2122 01/10/22  0647   POCGLU 67 64* 74 68       Intake/Output Summary (Last 24 hours) at 1/11/2022 1015  Last data filed at 1/11/2022 4177  Gross per 24 hour   Intake 1776.26 ml   Output 4750 ml   Net -2973.74 ml       General Appearance: delayed answering   mental status: Not oriented to person, place, and time with normal affect  Head:  normocephalic, atraumatic. Eye: no icterus, redness, pupils equal and reactive, extraocular eye movements intact, conjunctiva clear  Ear: normal external ear, no discharge, hearing intact  Nose:  no drainage noted  Mouth: mucous membranes moist  Neck: supple, no carotid bruits, thyroid not palpable  Lungs: Bilateral equal air entry, clear to ausculation, no wheezing, rales or rhonchi, normal effort  Cardiovascular: normal rate, regular rhythm, no murmur, gallop, rub. Abdomen: Soft, nontender, nondistended, normal bowel sounds, no hepatomegaly or splenomegaly  Neurologic: Not cooperative with neurological exam   skin: No gross lesions, rashes, bruising or bleeding on exposed skin area  Extremities:  peripheral pulses palpable, no pedal edema or calf pain with palpation  Psych:      Investigations:      Laboratory Testing:  Recent Results (from the past 24 hour(s))   Basic Metabolic Panel w/ Reflex to MG    Collection Time: 01/11/22  7:17 AM   Result Value Ref Range    Glucose 99 70 - 99 mg/dL    BUN 5 (L) 8 - 23 mg/dL    CREATININE 0.46 (L) 0.50 - 0.90 mg/dL    Bun/Cre Ratio NOT REPORTED 9 - 20    Calcium 9.1 8.6 - 10.4 mg/dL    Sodium 141 135 - 144 mmol/L    Potassium 3.2 (L) 3.7 - 5.3 mmol/L    Chloride 105 98 - 107 mmol/L    CO2 22 20 - 31 mmol/L    Anion Gap 14 9 - 17 mmol/L    GFR Non-African American >60 >60 mL/min    GFR African American >60 >60 mL/min    GFR Comment          GFR Staging NOT REPORTED    CBC    Collection Time: 01/11/22  7:17 AM   Result Value Ref Range    WBC 4.5 3.5 - 11.0 k/uL    RBC 4.83 4.0 - 5.2 m/uL    Hemoglobin 15.7 12.0 - 16.0 g/dL    Hematocrit 45.7 36 - 46 %    MCV 94.7 80 - 100 fL    MCH 32.5 26 - 34 pg    MCHC 34.3 31 - 37 g/dL

## 2022-01-11 NOTE — PROGRESS NOTES
[facility]: [facility]   INPATIENT OCCUPATIONAL THERAPY  PROGRESS NOTE  Date: 2022  Patient Name: Deborah Adler      Room: 9810/8786-81  MRN: [id number]    : 1955  (68 y.o.) Gender: female     Discharge Recommendations:  Further Occupational Therapy is recommended upon facility discharge. Referring Practitioner: Quita Rascon MD  Diagnosis: Status Epilepticus  General  Chart Reviewed: Yes  Patient assessed for rehabilitation services?: Yes  Family / Caregiver Present: No  Referring Practitioner: Quita Rascon MD  Diagnosis: Status Epilepticus    Restrictions  Restrictions/Precautions: Seizure,General Precautions,Fall Risk      Subjective  Subjective: Pt resting in bed upon arrival agreeable to OT tx  Patient Currently in Pain: Denies  Overall Orientation Status: Impaired  Orientation Level: Oriented to person;Disoriented to place; Disoriented to time;Disoriented to situation  Patient Observation  Observations: Pt with limited capacity/ability to comprehend and respond; Requires increased time to process and respond. Objective  Cognition  Overall Cognitive Status: Impaired  Arousal/Alertness: Delayed responses to stimuli  Following Commands:  Follows one step commands with repetition  Safety Judgement: Decreased awareness of need for safety  Insights: Decreased awareness of deficits  Sequencing and Organization: Assistance required to identify errors made;Assistance required to generate solutions;Assistance required to implement solutions  Bed mobility  Rolling to Left: Stand by assistance  Rolling to Right: Stand by assistance  Supine to Sit: Stand by assistance  Sit to Supine: Stand by assistance  Scooting: Stand by assistance  Comment: HOB elevated, vc for initiation and sequencing of tasks 2* cognitave barriers  Balance  Sitting Balance: Stand by assistance (18-19 mins EOB unsupported)         ADL  Feeding: Setup  Grooming: Setup  UE Bathing: Stand by assistance  LE Bathing: Contact guard assistance  UE Dressing: Stand by assistance  LE Dressing: Contact guard assistance  Toileting: Dependent/Total (bird catheter)  Additional Comments: ADL scores based on clinical reasoning and skilled observation unless otherwise noted. Type of ROM/Therapeutic Exercise  Type of ROM/Therapeutic Exercise: Free weights (1#; x10 reps )  Comment: BUE Ex faciliateted this date to support strength, ROM, and endurance needed to engage in ADLs, functional mobility, and transfers safely and independently. Pt req continious verbal/visual/ tactile cues for all exercises completed this date. MIn A req for correct form technique due to decreased cognition. G tolerance noted. Exercises  Scapular Protraction: x  Scapular Retraction: x  Shoulder Flexion: x  Shoulder Extension: x  Elbow Flexion: x  Elbow Extension: x                          Assessment  Performance deficits / Impairments: Decreased ADL status; Decreased functional mobility ; Decreased safe awareness;Decreased cognition;Decreased endurance;Decreased balance;Decreased high-level IADLs  Prognosis: Good  Discharge Recommendations: Patient would benefit from continued therapy after discharge  Activity Tolerance: Treatment limited secondary to decreased cognition  Safety Devices in place: Yes  Type of devices: All fall risk precautions in place;Call light within reach; Bed alarm in place; Patient at risk for falls; Left in bed (PCP present at session end )             Patient Education: Bed mobility, BUE EX, OT POC     Learner:patient  Method: demonstration and explanation       Outcome: needs reinforcement     Plan  Safety Devices  Safety Devices in place: Yes  Type of devices:  All fall risk precautions in place,Call light within reach,Bed alarm in place,Patient at risk for falls,Left in bed (PCP present at session end )  Plan  Times per week: 4-6  Current Treatment Recommendations: Self-Care / Aimee Calix Training,Functional Mobility Training,Cognitive Reorientation,Pain Management,Safety Education & Training,Patient/Caregiver Education & Training,Equipment Evaluation, Education, & procurement,Home Management Training,Cognitive/Perceptual Training      Goals  Short term goals  Time Frame for Short term goals: By discharge  Short term goal 1: Pt will complete lower body dressing/bathing with modified independence and good safety  Short term goal 2: Pt will complete functional transfers/mobility during self care tasks with modified independence and good safety  Short term goal 3: Pt will verebalize/demonstrate good understanding of home safety/fall prevention strategies to increase safety and independence with self care tasks.   Short term goal 4: Pt will complete self care tasks with no more than 3 verbal cues for initiation to increase participation and independence with activities of daily living  Short term goal 5: Pt will participate in 15+ minutes of therapeutic exercises/functional activities to increase safety and independence with self care and mobility    OT Individual Minutes  Time In: 0946  Time Out: [place]  Minutes: 28      Electronically signed by YURY Salas on 1/11/22 at 10:30 AM EST

## 2022-01-11 NOTE — PROGRESS NOTES
2nd IV that was put in by access RN has infiltrated. Unable to get access. Notified Dr. Shiela Ruth - order received for midline from access RN.

## 2022-01-11 NOTE — PROGRESS NOTES
Physical Therapy  [address]  Acute Rehabilitation Physical Therapy Progress Note    Date: 22  Patient Name: Abdiaziz Castellanos       Room:   MRN: [id number]   Account: [de-identified]   : 1955  (77 y.o.) Gender: female     Referring Practitioner: Chapis Menjivar MD  Diagnosis: Status Epilepticus  Past Medical History:  has a past medical history of Ankle fracture, left, Anxiety, Arthritis, Black tarry stools, Colon polyp, Constipation, ETOH abuse, Frequency of urination, GERD (gastroesophageal reflux disease), Head injury, Headache(784.0), Hypertension, Hypotension, Hypothyroidism, Memory deficit, Migraines, Numbness and tingling, Osteoporosis, Peripheral vascular disease ([address] Pneumonia, Seizures ([address] Shingles, Stroke ([address] TIA (transient ischemic attack), Tremor, UTI (urinary tract infection), Varicose veins, and Wears glasses. Past Surgical History:   has a past surgical history that includes Bunionectomy (Right); Tubal ligation; other surgical history (2015); Hammer toe surgery (Left, 2016); pr [facility] incl fluor gdnce dx w/cell washg spx (N/A, 2018); Colonoscopy; and Colonoscopy (N/A, 5/10/2021). Additional Pertinent Hx: Patient has past medical history of seizure disorder, stroke with left-sided weakness, patient had multiple witnessed episodes, first noted by family, later on patient has seizure episode while in emergency room    Overall Orientation Status: Impaired  Orientation Level: Oriented to person  Restrictions/Precautions  Restrictions/Precautions: Seizure;General Precautions; Fall Risk    Subjective: Pt with limited communication capacity requiring increased time to process and respond. Pt is agreeable to therapy assessments.     Comments: 1:1 sitter present in room with patient upon my arrival.    Vital Signs  Patient Currently in Pain: Denies           Patient Observation  Observations: Pt with limited capacity/ability to comprehend and respond; Requires increased time to process and respond. Bed Mobility:   Bed Mobility  Rolling: Stand by assistance  Supine to Sit: Stand by assistance  Sit to Supine: Stand by assistance  Scooting: Stand by assistance  Bed mobility  Scooting: Stand by assistance    Transfers:  Sit to Stand: Contact guard assistance  Stand to sit: Contact guard assistance  Bed to Chair: Contact guard assistance              Ambulation 1  Surface: level tile  Device: No Device  Assistance: Contact guard assistance  Quality of Gait: slow pace; Narrow DENI  Gait Deviations: Slow Mariah  Distance: 15'x2  Comments: Verbal and visual cues provided to encourage pt to perform task to completion; CGA for safety and steadying during gait. Ambulation 2  Surface - 2: level tile  Device 2: Rolling Walker  Assistance 2: Contact guard assistance;Stand by assistance (Started as CGA but improve to SBA;  Writer manage IV)  Quality of Gait 2: Steady pace with no LOB. Rhythm improved with distance. Patient did report feeling shaky at end of amb. Distance: 100 ft x 3  Comments: Improved stability and safety with use of rolling walker.      Stairs/Curb  Stairs?: No                  BALANCE Posture: Good  Sitting - Static: Good  Sitting - Dynamic: Fair  Standing - Static: Fair (No AD)  Standing - Dynamic: Fair;- (no AD)  Comments: Standing balance assessed without AD; 2 minor LOB occur during gait with CGA to recover             Activity Tolerance: Patient limited by cognitive status  Activity Tolerance: pt Requires inc time and Max cueing and encouragment to perform tasks to completion  PT Equipment Recommendations  Equipment Needed:  (TBD)         Current Treatment Recommendations: Strengthening,Balance Training,Functional Mobility Training,Gait Training,Cognitive Reorientation,Safety Education & Training,Patient/Caregiver Education & Training    Conditions Requiring Skilled Therapeutic Intervention  Body structures, Functions, Activity limitations: Decreased functional mobility ; Decreased strength;Decreased safe awareness;Decreased cognition;Decreased balance  Treatment Diagnosis: Impaired Balance and strength 2* seizures  Prognosis: Fair  Decision Making: Medium Complexity  History: H/O Seizures, CVA in 2014, HTN  Clinical Presentation: pt is cooperative with max cueing and encouragement; Demo's need for inc time to process and respond  Barriers to Learning: Cognitive barriers; inc processing time needed  REQUIRES PT FOLLOW UP: Yes  Treatment Initiated : Functional mobility   Discharge Recommendations: Patient would benefit from continued therapy after discharge    Goals  Short term goals  Time Frame for Short term goals: until D/C  Short term goal 1: Pt to improve BLE strength by 1/2 MMG to improve safety with mobility  Short term goal 2: Pt to improve standing balance to fair with or without appropriate AD to improve safety with mobility   Short term goal 3: Mod I bed mobility to decrease burden of care  Short term goal 4: pt to tolerate 25 minutes of therapeutic exercise/activity to improve tolerance for therapy and daily activities       01/11/22 1520   PT Individual Minutes   Time In 1520   Time Out 1550   Minutes 30       Electronically signed by Sanjiv Hernandez PTA on 1/11/22 at 4:20 PM EST

## 2022-01-11 NOTE — PROGRESS NOTES
Pt continues to get out of bed, moving quickly. Pt not oriented at all. Telesitter is not even able to alarm before she is on her feet. 1:1 initiated at the bedside to keep pt safe.

## 2022-01-12 LAB
ANION GAP SERPL CALCULATED.3IONS-SCNC: 12 MMOL/L (ref 9–17)
BUN BLDV-MCNC: 6 MG/DL (ref 8–23)
BUN/CREAT BLD: ABNORMAL (ref 9–20)
CALCIUM SERPL-MCNC: 8.5 MG/DL (ref 8.6–10.4)
CHLORIDE BLD-SCNC: 104 MMOL/L (ref 98–107)
CO2: 22 MMOL/L (ref 20–31)
CREAT SERPL-MCNC: 0.44 MG/DL (ref 0.5–0.9)
GFR AFRICAN AMERICAN: >60 ML/MIN
GFR NON-AFRICAN AMERICAN: >60 ML/MIN
GFR SERPL CREATININE-BSD FRML MDRD: ABNORMAL ML/MIN/{1.73_M2}
GFR SERPL CREATININE-BSD FRML MDRD: ABNORMAL ML/MIN/{1.73_M2}
GLUCOSE BLD-MCNC: 102 MG/DL (ref 65–105)
GLUCOSE BLD-MCNC: 107 MG/DL (ref 70–99)
GLUCOSE BLD-MCNC: 69 MG/DL (ref 65–105)
GLUCOSE BLD-MCNC: 88 MG/DL (ref 65–105)
GLUCOSE BLD-MCNC: 96 MG/DL (ref 65–105)
HCT VFR BLD CALC: 41.3 % (ref 36–46)
HEMOGLOBIN: 14.4 G/DL (ref 12–16)
MAGNESIUM: 1.6 MG/DL (ref 1.6–2.6)
MCH RBC QN AUTO: 32.7 PG (ref 26–34)
MCHC RBC AUTO-ENTMCNC: 34.8 G/DL (ref 31–37)
MCV RBC AUTO: 94 FL (ref 80–100)
NRBC AUTOMATED: NORMAL PER 100 WBC
PDW BLD-RTO: 12.5 % (ref 11.5–14.9)
PLATELET # BLD: 212 K/UL (ref 150–450)
PMV BLD AUTO: 8.7 FL (ref 6–12)
POTASSIUM SERPL-SCNC: 3.4 MMOL/L (ref 3.7–5.3)
RBC # BLD: 4.39 M/UL (ref 4–5.2)
SODIUM BLD-SCNC: 138 MMOL/L (ref 135–144)
WBC # BLD: 5.5 K/UL (ref 3.5–11)

## 2022-01-12 PROCEDURE — 99232 SBSQ HOSP IP/OBS MODERATE 35: CPT | Performed by: PSYCHIATRY & NEUROLOGY

## 2022-01-12 PROCEDURE — 92507 TX SP LANG VOICE COMM INDIV: CPT

## 2022-01-12 PROCEDURE — 2580000003 HC RX 258: Performed by: STUDENT IN AN ORGANIZED HEALTH CARE EDUCATION/TRAINING PROGRAM

## 2022-01-12 PROCEDURE — 6370000000 HC RX 637 (ALT 250 FOR IP): Performed by: STUDENT IN AN ORGANIZED HEALTH CARE EDUCATION/TRAINING PROGRAM

## 2022-01-12 PROCEDURE — 6360000002 HC RX W HCPCS: Performed by: STUDENT IN AN ORGANIZED HEALTH CARE EDUCATION/TRAINING PROGRAM

## 2022-01-12 PROCEDURE — 82947 ASSAY GLUCOSE BLOOD QUANT: CPT

## 2022-01-12 PROCEDURE — 83735 ASSAY OF MAGNESIUM: CPT

## 2022-01-12 PROCEDURE — 1200000000 HC SEMI PRIVATE

## 2022-01-12 PROCEDURE — 36415 COLL VENOUS BLD VENIPUNCTURE: CPT

## 2022-01-12 PROCEDURE — 99232 SBSQ HOSP IP/OBS MODERATE 35: CPT | Performed by: INTERNAL MEDICINE

## 2022-01-12 PROCEDURE — C9254 INJECTION, LACOSAMIDE: HCPCS | Performed by: STUDENT IN AN ORGANIZED HEALTH CARE EDUCATION/TRAINING PROGRAM

## 2022-01-12 PROCEDURE — 85027 COMPLETE CBC AUTOMATED: CPT

## 2022-01-12 PROCEDURE — 2580000003 HC RX 258: Performed by: EMERGENCY MEDICINE

## 2022-01-12 PROCEDURE — 6370000000 HC RX 637 (ALT 250 FOR IP): Performed by: INTERNAL MEDICINE

## 2022-01-12 PROCEDURE — 80048 BASIC METABOLIC PNL TOTAL CA: CPT

## 2022-01-12 RX ADMIN — PAROXETINE HYDROCHLORIDE 30 MG: 20 TABLET, FILM COATED ORAL at 08:34

## 2022-01-12 RX ADMIN — DEXTROSE MONOHYDRATE 100 MG: 50 INJECTION, SOLUTION INTRAVENOUS at 08:34

## 2022-01-12 RX ADMIN — SODIUM CHLORIDE, PRESERVATIVE FREE 10 ML: 5 INJECTION INTRAVENOUS at 08:34

## 2022-01-12 RX ADMIN — LEVETIRACETAM 1500 MG: 100 INJECTION INTRAVENOUS at 22:36

## 2022-01-12 RX ADMIN — PRIMIDONE 75 MG: 50 TABLET ORAL at 08:35

## 2022-01-12 RX ADMIN — POTASSIUM CHLORIDE 40 MEQ: 1500 TABLET, EXTENDED RELEASE ORAL at 08:36

## 2022-01-12 RX ADMIN — SODIUM CHLORIDE, PRESERVATIVE FREE 10 ML: 5 INJECTION INTRAVENOUS at 21:35

## 2022-01-12 RX ADMIN — LEVOTHYROXINE SODIUM 50 MCG: 0.05 TABLET ORAL at 06:26

## 2022-01-12 RX ADMIN — PANTOPRAZOLE SODIUM 40 MG: 40 TABLET, DELAYED RELEASE ORAL at 06:26

## 2022-01-12 RX ADMIN — PRIMIDONE 75 MG: 50 TABLET ORAL at 21:33

## 2022-01-12 RX ADMIN — LEVETIRACETAM 1500 MG: 100 INJECTION INTRAVENOUS at 11:46

## 2022-01-12 RX ADMIN — ACETAMINOPHEN 650 MG: 325 TABLET, FILM COATED ORAL at 11:43

## 2022-01-12 RX ADMIN — ACETAMINOPHEN 650 MG: 325 TABLET, FILM COATED ORAL at 06:28

## 2022-01-12 RX ADMIN — DONEPEZIL HYDROCHLORIDE 5 MG: 5 TABLET, FILM COATED ORAL at 21:35

## 2022-01-12 RX ADMIN — ENOXAPARIN SODIUM 40 MG: 100 INJECTION SUBCUTANEOUS at 08:34

## 2022-01-12 RX ADMIN — OXYBUTYNIN CHLORIDE 5 MG: 5 TABLET ORAL at 08:36

## 2022-01-12 RX ADMIN — DEXTROSE MONOHYDRATE 100 MG: 50 INJECTION, SOLUTION INTRAVENOUS at 21:52

## 2022-01-12 RX ADMIN — SODIUM CHLORIDE: 9 INJECTION, SOLUTION INTRAVENOUS at 01:18

## 2022-01-12 RX ADMIN — OXYBUTYNIN CHLORIDE 5 MG: 5 TABLET ORAL at 13:04

## 2022-01-12 RX ADMIN — ASPIRIN 81 MG: 81 TABLET, COATED ORAL at 08:35

## 2022-01-12 RX ADMIN — SODIUM CHLORIDE 25 ML: 9 INJECTION, SOLUTION INTRAVENOUS at 11:44

## 2022-01-12 RX ADMIN — OXYBUTYNIN CHLORIDE 5 MG: 5 TABLET ORAL at 21:35

## 2022-01-12 RX ADMIN — AMLODIPINE BESYLATE 10 MG: 10 TABLET ORAL at 08:36

## 2022-01-12 RX ADMIN — ATORVASTATIN CALCIUM 40 MG: 40 TABLET, FILM COATED ORAL at 08:36

## 2022-01-12 ASSESSMENT — PAIN SCALES - GENERAL
PAINLEVEL_OUTOF10: 5
PAINLEVEL_OUTOF10: 3
PAINLEVEL_OUTOF10: 0
PAINLEVEL_OUTOF10: 5

## 2022-01-12 NOTE — PROGRESS NOTES
[facility] Neurology   IN-PATIENT SERVICE      NEUROLOGY PROGRESS  NOTE            Date:   1/12/2022  Patient name:  Aziza Orellana  Date of admission:  1/8/2022  YOB: 1955      Interval History:     No seizures overnight    Pt is very awake, sitting on her bed watching TV        Otherwise pt is directable and follow commands, she is much better        History of Present Illness: The patient is a 77 y.o. female who presents with Seizures  . The patient was seen and examined and the chart was reviewed. Patient with history of prior right MCA stroke, prior history of seizures presents with breakthrough seizure early this morning. Witnessed by family apparently son. When she came to the ED she had been given Versed and was reportedly starting to wake up and follow some basic commands. She was loaded with 2 g of IV Keppra. Around 11 AM or so patient was noted to have right facial twitching, decreased responsiveness and occasional rhythmic twitching left upper extremity. She was clenching down at the same time and occasionally desaturating. She was given a total of 6 mg of IV Ativan and this did not resolve the patient's symptoms. Vimpat was then ordered by consultant 200 mg IV load. After this was given patient appeared to stop the twitching and was more somnolent/obtunded. Discussed case with neuro critical care team at [facility] in which they did accept patient for further continuous EEG monitoring and status epilepticus. Patient's respiratory status appears to be stable at this point in time and recommendation to hold off intubation.   If she were to start seizing again she would likely need to be intubated prior to arrival.    Past Medical History:     Past Medical History:   Diagnosis Date    Ankle fracture, left     Anxiety 02/04/2014    Arthritis     Black tarry stools     Colon polyp     Constipation     ETOH abuse     States she has not drank any ETOH in 24 years as of 2021  Frequency of urination ?  GERD (gastroesophageal reflux disease) 02/04/2014    Head injury 1998    after seizure/stroke pt fell and hit head    Headache(784.0)     Hypertension     Hypotension ?  Hypothyroidism     Memory deficit     Migraines     Numbness and tingling     fingers    Osteoporosis     Peripheral [facility]     Pneumonia     Seizures ([facility]     last seizure 6-,     Shingles      Pt.denies.  Stroke [facility]) 1998    stroke and seizure together    TIA (transient ischemic attack)     1996    Tremor     UTI (urinary tract infection)     once    Varicose veins     Wears glasses         Past Surgical History:     Past Surgical History:   Procedure Laterality Date    BUNIONECTOMY Right     COLONOSCOPY      COLONOSCOPY N/A 5/10/2021    COLONOSCOPY DIAGNOSTIC performed by Arcadio Dunbar MD at [facility] Left 07/06/2016    ON 2ND, 3rd, 4th,  and  5TH TOES OF LEFT FOOT  Pt. Denies.     OTHER SURGICAL HISTORY  02/23/2015    orif left ankle with synthes and intraoperative c- arm    [facility] INCL FLUOR GDNCE DX W/CELL WASHG SPX N/A 01/16/2018    BRONCHOSCOPY WITH WASHINGS AT PATIENT BEDSIDE ICU performed by Migdalia Duron MD at [address]          Medications during admission:      sodium chloride flush  5-40 mL IntraVENous 2 times per day    amLODIPine  10 mg Oral Daily    aspirin  81 mg Oral Daily    atorvastatin  40 mg Oral Daily    donepezil  5 mg Oral Nightly    levothyroxine  50 mcg Oral Daily    pantoprazole  40 mg Oral QAM AC    oxybutynin  5 mg Oral TID    PARoxetine  30 mg Oral Daily    primidone  75 mg Oral BID    enoxaparin  40 mg SubCUTAneous Daily    lacosamide (VIMPAT) IVPB  100 mg IntraVENous BID    levetiracetam  1,500 mg IntraVENous Q12H         Physical Exam:   /71   Pulse 78   Temp 97.6 °F (36.4 °C) (Oral)   Resp 18   Ht 5' 10\" (1.778 m)   Wt (!) 327 lb 2.6 oz (148.4 kg) SpO2 99%   BMI 46.94 kg/m²   Temp (24hrs), Av.1 °F (36.7 °C), Min:97.6 °F (36.4 °C), Max:98.6 °F (37 °C)          Neurological examination:      Mental status   Patient is awake sitting up doing PT. She follows three steps slowly, she is able to speak but slow, mildly dysarthric, she is AAO x 2, not oriented to time     Cranial nerves   Pupil response:            Present     Oculocephalic reflex:   Present     Corneal Reflex:            Present     Facial grimace to pin:  Present     Gag/Cough reflex:       Present        Motor and sensory function  No drift on arms and legs  Tone: Normal      DTR   Plantar response: Downgoing  (-) Winters's sign bilaterally    Gait  Not tested but pt is sitting up on the bed on her own           Diagnostics:      Laboratory Testing:  CBC:   Recent Labs     01/10/22  0656 22  0717 22  0617   WBC 6.0 4.5 5.5   HGB 14.0 15.7 14.4    192 212     BMP:    Recent Labs     01/10/22  0656 22  0717 22  0617    141 138   K 3.5* 3.2* 3.4*    105 104   CO2 20 22 22   BUN 6* 5* 6*   CREATININE 0.46* 0.46* 0.44*   GLUCOSE 87 99 107*         Lab Results   Component Value Date    CHOL 183 2016    LDLCHOLESTEROL 55 2021    HDL 71 2021    TRIG 64 2016    ALT 15 2021    AST 18 2021    TSH 3.96 2022    INR 1.1 2022    LABA1C 5.1 2016    [id number] 1449 (H) 2021       Lab Results   Component Value Date    PHENYTOIN <0.8 (L) 2018       Imaging/Diagnostics:    EEG monitoring (10/2018): Left frontal temporal sharp waves conferred an increased risk for focal onset seizures. Continuous left frontotemporal slowing.       CT head: Chronic right hemispheric stroke with encephalomalacia. Diffuse cerebral atrophy and small vessel ischemic changes     MRI brain (2020): Remote right MCA distribution infarct. No acute abnormalities. No postcontrast enhancement.       I personally reviewed all of the above medications, clinical laboratory, imaging and other diagnostic tests. Impression:      77year old woman with hx of seizure but non compliance, presented with seizures, now with prolonged but improving post ictal state.     Plan:     - continue Vimpat 100 mg every 12 hours, Keppra 1500 mg every 12 hours and primidone 75mg BID ok to switch to PO when swallowing cleared by SLP  - pt con't to improve, and follow all commands  - pt slowness is most likely due to remnant of being post ictal, this will resolve on its own in a few days and con't to improve    - PT OT and rehab  - will follow    Electronically signed by Leonora Mendes MD on 1/12/2022 at 2:05 16 MD [address]  Neurology/Neurocritical Care

## 2022-01-12 NOTE — PROGRESS NOTES
[facility] [facility]    OCCUPATIONAL THERAPY MISSED TREATMENT NOTE   INPATIENT   Date: 22  Patient Name: Scottie Antony       Room: 6289/3743-65  MRN: [id number]   Account #: [de-identified]    : 1955  (77 y.o.)  Gender: female   Referring Practitioner: Darian Baca MD  Diagnosis: Status Epilepticus             REASON FOR MISSED TREATMENT:  Patient with another ancillary department   -    Continue OT program       Payal Doyle OT

## 2022-01-12 NOTE — PROGRESS NOTES
Speech Language Pathology  Speech Language Pathology  [facility]    Speech Language Treatment Note    Date: 1/12/2022  Patients Name: Erik Morse  MRN: [id number]  Diagnosis:   Patient Active Problem List   Diagnosis Code    Acquired hypothyroidism E03.9    Essential hypertension I10    Urge incontinence N39.41    Generalized anxiety disorder F41.1    Gastroesophageal reflux disease without esophagitis K21.9    H/O: CVA (cerebrovascular accident) Z86.73    Migraine without aura and without status migrainosus, not intractable G43.009    Hammer toe of left foot M20.42    Tremor of both hands R25.1    Status epilepticus ([facility] G40.901    Speech and language deficits F80.9    Constipation K59.00    Seizure ([facility] R56.9    Seizures ([facility] R56.9       Pain: 0/10    Speech and Language Treatment  Treatment time: 1791-5563    Subjective: [x] Alert [x] Cooperative     [] Confused     [] Agitated    [] Lethargic      Objective/Assessment:  Auditory Comprehension: Pt. Required frequent repetition and redirection throughout (sometimes associated with [place] and other times with perseveration). Following 1-step verbal commands- 80% accuracy dorinda, 100% cued. Following 2-step verbal commands- 60% accuracy dorinda, 80% cued. Verbal Expression: Pt. Attempting to respond to all writer questions. Pt. Exhibiting expressive aphasia in structured tasks and conversation. Frequent perseveration impacting accuracy. Pt. Expressing frustration with deficits by rolling eyes and re-attempting. Confrontation naming (object around room)- 33% accuracy dorinda, 100% c mod-max cues. Counting 1-10- 40% accuracy dorinda, 70% c mod-max cues. Responding to orientation questions- 80% accuracy (oriented to month, place, city; disoriented to year). Divergent naming (naming 3 items in concrete category, no time constraint)- 33% accuracy dorinda, 100% c max cues.       Reading Comprehension: n/a    Written Language: n/a    Speech: Speech consisting mostly of 1-2 word phrases. Voice: Functional    Other: No family present. Pt. With 1:1 sitter. Plan:  [x] Continue ST services    [] Discharge from ST:      Discharge recommendations: [] Inpatient Rehab   [] [place]   [] Outpatient Therapy  [] Follow up at trauma clinic   [] Other:       Treatment completed by:  Mariella Gaines M.A., CCC-SLP

## 2022-01-12 NOTE — H&P
[place] [address] Internal Medicine    Progress note             Date:   1/12/2022  Patient name:  Livia Ellis  Date of admission:  1/8/2022  8:38 AM  MRN:   [id number]  Account:  [de-identified]  YOB: 1955  PCP:    Axel Krueger DO  Room:   2089/2089-01  Code Status:    Full Code    Physician Requesting Consult: Gricelda So MD    Reason for Consult:  medical management    Chief Complaint:     Chief Complaint   Patient presents with    Seizures       History Obtained From:     Patient medical record nursing staff    History of Present Illness:   History is very limited, patient is not answering any questions, just making eye contact,  Most of the data was retrieved from E HR, notes from ER physician and neurologist  Patient has past medical history of seizure disorder, stroke with left-sided weakness, patient had multiple witnessed episodes, first noted by family, later on patient has seizure episode while in emergency room  Patient was initially loaded with IV Keppra, later on IV Vimpat was added  Original plan was to transfer patient to [place] for continuous EEG monitoring, unfortunately there was no bed available, patient has end of getting admitted at [facility]  At the time evaluation, patient history, not answering any questions, just making eye contact,    1/11  Patient is awake but not very coherent, does not follow commands quickly, taking almost 10 minutes to even tell her name as per physical therapy,  Moving all extremities  St eval     1/12   much better today   Working with PT  Resolving postictal state     Past Medical History:     Past Medical History:   Diagnosis Date    Ankle fracture, left     Anxiety 02/04/2014    Arthritis     Black tarry stools     Colon polyp     Constipation     ETOH abuse     States she has not drank any ETOH in 24 years as of 2021    Frequency of urination ?     GERD (gastroesophageal reflux disease) 02/04/2014    Head injury 1998    after seizure/stroke pt fell and hit head    Headache(784.0)     Hypertension     Hypotension ?  Hypothyroidism     Memory deficit     Migraines     Numbness and tingling     fingers    Osteoporosis     Peripheral [facility] disease ([facility]     Pneumonia     Seizures ([facility]     last seizure 6-,     Shingles      Pt.denies.  Stroke [facility]) 1998    stroke and seizure together    TIA (transient ischemic attack)     1996    Tremor     UTI (urinary tract infection)     once    Varicose veins     Wears glasses         Past Surgical History:     Past Surgical History:   Procedure Laterality Date    BUNIONECTOMY Right     COLONOSCOPY      COLONOSCOPY N/A 5/10/2021    COLONOSCOPY DIAGNOSTIC performed by Iva Manjarrez MD at [address] Left 07/06/2016    ON 2ND, 3rd, 4th,  and  5TH TOES OF LEFT FOOT  Pt. Denies.  OTHER SURGICAL HISTORY  02/23/2015    orif left ankle with synthes and intraoperative c- arm    [facility] INCL FLUOR GDNCE DX W/CELL WASHG SPX N/A 01/16/2018    BRONCHOSCOPY WITH WASHINGS AT PATIENT BEDSIDE ICU performed by Demi Chacko MD at [address] [place] [address]          Medications Prior to Admission:     Prior to Admission medications    Medication Sig Start Date End Date Taking?  Authorizing Provider   oxybutynin (DITROPAN) 5 MG tablet TAKE 1 TABLET BY MOUTH 3 TIMES A DAY 12/3/21  Yes Ethyl Soda, DO   diclofenac sodium (VOLTAREN) 1 % GEL Apply 2 g topically 4 times daily as needed for Pain 12/1/21  Yes Unknown MD Tonny   SUMAtriptan (IMITREX) 100 MG tablet TAKE 1 TABLET BY MOUTH ONE TIME A DAY AS NEEDED FOR MIGRAINE 12/1/21  Yes Unknown MD Tonny   aspirin (ASPIRIN LOW DOSE) 81 MG EC tablet TAKE 1 TABLET BY MOUTH ONE TIME A DAY 11/3/21  Yes Ethyl Soda, DO   PARoxetine (PAXIL) 30 MG tablet TAKE ONE TABLET BY MOUTH EVERY MORNING 11/3/21  Yes Ethyl Soda, DO   atorvastatin (LIPITOR) 40 MG tablet TAKE 1 TABLET BY MOUTH ONE TIME A DAY 11/3/21  Yes Victor Manuel Diaz DO   donepezil (ARICEPT) 5 MG tablet TAKE ONE TABLET BY MOUTH NIGHTLY 21  Yes Scar Ruth MD   levETIRAcetam (KEPPRA) 1000 MG tablet TAKE 1 TABLET BY MOUTH (ALONG WITH 250MG TABLET FOR TOTAL OF 1250MG) TWICE A DAY 10/6/21  Yes Radha Dugan MD   primidone (MYSOLINE) 50 MG tablet TAKE ONE-HALF TABLET BY MOUTH TWICE DAILY 9/15/21  Yes Radha Dugan MD   hydroCHLOROthiazide (HYDRODIURIL) 12.5 MG tablet TAKE 1 TABLET BY MOUTH ONE TIME A DAY 9/15/21  Yes Victor Manuel Diaz DO   levothyroxine (SYNTHROID) 50 MCG tablet TAKE 1 TABLET BY MOUTH ONE TIME A DAY 9/15/21  Yes Victor Manuel Diaz DO   amLODIPine (NORVASC) 10 MG tablet TAKE 1 TABLET BY MOUTH ONE TIME A DAY 9/15/21  Yes Victor Manuel Diaz DO   omeprazole (PRILOSEC) 20 MG delayed release capsule TAKE 1 CAPSULE BY MOUTH ONE TIME A DAY 9/15/21  Yes Victor Manuel Diaz DO   levETIRAcetam (KEPPRA) 250 MG tablet TAKE 1 TABLET BY MOUTH (ALONG WITH 1000MG TABLET FOR TOTAL OF 1250MG) TWICE DAILY. 9/10/21  Yes Radha Dugan MD        Allergies:     Patient has no known allergies. Social History:     Tobacco:    reports that she has never smoked. She has never used smokeless tobacco.  Alcohol:      reports previous alcohol use. Drug Use:  reports no history of drug use.     Family History:     Family History   Problem Relation Age of Onset    Cervical Cancer Mother     Heart Disease Father     Heart Disease Maternal Grandfather     Heart Disease Paternal Grandfather        Review of Systems:     Cannot be done because the patient cannot    Physical Exam:     /79   Pulse 86   Temp 98.1 °F (36.7 °C) (Oral)   Resp 18   Ht 5' 10\" (1.778 m)   Wt (!) 327 lb 2.6 oz (148.4 kg)   SpO2 92%   BMI 46.94 kg/m²   Temp (24hrs), Av.2 °F (36.8 °C), Min:98 °F (36.7 °C), Max:98.6 °F (37 °C)    Recent Labs     22  1303 22  1656 22  2110 22  0805   POCGLU 102 93 75 88 12 9 - 17 mmol/L    GFR Non-African American >60 >60 mL/min    GFR African American >60 >60 mL/min    GFR Comment          GFR Staging NOT REPORTED    CBC    Collection Time: 01/12/22  6:17 AM   Result Value Ref Range    WBC 5.5 3.5 - 11.0 k/uL    RBC 4.39 4.0 - 5.2 m/uL    Hemoglobin 14.4 12.0 - 16.0 g/dL    Hematocrit 41.3 36 - 46 %    MCV 94.0 80 - 100 fL    MCH 32.7 26 - 34 pg    MCHC 34.8 31 - 37 g/dL    RDW 12.5 11.5 - 14.9 %    Platelets 434 772 - 628 k/uL    MPV 8.7 6.0 - 12.0 fL    NRBC Automated NOT REPORTED per 100 WBC   Magnesium    Collection Time: 01/12/22  6:17 AM   Result Value Ref Range    Magnesium 1.6 1.6 - 2.6 mg/dL   POC Glucose Fingerstick    Collection Time: 01/12/22  8:05 AM   Result Value Ref Range    POC Glucose 88 65 - 105 mg/dL           Consultations:   IP CONSULT TO NEUROLOGY  IP CONSULT TO INTERNAL MEDICINE  IP CONSULT TO SOCIAL WORK  IP CONSULT TO NEUROLOGY  Assessment :      Primary Problem  <principal problem not specified>    Active Hospital Problems    Diagnosis Date Noted    Seizure ([facility] [R56.9] 01/08/2022    Seizures ([facility] [R56.9] 01/08/2022    H/O: CVA (cerebrovascular accident) [Z86.73] 05/06/2014    Acquired hypothyroidism [E03.9] 03/27/2012    Essential hypertension [I10] 03/27/2012       Plan:     1. Witnessed seizures, there was concern of noncompliance (although Keppra level tested okay) , on IV Keppra on IV Vimpat , neurology onboard   2. Had  MRI brain with and without contrast  3. On reg diet at this time   4. On Lovenox for DVT prophylaxis  5. Hypothyroidism TSH nml  6. History of stroke with residual weakness on left side, although patient not cooperative with neurological exam  7. PT/OT  8. Rehab placement assessment   9. Speech eval   10. resolving postictal state   11.  DC IV fluids ,Eating better         Saint Goodwill, MD  1/12/2022  10:07 AM    Copy sent to Dr. Carita Cowden, DO    Please note that this chart was generated using voice recognition Dragon dictation software. Although every effort was made to ensure the accuracy of this automated transcription, some errors in transcription may have occurred.

## 2022-01-13 VITALS
WEIGHT: 141.6 LBS | BODY MASS INDEX: 20.27 KG/M2 | RESPIRATION RATE: 18 BRPM | SYSTOLIC BLOOD PRESSURE: 117 MMHG | HEART RATE: 79 BPM | TEMPERATURE: 97.7 F | DIASTOLIC BLOOD PRESSURE: 79 MMHG | OXYGEN SATURATION: 96 % | HEIGHT: 70 IN

## 2022-01-13 LAB
ANION GAP SERPL CALCULATED.3IONS-SCNC: 11 MMOL/L (ref 9–17)
BUN BLDV-MCNC: 9 MG/DL (ref 8–23)
BUN/CREAT BLD: ABNORMAL (ref 9–20)
CALCIUM SERPL-MCNC: 9.3 MG/DL (ref 8.6–10.4)
CHLORIDE BLD-SCNC: 107 MMOL/L (ref 98–107)
CO2: 25 MMOL/L (ref 20–31)
CREAT SERPL-MCNC: 0.49 MG/DL (ref 0.5–0.9)
GFR AFRICAN AMERICAN: >60 ML/MIN
GFR NON-AFRICAN AMERICAN: >60 ML/MIN
GFR SERPL CREATININE-BSD FRML MDRD: ABNORMAL ML/MIN/{1.73_M2}
GFR SERPL CREATININE-BSD FRML MDRD: ABNORMAL ML/MIN/{1.73_M2}
GLUCOSE BLD-MCNC: 88 MG/DL (ref 70–99)
HCT VFR BLD CALC: 42.2 % (ref 36–46)
HEMOGLOBIN: 14.6 G/DL (ref 12–16)
MCH RBC QN AUTO: 32.9 PG (ref 26–34)
MCHC RBC AUTO-ENTMCNC: 34.5 G/DL (ref 31–37)
MCV RBC AUTO: 95.1 FL (ref 80–100)
NRBC AUTOMATED: NORMAL PER 100 WBC
PDW BLD-RTO: 12.8 % (ref 11.5–14.9)
PLATELET # BLD: 207 K/UL (ref 150–450)
PMV BLD AUTO: 9.1 FL (ref 6–12)
POTASSIUM SERPL-SCNC: 3.6 MMOL/L (ref 3.7–5.3)
RBC # BLD: 4.43 M/UL (ref 4–5.2)
SODIUM BLD-SCNC: 143 MMOL/L (ref 135–144)
WBC # BLD: 5.7 K/UL (ref 3.5–11)

## 2022-01-13 PROCEDURE — 51798 US URINE CAPACITY MEASURE: CPT

## 2022-01-13 PROCEDURE — 36415 COLL VENOUS BLD VENIPUNCTURE: CPT

## 2022-01-13 PROCEDURE — 92507 TX SP LANG VOICE COMM INDIV: CPT

## 2022-01-13 PROCEDURE — 85027 COMPLETE CBC AUTOMATED: CPT

## 2022-01-13 PROCEDURE — 80048 BASIC METABOLIC PNL TOTAL CA: CPT

## 2022-01-13 PROCEDURE — 6360000002 HC RX W HCPCS: Performed by: STUDENT IN AN ORGANIZED HEALTH CARE EDUCATION/TRAINING PROGRAM

## 2022-01-13 PROCEDURE — C9254 INJECTION, LACOSAMIDE: HCPCS | Performed by: STUDENT IN AN ORGANIZED HEALTH CARE EDUCATION/TRAINING PROGRAM

## 2022-01-13 PROCEDURE — 2580000003 HC RX 258: Performed by: STUDENT IN AN ORGANIZED HEALTH CARE EDUCATION/TRAINING PROGRAM

## 2022-01-13 PROCEDURE — 6370000000 HC RX 637 (ALT 250 FOR IP): Performed by: INTERNAL MEDICINE

## 2022-01-13 PROCEDURE — 92526 ORAL FUNCTION THERAPY: CPT

## 2022-01-13 PROCEDURE — 99233 SBSQ HOSP IP/OBS HIGH 50: CPT | Performed by: PSYCHIATRY & NEUROLOGY

## 2022-01-13 PROCEDURE — 99239 HOSP IP/OBS DSCHRG MGMT >30: CPT | Performed by: INTERNAL MEDICINE

## 2022-01-13 RX ORDER — LEVETIRACETAM 750 MG/1
1500 TABLET ORAL 2 TIMES DAILY
Status: DISCONTINUED | OUTPATIENT
Start: 2022-01-13 | End: 2022-01-13 | Stop reason: HOSPADM

## 2022-01-13 RX ORDER — LEVETIRACETAM 500 MG/1
500 TABLET ORAL 2 TIMES DAILY
Qty: 60 TABLET | Refills: 3 | Status: SHIPPED | OUTPATIENT
Start: 2022-01-13 | End: 2022-01-13 | Stop reason: HOSPADM

## 2022-01-13 RX ORDER — LEVETIRACETAM 500 MG/1
1500 TABLET ORAL 2 TIMES DAILY
Qty: 180 TABLET | Refills: 3 | Status: SHIPPED | OUTPATIENT
Start: 2022-01-13 | End: 2022-01-13 | Stop reason: HOSPADM

## 2022-01-13 RX ORDER — POLYETHYLENE GLYCOL 3350 17 G/17G
17 POWDER, FOR SOLUTION ORAL DAILY PRN
Qty: 527 G | Refills: 1 | Status: SHIPPED | OUTPATIENT
Start: 2022-01-13 | End: 2022-02-12

## 2022-01-13 RX ORDER — PRIMIDONE 50 MG/1
75 TABLET ORAL 2 TIMES DAILY
Qty: 90 TABLET | Refills: 3 | Status: SHIPPED | OUTPATIENT
Start: 2022-01-13 | End: 2022-02-19 | Stop reason: SDUPTHER

## 2022-01-13 RX ORDER — LACOSAMIDE 100 MG/1
100 TABLET ORAL 2 TIMES DAILY
Status: DISCONTINUED | OUTPATIENT
Start: 2022-01-13 | End: 2022-01-13 | Stop reason: HOSPADM

## 2022-01-13 RX ORDER — LEVETIRACETAM 750 MG/1
1500 TABLET ORAL 2 TIMES DAILY
Qty: 60 TABLET | Refills: 3 | Status: ON HOLD | OUTPATIENT
Start: 2022-01-13 | End: 2022-02-24 | Stop reason: HOSPADM

## 2022-01-13 RX ORDER — LACOSAMIDE 100 MG/1
100 TABLET ORAL 2 TIMES DAILY
Qty: 60 TABLET | Refills: 0 | Status: SHIPPED | OUTPATIENT
Start: 2022-01-13 | End: 2022-02-19 | Stop reason: SDUPTHER

## 2022-01-13 RX ADMIN — ATORVASTATIN CALCIUM 40 MG: 40 TABLET, FILM COATED ORAL at 09:02

## 2022-01-13 RX ADMIN — PAROXETINE HYDROCHLORIDE 30 MG: 20 TABLET, FILM COATED ORAL at 09:02

## 2022-01-13 RX ADMIN — PANTOPRAZOLE SODIUM 40 MG: 40 TABLET, DELAYED RELEASE ORAL at 07:37

## 2022-01-13 RX ADMIN — OXYBUTYNIN CHLORIDE 5 MG: 5 TABLET ORAL at 14:42

## 2022-01-13 RX ADMIN — PRIMIDONE 75 MG: 50 TABLET ORAL at 09:02

## 2022-01-13 RX ADMIN — LEVETIRACETAM 1500 MG: 100 INJECTION INTRAVENOUS at 10:52

## 2022-01-13 RX ADMIN — OXYBUTYNIN CHLORIDE 5 MG: 5 TABLET ORAL at 09:02

## 2022-01-13 RX ADMIN — AMLODIPINE BESYLATE 10 MG: 10 TABLET ORAL at 09:02

## 2022-01-13 RX ADMIN — ENOXAPARIN SODIUM 40 MG: 100 INJECTION SUBCUTANEOUS at 09:02

## 2022-01-13 RX ADMIN — DEXTROSE MONOHYDRATE 100 MG: 50 INJECTION, SOLUTION INTRAVENOUS at 09:14

## 2022-01-13 RX ADMIN — ASPIRIN 81 MG: 81 TABLET, COATED ORAL at 09:02

## 2022-01-13 RX ADMIN — SODIUM CHLORIDE, PRESERVATIVE FREE 10 ML: 5 INJECTION INTRAVENOUS at 09:14

## 2022-01-13 RX ADMIN — LEVOTHYROXINE SODIUM 50 MCG: 0.05 TABLET ORAL at 07:36

## 2022-01-13 NOTE — PROGRESS NOTES
Writer spoke with Patients sister who patient lives with and provided her an update on patient status and plan.

## 2022-01-13 NOTE — DISCHARGE INSTR - COC
Continuity of Care Form    Patient Name: Bhavin Wayne   :  1955  MRN:  [id number]    Admit date:  2022  Discharge date:  ***    Code Status Order: Full Code   Advance Directives:      Admitting Physician:  Dale Pleitez MD  PCP: Jimmie Gordon DO    Discharging Nurse: [facility] Unit/Room#: 2089/2089-01  Discharging Unit Phone Number: ***    Emergency Contact:   Extended Emergency Contact Information  Primary Emergency Contact: Jennifer Giron  Address: [address] Phone: [phone]  Relation: Brother/Sister  Hearing or visual needs: None  Other needs: None  Preferred language: English   needed? No  Secondary Emergency Contact: Alley Griffin  Address:  [address] [place] [address] Phone: [phone]  Work Phone: [phone]  Mobile Phone: [phone]  Relation: Brother/Sister  Hearing or visual needs: None  Other needs: None  Preferred language: English   needed? No    Past Surgical History:  Past Surgical History:   Procedure Laterality Date    BUNIONECTOMY Right     COLONOSCOPY      COLONOSCOPY N/A 5/10/2021    COLONOSCOPY DIAGNOSTIC performed by Kalyani Leger MD at [address] Left 2016    ON 2ND, 3rd, 4th,  and  5TH TOES OF LEFT FOOT  Pt. Denies.     OTHER SURGICAL HISTORY  2015    orif left ankle with synthes and intraoperative c- arm    [place] BRNCC INCL FLUOR GDNCE DX W/CELL WASHG SPX N/A 2018    BRONCHOSCOPY WITH WASHINGS AT PATIENT BEDSIDE ICU performed by Madi Solares MD at [address]         Immunization History:   Immunization History   Administered Date(s) Administered    COVID-19, Yeni Ramirezo, Primary or Immunocompromised, PF, 100mcg/0.5mL 2021, 2021    Influenza 10/16/2012, 10/22/2013    Influenza Virus Vaccine 2019    Influenza, Quadv, IM, (6 mo and older Fluzone, Flulaval, Fluarix and 3 yrs and older Afluria) 11/28/2016, 11/12/2019, 02/10/2021, 12/01/2021    PPD Test 01/23/2018, 08/08/2018    Pneumococcal Conjugate 13-valent ([id number]) 06/23/2021    Pneumococcal Polysaccharide (Nisgmjphb70) 02/07/2019    Tdap (Boostrix, Adacel) 11/23/2015    Zoster Live (Zostavax) 02/08/2016    Zoster Recombinant (Shingrix) 07/18/2018       Active Problems:  Patient Active Problem List   Diagnosis Code    Acquired hypothyroidism E03.9    Essential hypertension I10    Urge incontinence N39.41    Generalized anxiety disorder F41.1    Gastroesophageal reflux disease without esophagitis K21.9    H/O: CVA (cerebrovascular accident) Z86.73    Migraine without aura and without status migrainosus, not intractable G43.009    Hammer toe of left foot M20.42    Tremor of both hands R25.1    Status epilepticus ([address] G40.901    Speech and language deficits F80.9    Constipation K59.00    Seizure ([address] R56.9    Seizures ([facility]) R56.9       Isolation/Infection:   Isolation            Contact          Patient Infection Status       Infection Onset Added Last Indicated Last Indicated By Review Planned Expiration Resolved Resolved By    MRSA  01/17/18 01/17/18 Cesar Jean RN        Sputum 7/2018 trach    Resolved    COVID-19 (Rule Out) 01/08/22 01/08/22 01/08/22 COVID-19, Rapid (Ordered)   01/08/22 Rule-Out Test Resulted    COVID-19 (Rule Out) 05/06/21 05/06/21 05/06/21 COVID-19 (Ordered)   05/07/21 Rule-Out Test Resulted    COVID-19 (Rule Out) 06/21/20 06/21/20 06/21/20 COVID-19 (Ordered)   06/21/20 Rule-Out Test Resulted            Nurse Assessment:  Last Vital Signs: /79   Pulse 79   Temp 97.7 °F (36.5 °C)   Resp 18   Ht 5' 10\" (1.778 m)   Wt 141 lb 9.6 oz (64.2 kg)   SpO2 96%   BMI 20.32 kg/m²     Last documented pain score (0-10 scale): Pain Level: 5  Last Weight:   Wt Readings from Last 1 Encounters:   01/13/22 141 lb 9.6 oz (64.2 kg)     Mental Status:  oriented, alert, and periods of confusion    IV Access:  - None    Nursing Mobility/ADLs:  Walking   Assisted  Transfer  Assisted  Bathing  Assisted  Dressing  Assisted  Toileting  Assisted  Feeding  Independent  Med Admin  Independent  Med Delivery   whole    Wound Care Documentation and Therapy:        Elimination:  Continence: Bowel: Yes  Bladder: Yes  Urinary Catheter: None   Colostomy/Ileostomy/Ileal Conduit: No       Date of Last BM: 1/13/2022    Intake/Output Summary (Last 24 hours) at 1/13/2022 1630  Last data filed at 1/13/2022 0053  Gross per 24 hour   Intake 360 ml   Output 300 ml   Net 60 ml     I/O last 3 completed shifts: In: 2531 [P.O.:840; I.V.:553; IV Piggyback:160]  Out: 350 [Urine:350]    Safety Concerns: At Risk for Falls    Impairments/Disabilities:      None    Nutrition Therapy:  Current Nutrition Therapy:   - Oral Diet:  General    Routes of Feeding: Oral  Liquids: No Restrictions  Daily Fluid Restriction: no  Last Modified Barium Swallow with Video (Video Swallowing Test): not done    Treatments at the Time of Hospital Discharge:   Respiratory Treatments: ***  Oxygen Therapy:  is not on home oxygen therapy. Ventilator:    - No ventilator support    Rehab Therapies: Physical Therapy, Occupational Therapy, and Speech/Language Therapy  Weight Bearing Status/Restrictions: No weight bearing restirctions  Other Medical Equipment (for information only, NOT a DME order):  walker  Other Treatments: ***    Patient's personal belongings (please select all that are sent with patient):  Belongings provided to patient before discharge.      RN SIGNATURE:  Electronically signed by Shree Holland RN on 1/13/22 at 5:22 PM EST    CASE MANAGEMENT/SOCIAL WORK SECTION    Inpatient Status Date: 1/8/2022    Readmission Risk Assessment Score:  Readmission Risk              Risk of Unplanned Readmission:  9           Discharging to Facility/ Agency   [address]  Phone:  [phone]  Fax:    Dialysis Facility (if applicable)   Name:  Address:  Dialysis Schedule:  Phone:  Fax:    / signature: Electronically signed by BEHZAD Carreon on 1/13/22 at 4:38 PM EST    PHYSICIAN SECTION    Prognosis: Fair    Condition at Discharge: Stable    Rehab Potential (if transferring to Rehab): Fair    Recommended Labs or Other Treatments After Discharge:     Physician Certification: I certify the above information and transfer of Aziza Orellana  is necessary for the continuing treatment of the diagnosis listed and that she requires [facility] for less 30 days.      Update Admission H&P: No change in H&P    PHYSICIAN SIGNATURE:  Electronically signed by Elba Draper MD on 1/13/22 at 4:53 PM EST

## 2022-01-13 NOTE — PROGRESS NOTES
I spoke with Dr Willem Goss and the conversion from IV to oral is 1:1.  Ok to discharge on oral. Electronically signed by Hugh Ramachandran RN on 1/13/2022 at 4:56 PM

## 2022-01-13 NOTE — PROGRESS NOTES
Speech Language Pathology  Speech Language Pathology  [facility] [address] [place] [address]    Speech Language Treatment Note    Date: 1/13/2022  Patients Name: Chepe Simms  MRN: [id number]  Diagnosis:   Patient Active Problem List   Diagnosis Code    Acquired hypothyroidism E03.9    Essential hypertension I10    Urge incontinence N39.41    Generalized anxiety disorder F41.1    Gastroesophageal reflux disease without esophagitis K21.9    H/O: CVA (cerebrovascular accident) Z86.73    Migraine without aura and without status migrainosus, not intractable G43.009    Hammer toe of left foot M20.42    Tremor of both hands R25.1    Status epilepticus ([address] G40.901    Speech and language deficits F80.9    Constipation K59.00    Seizure ([address] R56.9    Seizures ([address] R56.9       Pain: 0/10    Speech and Language Treatment  Treatment time: 5782-3088    Subjective: [x] Alert [x] Cooperative     [] Confused     [] Agitated    [] Lethargic      Objective/Assessment:  Auditory Comprehension: Pt. Required frequent repetition and redirection throughout (sometimes associated with Kenaitze and other times with perseveration). Following 1-step verbal commands- 100% accuracy dorinda. Following 2-step verbal commands- 90% accuracy dorinda, 100% cued (Pt. Began perseverating and therefore was unable to recall second step of direction missed). Verbal Expression: Pt. Attempting to respond to all writer questions. Pt. Verbal expression much improved today, having conversation with writer and telling writer about family in detail. Pt. exhibiting expressive aphasia in structured tasks and conversation. Frequent perseveration impacting accuracy. Pt. Expressing frustration with deficits stating, \"It's frustrating\" and \"I can't think of the things I want to say\". Education provided re: aphasia post-CVA and word retrieval strategies (e.g., circumlocution). Pt. Verbalized understanding and required mod-max A to facilitate strategy throughout. Confrontation naming (items in picture cards)- 100% accuracy dorinda. Responding to orientation questions- 60% accuracy (oriented to year, place, city; disoriented to month and president). Divergent naming (naming 5 items in concrete category, no time constraint)- 80% accuracy dorinda, 100% c max cues. Convergent categorization- 0% accuracy dorinda, increasing to 50% accuracy c MAX cues. Reading Comprehension: n/a    Written Language: n/a    Speech: Pt. Able to speak in sentences and engage in conversation today. Much improved from previous days. Voice: Functional    Swallowing: Pt's RN present upon arrival.  RN reports Pt. tolerating regular diet without difficulty. Observed Pt. taking medication with RN via thin liquid. No difficulty or overt s/s of aspiration noted. Therapeutic feeding trials of thin liquid via straw x7 presented (including consecutive sips). No overt s/s of aspiration present. Pt. appears to be tolerating diet well. Continue with current diet recommendation. Other: No family present. Plan:  [x] Continue ST services    [] Discharge from ST:      Discharge recommendations: [] Inpatient Rehab   [] [facility]   [] Outpatient Therapy  [] Follow up at trauma clinic   [] Other:       Treatment completed by:  Rochelle Hu M.A., CCC-SLP

## 2022-01-13 NOTE — PROGRESS NOTES
Writer received call from [facility] that pt is discharging tonight at 7pm.

## 2022-01-13 NOTE — PLAN OF CARE
Problem: Falls - Risk of:  Goal: Will remain free from falls  Description: Will remain free from falls  1/13/2022 1602 by Zhane Pérez RN  Outcome: Ongoing  Note: Pt remained free of falls during shift even tho she does not tate call light to get up. Educated on use of call light. 1/13/2022 0237 by Michael Balbuena RN  Outcome: Ongoing  Goal: Absence of physical injury  Description: Absence of physical injury  1/13/2022 1602 by Zhane Pérez RN  Outcome: Ongoing  1/13/2022 0237 by Michael Balbuena RN  Outcome: Ongoing  Note: Pt remained free of any injury or fall throughout the shift. Bed remained in lowest position, side rails up x2, call light within reach. Problem: Safety:  Goal: Free from accidental physical injury  Description: Free from accidental physical injury  1/13/2022 1602 by Zhane Pérez RN  Outcome: Ongoing  1/13/2022 0237 by Michael Balbuena RN  Outcome: Ongoing  Goal: Free from intentional harm  Description: Free from intentional harm  1/13/2022 1602 by Zhane Pérez RN  Outcome: Ongoing  1/13/2022 0237 by Michael Balbuena RN  Outcome: Ongoing     Problem: Pain:  Goal: Patient's pain/discomfort is manageable  Description: Patient's pain/discomfort is manageable  1/13/2022 1602 by Zhane Pérez RN  Outcome: Ongoing  1/13/2022 0237 by Michael Balbuena RN  Outcome: Ongoing  Note: Patient denied any pain throughout the shift. Problem: Skin Integrity:  Goal: Skin integrity will stabilize  Description: Skin integrity will stabilize  1/13/2022 1602 by Zhane Pérez RN  Outcome: Ongoing  1/13/2022 0237 by Michael Balbuena RN  Outcome: Ongoing  Note: Patient showed no new s/s of skin breakdown. Patient was repositioned and assessed frequently. Skin integrity maintained.         Problem: Skin Integrity:  Goal: Will show no infection signs and symptoms  Description: Will show no infection signs and symptoms  1/13/2022 1602 by Zhane Pérez RN  Outcome: Ongoing  1/13/2022 0237 by Jayshree Palma RN  Outcome: Ongoing  Goal: Absence of new skin breakdown  Description: Absence of new skin breakdown  1/13/2022 1602 by Cortez Velasquez RN  Outcome: Ongoing  1/13/2022 0237 by Jayshree Palma RN  Outcome: Ongoing     Problem: Confusion - Acute:  Goal: Absence of continued neurological deterioration signs and symptoms  Description: Absence of continued neurological deterioration signs and symptoms  1/13/2022 1602 by Cortez Velasquez RN  Outcome: Ongoing  1/13/2022 0237 by Jayshree Palma RN  Outcome: Ongoing  Goal: Mental status will be restored to baseline  Description: Mental status will be restored to baseline  1/13/2022 1602 by Cortez Velasquez RN  Outcome: Ongoing  1/13/2022 0237 by Jayshree Palma RN  Outcome: Ongoing     Problem: Discharge Planning:  Goal: Ability to perform activities of daily living will improve  Description: Ability to perform activities of daily living will improve  1/13/2022 1602 by Cortez Velasquez RN  Outcome: Ongoing  1/13/2022 0237 by Jayshree Palma RN  Outcome: Ongoing  Goal: Participates in care planning  Description: Participates in care planning  1/13/2022 1602 by Cortez Velasquez RN  Outcome: Ongoing  1/13/2022 0237 by Jayshree Palma RN  Outcome: Ongoing     Problem: Injury - Risk of, Physical Injury:  Goal: Will remain free from falls  Description: Will remain free from falls  1/13/2022 1602 by Cortez Velasquez RN  Outcome: Ongoing  Note: Pt remained free of falls during shift even tho she does not tate call light to get up. Educated on use of call light. 1/13/2022 0237 by Jayshree Palma RN  Outcome: Ongoing  Goal: Absence of physical injury  Description: Absence of physical injury  1/13/2022 1602 by Cortez Velasquez RN  Outcome: Ongoing  1/13/2022 0237 by Jayshree Palma RN  Outcome: Ongoing  Note: Pt remained free of any injury or fall throughout the shift.  Bed remained in lowest position, side rails up x2, call light within reach.         Problem: Mood - Altered:  Goal: Mood stable  Description: Mood stable  1/13/2022 1602 by Shree Holland RN  Outcome: Ongoing  1/13/2022 0237 by Colton Louise RN  Outcome: Ongoing  Goal: Absence of abusive behavior  Description: Absence of abusive behavior  1/13/2022 1602 by Shree Holland RN  Outcome: Ongoing  1/13/2022 0237 by Colton Louise RN  Outcome: Ongoing  Goal: Verbalizations of feeling emotionally comfortable while being cared for will increase  Description: Verbalizations of feeling emotionally comfortable while being cared for will increase  1/13/2022 1602 by Shree Holland RN  Outcome: Ongoing  1/13/2022 0237 by Colton Louise RN  Outcome: Ongoing     Problem: Psychomotor Activity - Altered:  Goal: Absence of psychomotor disturbance signs and symptoms  Description: Absence of psychomotor disturbance signs and symptoms  1/13/2022 1602 by Shree Holland RN  Outcome: Ongoing  1/13/2022 0237 by Colton Louise RN  Outcome: Ongoing     Problem: Sensory Perception - Impaired:  Goal: Demonstrations of improved sensory functioning will increase  Description: Demonstrations of improved sensory functioning will increase  1/13/2022 1602 by Shree Holland RN  Outcome: Ongoing  1/13/2022 0237 by Colton Louise RN  Outcome: Ongoing  Goal: Decrease in sensory misperception frequency  Description: Decrease in sensory misperception frequency  1/13/2022 1602 by Shree Holland RN  Outcome: Ongoing  1/13/2022 0237 by Colton Louise RN  Outcome: Ongoing  Goal: Able to refrain from responding to false sensory perceptions  Description: Able to refrain from responding to false sensory perceptions  1/13/2022 1602 by Shree Holland RN  Outcome: Ongoing  1/13/2022 0237 by Colton Louise RN  Outcome: Ongoing  Goal: Demonstrates accurate environmental perceptions  Description: Demonstrates accurate environmental perceptions  1/13/2022 1602 by Shree Holland RN  Outcome: Ongoing  1/13/2022 0237 by Heidi Whitley RN  Outcome: Ongoing  Goal: Able to distinguish between reality-based and nonreality-based thinking  Description: Able to distinguish between reality-based and nonreality-based thinking  1/13/2022 1602 by Stephenie Medina RN  Outcome: Ongoing  1/13/2022 0237 by Heidi Whitley RN  Outcome: Ongoing  Goal: Able to interrupt nonreality-based thinking  Description: Able to interrupt nonreality-based thinking  1/13/2022 1602 by Stephenie Medina RN  Outcome: Ongoing  1/13/2022 0237 by Heidi Whitley RN  Outcome: Ongoing     Problem: Sleep Pattern Disturbance:  Goal: Appears well-rested  Description: Appears well-rested  1/13/2022 1602 by Stephenie Medina RN  Outcome: Ongoing  1/13/2022 0237 by Heidi Whitley RN  Outcome: Ongoing     Problem: Musculor/Skeletal Functional Status  Goal: Highest potential functional level  1/13/2022 1602 by Stephenie Medina RN  Outcome: Ongoing  1/13/2022 0237 by Heidi Whitley RN  Outcome: Ongoing  Goal: Absence of falls  1/13/2022 1602 by Stephenie Medina RN  Outcome: Ongoing  1/13/2022 0237 by Heidi Whitley RN  Outcome: Ongoing

## 2022-01-13 NOTE — PROGRESS NOTES
SW informed pt received authorization for [facility]. SW scheduled transport with [facility] for 7:15PM to [facility]. Pt requested SW inform her sister, Froylan Awad of her d/c time. ELENA needs completed.     Number for report: [phone]

## 2022-01-13 NOTE — PROGRESS NOTES
[address] [facility] Neurology   IN-PATIENT SERVICE      NEUROLOGY PROGRESS  NOTE            Date:   1/13/2022  Patient name:  Thai Tolentino  Date of admission:  1/8/2022  YOB: 1955      Interval History:     No seizures overnight    Pt is very awake, sitting up eating lunch now    She is much more oriented    Otherwise pt is directable and follow commands, she is much better        History of Present Illness: The patient is a 77 y.o. female who presents with Seizures  . The patient was seen and examined and the chart was reviewed. Patient with history of prior right MCA stroke, prior history of seizures presents with breakthrough seizure early this morning. Witnessed by family apparently son. When she came to the ED she had been given Versed and was reportedly starting to wake up and follow some basic commands. She was loaded with 2 g of IV Keppra. Around 11 AM or so patient was noted to have right facial twitching, decreased responsiveness and occasional rhythmic twitching left upper extremity. She was clenching down at the same time and occasionally desaturating. She was given a total of 6 mg of IV Ativan and this did not resolve the patient's symptoms. Vimpat was then ordered by consultant 200 mg IV load. After this was given patient appeared to stop the twitching and was more somnolent/obtunded. Discussed case with neuro critical care team at LTAC[facility] in which they did accept patient for further continuous EEG monitoring and status epilepticus. Patient's respiratory status appears to be stable at this point in time and recommendation to hold off intubation.   If she were to start seizing again she would likely need to be intubated prior to arrival.    Past Medical History:     Past Medical History:   Diagnosis Date    Ankle fracture, left     Anxiety 02/04/2014    Arthritis     Black tarry stools     Colon polyp     Constipation     ETOH abuse     States she has not drank any ETOH in 24 years as of 2021    Frequency of urination ?  GERD (gastroesophageal reflux disease) 02/04/2014    Head injury 1998    after seizure/stroke pt fell and hit head    Headache(784.0)     Hypertension     Hypotension ?  Hypothyroidism     Memory deficit     Migraines     Numbness and tingling     fingers    Osteoporosis     Peripheral vascular disease ([address]     Pneumonia     Seizures ([facility]     last seizure 6-,     Shingles      Pt.denies.  Stroke [facility]) 1998    stroke and seizure together    TIA (transient ischemic attack)     1996    Tremor     UTI (urinary tract infection)     once    Varicose veins     Wears glasses         Past Surgical History:     Past Surgical History:   Procedure Laterality Date    BUNIONECTOMY Right     COLONOSCOPY      COLONOSCOPY N/A 5/10/2021    COLONOSCOPY DIAGNOSTIC performed by Morgan Valentine MD at [facility] Left 07/06/2016    ON 2ND, 3rd, 4th,  and  5TH TOES OF LEFT FOOT  Pt. Denies.     OTHER SURGICAL HISTORY  02/23/2015    orif left ankle with synthes and intraoperative c- arm    MO BRNCHSC INCL FLUOR GDNCE DX W/CELL WASHG SPX N/A 01/16/2018    BRONCHOSCOPY WITH WASHINGS AT PATIENT BEDSIDE ICU performed by Chuy Casas MD at [address]          Medications during admission:      sodium chloride flush  5-40 mL IntraVENous 2 times per day    amLODIPine  10 mg Oral Daily    aspirin  81 mg Oral Daily    atorvastatin  40 mg Oral Daily    donepezil  5 mg Oral Nightly    levothyroxine  50 mcg Oral Daily    pantoprazole  40 mg Oral QAM AC    oxybutynin  5 mg Oral TID    PARoxetine  30 mg Oral Daily    primidone  75 mg Oral BID    enoxaparin  40 mg SubCUTAneous Daily    lacosamide (VIMPAT) IVPB  100 mg IntraVENous BID    levetiracetam  1,500 mg IntraVENous Q12H         Physical Exam:   /80   Pulse 75   Temp 97.7 °F (36.5 °C)   Resp 16   Ht 5' 10\" (1.778 m)   Wt (!) 327 lb 2.6 oz (148.4 kg)   SpO2 98%   BMI 46.94 kg/m²   Temp (24hrs), Av.5 °F (36.9 °C), Min:97.6 °F (36.4 °C), Max:99.7 °F (37.6 °C)          Neurological examination:      Mental status   Patient is awake sitting up doing eating. She follows three steps slowly, she is able to speak well, she is AAO x 3, know is , but does not know month     Cranial nerves   Pupil response:            Present     Oculocephalic reflex:   Present     Corneal Reflex:            Present     Facial grimace to pin:  Present     Gag/Cough reflex:       Present        Motor and sensory function  No drift on arms and legs  Tone: Normal      DTR   Plantar response: Downgoing  (-) Winters's sign bilaterally    Gait  Not tested but pt is sitting up on the bed on her own           Diagnostics:      Laboratory Testing:  CBC:   Recent Labs     22  0717 22  0617 22  0647   WBC 4.5 5.5 5.7   HGB 15.7 14.4 14.6    212 207     BMP:    Recent Labs     22  0717 22  0617 22  0647    138 143   K 3.2* 3.4* 3.6*    104 107   CO2 22 22 25   BUN 5* 6* 9   CREATININE 0.46* 0.44* 0.49*   GLUCOSE 99 107* 88         Lab Results   Component Value Date    CHOL 183 2016    LDLCHOLESTEROL 55 2021    HDL 71 2021    TRIG 64 2016    ALT 15 2021    AST 18 2021    TSH 3.96 2022    INR 1.1 2022    LABA1C 5.1 2016    [id number] 1449 (H) 2021       Lab Results   Component Value Date    PHENYTOIN <0.8 (L) 2018       Imaging/Diagnostics:    EEG monitoring (10/2018): Left frontal temporal sharp waves conferred an increased risk for focal onset seizures. Continuous left frontotemporal slowing.       CT head: Chronic right hemispheric stroke with encephalomalacia. Diffuse cerebral atrophy and small vessel ischemic changes     MRI brain (2020): Remote right MCA distribution infarct. No acute abnormalities. No postcontrast enhancement.       I personally reviewed all of the above medications, clinical laboratory, imaging and other diagnostic tests. Impression:      77year old woman with hx of seizure but non compliance, presented with seizures, now with prolonged but significant  improving post ictal state.     Plan:     - continue Vimpat 100 mg every 12 hours, Keppra 1500 mg every 12 hours and primidone 75mg BID all PO  - pt con't to improve, this is a seizure with a prolonged post ictal, it is normal in a small group of people      - PT OT and rehab  - will follow  - pt can be discharged from neuro standpoint if there is a place for her to go    Electronically signed by Genoveva Gonsalez MD on 1/13/2022 at 12:27 PM      MD [address]  Neurology/Neurocritical Care

## 2022-01-13 NOTE — PLAN OF CARE
Problem: Falls - Risk of:  Goal: Will remain free from falls  Description: Will remain free from falls  1/13/2022 1649 by Mony Cuba RN  Outcome: Completed  1/13/2022 1602 by Mony Cuba RN  Outcome: Ongoing  Note: Pt remained free of falls during shift even tho she does not tate call light to get up. Educated on use of call light.   Goal: Absence of physical injury  Description: Absence of physical injury  1/13/2022 1649 by Mony Cuba RN  Outcome: Completed  1/13/2022 1602 by Mony Cuba RN  Outcome: Ongoing     Problem: Safety:  Goal: Free from accidental physical injury  Description: Free from accidental physical injury  1/13/2022 1649 by Mony Cuba RN  Outcome: Completed  1/13/2022 1602 by Mony Cuba RN  Outcome: Ongoing  Goal: Free from intentional harm  Description: Free from intentional harm  1/13/2022 1649 by Mony Cuba RN  Outcome: Completed  1/13/2022 1602 by Mony Cuba RN  Outcome: Ongoing     Problem: Pain:  Goal: Patient's pain/discomfort is manageable  Description: Patient's pain/discomfort is manageable  1/13/2022 1649 by Mony Cuba RN  Outcome: Completed  1/13/2022 1602 by Mony Cuba RN  Outcome: Ongoing     Problem: Skin Integrity:  Goal: Skin integrity will stabilize  Description: Skin integrity will stabilize  1/13/2022 1649 by Mony Cuba RN  Outcome: Completed  1/13/2022 1602 by Mony Cuba RN  Outcome: Ongoing     Problem: Skin Integrity:  Goal: Will show no infection signs and symptoms  Description: Will show no infection signs and symptoms  1/13/2022 1649 by Mony Cuba RN  Outcome: Completed  1/13/2022 1602 by Mony Cuba RN  Outcome: Ongoing  Goal: Absence of new skin breakdown  Description: Absence of new skin breakdown  1/13/2022 1649 by Mony Cuba RN  Outcome: Completed  1/13/2022 1602 by Mony Cuba RN  Outcome: Ongoing     Problem: Confusion - Acute:  Goal: Absence of continued neurological deterioration signs and symptoms  Description: Absence of continued neurological deterioration signs and symptoms  1/13/2022 1649 by Cortez Velasquez RN  Outcome: Completed  1/13/2022 1602 by Cortez Velasquez RN  Outcome: Ongoing  Goal: Mental status will be restored to baseline  Description: Mental status will be restored to baseline  1/13/2022 1649 by Cortez Velasquez RN  Outcome: Completed  1/13/2022 1602 by Cortez Velasquez RN  Outcome: Ongoing     Problem: Discharge Planning:  Goal: Ability to perform activities of daily living will improve  Description: Ability to perform activities of daily living will improve  1/13/2022 1649 by Cortez Velasquez RN  Outcome: Completed  1/13/2022 1602 by Cortez Velasquez RN  Outcome: Ongoing  Goal: Participates in care planning  Description: Participates in care planning  1/13/2022 1649 by Cortez Velasquez RN  Outcome: Completed  1/13/2022 1602 by Cortez Velasquez RN  Outcome: Ongoing     Problem: Injury - Risk of, Physical Injury:  Goal: Will remain free from falls  Description: Will remain free from falls  1/13/2022 1649 by Cortez Velasquez RN  Outcome: Completed  1/13/2022 1602 by Cortez Velasquez RN  Outcome: Ongoing  Note: Pt remained free of falls during shift even tho she does not tate call light to get up. Educated on use of call light.   Goal: Absence of physical injury  Description: Absence of physical injury  1/13/2022 1649 by Cortez Velasquez RN  Outcome: Completed  1/13/2022 1602 by Cortez Velasquez RN  Outcome: Ongoing     Problem: Mood - Altered:  Goal: Mood stable  Description: Mood stable  1/13/2022 1649 by Cortez Velasquez RN  Outcome: Completed  1/13/2022 1602 by Cortez Velasquez RN  Outcome: Ongoing  Goal: Absence of abusive behavior  Description: Absence of abusive behavior  1/13/2022 1649 by Cortez Velasquez RN  Outcome: Completed  1/13/2022 1602 by Hao Eastman Monique Salazar RN  Outcome: Ongoing  Goal: Verbalizations of feeling emotionally comfortable while being cared for will increase  Description: Verbalizations of feeling emotionally comfortable while being cared for will increase  1/13/2022 1649 by Joaquín Kennedy RN  Outcome: Completed  1/13/2022 1602 by Joaquín Kennedy RN  Outcome: Ongoing     Problem: Psychomotor Activity - Altered:  Goal: Absence of psychomotor disturbance signs and symptoms  Description: Absence of psychomotor disturbance signs and symptoms  1/13/2022 1649 by Joaquín Kennedy RN  Outcome: Completed  1/13/2022 1602 by Joaquín Kennedy RN  Outcome: Ongoing     Problem: Sensory Perception - Impaired:  Goal: Demonstrations of improved sensory functioning will increase  Description: Demonstrations of improved sensory functioning will increase  1/13/2022 1649 by Joaquín Kennedy RN  Outcome: Completed  1/13/2022 1602 by Joaquín Kennedy RN  Outcome: Ongoing  Goal: Decrease in sensory misperception frequency  Description: Decrease in sensory misperception frequency  1/13/2022 1649 by Joaquín Kennedy RN  Outcome: Completed  1/13/2022 1602 by Joaquín Kennedy RN  Outcome: Ongoing  Goal: Able to refrain from responding to false sensory perceptions  Description: Able to refrain from responding to false sensory perceptions  1/13/2022 1649 by Joaquín Kennedy RN  Outcome: Completed  1/13/2022 1602 by Joaquín Kennedy RN  Outcome: Ongoing  Goal: Demonstrates accurate environmental perceptions  Description: Demonstrates accurate environmental perceptions  1/13/2022 1649 by Joaquín Kennedy RN  Outcome: Completed  1/13/2022 1602 by Joaquín Kennedy RN  Outcome: Ongoing  Goal: Able to distinguish between reality-based and nonreality-based thinking  Description: Able to distinguish between reality-based and nonreality-based thinking  1/13/2022 1649 by Joaquín Kennedy RN  Outcome: Completed  1/13/2022 1602 by Sapna Carrasquillo Kendy Shelton RN  Outcome: Ongoing  Goal: Able to interrupt nonreality-based thinking  Description: Able to interrupt nonreality-based thinking  1/13/2022 1649 by Luis Felipe Walker RN  Outcome: Completed  1/13/2022 1602 by Luis Felipe Walker RN  Outcome: Ongoing     Problem: Sleep Pattern Disturbance:  Goal: Appears well-rested  Description: Appears well-rested  1/13/2022 1649 by Luis Felipe Walker RN  Outcome: Completed  1/13/2022 1602 by Luis Felipe Walker RN  Outcome: Ongoing     Problem: Musculor/Skeletal Functional Status  Goal: Highest potential functional level  1/13/2022 1649 by Luis Felipe Walker RN  Outcome: Completed  1/13/2022 1602 by Luis Felipe Walker RN  Outcome: Ongoing  Goal: Absence of falls  1/13/2022 1649 by Luis Felipe Walker RN  Outcome: Completed  1/13/2022 1602 by Luis Felipe Walker RN  Outcome: Ongoing

## 2022-01-13 NOTE — PLAN OF CARE
Problem: Falls - Risk of:  Goal: Will remain free from falls  Outcome: Ongoing  Goal: Absence of physical injury  Outcome: Ongoing  Note: Pt remained free of any injury or fall throughout the shift. Bed remained in lowest position, side rails up x2, call light within reach. Problem: Safety:  Goal: Free from accidental physical injury  Outcome: Ongoing  Goal: Free from intentional harm  Outcome: Ongoing     Problem: Pain:  Goal: Patient's pain/discomfort is manageable  Outcome: Ongoing  Note: Patient denied any pain throughout the shift. Problem: Skin Integrity:  Goal: Skin integrity will stabilize  Outcome: Ongoing  Note: Patient showed no new s/s of skin breakdown. Patient was repositioned and assessed frequently. Skin integrity maintained. Problem: Skin Integrity:  Goal: Will show no infection signs and symptoms  Outcome: Ongoing  Goal: Absence of new skin breakdown  Outcome: Ongoing     Problem: Confusion - Acute:  Goal: Absence of continued neurological deterioration signs and symptoms  Outcome: Ongoing  Goal: Mental status will be restored to baseline  Outcome: Ongoing     Problem: Discharge Planning:  Goal: Ability to perform activities of daily living will improve  Outcome: Ongoing  Goal: Participates in care planning  Outcome: Ongoing     Problem: Injury - Risk of, Physical Injury:  Goal: Will remain free from falls  Outcome: Ongoing  Goal: Absence of physical injury  Outcome: Ongoing  Note: Pt remained free of any injury or fall throughout the shift. Bed remained in lowest position, side rails up x2, call light within reach.         Problem: Mood - Altered:  Goal: Mood stable  Outcome: Ongoing  Goal: Absence of abusive behavior  Outcome: Ongoing  Goal: Verbalizations of feeling emotionally comfortable while being cared for will increase  Outcome: Ongoing     Problem: Psychomotor Activity - Altered:  Goal: Absence of psychomotor disturbance signs and symptoms  Outcome: Ongoing Problem: Sensory Perception - Impaired:  Goal: Demonstrations of improved sensory functioning will increase  Outcome: Ongoing  Goal: Decrease in sensory misperception frequency  Outcome: Ongoing  Goal: Able to refrain from responding to false sensory perceptions  Outcome: Ongoing  Goal: Demonstrates accurate environmental perceptions  Outcome: Ongoing  Goal: Able to distinguish between reality-based and nonreality-based thinking  Outcome: Ongoing  Goal: Able to interrupt nonreality-based thinking  Outcome: Ongoing     Problem: Sleep Pattern Disturbance:  Goal: Appears well-rested  Outcome: Ongoing     Problem: Musculor/Skeletal Functional Status  Goal: Highest potential functional level  Outcome: Ongoing  Goal: Absence of falls  Outcome: Ongoing

## 2022-01-14 ENCOUNTER — TELEPHONE (OUTPATIENT)
Dept: INTERNAL MEDICINE | Age: 67
End: 2022-01-14

## 2022-01-14 NOTE — TELEPHONE ENCOUNTER
[facility] Transitions Initial Follow Up Call    Outreach made within 2 business days of discharge: Yes    Patient: Lu Contreras   Patient : 1955   MRN: [id number]    Reason for Admission: seizures  Discharge Date: 2022--pt d/c'd to [facility] of [address] [place] [address] with: Severiano Mccormick, pt's sister--emergency contact and lives w/pt--discussed wanting to follow up with pt when she discharges from [facility]. Severiano Mccormick states she will give writer a call once she has had the family care meeting with [facility] and has an idea of how long pt will be there. Severiano Mccormick wrote down writer's name and direct line.     Discharge department/facility: [facility]

## 2022-01-20 ENCOUNTER — TELEPHONE (OUTPATIENT)
Dept: NEUROLOGY | Age: 67
End: 2022-01-20

## 2022-01-20 NOTE — TELEPHONE ENCOUNTER
I called pt to check on her, she missed her appt today. Her sister answered the phone stating that pt is currently in [facility] for rehab, she had a really bad seizure on 1/8/22. She stated that it took them 5 hours to fully get her out of it. I asked if she had missed any doses of her medication, she said she had not. She did inform me that Dr Quiroga Shall had increased her [address]. I looked at the dose and pt is currently taking 1500 mg BID. I asked that she call the office once pt is home and we can get her appt rescheduled. She stated her understanding. Shad

## 2022-01-21 NOTE — TELEPHONE ENCOUNTER
PC to pt's sister, Haleigh Escobar. She states pt is \"doing better, she's able to feed herself and she's starting to make more sense when she talks. \" Writer asked Haleigh Escobar to please call office when her sister is discharged from rehab so that we can follow up with her.

## 2022-01-29 NOTE — DISCHARGE SUMMARY
[facility] Internal Medicine  Amy Goldsmith MD; Yuan Dumont MD; Nickie Sprague MD; MD Tino Dick MD; Jerry Huddleston MD      [facility]  [facility]    Discharge Summary     Patient ID: Liam Gonsalez  :  1955   MRN: [id number]     ACCOUNT:  [de-identified]   Patient's PCP: Fanny Harkins DO  Admit Date: 2022   Discharge Date: 22    Length of Stay: 5  Code Status:  Prior  Admitting Physician: Jerry Huddleston MD  Discharge Physician: Jerry Huddleston MD     Active Discharge Diagnoses:     Hospital Problem Lists:  Active Problems:    Acquired hypothyroidism    Essential hypertension    H/O: CVA (cerebrovascular accident)    Seizure ([address]    Seizures ([address]  Resolved Problems:    * No resolved hospital problems.  *      Admission Condition:  serious     Discharged Condition: stable    Hospital Stay:     Hospital Course:  Liam Gonsalez is a 77 y.o. female who was admitted for the management of   <principal problem not specified> , presented to ER with Seizures  Patient has past medical history of seizure disorder, stroke with left-sided weakness, patient had multiple witnessed episodes, first noted by family, later on patient has seizure episode while in emergency room  Patient was initially loaded with IV Keppra, later on IV Vimpat was added  Original plan was to transfer patient to [place] for continuous EEG monitoring, unfortunately there was no bed available, patient has end of getting admitted at [facility]  At the time evaluation, patient history, not answering any questions, just making eye contact,       Patient is awake but not very coherent, does not follow commands quickly, taking almost 10 minutes to even tell her name as per physical therapy,  Moving all extremities  St eval         much better today   Working with PT  Resolving postictal state     Review of system:  Denies any nausea vomiting fever chills,  Denies any headaches or blurred vision,  Denies any chest pain shortness of pain orthopnea,   Denies any cough phlegm hemoptysis,  Denies any abdominal pain diarrhea constipation,  Denies any tingling tingling numbness weakness of arms or legs,   Skin no rash,    On examination,  Alert awake oriented x3,  S1-S2 present,  CTA bilateral,  Abdomen soft nontender nondistended bowel sounds present   Extremity no edema no calf tenderness,,  Skin no rash  CNS no focal neurological deficits      Significant therapeutic interventions:   1. Witnessed seizures, there was concern of noncompliance (although Keppra level tested okay) , on IV Keppra on IV Vimpat , neurology onboard   2. Had  MRI brain with and without contrast  3. On reg diet at this time   4. On Lovenox for DVT prophylaxis  5. Hypothyroidism TSH nml  6. History of stroke with residual weakness on left side, although patient not cooperative with neurological exam  7. PT/OT  8. Rehab placement assessment   9. Speech eval   10. resolving postictal state   11.  DC IV fluids ,Eating better  12. dced to Rehab       Significant Diagnostic Studies:   Labs / Micro:  CBC:   Lab Results   Component Value Date    WBC 5.7 01/13/2022    RBC 4.43 01/13/2022    RBC 4.21 10/04/2011    HGB 14.6 01/13/2022    HCT 42.2 01/13/2022    MCV 95.1 01/13/2022    MCH 32.9 01/13/2022    MCHC 34.5 01/13/2022    RDW 12.8 01/13/2022     01/13/2022     10/04/2011     BMP:    Lab Results   Component Value Date    GLUCOSE 88 01/13/2022    GLUCOSE 68 03/27/2012     01/13/2022    K 3.6 01/13/2022     01/13/2022    CO2 25 01/13/2022    ANIONGAP 11 01/13/2022    BUN 9 01/13/2022    CREATININE 0.49 01/13/2022    BUNCRER NOT REPORTED 01/13/2022    CALCIUM 9.3 01/13/2022    LABGLOM >60 01/13/2022    GFRAA >60 01/13/2022    GFR      01/13/2022    GFR NOT REPORTED 01/13/2022     HFP:    Lab Results   Component Value Date    PROT 7.0 12/01/2021     CMP:    Lab Results   Component Value Date    GLUCOSE 88 01/13/2022    GLUCOSE 68 03/27/2012     01/13/2022    K 3.6 01/13/2022     01/13/2022    CO2 25 01/13/2022    BUN 9 01/13/2022    CREATININE 0.49 01/13/2022    ANIONGAP 11 01/13/2022    ALKPHOS 60 12/01/2021    ALT 15 12/01/2021    AST 18 12/01/2021    BILITOT 0.56 12/01/2021    LABALBU 4.3 12/01/2021    LABALBU 4.1 10/04/2011    ALBUMIN 1.6 12/01/2021    LABGLOM >60 01/13/2022    GFRAA >60 01/13/2022    GFR      01/13/2022    GFR NOT REPORTED 01/13/2022    PROT 7.0 12/01/2021    CALCIUM 9.3 01/13/2022     PT/INR:    Lab Results   Component Value Date    PROTIME 14.7 01/09/2022    INR 1.1 01/09/2022     PTT:   Lab Results   Component Value Date    APTT 21.3 01/14/2018     FLP:    Lab Results   Component Value Date    CHOL 183 08/18/2016    TRIG 64 08/18/2016    HDL 71 08/30/2021     U/A:    Lab Results   Component Value Date    COLORU Yellow 01/09/2022    TURBIDITY Clear 01/09/2022    SPECGRAV 1.009 01/09/2022    HGBUR NEGATIVE 01/09/2022    PHUR 6.5 01/09/2022    PROTEINU NEGATIVE 01/09/2022    GLUCOSEU NEGATIVE 01/09/2022    GLUCOSEU NEGATIVE 10/04/2011    KETUA SMALL 01/09/2022    BILIRUBINUR NEGATIVE 01/09/2022    BILIRUBINUR NEGATIVE 10/04/2011    UROBILINOGEN Normal 01/09/2022    NITRU NEGATIVE 01/09/2022    LEUKOCYTESUR NEGATIVE 01/09/2022     TSH:    Lab Results   Component Value Date    TSH 3.96 01/09/2022          Radiology:  No results found. Consultations:    Consults:     Final Specialist Recommendations/Findings:   IP CONSULT TO NEUROLOGY  IP CONSULT TO INTERNAL MEDICINE  IP CONSULT TO SOCIAL WORK  IP CONSULT TO NEUROLOGY      The patient was seen and examined on day of discharge and this discharge summary is in conjunction with any daily progress note from day of discharge. Discharge plan:     Disposition:  To a non-[facility] facility    Physician Follow Up:     DO Kj Hboson.  [address] [facility] Visit       Requiring Further Evaluation/Follow Up POST HOSPITALIZATION/Incidental Findings:     Diet: cardiac diet    Activity: As tolerated    Instructions to Patient:     Discharge Medications:      Medication List      START taking these medications    lacosamide 100 MG Tabs tablet  Commonly known as: Vimpat  Take 1 tablet by mouth 2 times daily for 30 days. polyethylene glycol 17 g packet  Commonly known as: GLYCOLAX  Take 17 g by mouth daily as needed for Constipation        CHANGE how you take these medications    levETIRAcetam 750 MG tablet  Commonly known as: KEPPRA  Take 2 tablets by mouth 2 times daily  What changed:   · medication strength  · See the new instructions. · Another medication with the same name was removed. Continue taking this medication, and follow the directions you see here. primidone 50 MG tablet  Commonly known as: MYSOLINE  Take 1.5 tablets by mouth 2 times daily  What changed: See the new instructions.         CONTINUE taking these medications    amLODIPine 10 MG tablet  Commonly known as: NORVASC  TAKE 1 TABLET BY MOUTH ONE TIME A DAY     aspirin 81 MG EC tablet  Commonly known as: Aspirin Low Dose  TAKE 1 TABLET BY MOUTH ONE TIME A DAY     atorvastatin 40 MG tablet  Commonly known as: LIPITOR  TAKE 1 TABLET BY MOUTH ONE TIME A DAY     diclofenac sodium 1 % Gel  Commonly known as: VOLTAREN  Apply 2 g topically 4 times daily as needed for Pain     donepezil 5 MG tablet  Commonly known as: ARICEPT  TAKE ONE TABLET BY MOUTH NIGHTLY     levothyroxine 50 MCG tablet  Commonly known as: SYNTHROID  TAKE 1 TABLET BY MOUTH ONE TIME A DAY     omeprazole 20 MG delayed release capsule  Commonly known as: PRILOSEC  TAKE 1 CAPSULE BY MOUTH ONE TIME A DAY     oxybutynin 5 MG tablet  Commonly known as: DITROPAN  TAKE 1 TABLET BY MOUTH 3 TIMES A DAY     PARoxetine 30 MG tablet  Commonly known as: PAXIL  TAKE ONE TABLET BY MOUTH EVERY MORNING SUMAtriptan 100 MG tablet  Commonly known as: IMITREX  TAKE 1 TABLET BY MOUTH ONE TIME A DAY AS NEEDED FOR MIGRAINE        STOP taking these medications    hydroCHLOROthiazide 12.5 MG tablet  Commonly known as: HYDRODIURIL           Where to Get Your Medications      These medications were sent to [facility], [facility]  [address], [address] [place] [address]    Phone: [phone]   · levETIRAcetam 750 MG tablet     You can get these medications from any pharmacy    Bring a paper prescription for each of these medications  · lacosamide 100 MG Tabs tablet  · polyethylene glycol 17 g packet  · primidone 50 MG tablet         No discharge procedures on file. Time Spent on discharge is  35 mins in patient examination, evaluation, counseling as well as medication reconciliation, prescriptions for required medications, discharge plan and follow up. Electronically signed by   Jerry Huddleston MD  1/29/2022  6:32 PM      Thank you Dr. Fanny Harkins DO for the opportunity to be involved in this patient's care. Please note that this chart was generated using voice recognition Dragon dictation software. Although every effort was made to ensure the accuracy of this automated transcription, some errors in transcription may have occurred.

## 2022-02-16 DIAGNOSIS — Z86.73 H/O: CVA (CEREBROVASCULAR ACCIDENT): Chronic | ICD-10-CM

## 2022-02-16 DIAGNOSIS — R56.9 SEIZURES (HCC): ICD-10-CM

## 2022-02-16 DIAGNOSIS — F80.9 SPEECH AND LANGUAGE DEFICITS: ICD-10-CM

## 2022-02-16 NOTE — TELEPHONE ENCOUNTER
Pharmacy calling pt was in the hospital and was discharge on 2 new medications. Vimpat and primindone. . pt is currently out of medications and they want to know if pt should still be taking medications. Medications pended. Writer called pt and was able to get her schedule for a follow up on 3/16/22.

## 2022-02-19 RX ORDER — PRIMIDONE 50 MG/1
75 TABLET ORAL 2 TIMES DAILY
Qty: 90 TABLET | Refills: 0 | Status: SHIPPED | OUTPATIENT
Start: 2022-02-19 | End: 2022-04-12

## 2022-02-19 RX ORDER — LACOSAMIDE 100 MG/1
100 TABLET ORAL 2 TIMES DAILY
Qty: 60 TABLET | Refills: 0 | Status: ON HOLD | OUTPATIENT
Start: 2022-02-19 | End: 2022-02-24 | Stop reason: HOSPADM

## 2022-02-19 NOTE — TELEPHONE ENCOUNTER
Will give one more month until she can see neurology.      Steven Barahona, DO  PGY-3, Internal medicine resident  [facility], [place], [place]  2/19/2022 7:15 AM

## 2022-02-21 ENCOUNTER — APPOINTMENT (OUTPATIENT)
Dept: CT IMAGING | Age: 67
DRG: 053 | End: 2022-02-21
Payer: COMMERCIAL

## 2022-02-21 ENCOUNTER — APPOINTMENT (OUTPATIENT)
Dept: GENERAL RADIOLOGY | Age: 67
DRG: 053 | End: 2022-02-21
Payer: COMMERCIAL

## 2022-02-21 ENCOUNTER — HOSPITAL ENCOUNTER (INPATIENT)
Age: 67
LOS: 4 days | Discharge: NURSING, REHAB OR LONG-TERM CARE | DRG: 053 | End: 2022-02-25
Attending: EMERGENCY MEDICINE | Admitting: INTERNAL MEDICINE
Payer: COMMERCIAL

## 2022-02-21 DIAGNOSIS — G40.919 BREAKTHROUGH SEIZURE (HCC): Primary | ICD-10-CM

## 2022-02-21 DIAGNOSIS — R56.9 SEIZURES (HCC): ICD-10-CM

## 2022-02-21 PROBLEM — G40.201 COMPLEX PARTIAL STATUS EPILEPTICUS (HCC): Status: ACTIVE | Noted: 2022-02-21

## 2022-02-21 PROBLEM — F32.A DEPRESSION: Status: ACTIVE | Noted: 2022-02-21

## 2022-02-21 LAB
ABSOLUTE EOS #: 0 K/UL (ref 0–0.4)
ABSOLUTE IMMATURE GRANULOCYTE: ABNORMAL K/UL (ref 0–0.3)
ABSOLUTE LYMPH #: 0.9 K/UL (ref 1–4.8)
ABSOLUTE MONO #: 0.3 K/UL (ref 0.1–1.3)
ALBUMIN SERPL-MCNC: 4.6 G/DL (ref 3.5–5.2)
ALBUMIN/GLOBULIN RATIO: ABNORMAL (ref 1–2.5)
ALP BLD-CCNC: 69 U/L (ref 35–104)
ALT SERPL-CCNC: 14 U/L (ref 5–33)
AMPHETAMINE SCREEN URINE: NEGATIVE
ANION GAP SERPL CALCULATED.3IONS-SCNC: 14 MMOL/L (ref 9–17)
AST SERPL-CCNC: 17 U/L
BARBITURATE SCREEN URINE: POSITIVE
BASOPHILS # BLD: 1 % (ref 0–2)
BASOPHILS ABSOLUTE: 0 K/UL (ref 0–0.2)
BENZODIAZEPINE SCREEN, URINE: NEGATIVE
BILIRUB SERPL-MCNC: 0.61 MG/DL (ref 0.3–1.2)
BILIRUBIN URINE: NEGATIVE
BUN BLDV-MCNC: 11 MG/DL (ref 8–23)
BUN/CREAT BLD: ABNORMAL (ref 9–20)
BUPRENORPHINE URINE: ABNORMAL
CALCIUM SERPL-MCNC: 9.8 MG/DL (ref 8.6–10.4)
CANNABINOID SCREEN URINE: NEGATIVE
CHLORIDE BLD-SCNC: 107 MMOL/L (ref 98–107)
CO2: 22 MMOL/L (ref 20–31)
COCAINE METABOLITE, URINE: NEGATIVE
COLOR: YELLOW
COMMENT UA: NORMAL
CREAT SERPL-MCNC: 0.89 MG/DL (ref 0.5–0.9)
DIFFERENTIAL TYPE: ABNORMAL
EOSINOPHILS RELATIVE PERCENT: 0 % (ref 0–4)
ETHANOL PERCENT: <0.01 %
ETHANOL: <10 MG/DL
GFR AFRICAN AMERICAN: >60 ML/MIN
GFR NON-AFRICAN AMERICAN: >60 ML/MIN
GFR SERPL CREATININE-BSD FRML MDRD: ABNORMAL ML/MIN/{1.73_M2}
GFR SERPL CREATININE-BSD FRML MDRD: ABNORMAL ML/MIN/{1.73_M2}
GLUCOSE BLD-MCNC: 109 MG/DL (ref 70–99)
GLUCOSE URINE: NEGATIVE
HCT VFR BLD CALC: 46 % (ref 36–46)
HEMOGLOBIN: 15.6 G/DL (ref 12–16)
IMMATURE GRANULOCYTES: ABNORMAL %
INFLUENZA A: NOT DETECTED
INFLUENZA B: NOT DETECTED
INR BLD: 1.1
KEPPRA: 42 UG/ML
KETONES, URINE: NEGATIVE
LACTIC ACID, SEPSIS WHOLE BLOOD: ABNORMAL MMOL/L (ref 0.5–1.9)
LACTIC ACID, SEPSIS WHOLE BLOOD: NORMAL MMOL/L (ref 0.5–1.9)
LACTIC ACID, SEPSIS: 1.1 MMOL/L (ref 0.5–1.9)
LACTIC ACID, SEPSIS: 3.2 MMOL/L (ref 0.5–1.9)
LEUKOCYTE ESTERASE, URINE: NEGATIVE
LYMPHOCYTES # BLD: 18 % (ref 24–44)
MAGNESIUM: 2 MG/DL (ref 1.6–2.6)
MCH RBC QN AUTO: 32.4 PG (ref 26–34)
MCHC RBC AUTO-ENTMCNC: 33.9 G/DL (ref 31–37)
MCV RBC AUTO: 95.7 FL (ref 80–100)
MDMA URINE: ABNORMAL
METHADONE SCREEN, URINE: NEGATIVE
METHAMPHETAMINE, URINE: ABNORMAL
MONOCYTES # BLD: 6 % (ref 1–7)
NITRITE, URINE: NEGATIVE
NRBC AUTOMATED: ABNORMAL PER 100 WBC
OPIATES, URINE: NEGATIVE
OXYCODONE SCREEN URINE: NEGATIVE
PDW BLD-RTO: 13 % (ref 11.5–14.9)
PH UA: 7.5 (ref 5–8)
PHENCYCLIDINE, URINE: NEGATIVE
PLATELET # BLD: 238 K/UL (ref 150–450)
PLATELET ESTIMATE: ABNORMAL
PMV BLD AUTO: 8.7 FL (ref 6–12)
POTASSIUM SERPL-SCNC: 3.9 MMOL/L (ref 3.7–5.3)
PROPOXYPHENE, URINE: ABNORMAL
PROTEIN UA: NEGATIVE
PROTHROMBIN TIME: 14.1 SEC (ref 11.8–14.6)
RBC # BLD: 4.81 M/UL (ref 4–5.2)
RBC # BLD: ABNORMAL 10*6/UL
SARS-COV-2 RNA, RT PCR: NOT DETECTED
SEG NEUTROPHILS: 75 % (ref 36–66)
SEGMENTED NEUTROPHILS ABSOLUTE COUNT: 3.8 K/UL (ref 1.3–9.1)
SODIUM BLD-SCNC: 143 MMOL/L (ref 135–144)
SOURCE: NORMAL
SPECIFIC GRAVITY UA: 1.01 (ref 1–1.03)
SPECIMEN DESCRIPTION: NORMAL
TEST INFORMATION: ABNORMAL
TOTAL PROTEIN: 7.6 G/DL (ref 6.4–8.3)
TRICYCLIC ANTIDEPRESSANTS, UR: ABNORMAL
TROPONIN INTERP: ABNORMAL
TROPONIN INTERP: ABNORMAL
TROPONIN T: ABNORMAL NG/ML
TROPONIN T: ABNORMAL NG/ML
TROPONIN, HIGH SENSITIVITY: 22 NG/L (ref 0–14)
TROPONIN, HIGH SENSITIVITY: 46 NG/L (ref 0–14)
TURBIDITY: CLEAR
URINE HGB: NEGATIVE
UROBILINOGEN, URINE: NORMAL
WBC # BLD: 5.1 K/UL (ref 3.5–11)
WBC # BLD: ABNORMAL 10*3/UL

## 2022-02-21 PROCEDURE — 99285 EMERGENCY DEPT VISIT HI MDM: CPT

## 2022-02-21 PROCEDURE — 70450 CT HEAD/BRAIN W/O DYE: CPT

## 2022-02-21 PROCEDURE — 99223 1ST HOSP IP/OBS HIGH 75: CPT | Performed by: INTERNAL MEDICINE

## 2022-02-21 PROCEDURE — 96360 HYDRATION IV INFUSION INIT: CPT

## 2022-02-21 PROCEDURE — 87636 SARSCOV2 & INF A&B AMP PRB: CPT

## 2022-02-21 PROCEDURE — C9254 INJECTION, LACOSAMIDE: HCPCS | Performed by: PSYCHIATRY & NEUROLOGY

## 2022-02-21 PROCEDURE — 84484 ASSAY OF TROPONIN QUANT: CPT

## 2022-02-21 PROCEDURE — 87086 URINE CULTURE/COLONY COUNT: CPT

## 2022-02-21 PROCEDURE — 36415 COLL VENOUS BLD VENIPUNCTURE: CPT

## 2022-02-21 PROCEDURE — 80307 DRUG TEST PRSMV CHEM ANLYZR: CPT

## 2022-02-21 PROCEDURE — 2580000003 HC RX 258

## 2022-02-21 PROCEDURE — 2580000003 HC RX 258: Performed by: EMERGENCY MEDICINE

## 2022-02-21 PROCEDURE — 2580000003 HC RX 258: Performed by: PSYCHIATRY & NEUROLOGY

## 2022-02-21 PROCEDURE — 85025 COMPLETE CBC W/AUTO DIFF WBC: CPT

## 2022-02-21 PROCEDURE — 71045 X-RAY EXAM CHEST 1 VIEW: CPT

## 2022-02-21 PROCEDURE — 81003 URINALYSIS AUTO W/O SCOPE: CPT

## 2022-02-21 PROCEDURE — G0480 DRUG TEST DEF 1-7 CLASSES: HCPCS

## 2022-02-21 PROCEDURE — 87040 BLOOD CULTURE FOR BACTERIA: CPT

## 2022-02-21 PROCEDURE — 83605 ASSAY OF LACTIC ACID: CPT

## 2022-02-21 PROCEDURE — 6360000002 HC RX W HCPCS: Performed by: PSYCHIATRY & NEUROLOGY

## 2022-02-21 PROCEDURE — 85610 PROTHROMBIN TIME: CPT

## 2022-02-21 PROCEDURE — 80053 COMPREHEN METABOLIC PANEL: CPT

## 2022-02-21 PROCEDURE — 80177 DRUG SCRN QUAN LEVETIRACETAM: CPT

## 2022-02-21 PROCEDURE — 83735 ASSAY OF MAGNESIUM: CPT

## 2022-02-21 PROCEDURE — 93005 ELECTROCARDIOGRAM TRACING: CPT | Performed by: EMERGENCY MEDICINE

## 2022-02-21 PROCEDURE — 2060000000 HC ICU INTERMEDIATE R&B

## 2022-02-21 PROCEDURE — 6370000000 HC RX 637 (ALT 250 FOR IP): Performed by: EMERGENCY MEDICINE

## 2022-02-21 PROCEDURE — 99254 IP/OBS CNSLTJ NEW/EST MOD 60: CPT | Performed by: PSYCHIATRY & NEUROLOGY

## 2022-02-21 PROCEDURE — 6360000002 HC RX W HCPCS: Performed by: EMERGENCY MEDICINE

## 2022-02-21 PROCEDURE — 6360000002 HC RX W HCPCS

## 2022-02-21 RX ORDER — SODIUM CHLORIDE 0.9 % (FLUSH) 0.9 %
5-40 SYRINGE (ML) INJECTION PRN
Status: DISCONTINUED | OUTPATIENT
Start: 2022-02-21 | End: 2022-02-25 | Stop reason: HOSPADM

## 2022-02-21 RX ORDER — LEVOTHYROXINE SODIUM 0.05 MG/1
50 TABLET ORAL DAILY
Status: DISCONTINUED | OUTPATIENT
Start: 2022-02-22 | End: 2022-02-25 | Stop reason: HOSPADM

## 2022-02-21 RX ORDER — PRIMIDONE 50 MG/1
75 TABLET ORAL 2 TIMES DAILY
Status: DISCONTINUED | OUTPATIENT
Start: 2022-02-22 | End: 2022-02-25 | Stop reason: HOSPADM

## 2022-02-21 RX ORDER — POTASSIUM CHLORIDE 7.45 MG/ML
10 INJECTION INTRAVENOUS PRN
Status: DISCONTINUED | OUTPATIENT
Start: 2022-02-21 | End: 2022-02-25 | Stop reason: HOSPADM

## 2022-02-21 RX ORDER — ONDANSETRON 4 MG/1
4 TABLET, ORALLY DISINTEGRATING ORAL EVERY 8 HOURS PRN
Status: DISCONTINUED | OUTPATIENT
Start: 2022-02-21 | End: 2022-02-25 | Stop reason: HOSPADM

## 2022-02-21 RX ORDER — ACETAMINOPHEN 650 MG/1
650 SUPPOSITORY RECTAL ONCE
Status: COMPLETED | OUTPATIENT
Start: 2022-02-21 | End: 2022-02-21

## 2022-02-21 RX ORDER — ACETAMINOPHEN 325 MG/1
650 TABLET ORAL EVERY 6 HOURS PRN
Status: DISCONTINUED | OUTPATIENT
Start: 2022-02-21 | End: 2022-02-25 | Stop reason: HOSPADM

## 2022-02-21 RX ORDER — ASPIRIN 81 MG/1
81 TABLET ORAL DAILY
Status: DISCONTINUED | OUTPATIENT
Start: 2022-02-22 | End: 2022-02-25 | Stop reason: HOSPADM

## 2022-02-21 RX ORDER — ACETAMINOPHEN 650 MG/1
650 SUPPOSITORY RECTAL EVERY 6 HOURS PRN
Status: DISCONTINUED | OUTPATIENT
Start: 2022-02-21 | End: 2022-02-25 | Stop reason: HOSPADM

## 2022-02-21 RX ORDER — LORAZEPAM 2 MG/ML
1 INJECTION INTRAMUSCULAR ONCE
Status: COMPLETED | OUTPATIENT
Start: 2022-02-21 | End: 2022-02-21

## 2022-02-21 RX ORDER — 0.9 % SODIUM CHLORIDE 0.9 %
1000 INTRAVENOUS SOLUTION INTRAVENOUS ONCE
Status: COMPLETED | OUTPATIENT
Start: 2022-02-21 | End: 2022-02-21

## 2022-02-21 RX ORDER — ONDANSETRON 2 MG/ML
4 INJECTION INTRAMUSCULAR; INTRAVENOUS EVERY 6 HOURS PRN
Status: DISCONTINUED | OUTPATIENT
Start: 2022-02-21 | End: 2022-02-25 | Stop reason: HOSPADM

## 2022-02-21 RX ORDER — AMLODIPINE BESYLATE 10 MG/1
10 TABLET ORAL DAILY
Status: DISCONTINUED | OUTPATIENT
Start: 2022-02-22 | End: 2022-02-25 | Stop reason: HOSPADM

## 2022-02-21 RX ORDER — POTASSIUM CHLORIDE 20 MEQ/1
40 TABLET, EXTENDED RELEASE ORAL PRN
Status: DISCONTINUED | OUTPATIENT
Start: 2022-02-21 | End: 2022-02-25 | Stop reason: HOSPADM

## 2022-02-21 RX ORDER — PANTOPRAZOLE SODIUM 40 MG/1
40 TABLET, DELAYED RELEASE ORAL
Status: DISCONTINUED | OUTPATIENT
Start: 2022-02-22 | End: 2022-02-25 | Stop reason: HOSPADM

## 2022-02-21 RX ORDER — OXYBUTYNIN CHLORIDE 5 MG/1
5 TABLET ORAL 3 TIMES DAILY
Status: DISCONTINUED | OUTPATIENT
Start: 2022-02-22 | End: 2022-02-25 | Stop reason: HOSPADM

## 2022-02-21 RX ORDER — SODIUM CHLORIDE 9 MG/ML
25 INJECTION, SOLUTION INTRAVENOUS PRN
Status: DISCONTINUED | OUTPATIENT
Start: 2022-02-21 | End: 2022-02-25 | Stop reason: HOSPADM

## 2022-02-21 RX ORDER — SODIUM CHLORIDE 0.9 % (FLUSH) 0.9 %
5-40 SYRINGE (ML) INJECTION EVERY 12 HOURS SCHEDULED
Status: DISCONTINUED | OUTPATIENT
Start: 2022-02-21 | End: 2022-02-25 | Stop reason: HOSPADM

## 2022-02-21 RX ORDER — POLYETHYLENE GLYCOL 3350 17 G/17G
17 POWDER, FOR SOLUTION ORAL DAILY PRN
Status: DISCONTINUED | OUTPATIENT
Start: 2022-02-21 | End: 2022-02-25 | Stop reason: HOSPADM

## 2022-02-21 RX ORDER — LACOSAMIDE 100 MG/1
100 TABLET ORAL 2 TIMES DAILY
Status: DISCONTINUED | OUTPATIENT
Start: 2022-02-22 | End: 2022-02-21

## 2022-02-21 RX ORDER — LEVETIRACETAM 750 MG/1
1500 TABLET ORAL 2 TIMES DAILY
Status: DISCONTINUED | OUTPATIENT
Start: 2022-02-22 | End: 2022-02-21

## 2022-02-21 RX ORDER — ATORVASTATIN CALCIUM 40 MG/1
40 TABLET, FILM COATED ORAL DAILY
Status: DISCONTINUED | OUTPATIENT
Start: 2022-02-22 | End: 2022-02-25 | Stop reason: HOSPADM

## 2022-02-21 RX ORDER — DONEPEZIL HYDROCHLORIDE 5 MG/1
5 TABLET, FILM COATED ORAL NIGHTLY
Status: DISCONTINUED | OUTPATIENT
Start: 2022-02-21 | End: 2022-02-25 | Stop reason: HOSPADM

## 2022-02-21 RX ADMIN — LEVETIRACETAM 500 MG: 100 INJECTION INTRAVENOUS at 07:24

## 2022-02-21 RX ADMIN — ACETAMINOPHEN 650 MG: 650 SUPPOSITORY RECTAL at 07:55

## 2022-02-21 RX ADMIN — DEXTROSE MONOHYDRATE 200 MG: 50 INJECTION, SOLUTION INTRAVENOUS at 15:06

## 2022-02-21 RX ADMIN — DEXTROSE MONOHYDRATE 200 MG: 50 INJECTION, SOLUTION INTRAVENOUS at 14:26

## 2022-02-21 RX ADMIN — SODIUM CHLORIDE, PRESERVATIVE FREE 10 ML: 5 INJECTION INTRAVENOUS at 20:20

## 2022-02-21 RX ADMIN — DEXTROSE MONOHYDRATE 1280 MG PE: 50 INJECTION, SOLUTION INTRAVENOUS at 22:48

## 2022-02-21 RX ADMIN — DEXTROSE MONOHYDRATE 200 MG: 50 INJECTION, SOLUTION INTRAVENOUS at 20:10

## 2022-02-21 RX ADMIN — LORAZEPAM 1 MG: 2 INJECTION INTRAMUSCULAR; INTRAVENOUS at 14:15

## 2022-02-21 RX ADMIN — LEVETIRACETAM 1500 MG: 100 INJECTION INTRAVENOUS at 21:17

## 2022-02-21 RX ADMIN — SODIUM CHLORIDE 1000 ML: 9 INJECTION, SOLUTION INTRAVENOUS at 07:54

## 2022-02-21 RX ADMIN — ENOXAPARIN SODIUM 40 MG: 100 INJECTION SUBCUTANEOUS at 16:57

## 2022-02-21 RX ADMIN — LEVETIRACETAM 3000 MG: 100 INJECTION INTRAVENOUS at 14:43

## 2022-02-21 ASSESSMENT — PAIN SCALES - GENERAL
PAINLEVEL_OUTOF10: 0
PAINLEVEL_OUTOF10: 0

## 2022-02-21 NOTE — PROGRESS NOTES
Pt lives with nephew yuan rodriguez and eunice. Nils Mosley is also currently in the hospital. Home number [phone]. Some of her admission questions filled out with her sister Jennifer's help. She stated she doesn't know too much since she doesn't see her day to day though. But stated she does know after her seizures, she is usually very out of it for days and gets discharged to a facility for rehab. She stated pt is normally unsteady and needs assistance. Normally is confused some and forgetful. Cannot climb stairs normally but can feed herself and get up out of bed on her own.

## 2022-02-21 NOTE — PLAN OF CARE
Problem: Falls - Risk of:  Goal: Will remain free from falls  Description: Will remain free from falls  Outcome: Ongoing   No falls noted this shift. Bed kept in low position. Call light within reach. Problem: Skin Integrity:  Goal: Absence of new skin breakdown  Description: Absence of new skin breakdown  Outcome: Ongoing   Patient repositions self in bed. Head to toe documented. Problem: Safety:  Goal: Ability to remain free from injury will improve  Description: Ability to remain free from injury will improve  Outcome: Ongoing   Orders for all four side rails to be up. Pillows applied to all side rails. Neuro assessment q 2hr.

## 2022-02-21 NOTE — ED NOTES
Report given to JESSICA Barron from U. Report method by phone. The following was reviewed with receiving RN:   Current vital signs:  /79   Pulse 83   Temp 97.7 °F (36.5 °C)   Resp 17   Wt 140 lb (63.5 kg)   SpO2 95%   BMI 20.09 kg/m²                MEWS Score: 1     Any medication or safety alerts were reviewed. Any pending diagnostics and notifications were also reviewed, as well as any safety concerns or issues, abnormal labs, abnormal imaging, and abnormal assessment findings. Questions were answered.             Ronen Roca RN  02/21/22 [id number]

## 2022-02-21 NOTE — ED PROVIDER NOTES
EMERGENCY DEPARTMENT ENCOUNTER    Pt Name: Deborah Adler  MRN: [id number]  [place] 1955  Date of evaluation: 2/21/22  CHIEF COMPLAINT       Chief Complaint   Patient presents with    Seizures    Fever     HISTORY OF PRESENT ILLNESS     Seizures  Seizure activity on arrival: no    Seizure type:  Grand mal  Episode characteristics: abnormal movements, confusion, generalized shaking and unresponsiveness    Postictal symptoms: confusion and somnolence    Return to baseline: no    Severity:  Severe  Number of seizures this episode:  EMS witnessed 4 seizures, gave versed 4 intranasal and 4 iv  Progression:  Resolved  Context comment:  On keppra for seizures, just DC'd from nursing home back to home, family was present    Limited hx  Hx from EMS      REVIEW OF SYSTEMS     Review of Systems   Unable to perform ROS: Mental status change   Neurological: Positive for seizures. PASTMEDICAL HISTORY     Past Medical History:   Diagnosis Date    Ankle fracture, left     Anxiety 02/04/2014    Arthritis     Black tarry stools     Colon polyp     Constipation     ETOH abuse     States she has not drank any ETOH in 24 years as of 2021    Frequency of urination ?  GERD (gastroesophageal reflux disease) 02/04/2014    Head injury 1998    after seizure/stroke pt fell and hit head    Headache(784.0)     Hypertension     Hypotension ?  Hypothyroidism     Memory deficit     Migraines     Numbness and tingling     fingers    Osteoporosis     Peripheral vascular disease ([address]     Pneumonia     Seizures ([facility] [address]     last seizure 6-,     Shingles      Pt.denies.     Stroke [facility]) 1998    stroke and seizure together    TIA (transient ischemic attack)     1996    Tremor     UTI (urinary tract infection)     once    Varicose veins     Wears glasses      Past Problem List  Patient Active Problem List   Diagnosis Code    Acquired hypothyroidism E03.9    Essential hypertension I10    Urge incontinence N39.41    Generalized anxiety disorder F41.1    Gastroesophageal reflux disease without esophagitis K21.9    H/O: CVA (cerebrovascular accident) Z80.78    Migraine without aura and without status migrainosus, not intractable G43.009    Hammer toe of left foot M20.42    Tremor of both hands R25.1    Status epilepticus ([address] G40.901    Speech and language deficits F80.9    Constipation K59.00    Seizure ([address] R56.9    Seizures ([facility]) R56.9     SURGICAL HISTORY       Past Surgical History:   Procedure Laterality Date    BUNIONECTOMY Right     COLONOSCOPY      COLONOSCOPY N/A 5/10/2021    COLONOSCOPY DIAGNOSTIC performed by Sherif Land MD at [facility] Left 07/06/2016    ON 2ND, 3rd, 4th,  and  5TH TOES OF LEFT FOOT  Pt. Denies.  OTHER SURGICAL HISTORY  02/23/2015    orif left ankle with synthes and intraoperative c- arm    CA [address] INCL FLUOR GDNCE DX W/CELL WASHG SPX N/A 01/16/2018    BRONCHOSCOPY WITH WASHINGS AT PATIENT BEDSIDE ICU performed by aKtelynn Sandy MD at [address] [place] [address]       Previous Medications    AMLODIPINE (NORVASC) 10 MG TABLET    TAKE 1 TABLET BY MOUTH ONE TIME A DAY    ASPIRIN (ASPIRIN LOW DOSE) 81 MG EC TABLET    TAKE 1 TABLET BY MOUTH ONE TIME A DAY    ATORVASTATIN (LIPITOR) 40 MG TABLET    TAKE 1 TABLET BY MOUTH ONE TIME A DAY    DICLOFENAC SODIUM (VOLTAREN) 1 % GEL    Apply 2 g topically 4 times daily as needed for Pain    DONEPEZIL (ARICEPT) 5 MG TABLET    TAKE ONE TABLET BY MOUTH NIGHTLY    LACOSAMIDE (VIMPAT) 100 MG TABS TABLET    Take 1 tablet by mouth 2 times daily for 30 days.     LEVETIRACETAM (KEPPRA) 750 MG TABLET    Take 2 tablets by mouth 2 times daily    LEVOTHYROXINE (SYNTHROID) 50 MCG TABLET    TAKE 1 TABLET BY MOUTH ONE TIME A DAY    OMEPRAZOLE (PRILOSEC) 20 MG DELAYED RELEASE CAPSULE    TAKE 1 CAPSULE BY MOUTH ONE TIME A DAY    OXYBUTYNIN (DITROPAN) 5 MG TABLET    TAKE 1 TABLET BY MOUTH 3 TIMES A DAY    PAROXETINE (PAXIL) 30 MG TABLET    TAKE ONE TABLET BY MOUTH EVERY MORNING    PRIMIDONE (MYSOLINE) 50 MG TABLET    Take 1.5 tablets by mouth 2 times daily    SUMATRIPTAN (IMITREX) 100 MG TABLET    TAKE 1 TABLET BY MOUTH ONE TIME A DAY AS NEEDED FOR MIGRAINE     ALLERGIES     has No Known Allergies. FAMILY HISTORY     She indicated that her mother is . She indicated that her father is alive. She indicated that the status of her maternal grandfather is unknown. She indicated that the status of her paternal grandfather is unknown. SOCIAL HISTORY       Social History     Tobacco Use    Smoking status: Never Smoker    Smokeless tobacco: Never Used   Vaping Use    Vaping Use: Never used   Substance Use Topics    Alcohol use: Not Currently     Comment: quit 47, was alcoholic    Drug use: No     PHYSICAL EXAM     INITIAL VITALS: BP (!) 127/98   Pulse 106   Temp 97.6 °F (36.4 °C)   Resp 19   Wt 140 lb (63.5 kg)   SpO2 99%   BMI 20.09 kg/m²    Physical Exam  Constitutional:       General: She is not in acute distress. Appearance: Normal appearance. She is well-developed. She is not diaphoretic. HENT:      Head: Normocephalic and atraumatic. Right Ear: External ear normal.      Left Ear: External ear normal.      Nose: Nose normal. No congestion. Mouth/Throat:      Mouth: Mucous membranes are moist.      Pharynx: Oropharynx is clear. Comments: Dried blood in oral cavity, do not see any oral lacerations, no active bleeding, protecting airway  Eyes:      General:         Right eye: No discharge. Left eye: No discharge. Conjunctiva/sclera: Conjunctivae normal.      Pupils: Pupils are equal, round, and reactive to light. Neck:      Trachea: No tracheal deviation. Cardiovascular:      Rate and Rhythm: Regular rhythm. Tachycardia present. Pulses: Normal pulses. Heart sounds: Normal heart sounds.    Pulmonary:      Effort: Pulmonary effort is normal. No respiratory distress. Breath sounds: Normal breath sounds. No stridor. No wheezing or rales. Abdominal:      Palpations: Abdomen is soft. Tenderness: There is no abdominal tenderness. There is no guarding or rebound. Musculoskeletal:         General: No tenderness or deformity. Normal range of motion. Cervical back: Normal range of motion and neck supple. No rigidity. Skin:     General: Skin is warm and dry. Capillary Refill: Capillary refill takes less than 2 seconds. Findings: No erythema or rash. Neurological:      Comments: Drowsy, incomprehensible words, not following commands at this time, withdrawing from pain         MEDICAL DECISION MAKING:       ED Course as of 02/21/22 1115   Mon Feb 21, 2022   0725 Patient no longer seizing, EMS gave a total of 8 mg versed  Starting keppra 500 mg IBPB  She is on keppra, reviewed med list  She is protecting airway, she is sedated at this time, postictal, doesn't need intubation at this time. Will admit her for breakthough seizures. [WM]   0729 Fever could be from the multiple seizures today  Giving fluids, tylenol [place], checking for infection, unlikely meningitis with the hx of seizures [WM]   5 DW Dr Eliot Levine and FP residents for admission [WM]      ED Course User Index  [WM] Skylar Pardo MD     Patient still postictal, improving, following some commands, awake, alert    Do not suspect CNS infection or sepsis    Procedures    DIAGNOSTIC RESULTS   EKG:All EKG's are interpreted by the Emergency Department Physician who either signs or Co-signs this chart in the absence of a cardiologist.  NSR, nonspecific changes, no acute ischemic changes on ST segments, no change compared to old, normal rate and normal intervals        RADIOLOGY:All plain film, CT, MRI, and formal ultrasound images (except ED bedside ultrasound) are read by the radiologist, see reports below, unless otherwisenoted in MDM or here.   CT HEAD WO CONTRAST Final Result   No acute intracranial abnormality. Remote right MCA distribution infarct. XR CHEST PORTABLE   Final Result   Bibasilar atelectasis or airspace disease. LABS: All lab results were reviewed by myself, and all abnormals are listed below.   Labs Reviewed   CBC WITH AUTO DIFFERENTIAL - Abnormal; Notable for the following components:       Result Value    Seg Neutrophils 75 (*)     Lymphocytes 18 (*)     Absolute Lymph # 0.90 (*)     All other components within normal limits   COMPREHENSIVE METABOLIC PANEL - Abnormal; Notable for the following components:    Glucose 109 (*)     All other components within normal limits   LACTATE, SEPSIS - Abnormal; Notable for the following components:    Lactic Acid, Sepsis 3.2 (*)     All other components within normal limits   TROPONIN - Abnormal; Notable for the following components:    Troponin, High Sensitivity 22 (*)     All other components within normal limits   URINE DRUG SCREEN - Abnormal; Notable for the following components:    Barbiturate Screen, Ur POSITIVE (*)     All other components within normal limits   COVID-19 & INFLUENZA COMBO   CULTURE, BLOOD 1   CULTURE, BLOOD 1   CULTURE, URINE   PROTIME-INR   MAGNESIUM   URINALYSIS WITH REFLEX TO CULTURE   ETHANOL   LACTATE, SEPSIS   TROPONIN   LEVETIRACETAM LEVEL       EMERGENCY DEPARTMENTCOURSE:         Vitals:    Vitals:    02/21/22 0705 02/21/22 0937 02/21/22 1052   BP: (!) 95/57 (!) 127/98    Pulse: 118 106    Resp: 21 19    Temp: 100.3 °F (37.9 °C)  97.6 °F (36.4 °C)   TempSrc: Oral     SpO2: 92% 99%    Weight: 140 lb (63.5 kg)         The patient was given the following medications while in the emergency department:  Orders Placed This Encounter   Medications    levETIRAcetam (KEPPRA) 500 mg in sodium chloride 0.9 % 100 mL IVPB    0.9 % sodium chloride bolus    acetaminophen (TYLENOL) suppository 650 mg     CONSULTS:  IP CONSULT TO INTERNAL MEDICINE    FINAL IMPRESSION      1. Breakthrough seizure [facility])          DISPOSITION/PLAN   DISPOSITION Decision To Admit 02/21/2022 08:55:50 AM      PATIENT REFERRED TO:  No follow-up provider specified. DISCHARGE MEDICATIONS:  New Prescriptions    No medications on file     The care is provided during an unprecedented national emergency due to the novel coronavirus, COVID 19.   MD Buck Rivera MD  02/21/22 5277

## 2022-02-21 NOTE — H&P
[facility]     HISTORY AND PHYSICAL EXAMINATION            Date:   2/21/2022  Patient name:  Erik Morse  Date of admission:  2/21/2022  7:05 AM  MRN:   [id number]  Account:  [de-identified]  YOB: 1955  PCP:    Pito Winters DO  Room:   211/2114-  Code Status:    Full Code    Chief Complaint:     Chief Complaint   Patient presents with    Seizures    Fever     History Obtained From:     patient, family member - nephew, electronic medical record    History of Present Illness:     Due to pt's AMS, History obtained from EMR and from pt's nephew who lives with patient and called EMS this a.m. for witnessed seizure. Laura Campos is a 77 y.o. female with past medical history of right MCA embolic stroke in 2992 with subsequent seizure disorder (on Vimpat & Keppra), history of prolonged post-ictal state, residual left sided weakness s/p CVA, previous alcohol dependence (has been sober ~25 years), b/l tremor (R>L), hypertension, hypothyroidism, depression, GERD, who presents via EMS on 2/21 for witnessed seizure. Pt seen having seizure by nephew at 6:00AM; he reports seeing slow movement of arms, blood coming out of patient's mouth, head jerking, twitching of face and mouth, eyes partially opened, and heavy breathing noises alongside gasps for air. No incontinence noted, pt did not fall off bed, and not seen to have hit herself with anything although pt does sleep on her side pressed up against the wall. Pt subsequently witnessed to have 4 seizures by EMS. R Marzenaa Hildreth [address] was midnight. Patient was recently hospitalized at [facility] for breakthrough seizure (1/8 - 1/13) for breakthrough seizure at which time pt initially managed with Keppra and ativan and pt's seizure broke after Vimpat added.  Per nephew, pt was discharged to [facility] for rehab needs and returned home Feb 3rd. Pt has been compliant with medication as nephew has been helping her, however pill pac she was sent home on from SNF only included Keppra but not Vimpat; this was only recently noted by visiting home nurse. Thus, pt has been only on 1 of 2 of her home AEDs for at least past ~2 weeks. In the ED, pt loaded with Keppra after having received 8 mg total of versed. She was found to be febrile, possible 2/2 multiple seizures today, and sedated/drowsy. Despite AMS, patient was protecting airway and intubation was not indicated. Decision was made to admit the patient for management of breakthrough seizures. Past Medical History:     Past Medical History:   Diagnosis Date    Ankle fracture, left     Anxiety 02/04/2014    Arthritis     Black tarry stools     Colon polyp     Constipation     ETOH abuse     States she has not drank any ETOH in 24 years as of 2021    Frequency of urination ?  GERD (gastroesophageal reflux disease) 02/04/2014    Head injury 1998    after seizure/stroke pt fell and hit head    Headache(784.0)     Hypertension     Hypotension ?  Hypothyroidism     Memory deficit     Migraines     Numbness and tingling     fingers    Osteoporosis     Peripheral vascular disease ([address]     Pneumonia     Seizures ([address]     last seizure 6-,     Shingles      Pt.denies.  Stroke [facility]) 1998    stroke and seizure together    TIA (transient ischemic attack)     1996    Tremor     UTI (urinary tract infection)     once    Varicose veins     Wears glasses         Past SurgicalHistory:     Past Surgical History:   Procedure Laterality Date    BUNIONECTOMY Right     COLONOSCOPY      COLONOSCOPY N/A 5/10/2021    COLONOSCOPY DIAGNOSTIC performed by Mae Pacheco MD at [facility] Left 07/06/2016    ON 2ND, 3rd, 4th,  and  5TH TOES OF LEFT FOOT  Pt. Denies.     OTHER SURGICAL HISTORY  02/23/2015    orif left used smokeless tobacco.  Alcohol:      reports previous alcohol use. Drug Use:  reports no history of drug use. Family History:     Family History   Problem Relation Age of Onset    Cervical Cancer Mother     Heart Disease Father     Heart Disease Maternal Grandfather     Heart Disease Paternal Grandfather        Review of Systems:     Positive and Negative as described in HPI. Review of Systems   Unable to perform ROS: Mental status change       Physical Exam:   /76   Pulse 90   Temp 98.4 °F (36.9 °C) (Oral)   Resp 16   Ht 5' 10\" (1.778 m)   Wt 141 lb 1.5 oz (64 kg)   SpO2 95%   BMI 20.24 kg/m²   Temp (24hrs), Av.5 °F (36.9 °C), Min:97.6 °F (36.4 °C), Max:100.3 °F (37.9 °C)    No results for input(s): POCGLU in the last 72 hours. Intake/Output Summary (Last 24 hours) at 2022 1936  Last data filed at 2022 1835  Gross per 24 hour   Intake 1340 ml   Output 500 ml   Net 840 ml     Vitals:    22 1132 22 1219 22 1308 22 1345   BP: (!) 117/96 106/83 108/79 122/76   Pulse:  86 83 90   Resp:  18 17 16   Temp:   97.7 °F (36.5 °C) 98.4 °F (36.9 °C)   TempSrc:    Oral   SpO2:  95% 95% 95%   Weight:    141 lb 1.5 oz (64 kg)   Height:    5' 10\" (1.778 m)         Physical Exam  Constitutional:       General: She is not in acute distress. Comments: Pt found lying in bed   HENT:      Head: Normocephalic and atraumatic. Nose: Nose normal.      Mouth/Throat:      Comments: Some dried blood around mouth  Eyes:      Conjunctiva/sclera: Conjunctivae normal.      Comments: Pupils symmetric   Cardiovascular:      Rate and Rhythm: Normal rate and regular rhythm. Pulmonary:      Effort: Pulmonary effort is normal.      Breath sounds: No wheezing. Comments: Respiratory Effort normal without any obvious dyspnea/gasps,  No use of accessory muscles  Abdominal:      General: Abdomen is flat. There is no distension. Palpations: Abdomen is soft. Musculoskeletal:      Right lower leg: No edema. Left lower leg: No edema. Skin:     Findings: No bruising.       Comments: No overt bleeding/bruising found   Neurological:      Comments: Somnolent,   Some response to voice with patient intermittently attempting to open eyes,  But does not follow commands  Twitching of face noted           Investigations:     Laboratory Testing:  Recent Results (from the past 24 hour(s))   CBC with Auto Differential    Collection Time: 02/21/22  7:17 AM   Result Value Ref Range    WBC 5.1 3.5 - 11.0 k/uL    RBC 4.81 4.0 - 5.2 m/uL    Hemoglobin 15.6 12.0 - 16.0 g/dL    Hematocrit 46.0 36 - 46 %    MCV 95.7 80 - 100 fL    MCH 32.4 26 - 34 pg    MCHC 33.9 31 - 37 g/dL    RDW 13.0 11.5 - 14.9 %    Platelets 782 957 - 118 k/uL    MPV 8.7 6.0 - 12.0 fL    NRBC Automated NOT REPORTED per 100 WBC    Differential Type NOT REPORTED     Seg Neutrophils 75 (H) 36 - 66 %    Lymphocytes 18 (L) 24 - 44 %    Monocytes 6 1 - 7 %    Eosinophils % 0 0 - 4 %    Basophils 1 0 - 2 %    Immature Granulocytes NOT REPORTED 0 %    Segs Absolute 3.80 1.3 - 9.1 k/uL    Absolute Lymph # 0.90 (L) 1.0 - 4.8 k/uL    Absolute Mono # 0.30 0.1 - 1.3 k/uL    Absolute Eos # 0.00 0.0 - 0.4 k/uL    Basophils Absolute 0.00 0.0 - 0.2 k/uL    Absolute Immature Granulocyte NOT REPORTED 0.00 - 0.30 k/uL    WBC Morphology NOT REPORTED     RBC Morphology NOT REPORTED     Platelet Estimate NOT REPORTED    Comprehensive Metabolic Panel    Collection Time: 02/21/22  7:17 AM   Result Value Ref Range    Glucose 109 (H) 70 - 99 mg/dL    BUN 11 8 - 23 mg/dL    CREATININE 0.89 0.50 - 0.90 mg/dL    Bun/Cre Ratio NOT REPORTED 9 - 20    Calcium 9.8 8.6 - 10.4 mg/dL    Sodium 143 135 - 144 mmol/L    Potassium 3.9 3.7 - 5.3 mmol/L    Chloride 107 98 - 107 mmol/L    CO2 22 20 - 31 mmol/L    Anion Gap 14 9 - 17 mmol/L    Alkaline Phosphatase 69 35 - 104 U/L    ALT 14 5 - 33 U/L    AST 17 <32 U/L    Total Bilirubin 0.61 0.3 - 1.2 mg/dL    Total Protein 7.6 6.4 - 8.3 g/dL    Albumin 4.6 3.5 - 5.2 g/dL    Albumin/Globulin Ratio NOT REPORTED 1.0 - 2.5    GFR Non-African American >60 >60 mL/min    GFR African American >60 >60 mL/min    GFR Comment          GFR Staging NOT REPORTED    Culture, Blood 1    Collection Time: 02/21/22  7:17 AM    Specimen: Blood   Result Value Ref Range    Specimen Description . BLOOD 7G 2O L FOREARM     Special Requests NOT REPORTED     Culture NO GROWTH <24 HRS    Lactate, Sepsis    Collection Time: 02/21/22  7:17 AM   Result Value Ref Range    Lactic Acid, Sepsis 3.2 (H) 0.5 - 1.9 mmol/L    Lactic Acid, Sepsis, Whole Blood NOT REPORTED 0.5 - 1.9 mmol/L   Troponin    Collection Time: 02/21/22  7:17 AM   Result Value Ref Range    Troponin, High Sensitivity 22 (H) 0 - 14 ng/L    Troponin T NOT REPORTED <0.03 ng/mL    Troponin Interp NOT REPORTED    Protime-INR    Collection Time: 02/21/22  7:17 AM   Result Value Ref Range    Protime 14.1 11.8 - 14.6 sec    INR 1.1    Magnesium    Collection Time: 02/21/22  7:17 AM   Result Value Ref Range    Magnesium 2.0 1.6 - 2.6 mg/dL   Ethanol    Collection Time: 02/21/22  7:17 AM   Result Value Ref Range    Ethanol <10 <10 mg/dL    Ethanol percent <0.010 %   Levetiracetam Level    Collection Time: 02/21/22  7:17 AM   Result Value Ref Range    Levetiracetam Lvl 42 ug/mL   COVID-19 & Influenza Combo    Collection Time: 02/21/22  7:25 AM    Specimen: Nasopharyngeal Swab   Result Value Ref Range    Specimen Description . NASOPHARYNGEAL SWAB     Source . NASOPHARYNGEAL SWAB     SARS-CoV-2 RNA, RT PCR Not Detected Not Detected    INFLUENZA A Not Detected Not Detected    INFLUENZA B Not Detected Not Detected   Urinalysis with Reflex to Culture    Collection Time: 02/21/22  7:26 AM    Specimen: Urine, clean catch   Result Value Ref Range    Color, UA Yellow Yellow    Turbidity UA Clear Clear    Glucose, Ur NEGATIVE NEGATIVE    Bilirubin Urine NEGATIVE NEGATIVE    Ketones, Urine NEGATIVE NEGATIVE    Specific [place], UA 1.011 1.000 - 1.030    Urine Hgb NEGATIVE NEGATIVE    pH, UA 7.5 5.0 - 8.0    Protein, UA NEGATIVE NEGATIVE    Urobilinogen, Urine Normal Normal    Nitrite, Urine NEGATIVE NEGATIVE    Leukocyte Esterase, Urine NEGATIVE NEGATIVE    Urinalysis Comments       Microscopic exam not performed based on chemical results unless requested in original order. Urine Drug Screen    Collection Time: 02/21/22  7:26 AM   Result Value Ref Range    Amphetamine Screen, Ur NEGATIVE NEGATIVE    Barbiturate Screen, Ur POSITIVE (A) NEGATIVE    Benzodiazepine Screen, Urine NEGATIVE NEGATIVE    Cocaine Metabolite, Urine NEGATIVE NEGATIVE    Methadone Screen, Urine NEGATIVE NEGATIVE    Opiates, Urine NEGATIVE NEGATIVE    Phencyclidine, Urine NEGATIVE NEGATIVE    Propoxyphene, Urine NOT REPORTED NEGATIVE    Cannabinoid Scrn, Ur NEGATIVE NEGATIVE    Oxycodone Screen, Ur NEGATIVE NEGATIVE    Methamphetamine, Urine NOT REPORTED NEGATIVE    Tricyclic Antidepressants, Urine NOT REPORTED NEGATIVE    MDMA, Urine NOT REPORTED NEGATIVE    Buprenorphine Urine NOT REPORTED NEGATIVE    Test Information       Assay provides medical screening only. The absence of expected drug(s) and/or metabolite(s) may indicate diluted or adulterated urine, limitations of testing or timing of collection. Culture, Blood 1    Collection Time: 02/21/22  7:42 AM    Specimen: Blood   Result Value Ref Range    Specimen Description . BLOOD 2O 7G R HAND     Special Requests NOT REPORTED     Culture NO GROWTH <24 HRS    EKG 12 Lead    Collection Time: 02/21/22 11:12 AM   Result Value Ref Range    Ventricular Rate 96 BPM    Atrial Rate 96 BPM    P-R Interval 96 ms    QRS Duration 102 ms    Q-T Interval 372 ms    QTc Calculation (Bazett) 469 ms    P Axis 91 degrees    R Axis 57 degrees    T Axis -142 degrees   Lactate, Sepsis    Collection Time: 02/21/22 11:24 AM   Result Value Ref Range    Lactic Acid, Sepsis 1.1 0.5 - 1.9 mmol/L    Lactic Acid, Sepsis, Whole Blood NOT REPORTED 0.5 - 1.9 mmol/L   Troponin    Collection Time: 02/21/22 11:24 AM   Result Value Ref Range    Troponin, High Sensitivity 46 (H) 0 - 14 ng/L    Troponin T NOT REPORTED <0.03 ng/mL    Troponin Interp NOT REPORTED        Imaging/Diagnostics:  CT HEAD WO CONTRAST    Result Date: 2/21/2022  EXAMINATION: CT OF THE HEAD WITHOUT CONTRAST  2/21/2022 7:34 am TECHNIQUE: CT of the head was performed without the administration of intravenous contrast. Dose modulation, iterative reconstruction, and/or weight based adjustment of the mA/kV was utilized to reduce the radiation dose to as low as reasonably achievable. COMPARISON: CT brain performed 01/08/2022. HISTORY: ORDERING SYSTEM PROVIDED HISTORY: four seizures this am TECHNOLOGIST PROVIDED HISTORY: four seizures this am Decision Support Exception - unselect if not a suspected or confirmed emergency medical condition->Emergency Medical Condition (MA) Reason for Exam: four seizures this am FINDINGS: BRAIN/VENTRICLES: There is no acute hemorrhage, mass effect, or midline shift. There is cerebral atrophy. There is a remote right MCA distribution infarct. The ventricular structures are unremarkable. The infratentorial structures are unremarkable. ORBITS: The visualized portion of the orbits demonstrate no acute abnormality. SINUSES: The visualized paranasal sinuses and mastoid air cells demonstrate no acute abnormality. SOFT TISSUES/SKULL:  No acute abnormality of the visualized skull or soft tissues. No acute intracranial abnormality. Remote right MCA distribution infarct. XR CHEST PORTABLE    Result Date: 2/21/2022  EXAMINATION: ONE XRAY VIEW OF THE CHEST 2/21/2022 7:56 am COMPARISON: 06/20/2020 HISTORY: ORDERING SYSTEM PROVIDED HISTORY: AMS, fever, seizures TECHNOLOGIST PROVIDED HISTORY: AMS, fever, seizures Reason for Exam: AMS, fever, seizures FINDINGS: Cardiac leads project over the chest.  Heterogeneous bibasilar opacity is seen. No significant pleural effusion. No pneumothorax. Several interfaces projecting over the upper chest, favoring artifact. Right apical pleuroparenchymal thickening is again seen. Cardiac and mediastinal silhouettes are similar to prior. Calcified mediastinal and hilar lymph nodes, in keeping with granulomatous disease. Bibasilar atelectasis or airspace disease. Assessment :      Primary Problem  Seizure [facility])    Active Hospital Problems    Diagnosis Date Noted    Breakthrough seizure ([address] Burn[place]ette Ripa 02/21/2022    Depression [F72. 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] 02/21/2022    Se[address] [R56.9] 01/08/2022    Tremor [R25.1] 11/07/2017    H/O: CVA (cerebrovascular accident) [Z86.73] 05/06/2014    Gastroesophageal reflux disease without esophagitis [K21.9] 02/04/2014    Hypertension [I10] 03/27/2012    Acquired hypothyroidism [E03.9] 03/27/2012    Urge incontinence [N39.41] 03/27/2012       Plan:     Patient status Admit as inpatient in the  Progressive Unit/Step down    Breakthrough seizure   Seizure symptoms improving but pt remains postictal & with facial twitches  Likely 2/2 pt off home Vimpat for past ~2 weeks  Witnessed seizure at home at 6AM & witnessed seizures x4 by EMS  Hx of epilepsy s/p CVA with refractory seizures on Keppra & Vimpat  Recent hospitalization on 1/2022 for seizure  CT head negative for any acute intracranial abnormality  Case discussed with Neurologist Dr. Nuala Hamman;   Partial status epilepticus,   Pt well known to him,   Seizure pattern similar to prior,   Hx of prolonged post-ictal period up to 1 week,   No need for intubation, no need for ICU or sitter at this time  Pt protecting airway; intubation not needed  Versed given by EMS  Keppra 500 mg IV x 1 dose given   Keppra 3 g IV x1 dose given  Lacosamide 200 mg IV x 2 doses given  Lacosamide 200 mg IV BID  Levetiracetam 1.5 g IV BID  PT/OT  Seizure precautions, side rails up x4, frequent neuro checks    Other comorbid conditions:  · Tremors: c/w home Primidone 5 mg p.o. twice daily   · Hx of CVA: right MCA embolic stroke (4089), c/w home ASA 81 mg p.o. daily  · Hypertension: amlodipine 10 mg p.o. daily  · Depression: paroxetine 30 mg p.o. daily   · Hypothyroidism: levothyroxine 50 mcg p.o. daily  · GERD: pantoprazole 40 mg p.o. daily  · Other Home meds continued: donepezil 5 mg p.o. nightly, oxybutynin 5 mg p.o. TID      Code: FULL  DVT prophylaxis: Enoxaparin 40 mg SQ daily  GI prophylaxis: Protonix  Dispo: Admit to progressive unit      Please note: Use of a speech recognition software was used in the creation of portions of this note and dictation errors, including those of syntax and sound alike word substitutions, may have escaped proofreading. Consultations:   IP CONSULT TO INTERNAL MEDICINE  IP CONSULT TO NEUROLOGY    Patient is admitted as inpatient status because of co-morbiditieslisted above, severity of signs and symptoms as outlined, requirement for current medical therapies and most importantly because of direct risk to patient if care not provided in a hospital setting. Harriet Steward DO  2/21/2022  7:36 PM    Copy sent to Dr. Iain Barkley DO    Attending Physician Statement  I have discussed the care of Michelle Umaña and I have examined the patient myselft and taken ros and hpi , including pertinent history and exam findings,  with the resident. I have reviewed the key elements of all parts of the encounter with the resident. I agree with the assessment, plan and orders as documented by the resident.       Electronically signed by Kathi Elliott MD
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
## 2022-02-21 NOTE — ED NOTES
Bed: 06  Expected date:   Expected time:   Means of arrival: Ambulance  Comments:  STU [id number] Aline Peña RN  02/21/22 0143

## 2022-02-22 LAB
ABSOLUTE EOS #: 0 K/UL (ref 0–0.4)
ABSOLUTE LYMPH #: 1.4 K/UL (ref 1–4.8)
ABSOLUTE MONO #: 0.8 K/UL (ref 0.1–1.3)
ANION GAP SERPL CALCULATED.3IONS-SCNC: 10 MMOL/L (ref 9–17)
BASOPHILS # BLD: 1 % (ref 0–2)
BASOPHILS ABSOLUTE: 0 K/UL (ref 0–0.2)
BUN BLDV-MCNC: 8 MG/DL (ref 8–23)
CALCIUM SERPL-MCNC: 8.5 MG/DL (ref 8.6–10.4)
CHLORIDE BLD-SCNC: 108 MMOL/L (ref 98–107)
CO2: 23 MMOL/L (ref 20–31)
CREAT SERPL-MCNC: 0.46 MG/DL (ref 0.5–0.9)
CULTURE: NO GROWTH
EKG ATRIAL RATE: 96 BPM
EKG P AXIS: 91 DEGREES
EKG P-R INTERVAL: 96 MS
EKG Q-T INTERVAL: 372 MS
EKG QRS DURATION: 102 MS
EKG QTC CALCULATION (BAZETT): 469 MS
EKG R AXIS: 57 DEGREES
EKG T AXIS: -142 DEGREES
EKG VENTRICULAR RATE: 96 BPM
EOSINOPHILS RELATIVE PERCENT: 1 % (ref 0–4)
GFR AFRICAN AMERICAN: >60 ML/MIN
GFR NON-AFRICAN AMERICAN: >60 ML/MIN
GFR SERPL CREATININE-BSD FRML MDRD: ABNORMAL ML/MIN/{1.73_M2}
GLUCOSE BLD-MCNC: 86 MG/DL (ref 70–99)
HCT VFR BLD CALC: 40.4 % (ref 36–46)
HEMOGLOBIN: 13.4 G/DL (ref 12–16)
LYMPHOCYTES # BLD: 23 % (ref 24–44)
MAGNESIUM: 1.9 MG/DL (ref 1.6–2.6)
MCH RBC QN AUTO: 31.9 PG (ref 26–34)
MCHC RBC AUTO-ENTMCNC: 33.2 G/DL (ref 31–37)
MCV RBC AUTO: 96 FL (ref 80–100)
MONOCYTES # BLD: 13 % (ref 1–7)
PDW BLD-RTO: 12.6 % (ref 11.5–14.9)
PLATELET # BLD: 178 K/UL (ref 150–450)
PMV BLD AUTO: 8.7 FL (ref 6–12)
POTASSIUM SERPL-SCNC: 3.5 MMOL/L (ref 3.7–5.3)
RBC # BLD: 4.21 M/UL (ref 4–5.2)
SEG NEUTROPHILS: 62 % (ref 36–66)
SEGMENTED NEUTROPHILS ABSOLUTE COUNT: 3.9 K/UL (ref 1.3–9.1)
SODIUM BLD-SCNC: 141 MMOL/L (ref 135–144)
SPECIMEN DESCRIPTION: NORMAL
WBC # BLD: 6 K/UL (ref 3.5–11)

## 2022-02-22 PROCEDURE — 97530 THERAPEUTIC ACTIVITIES: CPT

## 2022-02-22 PROCEDURE — 99232 SBSQ HOSP IP/OBS MODERATE 35: CPT | Performed by: PSYCHIATRY & NEUROLOGY

## 2022-02-22 PROCEDURE — 36415 COLL VENOUS BLD VENIPUNCTURE: CPT

## 2022-02-22 PROCEDURE — 2580000003 HC RX 258

## 2022-02-22 PROCEDURE — 83735 ASSAY OF MAGNESIUM: CPT

## 2022-02-22 PROCEDURE — 6360000002 HC RX W HCPCS: Performed by: PSYCHIATRY & NEUROLOGY

## 2022-02-22 PROCEDURE — 93010 ELECTROCARDIOGRAM REPORT: CPT | Performed by: INTERNAL MEDICINE

## 2022-02-22 PROCEDURE — 6360000002 HC RX W HCPCS

## 2022-02-22 PROCEDURE — 2060000000 HC ICU INTERMEDIATE R&B

## 2022-02-22 PROCEDURE — 2580000003 HC RX 258: Performed by: PSYCHIATRY & NEUROLOGY

## 2022-02-22 PROCEDURE — 97166 OT EVAL MOD COMPLEX 45 MIN: CPT

## 2022-02-22 PROCEDURE — 99233 SBSQ HOSP IP/OBS HIGH 50: CPT | Performed by: INTERNAL MEDICINE

## 2022-02-22 PROCEDURE — 85025 COMPLETE CBC W/AUTO DIFF WBC: CPT

## 2022-02-22 PROCEDURE — 80048 BASIC METABOLIC PNL TOTAL CA: CPT

## 2022-02-22 PROCEDURE — 97162 PT EVAL MOD COMPLEX 30 MIN: CPT

## 2022-02-22 PROCEDURE — C9254 INJECTION, LACOSAMIDE: HCPCS | Performed by: PSYCHIATRY & NEUROLOGY

## 2022-02-22 RX ADMIN — ENOXAPARIN SODIUM 40 MG: 100 INJECTION SUBCUTANEOUS at 08:33

## 2022-02-22 RX ADMIN — LEVETIRACETAM 1500 MG: 100 INJECTION INTRAVENOUS at 10:45

## 2022-02-22 RX ADMIN — SODIUM CHLORIDE, PRESERVATIVE FREE 10 ML: 5 INJECTION INTRAVENOUS at 08:34

## 2022-02-22 RX ADMIN — SODIUM CHLORIDE, PRESERVATIVE FREE 10 ML: 5 INJECTION INTRAVENOUS at 22:06

## 2022-02-22 RX ADMIN — LEVETIRACETAM 1500 MG: 100 INJECTION INTRAVENOUS at 22:35

## 2022-02-22 RX ADMIN — DEXTROSE MONOHYDRATE 200 MG: 50 INJECTION, SOLUTION INTRAVENOUS at 09:21

## 2022-02-22 RX ADMIN — DEXTROSE MONOHYDRATE 200 MG: 50 INJECTION, SOLUTION INTRAVENOUS at 21:49

## 2022-02-22 ASSESSMENT — PAIN SCALES - GENERAL
PAINLEVEL_OUTOF10: 0
PAINLEVEL_OUTOF10: 0

## 2022-02-22 NOTE — PROGRESS NOTES
Physical Therapy    Facility/Department: [facility] PROGRESSIVE CARE  Initial Assessment    NAME: Michelle Umaña  : 1955  MRN: [id number]    Date of Service: 2022    Discharge Recommendations:  Patient would benefit from continued therapy after discharge        Assessment   Body structures, Functions, Activity limitations: Decreased functional mobility ; Decreased strength;Decreased safe awareness;Decreased cognition;Decreased endurance;Decreased balance  Assessment: Pt with hx of seizures and L MCA CVA,  Patient presented with focal status epilepticus with right facial twitching. Pt needs 1 person assist to dangle and stand at EOB, but needs 2 assist for any standing /dynaomic fucntion, pt leans to left and anteriorly and not safe to ambulate yet. Will benefit from conitnued therapy at discharge. Specific instructions for Next Treatment: Co-tx with BARAJAS/OTR. if any episode of facial twitching observed , notifiy RN immediately. Prognosis: Fair  Decision Making: Medium Complexity  History: History of multiple admissions for recurrent breakthrough seizures, L MCA CVA. Barriers to Learning: Cognitive, aphasia  REQUIRES PT FOLLOW UP: Yes  Activity Tolerance  Activity Tolerance: Patient limited by fatigue;Patient limited by endurance; Patient limited by cognitive status       Patient Diagnosis(es): The encounter diagnosis was Breakthrough seizure ([address] has a past medical history of Ankle fracture, left, Anxiety, Arthritis, Black tarry stools, Colon polyp, Constipation, ETOH abuse, Frequency of urination, GERD (gastroesophageal reflux disease), Head injury, Headache(784.0), Hypertension, Hypotension, Hypothyroidism, Memory deficit, Migraines, Numbness and tingling, Osteoporosis, Peripheral vascular disease ([address] Pneumonia, Seizures ([address] Shingles, Stroke ([address] TIA (transient ischemic attack), Tremor, UTI (urinary tract infection), Varicose veins, and Wears glasses.    has a past surgical history that includes Bunionectomy (Right); Tubal ligation; other surgical history (02/23/2015); Hammer toe surgery (Left, 07/06/2016); [facility] fluor gdnce dx w/cell washg spx (N/A, 01/16/2018); Colonoscopy; and Colonoscopy (N/A, 5/10/2021). Restrictions  Restrictions/Precautions  Restrictions/Precautions: Seizure,Fall Risk  Vision/Hearing  Vision:  (Unable to assess- pt unable to report. )  Hearing: Within functional limits     Subjective  General  Patient assessed for rehabilitation services?: Yes  Additional Pertinent Hx: The patient is a 77 y.o. female who presents with Seizures and Fever. The patient was seen and examined and the chart was reviewed. Patient presented with focal status epilepticus with right facial twitching. She was noted to be off of her home dose of Vimpat after being discharged from the nursing home. Caesar Lovell Referral Date : 02/21/22  Diagnosis: Seizure  Follows Commands: Impaired  Other (Comment): Follows simple commands 0ccasionally, needs extra time to process  Subjective  Subjective: Pt awake, at times followed simple commands with increased time to process information. Pt having speech difficulty at times. Pain Screening  Patient Currently in Pain: No          Orientation  Orientation  Overall Orientation Status: Impaired  Orientation Level: Disoriented X4  Social/Functional History  Social/Functional History  Lives With: Family (Sister and brother)  Type of Home: House  Home Layout: Two level  Additional Comments: Pt with cognitive deficts, unable to provide information regarding her fucntional status and home living situation. Pt's Sister/Caregiver, Cherrie Ugalde, is in ARU-Rehab unit.    Cognition        Objective          AROM RLE (degrees)  RLE AROM: WFL  AROM LLE (degrees)  LLE AROM : WFL  Strength RLE  Comment: Atleast 3+/5  Strength LLE  Comment: Atleast 4-/5     Sensation  Overall Sensation Status:  (PAZ)  Bed mobility  Rolling to Left: Stand by assistance  Rolling to Right: Stand by assistance  Supine to Sit: Minimal assistance  Sit to Supine: Stand by assistance  Scooting: Stand by assistance  Comment: Pt at times has diffculty following commands. Verbalizes appropriately short sentences and other times unable to. Transfers  Sit to Stand: Minimal Assistance; Moderate Assistance  Stand to sit: Moderate Assistance  Comment: Sit<>stand twice. OTR assisted with pericare while PT assisted with standing and maintaining balance. pt's needed mod a x 2 for dynamic standing balance, as pt leans to left and anteriorlyand has LOB bothe attempts. Not saafe to ambualte at this time. Ambulation  Ambulation?: No     Balance  Posture: Fair  Sitting - Static: Fair;+  Sitting - Dynamic: Poor  Standing - Static: Fair;-  Standing - Dynamic: Poor        Plan   Plan  Times per week: 5 x/week  Specific instructions for Next Treatment: Co-tx with BARAJAS/OTR. if any episode of facial twitching observed , notifiy RN immediately. Current Treatment Recommendations: Strengthening,Balance Training,Functional Mobility Training,Transfer Training,Cognitive/Perceptual Training,Endurance Training,Gait Training,Safety Education & Training,Patient/Caregiver Education & Training,Equipment Evaluation, Education, & procurement  Safety Devices  Type of devices: Left in bed,Gait belt,Call light within reach,Nurse notified,Bed alarm in place (all 4 rails up-per orders from MD.)    G-Code       OutComes Score                                                  AM-PAC Score  AM-PAC Inpatient Mobility Raw Score : 11 (02/22/22 1612)  AM-PAC Inpatient T-Scale Score : 33.86 (02/22/22 1612)  Mobility Inpatient CMS 0-100% Score: 72.57 (02/22/22 1612)  Mobility Inpatient CMS G-Code Modifier : CL (02/22/22 1612)          Goals  Short term goals  Time Frame for Short term goals: 7 to 8 visits  Short term goal 1: Pt able to perform supine<>sit at SBA with minimal verbal cues.   Short term goal 2: Pt able to dangle at EOB for 10 minutes and participate in theraputic ex's to improve strength  Short term goal 3: Pt able to perform sit.<>stand at [address] term goal 4: Pt able to transfers to chair at min A x 2  Short term goal 5:  Pt able to progress to taking 10 to 20 steps with RW, min A x 2  Patient Goals   Patient goals : Not stated.        Therapy Time   Individual Concurrent Group Co-treatment   Time In 3840         Time Out 1413         Minutes 26         Timed Code Treatment Minutes: [address] [facility] [place], JUVENAL

## 2022-02-22 NOTE — PLAN OF CARE
PLAN OF CARE    Received page that pt was sitting up in bed,  With everything off,   And trying to get out of bed,  And medical staff observed facial twitching. Pt seen and examined. She is pleasantly confused; Is oriented to self and responds that she is in hospital but otherwise disoriented including to time;  she does not know who she lives with,  Cannot tell me her relatives's names. Is cooperative and follows some commands but   After initial appropriate responses fixates on repetitive phrases/words and replies only with that. No facial twitching noted on physical exams,  No obvious signs of trauma. Tracks appropriately with eyes. Good hand grasp bilaterally. Tremor noted (has long term hx of tremor). Confirmed with medical staff; facial twitch that was observed no longer there. Suspect pt continues to be in post-ictal period,  D/t AMS and hx of prolonged post-ictal period. Will order sitter and continue to monitor.     Electronically signed by Jennifer Velasquez DO on 2/22/2022 at 4:07 PM

## 2022-02-22 NOTE — PLAN OF CARE
Problem: Falls - Risk of:  Goal: Will remain free from falls  Description: Will remain free from falls  2/22/2022 1005 by Romario Almonte RN  Outcome: Ongoing  2/22/2022 0349 by Cinda Dolan RN  Outcome: Ongoing  Goal: Absence of physical injury  Description: Absence of physical injury  2/22/2022 1005 by Romario Almonte RN  Outcome: Ongoing  2/22/2022 0349 by Cinda Dolan RN  Outcome: Ongoing     Problem: Skin Integrity:  Goal: Will show no infection signs and symptoms  Description: Will show no infection signs and symptoms  2/22/2022 1005 by Romario Almonte RN  Outcome: Ongoing  2/22/2022 0349 by Cinda Dolan RN  Outcome: Ongoing  Goal: Absence of new skin breakdown  Description: Absence of new skin breakdown  2/22/2022 1005 by Romario Almonte RN  Outcome: Ongoing  2/22/2022 0349 by Cinda Dolan RN  Outcome: Ongoing     Problem: Safety:  Goal: Ability to remain free from injury will improve  Description: Ability to remain free from injury will improve  2/22/2022 1005 by Romario Almonte RN  Outcome: Ongoing  2/22/2022 0349 by Cinda Dolan RN  Outcome: Ongoing

## 2022-02-22 NOTE — PROGRESS NOTES
[address]    Occupational Therapy Evaluation  Date: 22  Patient Name: Luis Wynn       Room:   MRN: [id number]  Account: [de-identified]   : 1955  (68 y.o.) Gender: female     Discharge Recommendations:  Further Occupational Therapy is recommended upon facility discharge. Equipment Needed:  (TBD)    Referring Practitioner: Silvana Prieto DO  Diagnosis: Seizure  Additional Pertinent Hx: 77 y.o. female with past medical history of right MCA embolic stroke in  with subsequent seizure disorder (on Vimpat & Keppra), history of prolonged post-ictal state, residual left sided weakness s/p CVA, previous alcohol dependence (has been sober ~25 years), b/l tremor (R>L), hypertension, hypothyroidism, depression, GERD, who presents via EMS on  for witnessed seizure. Pt seen having seizure by nephew at 6:00AM; he reports seeing slow movement of arms, blood coming out of patient's mouth, head jerking, twitching of face and mouth, eyes partially opened, and heavy breathing noises alongside gasps for air. Treatment Diagnosis: Impaired self-care status  Past Medical History:  has a past medical history of Ankle fracture, left, Anxiety, Arthritis, Black tarry stools, Colon polyp, Constipation, ETOH abuse, Frequency of urination, GERD (gastroesophageal reflux disease), Head injury, Headache(784.0), Hypertension, Hypotension, Hypothyroidism, Memory deficit, Migraines, Numbness and tingling, Osteoporosis, Peripheral vascular disease ([address] Pneumonia, Seizures ([address] Shingles, Stroke ([address] TIA (transient ischemic attack), Tremor, UTI (urinary tract infection), Varicose veins, and Wears glasses. Past Surgical History:   has a past surgical history that includes Bunionectomy (Right); Tubal ligation; other surgical history (2015);  Hammer toe surgery (Left, 2016); pr brncc incl fluor gdnce dx w/cell washg spx (N/A, 2018); Colonoscopy; and Colonoscopy (N/A, 5/10/2021). Restrictions  Restrictions/Precautions: Seizure,Fall Risk     Vitals  Temp: 98.4 °F (36.9 °C)  Pulse: 83  Resp: 14  BP: 118/83  Height: 5' 10\" (177.8 cm)  Weight: 156 lb 15.5 oz (71.2 kg)  BMI (Calculated): 22.6  Oxygen Therapy  SpO2: 96 %  Pulse Oximeter Device Mode: Intermittent  Pulse Oximeter Device Location: Finger  O2 Device: None (Room air)  O2 Flow Rate (L/min): 3 L/min  Level of Consciousness: Alert (0)    Subjective  Comments: Okay for OT/PT per RN Mario Najjar  Overall Orientation Status:  (Unable to assess)  Vision  Vision:  (Unable to assess- pt unable to report. )  Hearing  Hearing: Within functional limits  Social/Functional History  Lives With: Family (Sister and brother)  Type of Home: House  Home Layout: Two level  Additional Comments: Pt with cognitive deficts, unable to provide information regarding her fucntional status and home living situation. Pt's Sister/Caregiver, Darryl Reynoso, is in ARU-Rehab unit. Objective      Cognition  Overall Cognitive Status: Impaired  Following Directions: Follows one step commands  Attention Span: Difficulty attending to directions  Following Commands: Follows one step commands with repetition  Safety Judgement: Decreased awareness of need for assistance  Awareness of Errors: Assistance required to identify errors made  Insights: Decreased awareness of deficits  Sequencing and Organization: Assistance required to identify errors made,Assistance required to generate solutions,Assistance required to implement solutions   Sensation  Overall Sensation Status:  (PAZ)   ADL  Feeding: NPO  Grooming: Minimal assistance  UE Bathing: Moderate assistance  LE Bathing: Moderate assistance  UE Dressing: Moderate assistance  LE Dressing: Moderate assistance  Toileting: Dependent/Total (Claudia)  Additional Comments: ADL scores based on skilled observation and clinical reasoning, unless otherwise noted.  Assistance required due to cognitive impairments and poor safety awareness, impacting independence with self care. Pt's brief was heavily saturated, TA for donning/doffing brief and pericare. UE Function           LUE Strength  L Hand General: 4/5  LUE Strength Comment: Overall 4/5     LUE Tone: Normotonic     LUE AROM (degrees)  LUE AROM : WFL     Left Hand AROM (degrees)  Left Hand AROM: WFL  RUE Strength  R Hand General: 4/5  RUE Strength Comment: Overall 4/5      RUE Tone: Normotonic     RUE AROM (degrees)  RUE AROM : WFL     Right Hand AROM (degrees)  Right Hand AROM: WFL    Fine Motor Skills  Coordination  Movements Are Fluid And Coordinated: No  Coordination and Movement description: Fine motor impairments,Right UE,Left UE,Decreased accuracy                           Mobility  Supine to Sit: Minimal assistance  Sit to Supine: Stand by assistance       Balance  Sitting Balance: Contact guard assistance  Standing Balance: Moderate assistance (Heavy forward, L lean- unable to correct with cues)  Standing Balance  Time: 1 minute x2  Activity: functional transfers, pericare  Comment: with RW for UE support. HEAVY forward, L lean, unable to correct with cues. mod A x2  Functional Mobility  Functional Mobility Comments: Attempted to take side step but unable to initiate movement  Bed mobility  Rolling to Left: Stand by assistance  Rolling to Right: Stand by assistance  Supine to Sit: Minimal assistance  Sit to Supine: Stand by assistance  Scooting: Stand by assistance  Comment: Pt at times has diffculty following commands. Verbalizes appropriately short sentences and other times unable to     Transfers  Sit to stand: Moderate assistance  Stand to sit: Moderate assistance  Functional Activity Tolerance  Functional Activity Tolerance:  Tolerates 10 - 20 min exercise with multiple rests     Assessment  Assessment  Performance deficits / Impairments: Decreased functional mobility ,Decreased ADL status,Decreased strength,Decreased safe awareness,Decreased cognition,Decreased balance,Decreased endurance,Decreased coordination  Treatment Diagnosis: Impaired self-care status  Prognosis: Fair  Decision Making: Medium Complexity  REQUIRES OT FOLLOW UP: Yes  Discharge Recommendations: Patient would benefit from continued therapy after discharge,Subacute/Skilled Nursing Facility  Activity Tolerance: Treatment limited secondary to decreased cognition         Functional Outcome Measures  AM-PAC Daily Activity Inpatient   How much help for putting on and taking off regular lower body clothing?: A Lot  How much help for Bathing?: Total  How much help for Toileting?: A Lot  How much help for putting on and taking off regular upper body clothing?: A Lot  How much help for taking care of personal grooming?: A Lot  How much help for eating meals?:  (NPO)       Goals  Short term goals  Time Frame for Short term goals: By discharge  Short term goal 1: Pt will participate in BADLs with SBA and Good safety  Short term goal 2: Pt will complete 2-step activities with less than 3 cues to correct  Short term goal 3: Pt will perform transfers/functionanl mobility with SBA and Good safety  Short term goal 4: Pt will actively participate in 15+ minutes of therapeutic exercise/functional activity to promote safety and independence with self care and mobility    Plan  Safety Devices  Safety Devices in place: Yes  Type of devices:  All fall risk precautions in place,Bed alarm in place,Call light within reach,Gait belt,Patient at risk for falls,Left in bed,Nurse notified     Plan  Times per week: 4-5  Current Treatment Recommendations: Strengthening,ROM,Balance Training,Functional Mobility Carlos Grimes Education & Training,Patient/Caregiver Education & Training,Equipment Evaluation, Education, & procurement,Positioning,Self-Care / ADL,Cognitive Reorientation       Equipment Recommendations  Equipment Needed:  (TBD)  OT Individual Minutes  Time In: 1347  Time Out: 1413  Minutes: 26    Electronically signed by Carlos Estrada OT on 2/22/22 at 4:21 PM EST

## 2022-02-22 NOTE — FLOWSHEET NOTE
02/21/22 2020   Treatment Team Notification   Reason for Communication Change in status   Team Member Name Dr. Lorrain Hamman Provider   Method of Communication Secure Message   Response No new orders     Notified for patient right arm twitching. Told provider of current medications given. No new orders.

## 2022-02-22 NOTE — PROGRESS NOTES
Notified Dr. Shubham Hernandez of one episode of facial twitching noted by PCA. No new orders at this time. States to continue to monitor and page again if worsens or continues.

## 2022-02-22 NOTE — DISCHARGE INSTR - COC
Continuity of Care Form    Patient Name: Michael Pang   :  1955  MRN:  [id number]    Admit date:  2022  Discharge date:  22    Code Status Order: Full Code   Advance Directives:      Admitting Physician:  Shanelle Malone MD  PCP: Jimmie Peterson DO    Discharging Nurse: [facility] Unit/Room#: 2114/2114-01  Discharging Unit Phone Number: ***    Emergency Contact:   Extended Emergency Contact Information  Primary Emergency Contact: Jennifer Giron  Address: [address] Phone: [phone]  Relation: Brother/Sister  Hearing or visual needs: None  Other needs: None  Preferred language: English   needed? No  Secondary Emergency Contact: Alley Griffin  Address:  [address] [place] [address] Phone: [phone]  Work Phone: [phone]  Mobile Phone: [phone]  Relation: Brother/Sister  Hearing or visual needs: None  Other needs: None  Preferred language: English   needed? No    Past Surgical History:  Past Surgical History:   Procedure Laterality Date    BUNIONECTOMY Right     COLONOSCOPY      COLONOSCOPY N/A 5/10/2021    COLONOSCOPY DIAGNOSTIC performed by Keturah Suarez MD at [address] Left 2016    ON 2ND, 3rd, 4th,  and  5TH TOES OF LEFT FOOT  Pt. Denies.     OTHER SURGICAL HISTORY  2015    orif left ankle with synthes and intraoperative c- arm    AK BRNCHSC INCL FLUOR GDNCE DX W/CELL WASHG SPX N/A 2018    BRONCHOSCOPY WITH WASHINGS AT PATIENT BEDSIDE ICU performed by Jayy Moody MD at [address]         Immunization History:   Immunization History   Administered Date(s) Administered    COVID-19, Peter Murillo, Primary or Immunocompromised, PF, 100mcg/0.5mL 2021, 2021    Influenza 10/16/2012, 10/22/2013    Influenza Virus Vaccine 2019    Influenza, Quadv, IM, (6 mo and older Fluzone, Flulaval, Fluarix and 3 yrs and older Afluria) 11/28/2016, 11/12/2019, 02/10/2021, 12/01/2021    PPD Test 01/23/2018, 08/08/2018    Pneumococcal Conjugate 13-valent (Xvkntob31) 06/23/2021    Pneumococcal Polysaccharide (Rxsionmds21) 02/07/2019    Tdap (Boostrix, Adacel) 11/23/2015    Zoster Live (Zostavax) 02/08/2016    Zoster Recombinant (Shingrix) 07/18/2018       Active Problems:  Patient Active Problem List   Diagnosis Code    Acquired hypothyroidism E03.9    Hypertension I10    Urge incontinence N39.41    Generalized anxiety disorder F41.1    Gastroesophageal reflux disease without esophagitis K21.9    H/O: CVA (cerebrovascular accident) Z86.73    Migraine without aura and without status migrainosus, not intractable G43.009    Hammer toe of left foot M20.42    Tremor R25.1    Status epilepticus ([address] G40.901    Speech and language deficits F80.9    Constipation K59.00    Seizure ([address] R56.9    Seizures ([address] R56.9    Breakthrough seizure ([address] G40.919    Depression F32. A    Complex partial status epilepticus  G40.201       Isolation/Infection:   Isolation            Contact          Patient Infection Status       Infection Onset Added Last Indicated Last Indicated By Review Planned Expiration Resolved Resolved By    MRSA  01/17/18 01/17/18 Rama Reddy, JESSICA        Sputum 7/2018 trach    Resolved    COVID-19 (Rule Out) 02/21/22 02/21/22 02/21/22 COVID-19 & Influenza Combo (Ordered)   02/21/22 Rule-Out Test Resulted    COVID-19 (Rule Out) 01/08/22 01/08/22 01/08/22 COVID-19, Rapid (Ordered)   01/08/22 Rule-Out Test Resulted    COVID-19 (Rule Out) 05/06/21 05/06/21 05/06/21 COVID-19 (Ordered)   05/07/21 Rule-Out Test Resulted    COVID-19 (Rule Out) 06/21/20 06/21/20 06/21/20 COVID-19 (Ordered)   06/21/20 Rule-Out Test Resulted            Nurse Assessment:  Last Vital Signs: /83   Pulse 83   Temp 98.4 °F (36.9 °C) (Oral)   Resp 14   Ht 5' 10\" (1.778 m)   Wt 156 lb 15.5 oz (71.2 kg)   SpO2 96%   BMI 22.52 kg/m²     Last documented pain score (0-10 scale): Pain Level: 0  Last Weight:   Wt Readings from Last 1 Encounters:   02/22/22 156 lb 15.5 oz (71.2 kg)     Mental Status:  oriented and alert    IV Access:  - None    Nursing Mobility/ADLs:  Walking   Assisted  Transfer  Assisted  Bathing  Assisted  Dressing  Assisted  Toileting  Assisted  Feeding  [facility] Delivery   whole    Wound Care Documentation and Therapy:        Elimination:  Continence: Bowel: Yes  Bladder: Yes  Urinary Catheter: None   Colostomy/Ileostomy/Ileal Conduit: No       Date of Last BM: 02/24/22    Intake/Output Summary (Last 24 hours) at 2/22/2022 1345  Last data filed at 2/22/2022 1328  Gross per 24 hour   Intake 410 ml   Output 400 ml   Net 10 ml     I/O last 3 completed shifts: In: 1340 [IV Piggyback:1340]  Out: 500 [Urine:500]    Safety Concerns:     History of Falls (last 30 days) and At Risk for Falls    Impairments/Disabilities:      None    Nutrition Therapy:  Current Nutrition Therapy:   - Oral Diet:  General    Routes of Feeding: Oral  Liquids: Thin Liquids  Daily Fluid Restriction: no  Last Modified Barium Swallow with Video (Video Swallowing Test): not done    Treatments at the Time of Hospital Discharge:   Respiratory Treatments: ***  Oxygen Therapy:  is not on home oxygen therapy. Ventilator:    - No ventilator support    Rehab Therapies: Physical Therapy and Occupational Therapy  Weight Bearing Status/Restrictions: No weight bearing restirctions  Other Medical Equipment (for information only, NOT a DME order):  walker  Other Treatments: Skilled Nursing Assessment Per Protocol. Medication Education & Monitoring.      Patient's personal belongings (please select all that are sent with patient):  None    RN SIGNATURE:  Electronically signed by Demetrice Hubbard RN on 2/25/22 at 9:12 AM EST    CASE MANAGEMENT/SOCIAL WORK SECTION    Inpatient Status Date: 2/21/22    Readmission Risk Assessment Score:  Readmission Risk Risk of Unplanned Readmission:  14           Discharging to Facility/ Agency   [facility] [place]  [facility]  [place], [address]  [phone] [address]  Phone: [phone]  Fax [phone]     Dialysis Facility (if applicable)   Name:  Address:  Dialysis Schedule:  Phone:  Fax:    / signature: Electronically signed by Destiny Hill RN on 2/22/22 at 1:45 PM EST    PHYSICIAN SECTION    Prognosis: Fair    Condition at Discharge: Stable    Rehab Potential (if transferring to Rehab): Fair    Recommended Labs or Other Treatments After Discharge: none    Physician Certification: I certify the above information and transfer of Karon Queen  is necessary for the continuing treatment of the diagnosis listed and that she requires Eric Swainuel for less 30 days.      Update Admission H&P: No change in H&P    PHYSICIAN SIGNATURE:  Electronically signed by Eitan Whalen MD on 2/24/22 at 1:38 PM EST

## 2022-02-22 NOTE — CARE COORDINATION
CASE MANAGEMENT NOTE:    Admission Date:  2/21/2022 Dorene Castellanos is a 77 y.o.  female    [facility] [R56.9[facility]  Breakthrough seizure ([facility] Claudio Kirkpatrick    Met with:  Patient's Sister, Janette Allen, Pt's Other Sister/Caregiver, Cherrie Ugalde, is DC from [facility] today, Per Son, had Emergency surgery, Trach/TF    PCP:  Erik:  [facility]      Is patient alert and oriented at time of discussion:  Pt.was sound asleep    Current Residence/ Living Arrangements:  at home dependent on family care             Current Services PTA:  No    Does patient go to outpatient dialysis: No  If yes, location and chair time: NA    Is patient agreeable to VNS: Yes    Freedom of choice provided:  Yes    List of [address] provided: No    VNS chosen: Current w/ VNS, [place], will continue    DME:  walker    Home Oxygen: No    Nebulizer: No    CPAP/BIPAP: No    Supplier: N/A    Potential Assistance Needed: VNS, May need SNF    SNF needed:Will follow PT/OT rec, Has Been to [address], in past, Notified, LSW to follow if needed    Freedom of choice and list provided: No    Pharmacy:  [facility], gets [facility]       Does Patient want to use MEDS to BEDS? No    Is patient currently receiving oral anticoagulation therapy? No    Is the Patient an [facility] with Readmission Risk Score greater than 14%? No  If yes, pt needs a follow up appointment made within 7 days. Family Members/Caregivers that pt would like involved in their care:    Yes    If yes, list name here:  Alex Reilly, Sister C/ [address] Provider:  [address], Will need transport home             Discharge Plan:  2/22/22 [facility] Pt. Sister, Cherrie Ugalde, lives w/ Her & Is her Caregiver, However, Per Alley's Son, she is being DC from [facility] today, Had Emergency Surgey, has Trach/Peg. Writer spoke to Sylvia Vasques, Pt.  May need SNF, AOO,

## 2022-02-22 NOTE — PLAN OF CARE
Problem: Falls - Risk of:  Goal: Will remain free from falls  Description: Will remain free from falls  2/22/2022 0349 by Paulino Munoz RN  Outcome: Ongoing     No falls noted this shift. Patient bedrest and turns self. Bed kept in low position. Safe environment maintained. Bedside table & call light in reach. Uses call light appropriately when needing assistance. Problem: Skin Integrity:  Goal: Absence of new skin breakdown  Description: Absence of new skin breakdown  2/22/2022 0349 by Paulino Munoz RN  Outcome: Ongoing     No new signs of skin breakdown. Problem: Safety:  Goal: Ability to remain free from injury will improve  Description: Ability to remain free from injury will improve  2/22/2022 0349 by Paulino Munoz RN  Outcome: Ongoing     Patient in seizure precautions.

## 2022-02-22 NOTE — PROGRESS NOTES
[facility] Neurology   IN-PATIENT SERVICE      NEUROLOGY PROGRESS  NOTE                Interval History:     No further right facial twitching or seizures have been noted. Patient is currently on Keppra and Vimpat. She continues to be postictal but is awake and following some very basic commands. History of Present Illness: The patient is a 77 y.o. female who presents with Seizures and Fever  . The patient was seen and examined and the chart was reviewed. Patient presented with focal status epilepticus with right facial twitching. She was noted to be off of her home dose of Vimpat after being discharged from the nursing home. Physical Exam:   /83   Pulse 83   Temp 98.4 °F (36.9 °C) (Oral)   Resp 14   Ht 5' 10\" (1.778 m)   Wt 156 lb 15.5 oz (71.2 kg)   SpO2 96%   BMI 22.52 kg/m²   Temp (24hrs), Av.5 °F (36.9 °C), Min:98.3 °F (36.8 °C), Max:98.9 °F (37.2 °C)      Neurological examination:    Mental status   Patient is awake. She is not oriented. She can follow some basic commands. Speech perseveration is present. Cranial nerves   CN II - XII intact   Motor function   she is able to move all 4 extremities spontaneously. Sensory function Intact to touch throughout     Cerebellar  not assessed     Reflex function 2/4 symmetric throughout .       Gait                  Not assessed           Diagnostics:      Laboratory Testing:  CBC: Recent Labs     22  0717 22  0443   WBC 5.1 6.0   HGB 15.6 13.4    178     BMP:    Recent Labs     22  0717 22  0443    141   K 3.9 3.5*    108*   CO2 22 23   BUN 11 8   CREATININE 0.89 0.46*   GLUCOSE 109* 86         Lab Results   Component Value Date    CHOL 183 2016    LDLCHOLESTEROL 55 2021    HDL 71 2021    TRIG 64 2016    ALT 14 2022    AST 17 2022    TSH 3.96 2022    INR 1.1 2022    LABA1C 5.1 2016    [id number] 1449 (H) 2021 Impression:      1. Breakthrough seizure with suspected focal status epilepticus  2. Chronic left MCA infarct  3. History of multiple admissions for recurrent breakthrough seizures. Plan:      Maintain Vimpat 200 mg twice daily, Keppra 1500 mg twice daily   Ativan as needed   Seizure precautions   Please notify neurology if patient has any persistent seizures or recurrent facial twitching.  Continue primidone for her history of tremor.    We will follow      Electronically signed by Alexsandra Nowak DO on 2/22/2022 at 3:41 PM      Alexsandra Nowak, [address] [place] [address]  Neurology

## 2022-02-22 NOTE — PROGRESS NOTES
[address] [employer]    PROGRESS NOTE             2/22/2022    7:06 AM    Name:   Liana Lord  MRN:     [id number]     Acct:      [de-identified]   Room:   [address] [place] [address] Day:  1  Admit Date:  2/21/2022  7:05 AM    PCP:  Ravi Khoury DO  Code Status:  Full Code    Subjective:     C/C:   Chief Complaint   Patient presents with    Seizures    Fever     Interval History Status: improved. Pt was seen and examined. Mentation still altered but she is much more responsive and alert today. Twitching of arm overnight; fosphenytoin given per Neuro recs. Review of Systems:     Review of Systems   Unable to perform ROS: Mental status change         Medications:      Allergies:  No Known Allergies    Current Meds:   Scheduled Meds:    sodium chloride flush  5-40 mL IntraVENous 2 times per day    enoxaparin  40 mg SubCUTAneous Daily    levetiracetam  1,500 mg IntraVENous Q12H    amLODIPine  10 mg Oral Daily    aspirin  81 mg Oral Daily    atorvastatin  40 mg Oral Daily    donepezil  5 mg Oral Nightly    levothyroxine  50 mcg Oral Daily    pantoprazole  40 mg Oral QAM AC    oxybutynin  5 mg Oral TID    PARoxetine  30 mg Oral Daily    primidone  75 mg Oral BID    lacosamide (VIMPAT) IVPB  200 mg IntraVENous BID     Continuous Infusions:    sodium chloride       PRN Meds: sodium chloride flush, sodium chloride, ondansetron **OR** ondansetron, polyethylene glycol, acetaminophen **OR** acetaminophen, potassium chloride **OR** potassium alternative oral replacement **OR** potassium chloride, magnesium sulfate    Data:     Past Medical History:   has a past medical history of Ankle fracture, left, Anxiety, Arthritis, Black tarry stools, Colon polyp, Constipation, ETOH abuse, Frequency of urination, GERD (gastroesophageal reflux disease), Head injury, Headache(784.0), Hypertension, Hypotension, Hypothyroidism, Memory deficit, Migraines, Numbness and tingling, Osteoporosis, Peripheral vascular disease ([address] Pneumonia, Seizures ([address] Shingles, Stroke ([address] TIA (transient ischemic attack), Tremor, UTI (urinary tract infection), Varicose veins, and Wears glasses. Social History:   reports that she has never smoked. She has never used smokeless tobacco. She reports previous alcohol use. She reports that she does not use drugs. Family History:   Family History   Problem Relation Age of Onset    Cervical Cancer Mother     Heart Disease Father     Heart Disease Maternal Grandfather     Heart Disease Paternal Grandfather        Vitals:  BP 96/63   Pulse 84   Temp 98.3 °F (36.8 °C) (Axillary)   Resp 18   Ht 5' 10\" (1.778 m)   Wt 156 lb 15.5 oz (71.2 kg)   SpO2 98%   BMI 22.52 kg/m²   Temp (24hrs), Av.2 °F (36.8 °C), Min:97.6 °F (36.4 °C), Max:98.9 °F (37.2 °C)    No results for input(s): POCGLU in the last 72 hours. Vitals:    22 1345 22 1945 22 0015 22 0330   BP: 122/76 109/68 96/63    Pulse: 90 84 84    Resp: 16 16 18    Temp: 98.4 °F (36.9 °C) 98.9 °F (37.2 °C) 98.3 °F (36.8 °C)    TempSrc: Oral Oral Axillary    SpO2: 95% 94% 98%    Weight: 141 lb 1.5 oz (64 kg)   156 lb 15.5 oz (71.2 kg)   Height: 5' 10\" (1.778 m)          I/O(24Hr):     Intake/Output Summary (Last 24 hours) at 2022 0706  Last data filed at 2022 0457  Gross per 24 hour   Intake 1340 ml   Output 500 ml   Net 840 ml       Labs:  Recent Results (from the past 24 hour(s))   CBC with Auto Differential    Collection Time: 22  7:17 AM   Result Value Ref Range    WBC 5.1 3.5 - 11.0 k/uL    RBC 4.81 4.0 - 5.2 m/uL    Hemoglobin 15.6 12.0 - 16.0 g/dL    Hematocrit 46.0 36 - 46 %    MCV 95.7 80 - 100 fL    MCH 32.4 26 - 34 pg    MCHC 33.9 31 - 37 g/dL    RDW 13.0 11.5 - 14.9 %    Platelets 206 560 - 925 k/uL    MPV 8.7 6.0 - 12.0 fL    NRBC Automated NOT REPORTED per 100 WBC    Differential Type NOT REPORTED     Seg Neutrophils 75 (H) Protime 14.1 11.8 - 14.6 sec    INR 1.1    Magnesium    Collection Time: 02/21/22  7:17 AM   Result Value Ref Range    Magnesium 2.0 1.6 - 2.6 mg/dL   Ethanol    Collection Time: 02/21/22  7:17 AM   Result Value Ref Range    Ethanol <10 <10 mg/dL    Ethanol percent <0.010 %   Levetiracetam Level    Collection Time: 02/21/22  7:17 AM   Result Value Ref Range    Levetiracetam Lvl 42 ug/mL   COVID-19 & Influenza Combo    Collection Time: 02/21/22  7:25 AM    Specimen: Nasopharyngeal Swab   Result Value Ref Range    Specimen Description . NASOPHARYNGEAL SWAB     Source . NASOPHARYNGEAL SWAB     SARS-CoV-2 RNA, RT PCR Not Detected Not Detected    INFLUENZA A Not Detected Not Detected    INFLUENZA B Not Detected Not Detected   Urinalysis with Reflex to Culture    Collection Time: 02/21/22  7:26 AM    Specimen: Urine, clean catch   Result Value Ref Range    Color, UA Yellow Yellow    Turbidity UA Clear Clear    Glucose, Ur NEGATIVE NEGATIVE    Bilirubin Urine NEGATIVE NEGATIVE    Ketones, Urine NEGATIVE NEGATIVE    Specific [place], UA 1.011 1.000 - 1.030    Urine Hgb NEGATIVE NEGATIVE    pH, UA 7.5 5.0 - 8.0    Protein, UA NEGATIVE NEGATIVE    Urobilinogen, Urine Normal Normal    Nitrite, Urine NEGATIVE NEGATIVE    Leukocyte Esterase, Urine NEGATIVE NEGATIVE    Urinalysis Comments       Microscopic exam not performed based on chemical results unless requested in original order.    Urine Drug Screen    Collection Time: 02/21/22  7:26 AM   Result Value Ref Range    Amphetamine Screen, Ur NEGATIVE NEGATIVE    Barbiturate Screen, Ur POSITIVE (A) NEGATIVE    Benzodiazepine Screen, Urine NEGATIVE NEGATIVE    Cocaine Metabolite, Urine NEGATIVE NEGATIVE    Methadone Screen, Urine NEGATIVE NEGATIVE    Opiates, Urine NEGATIVE NEGATIVE    Phencyclidine, Urine NEGATIVE NEGATIVE    Propoxyphene, Urine NOT REPORTED NEGATIVE    Cannabinoid Scrn, Ur NEGATIVE NEGATIVE    Oxycodone Screen, Ur NEGATIVE NEGATIVE    Methamphetamine, Urine NOT REPORTED NEGATIVE    Tricyclic Antidepressants, Urine NOT REPORTED NEGATIVE    MDMA, Urine NOT REPORTED NEGATIVE    Buprenorphine Urine NOT REPORTED NEGATIVE    Test Information       Assay provides medical screening only. The absence of expected drug(s) and/or metabolite(s) may indicate diluted or adulterated urine, limitations of testing or timing of collection. Culture, Blood 1    Collection Time: 02/21/22  7:42 AM    Specimen: Blood   Result Value Ref Range    Specimen Description . BLOOD 2O 7G R HAND     Culture NO GROWTH 12 HOURS    EKG 12 Lead    Collection Time: 02/21/22 11:12 AM   Result Value Ref Range    Ventricular Rate 96 BPM    Atrial Rate 96 BPM    P-R Interval 96 ms    QRS Duration 102 ms    Q-T Interval 372 ms    QTc Calculation (Bazett) 469 ms    P Axis 91 degrees    R Axis 57 degrees    T Axis -142 degrees   Lactate, Sepsis    Collection Time: 02/21/22 11:24 AM   Result Value Ref Range    Lactic Acid, Sepsis 1.1 0.5 - 1.9 mmol/L    Lactic Acid, Sepsis, Whole Blood NOT REPORTED 0.5 - 1.9 mmol/L   Troponin    Collection Time: 02/21/22 11:24 AM   Result Value Ref Range    Troponin, High Sensitivity 46 (H) 0 - 14 ng/L    Troponin T NOT REPORTED <0.03 ng/mL    Troponin Interp NOT REPORTED    Basic Metabolic Panel w/ Reflex to MG    Collection Time: 02/22/22  4:43 AM   Result Value Ref Range    Glucose 86 70 - 99 mg/dL    BUN 8 8 - 23 mg/dL    CREATININE 0.46 (L) 0.50 - 0.90 mg/dL    Calcium 8.5 (L) 8.6 - 10.4 mg/dL    Sodium 141 135 - 144 mmol/L    Potassium 3.5 (L) 3.7 - 5.3 mmol/L    Chloride 108 (H) 98 - 107 mmol/L    CO2 23 20 - 31 mmol/L    Anion Gap 10 9 - 17 mmol/L    GFR Non-African American >60 >60 mL/min    GFR African American >60 >60 mL/min    GFR Comment         CBC with Auto Differential    Collection Time: 02/22/22  4:43 AM   Result Value Ref Range    WBC 6.0 3.5 - 11.0 k/uL    RBC 4.21 4.0 - 5.2 m/uL    Hemoglobin 13.4 12.0 - 16.0 g/dL    Hematocrit 40.4 36 - 46 %    MCV 96.0 80 - 100 fL    MCH 31.9 26 - 34 pg    MCHC 33.2 31 - 37 g/dL    RDW 12.6 11.5 - 14.9 %    Platelets 887 450 - 070 k/uL    MPV 8.7 6.0 - 12.0 fL    Seg Neutrophils 62 36 - 66 %    Lymphocytes 23 (L) 24 - 44 %    Monocytes 13 (H) 1 - 7 %    Eosinophils % 1 0 - 4 %    Basophils 1 0 - 2 %    Segs Absolute 3.90 1.3 - 9.1 k/uL    Absolute Lymph # 1.40 1.0 - 4.8 k/uL    Absolute Mono # 0.80 0.1 - 1.3 k/uL    Absolute Eos # 0.00 0.0 - 0.4 k/uL    Basophils Absolute 0.00 0.0 - 0.2 k/uL   Magnesium    Collection Time: 02/22/22  4:43 AM   Result Value Ref Range    Magnesium 1.9 1.6 - 2.6 mg/dL         Lab Results   Component Value Date/Time    SPECIAL NOT REPORTED 07/31/2018 03:50 AM     Lab Results   Component Value Date/Time    CULTURE NO GROWTH 12 HOURS 02/21/2022 07:42 AM         Radiology:    CT HEAD WO CONTRAST    Result Date: 2/21/2022  No acute intracranial abnormality. Remote right MCA distribution infarct. XR CHEST PORTABLE    Result Date: 2/21/2022  Bibasilar atelectasis or airspace disease. Physical Examination:        Physical Exam  Constitutional:       General: She is not in acute distress. Appearance: Normal appearance. Comments: Sitting up in bed, awake and alert   HENT:      Head: Normocephalic and atraumatic. Right Ear: External ear normal.      Left Ear: External ear normal.      Nose: Nose normal.      Mouth/Throat:      Mouth: Mucous membranes are moist.      Pharynx: Oropharynx is clear. Comments: Some dry blood around lips  Eyes:      Extraocular Movements: Extraocular movements intact. Conjunctiva/sclera: Conjunctivae normal.      Pupils: Pupils are equal, round, and reactive to light. Cardiovascular:      Rate and Rhythm: Normal rate and regular rhythm. Pulmonary:      Effort: Pulmonary effort is normal. No respiratory distress. Breath sounds: No wheezing. Abdominal:      Palpations: Abdomen is soft. Tenderness: There is no abdominal tenderness. Musculoskeletal:         General: No tenderness. Right lower leg: No edema. Left lower leg: No edema. Skin:     General: Skin is warm and dry. Findings: No bruising. Neurological:      Mental Status: She is alert. Comments: Repetition of words; repeats her name a lot,  Follow some commands; likely 2/2 poor comprehension; Responds yes or no appropriately but not other clear answers,  Is only oriented to self. Poor comprehension limiting neuro exam but   Pt has good hand grasp bilaterally,  DTRs limited d/t pt not relaxing LE; biceps DTR symmetric bilaterally,  Tracks with eyes appropriately           Assessment:        Primary Problem  Seizure [facility])    Active Hospital Problems    Diagnosis Date Noted    Breakthrough seizure ([facility] [place] 02/21/2022    Depression [Z99. 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] 02/21/2022    Seizure ([facility] [R56.9] 01/08/2022    Tremor [R25.1] 11/07/2017    H/O: CVA (cerebrovascular accident) [Z86.73] 05/06/2014    Gastroesophageal reflux disease without esophagitis [K21.9] 02/04/2014    Hypertension [I10] 03/27/2012    Acquired hypothyroidism [E03.9] 03/27/2012    Urge incontinence [N39.41] 03/27/2012       Plan:        Breakthrough seizures   Seizure symptoms improving but pt remains postictal & with facial twitches  Likely 2/2 pt off home Vimpat for past ~2 weeks  Witnessed seizure at home at 6AM & witnessed seizures x4 by EMS  Hx of epilepsy s/p CVA with refractory seizures on Keppra & Vimpat  Recent hospitalization on 1/2022 for seizure  CT head negative for any acute intracranial abnormality  Case discussed with Neurologist Dr. Trinidad Mascorro;              Partial status epilepticus,              Pt well known to him,              Seizure pattern similar to prior,              Hx of prolonged post-ictal period up to 1 week,              No need for intubation, no need for ICU or sitter at this time  Pt protecting airway; intubation not needed  Lacosamide 200 mg IV BID  Levetiracetam 1.5 g IV BID  Fosphenytoin 1280 mg IV x1 dose given overnight on 2/21  PT/OT  Seizure precautions, side rails up x4, frequent neuro checks     Other comorbid conditions:  · Tremors: c/w home Primidone 5 mg p.o. twice daily   · Hx of CVA: right MCA embolic stroke (6186), c/w home ASA 81 mg p.o. daily  · Hypertension: amlodipine 10 mg p.o. daily  · Depression: paroxetine 30 mg p.o. daily   · Hypothyroidism: levothyroxine 50 mcg p.o. daily  · GERD: pantoprazole 40 mg p.o. daily  · Other Home meds continued: donepezil 5 mg p.o. nightly, oxybutynin 5 mg p.o. TID        Code: FULL  DVT prophylaxis: Enoxaparin 40 mg SQ daily  GI prophylaxis: Protonix      Please note: Use of a speech recognition software was used in the creation of portions of this note and dictation errors, including those of syntax and sound alike word substitutions, may have escaped proofreading. Lucila Gross DO  2/22/2022  7:06 AM     Attending Physician Statement  I have discussed the care of Erik Morse and I have examined the patient myselft and taken ros and hpi , including pertinent history and exam findings,  with the resident. I have reviewed the key elements of all parts of the encounter with the resident. I agree with the assessment, plan and orders as documented by the resident.     Recurrent breakthrough seizure  Hx of cva  Neuro input noted  High risk of mortality and morbidity with above  Needs stabilization with AED  Electronically signed by Yolanda Chavez MD

## 2022-02-23 PROCEDURE — 6370000000 HC RX 637 (ALT 250 FOR IP): Performed by: PSYCHIATRY & NEUROLOGY

## 2022-02-23 PROCEDURE — 6370000000 HC RX 637 (ALT 250 FOR IP)

## 2022-02-23 PROCEDURE — 2580000003 HC RX 258: Performed by: PSYCHIATRY & NEUROLOGY

## 2022-02-23 PROCEDURE — 99232 SBSQ HOSP IP/OBS MODERATE 35: CPT | Performed by: PSYCHIATRY & NEUROLOGY

## 2022-02-23 PROCEDURE — 2060000000 HC ICU INTERMEDIATE R&B

## 2022-02-23 PROCEDURE — 97110 THERAPEUTIC EXERCISES: CPT

## 2022-02-23 PROCEDURE — 2580000003 HC RX 258

## 2022-02-23 PROCEDURE — 6360000002 HC RX W HCPCS: Performed by: PSYCHIATRY & NEUROLOGY

## 2022-02-23 PROCEDURE — 97116 GAIT TRAINING THERAPY: CPT

## 2022-02-23 PROCEDURE — 97530 THERAPEUTIC ACTIVITIES: CPT

## 2022-02-23 PROCEDURE — 6360000002 HC RX W HCPCS

## 2022-02-23 PROCEDURE — 99232 SBSQ HOSP IP/OBS MODERATE 35: CPT | Performed by: INTERNAL MEDICINE

## 2022-02-23 PROCEDURE — C9254 INJECTION, LACOSAMIDE: HCPCS | Performed by: PSYCHIATRY & NEUROLOGY

## 2022-02-23 RX ORDER — LACOSAMIDE 100 MG/1
200 TABLET ORAL 2 TIMES DAILY
Status: DISCONTINUED | OUTPATIENT
Start: 2022-02-23 | End: 2022-02-25 | Stop reason: HOSPADM

## 2022-02-23 RX ORDER — LEVETIRACETAM 750 MG/1
1500 TABLET ORAL 2 TIMES DAILY
Status: DISCONTINUED | OUTPATIENT
Start: 2022-02-23 | End: 2022-02-25 | Stop reason: HOSPADM

## 2022-02-23 RX ADMIN — ASPIRIN 81 MG: 81 TABLET, COATED ORAL at 08:47

## 2022-02-23 RX ADMIN — LEVETIRACETAM 1500 MG: 100 INJECTION INTRAVENOUS at 09:40

## 2022-02-23 RX ADMIN — ENOXAPARIN SODIUM 40 MG: 100 INJECTION SUBCUTANEOUS at 08:40

## 2022-02-23 RX ADMIN — PRIMIDONE 75 MG: 50 TABLET ORAL at 08:48

## 2022-02-23 RX ADMIN — SODIUM CHLORIDE, PRESERVATIVE FREE 10 ML: 5 INJECTION INTRAVENOUS at 21:05

## 2022-02-23 RX ADMIN — DONEPEZIL HYDROCHLORIDE 5 MG: 5 TABLET, FILM COATED ORAL at 21:05

## 2022-02-23 RX ADMIN — OXYBUTYNIN CHLORIDE 5 MG: 5 TABLET ORAL at 21:05

## 2022-02-23 RX ADMIN — DEXTROSE MONOHYDRATE 200 MG: 50 INJECTION, SOLUTION INTRAVENOUS at 08:39

## 2022-02-23 RX ADMIN — PRIMIDONE 75 MG: 50 TABLET ORAL at 21:03

## 2022-02-23 RX ADMIN — OXYBUTYNIN CHLORIDE 5 MG: 5 TABLET ORAL at 15:20

## 2022-02-23 RX ADMIN — PAROXETINE HYDROCHLORIDE 30 MG: 20 TABLET, FILM COATED ORAL at 08:47

## 2022-02-23 RX ADMIN — LEVETIRACETAM 1500 MG: 750 TABLET, FILM COATED ORAL at 21:05

## 2022-02-23 RX ADMIN — OXYBUTYNIN CHLORIDE 5 MG: 5 TABLET ORAL at 08:48

## 2022-02-23 RX ADMIN — SODIUM CHLORIDE, PRESERVATIVE FREE 10 ML: 5 INJECTION INTRAVENOUS at 08:48

## 2022-02-23 RX ADMIN — AMLODIPINE BESYLATE 10 MG: 10 TABLET ORAL at 08:47

## 2022-02-23 RX ADMIN — ATORVASTATIN CALCIUM 40 MG: 40 TABLET, FILM COATED ORAL at 08:47

## 2022-02-23 RX ADMIN — LACOSAMIDE 200 MG: 100 TABLET, FILM COATED ORAL at 21:02

## 2022-02-23 ASSESSMENT — ENCOUNTER SYMPTOMS
RHINORRHEA: 0
SHORTNESS OF BREATH: 0
PHOTOPHOBIA: 0
ABDOMINAL PAIN: 0
COUGH: 0
VOMITING: 0

## 2022-02-23 ASSESSMENT — PAIN SCALES - GENERAL
PAINLEVEL_OUTOF10: 0

## 2022-02-23 NOTE — PROGRESS NOTES
Physical Therapy  Facility/Department: [facility] PROGRESSIVE CARE  Daily Treatment Note  NAME: Anaid Henning  : 1955  MRN: [id number]    Date of Service: 2022    Discharge Recommendations:  Patient would benefit from continued therapy after discharge        Assessment   Body structures, Functions, Activity limitations: Decreased functional mobility ; Decreased strength;Decreased safe awareness;Decreased cognition;Decreased endurance;Decreased balance  Specific instructions for Next Treatment: Co-tx with BARAJAS/OTR. if any episode of facial twitching observed , notifiy RN immediately. REQUIRES PT FOLLOW UP: Yes  Activity Tolerance  Activity Tolerance: Patient limited by cognitive status     Patient Diagnosis(es): The encounter diagnosis was Breakthrough seizure ([facility] [address] has a past medical history of Ankle fracture, left, Anxiety, Arthritis, Black tarry stools, Colon polyp, Constipation, ETOH abuse, Frequency of urination, GERD (gastroesophageal reflux disease), Head injury, Headache(784.0), Hypertension, Hypotension, Hypothyroidism, Memory deficit, Migraines, Numbness and tingling, Osteoporosis, Peripheral vascular disease ([address] Pneumonia, Seizures ([address] Shingles, Stroke ([address] TIA (transient ischemic attack), Tremor, UTI (urinary tract infection), Varicose veins, and Wears glasses. has a past surgical history that includes Bunionectomy (Right); Tubal ligation; other surgical history (2015); Hammer toe surgery (Left, 2016); pr [facility] incl fluor gdnce dx w/cell washg spx (N/A, 2018); Colonoscopy; and Colonoscopy (N/A, 5/10/2021). Restrictions  Restrictions/Precautions  Restrictions/Precautions: Seizure,Fall Risk  Subjective   General  Additional Pertinent Hx: The patient is a 77 y.o. female who presents with Seizures and Fever. The patient was seen and examined and the chart was reviewed. Patient presented with focal status epilepticus with right facial twitching.   She was noted to be off of her home dose of Vimpat after being discharged from the nursing home. .  Subjective  Subjective: Pt is awake and alert. Better able to follow commands. Cooperative with therapy. General Comment  Comments: RN okays therapy treatment. Co-tx with Rajinder Kim. Pain Screening  Patient Currently in Pain: Denies  Vital Signs  Patient Currently in Pain: Denies       Orientation  Orientation  Overall Orientation Status: Impaired  Cognition      Objective   Bed mobility  Supine to Sit: Stand by assistance  Sit to Supine: Stand by assistance  Scooting: Stand by assistance  Transfers  Sit to Stand: Minimal Assistance;2 Person Assistance (Posterior lean while standing)  Stand to sit: Minimal Assistance;2 Person Assistance (Pt demos poor eccentric control to sit.)  Ambulation  Ambulation?: Yes  Ambulation 1  Surface: level tile  Device: Rolling Walker  Assistance: Minimal assistance;2 Person assistance  Quality of Gait: Slow, unsteady, multiple LOB, unsteady turns, posterior + L lateral lean intermittently during amb. Gait Deviations: Slow Mariah;Decreased step length;Decreased step height;Deviated path  Distance: 10'x2 and 45'x2     Balance  Posture: Fair  Sitting - Static: Fair;+  Sitting - Dynamic: Fair  Standing - Static: Fair;-  Standing - Dynamic: Poor  Other exercises  Other exercises?: Yes  Other exercises 1: Seated BLE ROM exs x 10  Other exercises 2: Seated dynamic reaching outside of DENI (1 UE support + SBA for safety)  Other exercises 4: Standing dynamic reaching outside of DENI ([place] x 1)     Goals  Short term goals  Time Frame for Short term goals: 7 to 8 visits  Short term goal 1: Pt able to perform supine<>sit at SBA with minimal verbal cues.   Short term goal 2: Pt able to dangle at EOB for 10 minutes and participate in theraputic ex's to improve strength  Short term goal 3: Pt able to perform sit.<>stand at [address] term goal 4: Pt able to transfers to chair at min A x 2  Short term goal 5:  Pt able to progress to taking 10 to 20 steps with RW, min A x 2  Patient Goals   Patient goals : Not stated. Plan    Plan  Times per week: 5 x/week  Specific instructions for Next Treatment: Co-tx with BARAJAS/OTR. if any episode of facial twitching observed , notifiy RN immediately.   Current Treatment Recommendations: Strengthening,Balance Training,Functional Mobility Training,Transfer Training,Cognitive/Perceptual Training,Endurance Training,Gait Training,Safety Education & Training,Patient/Caregiver Education & Training,Equipment Evaluation, Education, & procurement  Safety Devices  Type of devices: Left in bed,Gait belt,Call light within reach,Nurse notified,Bed alarm in place (all 4 rails up-per orders from MD.)     Therapy Time   Individual Concurrent Group Co-treatment   Time In 1334         Time Out 1357         Minutes 21                 Nabil Mackenzie, PTA

## 2022-02-23 NOTE — PROGRESS NOTES
[address] [employer]    PROGRESS NOTE             2/23/2022    8:32 AM    Name:   Erik Morse  MRN:     [id number]     Acct:      [de-identified]   Room:   [facility] [place] [address] Day:  2  Admit Date:  2/21/2022  7:05 AM    PCP:  Pito Winters DO  Code Status:  Full Code    Subjective:     C/C:   Chief Complaint   Patient presents with    Seizures    Fever     Interval History Status: improved. Patient seen and examined. Alert and oriented with physical exam;  Oriented x3 but continues to have confusion and repeats previous answers multiple times before giving you answer to questions. Past bedside swallow exam; will advance diet. No complaints,  No acute events overnight. No new reported seizures overnight. Brief History:     Laura Campos is a 77 y.o. female with past medical history of right MCA embolic stroke in 7761 with subsequent seizure disorder (on Vimpat & Keppra), history of prolonged post-ictal state, residual left sided weakness s/p CVA, previous alcohol dependence (has been sober ~25 years), b/l tremor (R>L), hypertension, hypothyroidism, depression, GERD, who presented on 2/21 for multiple witnessed seizures. Patient admitted for management of breakthrough seizures; determined to likely be secondary to patient off of home Vimpat dose after being discharged from nursing home. Neurology following. Patient on Keppra and Vimpat. Remains postictal but slowly improving. Review of Systems:     Review of Systems   Constitutional: Negative for chills and fever. HENT: Negative for rhinorrhea and sneezing. Eyes: Negative for photophobia and visual disturbance. Respiratory: Negative for cough and shortness of breath. Cardiovascular: Negative for chest pain and leg swelling. Gastrointestinal: Negative for abdominal pain and vomiting. Musculoskeletal: Negative for arthralgias and myalgias.    Neurological: Negative for dizziness and headaches. Psychiatric/Behavioral: Positive for confusion. Medications: Allergies:  No Known Allergies    Current Meds:   Scheduled Meds:    sodium chloride flush  5-40 mL IntraVENous 2 times per day    enoxaparin  40 mg SubCUTAneous Daily    levetiracetam  1,500 mg IntraVENous Q12H    amLODIPine  10 mg Oral Daily    aspirin  81 mg Oral Daily    atorvastatin  40 mg Oral Daily    donepezil  5 mg Oral Nightly    levothyroxine  50 mcg Oral Daily    pantoprazole  40 mg Oral QAM AC    oxybutynin  5 mg Oral TID    PARoxetine  30 mg Oral Daily    primidone  75 mg Oral BID    lacosamide (VIMPAT) IVPB  200 mg IntraVENous BID     Continuous Infusions:    sodium chloride       PRN Meds: sodium chloride flush, sodium chloride, ondansetron **OR** ondansetron, polyethylene glycol, acetaminophen **OR** acetaminophen, potassium chloride **OR** potassium alternative oral replacement **OR** potassium chloride, magnesium sulfate    Data:     Past Medical History:   has a past medical history of Ankle fracture, left, Anxiety, Arthritis, Black tarry stools, Colon polyp, Constipation, ETOH abuse, Frequency of urination, GERD (gastroesophageal reflux disease), Head injury, Headache(784.0), Hypertension, Hypotension, Hypothyroidism, Memory deficit, Migraines, Numbness and tingling, Osteoporosis, Peripheral vascular disease ([address] Pneumonia, Seizures ([address] Shingles, Stroke ([address] TIA (transient ischemic attack), Tremor, UTI (urinary tract infection), Varicose veins, and Wears glasses. Social History:   reports that she has never smoked. She has never used smokeless tobacco. She reports previous alcohol use. She reports that she does not use drugs.      Family History:   Family History   Problem Relation Age of Onset    Cervical Cancer Mother     Heart Disease Father     Heart Disease Maternal Grandfather     Heart Disease Paternal Grandfather        Vitals:  /83   Pulse 79   Temp 98.1 °F (36.7 °C) (Oral)   Resp 20   Ht 5' 10\" (1.778 m)   Wt 153 lb 7 oz (69.6 kg)   SpO2 100%   BMI 22.02 kg/m²   Temp (24hrs), Av.1 °F (36.7 °C), Min:97.8 °F (36.6 °C), Max:98.4 °F (36.9 °C)    No results for input(s): POCGLU in the last 72 hours. Vitals:    22 0008 22 0230 22 0712   BP: 132/80 122/74  127/83   Pulse: 80 77  79   Resp: 18 18  20   Temp: 98.2 °F (36.8 °C) 97.8 °F (36.6 °C)  98.1 °F (36.7 °C)   TempSrc: Axillary Axillary  Oral   SpO2: 94% 96%  100%   Weight:   153 lb 7 oz (69.6 kg)    Height:           I/O(24Hr): Intake/Output Summary (Last 24 hours) at 2022 0832  Last data filed at 2022 1750  Gross per 24 hour   Intake 170 ml   Output    Net 170 ml       Labs:  No results found for this or any previous visit (from the past 24 hour(s)). Lab Results   Component Value Date/Time    SPECIAL NOT REPORTED 2018 03:50 AM     Lab Results   Component Value Date/Time    CULTURE NO GROWTH 2 DAYS 2022 07:42 AM         Radiology:    CT HEAD WO CONTRAST    Result Date: 2022  No acute intracranial abnormality. Remote right MCA distribution infarct. XR CHEST PORTABLE    Result Date: 2022  Bibasilar atelectasis or airspace disease. Physical Examination:        Physical Exam  Constitutional:       General: She is not in acute distress. Appearance: Normal appearance. HENT:      Head: Normocephalic and atraumatic. Nose: Nose normal.      Mouth/Throat:      Comments: Lesion on tip of tongue. Eyes:      Extraocular Movements: Extraocular movements intact. Conjunctiva/sclera: Conjunctivae normal.      Pupils: Pupils are equal, round, and reactive to light. Cardiovascular:      Rate and Rhythm: Normal rate and regular rhythm. Pulmonary:      Effort: Pulmonary effort is normal. No respiratory distress. Breath sounds: No wheezing. Abdominal:      General: There is no distension.       Palpations: Abdomen is soft.      Tenderness: There is no abdominal tenderness. Musculoskeletal:         General: No tenderness. Right lower leg: No edema. Left lower leg: No edema. Skin:     Findings: No bruising. Neurological:      Mental Status: She is alert and oriented to person, place, and time. Cranial Nerves: No cranial nerve deficit. Motor: No weakness. Deep Tendon Reflexes: Reflexes normal.      Comments: Alert,  Oriented to self, location, and year. Able to appropriately tell me who she lives with (answer correct per information I had gathered from conversations with family via telephone previously); Purposeful conversation;  Nannette Music me okay to add nephew to emergency contact list;  Describes him as \" a worrier. \"  Patient able to tell me she is in hospital for seizures. Continues to have speech perseveration but improved from yesterday. Able to follow commands. Psychiatric:         Mood and Affect: Mood normal.           Assessment:        Primary Problem  Seizure [facility])    Active Hospital Problems    Diagnosis Date Noted    Breakthrough seizure ([address] Trice Esparza 02/21/2022    Depression [D68. 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] 02/21/2022    Seizure ([facility] [R56.9] 01/08/2022    Tremor [R25.1] 11/07/2017    H/O: CVA (cerebrovascular accident) [Z86.73] 05/06/2014    Gastroesophageal reflux disease without esophagitis [K21.9] 02/04/2014    Hypertension [I10] 03/27/2012    Acquired hypothyroidism [E03.9] 03/27/2012    Urge incontinence [N39.41] 03/27/2012       Plan:        Breakthrough seizures   Seizure symptoms improving but pt remains postictal  Neurology following  Lacosamide 200 mg p.o. twice daily (switched from IV today)  Levetiracetam 1.5 g p.o. twice daily (switched from IV today)  PT/OT  Seizure precautions, side rails up x4  Past bedside swallow test   Continue sitter for now; anticipate likely OK to discontinue sitter in p.m.       Other comorbid conditions:  · Tremors: c/w home Primidone 5 mg p.o. twice daily   · Hx of CVA: right MCA embolic stroke (5832), c/w home ASA 81 mg p.o. daily  · Hypertension: amlodipine 10 mg p.o. daily  · Depression: paroxetine 30 mg p.o. daily   · Hypothyroidism: levothyroxine 50 mcg p.o. daily  · GERD: pantoprazole 40 mg p.o. daily  · Other Home meds continued: donepezil 5 mg p.o. nightly, oxybutynin 5 mg p.o. TID        Code: FULL  DVT prophylaxis: Enoxaparin 40 mg SQ daily  GI prophylaxis: Protonix  Dispo: Monitor tolerance of p.o. diet and meds, monitor post ictal state/Any further seizures, anticipate likely to dc tomorrow    Please note: Use of a speech recognition software was used in the creation of portions of this note and dictation errors, including those of syntax and sound alike word substitutions, may have escaped proofreading. Chinmay Baugh DO  2/23/2022  8:32 AM       Attestation and add on       I have discussed the care of Holley House , including pertinent history and exam findings,      2/23/22    with the resident. I have seen and examined the patient and the key elements of all parts of the encounter have been performed by me . I agree with the assessment, plan and orders as documented by the resident. Principal Problem:    Seizure ([address]  Active Problems:    Acquired hypothyroidism    Hypertension    Urge incontinence    Gastroesophageal reflux disease without esophagitis    H/O: CVA (cerebrovascular accident)    Tremor    Breakthrough seizure ([address]    Depression    Complex partial status epilepticus   Resolved Problems:    * No resolved hospital problems. *         ---- ;     MD LIAM Hurtado [facility]  [address]    Phone [phone]   Fax: [phone]  Answering Service: [phone]

## 2022-02-23 NOTE — PLAN OF CARE
Problem: Falls - Risk of:  Goal: Will remain free from falls  Description: Will remain free from falls  2/23/2022 0937 by Eris Wiseman RN  Outcome: Ongoing  2/23/2022 0302 by Flip Jorgensen RN  Outcome: Ongoing  Goal: Absence of physical injury  Description: Absence of physical injury  2/23/2022 0937 by Eris Wiseman RN  Outcome: Ongoing  2/23/2022 0302 by Flip Jorgensen RN  Outcome: Ongoing     Problem: Skin Integrity:  Goal: Will show no infection signs and symptoms  Description: Will show no infection signs and symptoms  2/23/2022 0937 by Eris Wiseman RN  Outcome: Ongoing  2/23/2022 0302 by Flip Jorgensen RN  Outcome: Ongoing  Goal: Absence of new skin breakdown  Description: Absence of new skin breakdown  2/23/2022 0937 by Eris Wiseman RN  Outcome: Ongoing  2/23/2022 0302 by Flip Jorgensen RN  Outcome: Ongoing     Problem: Safety:  Goal: Ability to remain free from injury will improve  Description: Ability to remain free from injury will improve  2/23/2022 0937 by Eris Wiseman RN  Outcome: Ongoing  2/23/2022 0302 by Flip Jorgensen RN  Outcome: Ongoing     Problem: Musculor/Skeletal Functional Status  Goal: Highest potential functional level  2/23/2022 0937 by Eris Wiseman RN  Outcome: Ongoing  2/23/2022 0302 by Flip Jorgensen RN  Outcome: Ongoing  Goal: Absence of falls  2/23/2022 0937 by Eris Wiseman RN  Outcome: Ongoing  2/23/2022 0302 by lFip Jorgensen RN  Outcome: Ongoing

## 2022-02-23 NOTE — PLAN OF CARE
Problem: Falls - Risk of:  Goal: Will remain free from falls  Description: Will remain free from falls  Outcome: Ongoing     No falls noted this shift. Patient bedrest and turns self. Bed kept in low position. Safe environment maintained. Bedside table & call light in reach. Uses call light appropriately when needing assistance. Problem: Falls - Risk of:  Goal: Absence of physical injury  Description: Absence of physical injury  Outcome: Ongoing     Patient sitter at bedside for patient safety. Problem: Skin Integrity:  Goal: Absence of new skin breakdown  Description: Absence of new skin breakdown  Outcome: Ongoing     No new signs of skin breakdown noted this shift.

## 2022-02-23 NOTE — FLOWSHEET NOTE
Patient resting but welcomed prayer     02/23/22 2622   Encounter Summary   Services provided to: Patient   Referral/Consult From: Rounding   Continue Visiting   (2/23/22)   Complexity of Encounter Low   Length of Encounter 15 minutes   Spiritual Assessment Completed Yes   Spiritual/Latter day   Type Spiritual support   Assessment Approachable   Intervention Prayer   Outcome Expressed gratitude

## 2022-02-23 NOTE — PROGRESS NOTES
RN spoke with medicine resident regarding sitter at bedside. RN explained that patient would need sitter discontinued for at least 24 hours prior to discharge. MD states sitter to be discontinued.

## 2022-02-23 NOTE — PROGRESS NOTES
RUTH is following for possible transfer to [facility]. Sabrina Dotson from [facility] started pre-cert today. Please confirm pt wants SNF upon discharge as she wants to make the final decision then. RUTH started [facility] in [facility].

## 2022-02-23 NOTE — PROGRESS NOTES
[address]   INPATIENT OCCUPATIONAL THERAPY  PROGRESS NOTE  Date: 2022  Patient Name: Deborah Adler      Room: /4-01  MRN: [id number]    : 1955  (68 y.o.) Gender: female     Discharge Recommendations:  Further Occupational Therapy is recommended upon facility discharge. Equipment Needed:  (TBD)    Referring Practitioner: Mago Beren DO  Diagnosis: Seizure  General  Chart Reviewed: Yes  Patient assessed for rehabilitation services?: Yes  Additional Pertinent Hx: 77 y.o. female with past medical history of right MCA embolic stroke in 1147 with subsequent seizure disorder (on Vimpat & Keppra), history of prolonged post-ictal state, residual left sided weakness s/p CVA, previous alcohol dependence (has been sober ~25 years), b/l tremor (R>L), hypertension, hypothyroidism, depression, GERD, who presents via EMS on  for witnessed seizure. Pt seen having seizure by nephew at 6:00AM; he reports seeing slow movement of arms, blood coming out of patient's mouth, head jerking, twitching of face and mouth, eyes partially opened, and heavy breathing noises alongside gasps for air. Response to previous treatment: Patient with no complaints from previous session  Family / Caregiver Present: No  Referring Practitioner: Mago Breen DO  Diagnosis: Seizure    Restrictions  Restrictions/Precautions: Seizure,Fall Risk      Subjective  Subjective: Pt resting in bed with sitter in room upon arrival. Pt was pleasant and agreeable to OT/PT treatment. Comments: Ok per RN for OT/PT co-treatment with PTA Risa             Objective  Cognition  Overall Cognitive Status: Impaired  Following Directions: Follows one step commands  Attention Span: Difficulty attending to directions  Following Commands:  Follows one step commands with repetition  Safety Judgement: Decreased awareness of need for assistance  Awareness of Errors: Assistance required to identify errors made  Insights: Decreased awareness of deficits  Sequencing and Organization: Assistance required to identify errors made;Assistance required to generate solutions;Assistance required to implement solutions  Bed mobility  Supine to Sit: Stand by assistance  Sit to Supine: Stand by assistance  Scooting: Stand by assistance  Comment: Bed mobility completed with HOB elevated and increased time with verbal cues for sequencing. Balance  Sitting Balance: Stand by assistance (Slight unsteadiness c ability to right self with 1 UE suppor)  Standing Balance: Minimal assistance (min A x 2)  Standing Balance  Time: 1 minute x 1; 2-3 minutes x 1  Activity: functional transfers/mobility  Comment: with RW. Multiple LOB while standing requiring min A x 2 to right self. Increased LOB while turning. Functional Mobility  Functional - Mobility Device: Rolling Walker  Activity: Other (to/from door; hallway)  Assist Level: Minimal assistance (min A x 2)  Functional Mobility Comments: Verbal cues for hand placement, safety, and walker management with poor carryover. Pt very unsteady with multiple LOB requiring assistance to correct self. ADL  Feeding: Setup  Grooming: Setup  UE Bathing: Contact guard assistance  LE Bathing: Moderate assistance  UE Dressing: Contact guard assistance  LE Dressing: Moderate assistance  Toileting: Moderate assistance  Additional Comments: ADL scores based on clinical reasoning and skilled observation unless otherwise noted. Pt currently limited due to decreased strength, balance, activity tolerance, and cognitive barriers impacting safety and independence with self care tasks. Transfers  Sit to stand: 2 Person assistance;Minimal assistance  Stand to sit: 2 Person assistance;Minimal assistance  Transfer Comments: Verbal cues for hand placement and safety with poor carryover.    Type of ROM/Therapeutic Exercise  Type of ROM/Therapeutic Exercise: AROM  Comment: OT facilitated pts engagement in BUE exercises while sitting EOB to increase strength and overall endurace with self care tasks. Pt required verbal and visual cues for technique with fair carryover. Pt completed 10-15 reps. Exercises  Scapular Protraction: x  Scapular Retraction: x  Shoulder Depression: x  Shoulder Elevation: x  Shoulder Flexion: x  Shoulder Extension: x  Horizontal ABduction: x  Horizontal ADduction: x  Elbow Flexion: x  Elbow Extension: x  Supination: x  Pronation: x        Additional Activities: OT facilitated pts engagement in functional reaching while sitting and standing outside of DENI. Pt required verbal cues for sequencing task. CGA required during task from PTA for safety                 Assessment  Performance deficits / Impairments: Decreased functional mobility ; Decreased ADL status; Decreased strength;Decreased safe awareness;Decreased cognition;Decreased balance;Decreased endurance;Decreased coordination  Prognosis: Fair  Discharge Recommendations: Patient would benefit from continued therapy after discharge;Subacute/Skilled Nursing Facility  Activity Tolerance: Treatment limited secondary to decreased cognition  Safety Devices in place: Yes  Type of devices: All fall risk precautions in place;Call light within reach;Gait belt;Patient at risk for falls; Left in bed;Nurse notified (1:1 in room )             Patient Education: safety with transfers, BUE exercises, functional reaching outside of DENI     Learner:patient  Method: demonstration and explanation       Outcome: needs reinforcement     Plan  Safety Devices  Safety Devices in place: Yes  Type of devices:  All fall risk precautions in place,Call light within reach,Gait belt,Patient at risk for falls,Left in bed,Nurse notified (1:1 in room )  Plan  Times per week: 4-5  Current Treatment Recommendations: Strengthening,ROM,Balance Training,Functional Mobility Carlos Grimes Education & Training,Patient/Caregiver Education & Training,Equipment Evaluation, Education, & procurement,Positioning,Self-Care / ADL,Cognitive Reorientation      Goals  Short term goals  Time Frame for Short term goals: By discharge  Short term goal 1: Pt will participate in BADLs with SBA and Good safety  Short term goal 2: Pt will complete 2-step activities with less than 3 cues to correct  Short term goal 3: Pt will perform transfers/functionanl mobility with SBA and Good safety  Short term goal 4: Pt will actively participate in 15+ minutes of therapeutic exercise/functional activity to promote safety and independence with self care and mobility    OT Individual Minutes  Time In: 1334  Time Out: [address] [address]  Minutes: 23      Electronically signed by Gini Sylvester OT on 2/23/22 at 3:38 PM EST

## 2022-02-23 NOTE — PROGRESS NOTES
[facility] Neurology   IN-PATIENT SERVICE      NEUROLOGY PROGRESS  NOTE                Interval History:     Patient somnolent but easily arousable. She is able to verbalize more today. She is able to tell me where she is and why she is here. Still has some speech perseveration. Tolerating p.o. meds. History of Present Illness: The patient is a 77 y.o. female who presents with Seizures and Fever  . The patient was seen and examined and the chart was reviewed. Patient presented with focal status epilepticus with right facial twitching. She was noted to be off of her home dose of Vimpat after being discharged from the nursing home. Physical Exam:   /83   Pulse 79   Temp 98.1 °F (36.7 °C) (Oral)   Resp 20   Ht 5' 10\" (1.778 m)   Wt 153 lb 7 oz (69.6 kg)   SpO2 100%   BMI 22.02 kg/m²   Temp (24hrs), Av.1 °F (36.7 °C), Min:97.8 °F (36.6 °C), Max:98.4 °F (36.9 °C)      Neurological examination:    Mental status   Alert and oriented to hospital and situation. Did not know the city or year. Each perseveration is present. She is able to follow commands. Cranial nerves   CN II - XII intact   Motor function  Strength: 5/5 RUE, 5/5 RLE, 5/5 LUE, 5/5  LLE                  Sensory function Intact to touch throughout     Cerebellar Intact finger-nose-finger testing. Intact heel-shin testing. Reflex function 2/4 symmetric throughout .       Gait                  Not assessed           Diagnostics:      Laboratory Testing:  CBC: Recent Labs     22  0717 22  0443   WBC 5.1 6.0   HGB 15.6 13.4    178     BMP:    Recent Labs     22  0717 22  0443    141   K 3.9 3.5*    108*   CO2 22 23   BUN 11 8   CREATININE 0.89 0.46*   GLUCOSE 109* 86         Lab Results   Component Value Date    CHOL 183 2016    LDLCHOLESTEROL 55 2021    HDL 71 2021    TRIG 64 2016    ALT 14 2022    AST 17 2022    TSH 3.96 2022    INR 1.1 02/21/2022    LABA1C 5.1 08/18/2016    [id number] 1449 (H) 04/02/2021         Impression:      1. Breakthrough seizure with focal status epilepticus  2. Chronic left MCA infarct  3. History of multiple admissions for recurrent breakthrough seizures    Plan:      Convert Vimpat and Keppra to oral formulations   Continue Ativan as needed   Seizure precautions  · Please notify neurology if patient has any persistent seizures or recurrent facial twitching. · Continue primidone for her history of tremor. · We will follow, anticipate stable for discharge home next 24 hours or so.       Electronically signed by Cipriano Roblero DO on 2/23/2022 at 11:42 AM      Cipriano Roblero [address] [facility]  Neurology

## 2022-02-24 DIAGNOSIS — G40.909 SEIZURE DISORDER (HCC): ICD-10-CM

## 2022-02-24 PROCEDURE — 99232 SBSQ HOSP IP/OBS MODERATE 35: CPT | Performed by: INTERNAL MEDICINE

## 2022-02-24 PROCEDURE — 99232 SBSQ HOSP IP/OBS MODERATE 35: CPT | Performed by: PSYCHIATRY & NEUROLOGY

## 2022-02-24 PROCEDURE — 97530 THERAPEUTIC ACTIVITIES: CPT

## 2022-02-24 PROCEDURE — 2580000003 HC RX 258

## 2022-02-24 PROCEDURE — 6370000000 HC RX 637 (ALT 250 FOR IP)

## 2022-02-24 PROCEDURE — 97110 THERAPEUTIC EXERCISES: CPT

## 2022-02-24 PROCEDURE — 2060000000 HC ICU INTERMEDIATE R&B

## 2022-02-24 PROCEDURE — 6360000002 HC RX W HCPCS

## 2022-02-24 PROCEDURE — 6370000000 HC RX 637 (ALT 250 FOR IP): Performed by: PSYCHIATRY & NEUROLOGY

## 2022-02-24 PROCEDURE — 97535 SELF CARE MNGMENT TRAINING: CPT

## 2022-02-24 PROCEDURE — 97116 GAIT TRAINING THERAPY: CPT

## 2022-02-24 RX ORDER — LEVETIRACETAM 750 MG/1
1500 TABLET ORAL 2 TIMES DAILY
Qty: 60 TABLET | Refills: 3 | Status: SHIPPED | OUTPATIENT
Start: 2022-02-24 | End: 2022-05-10 | Stop reason: SDUPTHER

## 2022-02-24 RX ORDER — LACOSAMIDE 200 MG/1
200 TABLET ORAL DAILY
Qty: 180 TABLET | Refills: 0 | Status: SHIPPED | OUTPATIENT
Start: 2022-02-24 | End: 2022-04-19

## 2022-02-24 RX ORDER — LEVETIRACETAM 750 MG/1
1500 TABLET ORAL 2 TIMES DAILY
Qty: 60 TABLET | Refills: 3 | Status: SHIPPED | OUTPATIENT
Start: 2022-02-24 | End: 2022-02-24

## 2022-02-24 RX ORDER — LACOSAMIDE 200 MG/1
200 TABLET ORAL DAILY
Qty: 60 TABLET | Refills: 2 | Status: SHIPPED | OUTPATIENT
Start: 2022-02-24 | End: 2022-02-24

## 2022-02-24 RX ADMIN — SODIUM CHLORIDE, PRESERVATIVE FREE 10 ML: 5 INJECTION INTRAVENOUS at 21:20

## 2022-02-24 RX ADMIN — LACOSAMIDE 200 MG: 100 TABLET, FILM COATED ORAL at 21:21

## 2022-02-24 RX ADMIN — LEVETIRACETAM 1500 MG: 750 TABLET, FILM COATED ORAL at 08:28

## 2022-02-24 RX ADMIN — DONEPEZIL HYDROCHLORIDE 5 MG: 5 TABLET, FILM COATED ORAL at 21:21

## 2022-02-24 RX ADMIN — OXYBUTYNIN CHLORIDE 5 MG: 5 TABLET ORAL at 15:13

## 2022-02-24 RX ADMIN — SODIUM CHLORIDE, PRESERVATIVE FREE 10 ML: 5 INJECTION INTRAVENOUS at 08:34

## 2022-02-24 RX ADMIN — ATORVASTATIN CALCIUM 40 MG: 40 TABLET, FILM COATED ORAL at 08:28

## 2022-02-24 RX ADMIN — PRIMIDONE 75 MG: 50 TABLET ORAL at 08:28

## 2022-02-24 RX ADMIN — OXYBUTYNIN CHLORIDE 5 MG: 5 TABLET ORAL at 21:21

## 2022-02-24 RX ADMIN — PAROXETINE HYDROCHLORIDE 30 MG: 20 TABLET, FILM COATED ORAL at 08:28

## 2022-02-24 RX ADMIN — ENOXAPARIN SODIUM 40 MG: 100 INJECTION SUBCUTANEOUS at 08:27

## 2022-02-24 RX ADMIN — LACOSAMIDE 200 MG: 100 TABLET, FILM COATED ORAL at 08:27

## 2022-02-24 RX ADMIN — LEVOTHYROXINE SODIUM 50 MCG: 0.05 TABLET ORAL at 06:36

## 2022-02-24 RX ADMIN — ASPIRIN 81 MG: 81 TABLET, COATED ORAL at 08:28

## 2022-02-24 RX ADMIN — PANTOPRAZOLE SODIUM 40 MG: 40 TABLET, DELAYED RELEASE ORAL at 06:35

## 2022-02-24 RX ADMIN — AMLODIPINE BESYLATE 10 MG: 10 TABLET ORAL at 08:28

## 2022-02-24 RX ADMIN — LEVETIRACETAM 1500 MG: 750 TABLET, FILM COATED ORAL at 21:21

## 2022-02-24 RX ADMIN — OXYBUTYNIN CHLORIDE 5 MG: 5 TABLET ORAL at 08:29

## 2022-02-24 RX ADMIN — PRIMIDONE 75 MG: 50 TABLET ORAL at 21:20

## 2022-02-24 ASSESSMENT — ENCOUNTER SYMPTOMS
COUGH: 0
PHOTOPHOBIA: 0
SORE THROAT: 0
SHORTNESS OF BREATH: 0
VOMITING: 0
NAUSEA: 0

## 2022-02-24 NOTE — PLAN OF CARE
Problem: Falls - Risk of:  Goal: Will remain free from falls  Description: Will remain free from falls  Outcome: Ongoing  Goal: Absence of physical injury  Description: Absence of physical injury  Outcome: Ongoing     Problem: Skin Integrity:  Goal: Will show no infection signs and symptoms  Description: Will show no infection signs and symptoms  Outcome: Ongoing  Goal: Absence of new skin breakdown  Description: Absence of new skin breakdown  Outcome: Ongoing     Problem: Safety:  Goal: Ability to remain free from injury will improve  Description: Ability to remain free from injury will improve  Outcome: Ongoing     Problem: Musculor/Skeletal Functional Status  Goal: Highest potential functional level  Outcome: Ongoing  Goal: Absence of falls  Outcome: Ongoing

## 2022-02-24 NOTE — TELEPHONE ENCOUNTER
Pt's sister called in, pt in hospital again for seizures. Had a question re: pt's Vimpat being discontinued. Unsure what happened, last OV with PCP in December lists the medication but has note stating pt reported not taking in October at a dermatology office appt. Either way, pt is being d/c'd from hospital and new script for Vimpat has been sent to pharmacy.

## 2022-02-24 NOTE — PROGRESS NOTES
[address]   INPATIENT OCCUPATIONAL THERAPY  PROGRESS NOTE  Date: 2022  Patient Name: Tami Zabala      Room: 4-01  MRN: [id number]    : 1955  (68 y.o.) Gender: female     Discharge Recommendations:  Further Occupational Therapy is recommended upon facility discharge. Equipment Needed:  (TBD)    Referring Practitioner: Fadia Harrington DO  Diagnosis: Seizure  General  Chart Reviewed: Yes  Patient assessed for rehabilitation services?: Yes  Additional Pertinent Hx: 77 y.o. female with past medical history of right MCA embolic stroke in 6000 with subsequent seizure disorder (on Vimpat & Keppra), history of prolonged post-ictal state, residual left sided weakness s/p CVA, previous alcohol dependence (has been sober ~25 years), b/l tremor (R>L), hypertension, hypothyroidism, depression, GERD, who presents via EMS on  for witnessed seizure. Pt seen having seizure by nephew at 6:00AM; he reports seeing slow movement of arms, blood coming out of patient's mouth, head jerking, twitching of face and mouth, eyes partially opened, and heavy breathing noises alongside gasps for air. Response to previous treatment: Patient with no complaints from previous session  Family / Caregiver Present: No  Referring Practitioner: Fadia Harrington DO  Diagnosis: Seizure    Restrictions  Restrictions/Precautions: Seizure,Fall Risk      Subjective  Subjective: Pt resting in bed upon arrival. Pt was pleasant and agreeable to OT/PT treatment. Comments: Ok per RN for tx,  Pt was pleasant. co-treatment with Donaldo Parr PTA  Patient Currently in Pain: Denies  Overall Orientation Status: Impaired  Orientation Level: Disoriented to situation;Disoriented to time;Oriented to place;Oriented to person (states February when asked the year.)  Patient Observation  Observations: pt impulsive. stands without warning.        Objective  Cognition  Overall Cognitive Status: Impaired  Following Directions: Follows one step commands  Attention Span: Difficulty attending to directions  Following Commands: Follows one step commands with repetition  Safety Judgement: Decreased awareness of need for assistance  Awareness of Errors: Assistance required to identify errors made  Insights: Decreased awareness of deficits  Sequencing and Organization: Assistance required to identify errors made;Assistance required to generate solutions;Assistance required to implement solutions  Bed mobility  Rolling to Left: Stand by assistance  Rolling to Right: Stand by assistance  Supine to Sit: Stand by assistance  Sit to Supine: Stand by assistance  Scooting: Stand by assistance  Balance  Sitting Balance: Stand by assistance (Slight unsteadiness c ability to right self with 1 UE suppor)  Standing Balance: Minimal assistance (CGA-min A x 2,)  Standing Balance  Time: 1 minute; 2-3 minutes x 1  Activity: functional transfers/mobility  Comment: with RW. Multiple LOB while standing requiring min A x 2 to right self. Increased LOB while turning. Functional Mobility  Functional - Mobility Device: Rolling Walker  Activity: Other (to/from door; hallway)  Assist Level: Minimal assistance (min A x 2)  Functional Mobility Comments: ataxic gait noted, VCs for hand placement, safety, and walker management with poor carryover. Pt very unsteady with multiple LOB requiring assistance to correct self. ADL  Feeding: Setup  Grooming: Setup  UE Bathing: Contact guard assistance  LE Bathing: Moderate assistance  UE Dressing: Contact guard assistance  LE Dressing: Moderate assistance  Toileting: Moderate assistance  Additional Comments: ADL scores based on clinical reasoning and skilled observation unless otherwise noted. Pt currently limited due to decreased strength, balance, activity tolerance, and cognitive barriers impacting safety and independence with self care tasks. pt drops wash cloth multiple time during UB and grooming.      Transfers  Sit to Strengthening,ROM,Balance Training,Functional Mobility Training,Endurance Training,Safety Education & Training,Patient/Caregiver Education & Training,Equipment Evaluation, Education, & procurement,Positioning,Self-Care / ADL,Cognitive Reorientation      Goals  Short term goals  Time Frame for Short term goals: By discharge  Short term goal 1: Pt will participate in BADLs with SBA and Good safety  Short term goal 2: Pt will complete 2-step activities with less than 3 cues to correct  Short term goal 3: Pt will perform transfers/functionanl mobility with SBA and Good safety  Short term goal 4: Pt will actively participate in 15+ minutes of therapeutic exercise/functional activity to promote safety and independence with self care and mobility    OT Individual Minutes  Time In: 1006  Time Out: [address]  Minutes: 27      Electronically signed by JODI Darnell on 2/24/22 at 11:21 AM EST

## 2022-02-24 NOTE — PROGRESS NOTES
[facility] Neurology   IN-PATIENT SERVICE      NEUROLOGY PROGRESS  NOTE                Interval History:     Patient sitting up in bed, alert and oriented. Continues to improve daily. Awaiting placement. No further seizures. History of Present Illness: The patient is T 55 y.o. female who presents with Seizures and Fever  .  The patient was seen and examined and the chart was reviewed. Carol Potter presented with focal status epilepticus with right facial twitching.  She was noted to be off of her home dose of Vimpat after being discharged from the nursing home    Physical Exam:   /68   Pulse 89   Temp 98.6 °F (37 °C) (Oral)   Resp 16   Ht 5' 10\" (1.778 m)   Wt 150 lb 2.1 oz (68.1 kg)   SpO2 (!) 89%   BMI 21.54 kg/m²   Temp (24hrs), Av.4 °F (36.9 °C), Min:97.8 °F (36.6 °C), Max:98.7 °F (37.1 °C)      Neurological examination:      Mental status    Alert and oriented to hospital and situation. Did not know the city or year. Speech perseveration is present. She is able to follow commands.      Cranial nerves    CN II - XII intact   Motor function  Strength: 5/5 RUE, 5/5 RLE, 5/5 LUE, 5/5  LLE                  Sensory function Intact to touch throughout      Cerebellar Intact finger-nose-finger testing. Intact heel-shin testing.       Reflex function 2/4 symmetric throughout .       Gait                  Not assessed                Diagnostics:      Laboratory Testing:  CBC: Recent Labs     22  0443   WBC 6.0   HGB 13.4        BMP:    Recent Labs     22  0443      K 3.5*   *   CO2 23   BUN 8   CREATININE 0.46*   GLUCOSE 86         Lab Results   Component Value Date    CHOL 183 2016    LDLCHOLESTEROL 55 2021    HDL 71 2021    TRIG 64 2016    ALT 14 2022    AST 17 2022    TSH 3.96 2022    INR 1.1 2022    LABA1C 5.1 2016    BNOAUZDZ70 1449 (H) 2021         Impression:      1.  Breakthrough seizure with focal status epilepticus  2. Chronic left MCA infarct  3. History of multiple admissions for recurrent breakthrough seizures    Plan:      Continue Keppra 1500 mg twice daily. Vimpat 200 mg twice daily.  Seizure precautions  · Please notify neurology if patient has any persistent seizures or recurrent facial twitching. · Continue primidone for her history of tremor. · Stable neurologically for discharge at this time. Follow-up as outpatient in 3 to 4 weeks. · We will sign off, please do not hesitate to contact with any further questions or concerns.       Electronically signed by Sneha Levin DO on 2/24/2022 at 3:37 PM      Sneha Levin [address] [place] [address]  Neurology

## 2022-02-24 NOTE — PROGRESS NOTES
Physical Therapy  Facility/Department: [address] [place] [address] PROGRESSIVE CARE  Daily Treatment Note  NAME: Erik Morse  : 1955  MRN: [id number]    Date of Service: 2022    Discharge Recommendations:  Patient would benefit from continued therapy after discharge        Assessment   Body structures, Functions, Activity limitations: Decreased functional mobility ; Decreased strength;Decreased safe awareness;Decreased cognition;Decreased endurance;Decreased balance  Specific instructions for Next Treatment: Co-tx with BARAJAS/OTR. if any episode of facial twitching observed , notifiy RN immediately. History: History of multiple admissions for recurrent breakthrough seizures, L MCA CVA. REQUIRES PT FOLLOW UP: Yes  Activity Tolerance  Activity Tolerance: Patient limited by cognitive status     Patient Diagnosis(es): The encounter diagnosis was Breakthrough seizure ([address] has a past medical history of Ankle fracture, left, Anxiety, Arthritis, Black tarry stools, Colon polyp, Constipation, ETOH abuse, Frequency of urination, GERD (gastroesophageal reflux disease), Head injury, Headache(784.0), Hypertension, Hypotension, Hypothyroidism, Memory deficit, Migraines, Numbness and tingling, Osteoporosis, Peripheral vascular disease ([address] Pneumonia, Seizures ([address] Shingles, Stroke ([address] TIA (transient ischemic attack), Tremor, UTI (urinary tract infection), Varicose veins, and Wears glasses. has a past surgical history that includes Bunionectomy (Right); Tubal ligation; other surgical history (2015); Hammer toe surgery (Left, 2016); pr brncc incl fluor gdnce dx w/cell washg spx (N/A, 2018); Colonoscopy; and Colonoscopy (N/A, 5/10/2021). Restrictions  Restrictions/Precautions  Restrictions/Precautions: Seizure,Fall Risk  Subjective   General  Chart Reviewed: Yes  Additional Pertinent Hx: The patient is a 77 y.o. female who presents with Seizures and Fever.  The patient was seen and examined and the chart was reviewed. Patient presented with focal status epilepticus with right facial twitching. She was noted to be off of her home dose of Vimpat after being discharged from the nursing home. Harriet Rudd Response To Previous Treatment: Patient with no complaints from previous session. Family / Caregiver Present: No  Subjective  Subjective: Patient laying in bed upon arrival; agreeable to therapy. Patient impulsive, starts to get out of bed and stand before writer is ready. General Comment  Comments: JESSICA Mancia approved therapy; co-treat with Maria Teresa Rodrigez d/t safety concerns   Pain Screening  Patient Currently in Pain: Denies  Vital Signs  Patient Currently in Pain: Denies       Orientation  Orientation  Overall Orientation Status: Impaired  Orientation Level: Oriented to person;Oriented to place; Disoriented to situation;Disoriented to time (states february when asked what year it is)  Objective   Bed mobility  Scooting: Stand by assistance  Transfers  Sit to Stand: Minimal Assistance;2 Person Assistance;Contact guard assistance  Stand to sit: Minimal Assistance;2 Person Assistance;Contact guard assistance  Bed to Chair: Minimal assistance  Comment: Patient very unsteady with STS.  1 posterior lean noted, self corrected   Ambulation  Ambulation?: Yes  Ambulation 1  Surface: level tile  Device: Rolling Walker  Assistance: Minimal assistance;Contact guard assistance;2 Person assistance  Quality of Gait: Slow, unsteady, multiple LOB, unsteady turns, posterior + L lateral lean intermittently during amb, ataxic movements   Gait Deviations: Slow Mariah;Decreased step length;Decreased step height;Deviated path  Distance: 126ft  Stairs/Curb  Stairs?: No        Other exercises  Other exercises?: Yes  Other exercises 1: bed mobility   Other exercises 2: STS x3   Other exercises 3: seated B LE exercises x15 reps   Other exercises 4: seated EOB ~10 minutes      Goals  Short term goals  Time Frame for Short term goals: 7 to 8 visits  Short term goal 1: Pt able to perform supine<>sit at SBA with minimal verbal cues. Short term goal 2: Pt able to dangle at EOB for 10 minutes and participate in theraputic ex's to improve strength  Short term goal 3: Pt able to perform sit.<>stand at [address] term goal 4: Pt able to transfers to chair at min A x 2  Short term goal 5:  Pt able to progress to taking 10 to 20 steps with RW, min A x 2  Patient Goals   Patient goals : Not stated. Plan    Plan  Times per week: 5 x/week  Specific instructions for Next Treatment: Co-tx with BARAJAS/OTR. if any episode of facial twitching observed , notifiy RN immediately.   Current Treatment Recommendations: Strengthening,Balance Training,Functional Mobility Training,Transfer Training,Cognitive/Perceptual Training,Endurance Training,Gait Training,Safety Education & Training,Patient/Caregiver Education & Training,Equipment Evaluation, Education, & procurement  Safety Devices  Type of devices: Gait belt,Call light within reach,Nurse notified,Left in chair,Chair alarm in place        02/24/22 1046   PT Individual Minutes   Time In 1006   Time Out 1033   Minutes 27     Electronically signed by Joselin Nunez PTA on 2/24/22 at 11:09 AM EST

## 2022-02-24 NOTE — CARE COORDINATION
RUTH informed pt ready for discharge, SW completed and faxed paperwork to [facility], transport not available until tomorrow, paperwork faxed to [facility], pickup time 2/25/2022 at 1030am.      RUTH advised Saurabh Karimi at [facility] of d/c 2/25/2022, pickup 1030am.   Number for report is [phone].    [address] completed in [address].

## 2022-02-24 NOTE — DISCHARGE SUMMARY
[address]    Discharge Summary     Patient ID: Zoë Patel  :  1955   MRN: [id number]     ACCOUNT:  [de-identified]   Patient's PCP: Stevenson Cooks, DO  Admit Date: 2022   Discharge Date: 2022    Length of Stay: 4  Code Status:  Full Code  Admitting Physician: Hayde Hoover MD  Discharge Physician: Darlis Goodell, DO     Active Discharge Diagnoses:       Primary Problem  Seizure [facility])      [place] Problems    Diagnosis Date Noted    Breakthrough seizure [facility]) Rupa Stacie 2022    Depression [F32. A] 2022    Complex partial status epilepticus  [G40.201] 2022    Seizure ([address] [R56.9] 2022    Tremor [R25.1] 2017    H/O: CVA (cerebrovascular accident) [Z86.73] 2014    Gastroesophageal reflux disease without esophagitis [K21.9] 2014    Hypertension [I10] 2012    Acquired hypothyroidism [E03.9] 2012    Urge incontinence [N39.41] 2012       Admission Condition:  serious     Discharged Condition: fair    Hospital Stay:       Hospital Course:    Sue Ramsey is a 77 y.o. female with past medical history of right MCA embolic stroke in 6953 with subsequent seizure disorder (on Vimpat & Keppra), history of prolonged post-ictal state, previous alcohol dependence (has been sober ~25 years), b/l tremor (R>L), hypertension, hypothyroidism, depression, GERD, who presented on  for multiple witnessed seizures. Pt continued to seize with right facial twitching on initial admission; pt loaded with Keppra without much improvement, then loaded with Vimpat which finally broke seizure, and lastly also given 1 time dose of phenytoin to help break seizure faster. Patient admitted for management of breakthrough seizures;   determined to likely be secondary to patient off Vimpat after being discharged from nursing home.     Neurology following & medication adjustments made. Patient to be continued on [address] [place] [address] at doses/frequencies as specified below. She had a prolonged postictal period with initial disorientation/confusion and some speech perseveration but slowly came out of it. Today, on day of discharge, pt was seen and examined. She is alert & oriented & aware of situation. Postictal period appears to have ended since yesterday without any further seizures or confusion and pt is medically stable for discharge. Discussed pt not to drive for at least 6 months and/or until cleared by Neurologist;  Pt understands and is agreeable. Per PT/OT recommendations pt in need of more rehab needs. Patient to be discharged to SNF.       Significant therapeutic interventions: Vimpat, Keppra, fosphenytoin, PT/OT    Significant Diagnostic Studies:   Labs / Micro:  CBC:   Lab Results   Component Value Date    WBC 6.0 02/22/2022    RBC 4.21 02/22/2022    RBC 4.21 10/04/2011    HGB 13.4 02/22/2022    HCT 40.4 02/22/2022    MCV 96.0 02/22/2022    MCH 31.9 02/22/2022    MCHC 33.2 02/22/2022    RDW 12.6 02/22/2022     02/22/2022     10/04/2011     BMP:    Lab Results   Component Value Date    GLUCOSE 86 02/22/2022    GLUCOSE 68 03/27/2012     02/22/2022    K 3.5 02/22/2022     02/22/2022    CO2 23 02/22/2022    ANIONGAP 10 02/22/2022    BUN 8 02/22/2022    CREATININE 0.46 02/22/2022    BUNCRER NOT REPORTED 02/21/2022    CALCIUM 8.5 02/22/2022    LABGLOM >60 02/22/2022    GFRAA >60 02/22/2022    GFR      02/22/2022     HFP:    Lab Results   Component Value Date    PROT 7.6 02/21/2022     CMP:    Lab Results   Component Value Date    GLUCOSE 86 02/22/2022    GLUCOSE 68 03/27/2012     02/22/2022    K 3.5 02/22/2022     02/22/2022    CO2 23 02/22/2022    BUN 8 02/22/2022    CREATININE 0.46 02/22/2022    ANIONGAP 10 02/22/2022    ALKPHOS 69 02/21/2022    ALT 14 02/21/2022    AST 17 02/21/2022    BILITOT 0.61 02/21/2022    LABALBU 4.6 02/21/2022    LABALBU 4.1 10/04/2011    ALBUMIN NOT REPORTED 02/21/2022    LABGLOM >60 02/22/2022    GFRAA >60 02/22/2022    GFR      02/22/2022    PROT 7.6 02/21/2022    CALCIUM 8.5 02/22/2022     PT/INR:    Lab Results   Component Value Date    PROTIME 14.1 02/21/2022    INR 1.1 02/21/2022     PTT:   Lab Results   Component Value Date    APTT 21.3 01/14/2018     FLP:    Lab Results   Component Value Date    CHOL 183 08/18/2016    TRIG 64 08/18/2016    HDL 71 08/30/2021     U/A:    Lab Results   Component Value Date    COLORU Yellow 02/21/2022    TURBIDITY Clear 02/21/2022    SPECGRAV 1.011 02/21/2022    HGBUR NEGATIVE 02/21/2022    PHUR 7.5 02/21/2022    PROTEINU NEGATIVE 02/21/2022    GLUCOSEU NEGATIVE 02/21/2022    GLUCOSEU NEGATIVE 10/04/2011    KETUA NEGATIVE 02/21/2022    BILIRUBINUR NEGATIVE 02/21/2022    BILIRUBINUR NEGATIVE 10/04/2011    UROBILINOGEN Normal 02/21/2022    NITRU NEGATIVE 02/21/2022    LEUKOCYTESUR NEGATIVE 02/21/2022     TSH:    Lab Results   Component Value Date    TSH 3.96 01/09/2022       Radiology:    CT HEAD WO CONTRAST    Result Date: 2/21/2022  EXAMINATION: CT OF THE HEAD WITHOUT CONTRAST  2/21/2022 7:34 am TECHNIQUE: CT of the head was performed without the administration of intravenous contrast. Dose modulation, iterative reconstruction, and/or weight based adjustment of the mA/kV was utilized to reduce the radiation dose to as low as reasonably achievable. COMPARISON: CT brain performed 01/08/2022. HISTORY: ORDERING SYSTEM PROVIDED HISTORY: four seizures this am TECHNOLOGIST PROVIDED HISTORY: four seizures this am Decision Support Exception - unselect if not a suspected or confirmed emergency medical condition->Emergency Medical Condition (MA) Reason for Exam: four seizures this am FINDINGS: BRAIN/VENTRICLES: There is no acute hemorrhage, mass effect, or midline shift. There is cerebral atrophy. There is a remote right MCA distribution infarct.   The ventricular structures are unremarkable. The infratentorial structures are unremarkable. ORBITS: The visualized portion of the orbits demonstrate no acute abnormality. SINUSES: The visualized paranasal sinuses and mastoid air cells demonstrate no acute abnormality. SOFT TISSUES/SKULL:  No acute abnormality of the visualized skull or soft tissues. No acute intracranial abnormality. Remote right MCA distribution infarct. XR CHEST PORTABLE    Result Date: 2/21/2022  EXAMINATION: ONE XRAY VIEW OF THE CHEST 2/21/2022 7:56 am COMPARISON: 06/20/2020 HISTORY: ORDERING SYSTEM PROVIDED HISTORY: AMS, fever, seizures TECHNOLOGIST PROVIDED HISTORY: AMS, fever, seizures Reason for Exam: AMS, fever, seizures FINDINGS: Cardiac leads project over the chest.  Heterogeneous bibasilar opacity is seen. No significant pleural effusion. No pneumothorax. Several interfaces projecting over the upper chest, favoring artifact. Right apical pleuroparenchymal thickening is again seen. Cardiac and mediastinal silhouettes are similar to prior. Calcified mediastinal and hilar lymph nodes, in keeping with granulomatous disease. Bibasilar atelectasis or airspace disease. Consultations:    Consults:     Final Specialist Recommendations/Findings:   IP CONSULT TO INTERNAL MEDICINE  IP CONSULT TO NEUROLOGY      The patient was seen and examined on day of discharge and this discharge summary is in conjunction with any daily progress note from day of discharge. Discharge plan:       Disposition: To a non-[facility] facility    Physician Follow Up:     [address]  [address] [place] [address]  [phone]    they will call you at home to set up a time to come to your house.     Paula Mccann, [address] [address]  [phone]    In 4 weeks  Hospital follow-up       Requiring Further Evaluation/Follow Up POST HOSPITALIZATION/Incidental Findings: Seizures    Diet: regular diet    Activity: As tolerated    Instructions to Patient:   Take all medications as prescribed;  Please ensure you do not miss any doses of all antiepileptic medications prescribed. If symptoms worsen or recur, please return to the ED. Follow-up with PCP. Follow-up with neurologist.    Discharge Medications:      Medication List        CHANGE how you take these medications      lacosamide 200 MG tablet  Commonly known as: VIMPAT  Take 1 tablet by mouth daily for 180 days. What changed:   medication strength  how much to take  when to take this            CONTINUE taking these medications      amLODIPine 10 MG tablet  Commonly known as: NORVASC  TAKE 1 TABLET BY MOUTH ONE TIME A DAY     aspirin 81 MG EC tablet  Commonly known as: Aspirin Low Dose  TAKE 1 TABLET BY MOUTH ONE TIME A DAY     atorvastatin 40 MG tablet  Commonly known as: LIPITOR  TAKE 1 TABLET BY MOUTH ONE TIME A DAY     diclofenac sodium 1 % Gel  Commonly known as: VOLTAREN  Apply 2 g topically 4 times daily as needed for Pain     donepezil 5 MG tablet  Commonly known as: ARICEPT  TAKE ONE TABLET BY MOUTH NIGHTLY     levETIRAcetam 750 MG tablet  Commonly known as: KEPPRA  Take 2 tablets by mouth 2 times daily     levothyroxine 50 MCG tablet  Commonly known as: SYNTHROID  TAKE 1 TABLET BY MOUTH ONE TIME A DAY     omeprazole 20 MG delayed release capsule  Commonly known as: PRILOSEC  TAKE 1 CAPSULE BY MOUTH ONE TIME A DAY     oxybutynin 5 MG tablet  Commonly known as: DITROPAN  TAKE 1 TABLET BY MOUTH 3 TIMES A DAY     PARoxetine 30 MG tablet  Commonly known as: PAXIL  TAKE ONE TABLET BY MOUTH EVERY MORNING     primidone 50 MG tablet  Commonly known as:  MYSOLINE  Take 1.5 tablets by mouth 2 times daily     SUMAtriptan 100 MG tablet  Commonly known as: IMITREX  TAKE 1 TABLET BY MOUTH ONE TIME A DAY AS NEEDED FOR MIGRAINE               Where to Get Your Medications        These medications were sent to [facility] [address] [place] [address] [phone] [facility] [address] [facility], [address] [address]      Phone: [phone]   levETIRAcetam 750 MG tablet       You can get these medications from any pharmacy    Bring a paper prescription for each of these medications  lacosamide 200 MG tablet         Electronically signed by   Gregorio Silva DO  2/25/2022  12:19 PM      Thank you Dr. Mikey Rene DO for the opportunity to be involved in this patient's care. Attestation and add on       I have discussed the care of Ney Qureshi , including pertinent history and exam findings,      2/25/22    with the resident. I have seen and examined the patient and the key elements of all parts of the encounter have been performed by me . I agree with the assessment, plan and orders as documented by the resident. Principal Problem:    Seizure ([address]  Active Problems:    Acquired hypothyroidism    Hypertension    Urge incontinence    Gastroesophageal reflux disease without esophagitis    H/O: CVA (cerebrovascular accident)    Tremor    Breakthrough seizure ([address]    Depression    Complex partial status epilepticus   Resolved Problems:    * No resolved hospital problems. *         ---- ;     MD LIAM Darnell[address], [address] [place] [address].    Phone [phone]   Fax: [phone]  Answering Service: [phone]

## 2022-02-24 NOTE — TELEPHONE ENCOUNTER
Pt's sister called in stating that pt missed her appt again this week because of having 5 seizures on 2/21. She said they found out that pt was not getting her Vimpat. She is trying to find out why we weren't giving it to her. I looked at her chart and told her that we last prescribed it in June, it was discontinued by PCP on 12/1/21. She said that she was going to call the drs office and find out why. She called back and told me that she was told that pt informed Derm that she didn't need it so PCP took it out of her chart. Your last note stated that she had breakthrough when she tried to come off of it the last time. Pt's sister is asking that we send a new script to [address]. This was done.

## 2022-02-24 NOTE — PLAN OF CARE
Problem: Falls - Risk of:  Goal: Will remain free from falls  Description: Will remain free from falls  Outcome: Ongoing     Problem: Falls - Risk of:  Goal: Absence of physical injury  Description: Absence of physical injury  Outcome: Ongoing     Problem: Skin Integrity:  Goal: Will show no infection signs and symptoms  Description: Will show no infection signs and symptoms  Outcome: Ongoing     Problem: Skin Integrity:  Goal: Absence of new skin breakdown  Description: Absence of new skin breakdown  Outcome: Ongoing     Problem: Safety:  Goal: Ability to remain free from injury will improve  Description: Ability to remain free from injury will improve  Outcome: Ongoing

## 2022-02-24 NOTE — PROGRESS NOTES
Infusions:    sodium chloride       PRN Meds: sodium chloride flush, sodium chloride, ondansetron **OR** ondansetron, polyethylene glycol, acetaminophen **OR** acetaminophen, potassium chloride **OR** potassium alternative oral replacement **OR** potassium chloride, magnesium sulfate    Data:     Past Medical History:   has a past medical history of Ankle fracture, left, Anxiety, Arthritis, Black tarry stools, Colon polyp, Constipation, ETOH abuse, Frequency of urination, GERD (gastroesophageal reflux disease), Head injury, Headache(784.0), Hypertension, Hypotension, Hypothyroidism, Memory deficit, Migraines, Numbness and tingling, Osteoporosis, Peripheral vascular disease ([address] Pneumonia, Seizures ([address] Shingles, Stroke ([address] TIA (transient ischemic attack), Tremor, UTI (urinary tract infection), Varicose veins, and Wears glasses. Social History:   reports that she has never smoked. She has never used smokeless tobacco. She reports previous alcohol use. She reports that she does not use drugs. Family History:   Family History   Problem Relation Age of Onset    Cervical Cancer Mother     Heart Disease Father     Heart Disease Maternal Grandfather     Heart Disease Paternal Grandfather        Vitals:  /77   Pulse 83   Temp 98.4 °F (36.9 °C) (Oral)   Resp 16   Ht 5' 10\" (1.778 m)   Wt 150 lb 2.1 oz (68.1 kg)   SpO2 95%   BMI 21.54 kg/m²   Temp (24hrs), Av.2 °F (36.8 °C), Min:97.8 °F (36.6 °C), Max:98.7 °F (37.1 °C)    No results for input(s): POCGLU in the last 72 hours. Vitals:    22 1815 22 1951 22 0100 22 0725   BP: 118/77 (!) 126/90 115/74 113/77   Pulse: 83 80 78 83   Resp: 18   16   Temp: 97.8 °F (36.6 °C) 98.3 °F (36.8 °C) 98.7 °F (37.1 °C) 98.4 °F (36.9 °C)   TempSrc: Axillary Oral  Oral   SpO2: 98% 97% 95% 95%   Weight:   150 lb 2.1 oz (68.1 kg)    Height:           I/O(24Hr):     Intake/Output Summary (Last 24 hours) at 2022 9416  Last data filed at 2/23/2022 1234  Gross per 24 hour   Intake 738.69 ml   Output 400 ml   Net 338.69 ml       Labs:  No results found for this or any previous visit (from the past 24 hour(s)). Lab Results   Component Value Date/Time    SPECIAL NOT REPORTED 07/31/2018 03:50 AM     Lab Results   Component Value Date/Time    CULTURE NO GROWTH 2 DAYS 02/21/2022 07:42 AM         Radiology:    CT HEAD WO CONTRAST    Result Date: 2/21/2022  No acute intracranial abnormality. Remote right MCA distribution infarct. XR CHEST PORTABLE    Result Date: 2/21/2022  Bibasilar atelectasis or airspace disease. Physical Examination:        Physical Exam  Constitutional:       Appearance: Normal appearance. HENT:      Head: Normocephalic and atraumatic. Right Ear: External ear normal.      Left Ear: External ear normal.      Nose: Nose normal.      Mouth/Throat:      Pharynx: Oropharynx is clear. Eyes:      General: No scleral icterus. Conjunctiva/sclera: Conjunctivae normal.   Cardiovascular:      Rate and Rhythm: Normal rate and regular rhythm. Pulmonary:      Effort: Pulmonary effort is normal.      Breath sounds: Normal breath sounds. Abdominal:      General: Bowel sounds are normal.      Palpations: Abdomen is soft. Tenderness: There is no abdominal tenderness. Musculoskeletal:         General: No tenderness. Right lower leg: No edema. Left lower leg: No edema. Neurological:      General: No focal deficit present. Mental Status: She is alert and oriented to person, place, and time. Motor: No weakness.       Deep Tendon Reflexes: Reflexes normal.      Comments: Patient accurately describes where she lives,  Tells me she is not driving due to seizures,  Oriented to self, time, location,  Speech is intelligible and purposeful,  Speech perseveration this a.m.,  Follows commands           Assessment:        Primary Problem  Seizure [facility])    Active Hospital Problems    Diagnosis Date Noted  Breakthrough seizure ([facility] Dick Kumari 02/21/2022    Depression [E34. 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] 02/21/2022    Seizure ([facility] [R56.9] 01/08/2022    Tremor [R25.1] 11/07/2017    H/O: CVA (cerebrovascular accident) [Z86.73] 05/06/2014    Gastroesophageal reflux disease without esophagitis [K21.9] 02/04/2014    Hypertension [I10] 03/27/2012    Acquired hypothyroidism [E03.9] 03/27/2012    Urge incontinence [N39.41] 03/27/2012       Plan:        Breakthrough seizures -resolved  No further seizures, patient is alert and oriented, no longer confused and speech perseveration appears to have resolved => postictal period appears to have ended  Neurology following  Lacosamide 200 mg p.o. twice daily   Levetiracetam 1.5 g p.o. twice daily   PT/OT; further assessment patient need for rehab services  Patient medically stable for discharge to SNF  Family contacted via telephone and updated     Other comorbid conditions:  · Tremors: c/w home Primidone 5 mg p.o. twice daily   · Hx of CVA: right MCA embolic stroke (7881), c/w home ASA 81 mg p.o. daily  · Hypertension: amlodipine 10 mg p.o. daily  · Depression: paroxetine 30 mg p.o. daily   · Hypothyroidism: levothyroxine 50 mcg p.o. daily  · GERD: pantoprazole 40 mg p.o. daily  · Other Home meds continued: donepezil 5 mg p.o. nightly, oxybutynin 5 mg p.o. TID        Code: FULL  DVT prophylaxis: Enoxaparin 40 mg SQ daily  GI prophylaxis: Protonix  Dispo:  Medically stable for discharge today pending placement     Please note: Use of a speech recognition software was used in the creation of portions of this note and dictation errors, including those of syntax and sound alike word substitutions, may have escaped proofreading. Arjun Andrews DO  2/24/2022  8:34 AM         Attestation and add on       I have discussed the care of Yevgeniy Mendez , including pertinent history and exam findings,      2/24/22    with the resident.   I have seen and examined the patient and the key elements of all parts of the encounter have been performed by me . I agree with the assessment, plan and orders as documented by the resident. Principal Problem:    Seizure ([facility]  Active Problems:    Acquired hypothyroidism    Hypertension    Urge incontinence    Gastroesophageal reflux disease without esophagitis    H/O: CVA (cerebrovascular accident)    Tremor    Breakthrough seizure ([address]    Depression    Complex partial status epilepticus   Resolved Problems:    * No resolved hospital problems. *         ---- ;     MD LIAM Peralta[address], [address] [place] [address].    Phone [phone]   Fax: [phone]  Answering Service: [phone]

## 2022-02-25 VITALS
TEMPERATURE: 98 F | DIASTOLIC BLOOD PRESSURE: 73 MMHG | HEIGHT: 70 IN | HEART RATE: 71 BPM | RESPIRATION RATE: 18 BRPM | WEIGHT: 150.13 LBS | OXYGEN SATURATION: 96 % | SYSTOLIC BLOOD PRESSURE: 111 MMHG | BODY MASS INDEX: 21.49 KG/M2

## 2022-02-25 PROCEDURE — 6370000000 HC RX 637 (ALT 250 FOR IP): Performed by: PSYCHIATRY & NEUROLOGY

## 2022-02-25 PROCEDURE — 2580000003 HC RX 258

## 2022-02-25 PROCEDURE — 6360000002 HC RX W HCPCS

## 2022-02-25 PROCEDURE — 99239 HOSP IP/OBS DSCHRG MGMT >30: CPT | Performed by: INTERNAL MEDICINE

## 2022-02-25 PROCEDURE — 6370000000 HC RX 637 (ALT 250 FOR IP)

## 2022-02-25 RX ADMIN — LEVOTHYROXINE SODIUM 50 MCG: 0.05 TABLET ORAL at 05:39

## 2022-02-25 RX ADMIN — LEVETIRACETAM 1500 MG: 750 TABLET, FILM COATED ORAL at 08:26

## 2022-02-25 RX ADMIN — PRIMIDONE 75 MG: 50 TABLET ORAL at 08:26

## 2022-02-25 RX ADMIN — OXYBUTYNIN CHLORIDE 5 MG: 5 TABLET ORAL at 08:28

## 2022-02-25 RX ADMIN — ASPIRIN 81 MG: 81 TABLET, COATED ORAL at 08:27

## 2022-02-25 RX ADMIN — AMLODIPINE BESYLATE 10 MG: 10 TABLET ORAL at 08:28

## 2022-02-25 RX ADMIN — LACOSAMIDE 200 MG: 100 TABLET, FILM COATED ORAL at 08:27

## 2022-02-25 RX ADMIN — PANTOPRAZOLE SODIUM 40 MG: 40 TABLET, DELAYED RELEASE ORAL at 05:39

## 2022-02-25 RX ADMIN — SODIUM CHLORIDE, PRESERVATIVE FREE 10 ML: 5 INJECTION INTRAVENOUS at 08:29

## 2022-02-25 RX ADMIN — PAROXETINE HYDROCHLORIDE 30 MG: 20 TABLET, FILM COATED ORAL at 08:27

## 2022-02-25 RX ADMIN — ENOXAPARIN SODIUM 40 MG: 100 INJECTION SUBCUTANEOUS at 08:29

## 2022-02-25 RX ADMIN — ATORVASTATIN CALCIUM 40 MG: 40 TABLET, FILM COATED ORAL at 08:28

## 2022-02-25 NOTE — PLAN OF CARE
PLAN OF CARE    Pt was seen and examined. She is alert and oriented,   Has no complaints,  In NAD, aware of plans for today & reason for being in hospital.  Plan is to dc to SNF. Transport set up for 10:30 AM today. Full note to follow.     Electronically signed by Loni Silveira DO on 2/25/2022 at 10:49 AM

## 2022-02-25 NOTE — PLAN OF CARE
Problem: Falls - Risk of:  Goal: Will remain free from falls  Description: Will remain free from falls  2/25/2022 0344 by Shanika Farrell RN  Outcome: Ongoing     Problem: Skin Integrity:  Goal: Will show no infection signs and symptoms  Description: Will show no infection signs and symptoms  2/25/2022 0344 by Shanika Farrell RN  Outcome: Ongoing     Problem: Safety:  Goal: Ability to remain free from injury will improve  Description: Ability to remain free from injury will improve  2/25/2022 0344 by Shanika Farrell RN  Outcome: Ongoing     Problem: Musculor/Skeletal Functional Status  Goal: Absence of falls  Outcome: Ongoing

## 2022-02-26 LAB
CULTURE: NORMAL
CULTURE: NORMAL
SPECIMEN DESCRIPTION: NORMAL
SPECIMEN DESCRIPTION: NORMAL

## 2022-02-28 ENCOUNTER — TELEPHONE (OUTPATIENT)
Dept: INTERNAL MEDICINE | Age: 67
End: 2022-02-28

## 2022-02-28 RX ORDER — LACOSAMIDE 200 MG/1
200 TABLET ORAL 2 TIMES DAILY
Qty: 60 TABLET | Refills: 5 | Status: SHIPPED | OUTPATIENT
Start: 2022-02-28 | End: 2022-08-24

## 2022-02-28 NOTE — TELEPHONE ENCOUNTER
[facility] Initial Follow Up Call    Outreach made within 2 business days of discharge: Yes    Patient: Amanda Hemphill   Patient : 1955   MRN: [id number]    Reason for Admission: seizure  Discharge Date: 2022       Spoke with: Haleigh Escobar    Discharge department/facility: [facility]      Scheduled appointment with PCP within 7-14 days    Spoke with pt's sister Haleigh Escobar at the end of last week. Pt d/c'd to SNF for rehab. Writer and Haleigh Escobar discussed calling to make f/u appt for pt to be seen once discharged from SNF.     Follow Up  Future Appointments   Date Time Provider Carlos Matta   3/16/2022  3:40 PM Hanny Villatoro, DO [facility] [address] IM Em Laurent RN

## 2022-03-09 ENCOUNTER — HOSPITAL ENCOUNTER (OUTPATIENT)
Age: 67
Setting detail: SPECIMEN
Discharge: HOME | End: 2022-03-09
Payer: COMMERCIAL

## 2022-03-09 LAB
ALBUMIN SERPL-MCNC: 3.9 G/DL (ref 3.5–5.2)
ALBUMIN/GLOBULIN RATIO: 1.3 (ref 1–2.5)
ALP BLD-CCNC: 63 U/L (ref 35–104)
ALT SERPL-CCNC: 26 U/L (ref 5–33)
ANION GAP SERPL CALCULATED.3IONS-SCNC: 15 MMOL/L (ref 9–17)
AST SERPL-CCNC: 24 U/L
BILIRUB SERPL-MCNC: 0.37 MG/DL (ref 0.3–1.2)
BUN BLDV-MCNC: 14 MG/DL (ref 8–23)
CALCIUM SERPL-MCNC: 9.5 MG/DL (ref 8.6–10.4)
CHLORIDE BLD-SCNC: 101 MMOL/L (ref 98–107)
CHOLESTEROL/HDL RATIO: 2.4
CHOLESTEROL: 168 MG/DL
CO2: 26 MMOL/L (ref 20–31)
CREAT SERPL-MCNC: 0.61 MG/DL (ref 0.5–0.9)
GFR AFRICAN AMERICAN: >60 ML/MIN
GFR NON-AFRICAN AMERICAN: >60 ML/MIN
GFR SERPL CREATININE-BSD FRML MDRD: ABNORMAL ML/MIN/{1.73_M2}
GLUCOSE BLD-MCNC: 114 MG/DL (ref 70–99)
HCT VFR BLD CALC: 44.6 % (ref 36.3–47.1)
HDLC SERPL-MCNC: 69 MG/DL
HEMOGLOBIN: 14.9 G/DL (ref 11.9–15.1)
KEPPRA: 55 UG/ML
LDL CHOLESTEROL: 80 MG/DL (ref 0–130)
MCH RBC QN AUTO: 32.1 PG (ref 25.2–33.5)
MCHC RBC AUTO-ENTMCNC: 33.4 G/DL (ref 28.4–34.8)
MCV RBC AUTO: 96.1 FL (ref 82.6–102.9)
NRBC AUTOMATED: 0 PER 100 WBC
PDW BLD-RTO: 12 % (ref 11.8–14.4)
PLATELET # BLD: 280 K/UL (ref 138–453)
PMV BLD AUTO: 10.9 FL (ref 8.1–13.5)
POTASSIUM SERPL-SCNC: 3.7 MMOL/L (ref 3.7–5.3)
RBC # BLD: 4.64 M/UL (ref 3.95–5.11)
SODIUM BLD-SCNC: 142 MMOL/L (ref 135–144)
TOTAL PROTEIN: 7 G/DL (ref 6.4–8.3)
TRIGL SERPL-MCNC: 96 MG/DL
TSH SERPL DL<=0.05 MIU/L-ACNC: 12.36 MIU/L (ref 0.3–5)
WBC # BLD: 7.7 K/UL (ref 3.5–11.3)

## 2022-03-09 PROCEDURE — 80177 DRUG SCRN QUAN LEVETIRACETAM: CPT

## 2022-03-09 PROCEDURE — 80061 LIPID PANEL: CPT

## 2022-03-09 PROCEDURE — 36415 COLL VENOUS BLD VENIPUNCTURE: CPT

## 2022-03-09 PROCEDURE — 84443 ASSAY THYROID STIM HORMONE: CPT

## 2022-03-09 PROCEDURE — 80053 COMPREHEN METABOLIC PANEL: CPT

## 2022-03-09 PROCEDURE — P9603 ONE-WAY ALLOW PRORATED MILES: HCPCS

## 2022-03-09 PROCEDURE — 85027 COMPLETE CBC AUTOMATED: CPT

## 2022-03-16 PROBLEM — R92.8 MAMMOGRAM ABNORMAL: Status: ACTIVE | Noted: 2022-03-16

## 2022-03-18 ENCOUNTER — TELEPHONE (OUTPATIENT)
Dept: INTERNAL MEDICINE | Age: 67
End: 2022-03-18

## 2022-03-18 DIAGNOSIS — G40.901 STATUS EPILEPTICUS (HCC): Primary | ICD-10-CM

## 2022-03-18 NOTE — TELEPHONE ENCOUNTER
Cre[facility] [place] [facility] need order to restate pt for home care, if any question please call [phone], pt has a appt on 4/6/22.    Thanks

## 2022-03-24 ENCOUNTER — HOSPITAL ENCOUNTER (OUTPATIENT)
Age: 67
Setting detail: SPECIMEN
Discharge: HOME | End: 2022-03-24
Payer: COMMERCIAL

## 2022-03-24 LAB
ANION GAP SERPL CALCULATED.3IONS-SCNC: 12 MMOL/L (ref 9–17)
BUN BLDV-MCNC: 13 MG/DL (ref 8–23)
CALCIUM SERPL-MCNC: 8.9 MG/DL (ref 8.6–10.4)
CHLORIDE BLD-SCNC: 104 MMOL/L (ref 98–107)
CO2: 24 MMOL/L (ref 20–31)
CREAT SERPL-MCNC: 0.55 MG/DL (ref 0.5–0.9)
GFR AFRICAN AMERICAN: >60 ML/MIN
GFR NON-AFRICAN AMERICAN: >60 ML/MIN
GFR SERPL CREATININE-BSD FRML MDRD: ABNORMAL ML/MIN/{1.73_M2}
GLUCOSE BLD-MCNC: 67 MG/DL (ref 70–99)
POTASSIUM SERPL-SCNC: 3.6 MMOL/L (ref 3.7–5.3)
SODIUM BLD-SCNC: 140 MMOL/L (ref 135–144)

## 2022-03-24 PROCEDURE — 80048 BASIC METABOLIC PNL TOTAL CA: CPT

## 2022-04-05 NOTE — PROGRESS NOTES
@[facility]@    [facility]    Clinic Progress Note    Date of patient's visit: 4/5/2022    Patient's Name:  Jessica Mathew    YOB: 1955            Patient Care Team:  Kirsten Storey DO as PCP - General (Internal Medicine)  Bernard Lynch MD as Consulting Physician (Internal Medicine)  Sheila Saleh MD as Consulting Physician (Gastroenterology)    REASON FOR VISIT: 3 Month Follow Up. HISTORY OF PRESENT ILLNESS:      History was obtained from the patient. Jessica Mathew is a 79 y.o. with a past medical history Depression, Hypertension, Hypothyroidism, Acid reflux and Seizures who presented to the clinic today for a 3 months follow up. Per chart review 6/17/2021 patient has BMP done which demonstrated K 3.7 and creatinine 0.53. CBC with mild anemia 8.6 and Lipid panel done 8/30/2021 within normal limits. Mammogram and US breast pending due to asymmetric density upper-outer quadrant of the left breast.     12/1/2021 in the clinic has no complaints other than chronic knee pain. Denies fevers, chills, chest pain, shortness of breath, abdominal pain, nausea or vomiting. Will give Voltaren. Today in the clinic she follows up after being seen in the hospital for seizures on 1/8/2022 and again on 2/22/22 for break through seizures. During her admission 1/8/22 she was admitted after multiple seizures. Loaded with IV keppra and IV vimpat. She improved and was discharged on Vimpat at [address]. On 2/22/22 patient had break through seizures and was readmitted. This time loaded again with vimpat and keppra as well as requiring a one time does of phenytoin. Improved and was discharged with Neurology follow up. Currently remains on Keppra 750 mg BID and Vimpat 200 mg daily. States she sees the neurologist on 4/19/22. Patient states she had a small lesion the inner left thigh that started a few weeks ago. No pain or discharge.  Patient states it looks like a burn but she doesn't remember burning herself. REVIEW OF SYSTEMS:      · Constitutional: Negative for Fever, chills. · Eyes: Negative for visual changes, diplopia. · ENT: Negative for ear pain, nose bleeds, sore throat. · Cardiovascular: Negative for lightheadedness ,chest pain, palpitations. · Respiratory: Negative for Shortness of breath,cough or wheezing. · Gastrointestinal : Negative for nausea/vomiting, abdominal pain, diarrhea, constipation. · Genitourinary: Negative for change in bladder habits, dysuria, hematuria. · Musculoskeletal: Negative for joint pain, muscle aches. · Neurological: Negative for headache, change in muscle strength numbness/tingling. PAST MEDICAL HISTORY:     Past Medical History:   Diagnosis Date    Ankle fracture, left     Anxiety 02/04/2014    Arthritis     Black tarry stools     Colon polyp     Constipation     ETOH abuse     States she has not drank any ETOH in 24 years as of 2021    Frequency of urination ?  GERD (gastroesophageal reflux disease) 02/04/2014    Head injury 1998    after seizure/stroke pt fell and hit head    Headache(784.0)     Hypertension     Hypotension ?  Hypothyroidism     Memory deficit     Migraines     Numbness and tingling     fingers    Osteoporosis     Peripheral vascular disease ([address]     Pneumonia     Seizures ([facility]     last seizure 6-,     Shingles      Pt.denies.  Stroke [facility]) 1998    stroke and seizure together    TIA (transient ischemic attack)     1996    Tremor     UTI (urinary tract infection)     once    Varicose veins     Wears glasses      PAST SURGICAL HISTORY     Past Surgical History:   Procedure Laterality Date    BUNIONECTOMY Right     COLONOSCOPY      COLONOSCOPY N/A 5/10/2021    COLONOSCOPY DIAGNOSTIC performed by Yue Talley MD at [address] [place] [address] Left 07/06/2016    ON 2ND, 3rd, 4th,  and  5TH TOES OF LEFT FOOT  Pt. Denies.     OTHER SURGICAL HISTORY  02/23/2015    orif left ankle with synthes and intraoperative c- arm    [facility] INCL FLUOR GDNCE DX W/CELL WASHG SPX N/A 01/16/2018    BRONCHOSCOPY WITH WASHINGS AT PATIENT BEDSIDE ICU performed by Cipriano Lopez MD at [address]:      No Known Allergies      MEDICATIONS:      Current Outpatient Medications on File Prior to Visit   Medication Sig Dispense Refill    lacosamide (VIMPAT) 200 MG tablet Take 1 tablet by mouth 2 times daily for 180 days. 60 tablet 5    lacosamide (VIMPAT) 200 MG tablet Take 1 tablet by mouth daily for 180 days. 180 tablet 0    levETIRAcetam (KEPPRA) 750 MG tablet Take 2 tablets by mouth 2 times daily 60 tablet 3    primidone (MYSOLINE) 50 MG tablet Take 1.5 tablets by mouth 2 times daily 90 tablet 0    oxybutynin (DITROPAN) 5 MG tablet TAKE 1 TABLET BY MOUTH 3 TIMES A DAY 90 tablet 3    diclofenac sodium (VOLTAREN) 1 % GEL Apply 2 g topically 4 times daily as needed for Pain 150 g 1    SUMAtriptan (IMITREX) 100 MG tablet TAKE 1 TABLET BY MOUTH ONE TIME A DAY AS NEEDED FOR MIGRAINE 15 tablet 0    aspirin (ASPIRIN LOW DOSE) 81 MG EC tablet TAKE 1 TABLET BY MOUTH ONE TIME A DAY 60 tablet 3    PARoxetine (PAXIL) 30 MG tablet TAKE ONE TABLET BY MOUTH EVERY MORNING 60 tablet 3    atorvastatin (LIPITOR) 40 MG tablet TAKE 1 TABLET BY MOUTH ONE TIME A DAY 60 tablet 3    donepezil (ARICEPT) 5 MG tablet TAKE ONE TABLET BY MOUTH NIGHTLY 30 tablet 3    levothyroxine (SYNTHROID) 50 MCG tablet TAKE 1 TABLET BY MOUTH ONE TIME A DAY 60 tablet 3    amLODIPine (NORVASC) 10 MG tablet TAKE 1 TABLET BY MOUTH ONE TIME A DAY 60 tablet 3    omeprazole (PRILOSEC) 20 MG delayed release capsule TAKE 1 CAPSULE BY MOUTH ONE TIME A DAY 60 capsule 3     No current facility-administered medications on file prior to visit. SOCIAL HISTORY:     Reviewed and no change from previous record. Cassia Hoffman  reports that she has never smoked.  She has never used smokeless tobacco.    FAMILY HISTORY:      Reviewed and No change from previous visit    PHYSICAL EXAM:      There were no vitals filed for this visit. There is no height or weight on file to calculate BMI. BP Readings from Last 3 Encounters:   02/25/22 111/73   01/13/22 117/79   12/01/21 111/74        Wt Readings from Last 3 Encounters:   02/24/22 150 lb 2.1 oz (68.1 kg)   01/13/22 141 lb 9.6 oz (64.2 kg)   12/01/21 134 lb (60.8 kg)       · General appearance: Well Appearing, Cooperative, Appropriately Dressed for the Weather. · Head: Normocephalic, No Lesions, No Obvious Deformity. · ENT: No Icterus or Redness to Eyes. No Epistaxis from nose, No Pharyngeal Erythema or Exudate. · Lungs: Clear to Auscultation Bilaterally, No Rales or Rhonchi. · Heart: Regular Rate and Rhythm, S1, S2 normal, No Murmur  · Abdomen: Bowel Sounds Present on Auscultation. Soft, Non-Tender. · Extremities: Atraumatic, No Cyanosis or Edema. · Neurological:  Awake, Alert, Oriented to Person, Place and Time. Reflexes normal and symmetric.  Sensation grossly normal.    LABORATORY FINDINGS:      CBC:  Lab Results   Component Value Date    WBC 7.7 03/09/2022    HGB 14.9 03/09/2022     03/09/2022     10/04/2011       BMP:    Lab Results   Component Value Date     03/24/2022    K 3.6 03/24/2022     03/24/2022    CO2 24 03/24/2022    BUN 13 03/24/2022    CREATININE 0.55 03/24/2022    GLUCOSE 67 03/24/2022    GLUCOSE 68 03/27/2012       HEMOGLOBIN A1C:   Lab Results   Component Value Date    LABA1C 5.1 08/18/2016       MICROALBUMIN URINE: No results found for: MICROALBUR    FASTING LIPID PANEL:  Lab Results   Component Value Date    CHOL 168 03/09/2022    HDL 69 03/09/2022    TRIG 96 03/09/2022       Lab Results   Component Value Date    LDLCHOLESTEROL 80 03/09/2022       LIVER PROFILE:  Lab Results   Component Value Date    ALT 26 03/09/2022    AST 24 03/09/2022    PROT 7.0 03/09/2022    BILITOT 0.37 03/09/2022 BILIDIR 0.17 05/13/2018    LABALBU 3.9 03/09/2022    LABALBU 4.1 10/04/2011        THYROID FUNCTION:   Lab Results   Component Value Date    TSH 12.36 03/09/2022        URINE ANALYSIS: No results found for: [address]:      Flu vaccine due but will reach out to [facility] [place] and see if she has received. Shingles due has script. Tetanus UTD. Pneumococcal UTD  COVID-19 done x 2. Potassium Screening 3.6 3/24/22  Creatinine Screening 0.55 3/24/22  Lipids Screening within normal limits    Mammogram pending. 6/9/2021:   Asymmetric density upper-outer quadrant left breast for which additional   imaging is recommended. Repeat mammogram ordered. Colonoscopy 5/10/31. ASSESSMENT AND PLAN:      1. Essential hypertension  - Continue Norvasc 10 mg, Hydrochlorothiazide 12.5 mg.   - 124/82. 2. Gastroesophageal reflux disease without esophagitis  - Continue Prilosec. 3. Status epilepticus with Breakthrough Seizures. - On Keppra and Vimpat. - Close follow up with Neurology. 4. Acquired hypothyroidism  - Continue Synthroid 50 mcg daily.   - TSH okay in April. 5. Generalized anxiety disorder  - Continue Paxil. 6. Migraine without aura and without status migrainosus, not intractable  - Continue Sumatriptan. 7. Mammogram abnormal  - Repeat mammogram and US.   - Patient educated on importance of repeat mammogram and US and close follow up. In summary, in the [facility] on 04/05/22, Devan Finn was seen and examined. FOLLOW UP AND INSTRUCTIONS:   No follow-ups on file. 1. Laura received counseling on the following healthy behaviors: nutrition, exercise and medication adherence    2. Reviewed prior labs and health maintenance. 3. Discussed use, benefit, and side effects of prescribed medications. Barriers to medication compliance addressed. All patient questions answered. Pt voiced understanding.      4. Patient given educational materials - see patient instructions    Rosa Carbajal DO  Internal Medicine Resident , PGY-3  [facility], [facility].  [facility]  04/05/22 10:55 AM

## 2022-04-06 ENCOUNTER — OFFICE VISIT (OUTPATIENT)
Dept: INTERNAL MEDICINE | Age: 67
End: 2022-04-06
Payer: COMMERCIAL

## 2022-04-06 VITALS
SYSTOLIC BLOOD PRESSURE: 124 MMHG | HEART RATE: 79 BPM | WEIGHT: 142 LBS | BODY MASS INDEX: 20.33 KG/M2 | DIASTOLIC BLOOD PRESSURE: 82 MMHG | TEMPERATURE: 97.2 F | HEIGHT: 70 IN

## 2022-04-06 DIAGNOSIS — E03.9 ACQUIRED HYPOTHYROIDISM: Primary | ICD-10-CM

## 2022-04-06 DIAGNOSIS — G43.009 MIGRAINE WITHOUT AURA AND WITHOUT STATUS MIGRAINOSUS, NOT INTRACTABLE: ICD-10-CM

## 2022-04-06 PROCEDURE — G8399 PT W/DXA RESULTS DOCUMENT: HCPCS | Performed by: STUDENT IN AN ORGANIZED HEALTH CARE EDUCATION/TRAINING PROGRAM

## 2022-04-06 PROCEDURE — G8420 CALC BMI NORM PARAMETERS: HCPCS | Performed by: STUDENT IN AN ORGANIZED HEALTH CARE EDUCATION/TRAINING PROGRAM

## 2022-04-06 PROCEDURE — 99211 OFF/OP EST MAY X REQ PHY/QHP: CPT | Performed by: INTERNAL MEDICINE

## 2022-04-06 PROCEDURE — G8427 DOCREV CUR MEDS BY ELIG CLIN: HCPCS | Performed by: STUDENT IN AN ORGANIZED HEALTH CARE EDUCATION/TRAINING PROGRAM

## 2022-04-06 PROCEDURE — 3017F COLORECTAL CA SCREEN DOC REV: CPT | Performed by: STUDENT IN AN ORGANIZED HEALTH CARE EDUCATION/TRAINING PROGRAM

## 2022-04-06 PROCEDURE — 1036F TOBACCO NON-USER: CPT | Performed by: STUDENT IN AN ORGANIZED HEALTH CARE EDUCATION/TRAINING PROGRAM

## 2022-04-06 PROCEDURE — 4040F PNEUMOC VAC/ADMIN/RCVD: CPT | Performed by: STUDENT IN AN ORGANIZED HEALTH CARE EDUCATION/TRAINING PROGRAM

## 2022-04-06 PROCEDURE — 1123F ACP DISCUSS/DSCN MKR DOCD: CPT | Performed by: STUDENT IN AN ORGANIZED HEALTH CARE EDUCATION/TRAINING PROGRAM

## 2022-04-06 PROCEDURE — 99213 OFFICE O/P EST LOW 20 MIN: CPT | Performed by: STUDENT IN AN ORGANIZED HEALTH CARE EDUCATION/TRAINING PROGRAM

## 2022-04-06 PROCEDURE — 1090F PRES/ABSN URINE INCON ASSESS: CPT | Performed by: STUDENT IN AN ORGANIZED HEALTH CARE EDUCATION/TRAINING PROGRAM

## 2022-04-06 RX ORDER — SUMATRIPTAN 100 MG/1
TABLET, FILM COATED ORAL
Qty: 15 TABLET | Refills: 0 | Status: SHIPPED | OUTPATIENT
Start: 2022-04-06 | End: 2022-07-08

## 2022-04-06 SDOH — ECONOMIC STABILITY: TRANSPORTATION INSECURITY
IN THE PAST 12 MONTHS, HAS THE LACK OF TRANSPORTATION KEPT YOU FROM MEDICAL APPOINTMENTS OR FROM GETTING MEDICATIONS?: NO

## 2022-04-06 SDOH — ECONOMIC STABILITY: FOOD INSECURITY: WITHIN THE PAST 12 MONTHS, YOU WORRIED THAT YOUR FOOD WOULD RUN OUT BEFORE YOU GOT MONEY TO BUY MORE.: NEVER TRUE

## 2022-04-06 SDOH — ECONOMIC STABILITY: TRANSPORTATION INSECURITY
IN THE PAST 12 MONTHS, HAS LACK OF TRANSPORTATION KEPT YOU FROM MEETINGS, WORK, OR FROM GETTING THINGS NEEDED FOR DAILY LIVING?: NO

## 2022-04-06 SDOH — ECONOMIC STABILITY: FOOD INSECURITY: WITHIN THE PAST 12 MONTHS, THE FOOD YOU BOUGHT JUST DIDN'T LAST AND YOU DIDN'T HAVE MONEY TO GET MORE.: NEVER TRUE

## 2022-04-06 ASSESSMENT — PATIENT HEALTH QUESTIONNAIRE - PHQ9
SUM OF ALL RESPONSES TO PHQ QUESTIONS 1-9: 0
SUM OF ALL RESPONSES TO PHQ9 QUESTIONS 1 & 2: 0
3. TROUBLE FALLING OR STAYING ASLEEP: 0
6. FEELING BAD ABOUT YOURSELF - OR THAT YOU ARE A FAILURE OR HAVE LET YOURSELF OR YOUR FAMILY DOWN: 0
5. POOR APPETITE OR OVEREATING: 0
4. FEELING TIRED OR HAVING LITTLE ENERGY: 0
7. TROUBLE CONCENTRATING ON THINGS, SUCH AS READING THE NEWSPAPER OR WATCHING TELEVISION: 0
9. THOUGHTS THAT YOU WOULD BE BETTER OFF DEAD, OR OF HURTING YOURSELF: 0
SUM OF ALL RESPONSES TO PHQ QUESTIONS 1-9: 0
SUM OF ALL RESPONSES TO PHQ QUESTIONS 1-9: 0
8. MOVING OR SPEAKING SO SLOWLY THAT OTHER PEOPLE COULD HAVE NOTICED. OR THE OPPOSITE, BEING SO FIGETY OR RESTLESS THAT YOU HAVE BEEN MOVING AROUND A LOT MORE THAN USUAL: 0
2. FEELING DOWN, DEPRESSED OR HOPELESS: 0
SUM OF ALL RESPONSES TO PHQ QUESTIONS 1-9: 0
10. IF YOU CHECKED OFF ANY PROBLEMS, HOW DIFFICULT HAVE THESE PROBLEMS MADE IT FOR YOU TO DO YOUR WORK, TAKE CARE OF THINGS AT HOME, OR GET ALONG WITH OTHER PEOPLE: 0
1. LITTLE INTEREST OR PLEASURE IN DOING THINGS: 0

## 2022-04-06 ASSESSMENT — SOCIAL DETERMINANTS OF HEALTH (SDOH): HOW HARD IS IT FOR YOU TO PAY FOR THE VERY BASICS LIKE FOOD, HOUSING, MEDICAL CARE, AND HEATING?: NOT HARD AT ALL

## 2022-04-06 NOTE — PATIENT INSTRUCTIONS
Return To Clinic 6/8/2022 for 2 month follow up. Please bring all of your medications with you to this appointment. After Visit Summary given and reviewed. The medication list included in this document is our record of what you are currently taking, including any changes that were made at today's visit. If you find any differences when compared to your medications at home, or have any questions that were not answered at your visit, please contact the office. It is very important for your care that you keep your appointment. If for some reason you are unable to keep your appointment, it is equally important that you call our office at [phone] to cancel your appointment and reschedule. Failure to do so may result in your termination from our practice. Medications have been e-scribed to pharmacy of patients choice. LABORATORY INSTRUCTIONS    Your doctor has ordered blood or urine testing. You can get this testing done at the Lab located on the first floor of the [facility], or at any other [facility]. Please stop at Main Registration, before going to the lab, as you must be registered first.     Please get this lab done before your next visit.     You may eat or drink before this test.    -CATHIE Vizcarra

## 2022-04-08 DIAGNOSIS — R56.9 SEIZURES (HCC): ICD-10-CM

## 2022-04-08 DIAGNOSIS — N39.41 URGE INCONTINENCE: ICD-10-CM

## 2022-04-08 RX ORDER — DONEPEZIL HYDROCHLORIDE 5 MG/1
TABLET, FILM COATED ORAL
Qty: 30 TABLET | Refills: 3 | Status: SHIPPED | OUTPATIENT
Start: 2022-04-08 | End: 2022-07-28

## 2022-04-08 NOTE — TELEPHONE ENCOUNTER
Request for Ditropan and Mysoline.       Next Visit Date:  Future Appointments   Date Time Provider Carlos Wilsoni   4/19/2022 12:00 PM Leonora Cardoso MD Neuro Spec [id number]   6/8/2022  2:40 PM Corine Cha DO [facility] [address] Maintenance   Topic Date Due    Shingles Vaccine (3 of 3) 09/12/2018    Lipid screen  03/09/2023    TSH testing  03/09/2023    Depression Monitoring  04/06/2023    Breast cancer screen  06/09/2023    Pneumococcal 65+ years Vaccine (2 of 2 - PPSV23) 02/07/2024    DTaP/Tdap/Td vaccine (2 - Td or Tdap) 11/23/2025    Colorectal Cancer Screen  05/10/2031    DEXA (modify frequency per FRAX score)  Completed    Flu vaccine  Completed    COVID-19 Vaccine  Completed    Hepatitis C screen  Completed    Hepatitis A vaccine  Aged Out    Hepatitis B vaccine  Aged Out    Hib vaccine  Aged Out    Meningococcal (ACWY) vaccine  Aged Out       Hemoglobin A1C (%)   Date Value   08/18/2016 5.1   05/07/2015 5.0             ( goal A1C is < 7)   No results found for: LABMICR  LDL Cholesterol (mg/dL)   Date Value   03/09/2022 80       (goal LDL is <100)   AST (U/L)   Date Value   03/09/2022 24     ALT (U/L)   Date Value   03/09/2022 26     BUN (mg/dL)   Date Value   03/24/2022 13     BP Readings from Last 3 Encounters:   04/06/22 124/82   02/25/22 111/73   01/13/22 117/79          (goal 120/80)    All Future Testing planned in [facility]  Lab Frequency Next Occurrence   COVID-19 Once 05/06/2021   US BREAST COMPLETE LEFT Once 06/14/2022   CAYETANO DIGITAL DIAGNOSTIC W OR WO CAD LEFT Once 06/24/2022   TSH Once 04/06/2022   T4, Free Once 04/06/2022         Patient Active Problem List:     Acquired hypothyroidism     Hypertension     Urge incontinence     Generalized anxiety disorder     Gastroesophageal reflux disease without esophagitis     H/O: CVA (cerebrovascular accident)     Migraine without aura and without status migrainosus, not intractable     Hammer toe of left foot     Tremor Status epilepticus ([facility]     Speech and language deficits     Constipation     Seizure (HCC)     Seizures ([address]     Breakthrough seizure ([address]     Depression     Complex partial status epilepticus      Mammogram abnormal

## 2022-04-08 NOTE — TELEPHONE ENCOUNTER
Pharmacy requesting refill of Donepezil 5mg.       Medication active on med list yes      Date of last Rx: 11/2/21 with 3 refills          verified by CATHIE NYE      Date of last appointment: 7/20/21    Next Visit Date:  4/19/22

## 2022-04-12 RX ORDER — OXYBUTYNIN CHLORIDE 5 MG/1
TABLET ORAL
Qty: 90 TABLET | Refills: 3 | Status: SHIPPED | OUTPATIENT
Start: 2022-04-12 | End: 2022-07-27 | Stop reason: SDUPTHER

## 2022-04-12 RX ORDER — PRIMIDONE 50 MG/1
TABLET ORAL
Qty: 90 TABLET | Refills: 0 | Status: SHIPPED | OUTPATIENT
Start: 2022-04-12 | End: 2022-05-06

## 2022-04-19 ENCOUNTER — OFFICE VISIT (OUTPATIENT)
Dept: NEUROLOGY | Age: 67
End: 2022-04-19
Payer: COMMERCIAL

## 2022-04-19 VITALS
DIASTOLIC BLOOD PRESSURE: 77 MMHG | SYSTOLIC BLOOD PRESSURE: 113 MMHG | HEART RATE: 77 BPM | WEIGHT: 141 LBS | BODY MASS INDEX: 20.19 KG/M2 | HEIGHT: 70 IN

## 2022-04-19 DIAGNOSIS — R56.9 SEIZURES (HCC): ICD-10-CM

## 2022-04-19 PROCEDURE — 99214 OFFICE O/P EST MOD 30 MIN: CPT | Performed by: PSYCHIATRY & NEUROLOGY

## 2022-04-19 PROCEDURE — 4040F PNEUMOC VAC/ADMIN/RCVD: CPT | Performed by: PSYCHIATRY & NEUROLOGY

## 2022-04-19 PROCEDURE — G8427 DOCREV CUR MEDS BY ELIG CLIN: HCPCS | Performed by: PSYCHIATRY & NEUROLOGY

## 2022-04-19 PROCEDURE — G8399 PT W/DXA RESULTS DOCUMENT: HCPCS | Performed by: PSYCHIATRY & NEUROLOGY

## 2022-04-19 PROCEDURE — G8420 CALC BMI NORM PARAMETERS: HCPCS | Performed by: PSYCHIATRY & NEUROLOGY

## 2022-04-19 PROCEDURE — 3017F COLORECTAL CA SCREEN DOC REV: CPT | Performed by: PSYCHIATRY & NEUROLOGY

## 2022-04-19 PROCEDURE — 1090F PRES/ABSN URINE INCON ASSESS: CPT | Performed by: PSYCHIATRY & NEUROLOGY

## 2022-04-19 PROCEDURE — 1123F ACP DISCUSS/DSCN MKR DOCD: CPT | Performed by: PSYCHIATRY & NEUROLOGY

## 2022-04-19 PROCEDURE — 1036F TOBACCO NON-USER: CPT | Performed by: PSYCHIATRY & NEUROLOGY

## 2022-04-19 RX ORDER — LACOSAMIDE 200 MG/1
250 TABLET ORAL 2 TIMES DAILY
Qty: 270 TABLET | Refills: 2 | Status: SHIPPED | OUTPATIENT
Start: 2022-04-19 | End: 2022-04-29 | Stop reason: DRUGHIGH

## 2022-04-19 NOTE — PROGRESS NOTES
Dear Dr. Deja Orona DO   Susan Hinson is a 79 y.o. female who comes in today as a follow visit. She has a past medical history of right cortical ischemic stroke and subsequently has a history of seizure disorder. On 6/9/2020 the patient stated she stopped Vimpat; this medication was re-initiated. She is currently on Keppra at 1250 mg twice a day and Vimpat 200 mg twice a day. While off Vimpat, she experienced a breakthrough seizure on 6/19/20 and required a hospital stay from 6/19/20 to 6/23/20. Her previous DaTscan returned normal.    7/20/21, the patient is accompanied by her sister. She reports a mechanical fall that resulting in an ER visit on 06/11/2021. The patient admits that the tremors have improved since last visit but have not gone away completely. Her sister reports the patient has slower speech and difficulties with memory. Additionally, the patient admits neck pain on the right side. She denies bladder and bowel incontinence. The patient presented for a follow up on 4/19/22. She explains that she had a breakthrough seizure for which she had gone to the hospital in February 2022. The patient states she has had 3 breakthrough seizures in the last year. She admits to medication compliance. She denies any side-effects. Patient was not on Vimpat per last discharge note in February. Patient was taking Vimpat and Keppra during breakthrough seizure in April 2022. The patient states that her tremors are better in the mornings, but worsen as the day progresses. Patient has been compliant with her Primidone 1/2 pill BID. MRI Brain W Contrast on 06/22/2020: Remote right MCA distribution infarct. No acute intracranial abnormality. No abnormal postcontrast enhancement. MRI Brain WO Contrast on 06/20/2020: No acute infarct or acute intracranial process identified. Remote right MCA infarct.   Patient's long-term EEG monitoring at [facility] for 4 days showed frequent left temporal parietal spike wave discharges but did not have any seizures. Last clinical seizure was in August 2018 and seizure has been under control after increment of Keppra and addition of Vimpat.   Her most recent seizure was 6/19/20  REVIEW OF SYSTEMS   Constitutional Weight: absent, Appetite: absent, Fatigue: absent   HEENT Visual disturbance: absent, Ears: normal   Respiratory Shortness of breath: absent, Cough: absent   Cardiovascular Chest pain: absent, Leg swelling :absent   GI Constipation: absent, Diarrhea: absent, Swallowing change: absent    Urinary frequency: absent, Urinary urgency: absent,    Musculoskeletal Neck pain: absent, Back pain: absent, Stiffness: absent, Muscle pain: absent, Joint pain: absent   Dermatological Hair loss: absent, Skin changes: absent   Neurological Memory loss: absent, Confusion: absent, Seizures: present, Trouble walking or imbalance: absent, Dizziness: absent, Weakness: absent, Numbness: absent Tremor: absent, Spasm: absent, Speech difficulty: absent, Headache: present, Light sensitivity: absent   Psychiatric Anxiety: absent, Hallucination: absent, Depression, absent   Hematologic Abnormal bleeding/Bruising: absent, Anemia: absent         Lab Results   Component Value Date    LDLCHOLESTEROL 80 03/09/2022     No components found for: CHLPL  Lab Results   Component Value Date    TRIG 96 03/09/2022    TRIG 64 08/18/2016    TRIG 125 05/07/2015     Lab Results   Component Value Date    HDL 69 03/09/2022    HDL 71 08/30/2021    HDL 74 03/03/2020     No results found for: LDLCALC  No results found for: LABVLDL  Lab Results   Component Value Date    LABA1C 5.1 08/18/2016     Lab Results   Component Value Date     08/18/2016     Lab Results   Component Value Date    YLNIIGAI63 1449 (H) 04/02/2021      Right MCA stroke 1998   Last seizure 2007, however there is concern of intermittent staring episodes with unknown frequency Possibly occurring once a week or so  2 D echo Sep 2012 normal.  MRA head normal, MRA neck normal  MRI [facility] March 2013      General examination:    Head: Normocephalic, atraumatic  Eyes: Extraocular movements intact  Lungs: Respirations unlabored, chest wall no deformity  ENT: Normal external ear canals, no sinus tenderness  Heart: Regular rate rhythm  Abdomen: No masses, tenderness  Extremities: No cyanosis or edema, 2+ pulses  Skin: Intact, normal skin color    Neurological examination:    Mental status   Alert and oriented; intact memory with no confusion, speech or language problems; no hallucinations or delusions     Cranial nerves   II - visual fields intact to confrontation                                                III, IV, VI - extra-ocular muscles full: no pupillary defect; no ARSH, no nystagmus, no ptosis                                                                      V - normal facial sensation                                                               VII - normal facial symmetry                                                             VIII - intact hearing                                                                             IX, X - symmetrical palate                                                                  XI - symmetrical shoulder shrug                                                       XII - midline tongue without atrophy or fasciculation     Motor function  Normal muscle bulk and tone; normal power 5/5, including fine motor movements     Sensory function Intact to touch, pin, vibration, proprioception     Cerebellar Intact fine motor movement. No involuntary movements or tremors     Reflex function Intact 2+ DTR and symmetric.  Negative Babinski     Gait                  Decreased arm swing on right side   Positive retropulsion test  Positional tremor on right upper extremity, more than left upper extremity     mini-mental status exam  Maximum score 5 Score 5-2 What is the year, season, date, day, month   Maximum score 5 Score 5 Where are we: State, county, town, hospital, floor? Maximum score 3 Score 3-0 Name 3 objects: ball, pen, car. Ask patient to repeat 3 objects. Maximum score 5 Score 5-0 Serial sevens from 100:93, 86, 79, 72, 65   Maximum score 3 Score 3-3 Recall 3 objects   Maximum score 2 Score 2 Name a pencil and watch. Maximum score 1 Score 1 Repeat the following: No ifs ands or buts.      Maximum score 3 Score 3 Follow 3 stage command take a paper in your hand, fold it in half, and put it on the floor. Maximum score 1  Score 1 Read and obey the following: Close your eyes     Maximum score 1 Score 1 Write a sentence. Maximum score 1 Score 1 Copy a design below. Max 30   Patients score 25      Assessment and recommendations:      Right MCA embolic stroke in 1070, with subsequent remote symptomatic seizures  The patient states she has had 3 breakthrough seizures in the last year. She admits to medication compliance. She denies any side-effects. Patient was not on Vimpat per discharge note in February. Patient was taking Vimpat and Keppra during breakthrough seizure in April 2022. She is currently on Keppra 1250 mg twice a day and Vimpat 200 mg twice a day. There are no side effects to medication. I recommend the patient increase Vimpat to 250 mg BID at this time for seizure prophylaxis. Seizure precautions including alcohol abstinence, sleep deprivation and avoidance of photic stimulation were discussed in details and questions were answered. Driving instructions were also discussed with the patient and questions were answered. The patient understands that she should not drive until she is 6 months seizure-free as per [place]. Bilateral tremor, worse on right side  Previously, patient's DaTscan has been negative and she has not found relief of the symptoms with carbidopa/levodopa. The patient states that her tremors are better in the mornings, but worsen as the day progresses. Patient has been compliant with her Primidone. Continue primidone 25 mg twice daily for tremors. Medication side effects were discussed and questions were answered. Mild cognitive impairment:   Last visit on 7/20/21, patient's MMSE was a 25/30. Continue Aricept 5 mg nightly to treat mild cognitive impairment. Medication side effects were discussed and questions were answered. Please return in 6 months or sooner if symptoms worsen. Emani Luis, DO I would like to thank you for your consult. Please contact neurology if there remains any further question about the patient care. Scribe Attestation:   By signing my name below, Arjun Juan Luis, attest that this documentation has been prepared under the direction and in the presence of Hardik Mcmullen MD.    Electronically Signed: Quita Leslie 6. 04/19/22. 12:05 PM      Physician Attestation:  Carmina Gillespie MD, personally performed the services described in this documentation. All medical record entries made by the scribe were at my direction and in my presence. I have reviewed the chart and discharge instructions (if applicable) and agree that the record reflects my personal performance and is accurate and complete.     Electronically Signed: Reno Domínguez 4/19/2022 12:02 PM    Diplomate, American Board of Psychiatry and Neurology  Diplomate, American Board of Clinical Neurophysiology  Diplomate, American Board of Epilepsy

## 2022-04-21 DIAGNOSIS — R56.9 SEIZURES (HCC): Primary | ICD-10-CM

## 2022-04-21 NOTE — TELEPHONE ENCOUNTER
Rec to increase the dose of vimpat to 150 mg BID for one week and then 200 mg BID. We can send the new prescription.  Thanks

## 2022-04-21 NOTE — TELEPHONE ENCOUNTER
Laura's son Awa Bruce called in stating Misty has been taking her Vimpat incorrectly for the past 3 months, only taking 200mg daily in the am. He said there was a mistake from [facility] and the visiting nurse with her meds. He said he received her increased dose on 04/19/22 after her appointment and she has been extremely tired from the large increase, he believes, in the dose. He wanted to know if she could go on a taper up instead. He stated she was passing out immediately after her evening dose at 5:30pm. Please advise.

## 2022-04-25 NOTE — TELEPHONE ENCOUNTER
Order added for 150mg BID, note they increased her from 200mg daily to 300mg BID. To confirm, did you want her to maintain at 200mg BID? Laura's sister Carolynn [place] notified. Thanks.

## 2022-04-27 ENCOUNTER — TELEPHONE (OUTPATIENT)
Dept: NEUROLOGY | Age: 67
End: 2022-04-27

## 2022-04-27 DIAGNOSIS — R56.9 SEIZURES (HCC): ICD-10-CM

## 2022-04-27 DIAGNOSIS — G40.909 SEIZURE DISORDER (HCC): ICD-10-CM

## 2022-04-27 RX ORDER — LACOSAMIDE 150 MG/1
150 TABLET ORAL 2 TIMES DAILY
Qty: 60 TABLET | Refills: 0 | Status: SHIPPED | OUTPATIENT
Start: 2022-04-27 | End: 2022-04-29 | Stop reason: CLARIF

## 2022-04-27 NOTE — TELEPHONE ENCOUNTER
[facility] pharmacist called in about Laura's Vimpat as there is some confusion. Alek Mendes has been getting her medications in bubble pack for months and she has not had any break in the cycle. He said her dose of Vimpat has been 200 mg. bid for many months. He said on 4/19/22 an additional 100 mg. dose bid was given to her in  a bottle. He said that increase to a total of 300 mg. bid is what must have made her drowsy. He is thinking pt. should possibly go back to 200 mg. bid and then if Dr. Tayo Abraham wants to try to increase the dose further thy can go from there. Please advise.

## 2022-04-29 NOTE — TELEPHONE ENCOUNTER
I called and spoke with the pharmacist at [facility]. V's. They will keep the RX for Vimpat 200 mg. bid and she has refills on that RX. They will eliminate the other RX. I called Wali Late and discussed at length with her that she should only take the Vimpat that is in her bubble pack and she should NOT take the 100 mg. that is in the bottle. She voiced understanding. I also advised her if her son has any questions he can call the office.

## 2022-04-29 NOTE — PROGRESS NOTES
Attending Physician Statement  I have discussed the care of Kianna Willis including pertinent history and exam findings,  with the resident. I have reviewed the key elements of all parts of the encounter with the resident. I agree with the assessment, plan and orders as documented by the resident.   (GE Modifier)    MD HAYDER Decker  Attending Physician, [address] [place] [address], Internal Medicine Residency Program  [address] [place] [address]  4/29/2022, 9:19 AM

## 2022-05-06 DIAGNOSIS — R56.9 SEIZURES (HCC): ICD-10-CM

## 2022-05-06 RX ORDER — PRIMIDONE 50 MG/1
TABLET ORAL
Qty: 90 TABLET | Refills: 0 | Status: SHIPPED | OUTPATIENT
Start: 2022-05-06 | End: 2022-06-06 | Stop reason: SDUPTHER

## 2022-05-06 NOTE — TELEPHONE ENCOUNTER
Request for Primidone.       Next Visit Date:  Future Appointments   Date Time Provider Carlos Matta   6/8/2022  2:40 PM Fatou Awad DO [facility] Maintenance   Topic Date Due    Shingles vaccine (3 of 3) 09/12/2018    Lipids  03/09/2023    TSH  03/09/2023    Depression Monitoring  04/06/2023    Breast cancer screen  06/09/2023    Pneumococcal 65+ years Vaccine (2 - PPSV23 or PCV20) 02/07/2024    DTaP/Tdap/Td vaccine (2 - Td or Tdap) 11/23/2025    Colorectal Cancer Screen  05/10/2031    DEXA (modify frequency per FRAX score)  Completed    Flu vaccine  Completed    COVID-19 Vaccine  Completed    Hepatitis C screen  Completed    Hepatitis A vaccine  Aged Out    Hepatitis B vaccine  Aged Out    Hib vaccine  Aged Out    Meningococcal (ACWY) vaccine  Aged Out       Hemoglobin A1C (%)   Date Value   08/18/2016 5.1   05/07/2015 5.0             ( goal A1C is < 7)   No results found for: LABMICR  LDL Cholesterol (mg/dL)   Date Value   03/09/2022 80       (goal LDL is <100)   AST (U/L)   Date Value   03/09/2022 24     ALT (U/L)   Date Value   03/09/2022 26     BUN (mg/dL)   Date Value   03/24/2022 13     BP Readings from Last 3 Encounters:   04/19/22 113/77   04/06/22 124/82   02/25/22 111/73          (goal 120/80)    All Future Testing planned in [facility]  Lab Frequency Next Occurrence   US BREAST COMPLETE LEFT Once 06/14/2022   CAYETANO DIGITAL DIAGNOSTIC W OR WO CAD LEFT Once 06/24/2022   TSH Once 07/14/2022   T4, Free Once 07/14/2022         Patient Active Problem List:     Acquired hypothyroidism     Hypertension     Urge incontinence     Generalized anxiety disorder     Gastroesophageal reflux disease without esophagitis     H/O: CVA (cerebrovascular accident)     Migraine without aura and without status migrainosus, not intractable     Hammer toe of left foot     Tremor     Status epilepticus (HCC)     Speech and language deficits     Constipation     Seizure (HCC)     Seizures [facility])     Breakthrough seizure ([facility] [address]     Depression     Complex partial status epilepticus      Mammogram abnormal

## 2022-05-10 RX ORDER — LEVETIRACETAM 750 MG/1
1500 TABLET ORAL 2 TIMES DAILY
Qty: 60 TABLET | Refills: 3 | Status: SHIPPED | OUTPATIENT
Start: 2022-05-10 | End: 2022-06-28 | Stop reason: SDUPTHER

## 2022-05-10 NOTE — TELEPHONE ENCOUNTER
Pharmacy requesting refill of:  Levetiracetam  (Keppra) 750 mg tablet      Medication active on med list:  yes      Date of last Rx: 02/24/2022  with 3 refills   verified on 05/10/2022   verified by CATHIE SHAH      Date of last appointment:  04/19/2022    Next Visit Date:  TBD

## 2022-05-25 ENCOUNTER — TELEPHONE (OUTPATIENT)
Dept: INTERNAL MEDICINE | Age: 67
End: 2022-05-25

## 2022-05-25 DIAGNOSIS — Z12.31 SCREENING MAMMOGRAM FOR BREAST CANCER: Primary | ICD-10-CM

## 2022-05-25 NOTE — TELEPHONE ENCOUNTER
----- Message from Jennifer Kimbrough sent at 5/25/2022 12:24 PM EDT -----  Subject: Referral Request    QUESTIONS   Reason for referral request? Patient states was supposed to have a mammo   referral but says it needs to be updated   Has the physician seen you for this condition before? Yes  Select a date? 2022-02-22  Select the Provider the patient wants to be referred to, if known (PCP or   Specialist)? Марина Cruz   Preferred Specialist (if applicable)? Do you already have an appointment scheduled? No  Additional Information for Provider? [facility]  ---------------------------------------------------------------------------  --------------  Finis Lady INFO  What is the best way for the office to contact you? OK to leave message on   voicemail  Preferred Call Back Phone Number? [phone]  ---------------------------------------------------------------------------  --------------  SCRIPT ANSWERS  Relationship to Patient?  Self

## 2022-06-03 DIAGNOSIS — R56.9 SEIZURES (HCC): ICD-10-CM

## 2022-06-03 NOTE — TELEPHONE ENCOUNTER
disease without esophagitis     H/O: CVA (cerebrovascular accident)     Migraine without aura and without status migrainosus, not intractable     Hammer toe of left foot     Tremor     Status epilepticus (HCC)     Speech and language deficits     Constipation     Seizure (HCC)     Seizures (HCC)     Breakthrough seizure ([facility] [address]     Depression     Complex partial status epilepticus      Mammogram abnormal

## 2022-06-06 RX ORDER — PRIMIDONE 50 MG/1
TABLET ORAL
Qty: 90 TABLET | Refills: 0 | Status: SHIPPED | OUTPATIENT
Start: 2022-06-06 | End: 2022-07-01

## 2022-06-21 ENCOUNTER — HOSPITAL ENCOUNTER (OUTPATIENT)
Dept: WOMENS IMAGING | Age: 67
Discharge: HOME | End: 2022-06-23
Payer: COMMERCIAL

## 2022-06-21 DIAGNOSIS — Z12.31 SCREENING MAMMOGRAM FOR BREAST CANCER: ICD-10-CM

## 2022-06-21 PROCEDURE — 77067 SCR MAMMO BI INCL CAD: CPT

## 2022-06-28 DIAGNOSIS — Z86.73 H/O: STROKE: ICD-10-CM

## 2022-06-28 DIAGNOSIS — R56.9 SEIZURES (HCC): ICD-10-CM

## 2022-06-28 DIAGNOSIS — Z86.73 H/O: CVA (CEREBROVASCULAR ACCIDENT): ICD-10-CM

## 2022-06-28 DIAGNOSIS — I10 ESSENTIAL HYPERTENSION: Chronic | ICD-10-CM

## 2022-06-28 DIAGNOSIS — K21.9 GASTROESOPHAGEAL REFLUX DISEASE: ICD-10-CM

## 2022-06-28 RX ORDER — LEVETIRACETAM 750 MG/1
TABLET ORAL
Qty: 60 TABLET | Refills: 3 | OUTPATIENT
Start: 2022-06-28

## 2022-06-28 NOTE — TELEPHONE ENCOUNTER
Pharmacy requesting refill of levetiracetam.      Medication active on med list yes      Date of last fill: 5/10/22 but only 15 day supply (in error)  with 3 refills verified on 6/28/22  verified by ZOEY RN      Date of last appointment 4/19/22    Next Visit Date:  Visit date not found

## 2022-06-29 RX ORDER — LEVETIRACETAM 750 MG/1
1500 TABLET ORAL 2 TIMES DAILY
Qty: 112 TABLET | Refills: 5 | Status: SHIPPED | OUTPATIENT
Start: 2022-06-29

## 2022-06-29 NOTE — TELEPHONE ENCOUNTER
E-scribing request for 6 medications . Pt has future appt.        Health Maintenance   Topic Date Due    Shingles vaccine (3 of 3) 06/08/2023 (Originally 9/12/2018)    Lipids  03/09/2023    Depression Monitoring  04/06/2023    Pneumococcal 65+ years Vaccine (2 - PPSV23 or PCV20) 02/07/2024    Breast cancer screen  06/21/2024    DTaP/Tdap/Td vaccine (2 - Td or Tdap) 11/23/2025    Colorectal Cancer Screen  05/10/2031    DEXA (modify frequency per FRAX score)  Completed    Flu vaccine  Completed    COVID-19 Vaccine  Completed    Hepatitis C screen  Completed    Hepatitis A vaccine  Aged Out    Hepatitis B vaccine  Aged Out    Hib vaccine  Aged Out    Meningococcal (ACWY) vaccine  Aged Out             (applicable per patient's age: Cancer Screenings, Depression Screening, Fall Risk Screening, Immunizations)    Hemoglobin A1C (%)   Date Value   08/18/2016 5.1   05/07/2015 5.0     LDL Cholesterol (mg/dL)   Date Value   03/09/2022 80     AST (U/L)   Date Value   03/09/2022 24     ALT (U/L)   Date Value   03/09/2022 26     BUN (mg/dL)   Date Value   03/24/2022 13      (goal A1C is < 7)   (goal LDL is <100) need 30-50% reduction from baseline     BP Readings from Last 3 Encounters:   04/19/22 113/77   04/06/22 124/82   02/25/22 111/73    (goal /80)      All Future Testing planned in [facility]:  Lab Frequency Next Occurrence   US BREAST COMPLETE LEFT Once 06/14/2022   CAYETANO DIGITAL DIAGNOSTIC W OR WO CAD LEFT Once 06/24/2022   TSH Once 07/14/2022   T4, Free Once 07/14/2022       Next Visit Date:  Future Appointments   Date Time Provider Carlos Matta   7/1/2022  1:00 PM [facility]            Patient Active Problem List:     Acquired hypothyroidism     Hypertension     Urge incontinence     Generalized anxiety disorder     Gastroesophageal reflux disease without esophagitis     H/O: CVA (cerebrovascular accident)     Migraine without aura and without status migrainosus, not intractable     Hammer toe of left foot     Tremor     Status epilepticus (HCC)     Speech and language deficits     Constipation     Seizure (HCC)     Seizures (HCC)     Breakthrough seizure ([facility] [address]     Depression     Complex partial status epilepticus      Mammogram abnormal

## 2022-07-01 ENCOUNTER — OFFICE VISIT (OUTPATIENT)
Dept: INTERNAL MEDICINE | Age: 67
End: 2022-07-01
Payer: COMMERCIAL

## 2022-07-01 VITALS
SYSTOLIC BLOOD PRESSURE: 125 MMHG | OXYGEN SATURATION: 96 % | HEIGHT: 70 IN | HEART RATE: 78 BPM | TEMPERATURE: 98.1 F | WEIGHT: 142 LBS | DIASTOLIC BLOOD PRESSURE: 81 MMHG | BODY MASS INDEX: 20.33 KG/M2

## 2022-07-01 DIAGNOSIS — E03.9 ACQUIRED HYPOTHYROIDISM: Chronic | ICD-10-CM

## 2022-07-01 DIAGNOSIS — K21.9 GASTROESOPHAGEAL REFLUX DISEASE WITHOUT ESOPHAGITIS: ICD-10-CM

## 2022-07-01 DIAGNOSIS — R56.9 SEIZURE (HCC): ICD-10-CM

## 2022-07-01 DIAGNOSIS — Z86.73 H/O: CVA (CEREBROVASCULAR ACCIDENT): Chronic | ICD-10-CM

## 2022-07-01 DIAGNOSIS — I10 PRIMARY HYPERTENSION: Primary | ICD-10-CM

## 2022-07-01 DIAGNOSIS — G43.009 MIGRAINE WITHOUT AURA AND WITHOUT STATUS MIGRAINOSUS, NOT INTRACTABLE: ICD-10-CM

## 2022-07-01 PROCEDURE — 1036F TOBACCO NON-USER: CPT | Performed by: STUDENT IN AN ORGANIZED HEALTH CARE EDUCATION/TRAINING PROGRAM

## 2022-07-01 PROCEDURE — G8427 DOCREV CUR MEDS BY ELIG CLIN: HCPCS | Performed by: STUDENT IN AN ORGANIZED HEALTH CARE EDUCATION/TRAINING PROGRAM

## 2022-07-01 PROCEDURE — 99211 OFF/OP EST MAY X REQ PHY/QHP: CPT | Performed by: INTERNAL MEDICINE

## 2022-07-01 PROCEDURE — 99213 OFFICE O/P EST LOW 20 MIN: CPT | Performed by: STUDENT IN AN ORGANIZED HEALTH CARE EDUCATION/TRAINING PROGRAM

## 2022-07-01 PROCEDURE — 1123F ACP DISCUSS/DSCN MKR DOCD: CPT | Performed by: STUDENT IN AN ORGANIZED HEALTH CARE EDUCATION/TRAINING PROGRAM

## 2022-07-01 PROCEDURE — G8399 PT W/DXA RESULTS DOCUMENT: HCPCS | Performed by: STUDENT IN AN ORGANIZED HEALTH CARE EDUCATION/TRAINING PROGRAM

## 2022-07-01 PROCEDURE — 1090F PRES/ABSN URINE INCON ASSESS: CPT | Performed by: STUDENT IN AN ORGANIZED HEALTH CARE EDUCATION/TRAINING PROGRAM

## 2022-07-01 PROCEDURE — G8420 CALC BMI NORM PARAMETERS: HCPCS | Performed by: STUDENT IN AN ORGANIZED HEALTH CARE EDUCATION/TRAINING PROGRAM

## 2022-07-01 PROCEDURE — 3017F COLORECTAL CA SCREEN DOC REV: CPT | Performed by: STUDENT IN AN ORGANIZED HEALTH CARE EDUCATION/TRAINING PROGRAM

## 2022-07-01 RX ORDER — ASPIRIN 81 MG/1
TABLET ORAL
Qty: 60 TABLET | Refills: 3 | Status: SHIPPED | OUTPATIENT
Start: 2022-07-01

## 2022-07-01 RX ORDER — OMEPRAZOLE 20 MG/1
CAPSULE, DELAYED RELEASE ORAL
Qty: 60 CAPSULE | Refills: 3 | Status: SHIPPED | OUTPATIENT
Start: 2022-07-01

## 2022-07-01 RX ORDER — ATORVASTATIN CALCIUM 40 MG/1
TABLET, FILM COATED ORAL
Qty: 60 TABLET | Refills: 3 | Status: SHIPPED | OUTPATIENT
Start: 2022-07-01

## 2022-07-01 RX ORDER — SUMATRIPTAN 100 MG/1
TABLET, FILM COATED ORAL
Qty: 15 TABLET | Refills: 0 | Status: CANCELLED | OUTPATIENT
Start: 2022-07-01

## 2022-07-01 RX ORDER — PRIMIDONE 50 MG/1
TABLET ORAL
Qty: 90 TABLET | Refills: 0 | Status: SHIPPED | OUTPATIENT
Start: 2022-07-01 | End: 2022-07-27

## 2022-07-01 RX ORDER — AMLODIPINE BESYLATE 10 MG/1
TABLET ORAL
Qty: 60 TABLET | Refills: 3 | Status: SHIPPED | OUTPATIENT
Start: 2022-07-01

## 2022-07-01 RX ORDER — PAROXETINE 30 MG/1
TABLET, FILM COATED ORAL
Qty: 60 TABLET | Refills: 3 | Status: SHIPPED | OUTPATIENT
Start: 2022-07-01

## 2022-07-01 ASSESSMENT — ENCOUNTER SYMPTOMS
SHORTNESS OF BREATH: 0
VOMITING: 0
COUGH: 0
NAUSEA: 0
BACK PAIN: 0
EYE DISCHARGE: 0
COLOR CHANGE: 0
CHEST TIGHTNESS: 0

## 2022-07-01 NOTE — PROGRESS NOTES
[facility]  [facility] [address]  [place] [address]  Dept: [phone]  Dept Fax: [phone]    Office Progress/Follow Up Note  Date ofpatient's visit: 7/1/2022  Patient's Name:  Shannan Colorado YOB: 1955            Patient Care Team:  Leticia Bernal MD as PCP - General (Internal Medicine)  Nguyễn Dean MD as Consulting Physician (Internal Medicine)  Keri Vergara MD as Consulting Physician (Gastroenterology)  ================================================================    REASON FOR VISIT/CHIEF COMPLAINT:  Hypertension (medication taken today) and Establish Care (tx from jose)    HISTORY OF PRESENTING ILLNESS:    51-year-old female with history of hypertension on Norvasc 10, history of CVA on aspirin and Lipitor, cognitive impairment on donepezil, seizure on Vimpat 200 mg twice daily and Keppra 1.5 g twice daily depression, GERD on Prilosec is here for routine follow-up. Patient was recently seen by neurologist.  Recommendation was to increase the dose of Vimpat to 250 twice daily which was making her drowsy and patient was switched back to 200 mg twice daily. patient denies any new symptoms. Denies any headache, seizure, chest pain, cough, fever, chills belly pain, nausea or vomiting, dizziness. Patient use cab Service to come in clinic. Was advised not to drive under [place] law for 6 months. Seizure precaution including sleep deprivation and alcohol abstinence were discussed. All  Question was answered. In clinic her blood pressure 125/81 with heart rate 78. Healthcare maintenance  DEXA scan negative in 2015  Colonoscopy was done in 2021. Negative. Repeat in 3 years due to poor prep  Mammography on 6/21/2022: Negative annual screening.   Pap Smear in 2017: negative for malignancy    Patient Active Problem List   Diagnosis    Acquired hypothyroidism    Hypertension    Urge incontinence    Generalized anxiety disorder    Gastroesophageal reflux disease without esophagitis    H/O: CVA (cerebrovascular accident)    Migraine without aura and without status migrainosus, not intractable    Hammer toe of left foot    Tremor    Status epilepticus (HCC)    Speech and language deficits    Constipation    Seizure (HCC)    Seizures (HCC)    Breakthrough seizure ([address]    Depression    Complex partial status epilepticus     Mammogram abnormal       There are no preventive care reminders to display for this patient. No Known Allergies      Current Outpatient Medications   Medication Sig Dispense Refill    levETIRAcetam (KEPPRA) 750 MG tablet Take 2 tablets by mouth 2 times daily 112 tablet 5    primidone (MYSOLINE) 50 MG tablet TAKE ONE AND ONE-HALF TABLETS BY MOUTH TWO TIMES A DAY 90 tablet 0    oxybutynin (DITROPAN) 5 MG tablet TAKE 1 TABLET BY MOUTH 3 TIMES A DAY 90 tablet 3    donepezil (ARICEPT) 5 MG tablet TAKE ONE TABLET BY MOUTH NIGHTLY 30 tablet 3    SUMAtriptan (IMITREX) 100 MG tablet TAKE 1 TABLET BY MOUTH ONE TIME A DAY AS NEEDED FOR MIGRAINE 15 tablet 0    lacosamide (VIMPAT) 200 MG tablet Take 1 tablet by mouth 2 times daily for 180 days. 60 tablet 5    diclofenac sodium (VOLTAREN) 1 % GEL Apply 2 g topically 4 times daily as needed for Pain 150 g 1    aspirin (ASPIRIN LOW DOSE) 81 MG EC tablet TAKE 1 TABLET BY MOUTH ONE TIME A DAY 60 tablet 3    PARoxetine (PAXIL) 30 MG tablet TAKE ONE TABLET BY MOUTH EVERY MORNING 60 tablet 3    atorvastatin (LIPITOR) 40 MG tablet TAKE 1 TABLET BY MOUTH ONE TIME A DAY 60 tablet 3    levothyroxine (SYNTHROID) 50 MCG tablet TAKE 1 TABLET BY MOUTH ONE TIME A DAY 60 tablet 3    amLODIPine (NORVASC) 10 MG tablet TAKE 1 TABLET BY MOUTH ONE TIME A DAY 60 tablet 3    omeprazole (PRILOSEC) 20 MG delayed release capsule TAKE 1 CAPSULE BY MOUTH ONE TIME A DAY 60 capsule 3     No current facility-administered medications for this visit.        Social History     Tobacco Use    Smoking status: Never Smoker    Smokeless tobacco: Never Used   Vaping Use    Vaping Use: Never used   Substance Use Topics    Alcohol use: Not Currently     Comment: quit 0029, was alcoholic    Drug use: No       Family History   Problem Relation Age of Onset    Cervical Cancer Mother     Heart Disease Father     Heart Disease Maternal Grandfather     Heart Disease Paternal Grandfather         REVIEW OF SYSTEMS:  Review of Systems   Constitutional: Negative for activity change, appetite change and fatigue. Eyes: Negative for discharge. Respiratory: Negative for cough, chest tightness and shortness of breath. Cardiovascular: Negative for chest pain, palpitations and leg swelling. Gastrointestinal: Negative for nausea and vomiting. Genitourinary: Negative for difficulty urinating. Musculoskeletal: Negative for back pain and neck pain. Skin: Negative for color change and rash. Neurological: Negative for weakness and light-headedness. Psychiatric/Behavioral: Negative for behavioral problems and decreased concentration. PHYSICAL EXAM:  Vitals:    07/01/22 1316   BP: 125/81   Pulse: 78   Temp: 98.1 °F (36.7 °C)   TempSrc: Temporal   SpO2: 96%   Weight: 142 lb (64.4 kg)   Height: 5' 10\" (1.778 m)     BP Readings from Last 3 Encounters:   07/01/22 125/81   04/19/22 113/77   04/06/22 124/82        Physical Exam  Constitutional:       Appearance: Normal appearance. HENT:      Head: Normocephalic. Mouth/Throat:      Mouth: Mucous membranes are moist.   Eyes:      Extraocular Movements: Extraocular movements intact. Pupils: Pupils are equal, round, and reactive to light. Cardiovascular:      Rate and Rhythm: Normal rate. Pulmonary:      Effort: Pulmonary effort is normal.   Abdominal:      General: Abdomen is flat. Musculoskeletal:      Cervical back: Normal range of motion. Skin:     General: Skin is warm. Neurological:      General: No focal deficit present.       Mental Status: She is alert. Psychiatric:         Mood and Affect: Mood normal.         Thought Content: Thought content normal.         DIAGNOSTIC FINDINGS:  CBC:  Lab Results   Component Value Date/Time    WBC 7.7 03/09/2022 07:52 AM    HGB 14.9 03/09/2022 07:52 AM     03/09/2022 07:52 AM     10/04/2011 01:53 PM       BMP:    Lab Results   Component Value Date/Time     03/24/2022 02:23 PM    K 3.6 03/24/2022 02:23 PM     03/24/2022 02:23 PM    CO2 24 03/24/2022 02:23 PM    BUN 13 03/24/2022 02:23 PM    CREATININE 0.55 03/24/2022 02:23 PM    GLUCOSE 67 03/24/2022 02:23 PM    GLUCOSE 68 03/27/2012 10:48 AM       HEMOGLOBIN A1C:   Lab Results   Component Value Date/Time    LABA1C 5.1 08/18/2016 09:57 AM       FASTING LIPID PANEL:  Lab Results   Component Value Date    CHOL 168 03/09/2022    HDL 69 03/09/2022    TRIG 96 03/09/2022       ASSESSMENT AND PLAN:  Iraida Mijares was seen today for hypertension and establish care. Diagnoses and all orders for this visit:    Primary hypertension  -Controlled  -Continue Norvasc 10 mg once daily    Seizure [facility])  -Patient see Dr. Pardeep Sanchez. Currently on Vimpat 200 twice daily and Keppra twice daily. Per [place] law no driving until 6 months seizure-free.  -Seizure precaution-alcohol abstinence, sleep deprivation were discussed. Migraine without aura and without status migrainosus, not intractable  -Stable  -Continue sumatriptan 1 time as needed for migraine headache    Acquired hypothyroidism  -     TSH with Reflex; Future  -Currently on levothyroxine 50 mcg. Last TSH 12.36 that was checked in hospital admission. Will check repeat TSH and will adjust the dose accordingly. H/O: CVA (cerebrovascular accident)  -Continue aspirin and Lipitor  Continue taking Aricept 5 mg for mild cognitive impairment. Gastroesophageal reflux disease without esophagitis  -continue Prilosec    FOLLOW UP AND INSTRUCTIONS:  Return in about 3 months (around 10/1/2022).     · Iraida Mijares received counseling on the following healthy behaviors: nutrition, exercise and medication adherence    · Discussed use, benefit, and side effects of prescribed medications. Barriers to medication compliance addressed. All patient questions answered. Pt voiced understanding. · Patient given educational materials - see patient instructions    Abbey Gilbert MD  Internal Medicine Resident PGY [facility] FortunecarenjaleelestebanCarlos   7/1/2022, 1:52 PM          This note is created with the assistance of a speech-recognition program. While intending to generate a document that actually reflects the content of thevisit, the document can still have some mistakes which may not have been identified and corrected by editing.

## 2022-07-01 NOTE — PATIENT INSTRUCTIONS
Return To Clinic in October for 3 month follow up. Patient was added to wait list. Patient will be contacted by office to schedule upcoming appointment with the physician. After Visit Summary given and reviewed. The medication list included in this document is our record of what you are currently taking, including any changes that were made at today's visit. If you find any differences when compared to your medications at home, or have any questions that were not answered at your visit, please contact the office. It is very important for your care that you keep your appointment. If for some reason you are unable to keep your appointment, it is equally important that you call our office at [phone] to cancel your appointment and reschedule. Failure to do so may result in your termination from our practice.      -CATHIE Vizcarra

## 2022-07-07 DIAGNOSIS — G43.009 MIGRAINE WITHOUT AURA AND WITHOUT STATUS MIGRAINOSUS, NOT INTRACTABLE: ICD-10-CM

## 2022-07-08 RX ORDER — SUMATRIPTAN 100 MG/1
TABLET, FILM COATED ORAL
Qty: 15 TABLET | Refills: 0 | Status: SHIPPED | OUTPATIENT
Start: 2022-07-08 | End: 2022-11-04 | Stop reason: SDUPTHER

## 2022-07-08 NOTE — TELEPHONE ENCOUNTER
E-scribe request for medication IMITREX. Pt is on wait list till October. Health Maintenance   Topic Date Due    Shingles vaccine (3 of 3) 06/08/2023 (Originally 9/12/2018)    Flu vaccine (1) 09/01/2022    Lipids  03/09/2023    Depression Monitoring  04/06/2023    Pneumococcal 65+ years Vaccine (2 - PPSV23 or PCV20) 02/07/2024    Breast cancer screen  06/21/2024    DTaP/Tdap/Td vaccine (2 - Td or Tdap) 11/23/2025    Colorectal Cancer Screen  05/10/2031    DEXA (modify frequency per FRAX score)  Completed    COVID-19 Vaccine  Completed    Hepatitis C screen  Completed    Hepatitis A vaccine  Aged Out    Hepatitis B vaccine  Aged Out    Hib vaccine  Aged Out    Meningococcal (ACWY) vaccine  Aged Out             (applicable per patient's age: Cancer Screenings, Depression Screening, Fall Risk Screening, Immunizations)    Hemoglobin A1C (%)   Date Value   08/18/2016 5.1   05/07/2015 5.0     LDL Cholesterol (mg/dL)   Date Value   03/09/2022 80     AST (U/L)   Date Value   03/09/2022 24     ALT (U/L)   Date Value   03/09/2022 26     BUN (mg/dL)   Date Value   03/24/2022 13      (goal A1C is < 7)   (goal LDL is <100) need 30-50% reduction from baseline     BP Readings from Last 3 Encounters:   07/01/22 125/81   04/19/22 113/77   04/06/22 124/82    (goal /80)      All Future Testing planned in [facility]:  Lab Frequency Next Occurrence   TSH Once 07/14/2022   T4, Free Once 07/14/2022   TSH with Reflex Once 10/02/2022       Next Visit Date:  No future appointments.          Patient Active Problem List:     Acquired hypothyroidism     Hypertension     Urge incontinence     Generalized anxiety disorder     Gastroesophageal reflux disease without esophagitis     H/O: CVA (cerebrovascular accident)     Migraine without aura and without status migrainosus, not intractable     Hammer toe of left foot     Tremor     Status epilepticus ([address]     Speech and language deficits     Constipation     Seizure ([address]izures ([facility] [address] [facility]     Breakthrough seizure ([facility] [address] [facility]     Depression     Complex partial status epilepticus      Mammogram abnormal

## 2022-07-26 DIAGNOSIS — R56.9 SEIZURES (HCC): ICD-10-CM

## 2022-07-26 DIAGNOSIS — G43.009 MIGRAINE WITHOUT AURA AND WITHOUT STATUS MIGRAINOSUS, NOT INTRACTABLE: ICD-10-CM

## 2022-07-26 DIAGNOSIS — N39.41 URGE INCONTINENCE: ICD-10-CM

## 2022-07-26 DIAGNOSIS — E03.9 ACQUIRED HYPOTHYROIDISM: ICD-10-CM

## 2022-07-26 NOTE — TELEPHONE ENCOUNTER
Pharmacy requesting refill of Aricept.       Medication active on med list yes      Date of last fill: 4/8/22  verified on 7/26/2022   verified by [facility] LPN      Date of last appointment 4/19/22    Next Visit Date:  Visit date not found, no follow up scheduled

## 2022-07-26 NOTE — TELEPHONE ENCOUNTER
Refill request for SUMAtriptan (IMITREX) 100 MG tablet. If appropriate please send medication(s) to patients pharmacy. Next appt: Patient is currently on wait list for provider.     Last appt: 7/1/2022    Health Maintenance   Topic Date Due    COVID-19 Vaccine (4 - Booster) 05/27/2022    Shingles vaccine (3 of 3) 06/08/2023 (Originally 9/12/2018)    Flu vaccine (1) 09/01/2022    Lipids  03/09/2023    Depression Monitoring  04/06/2023    Pneumococcal 65+ years Vaccine (2 - PPSV23 or PCV20) 02/07/2024    Breast cancer screen  06/21/2024    DTaP/Tdap/Td vaccine (2 - Td or Tdap) 11/23/2025    Colorectal Cancer Screen  05/10/2031    DEXA (modify frequency per FRAX score)  Completed    Hepatitis C screen  Completed    Hepatitis A vaccine  Aged Out    Hepatitis B vaccine  Aged Out    Hib vaccine  Aged Out    Meningococcal (ACWY) vaccine  Aged Out       Hemoglobin A1C (%)   Date Value   08/18/2016 5.1   05/07/2015 5.0             ( goal A1C is < 7)   No results found for: LABMICR  LDL Cholesterol (mg/dL)   Date Value   03/09/2022 80       (goal LDL is <100)   AST (U/L)   Date Value   03/09/2022 24     ALT (U/L)   Date Value   03/09/2022 26     BUN (mg/dL)   Date Value   03/24/2022 13     BP Readings from Last 3 Encounters:   07/01/22 125/81   04/19/22 113/77   04/06/22 124/82          (goal 120/80)          Patient Active Problem List:     Acquired hypothyroidism     Hypertension     Urge incontinence     Generalized anxiety disorder     Gastroesophageal reflux disease without esophagitis     H/O: CVA (cerebrovascular accident)     Migraine without aura and without status migrainosus, not intractable     Hammer toe of left foot     Tremor     Status epilepticus (HCC)     Speech and language deficits     Constipation     Seizure (HCC)     Seizures (HCC)     Breakthrough seizure ([address]     Depression     Complex partial status epilepticus      Mammogram abnormal

## 2022-07-26 NOTE — TELEPHONE ENCOUNTER
Refill request for primidone (MYSOLINE) 50 MG tablet. If appropriate please send medication(s) to patients pharmacy. Next appt: Patient is currently on wait list for provider.     Last appt: 7/1/2022    Health Maintenance   Topic Date Due    COVID-19 Vaccine (4 - Booster) 05/27/2022    Shingles vaccine (3 of 3) 06/08/2023 (Originally 9/12/2018)    Flu vaccine (1) 09/01/2022    Lipids  03/09/2023    Depression Monitoring  04/06/2023    Pneumococcal 65+ years Vaccine (2 - PPSV23 or PCV20) 02/07/2024    Breast cancer screen  06/21/2024    DTaP/Tdap/Td vaccine (2 - Td or Tdap) 11/23/2025    Colorectal Cancer Screen  05/10/2031    DEXA (modify frequency per FRAX score)  Completed    Hepatitis C screen  Completed    Hepatitis A vaccine  Aged Out    Hepatitis B vaccine  Aged Out    Hib vaccine  Aged Out    Meningococcal (ACWY) vaccine  Aged Out       Hemoglobin A1C (%)   Date Value   08/18/2016 5.1   05/07/2015 5.0             ( goal A1C is < 7)   No results found for: LABMICR  LDL Cholesterol (mg/dL)   Date Value   03/09/2022 80       (goal LDL is <100)   AST (U/L)   Date Value   03/09/2022 24     ALT (U/L)   Date Value   03/09/2022 26     BUN (mg/dL)   Date Value   03/24/2022 13     BP Readings from Last 3 Encounters:   07/01/22 125/81   04/19/22 113/77   04/06/22 124/82          (goal 120/80)          Patient Active Problem List:     Acquired hypothyroidism     Hypertension     Urge incontinence     Generalized anxiety disorder     Gastroesophageal reflux disease without esophagitis     H/O: CVA (cerebrovascular accident)     Migraine without aura and without status migrainosus, not intractable     Hammer toe of left foot     Tremor     Status epilepticus (HCC)     Speech and language deficits     Constipation     Seizure (HCC)     Seizures (HCC)     Breakthrough seizure ([facility] [address]     Depression     Complex partial status epilepticus      Mammogram abnormal

## 2022-07-26 NOTE — TELEPHONE ENCOUNTER
Request for Oxybutynin and Synthroid. Next Visit Date:  No future appointments.     Health Maintenance   Topic Date Due    COVID-19 Vaccine (4 - Booster) 05/27/2022    Shingles vaccine (3 of 3) 06/08/2023 (Originally 9/12/2018)    Flu vaccine (1) 09/01/2022    Lipids  03/09/2023    Depression Monitoring  04/06/2023    Pneumococcal 65+ years Vaccine (2 - PPSV23 or PCV20) 02/07/2024    Breast cancer screen  06/21/2024    DTaP/Tdap/Td vaccine (2 - Td or Tdap) 11/23/2025    Colorectal Cancer Screen  05/10/2031    DEXA (modify frequency per FRAX score)  Completed    Hepatitis C screen  Completed    Hepatitis A vaccine  Aged Out    Hepatitis B vaccine  Aged Out    Hib vaccine  Aged Out    Meningococcal (ACWY) vaccine  Aged Out       Hemoglobin A1C (%)   Date Value   08/18/2016 5.1   05/07/2015 5.0             ( goal A1C is < 7)   No results found for: LABMICR  LDL Cholesterol (mg/dL)   Date Value   03/09/2022 80       (goal LDL is <100)   AST (U/L)   Date Value   03/09/2022 24     ALT (U/L)   Date Value   03/09/2022 26     BUN (mg/dL)   Date Value   03/24/2022 13     BP Readings from Last 3 Encounters:   07/01/22 125/81   04/19/22 113/77   04/06/22 124/82          (goal 120/80)    All Future Testing planned in [facility]  Lab Frequency Next Occurrence   TSH Once 07/14/2022   T4, Free Once 07/14/2022   TSH with Reflex Once 10/02/2022         Patient Active Problem List:     Acquired hypothyroidism     Hypertension     Urge incontinence     Generalized anxiety disorder     Gastroesophageal reflux disease without esophagitis     H/O: CVA (cerebrovascular accident)     Migraine without aura and without status migrainosus, not intractable     Hammer toe of left foot     Tremor     Status epilepticus (HCC)     Speech and language deficits     Constipation     Seizure (HCC)     Seizures (HCC)     Breakthrough seizure ([facility] [address]     Depression     Complex partial status epilepticus      Mammogram abnormal
No

## 2022-07-27 RX ORDER — LEVOTHYROXINE SODIUM 0.05 MG/1
TABLET ORAL
Qty: 60 TABLET | Refills: 3 | Status: SHIPPED | OUTPATIENT
Start: 2022-07-27

## 2022-07-27 RX ORDER — PRIMIDONE 50 MG/1
TABLET ORAL
Qty: 90 TABLET | Refills: 2 | Status: SHIPPED | OUTPATIENT
Start: 2022-07-27 | End: 2022-10-24

## 2022-07-27 RX ORDER — SUMATRIPTAN 100 MG/1
TABLET, FILM COATED ORAL
Qty: 15 TABLET | Refills: 0 | OUTPATIENT
Start: 2022-07-27

## 2022-07-27 RX ORDER — OXYBUTYNIN CHLORIDE 5 MG/1
TABLET ORAL
Qty: 90 TABLET | Refills: 3 | Status: SHIPPED | OUTPATIENT
Start: 2022-07-27

## 2022-07-27 NOTE — TELEPHONE ENCOUNTER
Spoke with pt let her know that medication was sent to the wrong office and to contact neurologist to get refill

## 2022-07-28 RX ORDER — DONEPEZIL HYDROCHLORIDE 5 MG/1
TABLET, FILM COATED ORAL
Qty: 30 TABLET | Refills: 3 | Status: SHIPPED | OUTPATIENT
Start: 2022-07-28

## 2022-08-01 ENCOUNTER — HOSPITAL ENCOUNTER (OUTPATIENT)
Age: 67
Discharge: HOME | End: 2022-08-01
Payer: COMMERCIAL

## 2022-08-01 DIAGNOSIS — E03.9 ACQUIRED HYPOTHYROIDISM: ICD-10-CM

## 2022-08-01 LAB
THYROXINE, FREE: 0.96 NG/DL (ref 0.93–1.7)
TSH SERPL DL<=0.05 MIU/L-ACNC: 3.2 UIU/ML (ref 0.3–5)

## 2022-08-01 PROCEDURE — 36415 COLL VENOUS BLD VENIPUNCTURE: CPT

## 2022-08-01 PROCEDURE — 84439 ASSAY OF FREE THYROXINE: CPT

## 2022-08-01 PROCEDURE — 84443 ASSAY THYROID STIM HORMONE: CPT

## 2022-08-23 DIAGNOSIS — G40.909 SEIZURE DISORDER (HCC): ICD-10-CM

## 2022-08-23 NOTE — TELEPHONE ENCOUNTER
Pharmacy requesting refill of Vimpat 200mg.       Medication active on med list yes      Date of last fill: 02/28/2022  verified on 8/23/2022   verified by [facility] LPN      Date of last appointment 04/19/2022    Next Visit Date:  Visit date not found

## 2022-08-24 RX ORDER — LACOSAMIDE 200 MG/1
TABLET ORAL
Qty: 60 TABLET | Refills: 5 | Status: SHIPPED | OUTPATIENT
Start: 2022-08-24 | End: 2023-02-24

## 2022-09-29 DIAGNOSIS — G43.009 MIGRAINE WITHOUT AURA AND WITHOUT STATUS MIGRAINOSUS, NOT INTRACTABLE: ICD-10-CM

## 2022-09-30 NOTE — TELEPHONE ENCOUNTER
Refill request for SUMAtriptan (IMITREX) 100 MG tablet. If appropriate please send medication(s) to patients pharmacy.     Next appt: 10/7/2022    Health Maintenance   Topic Date Due    COVID-19 Vaccine (4 - Booster) 05/27/2022    Flu vaccine (1) 08/01/2022    Shingles vaccine (3 of 3) 06/08/2023 (Originally 9/12/2018)    Lipids  03/09/2023    Depression Monitoring  04/06/2023    Pneumococcal 65+ years Vaccine (3) 02/07/2024    Breast cancer screen  06/21/2024    DTaP/Tdap/Td vaccine (2 - Td or Tdap) 11/23/2025    Colorectal Cancer Screen  05/10/2031    DEXA (modify frequency per FRAX score)  Completed    Hepatitis C screen  Completed    Hepatitis A vaccine  Aged Out    Hepatitis B vaccine  Aged Out    Hib vaccine  Aged Out    Meningococcal (ACWY) vaccine  Aged Out       Hemoglobin A1C (%)   Date Value   08/18/2016 5.1   05/07/2015 5.0             ( goal A1C is < 7)   No results found for: LABMICR  LDL Cholesterol (mg/dL)   Date Value   03/09/2022 80       (goal LDL is <100)   AST (U/L)   Date Value   03/09/2022 24     ALT (U/L)   Date Value   03/09/2022 26     BUN (mg/dL)   Date Value   03/24/2022 13     BP Readings from Last 3 Encounters:   07/01/22 125/81   04/19/22 113/77   04/06/22 124/82          (goal 120/80)          Patient Active Problem List:     Acquired hypothyroidism     Hypertension     Urge incontinence     Generalized anxiety disorder     Gastroesophageal reflux disease without esophagitis     H/O: CVA (cerebrovascular accident)     Migraine without aura and without status migrainosus, not intractable     Hammer toe of left foot     Tremor     Status epilepticus (HCC)     Speech and language deficits     Constipation     Seizure (HCC)     Seizures (HCC)     Breakthrough seizure ([address]     Depression     Complex partial status epilepticus      Mammogram abnormal

## 2022-10-05 RX ORDER — SUMATRIPTAN 100 MG/1
TABLET, FILM COATED ORAL
Qty: 15 TABLET | Refills: 0 | OUTPATIENT
Start: 2022-10-05

## 2022-10-24 DIAGNOSIS — R56.9 SEIZURES (HCC): ICD-10-CM

## 2022-10-24 DIAGNOSIS — G43.009 MIGRAINE WITHOUT AURA AND WITHOUT STATUS MIGRAINOSUS, NOT INTRACTABLE: ICD-10-CM

## 2022-10-24 RX ORDER — PRIMIDONE 50 MG/1
TABLET ORAL
Qty: 90 TABLET | Refills: 2 | Status: SHIPPED | OUTPATIENT
Start: 2022-10-24

## 2022-10-24 RX ORDER — SUMATRIPTAN 100 MG/1
TABLET, FILM COATED ORAL
Qty: 15 TABLET | Refills: 0 | OUTPATIENT
Start: 2022-10-24

## 2022-10-24 NOTE — TELEPHONE ENCOUNTER
Refill request for SUMAtriptan (IMITREX) 100 MG tablet. If appropriate please send medication(s) to patients pharmacy.     Next appt: 11/4/2022      Health Maintenance   Topic Date Due    COVID-19 Vaccine (4 - Booster) 03/24/2022    Flu vaccine (1) 08/01/2022    Shingles vaccine (3 of 3) 06/08/2023 (Originally 9/12/2018)    Lipids  03/09/2023    Depression Monitoring  04/06/2023    Pneumococcal 65+ years Vaccine (3) 02/07/2024    Breast cancer screen  06/21/2024    DTaP/Tdap/Td vaccine (2 - Td or Tdap) 11/23/2025    Colorectal Cancer Screen  05/10/2031    DEXA (modify frequency per FRAX score)  Completed    Hepatitis C screen  Completed    Hepatitis A vaccine  Aged Out    Hib vaccine  Aged Out    Meningococcal (ACWY) vaccine  Aged Out       Hemoglobin A1C (%)   Date Value   08/18/2016 5.1   05/07/2015 5.0             ( goal A1C is < 7)   No results found for: LABMICR  LDL Cholesterol (mg/dL)   Date Value   03/09/2022 80       (goal LDL is <100)   AST (U/L)   Date Value   03/09/2022 24     ALT (U/L)   Date Value   03/09/2022 26     BUN (mg/dL)   Date Value   03/24/2022 13     BP Readings from Last 3 Encounters:   07/01/22 125/81   04/19/22 113/77   04/06/22 124/82          (goal 120/80)          Patient Active Problem List:     Acquired hypothyroidism     Hypertension     Urge incontinence     Generalized anxiety disorder     Gastroesophageal reflux disease without esophagitis     H/O: CVA (cerebrovascular accident)     Migraine without aura and without status migrainosus, not intractable     Hammer toe of left foot     Tremor     Status epilepticus (HCC)     Speech and language deficits     Constipation     Seizure (HCC)     Seizures (HCC)     Breakthrough seizure ([address]     Depression     Complex partial status epilepticus      Mammogram abnormal

## 2022-10-24 NOTE — TELEPHONE ENCOUNTER
Refill request for primidone (MYSOLINE) 50 MG tablet. If appropriate please send medication(s) to patients pharmacy.     Next appt: 11/4/2022      Health Maintenance   Topic Date Due    COVID-19 Vaccine (4 - Booster) 03/24/2022    Flu vaccine (1) 08/01/2022    Shingles vaccine (3 of 3) 06/08/2023 (Originally 9/12/2018)    Lipids  03/09/2023    Depression Monitoring  04/06/2023    Pneumococcal 65+ years Vaccine (3) 02/07/2024    Breast cancer screen  06/21/2024    DTaP/Tdap/Td vaccine (2 - Td or Tdap) 11/23/2025    Colorectal Cancer Screen  05/10/2031    DEXA (modify frequency per FRAX score)  Completed    Hepatitis C screen  Completed    Hepatitis A vaccine  Aged Out    Hib vaccine  Aged Out    Meningococcal (ACWY) vaccine  Aged Out       Hemoglobin A1C (%)   Date Value   08/18/2016 5.1   05/07/2015 5.0             ( goal A1C is < 7)   No results found for: LABMICR  LDL Cholesterol (mg/dL)   Date Value   03/09/2022 80       (goal LDL is <100)   AST (U/L)   Date Value   03/09/2022 24     ALT (U/L)   Date Value   03/09/2022 26     BUN (mg/dL)   Date Value   03/24/2022 13     BP Readings from Last 3 Encounters:   07/01/22 125/81   04/19/22 113/77   04/06/22 124/82          (goal 120/80)          Patient Active Problem List:     Acquired hypothyroidism     Hypertension     Urge incontinence     Generalized anxiety disorder     Gastroesophageal reflux disease without esophagitis     H/O: CVA (cerebrovascular accident)     Migraine without aura and without status migrainosus, not intractable     Hammer toe of left foot     Tremor     Status epilepticus (HCC)     Speech and language deficits     Constipation     Seizure (HCC)     Seizures (HCC)     Breakthrough seizure ([facility] [address]     Depression     Complex partial status epilepticus      Mammogram abnormal

## 2022-10-25 DIAGNOSIS — G43.009 MIGRAINE WITHOUT AURA AND WITHOUT STATUS MIGRAINOSUS, NOT INTRACTABLE: ICD-10-CM

## 2022-10-26 RX ORDER — SUMATRIPTAN 100 MG/1
TABLET, FILM COATED ORAL
Qty: 15 TABLET | Refills: 0 | OUTPATIENT
Start: 2022-10-26

## 2022-10-26 NOTE — TELEPHONE ENCOUNTER
E-scribe request from [id number] [facility] for Sumatriptan 1000 mg. Patient has an appt on 11/4/22.       Health Maintenance   Topic Date Due    COVID-19 Vaccine (4 - Booster) 03/24/2022    Flu vaccine (1) 08/01/2022    Shingles vaccine (3 of 3) 06/08/2023 (Originally 9/12/2018)    Lipids  03/09/2023    Depression Monitoring  04/06/2023    Pneumococcal 65+ years Vaccine (3) 02/07/2024    Breast cancer screen  06/21/2024    DTaP/Tdap/Td vaccine (2 - Td or Tdap) 11/23/2025    Colorectal Cancer Screen  05/10/2031    DEXA (modify frequency per FRAX score)  Completed    Hepatitis C screen  Completed    Hepatitis A vaccine  Aged Out    Hib vaccine  Aged Out    Meningococcal (ACWY) vaccine  Aged Out             (applicable per patient's age: Cancer Screenings, Depression Screening, Fall Risk Screening, Immunizations)    Hemoglobin A1C (%)   Date Value   08/18/2016 5.1   05/07/2015 5.0     LDL Cholesterol (mg/dL)   Date Value   03/09/2022 80     AST (U/L)   Date Value   03/09/2022 24     ALT (U/L)   Date Value   03/09/2022 26     BUN (mg/dL)   Date Value   03/24/2022 13      (goal A1C is < 7)   (goal LDL is <100) need 30-50% reduction from baseline     BP Readings from Last 3 Encounters:   07/01/22 125/81   04/19/22 113/77   04/06/22 124/82    (goal /80)      All Future Testing planned in [facility]:      Next Visit Date:  Future Appointments   Date Time Provider Carlos Matta   11/4/2022  2:40 PM Alex Alamo MD [id number]            Patient Active Problem List:     Acquired hypothyroidism     Hypertension     Urge incontinence     Generalized anxiety disorder     Gastroesophageal reflux disease without esophagitis     H/O: CVA (cerebrovascular accident)     Migraine without aura and without status migrainosus, not intractable     Hammer toe of left foot     Tremor     Status epilepticus ([address]     Speech and language deficits     Constipation     Seizure ([address]     Seizures ([address]     Breakthrough seizure [facility])     Depression     Complex partial status epilepticus      Mammogram abnormal

## 2022-10-27 NOTE — TELEPHONE ENCOUNTER
Will address in next visit on 11/04/2022. She needs to see neurology for her refractory migraine headache.

## 2022-11-04 ENCOUNTER — OFFICE VISIT (OUTPATIENT)
Dept: INTERNAL MEDICINE | Age: 67
End: 2022-11-04
Payer: COMMERCIAL

## 2022-11-04 VITALS
BODY MASS INDEX: 20.19 KG/M2 | TEMPERATURE: 98.1 F | HEART RATE: 86 BPM | SYSTOLIC BLOOD PRESSURE: 100 MMHG | HEIGHT: 70 IN | WEIGHT: 141 LBS | DIASTOLIC BLOOD PRESSURE: 75 MMHG | OXYGEN SATURATION: 98 %

## 2022-11-04 DIAGNOSIS — R56.9 SEIZURES (HCC): ICD-10-CM

## 2022-11-04 DIAGNOSIS — E03.9 ACQUIRED HYPOTHYROIDISM: ICD-10-CM

## 2022-11-04 DIAGNOSIS — Z86.73 H/O: CVA (CEREBROVASCULAR ACCIDENT): ICD-10-CM

## 2022-11-04 DIAGNOSIS — G43.009 MIGRAINE WITHOUT AURA AND WITHOUT STATUS MIGRAINOSUS, NOT INTRACTABLE: ICD-10-CM

## 2022-11-04 DIAGNOSIS — I10 ESSENTIAL HYPERTENSION: Primary | ICD-10-CM

## 2022-11-04 DIAGNOSIS — Z23 FLU VACCINE NEED: ICD-10-CM

## 2022-11-04 PROCEDURE — G8482 FLU IMMUNIZE ORDER/ADMIN: HCPCS | Performed by: STUDENT IN AN ORGANIZED HEALTH CARE EDUCATION/TRAINING PROGRAM

## 2022-11-04 PROCEDURE — 99213 OFFICE O/P EST LOW 20 MIN: CPT | Performed by: STUDENT IN AN ORGANIZED HEALTH CARE EDUCATION/TRAINING PROGRAM

## 2022-11-04 PROCEDURE — G0008 ADMIN INFLUENZA VIRUS VAC: HCPCS | Performed by: INTERNAL MEDICINE

## 2022-11-04 PROCEDURE — 3017F COLORECTAL CA SCREEN DOC REV: CPT | Performed by: STUDENT IN AN ORGANIZED HEALTH CARE EDUCATION/TRAINING PROGRAM

## 2022-11-04 PROCEDURE — 1036F TOBACCO NON-USER: CPT | Performed by: STUDENT IN AN ORGANIZED HEALTH CARE EDUCATION/TRAINING PROGRAM

## 2022-11-04 PROCEDURE — G8420 CALC BMI NORM PARAMETERS: HCPCS | Performed by: STUDENT IN AN ORGANIZED HEALTH CARE EDUCATION/TRAINING PROGRAM

## 2022-11-04 PROCEDURE — G8399 PT W/DXA RESULTS DOCUMENT: HCPCS | Performed by: STUDENT IN AN ORGANIZED HEALTH CARE EDUCATION/TRAINING PROGRAM

## 2022-11-04 PROCEDURE — 3078F DIAST BP <80 MM HG: CPT | Performed by: STUDENT IN AN ORGANIZED HEALTH CARE EDUCATION/TRAINING PROGRAM

## 2022-11-04 PROCEDURE — 3074F SYST BP LT 130 MM HG: CPT | Performed by: STUDENT IN AN ORGANIZED HEALTH CARE EDUCATION/TRAINING PROGRAM

## 2022-11-04 PROCEDURE — 1123F ACP DISCUSS/DSCN MKR DOCD: CPT | Performed by: STUDENT IN AN ORGANIZED HEALTH CARE EDUCATION/TRAINING PROGRAM

## 2022-11-04 PROCEDURE — 1090F PRES/ABSN URINE INCON ASSESS: CPT | Performed by: STUDENT IN AN ORGANIZED HEALTH CARE EDUCATION/TRAINING PROGRAM

## 2022-11-04 PROCEDURE — G8427 DOCREV CUR MEDS BY ELIG CLIN: HCPCS | Performed by: STUDENT IN AN ORGANIZED HEALTH CARE EDUCATION/TRAINING PROGRAM

## 2022-11-04 RX ORDER — SUMATRIPTAN 100 MG/1
TABLET, FILM COATED ORAL
Qty: 15 TABLET | Refills: 0 | Status: SHIPPED | OUTPATIENT
Start: 2022-11-04

## 2022-11-04 ASSESSMENT — ENCOUNTER SYMPTOMS
COLOR CHANGE: 0
NAUSEA: 0
VOMITING: 0
EYE DISCHARGE: 0
COUGH: 0
BACK PAIN: 0
SHORTNESS OF BREATH: 0
CHEST TIGHTNESS: 0

## 2022-11-04 ASSESSMENT — PATIENT HEALTH QUESTIONNAIRE - PHQ9
9. THOUGHTS THAT YOU WOULD BE BETTER OFF DEAD, OR OF HURTING YOURSELF: 0
SUM OF ALL RESPONSES TO PHQ QUESTIONS 1-9: 0
7. TROUBLE CONCENTRATING ON THINGS, SUCH AS READING THE NEWSPAPER OR WATCHING TELEVISION: 0
SUM OF ALL RESPONSES TO PHQ QUESTIONS 1-9: 0
5. POOR APPETITE OR OVEREATING: 0
1. LITTLE INTEREST OR PLEASURE IN DOING THINGS: 0
6. FEELING BAD ABOUT YOURSELF - OR THAT YOU ARE A FAILURE OR HAVE LET YOURSELF OR YOUR FAMILY DOWN: 0
2. FEELING DOWN, DEPRESSED OR HOPELESS: 0
8. MOVING OR SPEAKING SO SLOWLY THAT OTHER PEOPLE COULD HAVE NOTICED. OR THE OPPOSITE, BEING SO FIGETY OR RESTLESS THAT YOU HAVE BEEN MOVING AROUND A LOT MORE THAN USUAL: 0
SUM OF ALL RESPONSES TO PHQ9 QUESTIONS 1 & 2: 0
4. FEELING TIRED OR HAVING LITTLE ENERGY: 0
SUM OF ALL RESPONSES TO PHQ QUESTIONS 1-9: 0
10. IF YOU CHECKED OFF ANY PROBLEMS, HOW DIFFICULT HAVE THESE PROBLEMS MADE IT FOR YOU TO DO YOUR WORK, TAKE CARE OF THINGS AT HOME, OR GET ALONG WITH OTHER PEOPLE: 0
3. TROUBLE FALLING OR STAYING ASLEEP: 0
SUM OF ALL RESPONSES TO PHQ QUESTIONS 1-9: 0

## 2022-11-04 NOTE — PATIENT INSTRUCTIONS
Patient was administered vaccination(s) for Influenza in the office.  VIS given to patient for each vaccination given.      -CATHIE Vizcarra

## 2022-11-04 NOTE — PROGRESS NOTES
[facility] [id number] [facility] ([place], [place]) [address]  Dept: [phone]  Dept Fax: [phone]    Office Progress/Follow Up Note  Date ofpatient's visit: 11/4/2022  Patient's Name:  Partha Villalta YOB: 1955            Patient Care Team:  Lenora Barton MD as PCP - General (Internal Medicine)  Stephen Phoenix MD as Consulting Physician (Internal Medicine)  Rosalee Tomas MD as Consulting Physician (Gastroenterology)  ================================================================    REASON FOR VISIT/CHIEF COMPLAINT:  Hypertension (Medications taken today) and Health Maintenance (Flu vaccine pended )    HISTORY OF PRESENTING ILLNESS:  History was obtained from: patient, electronic medical record. Anson Marks a 79 y.o. is here for a follow-up    57-year-old female with history of hypertension on Norvasc 10, history of CVA in 1998 on aspirin and Lipitor, cognitive impairment on donepezil, seizure on Vimpat 200 mg twice daily and Keppra 1.5 g twice daily depression, GERD on Prilosec, mild cognitive impairment on Aricept 5 mg, is here for routine follow-up. Patient is here for routine follow-up. She denies any more seizure. Last breakthrough seizure was in February 2022. She is compliant with her antiseizure medication she take Vimpat and Keppra. She follows with Dr. Jaja Crandall. She also complains of migraine for which she takes as needed Imitrex. Usually headache is controlled with over-the-counter medication. Denies any numbness tingling, vision problems/blurry vision, vomiting, weakness, chest pain or shortness of breath. She has bubbble back for her medication. Patient use cab Service to come in clinic. Was advised not to drive under [place] law for 6 months. Seizure precaution including sleep deprivation and alcohol abstinence were discussed    In clinic her blood pressure 100/75 with heart rate 86.       Healthcare maintenance  DEXA scan negative in 2015  Colonoscopy was done in 2021. Negative. Repeat in 3 years due to poor prep  Mammography on 6/21/2022: Negative annual screening. Pap Smear in 2017: negative for malignancy  HbA1c 5.1% in 2016  TSH 3.20 on 08/01/2022      Patient Active Problem List   Diagnosis    Acquired hypothyroidism    Hypertension    Urge incontinence    Generalized anxiety disorder    Gastroesophageal reflux disease without esophagitis    H/O: CVA (cerebrovascular accident)    Migraine without aura and without status migrainosus, not intractable    Hammer toe of left foot    Tremor    Status epilepticus (HCC)    Speech and language deficits    Constipation    Seizure ([address]    Seizures ([address]    Breakthrough seizure ([address]    Depression    Complex partial status epilepticus     Mammogram abnormal       Health Maintenance Due   Topic Date Due    COVID-19 Vaccine (4 - Booster) 03/24/2022    Flu vaccine (1) 08/01/2022       No Known Allergies      Current Outpatient Medications   Medication Sig Dispense Refill    SUMAtriptan (IMITREX) 100 MG tablet Take 1 medication as needed for migraine. MAX 2 TABS a day 15 tablet 0    primidone (MYSOLINE) 50 MG tablet TAKE ONE AND ONE-HALF TABLET BY MOUTH TWICE DAILY.  90 tablet 2    lacosamide (VIMPAT) 200 MG tablet TAKE 1 TABLET BY MOUTH 2 TIMES A DAY 60 tablet 5    donepezil (ARICEPT) 5 MG tablet TAKE 1 TABLET BY MOUTH EVERY NIGHT 30 tablet 3    levothyroxine (SYNTHROID) 50 MCG tablet TAKE 1 TABLET BY MOUTH ONE TIME A DAY 60 tablet 3    oxybutynin (DITROPAN) 5 MG tablet TAKE 1 TABLET BY MOUTH 3 TIMES A DAY 90 tablet 3    aspirin (ASPIRIN LOW DOSE) 81 MG EC tablet TAKE 1 TABLET BY MOUTH ONE TIME A DAY 60 tablet 3    PARoxetine (PAXIL) 30 MG tablet TAKE ONE TABLET BY MOUTH EVERY MORNING 60 tablet 3    amLODIPine (NORVASC) 10 MG tablet TAKE 1 TABLET BY MOUTH ONE TIME A DAY 60 tablet 3    atorvastatin (LIPITOR) 40 MG tablet TAKE 1 TABLET BY MOUTH ONE TIME A DAY 60 tablet 3    omeprazole (PRILOSEC) 20 MG delayed release capsule TAKE 1 CAPSULE BY MOUTH ONE TIME A DAY 60 capsule 3    levETIRAcetam (KEPPRA) 750 MG tablet Take 2 tablets by mouth 2 times daily 112 tablet 5    diclofenac sodium (VOLTAREN) 1 % GEL Apply 2 g topically 4 times daily as needed for Pain (Patient not taking: Reported on 11/4/2022) 150 g 1     No current facility-administered medications for this visit. Social History     Tobacco Use    Smoking status: Never    Smokeless tobacco: Never   Vaping Use    Vaping Use: Never used   Substance Use Topics    Alcohol use: Not Currently     Comment: quit [id number], was alcoholic    Drug use: No       Family History   Problem Relation Age of Onset    Cervical Cancer Mother     Heart Disease Father     Heart Disease Maternal Grandfather     Heart Disease Paternal Grandfather         REVIEW OF SYSTEMS:  Review of Systems   Constitutional:  Negative for activity change, appetite change and fatigue. Eyes:  Negative for discharge. Respiratory:  Negative for cough, chest tightness and shortness of breath. Cardiovascular:  Negative for chest pain, palpitations and leg swelling. Gastrointestinal:  Negative for nausea and vomiting. Genitourinary:  Negative for difficulty urinating. Musculoskeletal:  Negative for back pain and neck pain. Skin:  Negative for color change and rash. Neurological:  Negative for weakness and light-headedness. Psychiatric/Behavioral:  Negative for behavioral problems and decreased concentration. PHYSICAL EXAM:  Vitals:    11/04/22 1405   BP: 100/75   Pulse: 86   Temp: 98.1 °F (36.7 °C)   TempSrc: Temporal   SpO2: 98%   Weight: 141 lb (64 kg)   Height: 5' 10\" (1.778 m)     BP Readings from Last 3 Encounters:   11/04/22 100/75   07/01/22 125/81   04/19/22 113/77        Physical Exam  Constitutional:       Appearance: Normal appearance. She is normal weight. HENT:      Head: Normocephalic.    Eyes:      Pupils: Pupils are equal, round, and reactive to light. Cardiovascular:      Rate and Rhythm: Normal rate and regular rhythm. Pulmonary:      Effort: Pulmonary effort is normal.   Abdominal:      General: Abdomen is flat. Bowel sounds are normal.   Skin:     General: Skin is warm. Neurological:      General: No focal deficit present. Mental Status: She is alert. Psychiatric:         Mood and Affect: Mood normal.         Thought Content: Thought content normal.         DIAGNOSTIC FINDINGS:  CBC:  Lab Results   Component Value Date/Time    WBC 7.7 03/09/2022 07:52 AM    HGB 14.9 03/09/2022 07:52 AM     03/09/2022 07:52 AM     10/04/2011 01:53 PM       BMP:    Lab Results   Component Value Date/Time     03/24/2022 02:23 PM    K 3.6 03/24/2022 02:23 PM     03/24/2022 02:23 PM    CO2 24 03/24/2022 02:23 PM    BUN 13 03/24/2022 02:23 PM    CREATININE 0.55 03/24/2022 02:23 PM    GLUCOSE 67 03/24/2022 02:23 PM    GLUCOSE 68 03/27/2012 10:48 AM       HEMOGLOBIN A1C:   Lab Results   Component Value Date/Time    LABA1C 5.1 08/18/2016 09:57 AM       FASTING LIPID PANEL:  Lab Results   Component Value Date    CHOL 168 03/09/2022    HDL 69 03/09/2022    TRIG 96 03/09/2022       ASSESSMENT AND PLAN:  Kt Oneill was seen today for hypertension and health maintenance. Diagnoses and all orders for this visit:    Essential hypertension  Blood pressure controlled. Continue Norvasc 10 mg once daily  Denies any dizziness, lightheadedness    Migraine without aura and without status migrainosus, not intractable  -As needed Imitrex. Do not exceed from 200 mg once daily. Flu vaccine need    H/O: CVA (cerebrovascular accident)  -Continue aspirin, Lipitor    Seizures (HCC)  -Continue Keppra 1500 oral twice daily  -Continue Vimpat 200 twice daily  -Last seizure in February 2020  -Seizure precaution explained and discussed with patient.        Acquired hypothyroidism  -Continue levothyroxine 50 MCG once daily    FOLLOW UP AND INSTRUCTIONS:  Return in about 6 months (around 5/4/2023). Blain Merlin received counseling on the following healthy behaviors: nutrition, exercise, and medication adherence    Discussed use, benefit, and side effects of prescribed medications. Barriers to medication compliance addressed. All patient questions answered. Pt voiced understanding. Patient given educational materials - see patient instructions    Ramandeep Calvo MD  Internal Medicine Resident PGY [facility]   11/4/2022, 2:55 PM          This note is created with the assistance of a speech-recognition program. While intending to generate a document that actually reflects the content of thevisit, the document can still have some mistakes which may not have been identified and corrected by editing.

## 2022-11-04 NOTE — PROGRESS NOTES
Vaccine Information Sheet, \"Influenza - Inactivated\"  given to Hugo Angel, or parent/legal guardian of  Hugo Angel and verbalized understanding. Patient responses:    Is the person being vaccinated sick today? No    Does the person to be vaccinated have an allergy to a component of the vaccine? No    Has the person to be vaccinated ever had a reaction to the flu vaccine in the past?  No    Have you ever had [place] Syndrome? No    Flu vaccine given per order. Please see immunization tab.

## 2022-11-04 NOTE — PROGRESS NOTES
Attending Physician Statement  I have discussed the care of Francis Iqbal including pertinent history and exam findings,  with the resident. I have reviewed the key elements of all parts of the encounter with the resident. I agree with the assessment, plan and orders as documented by the resident.   (GE Modifier)    MD HAYDER Moon  Attending Physician, [address] [place] [address], Internal Medicine Residency Program  [address] [place] [address]  11/4/2022, 2:54 PM

## 2022-11-21 DIAGNOSIS — N39.41 URGE INCONTINENCE: Primary | ICD-10-CM

## 2022-11-21 RX ORDER — OXYBUTYNIN CHLORIDE 5 MG/1
TABLET ORAL
Qty: 90 TABLET | Refills: 3 | Status: SHIPPED | OUTPATIENT
Start: 2022-11-21

## 2022-11-21 RX ORDER — DONEPEZIL HYDROCHLORIDE 5 MG/1
TABLET, FILM COATED ORAL
Qty: 90 TABLET | Refills: 0 | Status: SHIPPED | OUTPATIENT
Start: 2022-11-21

## 2022-11-21 NOTE — TELEPHONE ENCOUNTER
Refill request for oxybutynin (DITROPAN) 5 MG tablet. If appropriate please send medication(s) to patients pharmacy.     Next appt: 5/5/2023      Health Maintenance   Topic Date Due    COVID-19 Vaccine (4 - Booster) 03/24/2022    Shingles vaccine (3 of 3) 06/08/2023 (Originally 9/12/2018)    Lipids  03/09/2023    Depression Monitoring  11/04/2023    Pneumococcal 65+ years Vaccine (3) 02/07/2024    Breast cancer screen  06/21/2024    DTaP/Tdap/Td vaccine (2 - Td or Tdap) 11/23/2025    Colorectal Cancer Screen  05/10/2031    DEXA (modify frequency per FRAX score)  Completed    Flu vaccine  Completed    Hepatitis C screen  Completed    Hepatitis A vaccine  Aged Out    Hib vaccine  Aged Out    Meningococcal (ACWY) vaccine  Aged Out       Hemoglobin A1C (%)   Date Value   08/18/2016 5.1   05/07/2015 5.0             ( goal A1C is < 7)   No results found for: LABMICR  LDL Cholesterol (mg/dL)   Date Value   03/09/2022 80       (goal LDL is <100)   AST (U/L)   Date Value   03/09/2022 24     ALT (U/L)   Date Value   03/09/2022 26     BUN (mg/dL)   Date Value   03/24/2022 13     BP Readings from Last 3 Encounters:   11/04/22 100/75   07/01/22 125/81   04/19/22 113/77          (goal 120/80)          Patient Active Problem List:     Acquired hypothyroidism     Hypertension     Urge incontinence     Generalized anxiety disorder     Gastroesophageal reflux disease without esophagitis     H/O: CVA (cerebrovascular accident)     Migraine without aura and without status migrainosus, not intractable     Hammer toe of left foot     Tremor     Status epilepticus (HCC)     Speech and language deficits     Constipation     Seizure (HCC)     Seizures (HCC)     Breakthrough seizure ([facility] [address]     Depression     Complex partial status epilepticus      Mammogram abnormal

## 2022-11-21 NOTE — TELEPHONE ENCOUNTER
Pharmacy requesting refill of donepezil (ARICEPT) 5 MG tablet      Medication active on med list yes      Date of last Rx: 07/28/2022 with 3 refills          verified by CATHIE HOFFMANN      Date of last appointment 04/19/2022    Next Visit Date:  Visit date not found

## 2022-12-13 DIAGNOSIS — G43.009 MIGRAINE WITHOUT AURA AND WITHOUT STATUS MIGRAINOSUS, NOT INTRACTABLE: ICD-10-CM

## 2022-12-13 NOTE — TELEPHONE ENCOUNTER
Pharmacy requesting refill of levETIRAcetam (KEPPRA) 750 MG tablet      Medication active on med list yes      Date of last Rx: 06/29/2022 with 5 refills          verified by CATHIE HOFFMANN      Date of last appointment 04/19/2022    Next Visit Date:  Visit date not found

## 2022-12-14 RX ORDER — LEVETIRACETAM 750 MG/1
TABLET ORAL
Qty: 120 TABLET | Refills: 5 | Status: SHIPPED | OUTPATIENT
Start: 2022-12-14

## 2022-12-14 RX ORDER — SUMATRIPTAN 100 MG/1
TABLET, FILM COATED ORAL
Qty: 30 TABLET | Refills: 1 | Status: SHIPPED | OUTPATIENT
Start: 2022-12-14

## 2022-12-14 NOTE — TELEPHONE ENCOUNTER
Request for imitrex.   Next go on 5/5/23    Next Visit Date:  Future Appointments   Date Time Provider Carlos Wilsoni   5/5/2023  1:40 PM Lynda Saul MD [facility] IM [address] Maintenance   Topic Date Due    COVID-19 Vaccine (4 - Booster) 03/24/2022    Shingles vaccine (3 of 3) 06/08/2023 (Originally 9/12/2018)    Lipids  03/09/2023    Depression Monitoring  11/04/2023    Pneumococcal 65+ years Vaccine (3) 02/07/2024    Breast cancer screen  06/21/2024    DTaP/Tdap/Td vaccine (2 - Td or Tdap) 11/23/2025    Colorectal Cancer Screen  05/10/2031    DEXA (modify frequency per FRAX score)  Completed    Flu vaccine  Completed    Hepatitis C screen  Completed    Hepatitis A vaccine  Aged Out    Hib vaccine  Aged Out    Meningococcal (ACWY) vaccine  Aged Out       Hemoglobin A1C (%)   Date Value   08/18/2016 5.1   05/07/2015 5.0             ( goal A1C is < 7)   No results found for: LABMICR  LDL Cholesterol (mg/dL)   Date Value   03/09/2022 80       (goal LDL is <100)   AST (U/L)   Date Value   03/09/2022 24     ALT (U/L)   Date Value   03/09/2022 26     BUN (mg/dL)   Date Value   03/24/2022 13     BP Readings from Last 3 Encounters:   11/04/22 100/75   07/01/22 125/81   04/19/22 113/77          (goal 120/80)    All Future Testing planned in [facility]        Patient Active Problem List:     Acquired hypothyroidism     Hypertension     Urge incontinence     Generalized anxiety disorder     Gastroesophageal reflux disease without esophagitis     H/O: CVA (cerebrovascular accident)     Migraine without aura and without status migrainosus, not intractable     Hammer toe of left foot     Tremor     Status epilepticus (HCC)     Speech and language deficits     Constipation     Seizure (HCC)     Seizures (HCC)     Breakthrough seizure ([facility] [address]     Depression     Complex partial status epilepticus      Mammogram abnormal

## 2023-01-03 DIAGNOSIS — R56.9 SEIZURES (HCC): ICD-10-CM

## 2023-01-04 NOTE — TELEPHONE ENCOUNTER
Request for myosline.   Next go on 5/5/23    Next Visit Date:  Future Appointments   Date Time Provider Carlos Wilsoni   5/5/2023  1:40 PM Nanette Villegas MD [facility] IM [address] Maintenance   Topic Date Due    COVID-19 Vaccine (4 - Booster) 03/24/2022    Shingles vaccine (3 of 3) 06/08/2023 (Originally 9/12/2018)    Lipids  03/09/2023    Depression Monitoring  11/04/2023    Pneumococcal 65+ years Vaccine (3) 02/07/2024    Breast cancer screen  06/21/2024    DTaP/Tdap/Td vaccine (2 - Td or Tdap) 11/23/2025    Colorectal Cancer Screen  05/10/2031    DEXA (modify frequency per FRAX score)  Completed    Flu vaccine  Completed    Hepatitis C screen  Completed    Hepatitis A vaccine  Aged Out    Hib vaccine  Aged Out    Meningococcal (ACWY) vaccine  Aged Out       Hemoglobin A1C (%)   Date Value   08/18/2016 5.1   05/07/2015 5.0             ( goal A1C is < 7)   No results found for: LABMICR  LDL Cholesterol (mg/dL)   Date Value   03/09/2022 80       (goal LDL is <100)   AST (U/L)   Date Value   03/09/2022 24     ALT (U/L)   Date Value   03/09/2022 26     BUN (mg/dL)   Date Value   03/24/2022 13     BP Readings from Last 3 Encounters:   11/04/22 100/75   07/01/22 125/81   04/19/22 113/77          (goal 120/80)    All Future Testing planned in [facility]        Patient Active Problem List:     Acquired hypothyroidism     Hypertension     Urge incontinence     Generalized anxiety disorder     Gastroesophageal reflux disease without esophagitis     H/O: CVA (cerebrovascular accident)     Migraine without aura and without status migrainosus, not intractable     Hammer toe of left foot     Tremor     Status epilepticus (HCC)     Speech and language deficits     Constipation     Seizure (HCC)     Seizures (HCC)     Breakthrough seizure ([facility] [address]     Depression     Complex partial status epilepticus      Mammogram abnormal

## 2023-01-05 RX ORDER — PRIMIDONE 50 MG/1
TABLET ORAL
Qty: 90 TABLET | Refills: 2 | Status: SHIPPED | OUTPATIENT
Start: 2023-01-05 | End: 2023-07-04

## 2023-01-19 ENCOUNTER — TELEPHONE (OUTPATIENT)
Dept: INTERNAL MEDICINE | Age: 68
End: 2023-01-19

## 2023-02-09 DIAGNOSIS — G40.909 SEIZURE DISORDER (HCC): ICD-10-CM

## 2023-02-09 RX ORDER — DONEPEZIL HYDROCHLORIDE 5 MG/1
TABLET, FILM COATED ORAL
Qty: 90 TABLET | Refills: 0 | Status: SHIPPED | OUTPATIENT
Start: 2023-02-09

## 2023-02-09 RX ORDER — LACOSAMIDE 200 MG/1
TABLET ORAL
Qty: 60 TABLET | Refills: 1 | Status: SHIPPED | OUTPATIENT
Start: 2023-02-09 | End: 2023-04-10

## 2023-02-09 NOTE — TELEPHONE ENCOUNTER
Please send for Dr. Katerin James requesting refill of donepezil 5 mg.       Medication active on med list yes      Date of last Rx: 11/21/2022 with 0 refills          verified by SB, RMA      Date of last appointment 4/19/2022    Next Visit Date:  Visit date not found

## 2023-02-09 NOTE — TELEPHONE ENCOUNTER
Pharmacy requesting refill of Lacosamide 200 mg.       Medication active on med list: yes      Date of last fill: 8/24/22 for #60 and 5 refills  verified on 2/9/2023    verified by Pillo Newman., SHAI      Date of last appointment: 4/19/2022    Next Visit Date:  Nothing scheduled

## 2023-02-21 ENCOUNTER — TELEPHONE (OUTPATIENT)
Dept: NEUROLOGY | Age: 68
End: 2023-02-21

## 2023-03-07 DIAGNOSIS — Z86.73 H/O: STROKE: ICD-10-CM

## 2023-03-07 DIAGNOSIS — E03.9 ACQUIRED HYPOTHYROIDISM: ICD-10-CM

## 2023-03-07 DIAGNOSIS — N39.41 URGE INCONTINENCE: ICD-10-CM

## 2023-03-08 RX ORDER — ASPIRIN 81 MG/1
TABLET ORAL
Qty: 90 TABLET | Refills: 3 | Status: SHIPPED | OUTPATIENT
Start: 2023-03-08

## 2023-03-08 RX ORDER — OXYBUTYNIN CHLORIDE 5 MG/1
TABLET ORAL
Qty: 90 TABLET | Refills: 3 | Status: SHIPPED | OUTPATIENT
Start: 2023-03-08

## 2023-03-08 RX ORDER — LEVOTHYROXINE SODIUM 0.05 MG/1
TABLET ORAL
Qty: 60 TABLET | Refills: 3 | Status: SHIPPED | OUTPATIENT
Start: 2023-03-08

## 2023-03-08 NOTE — TELEPHONE ENCOUNTER
Request for levothyoxine.   Next go on 5/5/23    Next Visit Date:  Future Appointments   Date Time Provider Carlos Matta   5/5/2023  1:40 PM Truong Wood MD [facility] IM [address] Maintenance   Topic Date Due    COVID-19 Vaccine (4 - Booster) 03/24/2022    Lipids  03/09/2023    Shingles vaccine (3 of 3) 06/08/2023 (Originally 9/12/2018)    Depression Monitoring  11/04/2023    Pneumococcal 65+ years Vaccine (3) 02/07/2024    Breast cancer screen  06/21/2024    DTaP/Tdap/Td vaccine (2 - Td or Tdap) 11/23/2025    Colorectal Cancer Screen  05/10/2031    DEXA (modify frequency per FRAX score)  Completed    Flu vaccine  Completed    Hepatitis C screen  Completed    Hepatitis A vaccine  Aged Out    Hib vaccine  Aged Out    Meningococcal (ACWY) vaccine  Aged Out       Hemoglobin A1C (%)   Date Value   08/18/2016 5.1   05/07/2015 5.0             ( goal A1C is < 7)   No results found for: LABMICR  LDL Cholesterol (mg/dL)   Date Value   03/09/2022 80       (goal LDL is <100)   AST (U/L)   Date Value   03/09/2022 24     ALT (U/L)   Date Value   03/09/2022 26     BUN (mg/dL)   Date Value   03/24/2022 13     BP Readings from Last 3 Encounters:   11/04/22 100/75   07/01/22 125/81   04/19/22 113/77          (goal 120/80)    All Future Testing planned in [facility]        Patient Active Problem List:     Acquired hypothyroidism     Hypertension     Urge incontinence     Generalized anxiety disorder     Gastroesophageal reflux disease without esophagitis     H/O: CVA (cerebrovascular accident)     Migraine without aura and without status migrainosus, not intractable     Hammer toe of left foot     Tremor     Status epilepticus (HCC)     Speech and language deficits     Constipation     Seizure (HCC)     Seizures (HCC)     Breakthrough seizure ([facility] [address]     Depression     Complex partial status epilepticus      Mammogram abnormal

## 2023-03-08 NOTE — TELEPHONE ENCOUNTER
Request for aspirin.   Next go on 5/5/23    Next Visit Date:  Future Appointments   Date Time Provider Carlos Matta   5/5/2023  1:40 PM Karni Benz MD [facility] IM [address] Maintenance   Topic Date Due    COVID-19 Vaccine (4 - Booster) 03/24/2022    Lipids  03/09/2023    Shingles vaccine (3 of 3) 06/08/2023 (Originally 9/12/2018)    Depression Monitoring  11/04/2023    Pneumococcal 65+ years Vaccine (3) 02/07/2024    Breast cancer screen  06/21/2024    DTaP/Tdap/Td vaccine (2 - Td or Tdap) 11/23/2025    Colorectal Cancer Screen  05/10/2031    DEXA (modify frequency per FRAX score)  Completed    Flu vaccine  Completed    Hepatitis C screen  Completed    Hepatitis A vaccine  Aged Out    Hib vaccine  Aged Out    Meningococcal (ACWY) vaccine  Aged Out       Hemoglobin A1C (%)   Date Value   08/18/2016 5.1   05/07/2015 5.0             ( goal A1C is < 7)   No results found for: LABMICR  LDL Cholesterol (mg/dL)   Date Value   03/09/2022 80       (goal LDL is <100)   AST (U/L)   Date Value   03/09/2022 24     ALT (U/L)   Date Value   03/09/2022 26     BUN (mg/dL)   Date Value   03/24/2022 13     BP Readings from Last 3 Encounters:   11/04/22 100/75   07/01/22 125/81   04/19/22 113/77          (goal 120/80)    All Future Testing planned in [facility]        Patient Active Problem List:     Acquired hypothyroidism     Hypertension     Urge incontinence     Generalized anxiety disorder     Gastroesophageal reflux disease without esophagitis     H/O: CVA (cerebrovascular accident)     Migraine without aura and without status migrainosus, not intractable     Hammer toe of left foot     Tremor     Status epilepticus (HCC)     Speech and language deficits     Constipation     Seizure (HCC)     Seizures (HCC)     Breakthrough seizure ([facility] [address]     Depression     Complex partial status epilepticus      Mammogram abnormal

## 2023-03-30 ENCOUNTER — TELEPHONE (OUTPATIENT)
Dept: INTERNAL MEDICINE | Age: 68
End: 2023-03-30

## 2023-03-30 NOTE — TELEPHONE ENCOUNTER
Patient sister Daysi Rodriguez called stating she is moving out of her sister house and the patient doesn't want to move. The sister Daysi Rodriguez does not have power of  and wanted advice on how to get James Amaro moved to a place where someone could care for her. Patient sister Daysi Rodriguez was given information to [facility] for assistance to guide her on the next steps since she does not have power of  and will call us if she needs any followup.

## 2023-04-06 DIAGNOSIS — R56.9 SEIZURES (HCC): ICD-10-CM

## 2023-04-07 RX ORDER — PRIMIDONE 50 MG/1
TABLET ORAL
Qty: 90 TABLET | Refills: 1 | Status: SHIPPED | OUTPATIENT
Start: 2023-04-07 | End: 2023-06-05 | Stop reason: SDUPTHER

## 2023-05-09 DIAGNOSIS — G40.909 SEIZURE DISORDER (HCC): ICD-10-CM

## 2023-05-09 NOTE — TELEPHONE ENCOUNTER
Approved through 2/21/2024.
Received a fax from pharmacy stating lacosamide needs PA. This was completed on CMM.
Satisfactory

## 2023-05-10 NOTE — TELEPHONE ENCOUNTER
Pharmacy requesting refill of donepezil (ARICEPT) 5 MG tablet       Medication active on med list yes      Date of last Rx: 02/09/2023 with 0 refills          verified by CATHIE HOFFMANN      Date of last appointment 4/19/2022    Next Visit Date:  9/5/2023

## 2023-05-10 NOTE — TELEPHONE ENCOUNTER
Patient needs to schedule an appointment. They have not been seen since 4/19/2022. I called the patient, and left a voicemail. Pharmacy requesting refill of Lacosamide 200mg.       Medication active on med list yes      Date of last Rx: 2/9/2023 with 1 refills          verified by Minnie Martinez LPN      Date of last appointment 4/19/2022    Next Visit Date:  Visit date not found

## 2023-05-11 ENCOUNTER — HOSPITAL ENCOUNTER (OUTPATIENT)
Age: 68
Setting detail: SPECIMEN
Discharge: HOME | End: 2023-05-11

## 2023-05-11 ENCOUNTER — OFFICE VISIT (OUTPATIENT)
Dept: INTERNAL MEDICINE | Age: 68
End: 2023-05-11
Payer: COMMERCIAL

## 2023-05-11 VITALS
OXYGEN SATURATION: 97 % | BODY MASS INDEX: 20.04 KG/M2 | WEIGHT: 140 LBS | HEIGHT: 70 IN | DIASTOLIC BLOOD PRESSURE: 82 MMHG | HEART RATE: 78 BPM | TEMPERATURE: 97.5 F | SYSTOLIC BLOOD PRESSURE: 122 MMHG

## 2023-05-11 DIAGNOSIS — I10 PRIMARY HYPERTENSION: ICD-10-CM

## 2023-05-11 DIAGNOSIS — E03.9 ACQUIRED HYPOTHYROIDISM: Chronic | ICD-10-CM

## 2023-05-11 DIAGNOSIS — Z86.73 H/O: CVA (CEREBROVASCULAR ACCIDENT): Chronic | ICD-10-CM

## 2023-05-11 DIAGNOSIS — I10 PRIMARY HYPERTENSION: Primary | ICD-10-CM

## 2023-05-11 DIAGNOSIS — R56.9 SEIZURES (HCC): ICD-10-CM

## 2023-05-11 LAB
ABSOLUTE EOS #: <0.03 K/UL (ref 0–0.44)
ABSOLUTE IMMATURE GRANULOCYTE: <0.03 K/UL (ref 0–0.3)
ABSOLUTE LYMPH #: 1.49 K/UL (ref 1.1–3.7)
ABSOLUTE MONO #: 0.6 K/UL (ref 0.1–1.2)
ALBUMIN SERPL-MCNC: 4 G/DL (ref 3.5–5.2)
ALBUMIN/GLOBULIN RATIO: 1.4 (ref 1–2.5)
ALP SERPL-CCNC: 54 U/L (ref 35–104)
ALT SERPL-CCNC: 17 U/L (ref 5–33)
ANION GAP SERPL CALCULATED.3IONS-SCNC: 12 MMOL/L (ref 9–17)
AST SERPL-CCNC: 19 U/L
BASOPHILS # BLD: 1 % (ref 0–2)
BASOPHILS ABSOLUTE: 0.04 K/UL (ref 0–0.2)
BILIRUB SERPL-MCNC: 0.4 MG/DL (ref 0.3–1.2)
BUN SERPL-MCNC: 10 MG/DL (ref 8–23)
CALCIUM SERPL-MCNC: 9 MG/DL (ref 8.6–10.4)
CHLORIDE SERPL-SCNC: 101 MMOL/L (ref 98–107)
CHOLEST SERPL-MCNC: 145 MG/DL
CHOLESTEROL/HDL RATIO: 2.1
CO2 SERPL-SCNC: 25 MMOL/L (ref 20–31)
CREAT SERPL-MCNC: 0.68 MG/DL (ref 0.5–0.9)
EOSINOPHILS RELATIVE PERCENT: 0 % (ref 1–4)
GFR SERPL CREATININE-BSD FRML MDRD: >60 ML/MIN/1.73M2
GLUCOSE SERPL-MCNC: 78 MG/DL (ref 70–99)
HCT VFR BLD AUTO: 40.8 % (ref 36.3–47.1)
HDLC SERPL-MCNC: 70 MG/DL
HGB BLD-MCNC: 13.6 G/DL (ref 11.9–15.1)
IMMATURE GRANULOCYTES: 0 %
LDLC SERPL CALC-MCNC: 61 MG/DL (ref 0–130)
LYMPHOCYTES # BLD: 25 % (ref 24–43)
MCH RBC QN AUTO: 32.3 PG (ref 25.2–33.5)
MCHC RBC AUTO-ENTMCNC: 33.3 G/DL (ref 28.4–34.8)
MCV RBC AUTO: 96.9 FL (ref 82.6–102.9)
MONOCYTES # BLD: 10 % (ref 3–12)
NRBC AUTOMATED: 0 PER 100 WBC
PDW BLD-RTO: 11.8 % (ref 11.8–14.4)
PLATELET # BLD AUTO: 181 K/UL (ref 138–453)
PMV BLD AUTO: 11.8 FL (ref 8.1–13.5)
POTASSIUM SERPL-SCNC: 4 MMOL/L (ref 3.7–5.3)
PROT SERPL-MCNC: 6.8 G/DL (ref 6.4–8.3)
RBC # BLD: 4.21 M/UL (ref 3.95–5.11)
SEG NEUTROPHILS: 64 % (ref 36–65)
SEGMENTED NEUTROPHILS ABSOLUTE COUNT: 3.89 K/UL (ref 1.5–8.1)
SODIUM SERPL-SCNC: 138 MMOL/L (ref 135–144)
TRIGL SERPL-MCNC: 71 MG/DL
TSH SERPL-ACNC: 3.74 UIU/ML (ref 0.3–5)
WBC # BLD AUTO: 6.1 K/UL (ref 3.5–11.3)

## 2023-05-11 PROCEDURE — 3074F SYST BP LT 130 MM HG: CPT | Performed by: STUDENT IN AN ORGANIZED HEALTH CARE EDUCATION/TRAINING PROGRAM

## 2023-05-11 PROCEDURE — 99213 OFFICE O/P EST LOW 20 MIN: CPT | Performed by: STUDENT IN AN ORGANIZED HEALTH CARE EDUCATION/TRAINING PROGRAM

## 2023-05-11 PROCEDURE — 3017F COLORECTAL CA SCREEN DOC REV: CPT | Performed by: STUDENT IN AN ORGANIZED HEALTH CARE EDUCATION/TRAINING PROGRAM

## 2023-05-11 PROCEDURE — 1036F TOBACCO NON-USER: CPT | Performed by: STUDENT IN AN ORGANIZED HEALTH CARE EDUCATION/TRAINING PROGRAM

## 2023-05-11 PROCEDURE — G8420 CALC BMI NORM PARAMETERS: HCPCS | Performed by: STUDENT IN AN ORGANIZED HEALTH CARE EDUCATION/TRAINING PROGRAM

## 2023-05-11 PROCEDURE — 1123F ACP DISCUSS/DSCN MKR DOCD: CPT | Performed by: STUDENT IN AN ORGANIZED HEALTH CARE EDUCATION/TRAINING PROGRAM

## 2023-05-11 PROCEDURE — 3079F DIAST BP 80-89 MM HG: CPT | Performed by: STUDENT IN AN ORGANIZED HEALTH CARE EDUCATION/TRAINING PROGRAM

## 2023-05-11 PROCEDURE — 1090F PRES/ABSN URINE INCON ASSESS: CPT | Performed by: STUDENT IN AN ORGANIZED HEALTH CARE EDUCATION/TRAINING PROGRAM

## 2023-05-11 PROCEDURE — G8427 DOCREV CUR MEDS BY ELIG CLIN: HCPCS | Performed by: STUDENT IN AN ORGANIZED HEALTH CARE EDUCATION/TRAINING PROGRAM

## 2023-05-11 PROCEDURE — G8399 PT W/DXA RESULTS DOCUMENT: HCPCS | Performed by: STUDENT IN AN ORGANIZED HEALTH CARE EDUCATION/TRAINING PROGRAM

## 2023-05-11 RX ORDER — LACOSAMIDE 200 MG/1
TABLET ORAL
Qty: 60 TABLET | Refills: 0 | Status: SHIPPED | OUTPATIENT
Start: 2023-05-11 | End: 2023-06-10

## 2023-05-11 RX ORDER — ATORVASTATIN CALCIUM 40 MG/1
40 TABLET, FILM COATED ORAL DAILY
Qty: 30 TABLET | Refills: 5 | Status: SHIPPED | OUTPATIENT
Start: 2023-05-11

## 2023-05-11 RX ORDER — DONEPEZIL HYDROCHLORIDE 5 MG/1
TABLET, FILM COATED ORAL
Qty: 120 TABLET | Refills: 0 | Status: SHIPPED | OUTPATIENT
Start: 2023-05-11

## 2023-05-11 SDOH — ECONOMIC STABILITY: INCOME INSECURITY: HOW HARD IS IT FOR YOU TO PAY FOR THE VERY BASICS LIKE FOOD, HOUSING, MEDICAL CARE, AND HEATING?: NOT HARD AT ALL

## 2023-05-11 SDOH — ECONOMIC STABILITY: FOOD INSECURITY: WITHIN THE PAST 12 MONTHS, YOU WORRIED THAT YOUR FOOD WOULD RUN OUT BEFORE YOU GOT MONEY TO BUY MORE.: NEVER TRUE

## 2023-05-11 SDOH — ECONOMIC STABILITY: FOOD INSECURITY: WITHIN THE PAST 12 MONTHS, THE FOOD YOU BOUGHT JUST DIDN'T LAST AND YOU DIDN'T HAVE MONEY TO GET MORE.: NEVER TRUE

## 2023-05-11 SDOH — ECONOMIC STABILITY: HOUSING INSECURITY
IN THE LAST 12 MONTHS, WAS THERE A TIME WHEN YOU DID NOT HAVE A STEADY PLACE TO SLEEP OR SLEPT IN A SHELTER (INCLUDING NOW)?: NO

## 2023-05-11 ASSESSMENT — PATIENT HEALTH QUESTIONNAIRE - PHQ9
1. LITTLE INTEREST OR PLEASURE IN DOING THINGS: 0
SUM OF ALL RESPONSES TO PHQ9 QUESTIONS 1 & 2: 0
9. THOUGHTS THAT YOU WOULD BE BETTER OFF DEAD, OR OF HURTING YOURSELF: 0
5. POOR APPETITE OR OVEREATING: 0
8. MOVING OR SPEAKING SO SLOWLY THAT OTHER PEOPLE COULD HAVE NOTICED. OR THE OPPOSITE, BEING SO FIGETY OR RESTLESS THAT YOU HAVE BEEN MOVING AROUND A LOT MORE THAN USUAL: 0
SUM OF ALL RESPONSES TO PHQ QUESTIONS 1-9: 0
3. TROUBLE FALLING OR STAYING ASLEEP: 0
4. FEELING TIRED OR HAVING LITTLE ENERGY: 0
7. TROUBLE CONCENTRATING ON THINGS, SUCH AS READING THE NEWSPAPER OR WATCHING TELEVISION: 0
2. FEELING DOWN, DEPRESSED OR HOPELESS: 0
SUM OF ALL RESPONSES TO PHQ QUESTIONS 1-9: 0
6. FEELING BAD ABOUT YOURSELF - OR THAT YOU ARE A FAILURE OR HAVE LET YOURSELF OR YOUR FAMILY DOWN: 0
SUM OF ALL RESPONSES TO PHQ QUESTIONS 1-9: 0
SUM OF ALL RESPONSES TO PHQ QUESTIONS 1-9: 0
10. IF YOU CHECKED OFF ANY PROBLEMS, HOW DIFFICULT HAVE THESE PROBLEMS MADE IT FOR YOU TO DO YOUR WORK, TAKE CARE OF THINGS AT HOME, OR GET ALONG WITH OTHER PEOPLE: 0

## 2023-05-11 ASSESSMENT — ENCOUNTER SYMPTOMS
VOMITING: 0
CHEST TIGHTNESS: 0
COUGH: 0
COLOR CHANGE: 0
EYE DISCHARGE: 0
NAUSEA: 0
SHORTNESS OF BREATH: 0
BACK PAIN: 0

## 2023-05-11 NOTE — PROGRESS NOTES
Attending Physician Statement  I have discussed the care of Bob Parikh, including pertinent history and exam findings,  with the resident. I have reviewed the key elements of all parts of the encounter with the resident. I agree with the assessment, plan and orders as documented by the resident.   (GE Modifier)

## 2023-05-11 NOTE — PROGRESS NOTES
[facility] [address] [address]  Dept: [phone]  Dept Fax: [phone]    Office Progress/Follow Up Note  Date ofpatient's visit: 5/11/2023  Patient's Name:  Ebonie Rodriguez YOB: 1955            Patient Care Team:  Eber Couch MD as PCP - General (Internal Medicine)  Mercedes Correa MD as Consulting Physician (Internal Medicine)  Tahira Ivan MD as Consulting Physician (Gastroenterology)  ================================================================    REASON FOR VISIT/CHIEF COMPLAINT:  Hypertension    HISTORY OF PRESENTING ILLNESS:  History was obtained from: patient, electronic medical record. Denzel Burkitt a 76 y.o. is here for a follow-up    60-year-old female with history of hypertension on Norvasc 10, history of CVA in 1998 on aspirin and Lipitor, cognitive impairment on donepezil, seizure on Vimpat 200 mg twice daily and Keppra 1.5 g twice daily depression, GERD on Prilosec, mild cognitive impairment on Aricept 5 mg, is here for routine follow-up. Patient is here for routine follow-up. Last seen in the office in November. She is compliant with her antiseizure medication she take Vimpat and Keppra. She follows with Dr. Iris Soto. Denies any numbness tingling, vision problems/blurry vision, vomiting, weakness, chest pain or shortness of breath. She has bubbble back for her medication. Patient use Beatriz Duran today to come in clinic. Seizure precaution including sleep deprivation and alcohol abstinence were discussed. She is non-smoker. She lives with his sisters and son. Clinic 122/82 with heart rate 78. Healthcare maintenance  DEXA scan negative in 2015  Colonoscopy was done in 2021. Negative. Repeat in 3 years due to poor prep  Mammography on 6/21/2022: Negative annual screening.   Pap Smear in 2017: negative for malignancy  HbA1c 5.1% in 2016  TSH 3.20 on 08/01/2022      Patient Active Problem

## 2023-05-19 DIAGNOSIS — G43.009 MIGRAINE WITHOUT AURA AND WITHOUT STATUS MIGRAINOSUS, NOT INTRACTABLE: ICD-10-CM

## 2023-05-19 RX ORDER — SUMATRIPTAN 100 MG/1
TABLET, FILM COATED ORAL
Qty: 30 TABLET | Refills: 1 | Status: SHIPPED | OUTPATIENT
Start: 2023-05-19

## 2023-05-19 NOTE — TELEPHONE ENCOUNTER
Last visit: 05/11/23   Last Med refill: 12/22  Does patient have enough medication for 72 hours: No:     Next Visit Date:  Future Appointments   Date Time Provider Carlos Matta   9/5/2023 10:00 AM Danna Talley MD Neuro [facility] Neurology -       Health Maintenance   Topic Date Due    COVID-19 Vaccine (4 - Booster) 03/24/2022    Shingles vaccine (3 of 3) 06/08/2023 (Originally 9/12/2018)    Pneumococcal 65+ years Vaccine (3) 02/07/2024    Lipids  05/11/2024    Depression Monitoring  05/11/2024    Breast cancer screen  06/21/2024    DTaP/Tdap/Td vaccine (2 - Td or Tdap) 11/23/2025    Colorectal Cancer Screen  05/10/2031    DEXA (modify frequency per FRAX score)  Completed    Flu vaccine  Completed    Hepatitis C screen  Completed    Hepatitis A vaccine  Aged Out    Hib vaccine  Aged Out    Meningococcal (ACWY) vaccine  Aged Out    Depression Screen  Discontinued    Diabetes screen  Discontinued    HIV screen  Discontinued       Hemoglobin A1C (%)   Date Value   08/18/2016 5.1   05/07/2015 5.0             ( goal A1C is < 7)   No results found for: LABMICR  LDL Cholesterol (mg/dL)   Date Value   05/11/2023 61   03/09/2022 80       (goal LDL is <100)   AST (U/L)   Date Value   05/11/2023 19     ALT (U/L)   Date Value   05/11/2023 17     BUN (mg/dL)   Date Value   05/11/2023 10     BP Readings from Last 3 Encounters:   05/11/23 122/82   11/04/22 100/75   07/01/22 125/81          (goal 120/80)    All Future Testing planned in [facility]              Patient Active Problem List:     Acquired hypothyroidism     Hypertension     Urge incontinence     Generalized anxiety disorder     Gastroesophageal reflux disease without esophagitis     H/O: CVA (cerebrovascular accident)     Migraine without aura and without status migrainosus, not intractable     Hammer toe of left foot     Tremor     Status epilepticus (HCC)     Speech and language deficits     Constipation     Seizure (HCC)     Seizures (HCC)     Breakthrough seizure

## 2023-06-02 DIAGNOSIS — G40.909 SEIZURE DISORDER (HCC): ICD-10-CM

## 2023-06-02 DIAGNOSIS — R56.9 SEIZURES (HCC): ICD-10-CM

## 2023-06-05 DIAGNOSIS — G40.909 SEIZURE DISORDER (HCC): ICD-10-CM

## 2023-06-05 RX ORDER — PRIMIDONE 50 MG/1
TABLET ORAL
Qty: 60 TABLET | Refills: 1 | Status: SHIPPED | OUTPATIENT
Start: 2023-06-05 | End: 2023-08-04

## 2023-06-05 RX ORDER — LACOSAMIDE 200 MG/1
TABLET ORAL
Qty: 60 TABLET | Refills: 0 | Status: SHIPPED | OUTPATIENT
Start: 2023-06-05 | End: 2023-06-29

## 2023-06-05 RX ORDER — LEVETIRACETAM 750 MG/1
TABLET ORAL
Qty: 120 TABLET | Refills: 0 | Status: SHIPPED | OUTPATIENT
Start: 2023-06-05 | End: 2023-06-29

## 2023-06-05 RX ORDER — LACOSAMIDE 200 MG/1
TABLET ORAL
Qty: 60 TABLET | Refills: 1 | OUTPATIENT
Start: 2023-06-05 | End: 2023-07-05

## 2023-06-05 NOTE — TELEPHONE ENCOUNTER
Pharmacy requesting refill of Vimpat and Keppra. Medication active on med list yes      Date of last fill: Vimpat: 5/11/23, Keppra: 12/14/22  verified on 6/5/2023   verified by [facility]      Date of last appointment 4/19/22    Next Visit Date:  9/5/2023    Please approve for Dr Kaitlyn Henning pt as he is out of office, thanks.

## 2023-06-05 NOTE — TELEPHONE ENCOUNTER
([facility]     Seizures ([facility]     Breakthrough seizure ([facility]     Depression     Complex partial status epilepticus      Mammogram abnormal

## 2023-06-28 DIAGNOSIS — G40.909 SEIZURE DISORDER (HCC): ICD-10-CM

## 2023-06-28 DIAGNOSIS — N39.41 URGE INCONTINENCE: ICD-10-CM

## 2023-06-28 DIAGNOSIS — R56.9 SEIZURES (HCC): ICD-10-CM

## 2023-06-28 DIAGNOSIS — I10 ESSENTIAL HYPERTENSION: Chronic | ICD-10-CM

## 2023-06-29 RX ORDER — OXYBUTYNIN CHLORIDE 5 MG/1
TABLET ORAL
Qty: 90 TABLET | Refills: 5 | Status: SHIPPED | OUTPATIENT
Start: 2023-06-29

## 2023-06-29 RX ORDER — LACOSAMIDE 200 MG/1
200 TABLET ORAL 2 TIMES DAILY
Qty: 180 TABLET | Refills: 3 | Status: SHIPPED | OUTPATIENT
Start: 2023-06-29 | End: 2023-09-27

## 2023-06-29 RX ORDER — PRIMIDONE 50 MG/1
TABLET ORAL
Qty: 90 TABLET | Refills: 5 | Status: SHIPPED | OUTPATIENT
Start: 2023-06-29 | End: 2023-08-28

## 2023-06-29 RX ORDER — AMLODIPINE BESYLATE 10 MG/1
TABLET ORAL
Qty: 30 TABLET | Refills: 5 | Status: SHIPPED | OUTPATIENT
Start: 2023-06-29

## 2023-06-29 RX ORDER — LEVETIRACETAM 750 MG/1
TABLET ORAL
Qty: 120 TABLET | Refills: 0 | Status: SHIPPED | OUTPATIENT
Start: 2023-06-29

## 2023-07-27 DIAGNOSIS — G40.909 SEIZURE DISORDER (HCC): ICD-10-CM

## 2023-07-27 NOTE — TELEPHONE ENCOUNTER
Pharmacy requesting refill of Keppra.       Medication active on med list yes      Date of last fill: 6/29/23  verified on 7/27/2023   verified by [facility] LPN      Date of last appointment 4/19/23    Next Visit Date:  9/5/2023

## 2023-07-28 RX ORDER — LEVETIRACETAM 750 MG/1
TABLET ORAL
Qty: 120 TABLET | Refills: 5 | Status: SHIPPED | OUTPATIENT
Start: 2023-07-28 | End: 2024-01-24

## 2023-08-21 NOTE — TELEPHONE ENCOUNTER
Pharmacy requesting refill of donepezil 5 mg.       Medication active on med list yes      Date of last Rx: 5/11/2023 with 0 refills          verified by SB, RMA      Date of last appointment 4/19/2022    Next Visit Date:  9/5/2023

## 2023-08-22 RX ORDER — DONEPEZIL HYDROCHLORIDE 5 MG/1
TABLET, FILM COATED ORAL
Qty: 90 TABLET | Refills: 0 | Status: SHIPPED | OUTPATIENT
Start: 2023-08-22

## 2023-09-05 ENCOUNTER — OFFICE VISIT (OUTPATIENT)
Dept: NEUROLOGY | Age: 68
End: 2023-09-05
Payer: COMMERCIAL

## 2023-09-05 VITALS
HEIGHT: 70 IN | BODY MASS INDEX: 20.39 KG/M2 | SYSTOLIC BLOOD PRESSURE: 93 MMHG | DIASTOLIC BLOOD PRESSURE: 65 MMHG | WEIGHT: 142.4 LBS | HEART RATE: 82 BPM

## 2023-09-05 DIAGNOSIS — G40.909 SEIZURE DISORDER (HCC): Primary | ICD-10-CM

## 2023-09-05 PROBLEM — G20 PARKINSON'S DISEASE (HCC): Status: ACTIVE | Noted: 2023-09-05

## 2023-09-05 PROBLEM — G20.A1 PARKINSON'S DISEASE: Status: ACTIVE | Noted: 2023-09-05

## 2023-09-05 PROCEDURE — 1090F PRES/ABSN URINE INCON ASSESS: CPT | Performed by: PSYCHIATRY & NEUROLOGY

## 2023-09-05 PROCEDURE — 1123F ACP DISCUSS/DSCN MKR DOCD: CPT | Performed by: PSYCHIATRY & NEUROLOGY

## 2023-09-05 PROCEDURE — 3074F SYST BP LT 130 MM HG: CPT | Performed by: PSYCHIATRY & NEUROLOGY

## 2023-09-05 PROCEDURE — 99214 OFFICE O/P EST MOD 30 MIN: CPT | Performed by: PSYCHIATRY & NEUROLOGY

## 2023-09-05 PROCEDURE — 3078F DIAST BP <80 MM HG: CPT | Performed by: PSYCHIATRY & NEUROLOGY

## 2023-09-05 PROCEDURE — G8399 PT W/DXA RESULTS DOCUMENT: HCPCS | Performed by: PSYCHIATRY & NEUROLOGY

## 2023-09-05 PROCEDURE — G8427 DOCREV CUR MEDS BY ELIG CLIN: HCPCS | Performed by: PSYCHIATRY & NEUROLOGY

## 2023-09-05 PROCEDURE — 3017F COLORECTAL CA SCREEN DOC REV: CPT | Performed by: PSYCHIATRY & NEUROLOGY

## 2023-09-05 PROCEDURE — 1036F TOBACCO NON-USER: CPT | Performed by: PSYCHIATRY & NEUROLOGY

## 2023-09-05 PROCEDURE — G8420 CALC BMI NORM PARAMETERS: HCPCS | Performed by: PSYCHIATRY & NEUROLOGY

## 2023-09-05 NOTE — PROGRESS NOTES
Dear Dr. Indigo Murrell MD   Willa Flowers is a 76 y.o. female who comes in today as a follow visit. She has a past medical history of right cortical ischemic stroke and subsequently has a history of seizure disorder. On 6/9/2020 the patient stated she stopped Vimpat; this medication was re-initiated. She is currently on Keppra at 1250 mg twice a day and Vimpat 200 mg twice a day. While off Vimpat, she experienced a breakthrough seizure on 6/19/20 and required a hospital stay from 6/19/20 to 6/23/20. Her previous DaTscan returned normal.    7/20/21, the patient is accompanied by her sister. She reports a mechanical fall that resulting in an ER visit on 06/11/2021. The patient admits that the tremors have improved since last visit but have not gone away completely. Her sister reports the patient has slower speech and difficulties with memory. Additionally, the patient admits neck pain on the right side. She denies bladder and bowel incontinence. The patient presented for a follow up on 4/19/22. She explains that she had a breakthrough seizure for which she had gone to the hospital in February 2022. The patient states she has had 3 breakthrough seizures in the last year. She admits to medication compliance. She denies any side-effects. Patient was not on Vimpat per last discharge note in February. Patient was taking Vimpat and Keppra during breakthrough seizure in April 2022. The patient states that her tremors are better in the mornings, but worsen as the day progresses. Patient has been compliant with her Primidone 1/2 pill BID. MRI Brain W Contrast on 06/22/2020: Remote right MCA distribution infarct. No acute intracranial abnormality. No abnormal postcontrast enhancement. MRI Brain WO Contrast on 06/20/2020: No acute infarct or acute intracranial process identified. Remote right MCA infarct.   Patient's long-term EEG monitoring at [facility] for 4 days showed frequent left temporal

## 2023-09-25 DIAGNOSIS — K21.9 GASTROESOPHAGEAL REFLUX DISEASE: ICD-10-CM

## 2023-09-25 DIAGNOSIS — F32.A DEPRESSION, UNSPECIFIED DEPRESSION TYPE: Primary | ICD-10-CM

## 2023-09-25 RX ORDER — PAROXETINE 30 MG/1
30 TABLET, FILM COATED ORAL EVERY MORNING
Qty: 30 TABLET | Refills: 0 | Status: ON HOLD | OUTPATIENT
Start: 2023-09-25

## 2023-09-25 RX ORDER — OMEPRAZOLE 20 MG/1
20 CAPSULE, DELAYED RELEASE ORAL DAILY
Qty: 30 CAPSULE | Refills: 0 | Status: ON HOLD | OUTPATIENT
Start: 2023-09-25

## 2023-10-13 ENCOUNTER — APPOINTMENT (OUTPATIENT)
Dept: CT IMAGING | Age: 68
DRG: 321 | End: 2023-10-13
Payer: COMMERCIAL

## 2023-10-13 ENCOUNTER — APPOINTMENT (OUTPATIENT)
Dept: GENERAL RADIOLOGY | Age: 68
DRG: 321 | End: 2023-10-13
Payer: COMMERCIAL

## 2023-10-13 ENCOUNTER — HOSPITAL ENCOUNTER (INPATIENT)
Age: 68
DRG: 321 | End: 2023-10-13
Attending: EMERGENCY MEDICINE | Admitting: ORTHOPAEDIC SURGERY
Payer: COMMERCIAL

## 2023-10-13 DIAGNOSIS — L02.91 ABSCESS: ICD-10-CM
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
## 2023-10-13 DIAGNOSIS — R60.0 LOCALIZED EDEMA: ICD-10-CM

## 2023-10-13 DIAGNOSIS — R06.03 ACUTE RESPIRATORY DISTRESS: ICD-10-CM

## 2023-10-13 DIAGNOSIS — S12.9XXA CLOSED FRACTURE OF SPINOUS PROCESS OF CERVICAL VERTEBRA, INITIAL ENCOUNTER (HCC): ICD-10-CM

## 2023-10-13 DIAGNOSIS — S06.4XAA EPIDURAL HEMATOMA (HCC): ICD-10-CM

## 2023-10-13 DIAGNOSIS — Z51.5 ENCOUNTER FOR PALLIATIVE CARE: ICD-10-CM

## 2023-10-13 LAB
ANION GAP SERPL CALCULATED.3IONS-SCNC: 10 MMOL/L (ref 9–17)
BASOPHILS # BLD: 0.1 K/UL (ref 0–0.2)
BASOPHILS NFR BLD: 1 % (ref 0–2)
BUN SERPL-MCNC: 9 MG/DL (ref 8–23)
CALCIUM SERPL-MCNC: 8.6 MG/DL (ref 8.6–10.4)
CHLORIDE SERPL-SCNC: 104 MMOL/L (ref 98–107)
CO2 SERPL-SCNC: 26 MMOL/L (ref 20–31)
CREAT SERPL-MCNC: 0.5 MG/DL (ref 0.5–0.9)
EOSINOPHIL # BLD: 0.1 K/UL (ref 0–0.4)
EOSINOPHILS RELATIVE PERCENT: 1 % (ref 0–4)
ERYTHROCYTE [DISTWIDTH] IN BLOOD BY AUTOMATED COUNT: 12.9 % (ref 11.5–14.9)
GFR SERPL CREATININE-BSD FRML MDRD: >60 ML/MIN/1.73M2
GLUCOSE SERPL-MCNC: 105 MG/DL (ref 70–99)
HCT VFR BLD AUTO: 39.2 % (ref 36–46)
HGB BLD-MCNC: 13.1 G/DL (ref 12–16)
INR PPP: 1.1
LYMPHOCYTES NFR BLD: 2 K/UL (ref 1–4.8)
LYMPHOCYTES RELATIVE PERCENT: 32 % (ref 24–44)
MAGNESIUM SERPL-MCNC: 2 MG/DL (ref 1.6–2.6)
MCH RBC QN AUTO: 33.4 PG (ref 26–34)
MCHC RBC AUTO-ENTMCNC: 33.3 G/DL (ref 31–37)
MCV RBC AUTO: 100.2 FL (ref 80–100)
MONOCYTES NFR BLD: 0.6 K/UL (ref 0.1–1.3)
MONOCYTES NFR BLD: 10 % (ref 1–7)
NEUTROPHILS NFR BLD: 56 % (ref 36–66)
NEUTS SEG NFR BLD: 3.6 K/UL (ref 1.3–9.1)
PARTIAL THROMBOPLASTIN TIME: 24.5 SEC (ref 24–36)
PHOSPHATE SERPL-MCNC: 3.8 MG/DL (ref 2.6–4.5)
PLATELET # BLD AUTO: 173 K/UL (ref 150–450)
PMV BLD AUTO: 9.8 FL (ref 6–12)
POTASSIUM SERPL-SCNC: 3.8 MMOL/L (ref 3.7–5.3)
PROTHROMBIN TIME: 14.9 SEC (ref 11.8–14.6)
RBC # BLD AUTO: 3.91 M/UL (ref 4–5.2)
SODIUM SERPL-SCNC: 140 MMOL/L (ref 135–144)
TROPONIN I SERPL HS-MCNC: 8 NG/L (ref 0–14)
TROPONIN I SERPL HS-MCNC: 8 NG/L (ref 0–14)
WBC OTHER # BLD: 6.3 K/UL (ref 3.5–11)

## 2023-10-13 PROCEDURE — 72170 X-RAY EXAM OF PELVIS: CPT

## 2023-10-13 PROCEDURE — 93005 ELECTROCARDIOGRAM TRACING: CPT | Performed by: EMERGENCY MEDICINE

## 2023-10-13 PROCEDURE — 99285 EMERGENCY DEPT VISIT HI MDM: CPT

## 2023-10-13 PROCEDURE — 71045 X-RAY EXAM CHEST 1 VIEW: CPT

## 2023-10-13 PROCEDURE — 85610 PROTHROMBIN TIME: CPT

## 2023-10-13 PROCEDURE — 70450 CT HEAD/BRAIN W/O DYE: CPT

## 2023-10-13 PROCEDURE — 36415 COLL VENOUS BLD VENIPUNCTURE: CPT

## 2023-10-13 PROCEDURE — 84484 ASSAY OF TROPONIN QUANT: CPT

## 2023-10-13 PROCEDURE — 73030 X-RAY EXAM OF SHOULDER: CPT

## 2023-10-13 PROCEDURE — 80048 BASIC METABOLIC PNL TOTAL CA: CPT

## 2023-10-13 PROCEDURE — 85025 COMPLETE CBC W/AUTO DIFF WBC: CPT

## 2023-10-13 PROCEDURE — 72131 CT LUMBAR SPINE W/O DYE: CPT

## 2023-10-13 PROCEDURE — 84100 ASSAY OF PHOSPHORUS: CPT

## 2023-10-13 PROCEDURE — 83735 ASSAY OF MAGNESIUM: CPT

## 2023-10-13 PROCEDURE — 85730 THROMBOPLASTIN TIME PARTIAL: CPT

## 2023-10-13 PROCEDURE — 73130 X-RAY EXAM OF HAND: CPT

## 2023-10-13 PROCEDURE — 6370000000 HC RX 637 (ALT 250 FOR IP): Performed by: EMERGENCY MEDICINE

## 2023-10-13 PROCEDURE — 72125 CT NECK SPINE W/O DYE: CPT

## 2023-10-13 RX ORDER — HYDROCODONE BITARTRATE AND ACETAMINOPHEN 5; 325 MG/1; MG/1
1 TABLET ORAL EVERY 8 HOURS PRN
Qty: 10 TABLET | Refills: 0 | Status: SHIPPED | OUTPATIENT
Start: 2023-10-13 | End: 2023-10-16

## 2023-10-13 RX ORDER — HYDROCODONE BITARTRATE AND ACETAMINOPHEN 5; 325 MG/1; MG/1
1 TABLET ORAL ONCE
Status: COMPLETED | OUTPATIENT
Start: 2023-10-13 | End: 2023-10-13

## 2023-10-13 RX ADMIN — HYDROCODONE BITARTRATE AND ACETAMINOPHEN 1 TABLET: 5; 325 TABLET ORAL at 19:03

## 2023-10-13 ASSESSMENT — LIFESTYLE VARIABLES
HOW MANY STANDARD DRINKS CONTAINING ALCOHOL DO YOU HAVE ON A TYPICAL DAY: PATIENT DOES NOT DRINK
HOW OFTEN DO YOU HAVE A DRINK CONTAINING ALCOHOL: NEVER

## 2023-10-13 ASSESSMENT — ENCOUNTER SYMPTOMS
SHORTNESS OF BREATH: 0
ABDOMINAL PAIN: 0
VOMITING: 0
COUGH: 0
NAUSEA: 0

## 2023-10-13 ASSESSMENT — PAIN DESCRIPTION - ORIENTATION: ORIENTATION: RIGHT

## 2023-10-13 ASSESSMENT — PAIN SCALES - GENERAL
PAINLEVEL_OUTOF10: 6
PAINLEVEL_OUTOF10: 8
PAINLEVEL_OUTOF10: 8

## 2023-10-13 ASSESSMENT — PAIN DESCRIPTION - LOCATION: LOCATION: SHOULDER

## 2023-10-13 ASSESSMENT — PAIN - FUNCTIONAL ASSESSMENT: PAIN_FUNCTIONAL_ASSESSMENT: 0-10

## 2023-10-13 NOTE — ED NOTES
Pt transferred onto ER stretcher and log rolled. C-Collar applied before pt arrival. C-spine precautions maintained.      Mary Ellen Hubbard RN  10/13/23 2981

## 2023-10-13 NOTE — ED TRIAGE NOTES
Mode of arrival (squad #, walk in, police, etc) : [place] [address]        Chief complaint(s): Fall, right shoulder pain        Arrival Note (brief scenario, treatment PTA, etc). : Pt fell down a flight of stairs. Pt c/o right shoulder pain. Pt has abrasions to forehead and bilateral lumbar region. Pt denies losing consciousness. C= \"Have you ever felt that you should Cut down on your drinking? \"  No  A= \"Have people Annoyed you by criticizing your drinking? \"  No  G= \"Have you ever felt bad or Guilty about your drinking? \"  No  E= \"Have you ever had a drink as an Eye-opener first thing in the morning to steady your nerves or to help a hangover? \"  No      Deferred []      Reason for deferring: N/A    *If yes to two or more: probable alcohol abuse. *

## 2023-10-13 NOTE — ED PROVIDER NOTES
EMERGENCY DEPARTMENT ENCOUNTER    Pt Name: Willi Smith  MRN: [id number]  [address] [facility] 1955  Date of evaluation: 10/13/23  CHIEF COMPLAINT       Chief Complaint   Patient presents with    Fall    Shoulder Pain    Hand Pain     HISTORY OF PRESENT ILLNESS   Patient fell while walking down her stairs. She fell down 3 steps. She denies any loss of consciousness. She is complaining of right-sided neck pain as well as right hand pain and right shoulder pain. She is not on a blood thinner. The history is provided by the patient and the EMS personnel. REVIEW OF SYSTEMS     Review of Systems   Constitutional:  Negative for chills and fever. HENT:  Negative for congestion. Eyes:  Negative for visual disturbance. Respiratory:  Negative for cough and shortness of breath. Cardiovascular:  Negative for chest pain. Gastrointestinal:  Negative for abdominal pain, nausea and vomiting. Genitourinary:  Negative for dysuria, flank pain, frequency and urgency. Musculoskeletal:  Positive for arthralgias and neck pain. Negative for myalgias. Neurological:  Negative for dizziness, light-headedness and headaches. Psychiatric/Behavioral:  Negative for behavioral problems. PASTMEDICAL HISTORY     Past Medical History:   Diagnosis Date    Ankle fracture, left     Anxiety 02/04/2014    Arthritis     Black tarry stools     Colon polyp     Constipation     ETOH abuse     States she has not drank any ETOH in 24 years as of 2021    Frequency of urination ? GERD (gastroesophageal reflux disease) 02/04/2014    Head injury 1998    after seizure/stroke pt fell and hit head    Headache(784.0)     Hypertension     Hypotension ? Hypothyroidism     Memory deficit     Migraines     Numbness and tingling     fingers    Osteoporosis     Peripheral vascular disease ([address])     Pneumonia     Seizures ([address])     last seizure 6-,     Shingles      Pt.denies.     Stroke [facility]) 1998    stroke and seizure together negative...

## 2023-10-14 ENCOUNTER — APPOINTMENT (OUTPATIENT)
Dept: MRI IMAGING | Age: 68
DRG: 321 | End: 2023-10-14
Payer: COMMERCIAL

## 2023-10-14 PROBLEM — M43.10 ACQUIRED SPONDYLOLISTHESIS: Status: ACTIVE | Noted: 2023-10-14

## 2023-10-14 PROBLEM — S06.4XAA EPIDURAL HEMATOMA (HCC): Status: ACTIVE | Noted: 2023-10-14

## 2023-10-14 PROBLEM — S12.9XXA: Status: ACTIVE | Noted: 2023-10-14

## 2023-10-14 PROBLEM — M48.02 SPINAL STENOSIS IN CERVICAL REGION: Status: ACTIVE | Noted: 2023-10-14

## 2023-10-14 PROBLEM — M48.061 SPINAL STENOSIS, LUMBAR REGION, WITHOUT NEUROGENIC CLAUDICATION: Status: ACTIVE | Noted: 2023-10-14

## 2023-10-14 LAB
BACTERIA URNS QL MICRO: ABNORMAL
BILIRUB UR QL STRIP: NEGATIVE
CASTS #/AREA URNS LPF: ABNORMAL /LPF
CASTS #/AREA URNS LPF: ABNORMAL /LPF
CLARITY UR: ABNORMAL
COLOR UR: ABNORMAL
EPI CELLS #/AREA URNS HPF: ABNORMAL /HPF
GLUCOSE UR STRIP-MCNC: NEGATIVE MG/DL
HGB UR QL STRIP.AUTO: NEGATIVE
KETONES UR STRIP-MCNC: ABNORMAL MG/DL
LEUKOCYTE ESTERASE UR QL STRIP: ABNORMAL
NITRITE UR QL STRIP: POSITIVE
PH UR STRIP: 5.5 [PH] (ref 5–8)
PROT UR STRIP-MCNC: ABNORMAL MG/DL
RBC #/AREA URNS HPF: ABNORMAL /HPF
SP GR UR STRIP: 1.02 (ref 1–1.03)
UROBILINOGEN UR STRIP-ACNC: NORMAL EU/DL (ref 0–1)
WBC #/AREA URNS HPF: ABNORMAL /HPF

## 2023-10-14 PROCEDURE — 87186 SC STD MICRODIL/AGAR DIL: CPT

## 2023-10-14 PROCEDURE — 72148 MRI LUMBAR SPINE W/O DYE: CPT

## 2023-10-14 PROCEDURE — 72141 MRI NECK SPINE W/O DYE: CPT

## 2023-10-14 PROCEDURE — 51798 US URINE CAPACITY MEASURE: CPT

## 2023-10-14 PROCEDURE — 99222 1ST HOSP IP/OBS MODERATE 55: CPT | Performed by: ORTHOPAEDIC SURGERY

## 2023-10-14 PROCEDURE — 72146 MRI CHEST SPINE W/O DYE: CPT

## 2023-10-14 PROCEDURE — 87086 URINE CULTURE/COLONY COUNT: CPT

## 2023-10-14 PROCEDURE — 6360000002 HC RX W HCPCS: Performed by: ORTHOPAEDIC SURGERY

## 2023-10-14 PROCEDURE — 81001 URINALYSIS AUTO W/SCOPE: CPT

## 2023-10-14 PROCEDURE — 99223 1ST HOSP IP/OBS HIGH 75: CPT | Performed by: INTERNAL MEDICINE

## 2023-10-14 PROCEDURE — 51701 INSERT BLADDER CATHETER: CPT

## 2023-10-14 PROCEDURE — 6370000000 HC RX 637 (ALT 250 FOR IP): Performed by: ORTHOPAEDIC SURGERY

## 2023-10-14 PROCEDURE — 2580000003 HC RX 258: Performed by: ORTHOPAEDIC SURGERY

## 2023-10-14 PROCEDURE — 6370000000 HC RX 637 (ALT 250 FOR IP): Performed by: EMERGENCY MEDICINE

## 2023-10-14 PROCEDURE — 87088 URINE BACTERIA CULTURE: CPT

## 2023-10-14 PROCEDURE — 1200000000 HC SEMI PRIVATE

## 2023-10-14 RX ORDER — ATORVASTATIN CALCIUM 40 MG/1
40 TABLET, FILM COATED ORAL DAILY
Status: DISCONTINUED | OUTPATIENT
Start: 2023-10-14 | End: 2023-10-18

## 2023-10-14 RX ORDER — SODIUM CHLORIDE 0.9 % (FLUSH) 0.9 %
5-40 SYRINGE (ML) INJECTION PRN
Status: DISCONTINUED | OUTPATIENT
Start: 2023-10-14 | End: 2023-10-28 | Stop reason: SDUPTHER

## 2023-10-14 RX ORDER — SODIUM CHLORIDE 0.9 % (FLUSH) 0.9 %
5-40 SYRINGE (ML) INJECTION EVERY 12 HOURS SCHEDULED
Status: DISCONTINUED | OUTPATIENT
Start: 2023-10-14 | End: 2023-10-28 | Stop reason: SDUPTHER

## 2023-10-14 RX ORDER — DONEPEZIL HYDROCHLORIDE 5 MG/1
5 TABLET, FILM COATED ORAL NIGHTLY
Status: DISCONTINUED | OUTPATIENT
Start: 2023-10-14 | End: 2023-10-18

## 2023-10-14 RX ORDER — LEVOTHYROXINE SODIUM 0.05 MG/1
50 TABLET ORAL DAILY
Status: DISCONTINUED | OUTPATIENT
Start: 2023-10-14 | End: 2023-10-18

## 2023-10-14 RX ORDER — LEVETIRACETAM 750 MG/1
750 TABLET ORAL ONCE
Status: COMPLETED | OUTPATIENT
Start: 2023-10-14 | End: 2023-10-14

## 2023-10-14 RX ORDER — PANTOPRAZOLE SODIUM 40 MG/1
40 TABLET, DELAYED RELEASE ORAL
Status: DISCONTINUED | OUTPATIENT
Start: 2023-10-15 | End: 2023-10-18

## 2023-10-14 RX ORDER — DEXAMETHASONE SODIUM PHOSPHATE 10 MG/ML
10 INJECTION, SOLUTION INTRAMUSCULAR; INTRAVENOUS EVERY 6 HOURS
Status: COMPLETED | OUTPATIENT
Start: 2023-10-14 | End: 2023-10-14

## 2023-10-14 RX ORDER — OXYCODONE HYDROCHLORIDE 5 MG/1
5 TABLET ORAL EVERY 4 HOURS PRN
Status: DISCONTINUED | OUTPATIENT
Start: 2023-10-14 | End: 2023-10-18

## 2023-10-14 RX ORDER — LACOSAMIDE 100 MG/1
200 TABLET ORAL ONCE
Status: DISCONTINUED | OUTPATIENT
Start: 2023-10-14 | End: 2023-10-14 | Stop reason: SDUPTHER

## 2023-10-14 RX ORDER — AMLODIPINE BESYLATE 10 MG/1
10 TABLET ORAL DAILY
Status: DISCONTINUED | OUTPATIENT
Start: 2023-10-14 | End: 2023-10-18

## 2023-10-14 RX ORDER — LACOSAMIDE 50 MG/1
200 TABLET ORAL 2 TIMES DAILY
Status: DISCONTINUED | OUTPATIENT
Start: 2023-10-14 | End: 2023-10-14 | Stop reason: SDUPTHER

## 2023-10-14 RX ORDER — LACOSAMIDE 50 MG/1
200 TABLET ORAL 2 TIMES DAILY
Status: DISCONTINUED | OUTPATIENT
Start: 2023-10-14 | End: 2023-10-18

## 2023-10-14 RX ORDER — SUMATRIPTAN 100 MG/1
100 TABLET, FILM COATED ORAL EVERY 12 HOURS PRN
Status: DISPENSED | OUTPATIENT
Start: 2023-10-14

## 2023-10-14 RX ORDER — LEVETIRACETAM 750 MG/1
750 TABLET ORAL 2 TIMES DAILY
Status: DISCONTINUED | OUTPATIENT
Start: 2023-10-14 | End: 2023-10-18

## 2023-10-14 RX ORDER — SODIUM CHLORIDE 9 MG/ML
INJECTION, SOLUTION INTRAVENOUS PRN
Status: DISCONTINUED | OUTPATIENT
Start: 2023-10-14 | End: 2023-10-28 | Stop reason: SDUPTHER

## 2023-10-14 RX ORDER — OXYBUTYNIN CHLORIDE 5 MG/1
5 TABLET ORAL 3 TIMES DAILY
Status: DISCONTINUED | OUTPATIENT
Start: 2023-10-14 | End: 2023-10-15

## 2023-10-14 RX ORDER — OXYCODONE HYDROCHLORIDE 10 MG/1
10 TABLET ORAL EVERY 4 HOURS PRN
Status: DISCONTINUED | OUTPATIENT
Start: 2023-10-14 | End: 2023-10-18 | Stop reason: SDUPTHER

## 2023-10-14 RX ORDER — ACETAMINOPHEN 325 MG/1
650 TABLET ORAL EVERY 4 HOURS PRN
Status: DISPENSED | OUTPATIENT
Start: 2023-10-14

## 2023-10-14 RX ORDER — PRIMIDONE 50 MG/1
50 TABLET ORAL 2 TIMES DAILY
Status: DISCONTINUED | OUTPATIENT
Start: 2023-10-14 | End: 2023-10-18

## 2023-10-14 RX ADMIN — DONEPEZIL HYDROCHLORIDE 5 MG: 5 TABLET, FILM COATED ORAL at 19:21

## 2023-10-14 RX ADMIN — DEXAMETHASONE SODIUM PHOSPHATE 10 MG: 10 INJECTION INTRAMUSCULAR; INTRAVENOUS at 13:26

## 2023-10-14 RX ADMIN — LACOSAMIDE 200 MG: 100 TABLET, FILM COATED ORAL at 12:03

## 2023-10-14 RX ADMIN — OXYBUTYNIN CHLORIDE 5 MG: 5 TABLET ORAL at 19:21

## 2023-10-14 RX ADMIN — OXYBUTYNIN CHLORIDE 5 MG: 5 TABLET ORAL at 12:03

## 2023-10-14 RX ADMIN — OXYCODONE HYDROCHLORIDE 10 MG: 10 TABLET ORAL at 13:25

## 2023-10-14 RX ADMIN — OXYCODONE HYDROCHLORIDE 5 MG: 5 TABLET ORAL at 08:32

## 2023-10-14 RX ADMIN — LACOSAMIDE 200 MG: 50 TABLET, FILM COATED ORAL at 21:12

## 2023-10-14 RX ADMIN — PAROXETINE 30 MG: 10 TABLET, FILM COATED ORAL at 09:54

## 2023-10-14 RX ADMIN — OXYCODONE HYDROCHLORIDE 5 MG: 5 TABLET ORAL at 19:21

## 2023-10-14 RX ADMIN — LEVETIRACETAM 750 MG: 750 TABLET, FILM COATED ORAL at 19:21

## 2023-10-14 RX ADMIN — SODIUM CHLORIDE, PRESERVATIVE FREE 10 ML: 5 INJECTION INTRAVENOUS at 19:27

## 2023-10-14 RX ADMIN — PRIMIDONE 50 MG: 50 TABLET ORAL at 13:26

## 2023-10-14 RX ADMIN — DEXAMETHASONE SODIUM PHOSPHATE 10 MG: 10 INJECTION INTRAMUSCULAR; INTRAVENOUS at 08:32

## 2023-10-14 RX ADMIN — PRIMIDONE 50 MG: 50 TABLET ORAL at 21:12

## 2023-10-14 RX ADMIN — DEXAMETHASONE SODIUM PHOSPHATE 10 MG: 10 INJECTION INTRAMUSCULAR; INTRAVENOUS at 19:21

## 2023-10-14 RX ADMIN — LEVETIRACETAM 750 MG: 750 TABLET, FILM COATED ORAL at 02:35

## 2023-10-14 RX ADMIN — LEVETIRACETAM 750 MG: 750 TABLET, FILM COATED ORAL at 13:26

## 2023-10-14 RX ADMIN — ATORVASTATIN CALCIUM 40 MG: 40 TABLET, FILM COATED ORAL at 09:54

## 2023-10-14 RX ADMIN — LEVOTHYROXINE SODIUM 50 MCG: 0.05 TABLET ORAL at 09:54

## 2023-10-14 ASSESSMENT — PAIN DESCRIPTION - LOCATION
LOCATION: SHOULDER

## 2023-10-14 ASSESSMENT — PAIN DESCRIPTION - PAIN TYPE
TYPE: ACUTE PAIN
TYPE: ACUTE PAIN

## 2023-10-14 ASSESSMENT — PAIN SCALES - GENERAL
PAINLEVEL_OUTOF10: 5

## 2023-10-14 ASSESSMENT — PAIN DESCRIPTION - ORIENTATION
ORIENTATION: RIGHT

## 2023-10-14 ASSESSMENT — PAIN DESCRIPTION - DESCRIPTORS
DESCRIPTORS: DISCOMFORT
DESCRIPTORS: DISCOMFORT

## 2023-10-14 ASSESSMENT — PAIN - FUNCTIONAL ASSESSMENT
PAIN_FUNCTIONAL_ASSESSMENT: PREVENTS OR INTERFERES SOME ACTIVE ACTIVITIES AND ADLS
PAIN_FUNCTIONAL_ASSESSMENT: PREVENTS OR INTERFERES SOME ACTIVE ACTIVITIES AND ADLS

## 2023-10-14 NOTE — ED NOTES
TRANSFER - OUT REPORT:    Verbal report given to [address] on Melinda Ramos  being transferred to 2001 for routine progression of patient care       Report consisted of patient's Situation, Background, Assessment and   Recommendations(SBAR). Information from the following report(s) ED Encounter Summary was reviewed with the receiving nurse. [place] Fall Assessment:    Presents to emergency department  because of falls (Syncope, seizure, or loss of consciousness): Yes  Age > 79: No  Altered Mental Status, Intoxication with alcohol or substance confusion (Disorientation, impaired judgment, poor safety awaremess, or inability to follow instructions): No  Impaired Mobility: Ambulates or transfers with assistive devices or assistance; Unable to ambulate or transer.: No  Nursing Judgement: Yes          Lines:   Peripheral IV 10/13/23 Left Wrist (Active)       Peripheral IV 10/14/23 Right Antecubital (Active)        Opportunity for questions and clarification was provided.       Patient transported with:  Registered Nurse/ Carmela Connelly [place]  17/61/64 9253

## 2023-10-14 NOTE — ED NOTES
RN tried helping patient into chair. She stood then her legs gave out. RN held patient up and called for assistance to get pt into wheelchair. Pt reports she is able to stand at home but feels a little weak today. RN was advised she has been weak but family has what she needs to get into the home.      Samantha Ruth RN  10/13/23 1154

## 2023-10-14 NOTE — ED PROVIDER NOTES
Patient was initially going to be discharged but the patient did have trouble ambulating and a work-up was conducted instead. Cardiac work-up in the ER did not display positive signs of acute cardiac ischemia. EKG was reviewed and interpreted by myself under the ED physician. Patient was having trouble ambulating because of generalized weakness coupled with the cervical fracture, the patient was ordered an MRI stat. Patient did show evidence of an epidural hematoma in the C2-C6 region. Consult to Ortho physician, Ortho physician did agree to keep the patient at this facility. Patient was admitted to his service. Patient understands and agrees with the plan.           Impression: Cervical epidural hematoma, cervical fracture, fall     [place], DO  10/14/23 5944

## 2023-10-14 NOTE — H&P
History and Physical        CHIEF COMPLAINT:  neck pain    HISTORY OF PRESENT ILLNESS:      The patient is a 76 y.o. female  who presents with neck pain unseteady gait    Pt fell down stairs. Denies loc right arm hand and shoulder pain        Past Medical History:    Past Medical History:   Diagnosis Date    Ankle fracture, left     Anxiety 02/04/2014    Arthritis     Black tarry stools     Colon polyp     Constipation     ETOH abuse     States she has not drank any ETOH in 24 years as of 2021    Frequency of urination ? GERD (gastroesophageal reflux disease) 02/04/2014    Head injury 1998    after seizure/stroke pt fell and hit head    Headache(784.0)     Hypertension     Hypotension ? Hypothyroidism     Memory deficit     Migraines     Numbness and tingling     fingers    Osteoporosis     Peripheral vascular disease ([address])     Pneumonia     Seizures ([address])     last seizure 6-,     Shingles      Pt.denies. Stroke [facility]) 1998    stroke and seizure together    TIA (transient ischemic attack)     1996    Tremor     UTI (urinary tract infection)     once    Varicose veins     Wears glasses        Past Surgical History:    Past Surgical History:   Procedure Laterality Date    BUNIONECTOMY Right     COLONOSCOPY      COLONOSCOPY N/A 5/10/2021    COLONOSCOPY DIAGNOSTIC performed by Smita Skinner MD at [address] Left 07/06/2016    ON 2ND, 3rd, 4th,  and  5TH TOES OF LEFT FOOT  Pt. Denies. OTHER SURGICAL HISTORY  02/23/2015    orif left ankle with synthes and intraoperative c- arm    [facility] INCL FLUOR GDNCE DX W/CELL WASHG SPX N/A 01/16/2018    BRONCHOSCOPY WITH WASHINGS AT PATIENT BEDSIDE ICU performed by Collins Iglesias MD at [address]         Medications Prior to Admission:   Prior to Admission medications    Medication Sig Start Date End Date Taking?  Authorizing Provider   HYDROcodone-acetaminophen (NORCO) 5-325 MG per tablet Take 1 tablet by mouth every

## 2023-10-14 NOTE — ED NOTES
RN tried to get patient into cab. Pt unable to bear weight on b/l legs r/t weakness. Pt brought back into rm 4. Dr. Mary Ellen Wooten notified that patient should be evaluated further for conditions.      Marybeth Bence, RN  10/13/23 4723

## 2023-10-15 PROBLEM — E43 SEVERE MALNUTRITION (HCC): Status: ACTIVE | Noted: 2023-10-15

## 2023-10-15 LAB
BACTERIA URNS QL MICRO: ABNORMAL
BILIRUB UR QL STRIP: NEGATIVE
CASTS #/AREA URNS LPF: ABNORMAL /LPF
CLARITY UR: ABNORMAL
COLOR UR: YELLOW
EPI CELLS #/AREA URNS HPF: ABNORMAL /HPF
GLUCOSE UR STRIP-MCNC: NEGATIVE MG/DL
HGB UR QL STRIP.AUTO: ABNORMAL
KETONES UR STRIP-MCNC: ABNORMAL MG/DL
LEUKOCYTE ESTERASE UR QL STRIP: ABNORMAL
NITRITE UR QL STRIP: NEGATIVE
PH UR STRIP: 6.5 [PH] (ref 5–8)
PROT UR STRIP-MCNC: NEGATIVE MG/DL
RBC #/AREA URNS HPF: ABNORMAL /HPF
SP GR UR STRIP: 1.02 (ref 1–1.03)
UROBILINOGEN UR STRIP-ACNC: NORMAL EU/DL (ref 0–1)
WBC #/AREA URNS HPF: ABNORMAL /HPF

## 2023-10-15 PROCEDURE — 97530 THERAPEUTIC ACTIVITIES: CPT

## 2023-10-15 PROCEDURE — 1200000000 HC SEMI PRIVATE

## 2023-10-15 PROCEDURE — 97162 PT EVAL MOD COMPLEX 30 MIN: CPT

## 2023-10-15 PROCEDURE — 51702 INSERT TEMP BLADDER CATH: CPT

## 2023-10-15 PROCEDURE — 6370000000 HC RX 637 (ALT 250 FOR IP): Performed by: ORTHOPAEDIC SURGERY

## 2023-10-15 PROCEDURE — 2580000003 HC RX 258: Performed by: ORTHOPAEDIC SURGERY

## 2023-10-15 PROCEDURE — 6370000000 HC RX 637 (ALT 250 FOR IP): Performed by: INTERNAL MEDICINE

## 2023-10-15 PROCEDURE — 99232 SBSQ HOSP IP/OBS MODERATE 35: CPT | Performed by: INTERNAL MEDICINE

## 2023-10-15 PROCEDURE — 81001 URINALYSIS AUTO W/SCOPE: CPT

## 2023-10-15 PROCEDURE — 51798 US URINE CAPACITY MEASURE: CPT

## 2023-10-15 PROCEDURE — 99231 SBSQ HOSP IP/OBS SF/LOW 25: CPT | Performed by: ORTHOPAEDIC SURGERY

## 2023-10-15 PROCEDURE — 87088 URINE BACTERIA CULTURE: CPT

## 2023-10-15 PROCEDURE — 87086 URINE CULTURE/COLONY COUNT: CPT

## 2023-10-15 PROCEDURE — 87186 SC STD MICRODIL/AGAR DIL: CPT

## 2023-10-15 RX ORDER — CIPROFLOXACIN 500 MG/1
500 TABLET, FILM COATED ORAL EVERY 12 HOURS SCHEDULED
Status: DISCONTINUED | OUTPATIENT
Start: 2023-10-15 | End: 2023-10-16

## 2023-10-15 RX ADMIN — PRIMIDONE 50 MG: 50 TABLET ORAL at 10:19

## 2023-10-15 RX ADMIN — SODIUM CHLORIDE, PRESERVATIVE FREE 10 ML: 5 INJECTION INTRAVENOUS at 08:32

## 2023-10-15 RX ADMIN — LACOSAMIDE 200 MG: 50 TABLET, FILM COATED ORAL at 22:53

## 2023-10-15 RX ADMIN — CIPROFLOXACIN 500 MG: 500 TABLET, FILM COATED ORAL at 12:37

## 2023-10-15 RX ADMIN — DONEPEZIL HYDROCHLORIDE 5 MG: 5 TABLET, FILM COATED ORAL at 22:53

## 2023-10-15 RX ADMIN — PRIMIDONE 50 MG: 50 TABLET ORAL at 22:54

## 2023-10-15 RX ADMIN — LEVETIRACETAM 750 MG: 750 TABLET, FILM COATED ORAL at 22:53

## 2023-10-15 RX ADMIN — PANTOPRAZOLE SODIUM 40 MG: 40 TABLET, DELAYED RELEASE ORAL at 05:55

## 2023-10-15 RX ADMIN — LEVETIRACETAM 750 MG: 750 TABLET, FILM COATED ORAL at 08:32

## 2023-10-15 RX ADMIN — OXYCODONE HYDROCHLORIDE 10 MG: 10 TABLET ORAL at 04:27

## 2023-10-15 RX ADMIN — CIPROFLOXACIN 500 MG: 500 TABLET, FILM COATED ORAL at 23:05

## 2023-10-15 RX ADMIN — LEVOTHYROXINE SODIUM 50 MCG: 0.05 TABLET ORAL at 05:55

## 2023-10-15 RX ADMIN — ATORVASTATIN CALCIUM 40 MG: 40 TABLET, FILM COATED ORAL at 08:32

## 2023-10-15 RX ADMIN — LACOSAMIDE 200 MG: 50 TABLET, FILM COATED ORAL at 08:33

## 2023-10-15 RX ADMIN — PAROXETINE 30 MG: 10 TABLET, FILM COATED ORAL at 08:32

## 2023-10-15 RX ADMIN — AMLODIPINE BESYLATE 10 MG: 10 TABLET ORAL at 08:32

## 2023-10-15 ASSESSMENT — PAIN DESCRIPTION - DESCRIPTORS: DESCRIPTORS: ACHING;THROBBING

## 2023-10-15 ASSESSMENT — PAIN DESCRIPTION - ORIENTATION: ORIENTATION: RIGHT

## 2023-10-15 ASSESSMENT — PAIN DESCRIPTION - LOCATION: LOCATION: SHOULDER

## 2023-10-15 ASSESSMENT — PAIN SCALES - GENERAL: PAINLEVEL_OUTOF10: 8

## 2023-10-16 ENCOUNTER — APPOINTMENT (OUTPATIENT)
Dept: MRI IMAGING | Age: 68
DRG: 321 | End: 2023-10-16
Payer: COMMERCIAL

## 2023-10-16 PROBLEM — Z51.5 PALLIATIVE CARE ENCOUNTER: Status: ACTIVE | Noted: 2023-10-16

## 2023-10-16 PROBLEM — R29.898 DECREASED STRENGTH OF LOWER EXTREMITY: Status: ACTIVE | Noted: 2023-10-16

## 2023-10-16 PROBLEM — Z71.89 DNR (DO NOT RESUSCITATE) DISCUSSION: Status: ACTIVE | Noted: 2023-10-16

## 2023-10-16 PROBLEM — R13.10 DYSPHAGIA: Status: ACTIVE | Noted: 2023-10-16

## 2023-10-16 PROBLEM — Z71.89 ACP (ADVANCE CARE PLANNING): Status: ACTIVE | Noted: 2023-10-16

## 2023-10-16 PROBLEM — Z71.89 GOALS OF CARE, COUNSELING/DISCUSSION: Status: ACTIVE | Noted: 2023-10-16

## 2023-10-16 LAB
EKG ATRIAL RATE: 75 BPM
EKG P AXIS: 72 DEGREES
EKG P-R INTERVAL: 120 MS
EKG Q-T INTERVAL: 398 MS
EKG QRS DURATION: 84 MS
EKG QTC CALCULATION (BAZETT): 444 MS
EKG R AXIS: 43 DEGREES
EKG T AXIS: -10 DEGREES
EKG VENTRICULAR RATE: 75 BPM
MICROORGANISM SPEC CULT: ABNORMAL
MICROORGANISM SPEC CULT: ABNORMAL
SPECIMEN DESCRIPTION: ABNORMAL
SPECIMEN DESCRIPTION: ABNORMAL

## 2023-10-16 PROCEDURE — C9254 INJECTION, LACOSAMIDE: HCPCS | Performed by: INTERNAL MEDICINE

## 2023-10-16 PROCEDURE — 99233 SBSQ HOSP IP/OBS HIGH 50: CPT | Performed by: INTERNAL MEDICINE

## 2023-10-16 PROCEDURE — 92610 EVALUATE SWALLOWING FUNCTION: CPT

## 2023-10-16 PROCEDURE — 97530 THERAPEUTIC ACTIVITIES: CPT

## 2023-10-16 PROCEDURE — 99222 1ST HOSP IP/OBS MODERATE 55: CPT | Performed by: NURSE PRACTITIONER

## 2023-10-16 PROCEDURE — 6370000000 HC RX 637 (ALT 250 FOR IP): Performed by: INTERNAL MEDICINE

## 2023-10-16 PROCEDURE — 72141 MRI NECK SPINE W/O DYE: CPT

## 2023-10-16 PROCEDURE — 70551 MRI BRAIN STEM W/O DYE: CPT

## 2023-10-16 PROCEDURE — 97110 THERAPEUTIC EXERCISES: CPT

## 2023-10-16 PROCEDURE — 1200000000 HC SEMI PRIVATE

## 2023-10-16 PROCEDURE — 2580000003 HC RX 258: Performed by: INTERNAL MEDICINE

## 2023-10-16 PROCEDURE — 97166 OT EVAL MOD COMPLEX 45 MIN: CPT

## 2023-10-16 PROCEDURE — 6360000002 HC RX W HCPCS: Performed by: INTERNAL MEDICINE

## 2023-10-16 PROCEDURE — 93010 ELECTROCARDIOGRAM REPORT: CPT | Performed by: INTERNAL MEDICINE

## 2023-10-16 PROCEDURE — 6370000000 HC RX 637 (ALT 250 FOR IP): Performed by: ORTHOPAEDIC SURGERY

## 2023-10-16 RX ORDER — CIPROFLOXACIN 2 MG/ML
400 INJECTION, SOLUTION INTRAVENOUS EVERY 12 HOURS
Status: DISCONTINUED | OUTPATIENT
Start: 2023-10-16 | End: 2023-10-16

## 2023-10-16 RX ORDER — MORPHINE SULFATE 2 MG/ML
2 INJECTION, SOLUTION INTRAMUSCULAR; INTRAVENOUS
Status: DISCONTINUED | OUTPATIENT
Start: 2023-10-16 | End: 2023-10-18

## 2023-10-16 RX ORDER — MORPHINE SULFATE 4 MG/ML
4 INJECTION, SOLUTION INTRAMUSCULAR; INTRAVENOUS
Status: DISCONTINUED | OUTPATIENT
Start: 2023-10-16 | End: 2023-10-18

## 2023-10-16 RX ORDER — SODIUM CHLORIDE 9 MG/ML
INJECTION, SOLUTION INTRAVENOUS CONTINUOUS
Status: DISCONTINUED | OUTPATIENT
Start: 2023-10-16 | End: 2023-10-21

## 2023-10-16 RX ADMIN — PANTOPRAZOLE SODIUM 40 MG: 40 TABLET, DELAYED RELEASE ORAL at 06:15

## 2023-10-16 RX ADMIN — CIPROFLOXACIN 500 MG: 500 TABLET, FILM COATED ORAL at 09:54

## 2023-10-16 RX ADMIN — LEVETIRACETAM 750 MG: 750 TABLET, FILM COATED ORAL at 09:55

## 2023-10-16 RX ADMIN — PRIMIDONE 50 MG: 50 TABLET ORAL at 09:55

## 2023-10-16 RX ADMIN — SODIUM CHLORIDE: 9 INJECTION, SOLUTION INTRAVENOUS at 16:54

## 2023-10-16 RX ADMIN — LACOSAMIDE 200 MG: 10 INJECTION INTRAVENOUS at 22:22

## 2023-10-16 RX ADMIN — LEVOTHYROXINE SODIUM 50 MCG: 0.05 TABLET ORAL at 06:15

## 2023-10-16 RX ADMIN — LACOSAMIDE 200 MG: 50 TABLET, FILM COATED ORAL at 09:54

## 2023-10-16 RX ADMIN — OXYCODONE HYDROCHLORIDE 10 MG: 10 TABLET ORAL at 13:05

## 2023-10-16 RX ADMIN — PAROXETINE 30 MG: 10 TABLET, FILM COATED ORAL at 09:57

## 2023-10-16 RX ADMIN — ATORVASTATIN CALCIUM 40 MG: 40 TABLET, FILM COATED ORAL at 09:55

## 2023-10-16 RX ADMIN — CEFTRIAXONE SODIUM 1000 MG: 1 INJECTION, POWDER, FOR SOLUTION INTRAMUSCULAR; INTRAVENOUS at 21:13

## 2023-10-16 RX ADMIN — AMLODIPINE BESYLATE 10 MG: 10 TABLET ORAL at 09:55

## 2023-10-16 RX ADMIN — LEVETIRACETAM 750 MG: 100 INJECTION, SOLUTION INTRAVENOUS at 18:14

## 2023-10-16 RX ADMIN — ACETAMINOPHEN 650 MG: 325 TABLET ORAL at 12:18

## 2023-10-16 ASSESSMENT — PAIN DESCRIPTION - DESCRIPTORS
DESCRIPTORS: ACHING
DESCRIPTORS: ACHING

## 2023-10-16 ASSESSMENT — PAIN SCALES - GENERAL
PAINLEVEL_OUTOF10: 9
PAINLEVEL_OUTOF10: 8

## 2023-10-16 ASSESSMENT — PAIN DESCRIPTION - LOCATION
LOCATION: SHOULDER
LOCATION: SHOULDER

## 2023-10-16 ASSESSMENT — PAIN DESCRIPTION - PAIN TYPE: TYPE: ACUTE PAIN

## 2023-10-16 ASSESSMENT — PAIN DESCRIPTION - ORIENTATION: ORIENTATION: RIGHT

## 2023-10-17 ENCOUNTER — ANESTHESIA EVENT (OUTPATIENT)
Dept: OPERATING ROOM | Age: 68
End: 2023-10-17
Payer: COMMERCIAL

## 2023-10-17 PROBLEM — G99.2 STENOSIS OF CERVICAL SPINE WITH MYELOPATHY (HCC): Status: ACTIVE | Noted: 2023-10-14

## 2023-10-17 LAB
ANION GAP SERPL CALCULATED.3IONS-SCNC: 9 MMOL/L (ref 9–17)
BASOPHILS # BLD: 0 K/UL (ref 0–0.2)
BASOPHILS NFR BLD: 0 % (ref 0–2)
BUN SERPL-MCNC: 13 MG/DL (ref 8–23)
CALCIUM SERPL-MCNC: 8.6 MG/DL (ref 8.6–10.4)
CHLORIDE SERPL-SCNC: 105 MMOL/L (ref 98–107)
CO2 SERPL-SCNC: 25 MMOL/L (ref 20–31)
CREAT SERPL-MCNC: <0.4 MG/DL (ref 0.5–0.9)
EOSINOPHIL # BLD: 0 K/UL (ref 0–0.4)
EOSINOPHILS RELATIVE PERCENT: 0 % (ref 0–4)
ERYTHROCYTE [DISTWIDTH] IN BLOOD BY AUTOMATED COUNT: 12.7 % (ref 11.5–14.9)
GFR SERPL CREATININE-BSD FRML MDRD: ABNORMAL ML/MIN/1.73M2
GLUCOSE SERPL-MCNC: 104 MG/DL (ref 70–99)
HCT VFR BLD AUTO: 39.5 % (ref 36–46)
HGB BLD-MCNC: 13.1 G/DL (ref 12–16)
LYMPHOCYTES NFR BLD: 1 K/UL (ref 1–4.8)
LYMPHOCYTES RELATIVE PERCENT: 10 % (ref 24–44)
MCH RBC QN AUTO: 33.1 PG (ref 26–34)
MCHC RBC AUTO-ENTMCNC: 33.2 G/DL (ref 31–37)
MCV RBC AUTO: 100 FL (ref 80–100)
MONOCYTES NFR BLD: 1 K/UL (ref 0.1–1.3)
MONOCYTES NFR BLD: 10 % (ref 1–7)
NEUTROPHILS NFR BLD: 80 % (ref 36–66)
NEUTS SEG NFR BLD: 7.9 K/UL (ref 1.3–9.1)
PLATELET # BLD AUTO: 153 K/UL (ref 150–450)
PMV BLD AUTO: 9.3 FL (ref 6–12)
POTASSIUM SERPL-SCNC: 3.6 MMOL/L (ref 3.7–5.3)
RBC # BLD AUTO: 3.95 M/UL (ref 4–5.2)
SODIUM SERPL-SCNC: 139 MMOL/L (ref 135–144)
WBC OTHER # BLD: 9.9 K/UL (ref 3.5–11)

## 2023-10-17 PROCEDURE — 97530 THERAPEUTIC ACTIVITIES: CPT

## 2023-10-17 PROCEDURE — 85025 COMPLETE CBC W/AUTO DIFF WBC: CPT

## 2023-10-17 PROCEDURE — 80048 BASIC METABOLIC PNL TOTAL CA: CPT

## 2023-10-17 PROCEDURE — 6360000002 HC RX W HCPCS: Performed by: ORTHOPAEDIC SURGERY

## 2023-10-17 PROCEDURE — 99233 SBSQ HOSP IP/OBS HIGH 50: CPT | Performed by: INTERNAL MEDICINE

## 2023-10-17 PROCEDURE — 36415 COLL VENOUS BLD VENIPUNCTURE: CPT

## 2023-10-17 PROCEDURE — 2580000003 HC RX 258: Performed by: INTERNAL MEDICINE

## 2023-10-17 PROCEDURE — C9254 INJECTION, LACOSAMIDE: HCPCS | Performed by: INTERNAL MEDICINE

## 2023-10-17 PROCEDURE — 1200000000 HC SEMI PRIVATE

## 2023-10-17 PROCEDURE — 92526 ORAL FUNCTION THERAPY: CPT

## 2023-10-17 PROCEDURE — 6360000002 HC RX W HCPCS: Performed by: INTERNAL MEDICINE

## 2023-10-17 RX ADMIN — CEFTRIAXONE SODIUM 1000 MG: 1 INJECTION, POWDER, FOR SOLUTION INTRAMUSCULAR; INTRAVENOUS at 21:35

## 2023-10-17 RX ADMIN — LACOSAMIDE 200 MG: 10 INJECTION INTRAVENOUS at 22:51

## 2023-10-17 RX ADMIN — SODIUM CHLORIDE: 9 INJECTION, SOLUTION INTRAVENOUS at 17:55

## 2023-10-17 RX ADMIN — LACOSAMIDE 200 MG: 10 INJECTION INTRAVENOUS at 09:34

## 2023-10-17 RX ADMIN — MORPHINE SULFATE 4 MG: 4 INJECTION, SOLUTION INTRAMUSCULAR; INTRAVENOUS at 22:45

## 2023-10-17 RX ADMIN — LEVETIRACETAM 750 MG: 100 INJECTION, SOLUTION INTRAVENOUS at 06:41

## 2023-10-17 RX ADMIN — LEVETIRACETAM 750 MG: 100 INJECTION, SOLUTION INTRAVENOUS at 17:58

## 2023-10-17 ASSESSMENT — LIFESTYLE VARIABLES: SMOKING_STATUS: 0

## 2023-10-17 ASSESSMENT — PAIN SCALES - GENERAL
PAINLEVEL_OUTOF10: 6
PAINLEVEL_OUTOF10: 9

## 2023-10-17 ASSESSMENT — PAIN DESCRIPTION - LOCATION: LOCATION: SHOULDER

## 2023-10-17 ASSESSMENT — PAIN DESCRIPTION - ORIENTATION: ORIENTATION: RIGHT

## 2023-10-17 ASSESSMENT — ENCOUNTER SYMPTOMS
SHORTNESS OF BREATH: 0
STRIDOR: 0

## 2023-10-17 ASSESSMENT — PAIN SCALES - WONG BAKER
WONGBAKER_NUMERICALRESPONSE: HURTS LITTLE MORE
WONGBAKER_NUMERICALRESPONSE: 4
WONGBAKER_NUMERICALRESPONSE: 4
WONGBAKER_NUMERICALRESPONSE: HURTS LITTLE MORE

## 2023-10-17 ASSESSMENT — PAIN DESCRIPTION - DESCRIPTORS: DESCRIPTORS: DISCOMFORT

## 2023-10-17 ASSESSMENT — PAIN - FUNCTIONAL ASSESSMENT: PAIN_FUNCTIONAL_ASSESSMENT: PREVENTS OR INTERFERES SOME ACTIVE ACTIVITIES AND ADLS

## 2023-10-17 NOTE — DISCHARGE INSTR - COC
Medication education and monitoring per protocol. Patient's personal belongings (please select all that are sent with patient):      RN SIGNATURE:      CASE MANAGEMENT/SOCIAL WORK SECTION    Inpatient Status Date:     Readmission Risk Assessment Score:  Readmission Risk              Risk of Unplanned Readmission:  10           Discharging to Facility/ Agency   [facility] [address] [facility], [address]  Phone: [phone]  Fax: [place] [id number] [place], [place]  Phone:  [phone]  Fax:  [phone]      Dialysis Facility (if applicable)   Name:  Address:  Dialysis Schedule:  Phone:  Fax:    / signature: Electronically signed by Courtney Freitas RN on 10/17/23 at 8:32 AM EDT    PHYSICIAN SECTION    Prognosis: {Prognosis:5400313044}    Condition at Discharge: [address] Patient Condition:358662527}    Rehab Potential (if transferring to Rehab): {Prognosis:5692894987}    Recommended Labs or Other Treatments After Discharge: ***    Physician Certification: I certify the above information and transfer of Argenis Davis  is necessary for the continuing treatment of the diagnosis listed and that she requires {Admit to Appropriate Level of Care:38321} for {GREATER/LESS:012865903} 30 days.      Update Admission H&P: {CHP DME Changes in NWP:691179507}    PHYSICIAN SIGNATURE:  {Esignature:390817020}

## 2023-10-18 ENCOUNTER — ANESTHESIA (OUTPATIENT)
Dept: OPERATING ROOM | Age: 68
End: 2023-10-18
Payer: COMMERCIAL

## 2023-10-18 ENCOUNTER — APPOINTMENT (OUTPATIENT)
Dept: GENERAL RADIOLOGY | Age: 68
DRG: 321 | End: 2023-10-18
Payer: COMMERCIAL

## 2023-10-18 PROCEDURE — 22842 INSERT SPINE FIXATION DEVICE: CPT | Performed by: PHYSICIAN ASSISTANT

## 2023-10-18 PROCEDURE — 3600000012 HC SURGERY LEVEL 2 ADDTL 15MIN: Performed by: ORTHOPAEDIC SURGERY

## 2023-10-18 PROCEDURE — 63048 LAM FACETEC &FORAMOT EA ADDL: CPT | Performed by: ORTHOPAEDIC SURGERY

## 2023-10-18 PROCEDURE — 22842 INSERT SPINE FIXATION DEVICE: CPT | Performed by: ORTHOPAEDIC SURGERY

## 2023-10-18 PROCEDURE — 22614 ARTHRD PST TQ 1NTRSPC EA ADD: CPT | Performed by: PHYSICIAN ASSISTANT

## 2023-10-18 PROCEDURE — 22600 ARTHRD PST TQ 1NTRSPC CRV: CPT | Performed by: PHYSICIAN ASSISTANT

## 2023-10-18 PROCEDURE — 2500000003 HC RX 250 WO HCPCS: Performed by: NURSE ANESTHETIST, CERTIFIED REGISTERED

## 2023-10-18 PROCEDURE — 63045 LAM FACETEC & FORAMOT CRV: CPT | Performed by: ORTHOPAEDIC SURGERY

## 2023-10-18 PROCEDURE — 2580000003 HC RX 258: Performed by: INTERNAL MEDICINE

## 2023-10-18 PROCEDURE — 6360000002 HC RX W HCPCS: Performed by: INTERNAL MEDICINE

## 2023-10-18 PROCEDURE — 2709999900 HC NON-CHARGEABLE SUPPLY: Performed by: ORTHOPAEDIC SURGERY

## 2023-10-18 PROCEDURE — 2580000003 HC RX 258: Performed by: ORTHOPAEDIC SURGERY

## 2023-10-18 PROCEDURE — 6360000002 HC RX W HCPCS: Performed by: ANESTHESIOLOGY

## 2023-10-18 PROCEDURE — 7100000000 HC PACU RECOVERY - FIRST 15 MIN: Performed by: ORTHOPAEDIC SURGERY

## 2023-10-18 PROCEDURE — 6360000002 HC RX W HCPCS: Performed by: NURSE ANESTHETIST, CERTIFIED REGISTERED

## 2023-10-18 PROCEDURE — 7100000001 HC PACU RECOVERY - ADDTL 15 MIN: Performed by: ORTHOPAEDIC SURGERY

## 2023-10-18 PROCEDURE — 3700000001 HC ADD 15 MINUTES (ANESTHESIA): Performed by: ORTHOPAEDIC SURGERY

## 2023-10-18 PROCEDURE — 63045 LAM FACETEC & FORAMOT CRV: CPT | Performed by: PHYSICIAN ASSISTANT

## 2023-10-18 PROCEDURE — 3600000002 HC SURGERY LEVEL 2 BASE: Performed by: ORTHOPAEDIC SURGERY

## 2023-10-18 PROCEDURE — 6360000002 HC RX W HCPCS: Performed by: ORTHOPAEDIC SURGERY

## 2023-10-18 PROCEDURE — 2580000003 HC RX 258: Performed by: NURSE ANESTHETIST, CERTIFIED REGISTERED

## 2023-10-18 PROCEDURE — 22600 ARTHRD PST TQ 1NTRSPC CRV: CPT | Performed by: ORTHOPAEDIC SURGERY

## 2023-10-18 PROCEDURE — 1200000000 HC SEMI PRIVATE

## 2023-10-18 PROCEDURE — 2720000010 HC SURG SUPPLY STERILE: Performed by: ORTHOPAEDIC SURGERY

## 2023-10-18 PROCEDURE — 3700000000 HC ANESTHESIA ATTENDED CARE: Performed by: ORTHOPAEDIC SURGERY

## 2023-10-18 PROCEDURE — 63048 LAM FACETEC &FORAMOT EA ADDL: CPT | Performed by: PHYSICIAN ASSISTANT

## 2023-10-18 PROCEDURE — 0PH304Z INSERTION OF INTERNAL FIXATION DEVICE INTO CERVICAL VERTEBRA, OPEN APPROACH: ICD-10-PCS | Performed by: ORTHOPAEDIC SURGERY

## 2023-10-18 PROCEDURE — 01N10ZZ RELEASE CERVICAL NERVE, OPEN APPROACH: ICD-10-PCS | Performed by: ORTHOPAEDIC SURGERY

## 2023-10-18 PROCEDURE — C9254 INJECTION, LACOSAMIDE: HCPCS | Performed by: ORTHOPAEDIC SURGERY

## 2023-10-18 PROCEDURE — 0RG10K1 FUSION OF CERVICAL VERTEBRAL JOINT WITH NONAUTOLOGOUS TISSUE SUBSTITUTE, POSTERIOR APPROACH, POSTERIOR COLUMN, OPEN APPROACH: ICD-10-PCS | Performed by: ORTHOPAEDIC SURGERY

## 2023-10-18 PROCEDURE — 22614 ARTHRD PST TQ 1NTRSPC EA ADD: CPT | Performed by: ORTHOPAEDIC SURGERY

## 2023-10-18 PROCEDURE — C9254 INJECTION, LACOSAMIDE: HCPCS | Performed by: INTERNAL MEDICINE

## 2023-10-18 PROCEDURE — C1713 ANCHOR/SCREW BN/BN,TIS/BN: HCPCS | Performed by: ORTHOPAEDIC SURGERY

## 2023-10-18 DEVICE — 3.5MM ELLIPSE ROD, 120MM
Type: IMPLANTABLE DEVICE | Site: SPINE CERVICAL | Status: FUNCTIONAL
Brand: ELLIPSE

## 2023-10-18 DEVICE — 3.5MM ELLIPSE POLYAXIAL SCREW, 10MM TAPERED TIP
Type: IMPLANTABLE DEVICE | Site: SPINE CERVICAL | Status: FUNCTIONAL
Brand: ELLIPSE

## 2023-10-18 DEVICE — ELLIPSE LOCKING CAP
Type: IMPLANTABLE DEVICE | Site: SPINE CERVICAL | Status: FUNCTIONAL
Brand: ELLIPSE

## 2023-10-18 RX ORDER — LEVOTHYROXINE SODIUM 0.05 MG/1
50 TABLET ORAL DAILY
Status: DISPENSED | OUTPATIENT
Start: 2023-10-19

## 2023-10-18 RX ORDER — SODIUM CHLORIDE 0.9 % (FLUSH) 0.9 %
5-40 SYRINGE (ML) INJECTION PRN
Status: ACTIVE | OUTPATIENT
Start: 2023-10-18

## 2023-10-18 RX ORDER — ONDANSETRON 2 MG/ML
4 INJECTION INTRAMUSCULAR; INTRAVENOUS
Status: DISCONTINUED | OUTPATIENT
Start: 2023-10-18 | End: 2023-10-18 | Stop reason: HOSPADM

## 2023-10-18 RX ORDER — PRIMIDONE 50 MG/1
50 TABLET ORAL 2 TIMES DAILY
Status: DISPENSED | OUTPATIENT
Start: 2023-10-18

## 2023-10-18 RX ORDER — PROPOFOL 10 MG/ML
INJECTION, EMULSION INTRAVENOUS PRN
Status: DISCONTINUED | OUTPATIENT
Start: 2023-10-18 | End: 2023-10-18 | Stop reason: SDUPTHER

## 2023-10-18 RX ORDER — LIDOCAINE HYDROCHLORIDE 20 MG/ML
INJECTION, SOLUTION EPIDURAL; INFILTRATION; INTRACAUDAL; PERINEURAL PRN
Status: DISCONTINUED | OUTPATIENT
Start: 2023-10-18 | End: 2023-10-18 | Stop reason: SDUPTHER

## 2023-10-18 RX ORDER — SODIUM CHLORIDE 9 MG/ML
INJECTION, SOLUTION INTRAVENOUS PRN
Status: DISCONTINUED | OUTPATIENT
Start: 2023-10-18 | End: 2023-10-18 | Stop reason: HOSPADM

## 2023-10-18 RX ORDER — SODIUM CHLORIDE 0.9 % (FLUSH) 0.9 %
5-40 SYRINGE (ML) INJECTION EVERY 12 HOURS SCHEDULED
Status: DISCONTINUED | OUTPATIENT
Start: 2023-10-18 | End: 2023-10-18 | Stop reason: HOSPADM

## 2023-10-18 RX ORDER — ATORVASTATIN CALCIUM 40 MG/1
40 TABLET, FILM COATED ORAL DAILY
Status: DISPENSED | OUTPATIENT
Start: 2023-10-19

## 2023-10-18 RX ORDER — DEXAMETHASONE SODIUM PHOSPHATE 4 MG/ML
INJECTION, SOLUTION INTRA-ARTICULAR; INTRALESIONAL; INTRAMUSCULAR; INTRAVENOUS; SOFT TISSUE PRN
Status: DISCONTINUED | OUTPATIENT
Start: 2023-10-18 | End: 2023-10-18 | Stop reason: SDUPTHER

## 2023-10-18 RX ORDER — HYDRALAZINE HYDROCHLORIDE 20 MG/ML
INJECTION INTRAMUSCULAR; INTRAVENOUS PRN
Status: DISCONTINUED | OUTPATIENT
Start: 2023-10-18 | End: 2023-10-18 | Stop reason: SDUPTHER

## 2023-10-18 RX ORDER — PANTOPRAZOLE SODIUM 40 MG/1
40 TABLET, DELAYED RELEASE ORAL
Status: DISCONTINUED | OUTPATIENT
Start: 2023-10-19 | End: 2023-10-21

## 2023-10-18 RX ORDER — METOCLOPRAMIDE HYDROCHLORIDE 5 MG/ML
10 INJECTION INTRAMUSCULAR; INTRAVENOUS
Status: DISCONTINUED | OUTPATIENT
Start: 2023-10-18 | End: 2023-10-18 | Stop reason: HOSPADM

## 2023-10-18 RX ORDER — GLYCOPYRROLATE 0.2 MG/ML
INJECTION INTRAMUSCULAR; INTRAVENOUS PRN
Status: DISCONTINUED | OUTPATIENT
Start: 2023-10-18 | End: 2023-10-18 | Stop reason: SDUPTHER

## 2023-10-18 RX ORDER — OXYCODONE HYDROCHLORIDE 10 MG/1
10 TABLET ORAL EVERY 4 HOURS PRN
Status: DISCONTINUED | OUTPATIENT
Start: 2023-10-18 | End: 2023-10-23

## 2023-10-18 RX ORDER — SODIUM CHLORIDE 0.9 % (FLUSH) 0.9 %
5-40 SYRINGE (ML) INJECTION EVERY 12 HOURS SCHEDULED
Status: ACTIVE | OUTPATIENT
Start: 2023-10-18

## 2023-10-18 RX ORDER — LACOSAMIDE 50 MG/1
200 TABLET ORAL 2 TIMES DAILY
Status: DISCONTINUED | OUTPATIENT
Start: 2023-10-18 | End: 2023-10-18 | Stop reason: ALTCHOICE

## 2023-10-18 RX ORDER — DEXAMETHASONE SODIUM PHOSPHATE 10 MG/ML
10 INJECTION, SOLUTION INTRAMUSCULAR; INTRAVENOUS EVERY 6 HOURS
Status: COMPLETED | OUTPATIENT
Start: 2023-10-18 | End: 2023-10-19

## 2023-10-18 RX ORDER — SODIUM CHLORIDE 9 MG/ML
INJECTION, SOLUTION INTRAVENOUS PRN
Status: ACTIVE | OUTPATIENT
Start: 2023-10-18

## 2023-10-18 RX ORDER — LEVETIRACETAM 750 MG/1
750 TABLET ORAL 2 TIMES DAILY
Status: DISCONTINUED | OUTPATIENT
Start: 2023-10-18 | End: 2023-10-18 | Stop reason: ALTCHOICE

## 2023-10-18 RX ORDER — ACETAMINOPHEN 325 MG/1
650 TABLET ORAL EVERY 6 HOURS
Status: DISCONTINUED | OUTPATIENT
Start: 2023-10-18 | End: 2023-11-12

## 2023-10-18 RX ORDER — FENTANYL CITRATE 50 UG/ML
INJECTION, SOLUTION INTRAMUSCULAR; INTRAVENOUS PRN
Status: DISCONTINUED | OUTPATIENT
Start: 2023-10-18 | End: 2023-10-18 | Stop reason: SDUPTHER

## 2023-10-18 RX ORDER — AMLODIPINE BESYLATE 10 MG/1
10 TABLET ORAL DAILY
Status: DISCONTINUED | OUTPATIENT
Start: 2023-10-19 | End: 2023-11-04

## 2023-10-18 RX ORDER — FENTANYL CITRATE 0.05 MG/ML
25 INJECTION, SOLUTION INTRAMUSCULAR; INTRAVENOUS EVERY 5 MIN PRN
Status: DISCONTINUED | OUTPATIENT
Start: 2023-10-18 | End: 2023-10-18 | Stop reason: HOSPADM

## 2023-10-18 RX ORDER — MORPHINE SULFATE 2 MG/ML
2 INJECTION, SOLUTION INTRAMUSCULAR; INTRAVENOUS
Status: DISCONTINUED | OUTPATIENT
Start: 2023-10-18 | End: 2023-10-21

## 2023-10-18 RX ORDER — TRANEXAMIC ACID 100 MG/ML
INJECTION, SOLUTION INTRAVENOUS PRN
Status: DISCONTINUED | OUTPATIENT
Start: 2023-10-18 | End: 2023-10-18 | Stop reason: SDUPTHER

## 2023-10-18 RX ORDER — DONEPEZIL HYDROCHLORIDE 10 MG/1
5 TABLET, FILM COATED ORAL NIGHTLY
Status: DISPENSED | OUTPATIENT
Start: 2023-10-18

## 2023-10-18 RX ORDER — OXYCODONE HYDROCHLORIDE 5 MG/1
5 TABLET ORAL EVERY 4 HOURS PRN
Status: ACTIVE | OUTPATIENT
Start: 2023-10-18

## 2023-10-18 RX ORDER — SODIUM CHLORIDE 0.9 % (FLUSH) 0.9 %
5-40 SYRINGE (ML) INJECTION PRN
Status: DISCONTINUED | OUTPATIENT
Start: 2023-10-18 | End: 2023-10-18 | Stop reason: HOSPADM

## 2023-10-18 RX ORDER — SODIUM CHLORIDE, SODIUM LACTATE, POTASSIUM CHLORIDE, CALCIUM CHLORIDE 600; 310; 30; 20 MG/100ML; MG/100ML; MG/100ML; MG/100ML
INJECTION, SOLUTION INTRAVENOUS CONTINUOUS PRN
Status: DISCONTINUED | OUTPATIENT
Start: 2023-10-18 | End: 2023-10-18 | Stop reason: SDUPTHER

## 2023-10-18 RX ORDER — DIPHENHYDRAMINE HYDROCHLORIDE 50 MG/ML
12.5 INJECTION INTRAMUSCULAR; INTRAVENOUS
Status: COMPLETED | OUTPATIENT
Start: 2023-10-18 | End: 2023-10-18

## 2023-10-18 RX ADMIN — LACOSAMIDE 200 MG: 10 INJECTION INTRAVENOUS at 22:35

## 2023-10-18 RX ADMIN — HYDRALAZINE HYDROCHLORIDE 5 MG: 20 INJECTION INTRAMUSCULAR; INTRAVENOUS at 13:48

## 2023-10-18 RX ADMIN — Medication 2000 MG: at 16:38

## 2023-10-18 RX ADMIN — DEXAMETHASONE SODIUM PHOSPHATE 10 MG: 10 INJECTION, SOLUTION INTRAMUSCULAR; INTRAVENOUS at 20:57

## 2023-10-18 RX ADMIN — LEVETIRACETAM 750 MG: 100 INJECTION, SOLUTION INTRAVENOUS at 21:04

## 2023-10-18 RX ADMIN — LIDOCAINE HYDROCHLORIDE 50 MG: 20 INJECTION, SOLUTION EPIDURAL; INFILTRATION; INTRACAUDAL; PERINEURAL at 12:46

## 2023-10-18 RX ADMIN — LEVETIRACETAM 750 MG: 100 INJECTION, SOLUTION INTRAVENOUS at 05:34

## 2023-10-18 RX ADMIN — GLYCOPYRROLATE 0.2 MG: 0.2 INJECTION INTRAMUSCULAR; INTRAVENOUS at 12:50

## 2023-10-18 RX ADMIN — SODIUM CHLORIDE: 9 INJECTION, SOLUTION INTRAVENOUS at 18:47

## 2023-10-18 RX ADMIN — TRANEXAMIC ACID 1000 MG: 100 INJECTION, SOLUTION INTRAVENOUS at 12:53

## 2023-10-18 RX ADMIN — DEXAMETHASONE SODIUM PHOSPHATE 8 MG: 4 INJECTION INTRA-ARTICULAR; INTRALESIONAL; INTRAMUSCULAR; INTRAVENOUS; SOFT TISSUE at 12:52

## 2023-10-18 RX ADMIN — HYDRALAZINE HYDROCHLORIDE 5 MG: 20 INJECTION INTRAMUSCULAR; INTRAVENOUS at 14:35

## 2023-10-18 RX ADMIN — CEFTRIAXONE SODIUM 1000 MG: 1 INJECTION, POWDER, FOR SOLUTION INTRAMUSCULAR; INTRAVENOUS at 21:06

## 2023-10-18 RX ADMIN — FENTANYL CITRATE 50 MCG: 50 INJECTION, SOLUTION INTRAMUSCULAR; INTRAVENOUS at 14:14

## 2023-10-18 RX ADMIN — PROPOFOL 50 MCG/KG/MIN: 10 INJECTION, EMULSION INTRAVENOUS at 13:27

## 2023-10-18 RX ADMIN — SODIUM CHLORIDE, POTASSIUM CHLORIDE, SODIUM LACTATE AND CALCIUM CHLORIDE: 600; 310; 30; 20 INJECTION, SOLUTION INTRAVENOUS at 12:43

## 2023-10-18 RX ADMIN — FENTANYL CITRATE 50 MCG: 50 INJECTION, SOLUTION INTRAMUSCULAR; INTRAVENOUS at 13:40

## 2023-10-18 RX ADMIN — DIPHENHYDRAMINE HYDROCHLORIDE 12.5 MG: 50 INJECTION INTRAMUSCULAR; INTRAVENOUS at 17:37

## 2023-10-18 RX ADMIN — Medication 2000 MG: at 12:52

## 2023-10-18 RX ADMIN — PROPOFOL 100 MG: 10 INJECTION, EMULSION INTRAVENOUS at 12:46

## 2023-10-18 RX ADMIN — SODIUM CHLORIDE: 9 INJECTION, SOLUTION INTRAVENOUS at 03:24

## 2023-10-18 RX ADMIN — SODIUM CHLORIDE: 9 INJECTION, SOLUTION INTRAVENOUS at 11:07

## 2023-10-18 RX ADMIN — MORPHINE SULFATE 2 MG: 2 INJECTION, SOLUTION INTRAMUSCULAR; INTRAVENOUS at 20:01

## 2023-10-18 RX ADMIN — LACOSAMIDE 200 MG: 10 INJECTION INTRAVENOUS at 08:54

## 2023-10-18 ASSESSMENT — PAIN SCALES - GENERAL
PAINLEVEL_OUTOF10: 0
PAINLEVEL_OUTOF10: 8

## 2023-10-18 ASSESSMENT — PAIN DESCRIPTION - ORIENTATION: ORIENTATION: RIGHT

## 2023-10-18 ASSESSMENT — PAIN - FUNCTIONAL ASSESSMENT
PAIN_FUNCTIONAL_ASSESSMENT: 0-10
PAIN_FUNCTIONAL_ASSESSMENT: PREVENTS OR INTERFERES SOME ACTIVE ACTIVITIES AND ADLS

## 2023-10-18 ASSESSMENT — PAIN DESCRIPTION - DESCRIPTORS: DESCRIPTORS: DISCOMFORT

## 2023-10-18 ASSESSMENT — PAIN DESCRIPTION - LOCATION: LOCATION: NECK;SHOULDER

## 2023-10-18 NOTE — FLOWSHEET NOTE
Physical Therapy Cancel Note      DATE: 10/18/2023    NAME: Corrie Menard  MRN: [id number]   : 1955      Patient not seen this date for Physical Therapy due to:    Surgery/Procedure: Pt off floor for surgery. Will check back tomorrow.       Electronically signed by Bell Gandhi PTA on 10/18/2023 at 11:46 AM

## 2023-10-18 NOTE — BRIEF OP NOTE
Brief Postoperative Note      Patient: Uzma Howard  YOB: 1955  MRN: [id number]    Date of Procedure: 10/18/2023    Pre-Op Diagnosis Codes:     * Stenosis of cervical spine with myelopathy ([address]) [M48.02, G99.2]    Post-Op Diagnosis: Same       Procedure(s):  CERVICAL DECOMPRESSION C3 THRU C7 FUSION POSTERIOR  Posterior cervical laminectomy C3-6; posterior cervical instrumented fusion C3-7    Surgeon(s):  Dominga Bui MD Neomia Pollen, PA    Assistant:  * No surgical staff found *    Anesthesia: General    Estimated Blood Loss (mL): 726     Complications: None    Specimens:   * No specimens in log *    Implants:  Implant Name Type Inv. Item Serial No.  Lot No. LRB No. Used Action   CAP SPNL ALEXYS NTHRD ELLIPSE - [id number]  CAP SPNL ALEXYS NTHRD ELLIPSE  [employer]WD  N/A 10 Implanted   DENISE SPNL L120MM DIA3. 5MM POST SMOOTH ELLIPSE - [id number]  DENISE SPNL L120MM DIA3. 5MM POST SMOOTH ELLIPSE  [employer]  N/A 1 Implanted   SCREW SPNL POLYAXL 3.5X10 MM TAPR TIP ELLIPSE - [id number]  SCREW SPNL POLYAXL 3.5X10 MM TAPR TIP ELLIPSE  [employer]  N/A 10 Implanted         Drains:   Urinary Catheter 10/15/23 Flores (Active)   $ Urethral catheter insertion $ Not inserted for procedure 10/15/23 1230   Catheter Indications Urinary retention (acute or chronic), continuous bladder irrigation or bladder outlet obstruction 10/17/23 2031   Site Assessment No urethral drainage 10/17/23 2031   Urine Color Sangeetha 10/18/23 0907   Urine Appearance Clear 10/18/23 0907   Urine Odor Malodorous 10/18/23 0500   Collection Container Standard 10/17/23 2031   Securement Method Securing device (Describe) 10/18/23 0500   Catheter Care  Soap and water 10/18/23 0500   Catheter Best Practices  Drainage tube clipped to bed;Catheter secured to thigh; Tamper seal intact; Bag below bladder;Bag not on floor; Lack of dependent loop in tubing;Drainage bag less than half full 10/17/23 2031   Status Draining

## 2023-10-19 ENCOUNTER — APPOINTMENT (OUTPATIENT)
Dept: CT IMAGING | Age: 68
DRG: 321 | End: 2023-10-19
Payer: COMMERCIAL

## 2023-10-19 ENCOUNTER — APPOINTMENT (OUTPATIENT)
Dept: GENERAL RADIOLOGY | Age: 68
DRG: 321 | End: 2023-10-19
Payer: COMMERCIAL

## 2023-10-19 LAB
ANION GAP SERPL CALCULATED.3IONS-SCNC: 12 MMOL/L (ref 9–17)
BASOPHILS # BLD: 0 K/UL (ref 0–0.2)
BASOPHILS NFR BLD: 0 % (ref 0–2)
BUN SERPL-MCNC: 14 MG/DL (ref 8–23)
CALCIUM SERPL-MCNC: 8.8 MG/DL (ref 8.6–10.4)
CHLORIDE SERPL-SCNC: 109 MMOL/L (ref 98–107)
CO2 SERPL-SCNC: 22 MMOL/L (ref 20–31)
CREAT SERPL-MCNC: <0.4 MG/DL (ref 0.5–0.9)
EOSINOPHIL # BLD: 0 K/UL (ref 0–0.4)
EOSINOPHILS RELATIVE PERCENT: 0 % (ref 0–4)
ERYTHROCYTE [DISTWIDTH] IN BLOOD BY AUTOMATED COUNT: 12.9 % (ref 11.5–14.9)
GFR SERPL CREATININE-BSD FRML MDRD: ABNORMAL ML/MIN/1.73M2
GLUCOSE SERPL-MCNC: 116 MG/DL (ref 70–99)
HCT VFR BLD AUTO: 41.8 % (ref 36–46)
HGB BLD-MCNC: 13.6 G/DL (ref 12–16)
LYMPHOCYTES NFR BLD: 0.4 K/UL (ref 1–4.8)
LYMPHOCYTES RELATIVE PERCENT: 3 % (ref 24–44)
MCH RBC QN AUTO: 33.1 PG (ref 26–34)
MCHC RBC AUTO-ENTMCNC: 32.5 G/DL (ref 31–37)
MCV RBC AUTO: 102.1 FL (ref 80–100)
MONOCYTES NFR BLD: 1.06 K/UL (ref 0.1–1.3)
MONOCYTES NFR BLD: 8 % (ref 1–7)
MORPHOLOGY: ABNORMAL
NEUTROPHILS NFR BLD: 89 % (ref 36–66)
NEUTS SEG NFR BLD: 11.74 K/UL (ref 1.3–9.1)
PLATELET # BLD AUTO: 193 K/UL (ref 150–450)
PMV BLD AUTO: 9.1 FL (ref 6–12)
POTASSIUM SERPL-SCNC: 4.2 MMOL/L (ref 3.7–5.3)
RBC # BLD AUTO: 4.09 M/UL (ref 4–5.2)
REASON FOR REJECTION: NORMAL
SODIUM SERPL-SCNC: 143 MMOL/L (ref 135–144)
SPECIMEN SOURCE: NORMAL
WBC OTHER # BLD: 13.2 K/UL (ref 3.5–11)
ZZ NTE CLEAN UP: ORDERED TEST: NORMAL

## 2023-10-19 PROCEDURE — 6360000002 HC RX W HCPCS: Performed by: ORTHOPAEDIC SURGERY

## 2023-10-19 PROCEDURE — 1200000000 HC SEMI PRIVATE

## 2023-10-19 PROCEDURE — 97530 THERAPEUTIC ACTIVITIES: CPT

## 2023-10-19 PROCEDURE — 80048 BASIC METABOLIC PNL TOTAL CA: CPT

## 2023-10-19 PROCEDURE — 70490 CT SOFT TISSUE NECK W/O DYE: CPT

## 2023-10-19 PROCEDURE — 99254 IP/OBS CNSLTJ NEW/EST MOD 60: CPT | Performed by: PHYSICAL MEDICINE & REHABILITATION

## 2023-10-19 PROCEDURE — 74230 X-RAY XM SWLNG FUNCJ C+: CPT

## 2023-10-19 PROCEDURE — 99232 SBSQ HOSP IP/OBS MODERATE 35: CPT | Performed by: INTERNAL MEDICINE

## 2023-10-19 PROCEDURE — 2580000003 HC RX 258: Performed by: ORTHOPAEDIC SURGERY

## 2023-10-19 PROCEDURE — 97164 PT RE-EVAL EST PLAN CARE: CPT

## 2023-10-19 PROCEDURE — 99024 POSTOP FOLLOW-UP VISIT: CPT | Performed by: ORTHOPAEDIC SURGERY

## 2023-10-19 PROCEDURE — 92611 MOTION FLUOROSCOPY/SWALLOW: CPT

## 2023-10-19 PROCEDURE — 36415 COLL VENOUS BLD VENIPUNCTURE: CPT

## 2023-10-19 PROCEDURE — 85025 COMPLETE CBC W/AUTO DIFF WBC: CPT

## 2023-10-19 PROCEDURE — 97168 OT RE-EVAL EST PLAN CARE: CPT

## 2023-10-19 PROCEDURE — C9254 INJECTION, LACOSAMIDE: HCPCS | Performed by: ORTHOPAEDIC SURGERY

## 2023-10-19 RX ADMIN — SODIUM CHLORIDE, PRESERVATIVE FREE 10 ML: 5 INJECTION INTRAVENOUS at 08:25

## 2023-10-19 RX ADMIN — LACOSAMIDE 200 MG: 10 INJECTION INTRAVENOUS at 09:18

## 2023-10-19 RX ADMIN — CEFAZOLIN 2000 MG: 10 INJECTION, POWDER, FOR SOLUTION INTRAVENOUS at 00:34

## 2023-10-19 RX ADMIN — MORPHINE SULFATE 2 MG: 2 INJECTION, SOLUTION INTRAMUSCULAR; INTRAVENOUS at 20:28

## 2023-10-19 RX ADMIN — DEXAMETHASONE SODIUM PHOSPHATE 10 MG: 10 INJECTION, SOLUTION INTRAMUSCULAR; INTRAVENOUS at 02:28

## 2023-10-19 RX ADMIN — SODIUM CHLORIDE: 9 INJECTION, SOLUTION INTRAVENOUS at 04:32

## 2023-10-19 RX ADMIN — MORPHINE SULFATE 2 MG: 2 INJECTION, SOLUTION INTRAMUSCULAR; INTRAVENOUS at 07:14

## 2023-10-19 RX ADMIN — SODIUM CHLORIDE: 9 INJECTION, SOLUTION INTRAVENOUS at 20:33

## 2023-10-19 RX ADMIN — CEFTRIAXONE SODIUM 1000 MG: 1 INJECTION, POWDER, FOR SOLUTION INTRAMUSCULAR; INTRAVENOUS at 20:37

## 2023-10-19 RX ADMIN — LEVETIRACETAM 750 MG: 100 INJECTION, SOLUTION INTRAVENOUS at 20:35

## 2023-10-19 RX ADMIN — LACOSAMIDE 200 MG: 10 INJECTION INTRAVENOUS at 21:49

## 2023-10-19 RX ADMIN — LEVETIRACETAM 750 MG: 100 INJECTION, SOLUTION INTRAVENOUS at 08:29

## 2023-10-19 ASSESSMENT — PAIN DESCRIPTION - ORIENTATION
ORIENTATION: POSTERIOR
ORIENTATION: POSTERIOR

## 2023-10-19 ASSESSMENT — PAIN - FUNCTIONAL ASSESSMENT: PAIN_FUNCTIONAL_ASSESSMENT: PREVENTS OR INTERFERES SOME ACTIVE ACTIVITIES AND ADLS

## 2023-10-19 ASSESSMENT — PAIN DESCRIPTION - LOCATION
LOCATION: NECK
LOCATION: NECK;SHOULDER

## 2023-10-19 ASSESSMENT — PAIN DESCRIPTION - DESCRIPTORS
DESCRIPTORS: ACHING
DESCRIPTORS: ACHING;DISCOMFORT

## 2023-10-19 ASSESSMENT — PAIN SCALES - GENERAL
PAINLEVEL_OUTOF10: 7
PAINLEVEL_OUTOF10: 8
PAINLEVEL_OUTOF10: 7

## 2023-10-19 NOTE — ACP (ADVANCE CARE PLANNING)
ACP Conversation in minutes:      Conversation Outcomes:  ACP discussion completed    Follow-up plan:    [] Schedule follow-up conversation to continue planning  [] Referred individual to Provider for additional questions/concerns   [] Advised patient/agent/surrogate to review completed ACP document and update if needed with changes in condition, patient preferences or care setting    [] This note routed to one or more involved healthcare providers

## 2023-10-19 NOTE — OP NOTE
[facility]                 [address], [address]                                OPERATIVE REPORT    PATIENT NAME: Terrance Jimenez                    :        1955  MED REC NO:   [id number]                              ROOM:         ACCOUNT NO:   [de-identified]                           ADMIT DATE: 10/13/2023  PROVIDER:     Jamee Roth MD    DATE OF PROCEDURE:  10/18/2023    PREOPERATIVE DIAGNOSES:  1. C6 lamina fracture. 2.  Cervical spinal stenosis with myelopathy. POSTOPERATIVE DIAGNOSES:  1. C6 lamina fracture. 2.  Cervical spinal stenosis with myelopathy. OPERATION PERFORMED:  1. Posterior cervical laminectomy, C3, C4, C5, and C6.  2.  Posterior cervical fusion, C3-C4, C4-C5, C5-C6, and C6-C7. 3.  Posterior segmental instrumentation, C3 through C7.  4.  Local autograft bone grafting with fluoroscopic assistance. OPERATING SURGEON:  Philomena Mancini. Bria Cheney MD    FIRST ASSISTANT:  MARIAM Holland who was utilized for patient  positioning, wound retraction, assistance with instrumentation, and skin  closure. ANESTHESIA:  General.    ESTIMATED BLOOD LOSS:  300 mL. COMPLICATIONS:  None. SPECIMEN:  None. IMPLANTS:  Globus screw lucía construction, 10-mm screws. DRAINS:  None. FINDINGS:  Neuromonitoring performed throughout the case. Significantly  abnormal monitoring with pre and post positioning that really did not  change at all throughout the case. No significant neurologic changes  throughout the case. PROCEDURE IN DETAIL:  The patient was taken to the operating room,  placed under general anesthesia, connected to neuromonitoring, and  neuromonitoring checked. The patient was transferred to the operative  table and checked for padding and positioning. Neuromonitoring were  again run and no changes. The neck was prepped and draped in the usual sterile fashion.   A midline  incision was made and carried down

## 2023-10-19 NOTE — PROCEDURES
INSTRUMENTAL SWALLOW REPORT  MODIFIED BARIUM SWALLOW    NAME: Kaitlin Jovel   : 1955  MRN: [id number]       Date of Eval: 10/19/2023        Radiologist: Dr. Christiano Borrego     Referring Diagnosis(es):  Dysphagia    Past Medical History:  has a past medical history of Ankle fracture, left, Anxiety, Arthritis, Black tarry stools, Colon polyp, Constipation, ETOH abuse, Frequency of urination, GERD (gastroesophageal reflux disease), Head injury, Headache(784.0), Hypertension, Hypotension, Hypothyroidism, Memory deficit, Migraines, Numbness and tingling, Osteoporosis, Peripheral vascular disease ([address]), Pneumonia, Seizures ([address]), Shingles, Stroke ([address]), TIA (transient ischemic attack), Tremor, UTI (urinary tract infection), Varicose veins, and Wears glasses. Past Surgical History:  has a past surgical history that includes Bunionectomy (Right); Tubal ligation; other surgical history (2015); Hammer toe surgery (Left, 2016); pr [facility] incl fluor gdnce dx w/cell washg spx (N/A, 2018); Colonoscopy; Colonoscopy (N/A, 5/10/2021); and cervical fusion (N/A, 10/18/2023). Type of Study: Initial MBS       Recent CXR/CT of Chest: CXR (10/16): IMPRESSION:  1. Chest x-ray demonstrates mild perihilar congestion. No other significant  lung parenchyma or pleural disease. 2. CT cervical spine demonstrates a spinous process fracture at the level of  C6. The alignment appears stable. Moderate multilevel degenerative disc  disease. C1-C2 and odontoid appear intact. 3. CT head demonstrates a large old right hemispheric ischemic infarct. No  evidence of intracranial hemorrhage. No other acute intracranial abnormality. 4. CT lumbar spine demonstrates no acute traumatic injury. Degenerative  changes mostly between L4 and S1. Diffuse osteopenia but no acute  compression injury. Sacrum is intact.     Patient Complaints/Reason for Referral:  Kaitlin Jovel was referred for a MBS to assess the efficiency of

## 2023-10-20 ENCOUNTER — APPOINTMENT (OUTPATIENT)
Dept: GENERAL RADIOLOGY | Age: 68
DRG: 321 | End: 2023-10-20
Payer: COMMERCIAL

## 2023-10-20 ENCOUNTER — APPOINTMENT (OUTPATIENT)
Dept: CT IMAGING | Age: 68
DRG: 321 | End: 2023-10-20
Payer: COMMERCIAL

## 2023-10-20 LAB
ALBUMIN SERPL-MCNC: 2.9 G/DL (ref 3.5–5.2)
ALP SERPL-CCNC: 59 U/L (ref 35–104)
ALT SERPL-CCNC: 34 U/L (ref 5–33)
ANION GAP SERPL CALCULATED.3IONS-SCNC: 12 MMOL/L (ref 9–17)
ANION GAP SERPL CALCULATED.3IONS-SCNC: 13 MMOL/L (ref 9–17)
ARTERIAL PATENCY WRIST A: ABNORMAL
ARTERIAL PATENCY WRIST A: ABNORMAL
AST SERPL-CCNC: 59 U/L
BASOPHILS # BLD: 0 K/UL (ref 0–0.2)
BASOPHILS NFR BLD: 0 % (ref 0–2)
BDY SITE: ABNORMAL
BILIRUB SERPL-MCNC: 0.7 MG/DL (ref 0.3–1.2)
BODY TEMPERATURE: 37
BODY TEMPERATURE: 37
BUN SERPL-MCNC: 14 MG/DL (ref 8–23)
BUN SERPL-MCNC: 14 MG/DL (ref 8–23)
CALCIUM SERPL-MCNC: 8.6 MG/DL (ref 8.6–10.4)
CALCIUM SERPL-MCNC: 8.8 MG/DL (ref 8.6–10.4)
CHLORIDE SERPL-SCNC: 106 MMOL/L (ref 98–107)
CHLORIDE SERPL-SCNC: 109 MMOL/L (ref 98–107)
CO2 SERPL-SCNC: 23 MMOL/L (ref 20–31)
CO2 SERPL-SCNC: 28 MMOL/L (ref 20–31)
COHGB MFR BLD: 0.9 % (ref 0–5)
COHGB MFR BLD: 1.1 % (ref 0–5)
COHGB MFR BLD: 1.2 % (ref 0–5)
CREAT SERPL-MCNC: 0.5 MG/DL (ref 0.5–0.9)
CREAT SERPL-MCNC: <0.4 MG/DL (ref 0.5–0.9)
EOSINOPHIL # BLD: 0 K/UL (ref 0–0.4)
EOSINOPHILS RELATIVE PERCENT: 0 % (ref 0–4)
ERYTHROCYTE [DISTWIDTH] IN BLOOD BY AUTOMATED COUNT: 12.8 % (ref 11.5–14.9)
FIO2 ON VENT: 100 %
FIO2 ON VENT: 90 %
GAS FLOW.O2 O2 DELIVERY SYS: ABNORMAL L/MIN
GAS FLOW.O2 SETTING OXYMISER: 20 L/MIN
GAS FLOW.O2 SETTING OXYMISER: 20 L/MIN
GFR SERPL CREATININE-BSD FRML MDRD: >60 ML/MIN/1.73M2
GFR SERPL CREATININE-BSD FRML MDRD: ABNORMAL ML/MIN/1.73M2
GLUCOSE BLD-MCNC: 113 MG/DL (ref 65–105)
GLUCOSE SERPL-MCNC: 105 MG/DL (ref 70–99)
GLUCOSE SERPL-MCNC: 114 MG/DL (ref 70–99)
HCO3 ARTERIAL: 26.9 MMOL/L (ref 22–26)
HCO3 ARTERIAL: 26.9 MMOL/L (ref 22–26)
HCO3 ARTERIAL: 30.1 MMOL/L (ref 22–26)
HCT VFR BLD AUTO: 38.3 % (ref 36–46)
HGB BLD-MCNC: 12.4 G/DL (ref 12–16)
LACTATE BLDV-SCNC: 2.4 MMOL/L (ref 0.5–1.9)
LACTATE BLDV-SCNC: 2.5 MMOL/L (ref 0.5–2.2)
LACTATE BLDV-SCNC: 4.4 MMOL/L (ref 0.5–1.9)
LYMPHOCYTES NFR BLD: 0.46 K/UL (ref 1–4.8)
LYMPHOCYTES RELATIVE PERCENT: 3 % (ref 24–44)
MAGNESIUM SERPL-MCNC: 1.9 MG/DL (ref 1.6–2.6)
MAGNESIUM SERPL-MCNC: 2.1 MG/DL (ref 1.6–2.6)
MCH RBC QN AUTO: 32.4 PG (ref 26–34)
MCHC RBC AUTO-ENTMCNC: 32.5 G/DL (ref 31–37)
MCV RBC AUTO: 99.6 FL (ref 80–100)
METHEMOGLOBIN: 0.8 % (ref 0–1.9)
METHEMOGLOBIN: 0.9 % (ref 0–1.9)
METHEMOGLOBIN: 1.3 % (ref 0–1.9)
MONOCYTES NFR BLD: 16 % (ref 1–7)
MONOCYTES NFR BLD: 2.46 K/UL (ref 0.1–1.3)
MORPHOLOGY: ABNORMAL
NEUTROPHILS NFR BLD: 81 % (ref 36–66)
NEUTS SEG NFR BLD: 12.43 K/UL (ref 1.3–9.1)
NUCLEATED RED BLOOD CELLS: 1 PER 100 WBC
O2 SAT, ARTERIAL: 88.9 % (ref 95–98)
O2 SAT, ARTERIAL: 93.6 % (ref 95–98)
O2 SAT, ARTERIAL: 93.8 % (ref 95–98)
PCO2 ARTERIAL: 39.5 MMHG (ref 35–45)
PCO2 ARTERIAL: 50.6 MMHG (ref 35–45)
PCO2 ARTERIAL: 51.9 MMHG (ref 35–45)
PEEP RESPIRATORY: 10 CM[H2O]
PEEP RESPIRATORY: 10 CM[H2O]
PH ARTERIAL: 7.32 (ref 7.35–7.45)
PH ARTERIAL: 7.33 (ref 7.35–7.45)
PH ARTERIAL: 7.49 (ref 7.35–7.45)
PLATELET # BLD AUTO: 253 K/UL (ref 150–450)
PMV BLD AUTO: 8.4 FL (ref 6–12)
PO2 ARTERIAL: 56.8 MMHG (ref 80–100)
PO2 ARTERIAL: 84.8 MMHG (ref 80–100)
PO2 ARTERIAL: 88.3 MMHG (ref 80–100)
POSITIVE BASE EXCESS, ART: 0.8 MMOL/L (ref 0–2)
POSITIVE BASE EXCESS, ART: 1 MMOL/L (ref 0–2)
POSITIVE BASE EXCESS, ART: 6.8 MMOL/L (ref 0–2)
POTASSIUM SERPL-SCNC: 3.2 MMOL/L (ref 3.7–5.3)
POTASSIUM SERPL-SCNC: 3.7 MMOL/L (ref 3.7–5.3)
PROCALCITONIN SERPL-MCNC: 0.1 NG/ML
PROT SERPL-MCNC: 6.2 G/DL (ref 6.4–8.3)
PT. POSITION: ABNORMAL
RBC # BLD AUTO: 3.84 M/UL (ref 4–5.2)
REASON FOR REJECTION: NORMAL
REASON FOR REJECTION: NORMAL
RESPIRATORY RATE: 20
RESPIRATORY RATE: 22
RESPIRATORY RATE: 35
RETICS # AUTO: 0.1 M/UL (ref 0.02–0.1)
RETICS/RBC NFR AUTO: 2.7 % (ref 0.5–2)
SODIUM SERPL-SCNC: 145 MMOL/L (ref 135–144)
SODIUM SERPL-SCNC: 146 MMOL/L (ref 135–144)
SPECIMEN SOURCE: NORMAL
SPECIMEN SOURCE: NORMAL
TEXT FOR RESPIRATORY: ABNORMAL
TEXT FOR RESPIRATORY: ABNORMAL
TOTAL RATE: 22
TOTAL RATE: 35
VENTILATION MODE VENT: ABNORMAL
VENTILATION MODE VENT: ABNORMAL
VT: 450
WBC OTHER # BLD: 15.3 K/UL (ref 3.5–11)
ZZ NTE CLEAN UP: ORDERED TEST: NORMAL
ZZ NTE CLEAN UP: ORDERED TEST: NORMAL

## 2023-10-20 PROCEDURE — 87205 SMEAR GRAM STAIN: CPT

## 2023-10-20 PROCEDURE — 94660 CPAP INITIATION&MGMT: CPT

## 2023-10-20 PROCEDURE — 6360000004 HC RX CONTRAST MEDICATION: Performed by: INTERNAL MEDICINE

## 2023-10-20 PROCEDURE — 2580000003 HC RX 258: Performed by: INTERNAL MEDICINE

## 2023-10-20 PROCEDURE — 94761 N-INVAS EAR/PLS OXIMETRY MLT: CPT

## 2023-10-20 PROCEDURE — C9254 INJECTION, LACOSAMIDE: HCPCS | Performed by: ORTHOPAEDIC SURGERY

## 2023-10-20 PROCEDURE — 6360000002 HC RX W HCPCS: Performed by: ORTHOPAEDIC SURGERY

## 2023-10-20 PROCEDURE — 0BH17EZ INSERTION OF ENDOTRACHEAL AIRWAY INTO TRACHEA, VIA NATURAL OR ARTIFICIAL OPENING: ICD-10-PCS | Performed by: INTERNAL MEDICINE

## 2023-10-20 PROCEDURE — 36556 INSERT NON-TUNNEL CV CATH: CPT

## 2023-10-20 PROCEDURE — 2700000000 HC OXYGEN THERAPY PER DAY

## 2023-10-20 PROCEDURE — 36600 WITHDRAWAL OF ARTERIAL BLOOD: CPT

## 2023-10-20 PROCEDURE — 2580000003 HC RX 258: Performed by: ORTHOPAEDIC SURGERY

## 2023-10-20 PROCEDURE — 31500 INSERT EMERGENCY AIRWAY: CPT

## 2023-10-20 PROCEDURE — 99291 CRITICAL CARE FIRST HOUR: CPT | Performed by: INTERNAL MEDICINE

## 2023-10-20 PROCEDURE — 82805 BLOOD GASES W/O2 SATURATION: CPT

## 2023-10-20 PROCEDURE — 6360000002 HC RX W HCPCS

## 2023-10-20 PROCEDURE — 2500000003 HC RX 250 WO HCPCS: Performed by: INTERNAL MEDICINE

## 2023-10-20 PROCEDURE — 87070 CULTURE OTHR SPECIMN AEROBIC: CPT

## 2023-10-20 PROCEDURE — 85045 AUTOMATED RETICULOCYTE COUNT: CPT

## 2023-10-20 PROCEDURE — 83735 ASSAY OF MAGNESIUM: CPT

## 2023-10-20 PROCEDURE — 84145 PROCALCITONIN (PCT): CPT

## 2023-10-20 PROCEDURE — 5A09457 ASSISTANCE WITH RESPIRATORY VENTILATION, 24-96 CONSECUTIVE HOURS, CONTINUOUS POSITIVE AIRWAY PRESSURE: ICD-10-PCS | Performed by: INTERNAL MEDICINE

## 2023-10-20 PROCEDURE — 82947 ASSAY GLUCOSE BLOOD QUANT: CPT

## 2023-10-20 PROCEDURE — 83605 ASSAY OF LACTIC ACID: CPT

## 2023-10-20 PROCEDURE — 85025 COMPLETE CBC W/AUTO DIFF WBC: CPT

## 2023-10-20 PROCEDURE — 6370000000 HC RX 637 (ALT 250 FOR IP): Performed by: INTERNAL MEDICINE

## 2023-10-20 PROCEDURE — 87040 BLOOD CULTURE FOR BACTERIA: CPT

## 2023-10-20 PROCEDURE — 80048 BASIC METABOLIC PNL TOTAL CA: CPT

## 2023-10-20 PROCEDURE — 6360000002 HC RX W HCPCS: Performed by: INTERNAL MEDICINE

## 2023-10-20 PROCEDURE — A4216 STERILE WATER/SALINE, 10 ML: HCPCS | Performed by: INTERNAL MEDICINE

## 2023-10-20 PROCEDURE — 94002 VENT MGMT INPAT INIT DAY: CPT

## 2023-10-20 PROCEDURE — 89220 SPUTUM SPECIMEN COLLECTION: CPT

## 2023-10-20 PROCEDURE — 80053 COMPREHEN METABOLIC PANEL: CPT

## 2023-10-20 PROCEDURE — 02HV33Z INSERTION OF INFUSION DEVICE INTO SUPERIOR VENA CAVA, PERCUTANEOUS APPROACH: ICD-10-PCS | Performed by: INTERNAL MEDICINE

## 2023-10-20 PROCEDURE — 36415 COLL VENOUS BLD VENIPUNCTURE: CPT

## 2023-10-20 PROCEDURE — 71045 X-RAY EXAM CHEST 1 VIEW: CPT

## 2023-10-20 PROCEDURE — 71260 CT THORAX DX C+: CPT

## 2023-10-20 PROCEDURE — 2000000000 HC ICU R&B

## 2023-10-20 RX ORDER — METRONIDAZOLE 500 MG/100ML
500 INJECTION, SOLUTION INTRAVENOUS EVERY 8 HOURS
Status: COMPLETED | OUTPATIENT
Start: 2023-10-20 | End: 2023-10-27

## 2023-10-20 RX ORDER — METRONIDAZOLE 500 MG/1
500 TABLET ORAL EVERY 8 HOURS SCHEDULED
Status: DISCONTINUED | OUTPATIENT
Start: 2023-10-20 | End: 2023-10-20 | Stop reason: CLARIF

## 2023-10-20 RX ORDER — 0.9 % SODIUM CHLORIDE 0.9 %
1000 INTRAVENOUS SOLUTION INTRAVENOUS ONCE
Status: COMPLETED | OUTPATIENT
Start: 2023-10-20 | End: 2023-10-20

## 2023-10-20 RX ORDER — CHLORHEXIDINE GLUCONATE ORAL RINSE 1.2 MG/ML
15 SOLUTION DENTAL 2 TIMES DAILY
Status: DISCONTINUED | OUTPATIENT
Start: 2023-10-20 | End: 2023-10-24

## 2023-10-20 RX ORDER — POTASSIUM CHLORIDE 20 MEQ/1
40 TABLET, EXTENDED RELEASE ORAL PRN
Status: DISPENSED | OUTPATIENT
Start: 2023-10-20

## 2023-10-20 RX ORDER — PROPOFOL 10 MG/ML
10 INJECTION, EMULSION INTRAVENOUS CONTINUOUS
Status: DISCONTINUED | OUTPATIENT
Start: 2023-10-20 | End: 2023-10-24

## 2023-10-20 RX ORDER — 0.9 % SODIUM CHLORIDE 0.9 %
500 INTRAVENOUS SOLUTION INTRAVENOUS ONCE
Status: COMPLETED | OUTPATIENT
Start: 2023-10-20 | End: 2023-10-20

## 2023-10-20 RX ORDER — 0.9 % SODIUM CHLORIDE 0.9 %
100 INTRAVENOUS SOLUTION INTRAVENOUS ONCE
Status: COMPLETED | OUTPATIENT
Start: 2023-10-20 | End: 2023-10-20

## 2023-10-20 RX ORDER — PROPOFOL 10 MG/ML
INJECTION, EMULSION INTRAVENOUS
Status: COMPLETED
Start: 2023-10-20 | End: 2023-10-20

## 2023-10-20 RX ORDER — POTASSIUM CHLORIDE 7.45 MG/ML
10 INJECTION INTRAVENOUS PRN
Status: DISPENSED | OUTPATIENT
Start: 2023-10-20

## 2023-10-20 RX ORDER — MIDAZOLAM HYDROCHLORIDE 1 MG/ML
INJECTION INTRAMUSCULAR; INTRAVENOUS
Status: COMPLETED | OUTPATIENT
Start: 2023-10-20 | End: 2023-10-20

## 2023-10-20 RX ORDER — PROPOFOL 10 MG/ML
INJECTION, EMULSION INTRAVENOUS CONTINUOUS PRN
Status: COMPLETED | OUTPATIENT
Start: 2023-10-20 | End: 2023-10-20

## 2023-10-20 RX ORDER — SODIUM CHLORIDE 450 MG/100ML
INJECTION, SOLUTION INTRAVENOUS CONTINUOUS
Status: DISCONTINUED | OUTPATIENT
Start: 2023-10-20 | End: 2023-10-22

## 2023-10-20 RX ORDER — FENTANYL CITRATE 0.05 MG/ML
INJECTION, SOLUTION INTRAMUSCULAR; INTRAVENOUS
Status: COMPLETED | OUTPATIENT
Start: 2023-10-20 | End: 2023-10-20

## 2023-10-20 RX ORDER — FUROSEMIDE 10 MG/ML
40 INJECTION INTRAMUSCULAR; INTRAVENOUS ONCE
Status: COMPLETED | OUTPATIENT
Start: 2023-10-20 | End: 2023-10-20

## 2023-10-20 RX ORDER — SODIUM CHLORIDE 0.9 % (FLUSH) 0.9 %
10 SYRINGE (ML) INJECTION PRN
Status: ACTIVE | OUTPATIENT
Start: 2023-10-20

## 2023-10-20 RX ADMIN — CHLORHEXIDINE GLUCONATE 0.12% ORAL RINSE 15 ML: 1.2 LIQUID ORAL at 20:43

## 2023-10-20 RX ADMIN — SODIUM CHLORIDE, PRESERVATIVE FREE 10 ML: 5 INJECTION INTRAVENOUS at 20:44

## 2023-10-20 RX ADMIN — FENTANYL CITRATE 75 MCG: 0.05 INJECTION, SOLUTION INTRAMUSCULAR; INTRAVENOUS at 09:41

## 2023-10-20 RX ADMIN — SODIUM CHLORIDE: 4.5 INJECTION, SOLUTION INTRAVENOUS at 14:40

## 2023-10-20 RX ADMIN — LACOSAMIDE 200 MG: 10 INJECTION INTRAVENOUS at 21:29

## 2023-10-20 RX ADMIN — FAMOTIDINE 20 MG: 10 INJECTION, SOLUTION INTRAVENOUS at 20:44

## 2023-10-20 RX ADMIN — SODIUM CHLORIDE 100 ML: 9 INJECTION, SOLUTION INTRAVENOUS at 05:33

## 2023-10-20 RX ADMIN — LACOSAMIDE 200 MG: 10 INJECTION INTRAVENOUS at 11:21

## 2023-10-20 RX ADMIN — MIDAZOLAM 2 MG: 1 INJECTION INTRAMUSCULAR; INTRAVENOUS at 09:41

## 2023-10-20 RX ADMIN — LEVETIRACETAM 750 MG: 100 INJECTION, SOLUTION INTRAVENOUS at 11:17

## 2023-10-20 RX ADMIN — PROPOFOL 35 MCG/KG/MIN: 10 INJECTION, EMULSION INTRAVENOUS at 17:30

## 2023-10-20 RX ADMIN — CEFEPIME 2000 MG: 2 INJECTION, POWDER, FOR SOLUTION INTRAVENOUS at 16:13

## 2023-10-20 RX ADMIN — IOPAMIDOL 75 ML: 755 INJECTION, SOLUTION INTRAVENOUS at 05:33

## 2023-10-20 RX ADMIN — METRONIDAZOLE 500 MG: 500 INJECTION, SOLUTION INTRAVENOUS at 17:26

## 2023-10-20 RX ADMIN — SODIUM CHLORIDE 1000 ML: 9 INJECTION, SOLUTION INTRAVENOUS at 08:51

## 2023-10-20 RX ADMIN — LEVETIRACETAM 750 MG: 100 INJECTION, SOLUTION INTRAVENOUS at 20:42

## 2023-10-20 RX ADMIN — PROPOFOL 50 MG: 10 INJECTION, EMULSION INTRAVENOUS at 09:42

## 2023-10-20 RX ADMIN — CHLORHEXIDINE GLUCONATE 0.12% ORAL RINSE 15 ML: 1.2 LIQUID ORAL at 13:22

## 2023-10-20 RX ADMIN — SODIUM CHLORIDE 500 ML: 9 INJECTION, SOLUTION INTRAVENOUS at 13:41

## 2023-10-20 RX ADMIN — NOREPINEPHRINE BITARTRATE 5 MCG/MIN: 1 INJECTION, SOLUTION INTRAVENOUS at 15:13

## 2023-10-20 RX ADMIN — PROPOFOL 1000 MG: 10 INJECTION, EMULSION INTRAVENOUS at 10:45

## 2023-10-20 RX ADMIN — PROPOFOL 35 MCG/KG/MIN: 10 INJECTION, EMULSION INTRAVENOUS at 11:25

## 2023-10-20 RX ADMIN — FUROSEMIDE 40 MG: 10 INJECTION, SOLUTION INTRAMUSCULAR; INTRAVENOUS at 04:35

## 2023-10-20 RX ADMIN — SODIUM CHLORIDE, PRESERVATIVE FREE 10 ML: 5 INJECTION INTRAVENOUS at 05:32

## 2023-10-20 RX ADMIN — SODIUM CHLORIDE, PRESERVATIVE FREE 10 ML: 5 INJECTION INTRAVENOUS at 08:56

## 2023-10-20 RX ADMIN — FAMOTIDINE 20 MG: 10 INJECTION, SOLUTION INTRAVENOUS at 12:13

## 2023-10-20 RX ADMIN — METRONIDAZOLE 500 MG: 500 INJECTION, SOLUTION INTRAVENOUS at 12:15

## 2023-10-20 RX ADMIN — CEFEPIME 2000 MG: 2 INJECTION, POWDER, FOR SOLUTION INTRAVENOUS at 08:52

## 2023-10-20 ASSESSMENT — PULMONARY FUNCTION TESTS
PIF_VALUE: 26
PIF_VALUE: 26
PIF_VALUE: 27
PIF_VALUE: 28
PIF_VALUE: 26
PIF_VALUE: 26
PIF_VALUE: 20
PIF_VALUE: 26
PIF_VALUE: 26
PIF_VALUE: 22
PIF_VALUE: 28
PIF_VALUE: 29
PIF_VALUE: 24
PIF_VALUE: 28
PIF_VALUE: 27
PIF_VALUE: 22
PIF_VALUE: 27
PIF_VALUE: 28
PIF_VALUE: 28
PIF_VALUE: 27
PIF_VALUE: 27
PIF_VALUE: 28
PIF_VALUE: 25
PIF_VALUE: 26
PIF_VALUE: 27
PIF_VALUE: 23
PIF_VALUE: 27
PIF_VALUE: 26
PIF_VALUE: 43
PIF_VALUE: 27
PIF_VALUE: 27
PIF_VALUE: 26
PIF_VALUE: 27
PIF_VALUE: 27
PIF_VALUE: 29
PIF_VALUE: 20
PIF_VALUE: 28
PIF_VALUE: 25

## 2023-10-20 ASSESSMENT — PAIN SCALES - WONG BAKER
WONGBAKER_NUMERICALRESPONSE: NO HURT
WONGBAKER_NUMERICALRESPONSE: 0
WONGBAKER_NUMERICALRESPONSE: NO HURT
WONGBAKER_NUMERICALRESPONSE: 0
WONGBAKER_NUMERICALRESPONSE: NO HURT
WONGBAKER_NUMERICALRESPONSE: 0
WONGBAKER_NUMERICALRESPONSE: NO HURT
WONGBAKER_NUMERICALRESPONSE: 0
WONGBAKER_NUMERICALRESPONSE: NO HURT
WONGBAKER_NUMERICALRESPONSE: 0
WONGBAKER_NUMERICALRESPONSE: NO HURT
WONGBAKER_NUMERICALRESPONSE: 0
WONGBAKER_NUMERICALRESPONSE: 0
WONGBAKER_NUMERICALRESPONSE: NO HURT
WONGBAKER_NUMERICALRESPONSE: NO HURT
WONGBAKER_NUMERICALRESPONSE: 0
WONGBAKER_NUMERICALRESPONSE: NO HURT
WONGBAKER_NUMERICALRESPONSE: 0
WONGBAKER_NUMERICALRESPONSE: NO HURT
WONGBAKER_NUMERICALRESPONSE: 0
WONGBAKER_NUMERICALRESPONSE: NO HURT
WONGBAKER_NUMERICALRESPONSE: 0
WONGBAKER_NUMERICALRESPONSE: 0
WONGBAKER_NUMERICALRESPONSE: NO HURT
WONGBAKER_NUMERICALRESPONSE: 0
WONGBAKER_NUMERICALRESPONSE: NO HURT
WONGBAKER_NUMERICALRESPONSE: 0
WONGBAKER_NUMERICALRESPONSE: 0

## 2023-10-20 ASSESSMENT — PAIN SCALES - GENERAL: PAINLEVEL_OUTOF10: 0

## 2023-10-20 NOTE — PROCEDURES
Procedure is endotracheal intubation. Indication pre-diagnosis: Impending respiratory failure    Post diagnosis: Same    Estimated blood loss: 0    Patient was placed in the sniffing position. We gave Versed 2 mg IV push Fentanyl 75 mcg IV push propofol 75 mcg IV push. Using a 4.0 GlideScope, 7.5 endotracheal tube was placed at 22 cm at the lip. Equal breath sounds heard. O2 saturations were in the high 80s and we improved the O2 saturations to 99% with increasing the PEEP. Chest x-ray has been ordered after central line placement. A chest x-ray has been is currently pending at time of this note.     Tang Goodwin MD

## 2023-10-20 NOTE — PROCEDURES
Procedure is right IJ central venous catheter placement. Indication: Need for IV access in the critically ill patient with a poor blood draw and will require antibiotics sedative medications possibly pressors. Patient gave verbal consent prior to intubation. We placed the patient in the Trendelenburg position. The right IJ central line was flushed with saline. We did prepped the patient and draped the patient in the sterile fashion. Under ultrasound guidance, right IJ CVC was placed on the first attempt. The distal port jorge back blood and flushed without difficulty. Sutures x2 were placed. Patient tolerated procedure with no apparent immediate complications. Estimated blood loss less than 8 mL. Post diagnosis: Same. X-ray has been ordered to rule out pneumothorax. X-rays pending at time of this dictation.     Jessica Smalls MD

## 2023-10-21 ENCOUNTER — APPOINTMENT (OUTPATIENT)
Dept: GENERAL RADIOLOGY | Age: 68
DRG: 321 | End: 2023-10-21
Payer: COMMERCIAL

## 2023-10-21 PROBLEM — J18.9 MULTIFOCAL PNEUMONIA: Status: ACTIVE | Noted: 2017-01-02

## 2023-10-21 PROBLEM — K20.90 ESOPHAGITIS: Status: ACTIVE | Noted: 2023-10-21

## 2023-10-21 PROBLEM — R79.82 ELEVATED C-REACTIVE PROTEIN (CRP): Status: ACTIVE | Noted: 2023-10-21

## 2023-10-21 PROBLEM — D72.829 LEUKOCYTOSIS: Status: ACTIVE | Noted: 2023-10-21

## 2023-10-21 PROBLEM — B96.20 E. COLI UTI (URINARY TRACT INFECTION): Status: ACTIVE | Noted: 2018-01-14

## 2023-10-21 LAB
ABSOLUTE BANDS #: 1.06 K/UL (ref 0–1)
ALBUMIN SERPL-MCNC: 2.2 G/DL (ref 3.5–5.2)
ALP SERPL-CCNC: 53 U/L (ref 35–104)
ALT SERPL-CCNC: 23 U/L (ref 5–33)
ANION GAP SERPL CALCULATED.3IONS-SCNC: 7 MMOL/L (ref 9–17)
ARTERIAL PATENCY WRIST A: ABNORMAL
AST SERPL-CCNC: 37 U/L
BANDS: 8 % (ref 0–10)
BASOPHILS # BLD: 0 K/UL (ref 0–0.2)
BASOPHILS NFR BLD: 0 % (ref 0–2)
BDY SITE: ABNORMAL
BILIRUB SERPL-MCNC: 0.5 MG/DL (ref 0.3–1.2)
BUN SERPL-MCNC: 17 MG/DL (ref 8–23)
CALCIUM SERPL-MCNC: 8.3 MG/DL (ref 8.6–10.4)
CHLORIDE SERPL-SCNC: 112 MMOL/L (ref 98–107)
CO2 SERPL-SCNC: 27 MMOL/L (ref 20–31)
COHGB MFR BLD: 1.2 % (ref 0–5)
CREAT SERPL-MCNC: <0.4 MG/DL (ref 0.5–0.9)
CRP SERPL HS-MCNC: 301.5 MG/L (ref 0–5)
EOSINOPHIL # BLD: 0 K/UL (ref 0–0.4)
EOSINOPHILS RELATIVE PERCENT: 0 % (ref 0–4)
ERYTHROCYTE [DISTWIDTH] IN BLOOD BY AUTOMATED COUNT: 13.2 % (ref 11.5–14.9)
FIO2 ON VENT: 40 %
GAS FLOW.O2 O2 DELIVERY SYS: ABNORMAL L/MIN
GAS FLOW.O2 SETTING OXYMISER: 20 L/MIN
GFR SERPL CREATININE-BSD FRML MDRD: ABNORMAL ML/MIN/1.73M2
GLUCOSE SERPL-MCNC: 98 MG/DL (ref 70–99)
HCO3 ARTERIAL: 26.9 MMOL/L (ref 22–26)
HCT VFR BLD AUTO: 33.8 % (ref 36–46)
HGB BLD-MCNC: 11 G/DL (ref 12–16)
LACTATE BLDV-SCNC: 2.3 MMOL/L (ref 0.5–2.2)
LYMPHOCYTES NFR BLD: 0.66 K/UL (ref 1–4.8)
LYMPHOCYTES RELATIVE PERCENT: 5 % (ref 24–44)
MAGNESIUM SERPL-MCNC: 2 MG/DL (ref 1.6–2.6)
MCH RBC QN AUTO: 32.5 PG (ref 26–34)
MCHC RBC AUTO-ENTMCNC: 32.6 G/DL (ref 31–37)
MCV RBC AUTO: 99.6 FL (ref 80–100)
METHEMOGLOBIN: 0.8 % (ref 0–1.9)
MONOCYTES NFR BLD: 0.4 K/UL (ref 0.1–1.3)
MONOCYTES NFR BLD: 3 % (ref 1–7)
MORPHOLOGY: ABNORMAL
MORPHOLOGY: ABNORMAL
NEUTROPHILS NFR BLD: 84 % (ref 36–66)
NEUTS SEG NFR BLD: 11.08 K/UL (ref 1.3–9.1)
O2 SAT, ARTERIAL: 94 % (ref 95–98)
PCO2 ARTERIAL: 41.1 MMHG (ref 35–45)
PEEP RESPIRATORY: 12 CM[H2O]
PH ARTERIAL: 7.42 (ref 7.35–7.45)
PHOSPHATE SERPL-MCNC: 2 MG/DL (ref 2.6–4.5)
PLATELET # BLD AUTO: 171 K/UL (ref 150–450)
PMV BLD AUTO: 8.9 FL (ref 6–12)
PO2 ARTERIAL: 75 MMHG (ref 80–100)
POSITIVE BASE EXCESS, ART: 2.6 MMOL/L (ref 0–2)
POTASSIUM SERPL-SCNC: 3.2 MMOL/L (ref 3.7–5.3)
PROCALCITONIN SERPL-MCNC: 1.28 NG/ML
PROT SERPL-MCNC: 5.1 G/DL (ref 6.4–8.3)
PT. POSITION: ABNORMAL
RBC # BLD AUTO: 3.39 M/UL (ref 4–5.2)
RESPIRATORY RATE: 20
SODIUM SERPL-SCNC: 146 MMOL/L (ref 135–144)
TEXT FOR RESPIRATORY: ABNORMAL
TOTAL RATE: 20
VENTILATION MODE VENT: ABNORMAL
VT: 450
WBC OTHER # BLD: 13.2 K/UL (ref 3.5–11)

## 2023-10-21 PROCEDURE — 6360000002 HC RX W HCPCS: Performed by: ORTHOPAEDIC SURGERY

## 2023-10-21 PROCEDURE — 71045 X-RAY EXAM CHEST 1 VIEW: CPT

## 2023-10-21 PROCEDURE — 6360000002 HC RX W HCPCS: Performed by: INTERNAL MEDICINE

## 2023-10-21 PROCEDURE — A4216 STERILE WATER/SALINE, 10 ML: HCPCS | Performed by: INTERNAL MEDICINE

## 2023-10-21 PROCEDURE — 94003 VENT MGMT INPAT SUBQ DAY: CPT

## 2023-10-21 PROCEDURE — 6360000002 HC RX W HCPCS: Performed by: NURSE PRACTITIONER

## 2023-10-21 PROCEDURE — 2000000000 HC ICU R&B

## 2023-10-21 PROCEDURE — 2580000003 HC RX 258: Performed by: ORTHOPAEDIC SURGERY

## 2023-10-21 PROCEDURE — 80053 COMPREHEN METABOLIC PANEL: CPT

## 2023-10-21 PROCEDURE — 99291 CRITICAL CARE FIRST HOUR: CPT | Performed by: INTERNAL MEDICINE

## 2023-10-21 PROCEDURE — 2580000003 HC RX 258: Performed by: NURSE PRACTITIONER

## 2023-10-21 PROCEDURE — C9254 INJECTION, LACOSAMIDE: HCPCS | Performed by: ORTHOPAEDIC SURGERY

## 2023-10-21 PROCEDURE — 36600 WITHDRAWAL OF ARTERIAL BLOOD: CPT

## 2023-10-21 PROCEDURE — 2580000003 HC RX 258: Performed by: INTERNAL MEDICINE

## 2023-10-21 PROCEDURE — 2500000003 HC RX 250 WO HCPCS: Performed by: INTERNAL MEDICINE

## 2023-10-21 PROCEDURE — 6370000000 HC RX 637 (ALT 250 FOR IP): Performed by: INTERNAL MEDICINE

## 2023-10-21 PROCEDURE — 84100 ASSAY OF PHOSPHORUS: CPT

## 2023-10-21 PROCEDURE — 99254 IP/OBS CNSLTJ NEW/EST MOD 60: CPT | Performed by: NURSE PRACTITIONER

## 2023-10-21 PROCEDURE — 85025 COMPLETE CBC W/AUTO DIFF WBC: CPT

## 2023-10-21 PROCEDURE — 83735 ASSAY OF MAGNESIUM: CPT

## 2023-10-21 PROCEDURE — C9113 INJ PANTOPRAZOLE SODIUM, VIA: HCPCS | Performed by: INTERNAL MEDICINE

## 2023-10-21 PROCEDURE — 86140 C-REACTIVE PROTEIN: CPT

## 2023-10-21 PROCEDURE — 6370000000 HC RX 637 (ALT 250 FOR IP): Performed by: ORTHOPAEDIC SURGERY

## 2023-10-21 PROCEDURE — 93005 ELECTROCARDIOGRAM TRACING: CPT | Performed by: NURSE PRACTITIONER

## 2023-10-21 PROCEDURE — 83605 ASSAY OF LACTIC ACID: CPT

## 2023-10-21 PROCEDURE — 82805 BLOOD GASES W/O2 SATURATION: CPT

## 2023-10-21 PROCEDURE — 36415 COLL VENOUS BLD VENIPUNCTURE: CPT

## 2023-10-21 PROCEDURE — 84145 PROCALCITONIN (PCT): CPT

## 2023-10-21 RX ORDER — DILTIAZEM HYDROCHLORIDE 5 MG/ML
5 INJECTION INTRAVENOUS ONCE
Status: COMPLETED | OUTPATIENT
Start: 2023-10-21 | End: 2023-10-21

## 2023-10-21 RX ORDER — MIDODRINE HYDROCHLORIDE 5 MG/1
5 TABLET ORAL 3 TIMES DAILY PRN
Status: DISCONTINUED | OUTPATIENT
Start: 2023-10-21 | End: 2023-10-31

## 2023-10-21 RX ORDER — METOPROLOL TARTRATE 5 MG/5ML
5 INJECTION INTRAVENOUS EVERY 6 HOURS
Status: DISCONTINUED | OUTPATIENT
Start: 2023-10-21 | End: 2023-11-05 | Stop reason: SDUPTHER

## 2023-10-21 RX ORDER — METOPROLOL TARTRATE 5 MG/5ML
5 INJECTION INTRAVENOUS PRN
Status: ACTIVE | OUTPATIENT
Start: 2023-10-21

## 2023-10-21 RX ORDER — POTASSIUM CHLORIDE 7.45 MG/ML
10 INJECTION INTRAVENOUS PRN
Status: DISPENSED | OUTPATIENT
Start: 2023-10-21

## 2023-10-21 RX ORDER — SENNA AND DOCUSATE SODIUM 50; 8.6 MG/1; MG/1
1 TABLET, FILM COATED ORAL 2 TIMES DAILY
Status: DISPENSED | OUTPATIENT
Start: 2023-10-21

## 2023-10-21 RX ORDER — FENTANYL CITRATE 0.05 MG/ML
50 INJECTION, SOLUTION INTRAMUSCULAR; INTRAVENOUS EVERY 30 MIN PRN
Status: DISPENSED | OUTPATIENT
Start: 2023-10-21

## 2023-10-21 RX ADMIN — SODIUM CHLORIDE, PRESERVATIVE FREE 10 ML: 5 INJECTION INTRAVENOUS at 20:12

## 2023-10-21 RX ADMIN — PAROXETINE HYDROCHLORIDE 30 MG: 20 TABLET, FILM COATED ORAL at 07:27

## 2023-10-21 RX ADMIN — NOREPINEPHRINE BITARTRATE 5 MCG/MIN: 1 INJECTION, SOLUTION INTRAVENOUS at 15:25

## 2023-10-21 RX ADMIN — FENTANYL CITRATE 50 MCG: 0.05 INJECTION, SOLUTION INTRAMUSCULAR; INTRAVENOUS at 18:20

## 2023-10-21 RX ADMIN — METRONIDAZOLE 500 MG: 500 INJECTION, SOLUTION INTRAVENOUS at 00:58

## 2023-10-21 RX ADMIN — PROPOFOL 15 MCG/KG/MIN: 10 INJECTION, EMULSION INTRAVENOUS at 18:24

## 2023-10-21 RX ADMIN — SODIUM CHLORIDE: 4.5 INJECTION, SOLUTION INTRAVENOUS at 03:51

## 2023-10-21 RX ADMIN — FAMOTIDINE 20 MG: 10 INJECTION, SOLUTION INTRAVENOUS at 07:39

## 2023-10-21 RX ADMIN — LEVETIRACETAM 750 MG: 100 INJECTION, SOLUTION INTRAVENOUS at 20:08

## 2023-10-21 RX ADMIN — LEVOTHYROXINE SODIUM 50 MCG: 0.05 TABLET ORAL at 07:26

## 2023-10-21 RX ADMIN — ACETAMINOPHEN 650 MG: 325 TABLET ORAL at 07:26

## 2023-10-21 RX ADMIN — CHLORHEXIDINE GLUCONATE 0.12% ORAL RINSE 15 ML: 1.2 LIQUID ORAL at 09:12

## 2023-10-21 RX ADMIN — METRONIDAZOLE 500 MG: 500 INJECTION, SOLUTION INTRAVENOUS at 16:42

## 2023-10-21 RX ADMIN — PROPOFOL 10 MCG/KG/MIN: 10 INJECTION, EMULSION INTRAVENOUS at 03:50

## 2023-10-21 RX ADMIN — MORPHINE SULFATE 2 MG: 2 INJECTION, SOLUTION INTRAMUSCULAR; INTRAVENOUS at 02:26

## 2023-10-21 RX ADMIN — SENNOSIDES AND DOCUSATE SODIUM 1 TABLET: 50; 8.6 TABLET ORAL at 08:53

## 2023-10-21 RX ADMIN — POTASSIUM CHLORIDE 10 MEQ: 7.46 INJECTION, SOLUTION INTRAVENOUS at 12:47

## 2023-10-21 RX ADMIN — LACOSAMIDE 200 MG: 10 INJECTION INTRAVENOUS at 21:35

## 2023-10-21 RX ADMIN — MIDODRINE HYDROCHLORIDE 5 MG: 5 TABLET ORAL at 19:13

## 2023-10-21 RX ADMIN — METOPROLOL TARTRATE 5 MG: 5 INJECTION, SOLUTION INTRAVENOUS at 18:40

## 2023-10-21 RX ADMIN — CEFEPIME 2000 MG: 2 INJECTION, POWDER, FOR SOLUTION INTRAVENOUS at 00:22

## 2023-10-21 RX ADMIN — LACOSAMIDE 200 MG: 10 INJECTION INTRAVENOUS at 09:09

## 2023-10-21 RX ADMIN — SODIUM PHOSPHATE, MONOBASIC, MONOHYDRATE AND SODIUM PHOSPHATE, DIBASIC, ANHYDROUS 17.82 MMOL: 142; 276 INJECTION, SOLUTION INTRAVENOUS at 19:50

## 2023-10-21 RX ADMIN — CEFEPIME 2000 MG: 2 INJECTION, POWDER, FOR SOLUTION INTRAVENOUS at 20:02

## 2023-10-21 RX ADMIN — SODIUM CHLORIDE, PRESERVATIVE FREE 40 MG: 5 INJECTION INTRAVENOUS at 20:09

## 2023-10-21 RX ADMIN — CEFEPIME 2000 MG: 2 INJECTION, POWDER, FOR SOLUTION INTRAVENOUS at 07:48

## 2023-10-21 RX ADMIN — PRIMIDONE 50 MG: 50 TABLET ORAL at 08:44

## 2023-10-21 RX ADMIN — POTASSIUM CHLORIDE 10 MEQ: 7.46 INJECTION, SOLUTION INTRAVENOUS at 07:26

## 2023-10-21 RX ADMIN — PRIMIDONE 50 MG: 50 TABLET ORAL at 20:10

## 2023-10-21 RX ADMIN — SODIUM CHLORIDE, PRESERVATIVE FREE 10 ML: 5 INJECTION INTRAVENOUS at 07:39

## 2023-10-21 RX ADMIN — ACETAMINOPHEN 650 MG: 325 TABLET ORAL at 14:23

## 2023-10-21 RX ADMIN — SODIUM CHLORIDE: 4.5 INJECTION, SOLUTION INTRAVENOUS at 16:39

## 2023-10-21 RX ADMIN — ACETAMINOPHEN 650 MG: 325 TABLET ORAL at 20:09

## 2023-10-21 RX ADMIN — POTASSIUM CHLORIDE 10 MEQ: 7.46 INJECTION, SOLUTION INTRAVENOUS at 05:54

## 2023-10-21 RX ADMIN — LEVETIRACETAM 750 MG: 100 INJECTION, SOLUTION INTRAVENOUS at 08:51

## 2023-10-21 RX ADMIN — SODIUM CHLORIDE: 9 INJECTION, SOLUTION INTRAVENOUS at 16:40

## 2023-10-21 RX ADMIN — DILTIAZEM HYDROCHLORIDE 5 MG: 5 INJECTION, SOLUTION INTRAVENOUS at 19:34

## 2023-10-21 RX ADMIN — SODIUM CHLORIDE, PRESERVATIVE FREE 40 MG: 5 INJECTION INTRAVENOUS at 08:53

## 2023-10-21 RX ADMIN — FENTANYL CITRATE 50 MCG: 0.05 INJECTION, SOLUTION INTRAMUSCULAR; INTRAVENOUS at 14:23

## 2023-10-21 RX ADMIN — CHLORHEXIDINE GLUCONATE 0.12% ORAL RINSE 15 ML: 1.2 LIQUID ORAL at 20:16

## 2023-10-21 RX ADMIN — METRONIDAZOLE 500 MG: 500 INJECTION, SOLUTION INTRAVENOUS at 09:54

## 2023-10-21 RX ADMIN — POTASSIUM CHLORIDE 10 MEQ: 7.46 INJECTION, SOLUTION INTRAVENOUS at 11:26

## 2023-10-21 RX ADMIN — ATORVASTATIN CALCIUM 40 MG: 40 TABLET, FILM COATED ORAL at 07:26

## 2023-10-21 RX ADMIN — DONEPEZIL HYDROCHLORIDE 5 MG: 10 TABLET, FILM COATED ORAL at 20:09

## 2023-10-21 RX ADMIN — SENNOSIDES AND DOCUSATE SODIUM 1 TABLET: 50; 8.6 TABLET ORAL at 20:09

## 2023-10-21 ASSESSMENT — PAIN SCALES - WONG BAKER
WONGBAKER_NUMERICALRESPONSE: NO HURT
WONGBAKER_NUMERICALRESPONSE: 0
WONGBAKER_NUMERICALRESPONSE: NO HURT
WONGBAKER_NUMERICALRESPONSE: 0
WONGBAKER_NUMERICALRESPONSE: 0
WONGBAKER_NUMERICALRESPONSE: NO HURT
WONGBAKER_NUMERICALRESPONSE: 0
WONGBAKER_NUMERICALRESPONSE: 0
WONGBAKER_NUMERICALRESPONSE: NO HURT
WONGBAKER_NUMERICALRESPONSE: 0
WONGBAKER_NUMERICALRESPONSE: NO HURT
WONGBAKER_NUMERICALRESPONSE: 0
WONGBAKER_NUMERICALRESPONSE: 0
WONGBAKER_NUMERICALRESPONSE: NO HURT
WONGBAKER_NUMERICALRESPONSE: 0
WONGBAKER_NUMERICALRESPONSE: 0
WONGBAKER_NUMERICALRESPONSE: NO HURT
WONGBAKER_NUMERICALRESPONSE: 0
WONGBAKER_NUMERICALRESPONSE: NO HURT
WONGBAKER_NUMERICALRESPONSE: NO HURT
WONGBAKER_NUMERICALRESPONSE: 0
WONGBAKER_NUMERICALRESPONSE: NO HURT

## 2023-10-21 ASSESSMENT — PULMONARY FUNCTION TESTS
PIF_VALUE: 20
PIF_VALUE: 28
PIF_VALUE: 21
PIF_VALUE: 22
PIF_VALUE: 21
PIF_VALUE: 29
PIF_VALUE: 21
PIF_VALUE: 20
PIF_VALUE: 21
PIF_VALUE: 24
PIF_VALUE: 21
PIF_VALUE: 20
PIF_VALUE: 20
PIF_VALUE: 10
PIF_VALUE: 29
PIF_VALUE: 26
PIF_VALUE: 21
PIF_VALUE: 22
PIF_VALUE: 29
PIF_VALUE: 21
PIF_VALUE: 35
PIF_VALUE: 22
PIF_VALUE: 21
PIF_VALUE: 29
PIF_VALUE: 26
PIF_VALUE: 21
PIF_VALUE: 28
PIF_VALUE: 13
PIF_VALUE: 21
PIF_VALUE: 28
PIF_VALUE: 22
PIF_VALUE: 19
PIF_VALUE: 21

## 2023-10-21 ASSESSMENT — PAIN SCALES - GENERAL
PAINLEVEL_OUTOF10: 0
PAINLEVEL_OUTOF10: 0

## 2023-10-21 NOTE — FLOWSHEET NOTE
10/21/23 0212   Anna Coma Scale   Eye Opening 2   Best Verbal Response 1   Best Motor Response 4   Anna Coma Scale Score 7   OPO Notified Yes   Date OPO Notified 10/21/23   Time OPO Notified [id number]   OPO Referral Number [id number]     Called per protocol.

## 2023-10-22 ENCOUNTER — APPOINTMENT (OUTPATIENT)
Dept: GENERAL RADIOLOGY | Age: 68
DRG: 321 | End: 2023-10-22
Payer: COMMERCIAL

## 2023-10-22 LAB
ALBUMIN SERPL-MCNC: 1.8 G/DL (ref 3.5–5.2)
ALP SERPL-CCNC: 70 U/L (ref 35–104)
ALT SERPL-CCNC: 34 U/L (ref 5–33)
ANION GAP SERPL CALCULATED.3IONS-SCNC: 8 MMOL/L (ref 9–17)
AST SERPL-CCNC: 84 U/L
BASOPHILS # BLD: 0 K/UL (ref 0–0.2)
BASOPHILS NFR BLD: 0 % (ref 0–2)
BDY SITE: ABNORMAL
BILIRUB SERPL-MCNC: 0.5 MG/DL (ref 0.3–1.2)
BODY TEMPERATURE: 37
BUN SERPL-MCNC: 19 MG/DL (ref 8–23)
CALCIUM SERPL-MCNC: 8 MG/DL (ref 8.6–10.4)
CHLORIDE SERPL-SCNC: 110 MMOL/L (ref 98–107)
CO2 SERPL-SCNC: 26 MMOL/L (ref 20–31)
COHGB MFR BLD: 0.9 % (ref 0–5)
CREAT SERPL-MCNC: <0.4 MG/DL (ref 0.5–0.9)
EOSINOPHIL # BLD: 0 K/UL (ref 0–0.4)
EOSINOPHILS RELATIVE PERCENT: 0 % (ref 0–4)
ERYTHROCYTE [DISTWIDTH] IN BLOOD BY AUTOMATED COUNT: 12.7 % (ref 11.5–14.9)
FIO2 ON VENT: 40 %
GAS FLOW.O2 O2 DELIVERY SYS: ABNORMAL L/MIN
GAS FLOW.O2 SETTING OXYMISER: 22 L/MIN
GFR SERPL CREATININE-BSD FRML MDRD: ABNORMAL ML/MIN/1.73M2
GLUCOSE SERPL-MCNC: 103 MG/DL (ref 70–99)
HCO3 ARTERIAL: 27.6 MMOL/L (ref 22–26)
HCT VFR BLD AUTO: 30.3 % (ref 36–46)
HGB BLD-MCNC: 10.2 G/DL (ref 12–16)
LACTATE BLDV-SCNC: 1.4 MMOL/L (ref 0.5–2.2)
LYMPHOCYTES NFR BLD: 0.8 K/UL (ref 1–4.8)
LYMPHOCYTES RELATIVE PERCENT: 7 % (ref 24–44)
MAGNESIUM SERPL-MCNC: 2.1 MG/DL (ref 1.6–2.6)
MCH RBC QN AUTO: 33.7 PG (ref 26–34)
MCHC RBC AUTO-ENTMCNC: 33.8 G/DL (ref 31–37)
MCV RBC AUTO: 99.6 FL (ref 80–100)
METHEMOGLOBIN: 0.9 % (ref 0–1.9)
MICROORGANISM SPEC CULT: ABNORMAL
MICROORGANISM/AGENT SPEC: ABNORMAL
MONOCYTES NFR BLD: 0.6 K/UL (ref 0.1–1.3)
MONOCYTES NFR BLD: 5 % (ref 1–7)
NEUTROPHILS NFR BLD: 88 % (ref 36–66)
NEUTS SEG NFR BLD: 9.9 K/UL (ref 1.3–9.1)
O2 SAT, ARTERIAL: 92.9 % (ref 95–98)
PCO2 ARTERIAL: 37.3 MMHG (ref 35–45)
PEEP RESPIRATORY: 9 CM[H2O]
PH ARTERIAL: 7.48 (ref 7.35–7.45)
PHOSPHATE SERPL-MCNC: 2 MG/DL (ref 2.6–4.5)
PHOSPHATE SERPL-MCNC: 2 MG/DL (ref 2.6–4.5)
PLATELET # BLD AUTO: 178 K/UL (ref 150–450)
PMV BLD AUTO: 9 FL (ref 6–12)
PO2 ARTERIAL: 62.9 MMHG (ref 80–100)
POSITIVE BASE EXCESS, ART: 4.1 MMOL/L (ref 0–2)
POTASSIUM SERPL-SCNC: 3.2 MMOL/L (ref 3.7–5.3)
PROT SERPL-MCNC: 4.6 G/DL (ref 6.4–8.3)
PT. POSITION: ABNORMAL
RBC # BLD AUTO: 3.04 M/UL (ref 4–5.2)
RESPIRATORY RATE: 22
SODIUM SERPL-SCNC: 144 MMOL/L (ref 135–144)
SPECIMEN DESCRIPTION: ABNORMAL
TEXT FOR RESPIRATORY: ABNORMAL
TOTAL RATE: 24
VENTILATION MODE VENT: ABNORMAL
VT: 450
WBC OTHER # BLD: 11.4 K/UL (ref 3.5–11)

## 2023-10-22 PROCEDURE — 2500000003 HC RX 250 WO HCPCS: Performed by: INTERNAL MEDICINE

## 2023-10-22 PROCEDURE — 2700000000 HC OXYGEN THERAPY PER DAY

## 2023-10-22 PROCEDURE — 6360000002 HC RX W HCPCS: Performed by: INTERNAL MEDICINE

## 2023-10-22 PROCEDURE — 6370000000 HC RX 637 (ALT 250 FOR IP): Performed by: INTERNAL MEDICINE

## 2023-10-22 PROCEDURE — 80053 COMPREHEN METABOLIC PANEL: CPT

## 2023-10-22 PROCEDURE — 83605 ASSAY OF LACTIC ACID: CPT

## 2023-10-22 PROCEDURE — 99233 SBSQ HOSP IP/OBS HIGH 50: CPT | Performed by: INTERNAL MEDICINE

## 2023-10-22 PROCEDURE — 2000000000 HC ICU R&B

## 2023-10-22 PROCEDURE — 87205 SMEAR GRAM STAIN: CPT

## 2023-10-22 PROCEDURE — 2580000003 HC RX 258: Performed by: ORTHOPAEDIC SURGERY

## 2023-10-22 PROCEDURE — 83735 ASSAY OF MAGNESIUM: CPT

## 2023-10-22 PROCEDURE — 36415 COLL VENOUS BLD VENIPUNCTURE: CPT

## 2023-10-22 PROCEDURE — 6360000002 HC RX W HCPCS: Performed by: NURSE PRACTITIONER

## 2023-10-22 PROCEDURE — 36600 WITHDRAWAL OF ARTERIAL BLOOD: CPT

## 2023-10-22 PROCEDURE — 85025 COMPLETE CBC W/AUTO DIFF WBC: CPT

## 2023-10-22 PROCEDURE — 99232 SBSQ HOSP IP/OBS MODERATE 35: CPT | Performed by: NURSE PRACTITIONER

## 2023-10-22 PROCEDURE — 82805 BLOOD GASES W/O2 SATURATION: CPT

## 2023-10-22 PROCEDURE — 6360000002 HC RX W HCPCS: Performed by: ORTHOPAEDIC SURGERY

## 2023-10-22 PROCEDURE — 84100 ASSAY OF PHOSPHORUS: CPT

## 2023-10-22 PROCEDURE — C9113 INJ PANTOPRAZOLE SODIUM, VIA: HCPCS | Performed by: INTERNAL MEDICINE

## 2023-10-22 PROCEDURE — A4216 STERILE WATER/SALINE, 10 ML: HCPCS | Performed by: INTERNAL MEDICINE

## 2023-10-22 PROCEDURE — 86403 PARTICLE AGGLUT ANTBDY SCRN: CPT

## 2023-10-22 PROCEDURE — 2580000003 HC RX 258: Performed by: NURSE PRACTITIONER

## 2023-10-22 PROCEDURE — 2580000003 HC RX 258: Performed by: INTERNAL MEDICINE

## 2023-10-22 PROCEDURE — 94761 N-INVAS EAR/PLS OXIMETRY MLT: CPT

## 2023-10-22 PROCEDURE — 87075 CULTR BACTERIA EXCEPT BLOOD: CPT

## 2023-10-22 PROCEDURE — 94003 VENT MGMT INPAT SUBQ DAY: CPT

## 2023-10-22 PROCEDURE — 71045 X-RAY EXAM CHEST 1 VIEW: CPT

## 2023-10-22 PROCEDURE — C9254 INJECTION, LACOSAMIDE: HCPCS | Performed by: ORTHOPAEDIC SURGERY

## 2023-10-22 PROCEDURE — 6370000000 HC RX 637 (ALT 250 FOR IP): Performed by: NURSE PRACTITIONER

## 2023-10-22 PROCEDURE — 6370000000 HC RX 637 (ALT 250 FOR IP): Performed by: ORTHOPAEDIC SURGERY

## 2023-10-22 PROCEDURE — 87070 CULTURE OTHR SPECIMN AEROBIC: CPT

## 2023-10-22 RX ORDER — LINEZOLID 600 MG/1
600 TABLET, FILM COATED ORAL EVERY 12 HOURS SCHEDULED
Status: DISCONTINUED | OUTPATIENT
Start: 2023-10-22 | End: 2023-10-23

## 2023-10-22 RX ORDER — SODIUM CHLORIDE, SODIUM LACTATE, POTASSIUM CHLORIDE, CALCIUM CHLORIDE 600; 310; 30; 20 MG/100ML; MG/100ML; MG/100ML; MG/100ML
INJECTION, SOLUTION INTRAVENOUS CONTINUOUS
Status: DISCONTINUED | OUTPATIENT
Start: 2023-10-22 | End: 2023-10-24

## 2023-10-22 RX ADMIN — POTASSIUM BICARBONATE 40 MEQ: 782 TABLET, EFFERVESCENT ORAL at 05:19

## 2023-10-22 RX ADMIN — SODIUM CHLORIDE, PRESERVATIVE FREE 40 MG: 5 INJECTION INTRAVENOUS at 20:17

## 2023-10-22 RX ADMIN — SODIUM CHLORIDE, PRESERVATIVE FREE 10 ML: 5 INJECTION INTRAVENOUS at 20:31

## 2023-10-22 RX ADMIN — SODIUM CHLORIDE, PRESERVATIVE FREE 40 MG: 5 INJECTION INTRAVENOUS at 08:03

## 2023-10-22 RX ADMIN — PAROXETINE HYDROCHLORIDE 30 MG: 20 TABLET, FILM COATED ORAL at 08:03

## 2023-10-22 RX ADMIN — LACOSAMIDE 200 MG: 10 INJECTION INTRAVENOUS at 20:56

## 2023-10-22 RX ADMIN — LEVETIRACETAM 750 MG: 100 INJECTION, SOLUTION INTRAVENOUS at 20:11

## 2023-10-22 RX ADMIN — PRIMIDONE 50 MG: 50 TABLET ORAL at 20:32

## 2023-10-22 RX ADMIN — ACETAMINOPHEN 650 MG: 325 TABLET ORAL at 15:14

## 2023-10-22 RX ADMIN — METRONIDAZOLE 500 MG: 500 INJECTION, SOLUTION INTRAVENOUS at 10:50

## 2023-10-22 RX ADMIN — ACETAMINOPHEN 650 MG: 325 TABLET ORAL at 01:04

## 2023-10-22 RX ADMIN — DONEPEZIL HYDROCHLORIDE 5 MG: 10 TABLET, FILM COATED ORAL at 20:25

## 2023-10-22 RX ADMIN — METOPROLOL TARTRATE 5 MG: 5 INJECTION, SOLUTION INTRAVENOUS at 08:03

## 2023-10-22 RX ADMIN — SENNOSIDES AND DOCUSATE SODIUM 1 TABLET: 50; 8.6 TABLET ORAL at 20:25

## 2023-10-22 RX ADMIN — METOPROLOL TARTRATE 5 MG: 5 INJECTION, SOLUTION INTRAVENOUS at 12:42

## 2023-10-22 RX ADMIN — SODIUM PHOSPHATE, MONOBASIC, MONOHYDRATE AND SODIUM PHOSPHATE, DIBASIC, ANHYDROUS 8.91 MMOL: 142; 276 INJECTION, SOLUTION INTRAVENOUS at 06:09

## 2023-10-22 RX ADMIN — PROPOFOL 10 MCG/KG/MIN: 10 INJECTION, EMULSION INTRAVENOUS at 22:24

## 2023-10-22 RX ADMIN — ACETAMINOPHEN 650 MG: 325 TABLET ORAL at 20:25

## 2023-10-22 RX ADMIN — MIDODRINE HYDROCHLORIDE 5 MG: 5 TABLET ORAL at 05:22

## 2023-10-22 RX ADMIN — CEFEPIME 2000 MG: 2 INJECTION, POWDER, FOR SOLUTION INTRAVENOUS at 20:20

## 2023-10-22 RX ADMIN — METOPROLOL TARTRATE 5 MG: 5 INJECTION, SOLUTION INTRAVENOUS at 20:18

## 2023-10-22 RX ADMIN — PRIMIDONE 50 MG: 50 TABLET ORAL at 08:02

## 2023-10-22 RX ADMIN — FENTANYL CITRATE 50 MCG: 0.05 INJECTION, SOLUTION INTRAMUSCULAR; INTRAVENOUS at 20:12

## 2023-10-22 RX ADMIN — SENNOSIDES AND DOCUSATE SODIUM 1 TABLET: 50; 8.6 TABLET ORAL at 08:03

## 2023-10-22 RX ADMIN — METRONIDAZOLE 500 MG: 500 INJECTION, SOLUTION INTRAVENOUS at 17:51

## 2023-10-22 RX ADMIN — LEVOTHYROXINE SODIUM 50 MCG: 0.05 TABLET ORAL at 05:19

## 2023-10-22 RX ADMIN — CHLORHEXIDINE GLUCONATE 0.12% ORAL RINSE 15 ML: 1.2 LIQUID ORAL at 20:32

## 2023-10-22 RX ADMIN — ACETAMINOPHEN 650 MG: 325 TABLET ORAL at 08:02

## 2023-10-22 RX ADMIN — SODIUM CHLORIDE, POTASSIUM CHLORIDE, SODIUM LACTATE AND CALCIUM CHLORIDE: 600; 310; 30; 20 INJECTION, SOLUTION INTRAVENOUS at 08:31

## 2023-10-22 RX ADMIN — LEVETIRACETAM 750 MG: 100 INJECTION, SOLUTION INTRAVENOUS at 10:38

## 2023-10-22 RX ADMIN — ATORVASTATIN CALCIUM 40 MG: 40 TABLET, FILM COATED ORAL at 08:03

## 2023-10-22 RX ADMIN — SODIUM CHLORIDE, PRESERVATIVE FREE 10 ML: 5 INJECTION INTRAVENOUS at 08:35

## 2023-10-22 RX ADMIN — METRONIDAZOLE 500 MG: 500 INJECTION, SOLUTION INTRAVENOUS at 01:03

## 2023-10-22 RX ADMIN — SODIUM CHLORIDE: 4.5 INJECTION, SOLUTION INTRAVENOUS at 08:18

## 2023-10-22 RX ADMIN — CHLORHEXIDINE GLUCONATE 0.12% ORAL RINSE 15 ML: 1.2 LIQUID ORAL at 08:26

## 2023-10-22 RX ADMIN — LACOSAMIDE 200 MG: 10 INJECTION INTRAVENOUS at 11:20

## 2023-10-22 RX ADMIN — CEFEPIME 2000 MG: 2 INJECTION, POWDER, FOR SOLUTION INTRAVENOUS at 08:59

## 2023-10-22 RX ADMIN — LINEZOLID 600 MG: 600 TABLET, FILM COATED ORAL at 20:30

## 2023-10-22 ASSESSMENT — PULMONARY FUNCTION TESTS
PIF_VALUE: 21
PIF_VALUE: 20
PIF_VALUE: 22
PIF_VALUE: 20
PIF_VALUE: 9
PIF_VALUE: 21
PIF_VALUE: 22
PIF_VALUE: 21
PIF_VALUE: 19
PIF_VALUE: 17
PIF_VALUE: 21
PIF_VALUE: 21
PIF_VALUE: 19
PIF_VALUE: 20
PIF_VALUE: 13
PIF_VALUE: 21
PIF_VALUE: 20
PIF_VALUE: 18
PIF_VALUE: 21
PIF_VALUE: 20
PIF_VALUE: 14
PIF_VALUE: 12
PIF_VALUE: 19
PIF_VALUE: 20
PIF_VALUE: 20
PIF_VALUE: 21
PIF_VALUE: 16
PIF_VALUE: 22
PIF_VALUE: 0
PIF_VALUE: 22
PIF_VALUE: 23
PIF_VALUE: 21
PIF_VALUE: 13
PIF_VALUE: 13
PIF_VALUE: 20
PIF_VALUE: 13
PIF_VALUE: 11
PIF_VALUE: 19
PIF_VALUE: 17
PIF_VALUE: 17
PIF_VALUE: 16
PIF_VALUE: 17
PIF_VALUE: 21
PIF_VALUE: 12
PIF_VALUE: 23
PIF_VALUE: 18
PIF_VALUE: 22
PIF_VALUE: 23

## 2023-10-22 ASSESSMENT — PAIN SCALES - WONG BAKER
WONGBAKER_NUMERICALRESPONSE: 0
WONGBAKER_NUMERICALRESPONSE: NO HURT
WONGBAKER_NUMERICALRESPONSE: 0
WONGBAKER_NUMERICALRESPONSE: 0
WONGBAKER_NUMERICALRESPONSE: NO HURT
WONGBAKER_NUMERICALRESPONSE: NO HURT
WONGBAKER_NUMERICALRESPONSE: 0
WONGBAKER_NUMERICALRESPONSE: NO HURT
WONGBAKER_NUMERICALRESPONSE: 0
WONGBAKER_NUMERICALRESPONSE: NO HURT
WONGBAKER_NUMERICALRESPONSE: 0
WONGBAKER_NUMERICALRESPONSE: 0
WONGBAKER_NUMERICALRESPONSE: NO HURT
WONGBAKER_NUMERICALRESPONSE: NO HURT
WONGBAKER_NUMERICALRESPONSE: 0
WONGBAKER_NUMERICALRESPONSE: NO HURT
WONGBAKER_NUMERICALRESPONSE: NO HURT
WONGBAKER_NUMERICALRESPONSE: 0
WONGBAKER_NUMERICALRESPONSE: NO HURT
WONGBAKER_NUMERICALRESPONSE: 0
WONGBAKER_NUMERICALRESPONSE: NO HURT
WONGBAKER_NUMERICALRESPONSE: 0
WONGBAKER_NUMERICALRESPONSE: NO HURT
WONGBAKER_NUMERICALRESPONSE: 0
WONGBAKER_NUMERICALRESPONSE: NO HURT
WONGBAKER_NUMERICALRESPONSE: 0
WONGBAKER_NUMERICALRESPONSE: NO HURT
WONGBAKER_NUMERICALRESPONSE: 0
WONGBAKER_NUMERICALRESPONSE: NO HURT
WONGBAKER_NUMERICALRESPONSE: 0
WONGBAKER_NUMERICALRESPONSE: 0

## 2023-10-22 ASSESSMENT — PAIN SCALES - GENERAL
PAINLEVEL_OUTOF10: 0
PAINLEVEL_OUTOF10: 0

## 2023-10-23 ENCOUNTER — ANESTHESIA EVENT (OUTPATIENT)
Dept: ENDOSCOPY | Age: 68
End: 2023-10-23
Payer: COMMERCIAL

## 2023-10-23 ENCOUNTER — APPOINTMENT (OUTPATIENT)
Dept: GENERAL RADIOLOGY | Age: 68
DRG: 321 | End: 2023-10-23
Payer: COMMERCIAL

## 2023-10-23 ENCOUNTER — APPOINTMENT (OUTPATIENT)
Age: 68
DRG: 321 | End: 2023-10-23
Attending: INTERNAL MEDICINE
Payer: COMMERCIAL

## 2023-10-23 ENCOUNTER — APPOINTMENT (OUTPATIENT)
Dept: CT IMAGING | Age: 68
DRG: 321 | End: 2023-10-23
Payer: COMMERCIAL

## 2023-10-23 ENCOUNTER — ANESTHESIA (OUTPATIENT)
Dept: ENDOSCOPY | Age: 68
End: 2023-10-23
Payer: COMMERCIAL

## 2023-10-23 LAB
ABSOLUTE BANDS #: 1.08 K/UL (ref 0–1)
ALBUMIN SERPL-MCNC: 1.8 G/DL (ref 3.5–5.2)
ALP SERPL-CCNC: 92 U/L (ref 35–104)
ALT SERPL-CCNC: 24 U/L (ref 5–33)
ANION GAP SERPL CALCULATED.3IONS-SCNC: 8 MMOL/L (ref 9–17)
ARTERIAL PATENCY WRIST A: ABNORMAL
AST SERPL-CCNC: 37 U/L
BANDS: 7 % (ref 0–10)
BASOPHILS # BLD: 0 K/UL (ref 0–0.2)
BASOPHILS NFR BLD: 0 % (ref 0–2)
BDY SITE: ABNORMAL
BILIRUB SERPL-MCNC: 0.5 MG/DL (ref 0.3–1.2)
BODY TEMPERATURE: 37
BUN SERPL-MCNC: 16 MG/DL (ref 8–23)
CALCIUM SERPL-MCNC: 7.9 MG/DL (ref 8.6–10.4)
CHLORIDE SERPL-SCNC: 109 MMOL/L (ref 98–107)
CO2 SERPL-SCNC: 27 MMOL/L (ref 20–31)
COHGB MFR BLD: 1.4 % (ref 0–5)
CREAT SERPL-MCNC: <0.4 MG/DL (ref 0.5–0.9)
CRP SERPL HS-MCNC: 152.4 MG/L (ref 0–5)
ECHO AO ROOT DIAM: 2.9 CM
ECHO AO ROOT INDEX: 1.71 CM/M2
ECHO AV AREA PEAK VELOCITY: 2.1 CM2
ECHO AV AREA VTI: 1.9 CM2
ECHO AV AREA/BSA PEAK VELOCITY: 1.2 CM2/M2
ECHO AV AREA/BSA VTI: 1.1 CM2/M2
ECHO AV MEAN GRADIENT: 3 MMHG
ECHO AV MEAN VELOCITY: 0.8 M/S
ECHO AV PEAK GRADIENT: 4 MMHG
ECHO AV PEAK VELOCITY: 1 M/S
ECHO AV VELOCITY RATIO: 0.8
ECHO AV VTI: 24 CM
ECHO BSA: 1.78 M2
ECHO EST RA PRESSURE: 3 MMHG
ECHO LA AREA 2C: 18.6 CM2
ECHO LA AREA 4C: 16.2 CM2
ECHO LA DIAMETER INDEX: 1.88 CM/M2
ECHO LA DIAMETER: 3.2 CM
ECHO LA MAJOR AXIS: 5.3 CM
ECHO LA MINOR AXIS: 5.2 CM
ECHO LA TO AORTIC ROOT RATIO: 1.1
ECHO LA VOL A-L A2C: 56 ML (ref 22–52)
ECHO LA VOL A-L A4C: 39 ML (ref 22–52)
ECHO LA VOL BP: 46 ML (ref 22–52)
ECHO LA VOL/BSA BIPLANE: 27 ML/M2 (ref 16–34)
ECHO LA VOLUME INDEX A-L A2C: 33 ML/M2 (ref 16–34)
ECHO LA VOLUME INDEX A-L A4C: 23 ML/M2 (ref 16–34)
ECHO LV E' LATERAL VELOCITY: 9 CM/S
ECHO LV E' SEPTAL VELOCITY: 6 CM/S
ECHO LV FRACTIONAL SHORTENING: 32 % (ref 28–44)
ECHO LV INTERNAL DIMENSION DIASTOLE INDEX: 2.59 CM/M2
ECHO LV INTERNAL DIMENSION DIASTOLIC: 4.4 CM (ref 3.9–5.3)
ECHO LV INTERNAL DIMENSION SYSTOLIC INDEX: 1.76 CM/M2
ECHO LV INTERNAL DIMENSION SYSTOLIC: 3 CM
ECHO LV IVSD: 0.9 CM (ref 0.6–0.9)
ECHO LV MASS 2D: 128 G (ref 67–162)
ECHO LV MASS INDEX 2D: 75.3 G/M2 (ref 43–95)
ECHO LV POSTERIOR WALL DIASTOLIC: 0.9 CM (ref 0.6–0.9)
ECHO LV RELATIVE WALL THICKNESS RATIO: 0.41
ECHO LVOT AREA: 2.5 CM2
ECHO LVOT AV VTI INDEX: 0.75
ECHO LVOT DIAM: 1.8 CM
ECHO LVOT MEAN GRADIENT: 2 MMHG
ECHO LVOT PEAK GRADIENT: 3 MMHG
ECHO LVOT PEAK VELOCITY: 0.8 M/S
ECHO LVOT STROKE VOLUME INDEX: 27.1 ML/M2
ECHO LVOT SV: 46 ML
ECHO LVOT VTI: 18.1 CM
ECHO MV A VELOCITY: 0.6 M/S
ECHO MV AREA VTI: 1.7 CM2
ECHO MV E DECELERATION TIME (DT): 190 MS
ECHO MV E VELOCITY: 0.64 M/S
ECHO MV E/A RATIO: 1.07
ECHO MV E/E' LATERAL: 7.11
ECHO MV E/E' RATIO (AVERAGED): 8.89
ECHO MV E/E' SEPTAL: 10.67
ECHO MV LVOT VTI INDEX: 1.49
ECHO MV MAX VELOCITY: 0.8 M/S
ECHO MV MEAN GRADIENT: 1 MMHG
ECHO MV MEAN VELOCITY: 0.4 M/S
ECHO MV PEAK GRADIENT: 3 MMHG
ECHO MV VTI: 27 CM
ECHO RA AREA 4C: 12.5 CM2
ECHO RA END SYSTOLIC VOLUME APICAL 4 CHAMBER INDEX BSA: 18 ML/M2
ECHO RA VOLUME: 31 ML
ECHO RIGHT VENTRICULAR SYSTOLIC PRESSURE (RVSP): 11 MMHG
ECHO RV TAPSE: 1.3 CM (ref 1.7–?)
ECHO TV REGURGITANT MAX VELOCITY: 1.4 M/S
ECHO TV REGURGITANT PEAK GRADIENT: 8 MMHG
EKG ATRIAL RATE: 85 BPM
EKG P AXIS: 77 DEGREES
EKG P-R INTERVAL: 112 MS
EKG Q-T INTERVAL: 374 MS
EKG QRS DURATION: 68 MS
EKG QTC CALCULATION (BAZETT): 445 MS
EKG R AXIS: 48 DEGREES
EKG T AXIS: 15 DEGREES
EKG VENTRICULAR RATE: 85 BPM
EOSINOPHIL # BLD: 0 K/UL (ref 0–0.4)
EOSINOPHILS RELATIVE PERCENT: 0 % (ref 0–4)
ERYTHROCYTE [DISTWIDTH] IN BLOOD BY AUTOMATED COUNT: 12.8 % (ref 11.5–14.9)
FIO2 ON VENT: 30 %
GAS FLOW.O2 O2 DELIVERY SYS: ABNORMAL L/MIN
GAS FLOW.O2 SETTING OXYMISER: 18 L/MIN
GFR SERPL CREATININE-BSD FRML MDRD: ABNORMAL ML/MIN/1.73M2
GLUCOSE SERPL-MCNC: 84 MG/DL (ref 70–99)
HCO3 ARTERIAL: 28.6 MMOL/L (ref 22–26)
HCT VFR BLD AUTO: 32.3 % (ref 36–46)
HGB BLD-MCNC: 10.8 G/DL (ref 12–16)
LYMPHOCYTES NFR BLD: 1.08 K/UL (ref 1–4.8)
LYMPHOCYTES RELATIVE PERCENT: 7 % (ref 24–44)
MAGNESIUM SERPL-MCNC: 2 MG/DL (ref 1.6–2.6)
MCH RBC QN AUTO: 33.1 PG (ref 26–34)
MCHC RBC AUTO-ENTMCNC: 33.5 G/DL (ref 31–37)
MCV RBC AUTO: 98.8 FL (ref 80–100)
METHEMOGLOBIN: 1.1 % (ref 0–1.9)
MONOCYTES NFR BLD: 0.77 K/UL (ref 0.1–1.3)
MONOCYTES NFR BLD: 5 % (ref 1–7)
MORPHOLOGY: ABNORMAL
NEUTROPHILS NFR BLD: 81 % (ref 36–66)
NEUTS SEG NFR BLD: 12.47 K/UL (ref 1.3–9.1)
O2 SAT, ARTERIAL: 93.4 % (ref 95–98)
PCO2 ARTERIAL: 36.3 MMHG (ref 35–45)
PEEP RESPIRATORY: 7 CM[H2O]
PH ARTERIAL: 7.5 (ref 7.35–7.45)
PHOSPHATE SERPL-MCNC: 2.7 MG/DL (ref 2.6–4.5)
PLATELET # BLD AUTO: 184 K/UL (ref 150–450)
PMV BLD AUTO: 8.8 FL (ref 6–12)
PO2 ARTERIAL: 73.2 MMHG (ref 80–100)
POSITIVE BASE EXCESS, ART: 5.5 MMOL/L (ref 0–2)
POTASSIUM SERPL-SCNC: 3.3 MMOL/L (ref 3.7–5.3)
PROCALCITONIN SERPL-MCNC: 0.49 NG/ML
PROT SERPL-MCNC: 4.7 G/DL (ref 6.4–8.3)
PT. POSITION: ABNORMAL
RBC # BLD AUTO: 3.27 M/UL (ref 4–5.2)
RESPIRATORY RATE: 20
SODIUM SERPL-SCNC: 144 MMOL/L (ref 135–144)
SURGICAL PATHOLOGY REPORT: NORMAL
TEXT FOR RESPIRATORY: ABNORMAL
TOTAL RATE: 20
VENTILATION MODE VENT: ABNORMAL
VT: 450
WBC OTHER # BLD: 15.4 K/UL (ref 3.5–11)

## 2023-10-23 PROCEDURE — 2580000003 HC RX 258: Performed by: INTERNAL MEDICINE

## 2023-10-23 PROCEDURE — 6360000002 HC RX W HCPCS: Performed by: SURGERY

## 2023-10-23 PROCEDURE — 80053 COMPREHEN METABOLIC PANEL: CPT

## 2023-10-23 PROCEDURE — 87075 CULTR BACTERIA EXCEPT BLOOD: CPT

## 2023-10-23 PROCEDURE — 0DH63UZ INSERTION OF FEEDING DEVICE INTO STOMACH, PERCUTANEOUS APPROACH: ICD-10-PCS | Performed by: SURGERY

## 2023-10-23 PROCEDURE — 3609013300 HC EGD TUBE PLACEMENT: Performed by: SURGERY

## 2023-10-23 PROCEDURE — 6360000002 HC RX W HCPCS: Performed by: INTERNAL MEDICINE

## 2023-10-23 PROCEDURE — 84145 PROCALCITONIN (PCT): CPT

## 2023-10-23 PROCEDURE — 3700000001 HC ADD 15 MINUTES (ANESTHESIA): Performed by: SURGERY

## 2023-10-23 PROCEDURE — 2000000000 HC ICU R&B

## 2023-10-23 PROCEDURE — 70491 CT SOFT TISSUE NECK W/DYE: CPT

## 2023-10-23 PROCEDURE — 2500000003 HC RX 250 WO HCPCS: Performed by: SURGERY

## 2023-10-23 PROCEDURE — 2500000003 HC RX 250 WO HCPCS: Performed by: INTERNAL MEDICINE

## 2023-10-23 PROCEDURE — C9254 INJECTION, LACOSAMIDE: HCPCS | Performed by: ORTHOPAEDIC SURGERY

## 2023-10-23 PROCEDURE — 82805 BLOOD GASES W/O2 SATURATION: CPT

## 2023-10-23 PROCEDURE — 36415 COLL VENOUS BLD VENIPUNCTURE: CPT

## 2023-10-23 PROCEDURE — 2580000003 HC RX 258: Performed by: ORTHOPAEDIC SURGERY

## 2023-10-23 PROCEDURE — 87076 CULTURE ANAEROBE IDENT EACH: CPT

## 2023-10-23 PROCEDURE — 71045 X-RAY EXAM CHEST 1 VIEW: CPT

## 2023-10-23 PROCEDURE — 72126 CT NECK SPINE W/DYE: CPT

## 2023-10-23 PROCEDURE — C9113 INJ PANTOPRAZOLE SODIUM, VIA: HCPCS | Performed by: INTERNAL MEDICINE

## 2023-10-23 PROCEDURE — 89220 SPUTUM SPECIMEN COLLECTION: CPT

## 2023-10-23 PROCEDURE — 2580000003 HC RX 258: Performed by: ANESTHESIOLOGY

## 2023-10-23 PROCEDURE — 2580000003 HC RX 258: Performed by: SURGERY

## 2023-10-23 PROCEDURE — 93306 TTE W/DOPPLER COMPLETE: CPT

## 2023-10-23 PROCEDURE — 6360000002 HC RX W HCPCS: Performed by: ORTHOPAEDIC SURGERY

## 2023-10-23 PROCEDURE — 2580000003 HC RX 258: Performed by: NURSE PRACTITIONER

## 2023-10-23 PROCEDURE — 87070 CULTURE OTHR SPECIMN AEROBIC: CPT

## 2023-10-23 PROCEDURE — 6360000002 HC RX W HCPCS: Performed by: ANESTHESIOLOGY

## 2023-10-23 PROCEDURE — 3700000000 HC ANESTHESIA ATTENDED CARE: Performed by: SURGERY

## 2023-10-23 PROCEDURE — 0DJ68ZZ INSPECTION OF STOMACH, VIA NATURAL OR ARTIFICIAL OPENING ENDOSCOPIC: ICD-10-PCS | Performed by: SURGERY

## 2023-10-23 PROCEDURE — 2720000010 HC SURG SUPPLY STERILE: Performed by: SURGERY

## 2023-10-23 PROCEDURE — 51702 INSERT TEMP BLADDER CATH: CPT

## 2023-10-23 PROCEDURE — 93306 TTE W/DOPPLER COMPLETE: CPT | Performed by: INTERNAL MEDICINE

## 2023-10-23 PROCEDURE — 6370000000 HC RX 637 (ALT 250 FOR IP): Performed by: INTERNAL MEDICINE

## 2023-10-23 PROCEDURE — 94003 VENT MGMT INPAT SUBQ DAY: CPT

## 2023-10-23 PROCEDURE — 99232 SBSQ HOSP IP/OBS MODERATE 35: CPT | Performed by: NURSE PRACTITIONER

## 2023-10-23 PROCEDURE — 86140 C-REACTIVE PROTEIN: CPT

## 2023-10-23 PROCEDURE — 43762 RPLC GTUBE NO REVJ TRC: CPT

## 2023-10-23 PROCEDURE — 83735 ASSAY OF MAGNESIUM: CPT

## 2023-10-23 PROCEDURE — 99233 SBSQ HOSP IP/OBS HIGH 50: CPT | Performed by: INTERNAL MEDICINE

## 2023-10-23 PROCEDURE — 36600 WITHDRAWAL OF ARTERIAL BLOOD: CPT

## 2023-10-23 PROCEDURE — 84100 ASSAY OF PHOSPHORUS: CPT

## 2023-10-23 PROCEDURE — 6360000002 HC RX W HCPCS: Performed by: NURSE PRACTITIONER

## 2023-10-23 PROCEDURE — 85025 COMPLETE CBC W/AUTO DIFF WBC: CPT

## 2023-10-23 PROCEDURE — 2709999900 HC NON-CHARGEABLE SUPPLY: Performed by: SURGERY

## 2023-10-23 PROCEDURE — 87205 SMEAR GRAM STAIN: CPT

## 2023-10-23 PROCEDURE — 2500000003 HC RX 250 WO HCPCS: Performed by: ANESTHESIOLOGY

## 2023-10-23 PROCEDURE — 6360000004 HC RX CONTRAST MEDICATION: Performed by: INTERNAL MEDICINE

## 2023-10-23 RX ORDER — LINEZOLID 2 MG/ML
600 INJECTION, SOLUTION INTRAVENOUS EVERY 12 HOURS
Status: DISCONTINUED | OUTPATIENT
Start: 2023-10-23 | End: 2023-10-29

## 2023-10-23 RX ORDER — VECURONIUM BROMIDE 1 MG/ML
INJECTION, POWDER, LYOPHILIZED, FOR SOLUTION INTRAVENOUS PRN
Status: DISCONTINUED | OUTPATIENT
Start: 2023-10-23 | End: 2023-10-23 | Stop reason: SDUPTHER

## 2023-10-23 RX ORDER — LIDOCAINE HYDROCHLORIDE 10 MG/ML
INJECTION, SOLUTION EPIDURAL; INFILTRATION; INTRACAUDAL; PERINEURAL PRN
Status: DISCONTINUED | OUTPATIENT
Start: 2023-10-23 | End: 2023-10-23 | Stop reason: SDUPTHER

## 2023-10-23 RX ORDER — VECURONIUM BROMIDE 1 MG/ML
10 INJECTION, POWDER, LYOPHILIZED, FOR SOLUTION INTRAVENOUS ONCE
Status: DISCONTINUED | OUTPATIENT
Start: 2023-10-23 | End: 2023-10-26

## 2023-10-23 RX ORDER — 0.9 % SODIUM CHLORIDE 0.9 %
100 INTRAVENOUS SOLUTION INTRAVENOUS ONCE
Status: COMPLETED | OUTPATIENT
Start: 2023-10-23 | End: 2023-10-23

## 2023-10-23 RX ORDER — PROPOFOL 10 MG/ML
INJECTION, EMULSION INTRAVENOUS PRN
Status: DISCONTINUED | OUTPATIENT
Start: 2023-10-23 | End: 2023-10-23 | Stop reason: SDUPTHER

## 2023-10-23 RX ORDER — LIDOCAINE HYDROCHLORIDE 10 MG/ML
INJECTION, SOLUTION EPIDURAL; INFILTRATION; INTRACAUDAL; PERINEURAL PRN
Status: DISCONTINUED | OUTPATIENT
Start: 2023-10-23 | End: 2023-10-23 | Stop reason: ALTCHOICE

## 2023-10-23 RX ORDER — SODIUM CHLORIDE 0.9 % (FLUSH) 0.9 %
10 SYRINGE (ML) INJECTION PRN
Status: ACTIVE | OUTPATIENT
Start: 2023-10-23

## 2023-10-23 RX ORDER — VECURONIUM BROMIDE 1 MG/ML
INJECTION, POWDER, LYOPHILIZED, FOR SOLUTION INTRAVENOUS
Status: COMPLETED
Start: 2023-10-23 | End: 2023-10-23

## 2023-10-23 RX ORDER — SODIUM CHLORIDE, SODIUM LACTATE, POTASSIUM CHLORIDE, CALCIUM CHLORIDE 600; 310; 30; 20 MG/100ML; MG/100ML; MG/100ML; MG/100ML
INJECTION, SOLUTION INTRAVENOUS CONTINUOUS PRN
Status: DISCONTINUED | OUTPATIENT
Start: 2023-10-23 | End: 2023-10-23 | Stop reason: SDUPTHER

## 2023-10-23 RX ADMIN — CEFEPIME 2000 MG: 2 INJECTION, POWDER, FOR SOLUTION INTRAVENOUS at 10:12

## 2023-10-23 RX ADMIN — METRONIDAZOLE 500 MG: 500 INJECTION, SOLUTION INTRAVENOUS at 10:16

## 2023-10-23 RX ADMIN — PROPOFOL 25 MG: 10 INJECTION, EMULSION INTRAVENOUS at 19:08

## 2023-10-23 RX ADMIN — LEVETIRACETAM 750 MG: 100 INJECTION, SOLUTION INTRAVENOUS at 08:24

## 2023-10-23 RX ADMIN — IOPAMIDOL 75 ML: 755 INJECTION, SOLUTION INTRAVENOUS at 09:40

## 2023-10-23 RX ADMIN — SODIUM PHOSPHATE, MONOBASIC, MONOHYDRATE AND SODIUM PHOSPHATE, DIBASIC, ANHYDROUS 8.91 MMOL: 142; 276 INJECTION, SOLUTION INTRAVENOUS at 01:03

## 2023-10-23 RX ADMIN — PROPOFOL 25 MG: 10 INJECTION, EMULSION INTRAVENOUS at 19:13

## 2023-10-23 RX ADMIN — PROPOFOL 25 MG: 10 INJECTION, EMULSION INTRAVENOUS at 19:22

## 2023-10-23 RX ADMIN — SODIUM CHLORIDE, POTASSIUM CHLORIDE, SODIUM LACTATE AND CALCIUM CHLORIDE: 600; 310; 30; 20 INJECTION, SOLUTION INTRAVENOUS at 01:00

## 2023-10-23 RX ADMIN — VECURONIUM BROMIDE 2 MG: 10 INJECTION, POWDER, LYOPHILIZED, FOR SOLUTION INTRAVENOUS at 19:11

## 2023-10-23 RX ADMIN — LIDOCAINE HYDROCHLORIDE 25 MG: 10 INJECTION, SOLUTION EPIDURAL; INFILTRATION; INTRACAUDAL; PERINEURAL at 19:02

## 2023-10-23 RX ADMIN — METOPROLOL TARTRATE 5 MG: 5 INJECTION, SOLUTION INTRAVENOUS at 01:12

## 2023-10-23 RX ADMIN — CHLORHEXIDINE GLUCONATE 0.12% ORAL RINSE 15 ML: 1.2 LIQUID ORAL at 10:21

## 2023-10-23 RX ADMIN — LACOSAMIDE 200 MG: 10 INJECTION INTRAVENOUS at 10:05

## 2023-10-23 RX ADMIN — SODIUM CHLORIDE, POTASSIUM CHLORIDE, SODIUM LACTATE AND CALCIUM CHLORIDE: 600; 310; 30; 20 INJECTION, SOLUTION INTRAVENOUS at 17:52

## 2023-10-23 RX ADMIN — PROPOFOL 25 MG: 10 INJECTION, EMULSION INTRAVENOUS at 18:55

## 2023-10-23 RX ADMIN — SODIUM CHLORIDE 100 ML: 9 INJECTION, SOLUTION INTRAVENOUS at 09:42

## 2023-10-23 RX ADMIN — SODIUM CHLORIDE, PRESERVATIVE FREE 40 MG: 5 INJECTION INTRAVENOUS at 09:01

## 2023-10-23 RX ADMIN — FENTANYL CITRATE 50 MCG: 0.05 INJECTION, SOLUTION INTRAMUSCULAR; INTRAVENOUS at 05:39

## 2023-10-23 RX ADMIN — METOPROLOL TARTRATE 5 MG: 5 INJECTION, SOLUTION INTRAVENOUS at 09:00

## 2023-10-23 RX ADMIN — LINEZOLID 600 MG: 600 INJECTION, SOLUTION INTRAVENOUS at 22:33

## 2023-10-23 RX ADMIN — METRONIDAZOLE 500 MG: 500 INJECTION, SOLUTION INTRAVENOUS at 01:10

## 2023-10-23 RX ADMIN — POTASSIUM CHLORIDE 10 MEQ: 7.46 INJECTION, SOLUTION INTRAVENOUS at 06:54

## 2023-10-23 RX ADMIN — PROPOFOL 50 MG: 10 INJECTION, EMULSION INTRAVENOUS at 19:05

## 2023-10-23 RX ADMIN — PROPOFOL 25 MG: 10 INJECTION, EMULSION INTRAVENOUS at 18:59

## 2023-10-23 RX ADMIN — LIDOCAINE HYDROCHLORIDE 25 MG: 10 INJECTION, SOLUTION EPIDURAL; INFILTRATION; INTRACAUDAL; PERINEURAL at 18:54

## 2023-10-23 RX ADMIN — METOPROLOL TARTRATE 5 MG: 5 INJECTION, SOLUTION INTRAVENOUS at 13:39

## 2023-10-23 RX ADMIN — PROPOFOL 25 MG: 10 INJECTION, EMULSION INTRAVENOUS at 19:17

## 2023-10-23 RX ADMIN — METRONIDAZOLE 500 MG: 500 INJECTION, SOLUTION INTRAVENOUS at 17:36

## 2023-10-23 RX ADMIN — SODIUM CHLORIDE, POTASSIUM CHLORIDE, SODIUM LACTATE AND CALCIUM CHLORIDE: 600; 310; 30; 20 INJECTION, SOLUTION INTRAVENOUS at 18:35

## 2023-10-23 RX ADMIN — LINEZOLID 600 MG: 600 INJECTION, SOLUTION INTRAVENOUS at 10:45

## 2023-10-23 RX ADMIN — POTASSIUM CHLORIDE 10 MEQ: 7.46 INJECTION, SOLUTION INTRAVENOUS at 08:48

## 2023-10-23 RX ADMIN — POTASSIUM CHLORIDE 10 MEQ: 7.46 INJECTION, SOLUTION INTRAVENOUS at 11:06

## 2023-10-23 RX ADMIN — LEVETIRACETAM 750 MG: 100 INJECTION, SOLUTION INTRAVENOUS at 22:30

## 2023-10-23 RX ADMIN — POTASSIUM CHLORIDE 10 MEQ: 7.46 INJECTION, SOLUTION INTRAVENOUS at 10:06

## 2023-10-23 RX ADMIN — SODIUM CHLORIDE, PRESERVATIVE FREE 10 ML: 5 INJECTION INTRAVENOUS at 09:41

## 2023-10-23 RX ADMIN — CEFEPIME 2000 MG: 2 INJECTION, POWDER, FOR SOLUTION INTRAVENOUS at 23:46

## 2023-10-23 RX ADMIN — PROPOFOL 10 MCG/KG/MIN: 10 INJECTION, EMULSION INTRAVENOUS at 19:39

## 2023-10-23 ASSESSMENT — PULMONARY FUNCTION TESTS
PIF_VALUE: 20
PIF_VALUE: 21
PIF_VALUE: 15
PIF_VALUE: 18
PIF_VALUE: 20
PIF_VALUE: 15
PIF_VALUE: 18
PIF_VALUE: 17
PIF_VALUE: 19
PIF_VALUE: 13
PIF_VALUE: 13
PIF_VALUE: 19
PIF_VALUE: 16
PIF_VALUE: 16
PIF_VALUE: 18
PIF_VALUE: 22
PIF_VALUE: 18
PIF_VALUE: 21
PIF_VALUE: 18
PIF_VALUE: 19
PIF_VALUE: 15
PIF_VALUE: 13
PIF_VALUE: 16
PIF_VALUE: 20
PIF_VALUE: 17

## 2023-10-23 ASSESSMENT — PAIN SCALES - GENERAL
PAINLEVEL_OUTOF10: 0

## 2023-10-23 ASSESSMENT — PAIN SCALES - WONG BAKER
WONGBAKER_NUMERICALRESPONSE: 0
WONGBAKER_NUMERICALRESPONSE: NO HURT
WONGBAKER_NUMERICALRESPONSE: NO HURT
WONGBAKER_NUMERICALRESPONSE: 0
WONGBAKER_NUMERICALRESPONSE: NO HURT
WONGBAKER_NUMERICALRESPONSE: 0
WONGBAKER_NUMERICALRESPONSE: 0
WONGBAKER_NUMERICALRESPONSE: NO HURT
WONGBAKER_NUMERICALRESPONSE: NO HURT
WONGBAKER_NUMERICALRESPONSE: 0
WONGBAKER_NUMERICALRESPONSE: NO HURT
WONGBAKER_NUMERICALRESPONSE: NO HURT

## 2023-10-23 NOTE — ANESTHESIA PRE PROCEDURE
normal                    Neuro/Psych:   (+) CVA:, neuromuscular disease:, TIA, headaches:, psychiatric history:            GI/Hepatic/Renal:   (+) GERD:,           Endo/Other:    (+) hypothyroidism::., .                 Abdominal:             Vascular: negative vascular ROS. Other Findings:           Anesthesia Plan      general and TIVA     ASA 4       Induction: intravenous. Anesthetic plan and risks discussed with legal guardian.                         Birdie Ferraro MD   10/23/2023

## 2023-10-24 ENCOUNTER — APPOINTMENT (OUTPATIENT)
Dept: GENERAL RADIOLOGY | Age: 68
DRG: 321 | End: 2023-10-24
Payer: COMMERCIAL

## 2023-10-24 LAB
ALBUMIN SERPL-MCNC: 1.8 G/DL (ref 3.5–5.2)
ALP SERPL-CCNC: 76 U/L (ref 35–104)
ALT SERPL-CCNC: 19 U/L (ref 5–33)
ANION GAP SERPL CALCULATED.3IONS-SCNC: 7 MMOL/L (ref 9–17)
ARTERIAL PATENCY WRIST A: ABNORMAL
AST SERPL-CCNC: 31 U/L
BASOPHILS # BLD: 0 K/UL (ref 0–0.2)
BASOPHILS NFR BLD: 0 % (ref 0–2)
BDY SITE: ABNORMAL
BILIRUB SERPL-MCNC: 0.6 MG/DL (ref 0.3–1.2)
BODY TEMPERATURE: 37
BUN SERPL-MCNC: 14 MG/DL (ref 8–23)
CALCIUM SERPL-MCNC: 7.8 MG/DL (ref 8.6–10.4)
CHLORIDE SERPL-SCNC: 112 MMOL/L (ref 98–107)
CO2 SERPL-SCNC: 26 MMOL/L (ref 20–31)
COHGB MFR BLD: 1.5 % (ref 0–5)
CREAT SERPL-MCNC: <0.4 MG/DL (ref 0.5–0.9)
EOSINOPHIL # BLD: 0 K/UL (ref 0–0.4)
EOSINOPHILS RELATIVE PERCENT: 0 % (ref 0–4)
ERYTHROCYTE [DISTWIDTH] IN BLOOD BY AUTOMATED COUNT: 13.2 % (ref 11.5–14.9)
FIO2 ON VENT: 30 %
GAS FLOW.O2 O2 DELIVERY SYS: ABNORMAL L/MIN
GAS FLOW.O2 SETTING OXYMISER: 18 L/MIN
GFR SERPL CREATININE-BSD FRML MDRD: ABNORMAL ML/MIN/1.73M2
GLUCOSE SERPL-MCNC: 100 MG/DL (ref 70–99)
HCO3 ARTERIAL: 28.7 MMOL/L (ref 22–26)
HCT VFR BLD AUTO: 32.9 % (ref 36–46)
HGB BLD-MCNC: 10.9 G/DL (ref 12–16)
LYMPHOCYTES NFR BLD: 0.94 K/UL (ref 1–4.8)
LYMPHOCYTES RELATIVE PERCENT: 6 % (ref 24–44)
MCH RBC QN AUTO: 32.8 PG (ref 26–34)
MCHC RBC AUTO-ENTMCNC: 33.3 G/DL (ref 31–37)
MCV RBC AUTO: 98.6 FL (ref 80–100)
METHEMOGLOBIN: 1 % (ref 0–1.9)
MONOCYTES NFR BLD: 1.26 K/UL (ref 0.1–1.3)
MONOCYTES NFR BLD: 8 % (ref 1–7)
MORPHOLOGY: ABNORMAL
NEUTROPHILS NFR BLD: 86 % (ref 36–66)
NEUTS SEG NFR BLD: 13.5 K/UL (ref 1.3–9.1)
O2 SAT, ARTERIAL: 93.7 % (ref 95–98)
PATH REV BLD -IMP: NORMAL
PCO2 ARTERIAL: 37.6 MMHG (ref 35–45)
PEEP RESPIRATORY: 7 CM[H2O]
PH ARTERIAL: 7.49 (ref 7.35–7.45)
PLATELET # BLD AUTO: 199 K/UL (ref 150–450)
PMV BLD AUTO: 8.7 FL (ref 6–12)
PO2 ARTERIAL: 74.5 MMHG (ref 80–100)
POSITIVE BASE EXCESS, ART: 5.4 MMOL/L (ref 0–2)
POTASSIUM SERPL-SCNC: 3.8 MMOL/L (ref 3.7–5.3)
PROT SERPL-MCNC: 4.6 G/DL (ref 6.4–8.3)
PT. POSITION: ABNORMAL
RBC # BLD AUTO: 3.34 M/UL (ref 4–5.2)
RESPIRATORY RATE: 18
SODIUM SERPL-SCNC: 145 MMOL/L (ref 135–144)
TEXT FOR RESPIRATORY: ABNORMAL
TOTAL RATE: 20
VENTILATION MODE VENT: ABNORMAL
VT: 450
WBC OTHER # BLD: 15.7 K/UL (ref 3.5–11)

## 2023-10-24 PROCEDURE — 36415 COLL VENOUS BLD VENIPUNCTURE: CPT

## 2023-10-24 PROCEDURE — 6360000002 HC RX W HCPCS: Performed by: SURGERY

## 2023-10-24 PROCEDURE — 2700000000 HC OXYGEN THERAPY PER DAY

## 2023-10-24 PROCEDURE — 99233 SBSQ HOSP IP/OBS HIGH 50: CPT | Performed by: INTERNAL MEDICINE

## 2023-10-24 PROCEDURE — 2580000003 HC RX 258: Performed by: SURGERY

## 2023-10-24 PROCEDURE — 80053 COMPREHEN METABOLIC PANEL: CPT

## 2023-10-24 PROCEDURE — A4216 STERILE WATER/SALINE, 10 ML: HCPCS | Performed by: SURGERY

## 2023-10-24 PROCEDURE — C9113 INJ PANTOPRAZOLE SODIUM, VIA: HCPCS | Performed by: SURGERY

## 2023-10-24 PROCEDURE — 6370000000 HC RX 637 (ALT 250 FOR IP): Performed by: SURGERY

## 2023-10-24 PROCEDURE — 71045 X-RAY EXAM CHEST 1 VIEW: CPT

## 2023-10-24 PROCEDURE — 2580000003 HC RX 258: Performed by: INTERNAL MEDICINE

## 2023-10-24 PROCEDURE — 36600 WITHDRAWAL OF ARTERIAL BLOOD: CPT

## 2023-10-24 PROCEDURE — 82805 BLOOD GASES W/O2 SATURATION: CPT

## 2023-10-24 PROCEDURE — 85025 COMPLETE CBC W/AUTO DIFF WBC: CPT

## 2023-10-24 PROCEDURE — 2500000003 HC RX 250 WO HCPCS: Performed by: SURGERY

## 2023-10-24 PROCEDURE — 94003 VENT MGMT INPAT SUBQ DAY: CPT

## 2023-10-24 PROCEDURE — 99024 POSTOP FOLLOW-UP VISIT: CPT | Performed by: ORTHOPAEDIC SURGERY

## 2023-10-24 PROCEDURE — 6360000002 HC RX W HCPCS: Performed by: INTERNAL MEDICINE

## 2023-10-24 PROCEDURE — 94761 N-INVAS EAR/PLS OXIMETRY MLT: CPT

## 2023-10-24 PROCEDURE — 43762 RPLC GTUBE NO REVJ TRC: CPT

## 2023-10-24 PROCEDURE — 2000000000 HC ICU R&B

## 2023-10-24 PROCEDURE — C9254 INJECTION, LACOSAMIDE: HCPCS | Performed by: SURGERY

## 2023-10-24 RX ORDER — SODIUM CHLORIDE 450 MG/100ML
INJECTION, SOLUTION INTRAVENOUS CONTINUOUS
Status: DISCONTINUED | OUTPATIENT
Start: 2023-10-24 | End: 2023-10-28

## 2023-10-24 RX ADMIN — LINEZOLID 600 MG: 600 INJECTION, SOLUTION INTRAVENOUS at 21:25

## 2023-10-24 RX ADMIN — PRIMIDONE 50 MG: 50 TABLET ORAL at 08:17

## 2023-10-24 RX ADMIN — LEVETIRACETAM 750 MG: 100 INJECTION, SOLUTION INTRAVENOUS at 19:39

## 2023-10-24 RX ADMIN — CEFEPIME 2000 MG: 2 INJECTION, POWDER, FOR SOLUTION INTRAVENOUS at 17:17

## 2023-10-24 RX ADMIN — ACETAMINOPHEN 650 MG: 325 TABLET ORAL at 08:16

## 2023-10-24 RX ADMIN — CEFEPIME 2000 MG: 2 INJECTION, POWDER, FOR SOLUTION INTRAVENOUS at 08:46

## 2023-10-24 RX ADMIN — CHLORHEXIDINE GLUCONATE 0.12% ORAL RINSE 15 ML: 1.2 LIQUID ORAL at 08:16

## 2023-10-24 RX ADMIN — METOPROLOL TARTRATE 5 MG: 5 INJECTION, SOLUTION INTRAVENOUS at 19:39

## 2023-10-24 RX ADMIN — SODIUM CHLORIDE, PRESERVATIVE FREE 10 ML: 5 INJECTION INTRAVENOUS at 19:44

## 2023-10-24 RX ADMIN — LEVOTHYROXINE SODIUM 50 MCG: 0.05 TABLET ORAL at 08:16

## 2023-10-24 RX ADMIN — METOPROLOL TARTRATE 5 MG: 5 INJECTION, SOLUTION INTRAVENOUS at 13:10

## 2023-10-24 RX ADMIN — PRIMIDONE 50 MG: 50 TABLET ORAL at 20:52

## 2023-10-24 RX ADMIN — DONEPEZIL HYDROCHLORIDE 5 MG: 10 TABLET, FILM COATED ORAL at 19:29

## 2023-10-24 RX ADMIN — METOPROLOL TARTRATE 5 MG: 5 INJECTION, SOLUTION INTRAVENOUS at 08:16

## 2023-10-24 RX ADMIN — ACETAMINOPHEN 650 MG: 325 TABLET ORAL at 01:50

## 2023-10-24 RX ADMIN — METOPROLOL TARTRATE 5 MG: 5 INJECTION, SOLUTION INTRAVENOUS at 01:40

## 2023-10-24 RX ADMIN — SODIUM CHLORIDE, PRESERVATIVE FREE 10 ML: 5 INJECTION INTRAVENOUS at 08:18

## 2023-10-24 RX ADMIN — METRONIDAZOLE 500 MG: 500 INJECTION, SOLUTION INTRAVENOUS at 17:16

## 2023-10-24 RX ADMIN — SODIUM CHLORIDE: 4.5 INJECTION, SOLUTION INTRAVENOUS at 13:12

## 2023-10-24 RX ADMIN — ATORVASTATIN CALCIUM 40 MG: 40 TABLET, FILM COATED ORAL at 08:16

## 2023-10-24 RX ADMIN — METRONIDAZOLE 500 MG: 500 INJECTION, SOLUTION INTRAVENOUS at 10:16

## 2023-10-24 RX ADMIN — SODIUM CHLORIDE, PRESERVATIVE FREE 40 MG: 5 INJECTION INTRAVENOUS at 08:17

## 2023-10-24 RX ADMIN — LINEZOLID 600 MG: 600 INJECTION, SOLUTION INTRAVENOUS at 08:20

## 2023-10-24 RX ADMIN — LEVETIRACETAM 750 MG: 100 INJECTION, SOLUTION INTRAVENOUS at 08:38

## 2023-10-24 RX ADMIN — SODIUM CHLORIDE: 4.5 INJECTION, SOLUTION INTRAVENOUS at 23:04

## 2023-10-24 RX ADMIN — SENNOSIDES AND DOCUSATE SODIUM 1 TABLET: 50; 8.6 TABLET ORAL at 08:16

## 2023-10-24 RX ADMIN — LACOSAMIDE 200 MG: 10 INJECTION INTRAVENOUS at 10:25

## 2023-10-24 RX ADMIN — METRONIDAZOLE 500 MG: 500 INJECTION, SOLUTION INTRAVENOUS at 01:49

## 2023-10-24 RX ADMIN — LACOSAMIDE 200 MG: 10 INJECTION INTRAVENOUS at 20:54

## 2023-10-24 RX ADMIN — SENNOSIDES AND DOCUSATE SODIUM 1 TABLET: 50; 8.6 TABLET ORAL at 19:28

## 2023-10-24 RX ADMIN — ACETAMINOPHEN 650 MG: 325 TABLET ORAL at 13:10

## 2023-10-24 RX ADMIN — SODIUM CHLORIDE, POTASSIUM CHLORIDE, SODIUM LACTATE AND CALCIUM CHLORIDE: 600; 310; 30; 20 INJECTION, SOLUTION INTRAVENOUS at 08:15

## 2023-10-24 RX ADMIN — ACETAMINOPHEN 650 MG: 325 TABLET ORAL at 19:29

## 2023-10-24 RX ADMIN — SODIUM CHLORIDE, PRESERVATIVE FREE 40 MG: 5 INJECTION INTRAVENOUS at 19:39

## 2023-10-24 ASSESSMENT — PULMONARY FUNCTION TESTS
PIF_VALUE: 16
PIF_VALUE: 16
PIF_VALUE: 19
PIF_VALUE: 17
PIF_VALUE: 15
PIF_VALUE: 16
PIF_VALUE: 22

## 2023-10-24 ASSESSMENT — PAIN SCALES - WONG BAKER
WONGBAKER_NUMERICALRESPONSE: NO HURT
WONGBAKER_NUMERICALRESPONSE: 0
WONGBAKER_NUMERICALRESPONSE: NO HURT
WONGBAKER_NUMERICALRESPONSE: NO HURT
WONGBAKER_NUMERICALRESPONSE: 0
WONGBAKER_NUMERICALRESPONSE: NO HURT
WONGBAKER_NUMERICALRESPONSE: 0
WONGBAKER_NUMERICALRESPONSE: NO HURT
WONGBAKER_NUMERICALRESPONSE: 0
WONGBAKER_NUMERICALRESPONSE: NO HURT
WONGBAKER_NUMERICALRESPONSE: 0

## 2023-10-24 ASSESSMENT — PAIN SCALES - GENERAL
PAINLEVEL_OUTOF10: 0
PAINLEVEL_OUTOF10: 6
PAINLEVEL_OUTOF10: 0
PAINLEVEL_OUTOF10: 0
PAINLEVEL_OUTOF10: 6

## 2023-10-24 ASSESSMENT — PAIN DESCRIPTION - ORIENTATION: ORIENTATION: POSTERIOR

## 2023-10-24 ASSESSMENT — PAIN DESCRIPTION - PAIN TYPE: TYPE: SURGICAL PAIN

## 2023-10-24 ASSESSMENT — PAIN DESCRIPTION - LOCATION: LOCATION: NECK

## 2023-10-24 ASSESSMENT — PAIN DESCRIPTION - DESCRIPTORS: DESCRIPTORS: ACHING

## 2023-10-24 ASSESSMENT — PAIN - FUNCTIONAL ASSESSMENT: PAIN_FUNCTIONAL_ASSESSMENT: PREVENTS OR INTERFERES WITH MANY ACTIVE NOT PASSIVE ACTIVITIES

## 2023-10-24 NOTE — OP NOTE
Patient: Dwayne Morse  YOB: 1955  MRN: [id number]    Date of Procedure: 10/23/2023    ESOPHAGOGASTRODUODENOSCOPY   ( EGD )  DATE OF PROCEDURE: 10/23/2023     SURGEON: Jed Burns MD    ASSISTANT: None    PREOPERATIVE DIAGNOSIS: Malnutrition    POSTOPERATIVE DIAGNOSIS: Same    OPERATION: Esophagogastroscopy with 20 Danish percutaneous endoscopic gastrostomy tube placement/PEG tube placement    ANESTHESIA: General    ESTIMATED BLOOD LOSS: None    COMPLICATIONS: None. SPECIMENS:  Was Not Obtained    HISTORY: The patient is a 76y.o. year old female with history of above preop diagnosis. I recommended esophagogastroduodenoscopy with possible biopsy and I explained the risk, benefits, expected outcome, and alternatives to the procedure. Risks included but are not limited to bleeding, infection, respiratory distress, hypotension, and perforation of the esophagus, stomach, or duodenum. Patient understands and is in agreement. PROCEDURE: The patient was given IV conscious sedation. The patient's SPO2 remained above 90% throughout the procedure. Cetacaine spray given. Patient placed in left lateral position. Olympus  videogastroscope was inserted orally under vision into the esophagus without difficulty and advanced into the stomach then through the pylorus up to the second part of duodenum. Findings:    Retropharyngeal area was grossly normal appearing    Esophagus: No obstruction. Small hiatal hernia. Possible Monge's esophagus. No biopsies obtained today. This was not a diagnostic examination today. Stomach:    Fundus and Cardia Examined in Retroflexed View: No obvious lesion. Body: Grossly unremarkable. Antrum: Grossly unremarkable. Duodenum:     Descending: Not examined    Bulb: Because of the angulation with the patient in supine position intubated on the ventilator intubation of the duodenal bulb was challenging and could not be performed.   Gastric

## 2023-10-24 NOTE — ANESTHESIA POSTPROCEDURE EVALUATION
Department of Anesthesiology  Postprocedure Note    Patient: Frank Charles  MRN: [id number]  YOB: 1955  Date of evaluation: 10/24/2023      Procedure Summary     Date: 10/23/23 Room / Location: [address] [facility]    Anesthesia Start: Norleen Meals Anesthesia Stop: 1933    Procedure: EGD ESOPHAGOGASTRODUODENOSCOPY PEG TUBE INSERTION (Esophagus) Diagnosis:       Malnutrition, unspecified type ([address])      (Malnutrition, unspecified type ([address]) Nicolas Wilde)    Surgeons: Maxim Buckner MD Responsible Provider: Fam Pascual MD    Anesthesia Type: general, TIVA ASA Status: 4          Anesthesia Type: No value filed. Ashley Phase I: Ashley Score: 9    Ashley Phase II:        Anesthesia Post Evaluation    Comments: POD #1 Patient seen lying in bed. Recently extubated. No anesthesia problems reported.

## 2023-10-25 LAB
ALBUMIN SERPL-MCNC: 1.6 G/DL (ref 3.5–5.2)
ALP SERPL-CCNC: 79 U/L (ref 35–104)
ALT SERPL-CCNC: 17 U/L (ref 5–33)
ANION GAP SERPL CALCULATED.3IONS-SCNC: 9 MMOL/L (ref 9–17)
AST SERPL-CCNC: 31 U/L
BASOPHILS # BLD: 0 K/UL (ref 0–0.2)
BASOPHILS NFR BLD: 0 % (ref 0–2)
BILIRUB SERPL-MCNC: 0.6 MG/DL (ref 0.3–1.2)
BUN SERPL-MCNC: 14 MG/DL (ref 8–23)
CALCIUM SERPL-MCNC: 7.6 MG/DL (ref 8.6–10.4)
CHLORIDE SERPL-SCNC: 106 MMOL/L (ref 98–107)
CO2 SERPL-SCNC: 21 MMOL/L (ref 20–31)
CREAT SERPL-MCNC: <0.4 MG/DL (ref 0.5–0.9)
EOSINOPHIL # BLD: 0.16 K/UL (ref 0–0.4)
EOSINOPHILS RELATIVE PERCENT: 1 % (ref 0–4)
ERYTHROCYTE [DISTWIDTH] IN BLOOD BY AUTOMATED COUNT: 13.6 % (ref 11.5–14.9)
GFR SERPL CREATININE-BSD FRML MDRD: ABNORMAL ML/MIN/1.73M2
GLUCOSE SERPL-MCNC: 100 MG/DL (ref 70–99)
HCT VFR BLD AUTO: 36.9 % (ref 36–46)
HGB BLD-MCNC: 11.6 G/DL (ref 12–16)
LYMPHOCYTES NFR BLD: 1.15 K/UL (ref 1–4.8)
LYMPHOCYTES RELATIVE PERCENT: 7 % (ref 24–44)
MCH RBC QN AUTO: 32.5 PG (ref 26–34)
MCHC RBC AUTO-ENTMCNC: 31.5 G/DL (ref 31–37)
MCV RBC AUTO: 102.9 FL (ref 80–100)
MICROORGANISM SPEC CULT: NORMAL
MICROORGANISM/AGENT SPEC: NORMAL
MONOCYTES NFR BLD: 0 % (ref 1–7)
MONOCYTES NFR BLD: 0 K/UL (ref 0.1–1.3)
MORPHOLOGY: ABNORMAL
NEUTROPHILS NFR BLD: 92 % (ref 36–66)
NEUTS SEG NFR BLD: 15.16 K/UL (ref 1.3–9.1)
NUCLEATED RED BLOOD CELLS: 2 PER 100 WBC
PLATELET # BLD AUTO: 233 K/UL (ref 150–450)
PMV BLD AUTO: 8.5 FL (ref 6–12)
POTASSIUM SERPL-SCNC: 3.7 MMOL/L (ref 3.7–5.3)
PROT SERPL-MCNC: 4.9 G/DL (ref 6.4–8.3)
RBC # BLD AUTO: 3.58 M/UL (ref 4–5.2)
SERVICE CMNT-IMP: NORMAL
SERVICE CMNT-IMP: NORMAL
SODIUM SERPL-SCNC: 136 MMOL/L (ref 135–144)
SPECIMEN DESCRIPTION: NORMAL
WBC OTHER # BLD: 16.5 K/UL (ref 3.5–11)

## 2023-10-25 PROCEDURE — 6360000002 HC RX W HCPCS: Performed by: INTERNAL MEDICINE

## 2023-10-25 PROCEDURE — 99232 SBSQ HOSP IP/OBS MODERATE 35: CPT | Performed by: NURSE PRACTITIONER

## 2023-10-25 PROCEDURE — 6360000002 HC RX W HCPCS: Performed by: SURGERY

## 2023-10-25 PROCEDURE — 99233 SBSQ HOSP IP/OBS HIGH 50: CPT | Performed by: INTERNAL MEDICINE

## 2023-10-25 PROCEDURE — 2700000000 HC OXYGEN THERAPY PER DAY

## 2023-10-25 PROCEDURE — 6370000000 HC RX 637 (ALT 250 FOR IP): Performed by: SURGERY

## 2023-10-25 PROCEDURE — C9113 INJ PANTOPRAZOLE SODIUM, VIA: HCPCS | Performed by: SURGERY

## 2023-10-25 PROCEDURE — 2580000003 HC RX 258: Performed by: SURGERY

## 2023-10-25 PROCEDURE — A4216 STERILE WATER/SALINE, 10 ML: HCPCS | Performed by: SURGERY

## 2023-10-25 PROCEDURE — 92526 ORAL FUNCTION THERAPY: CPT

## 2023-10-25 PROCEDURE — 99232 SBSQ HOSP IP/OBS MODERATE 35: CPT | Performed by: INTERNAL MEDICINE

## 2023-10-25 PROCEDURE — 94761 N-INVAS EAR/PLS OXIMETRY MLT: CPT

## 2023-10-25 PROCEDURE — 2060000000 HC ICU INTERMEDIATE R&B

## 2023-10-25 PROCEDURE — 80053 COMPREHEN METABOLIC PANEL: CPT

## 2023-10-25 PROCEDURE — 97116 GAIT TRAINING THERAPY: CPT

## 2023-10-25 PROCEDURE — C9254 INJECTION, LACOSAMIDE: HCPCS | Performed by: SURGERY

## 2023-10-25 PROCEDURE — 2500000003 HC RX 250 WO HCPCS: Performed by: SURGERY

## 2023-10-25 PROCEDURE — 51702 INSERT TEMP BLADDER CATH: CPT

## 2023-10-25 PROCEDURE — 2580000003 HC RX 258: Performed by: INTERNAL MEDICINE

## 2023-10-25 PROCEDURE — 85025 COMPLETE CBC W/AUTO DIFF WBC: CPT

## 2023-10-25 PROCEDURE — 36415 COLL VENOUS BLD VENIPUNCTURE: CPT

## 2023-10-25 RX ADMIN — SENNOSIDES AND DOCUSATE SODIUM 1 TABLET: 50; 8.6 TABLET ORAL at 08:13

## 2023-10-25 RX ADMIN — ACETAMINOPHEN 650 MG: 325 TABLET ORAL at 20:33

## 2023-10-25 RX ADMIN — MIDODRINE HYDROCHLORIDE 5 MG: 5 TABLET ORAL at 23:15

## 2023-10-25 RX ADMIN — PRIMIDONE 50 MG: 50 TABLET ORAL at 20:40

## 2023-10-25 RX ADMIN — CEFEPIME 2000 MG: 2 INJECTION, POWDER, FOR SOLUTION INTRAVENOUS at 08:31

## 2023-10-25 RX ADMIN — LEVETIRACETAM 750 MG: 100 INJECTION, SOLUTION INTRAVENOUS at 20:36

## 2023-10-25 RX ADMIN — LINEZOLID 600 MG: 600 INJECTION, SOLUTION INTRAVENOUS at 20:39

## 2023-10-25 RX ADMIN — LINEZOLID 600 MG: 600 INJECTION, SOLUTION INTRAVENOUS at 08:17

## 2023-10-25 RX ADMIN — LACOSAMIDE 200 MG: 10 INJECTION INTRAVENOUS at 21:18

## 2023-10-25 RX ADMIN — ACETAMINOPHEN 650 MG: 325 TABLET ORAL at 00:55

## 2023-10-25 RX ADMIN — SODIUM CHLORIDE, PRESERVATIVE FREE 10 ML: 5 INJECTION INTRAVENOUS at 08:15

## 2023-10-25 RX ADMIN — CEFEPIME 2000 MG: 2 INJECTION, POWDER, FOR SOLUTION INTRAVENOUS at 17:11

## 2023-10-25 RX ADMIN — SODIUM CHLORIDE, PRESERVATIVE FREE 40 MG: 5 INJECTION INTRAVENOUS at 08:17

## 2023-10-25 RX ADMIN — METRONIDAZOLE 500 MG: 500 INJECTION, SOLUTION INTRAVENOUS at 17:09

## 2023-10-25 RX ADMIN — METOPROLOL TARTRATE 5 MG: 5 INJECTION, SOLUTION INTRAVENOUS at 08:13

## 2023-10-25 RX ADMIN — METRONIDAZOLE 500 MG: 500 INJECTION, SOLUTION INTRAVENOUS at 00:54

## 2023-10-25 RX ADMIN — ATORVASTATIN CALCIUM 40 MG: 40 TABLET, FILM COATED ORAL at 08:13

## 2023-10-25 RX ADMIN — SODIUM CHLORIDE: 4.5 INJECTION, SOLUTION INTRAVENOUS at 09:22

## 2023-10-25 RX ADMIN — CEFEPIME 2000 MG: 2 INJECTION, POWDER, FOR SOLUTION INTRAVENOUS at 00:56

## 2023-10-25 RX ADMIN — ACETAMINOPHEN 650 MG: 325 TABLET ORAL at 08:12

## 2023-10-25 RX ADMIN — PRIMIDONE 50 MG: 50 TABLET ORAL at 08:13

## 2023-10-25 RX ADMIN — LACOSAMIDE 200 MG: 10 INJECTION INTRAVENOUS at 10:28

## 2023-10-25 RX ADMIN — LEVOTHYROXINE SODIUM 50 MCG: 0.05 TABLET ORAL at 08:13

## 2023-10-25 RX ADMIN — DONEPEZIL HYDROCHLORIDE 5 MG: 10 TABLET, FILM COATED ORAL at 20:33

## 2023-10-25 RX ADMIN — SENNOSIDES AND DOCUSATE SODIUM 1 TABLET: 50; 8.6 TABLET ORAL at 20:33

## 2023-10-25 RX ADMIN — SODIUM CHLORIDE, PRESERVATIVE FREE 10 ML: 5 INJECTION INTRAVENOUS at 20:40

## 2023-10-25 RX ADMIN — METOPROLOL TARTRATE 5 MG: 5 INJECTION, SOLUTION INTRAVENOUS at 00:56

## 2023-10-25 RX ADMIN — METRONIDAZOLE 500 MG: 500 INJECTION, SOLUTION INTRAVENOUS at 10:26

## 2023-10-25 RX ADMIN — SODIUM CHLORIDE, PRESERVATIVE FREE 40 MG: 5 INJECTION INTRAVENOUS at 20:33

## 2023-10-25 RX ADMIN — SODIUM CHLORIDE: 4.5 INJECTION, SOLUTION INTRAVENOUS at 20:37

## 2023-10-25 RX ADMIN — LEVETIRACETAM 750 MG: 100 INJECTION, SOLUTION INTRAVENOUS at 08:14

## 2023-10-25 RX ADMIN — ACETAMINOPHEN 650 MG: 325 TABLET ORAL at 14:01

## 2023-10-25 ASSESSMENT — PAIN - FUNCTIONAL ASSESSMENT
PAIN_FUNCTIONAL_ASSESSMENT: PREVENTS OR INTERFERES SOME ACTIVE ACTIVITIES AND ADLS
PAIN_FUNCTIONAL_ASSESSMENT: PREVENTS OR INTERFERES WITH MANY ACTIVE NOT PASSIVE ACTIVITIES

## 2023-10-25 ASSESSMENT — PAIN SCALES - GENERAL
PAINLEVEL_OUTOF10: 0
PAINLEVEL_OUTOF10: 6
PAINLEVEL_OUTOF10: 0
PAINLEVEL_OUTOF10: 6
PAINLEVEL_OUTOF10: 0

## 2023-10-25 ASSESSMENT — PAIN DESCRIPTION - PAIN TYPE
TYPE: SURGICAL PAIN
TYPE: SURGICAL PAIN

## 2023-10-25 ASSESSMENT — PAIN DESCRIPTION - LOCATION
LOCATION: NECK
LOCATION: NECK

## 2023-10-25 ASSESSMENT — PAIN DESCRIPTION - DESCRIPTORS
DESCRIPTORS: ACHING
DESCRIPTORS: ACHING

## 2023-10-25 ASSESSMENT — PAIN DESCRIPTION - ORIENTATION
ORIENTATION: POSTERIOR
ORIENTATION: POSTERIOR

## 2023-10-26 LAB
ALBUMIN SERPL-MCNC: 1.8 G/DL (ref 3.5–5.2)
ALP SERPL-CCNC: 84 U/L (ref 35–104)
ALT SERPL-CCNC: 15 U/L (ref 5–33)
ANION GAP SERPL CALCULATED.3IONS-SCNC: 8 MMOL/L (ref 9–17)
AST SERPL-CCNC: 24 U/L
BASOPHILS # BLD: 0 K/UL (ref 0–0.2)
BASOPHILS NFR BLD: 0 % (ref 0–2)
BILIRUB SERPL-MCNC: 0.4 MG/DL (ref 0.3–1.2)
BUN SERPL-MCNC: 14 MG/DL (ref 8–23)
CALCIUM SERPL-MCNC: 7.8 MG/DL (ref 8.6–10.4)
CHLORIDE SERPL-SCNC: 105 MMOL/L (ref 98–107)
CO2 SERPL-SCNC: 24 MMOL/L (ref 20–31)
CREAT SERPL-MCNC: <0.4 MG/DL (ref 0.5–0.9)
EOSINOPHIL # BLD: 0.18 K/UL (ref 0–0.4)
EOSINOPHILS RELATIVE PERCENT: 1 % (ref 0–4)
ERYTHROCYTE [DISTWIDTH] IN BLOOD BY AUTOMATED COUNT: 13.4 % (ref 11.5–14.9)
GFR SERPL CREATININE-BSD FRML MDRD: ABNORMAL ML/MIN/1.73M2
GLUCOSE BLD-MCNC: 112 MG/DL (ref 65–105)
GLUCOSE SERPL-MCNC: 107 MG/DL (ref 70–99)
HCT VFR BLD AUTO: 35.1 % (ref 36–46)
HGB BLD-MCNC: 11.5 G/DL (ref 12–16)
LYMPHOCYTES NFR BLD: 1.05 K/UL (ref 1–4.8)
LYMPHOCYTES RELATIVE PERCENT: 6 % (ref 24–44)
MAGNESIUM SERPL-MCNC: 1.9 MG/DL (ref 1.6–2.6)
MCH RBC QN AUTO: 32.6 PG (ref 26–34)
MCHC RBC AUTO-ENTMCNC: 32.9 G/DL (ref 31–37)
MCV RBC AUTO: 99.2 FL (ref 80–100)
MONOCYTES NFR BLD: 1.58 K/UL (ref 0.1–1.3)
MONOCYTES NFR BLD: 9 % (ref 1–7)
MORPHOLOGY: ABNORMAL
NEUTROPHILS NFR BLD: 84 % (ref 36–66)
NEUTS SEG NFR BLD: 14.69 K/UL (ref 1.3–9.1)
PLATELET # BLD AUTO: 251 K/UL (ref 150–450)
PMV BLD AUTO: 8.6 FL (ref 6–12)
POTASSIUM SERPL-SCNC: 3.2 MMOL/L (ref 3.7–5.3)
PROT SERPL-MCNC: 4.9 G/DL (ref 6.4–8.3)
RBC # BLD AUTO: 3.54 M/UL (ref 4–5.2)
SODIUM SERPL-SCNC: 137 MMOL/L (ref 135–144)
WBC OTHER # BLD: 17.5 K/UL (ref 3.5–11)

## 2023-10-26 PROCEDURE — 99233 SBSQ HOSP IP/OBS HIGH 50: CPT | Performed by: INTERNAL MEDICINE

## 2023-10-26 PROCEDURE — 6360000002 HC RX W HCPCS: Performed by: INTERNAL MEDICINE

## 2023-10-26 PROCEDURE — 83735 ASSAY OF MAGNESIUM: CPT

## 2023-10-26 PROCEDURE — 2500000003 HC RX 250 WO HCPCS: Performed by: SURGERY

## 2023-10-26 PROCEDURE — 82947 ASSAY GLUCOSE BLOOD QUANT: CPT

## 2023-10-26 PROCEDURE — 2580000003 HC RX 258: Performed by: INTERNAL MEDICINE

## 2023-10-26 PROCEDURE — 6370000000 HC RX 637 (ALT 250 FOR IP): Performed by: SURGERY

## 2023-10-26 PROCEDURE — 85025 COMPLETE CBC W/AUTO DIFF WBC: CPT

## 2023-10-26 PROCEDURE — 6360000002 HC RX W HCPCS: Performed by: SURGERY

## 2023-10-26 PROCEDURE — 36415 COLL VENOUS BLD VENIPUNCTURE: CPT

## 2023-10-26 PROCEDURE — C9113 INJ PANTOPRAZOLE SODIUM, VIA: HCPCS | Performed by: SURGERY

## 2023-10-26 PROCEDURE — C9254 INJECTION, LACOSAMIDE: HCPCS | Performed by: SURGERY

## 2023-10-26 PROCEDURE — 94761 N-INVAS EAR/PLS OXIMETRY MLT: CPT

## 2023-10-26 PROCEDURE — 2580000003 HC RX 258: Performed by: SURGERY

## 2023-10-26 PROCEDURE — A4216 STERILE WATER/SALINE, 10 ML: HCPCS | Performed by: SURGERY

## 2023-10-26 PROCEDURE — 2060000000 HC ICU INTERMEDIATE R&B

## 2023-10-26 PROCEDURE — 92526 ORAL FUNCTION THERAPY: CPT

## 2023-10-26 PROCEDURE — 80053 COMPREHEN METABOLIC PANEL: CPT

## 2023-10-26 PROCEDURE — 97168 OT RE-EVAL EST PLAN CARE: CPT

## 2023-10-26 PROCEDURE — 2700000000 HC OXYGEN THERAPY PER DAY

## 2023-10-26 PROCEDURE — 97164 PT RE-EVAL EST PLAN CARE: CPT

## 2023-10-26 PROCEDURE — 97530 THERAPEUTIC ACTIVITIES: CPT

## 2023-10-26 RX ADMIN — LEVOTHYROXINE SODIUM 50 MCG: 0.05 TABLET ORAL at 06:34

## 2023-10-26 RX ADMIN — ACETAMINOPHEN 650 MG: 325 TABLET ORAL at 02:08

## 2023-10-26 RX ADMIN — SODIUM CHLORIDE: 9 INJECTION, SOLUTION INTRAVENOUS at 02:16

## 2023-10-26 RX ADMIN — SODIUM CHLORIDE, PRESERVATIVE FREE 40 MG: 5 INJECTION INTRAVENOUS at 09:32

## 2023-10-26 RX ADMIN — LINEZOLID 600 MG: 600 INJECTION, SOLUTION INTRAVENOUS at 21:29

## 2023-10-26 RX ADMIN — CEFEPIME 2000 MG: 2 INJECTION, POWDER, FOR SOLUTION INTRAVENOUS at 17:28

## 2023-10-26 RX ADMIN — SODIUM CHLORIDE, PRESERVATIVE FREE 10 ML: 5 INJECTION INTRAVENOUS at 21:33

## 2023-10-26 RX ADMIN — METRONIDAZOLE 500 MG: 500 INJECTION, SOLUTION INTRAVENOUS at 17:29

## 2023-10-26 RX ADMIN — SENNOSIDES AND DOCUSATE SODIUM 1 TABLET: 50; 8.6 TABLET ORAL at 09:16

## 2023-10-26 RX ADMIN — ACETAMINOPHEN 650 MG: 325 TABLET ORAL at 09:16

## 2023-10-26 RX ADMIN — LINEZOLID 600 MG: 600 INJECTION, SOLUTION INTRAVENOUS at 10:58

## 2023-10-26 RX ADMIN — PRIMIDONE 50 MG: 50 TABLET ORAL at 21:32

## 2023-10-26 RX ADMIN — SODIUM CHLORIDE: 4.5 INJECTION, SOLUTION INTRAVENOUS at 16:11

## 2023-10-26 RX ADMIN — METRONIDAZOLE 500 MG: 500 INJECTION, SOLUTION INTRAVENOUS at 09:06

## 2023-10-26 RX ADMIN — METOPROLOL TARTRATE 5 MG: 5 INJECTION, SOLUTION INTRAVENOUS at 14:59

## 2023-10-26 RX ADMIN — PRIMIDONE 50 MG: 50 TABLET ORAL at 09:17

## 2023-10-26 RX ADMIN — CEFEPIME 2000 MG: 2 INJECTION, POWDER, FOR SOLUTION INTRAVENOUS at 00:36

## 2023-10-26 RX ADMIN — METOPROLOL TARTRATE 5 MG: 5 INJECTION, SOLUTION INTRAVENOUS at 21:45

## 2023-10-26 RX ADMIN — METOPROLOL TARTRATE 5 MG: 5 INJECTION, SOLUTION INTRAVENOUS at 09:30

## 2023-10-26 RX ADMIN — SENNOSIDES AND DOCUSATE SODIUM 1 TABLET: 50; 8.6 TABLET ORAL at 21:32

## 2023-10-26 RX ADMIN — LEVETIRACETAM 750 MG: 100 INJECTION, SOLUTION INTRAVENOUS at 22:04

## 2023-10-26 RX ADMIN — LACOSAMIDE 200 MG: 10 INJECTION INTRAVENOUS at 21:31

## 2023-10-26 RX ADMIN — METRONIDAZOLE 500 MG: 500 INJECTION, SOLUTION INTRAVENOUS at 00:37

## 2023-10-26 RX ADMIN — LEVETIRACETAM 750 MG: 100 INJECTION, SOLUTION INTRAVENOUS at 09:04

## 2023-10-26 RX ADMIN — SODIUM CHLORIDE, PRESERVATIVE FREE 10 ML: 5 INJECTION INTRAVENOUS at 09:32

## 2023-10-26 RX ADMIN — LACOSAMIDE 200 MG: 10 INJECTION INTRAVENOUS at 10:19

## 2023-10-26 RX ADMIN — SODIUM CHLORIDE: 4.5 INJECTION, SOLUTION INTRAVENOUS at 04:52

## 2023-10-26 RX ADMIN — ATORVASTATIN CALCIUM 40 MG: 40 TABLET, FILM COATED ORAL at 09:16

## 2023-10-26 RX ADMIN — DONEPEZIL HYDROCHLORIDE 5 MG: 10 TABLET, FILM COATED ORAL at 21:32

## 2023-10-26 RX ADMIN — CEFEPIME 2000 MG: 2 INJECTION, POWDER, FOR SOLUTION INTRAVENOUS at 10:57

## 2023-10-26 RX ADMIN — SODIUM CHLORIDE, PRESERVATIVE FREE 40 MG: 5 INJECTION INTRAVENOUS at 21:32

## 2023-10-26 RX ADMIN — ACETAMINOPHEN 650 MG: 325 TABLET ORAL at 21:32

## 2023-10-26 RX ADMIN — ACETAMINOPHEN 650 MG: 325 TABLET ORAL at 13:53

## 2023-10-27 ENCOUNTER — APPOINTMENT (OUTPATIENT)
Dept: VASCULAR LAB | Age: 68
DRG: 321 | End: 2023-10-27
Payer: COMMERCIAL

## 2023-10-27 ENCOUNTER — APPOINTMENT (OUTPATIENT)
Dept: GENERAL RADIOLOGY | Age: 68
DRG: 321 | End: 2023-10-27
Payer: COMMERCIAL

## 2023-10-27 LAB
ALBUMIN SERPL-MCNC: 1.9 G/DL (ref 3.5–5.2)
ALP SERPL-CCNC: 95 U/L (ref 35–104)
ALT SERPL-CCNC: 15 U/L (ref 5–33)
ANION GAP SERPL CALCULATED.3IONS-SCNC: 7 MMOL/L (ref 9–17)
AST SERPL-CCNC: 28 U/L
BASOPHILS # BLD: 0.15 K/UL (ref 0–0.2)
BASOPHILS NFR BLD: 1 % (ref 0–2)
BILIRUB SERPL-MCNC: 0.4 MG/DL (ref 0.3–1.2)
BNP SERPL-MCNC: 519 PG/ML
BUN SERPL-MCNC: 11 MG/DL (ref 8–23)
CALCIUM SERPL-MCNC: 7.7 MG/DL (ref 8.6–10.4)
CHLORIDE SERPL-SCNC: 103 MMOL/L (ref 98–107)
CO2 SERPL-SCNC: 24 MMOL/L (ref 20–31)
CREAT SERPL-MCNC: <0.4 MG/DL (ref 0.5–0.9)
ECHO BSA: 1.78 M2
EOSINOPHIL # BLD: 0.3 K/UL (ref 0–0.4)
EOSINOPHILS RELATIVE PERCENT: 2 % (ref 0–4)
ERYTHROCYTE [DISTWIDTH] IN BLOOD BY AUTOMATED COUNT: 13.4 % (ref 11.5–14.9)
GFR SERPL CREATININE-BSD FRML MDRD: ABNORMAL ML/MIN/1.73M2
GLUCOSE SERPL-MCNC: 119 MG/DL (ref 70–99)
HCT VFR BLD AUTO: 38.3 % (ref 36–46)
HGB BLD-MCNC: 12.7 G/DL (ref 12–16)
LYMPHOCYTES NFR BLD: 0.89 K/UL (ref 1–4.8)
LYMPHOCYTES RELATIVE PERCENT: 6 % (ref 24–44)
MAGNESIUM SERPL-MCNC: 1.8 MG/DL (ref 1.6–2.6)
MCH RBC QN AUTO: 32.6 PG (ref 26–34)
MCHC RBC AUTO-ENTMCNC: 33.1 G/DL (ref 31–37)
MCV RBC AUTO: 98.7 FL (ref 80–100)
MICROORGANISM SPEC CULT: ABNORMAL
MICROORGANISM SPEC CULT: ABNORMAL
MICROORGANISM/AGENT SPEC: ABNORMAL
MICROORGANISM/AGENT SPEC: ABNORMAL
MONOCYTES NFR BLD: 1.48 K/UL (ref 0.1–1.3)
MONOCYTES NFR BLD: 10 % (ref 1–7)
MORPHOLOGY: ABNORMAL
NEUTROPHILS NFR BLD: 81 % (ref 36–66)
NEUTS SEG NFR BLD: 11.98 K/UL (ref 1.3–9.1)
PLATELET # BLD AUTO: 254 K/UL (ref 150–450)
PMV BLD AUTO: 7.8 FL (ref 6–12)
POTASSIUM SERPL-SCNC: 3.4 MMOL/L (ref 3.7–5.3)
PROT SERPL-MCNC: 5 G/DL (ref 6.4–8.3)
RBC # BLD AUTO: 3.88 M/UL (ref 4–5.2)
SODIUM SERPL-SCNC: 134 MMOL/L (ref 135–144)
SPECIMEN DESCRIPTION: ABNORMAL
WBC OTHER # BLD: 14.8 K/UL (ref 3.5–11)

## 2023-10-27 PROCEDURE — 2060000000 HC ICU INTERMEDIATE R&B

## 2023-10-27 PROCEDURE — 2580000003 HC RX 258: Performed by: SURGERY

## 2023-10-27 PROCEDURE — 6360000002 HC RX W HCPCS: Performed by: NURSE PRACTITIONER

## 2023-10-27 PROCEDURE — 31720 CLEARANCE OF AIRWAYS: CPT

## 2023-10-27 PROCEDURE — 97110 THERAPEUTIC EXERCISES: CPT

## 2023-10-27 PROCEDURE — C9113 INJ PANTOPRAZOLE SODIUM, VIA: HCPCS | Performed by: SURGERY

## 2023-10-27 PROCEDURE — 6370000000 HC RX 637 (ALT 250 FOR IP): Performed by: SURGERY

## 2023-10-27 PROCEDURE — A4216 STERILE WATER/SALINE, 10 ML: HCPCS | Performed by: SURGERY

## 2023-10-27 PROCEDURE — 6360000002 HC RX W HCPCS: Performed by: SURGERY

## 2023-10-27 PROCEDURE — 94760 N-INVAS EAR/PLS OXIMETRY 1: CPT

## 2023-10-27 PROCEDURE — 93970 EXTREMITY STUDY: CPT

## 2023-10-27 PROCEDURE — 2500000003 HC RX 250 WO HCPCS: Performed by: SURGERY

## 2023-10-27 PROCEDURE — 6360000002 HC RX W HCPCS: Performed by: INTERNAL MEDICINE

## 2023-10-27 PROCEDURE — 92526 ORAL FUNCTION THERAPY: CPT

## 2023-10-27 PROCEDURE — 94664 DEMO&/EVAL PT USE INHALER: CPT

## 2023-10-27 PROCEDURE — 2700000000 HC OXYGEN THERAPY PER DAY

## 2023-10-27 PROCEDURE — 99232 SBSQ HOSP IP/OBS MODERATE 35: CPT | Performed by: INTERNAL MEDICINE

## 2023-10-27 PROCEDURE — 80053 COMPREHEN METABOLIC PANEL: CPT

## 2023-10-27 PROCEDURE — 2580000003 HC RX 258: Performed by: INTERNAL MEDICINE

## 2023-10-27 PROCEDURE — 71045 X-RAY EXAM CHEST 1 VIEW: CPT

## 2023-10-27 PROCEDURE — 99233 SBSQ HOSP IP/OBS HIGH 50: CPT | Performed by: INTERNAL MEDICINE

## 2023-10-27 PROCEDURE — 36415 COLL VENOUS BLD VENIPUNCTURE: CPT

## 2023-10-27 PROCEDURE — 83735 ASSAY OF MAGNESIUM: CPT

## 2023-10-27 PROCEDURE — C9254 INJECTION, LACOSAMIDE: HCPCS | Performed by: SURGERY

## 2023-10-27 PROCEDURE — 85025 COMPLETE CBC W/AUTO DIFF WBC: CPT

## 2023-10-27 PROCEDURE — 94667 MNPJ CHEST WALL 1ST: CPT

## 2023-10-27 PROCEDURE — 93970 EXTREMITY STUDY: CPT | Performed by: SURGERY

## 2023-10-27 PROCEDURE — 83880 ASSAY OF NATRIURETIC PEPTIDE: CPT

## 2023-10-27 RX ORDER — HEPARIN SODIUM 5000 [USP'U]/ML
5000 INJECTION, SOLUTION INTRAVENOUS; SUBCUTANEOUS EVERY 8 HOURS SCHEDULED
Status: DISCONTINUED | OUTPATIENT
Start: 2023-10-27 | End: 2023-10-29

## 2023-10-27 RX ORDER — FUROSEMIDE 10 MG/ML
20 INJECTION INTRAMUSCULAR; INTRAVENOUS ONCE
Status: COMPLETED | OUTPATIENT
Start: 2023-10-27 | End: 2023-10-27

## 2023-10-27 RX ADMIN — PRIMIDONE 50 MG: 50 TABLET ORAL at 21:56

## 2023-10-27 RX ADMIN — LEVETIRACETAM 750 MG: 100 INJECTION, SOLUTION INTRAVENOUS at 22:18

## 2023-10-27 RX ADMIN — HEPARIN SODIUM 5000 UNITS: 5000 INJECTION INTRAVENOUS; SUBCUTANEOUS at 14:28

## 2023-10-27 RX ADMIN — ACETAMINOPHEN 650 MG: 325 TABLET ORAL at 21:56

## 2023-10-27 RX ADMIN — LINEZOLID 600 MG: 600 INJECTION, SOLUTION INTRAVENOUS at 21:59

## 2023-10-27 RX ADMIN — CEFEPIME 2000 MG: 2 INJECTION, POWDER, FOR SOLUTION INTRAVENOUS at 00:45

## 2023-10-27 RX ADMIN — SODIUM CHLORIDE, PRESERVATIVE FREE 10 ML: 5 INJECTION INTRAVENOUS at 10:01

## 2023-10-27 RX ADMIN — POTASSIUM CHLORIDE 40 MEQ: 1500 TABLET, EXTENDED RELEASE ORAL at 10:01

## 2023-10-27 RX ADMIN — SENNOSIDES AND DOCUSATE SODIUM 1 TABLET: 50; 8.6 TABLET ORAL at 10:01

## 2023-10-27 RX ADMIN — METRONIDAZOLE 500 MG: 500 INJECTION, SOLUTION INTRAVENOUS at 00:49

## 2023-10-27 RX ADMIN — LEVOTHYROXINE SODIUM 50 MCG: 0.05 TABLET ORAL at 05:54

## 2023-10-27 RX ADMIN — LACOSAMIDE 200 MG: 10 INJECTION INTRAVENOUS at 21:58

## 2023-10-27 RX ADMIN — METOPROLOL TARTRATE 5 MG: 5 INJECTION, SOLUTION INTRAVENOUS at 22:20

## 2023-10-27 RX ADMIN — METOPROLOL TARTRATE 5 MG: 5 INJECTION, SOLUTION INTRAVENOUS at 05:59

## 2023-10-27 RX ADMIN — SODIUM CHLORIDE, PRESERVATIVE FREE 10 ML: 5 INJECTION INTRAVENOUS at 10:53

## 2023-10-27 RX ADMIN — DONEPEZIL HYDROCHLORIDE 5 MG: 10 TABLET, FILM COATED ORAL at 21:56

## 2023-10-27 RX ADMIN — METOPROLOL TARTRATE 5 MG: 5 INJECTION, SOLUTION INTRAVENOUS at 14:28

## 2023-10-27 RX ADMIN — SODIUM CHLORIDE, PRESERVATIVE FREE 10 ML: 5 INJECTION INTRAVENOUS at 22:24

## 2023-10-27 RX ADMIN — ACETAMINOPHEN 650 MG: 325 TABLET ORAL at 00:49

## 2023-10-27 RX ADMIN — ACETAMINOPHEN 650 MG: 325 TABLET ORAL at 14:28

## 2023-10-27 RX ADMIN — FUROSEMIDE 20 MG: 10 INJECTION, SOLUTION INTRAMUSCULAR; INTRAVENOUS at 10:53

## 2023-10-27 RX ADMIN — ATORVASTATIN CALCIUM 40 MG: 40 TABLET, FILM COATED ORAL at 10:01

## 2023-10-27 RX ADMIN — SENNOSIDES AND DOCUSATE SODIUM 1 TABLET: 50; 8.6 TABLET ORAL at 21:56

## 2023-10-27 RX ADMIN — SODIUM CHLORIDE, PRESERVATIVE FREE 10 ML: 5 INJECTION INTRAVENOUS at 22:23

## 2023-10-27 RX ADMIN — SODIUM CHLORIDE, PRESERVATIVE FREE 10 ML: 5 INJECTION INTRAVENOUS at 10:02

## 2023-10-27 RX ADMIN — SODIUM CHLORIDE, PRESERVATIVE FREE 40 MG: 5 INJECTION INTRAVENOUS at 09:59

## 2023-10-27 RX ADMIN — SODIUM CHLORIDE: 4.5 INJECTION, SOLUTION INTRAVENOUS at 14:52

## 2023-10-27 RX ADMIN — PRIMIDONE 50 MG: 50 TABLET ORAL at 10:00

## 2023-10-27 RX ADMIN — LACOSAMIDE 200 MG: 10 INJECTION INTRAVENOUS at 10:50

## 2023-10-27 RX ADMIN — HEPARIN SODIUM 5000 UNITS: 5000 INJECTION INTRAVENOUS; SUBCUTANEOUS at 21:56

## 2023-10-27 RX ADMIN — LINEZOLID 600 MG: 600 INJECTION, SOLUTION INTRAVENOUS at 10:09

## 2023-10-27 RX ADMIN — SODIUM CHLORIDE, PRESERVATIVE FREE 40 MG: 5 INJECTION INTRAVENOUS at 21:56

## 2023-10-27 RX ADMIN — CEFEPIME 2000 MG: 2 INJECTION, POWDER, FOR SOLUTION INTRAVENOUS at 10:07

## 2023-10-27 RX ADMIN — LEVETIRACETAM 750 MG: 100 INJECTION, SOLUTION INTRAVENOUS at 09:58

## 2023-10-27 RX ADMIN — ACETAMINOPHEN 650 MG: 325 TABLET ORAL at 10:00

## 2023-10-27 RX ADMIN — CEFEPIME 2000 MG: 2 INJECTION, POWDER, FOR SOLUTION INTRAVENOUS at 17:17

## 2023-10-28 ENCOUNTER — APPOINTMENT (OUTPATIENT)
Dept: GENERAL RADIOLOGY | Age: 68
DRG: 321 | End: 2023-10-28
Attending: INTERNAL MEDICINE
Payer: COMMERCIAL

## 2023-10-28 LAB
ABSOLUTE BANDS #: 0.48 K/UL (ref 0–1)
ANION GAP SERPL CALCULATED.3IONS-SCNC: 7 MMOL/L (ref 9–17)
ARTERIAL PATENCY WRIST A: ABNORMAL
ARTERIAL PATENCY WRIST A: ABNORMAL
BANDS: 3 % (ref 0–10)
BASOPHILS # BLD: 0 K/UL (ref 0–0.2)
BASOPHILS NFR BLD: 0 % (ref 0–2)
BDY SITE: ABNORMAL
BDY SITE: ABNORMAL
BODY TEMPERATURE: 37
BODY TEMPERATURE: 37
BUN SERPL-MCNC: 10 MG/DL (ref 8–23)
CALCIUM SERPL-MCNC: 7.8 MG/DL (ref 8.6–10.4)
CHLORIDE SERPL-SCNC: 102 MMOL/L (ref 98–107)
CO2 SERPL-SCNC: 27 MMOL/L (ref 20–31)
COHGB MFR BLD: 1.2 % (ref 0–5)
COHGB MFR BLD: 1.4 % (ref 0–5)
CREAT SERPL-MCNC: <0.4 MG/DL (ref 0.5–0.9)
EOSINOPHIL # BLD: 0 K/UL (ref 0–0.4)
EOSINOPHILS RELATIVE PERCENT: 0 % (ref 0–4)
ERYTHROCYTE [DISTWIDTH] IN BLOOD BY AUTOMATED COUNT: 13.6 % (ref 11.5–14.9)
FIO2 ON VENT: 65 %
GAS FLOW.O2 O2 DELIVERY SYS: ABNORMAL L/MIN
GAS FLOW.O2 O2 DELIVERY SYS: ABNORMAL L/MIN
GAS FLOW.O2 SETTING OXYMISER: 16 L/MIN
GFR SERPL CREATININE-BSD FRML MDRD: ABNORMAL ML/MIN/1.73M2
GLUCOSE SERPL-MCNC: 123 MG/DL (ref 70–99)
HCO3 ARTERIAL: 28.7 MMOL/L (ref 22–26)
HCO3 ARTERIAL: 31.2 MMOL/L (ref 22–26)
HCT VFR BLD AUTO: 39 % (ref 36–46)
HGB BLD-MCNC: 12.8 G/DL (ref 12–16)
LYMPHOCYTES NFR BLD: 1.11 K/UL (ref 1–4.8)
LYMPHOCYTES RELATIVE PERCENT: 7 % (ref 24–44)
MCH RBC QN AUTO: 32.6 PG (ref 26–34)
MCHC RBC AUTO-ENTMCNC: 32.8 G/DL (ref 31–37)
MCV RBC AUTO: 99.2 FL (ref 80–100)
METHEMOGLOBIN: 0.9 % (ref 0–1.9)
METHEMOGLOBIN: 0.9 % (ref 0–1.9)
MICROORGANISM SPEC CULT: ABNORMAL
MICROORGANISM SPEC CULT: ABNORMAL
MICROORGANISM/AGENT SPEC: ABNORMAL
MICROORGANISM/AGENT SPEC: ABNORMAL
MONOCYTES NFR BLD: 1.11 K/UL (ref 0.1–1.3)
MONOCYTES NFR BLD: 7 % (ref 1–7)
MORPHOLOGY: ABNORMAL
NEUTROPHILS NFR BLD: 83 % (ref 36–66)
NEUTS SEG NFR BLD: 13.2 K/UL (ref 1.3–9.1)
O2 SAT, ARTERIAL: 94.3 % (ref 95–98)
O2 SAT, ARTERIAL: 94.3 % (ref 95–98)
PCO2 ARTERIAL: 45 MMHG (ref 35–45)
PCO2 ARTERIAL: 47.9 MMHG (ref 35–45)
PEEP RESPIRATORY: 8 CM[H2O]
PH ARTERIAL: 7.41 (ref 7.35–7.45)
PH ARTERIAL: 7.42 (ref 7.35–7.45)
PLATELET # BLD AUTO: 313 K/UL (ref 150–450)
PMV BLD AUTO: 8.3 FL (ref 6–12)
PO2 ARTERIAL: 80.6 MMHG (ref 80–100)
PO2 ARTERIAL: 83.6 MMHG (ref 80–100)
POSITIVE BASE EXCESS, ART: 4.2 MMOL/L (ref 0–2)
POSITIVE BASE EXCESS, ART: 6.7 MMOL/L (ref 0–2)
POTASSIUM SERPL-SCNC: 3.4 MMOL/L (ref 3.7–5.3)
POTASSIUM SERPL-SCNC: 3.5 MMOL/L (ref 3.7–5.3)
PROCALCITONIN SERPL-MCNC: 0.1 NG/ML
PT. POSITION: ABNORMAL
PT. POSITION: ABNORMAL
RBC # BLD AUTO: 3.93 M/UL (ref 4–5.2)
RESPIRATORY RATE: 16
RESPIRATORY RATE: 20
SODIUM SERPL-SCNC: 136 MMOL/L (ref 135–144)
SPECIMEN DESCRIPTION: ABNORMAL
TEXT FOR RESPIRATORY: ABNORMAL
TEXT FOR RESPIRATORY: ABNORMAL
TOTAL RATE: 21
VENTILATION MODE VENT: ABNORMAL
VT: 450
WBC OTHER # BLD: 15.9 K/UL (ref 3.5–11)

## 2023-10-28 PROCEDURE — 2580000003 HC RX 258: Performed by: SURGERY

## 2023-10-28 PROCEDURE — 2580000003 HC RX 258: Performed by: INTERNAL MEDICINE

## 2023-10-28 PROCEDURE — 6370000000 HC RX 637 (ALT 250 FOR IP): Performed by: SURGERY

## 2023-10-28 PROCEDURE — 6360000002 HC RX W HCPCS: Performed by: INTERNAL MEDICINE

## 2023-10-28 PROCEDURE — 0BH17EZ INSERTION OF ENDOTRACHEAL AIRWAY INTO TRACHEA, VIA NATURAL OR ARTIFICIAL OPENING: ICD-10-PCS | Performed by: INTERNAL MEDICINE

## 2023-10-28 PROCEDURE — 99233 SBSQ HOSP IP/OBS HIGH 50: CPT | Performed by: INTERNAL MEDICINE

## 2023-10-28 PROCEDURE — 6360000002 HC RX W HCPCS: Performed by: SURGERY

## 2023-10-28 PROCEDURE — 31500 INSERT EMERGENCY AIRWAY: CPT

## 2023-10-28 PROCEDURE — 6360000002 HC RX W HCPCS: Performed by: NURSE PRACTITIONER

## 2023-10-28 PROCEDURE — 94660 CPAP INITIATION&MGMT: CPT

## 2023-10-28 PROCEDURE — C9113 INJ PANTOPRAZOLE SODIUM, VIA: HCPCS | Performed by: SURGERY

## 2023-10-28 PROCEDURE — 2000000000 HC ICU R&B

## 2023-10-28 PROCEDURE — 36415 COLL VENOUS BLD VENIPUNCTURE: CPT

## 2023-10-28 PROCEDURE — 2500000003 HC RX 250 WO HCPCS: Performed by: INTERNAL MEDICINE

## 2023-10-28 PROCEDURE — 82805 BLOOD GASES W/O2 SATURATION: CPT

## 2023-10-28 PROCEDURE — 2500000003 HC RX 250 WO HCPCS: Performed by: SURGERY

## 2023-10-28 PROCEDURE — 5A1945Z RESPIRATORY VENTILATION, 24-96 CONSECUTIVE HOURS: ICD-10-PCS | Performed by: INTERNAL MEDICINE

## 2023-10-28 PROCEDURE — 85025 COMPLETE CBC W/AUTO DIFF WBC: CPT

## 2023-10-28 PROCEDURE — 71045 X-RAY EXAM CHEST 1 VIEW: CPT

## 2023-10-28 PROCEDURE — C9254 INJECTION, LACOSAMIDE: HCPCS | Performed by: SURGERY

## 2023-10-28 PROCEDURE — 80048 BASIC METABOLIC PNL TOTAL CA: CPT

## 2023-10-28 PROCEDURE — A4216 STERILE WATER/SALINE, 10 ML: HCPCS | Performed by: SURGERY

## 2023-10-28 PROCEDURE — 84132 ASSAY OF SERUM POTASSIUM: CPT

## 2023-10-28 PROCEDURE — 6370000000 HC RX 637 (ALT 250 FOR IP): Performed by: INTERNAL MEDICINE

## 2023-10-28 PROCEDURE — 6360000002 HC RX W HCPCS

## 2023-10-28 PROCEDURE — 2500000003 HC RX 250 WO HCPCS

## 2023-10-28 PROCEDURE — 84145 PROCALCITONIN (PCT): CPT

## 2023-10-28 PROCEDURE — A4216 STERILE WATER/SALINE, 10 ML: HCPCS | Performed by: INTERNAL MEDICINE

## 2023-10-28 PROCEDURE — 36600 WITHDRAWAL OF ARTERIAL BLOOD: CPT

## 2023-10-28 RX ORDER — FENTANYL CITRATE 0.05 MG/ML
25 INJECTION, SOLUTION INTRAMUSCULAR; INTRAVENOUS
Status: DISPENSED | OUTPATIENT
Start: 2023-10-28

## 2023-10-28 RX ORDER — NOREPINEPHRINE BITARTRATE 0.06 MG/ML
1-100 INJECTION, SOLUTION INTRAVENOUS CONTINUOUS
Status: DISCONTINUED | OUTPATIENT
Start: 2023-10-28 | End: 2023-11-05

## 2023-10-28 RX ORDER — PROPOFOL 10 MG/ML
10 INJECTION, EMULSION INTRAVENOUS CONTINUOUS
Status: DISPENSED | OUTPATIENT
Start: 2023-10-28

## 2023-10-28 RX ORDER — NOREPINEPHRINE BITARTRATE 0.06 MG/ML
INJECTION, SOLUTION INTRAVENOUS
Status: COMPLETED
Start: 2023-10-28 | End: 2023-10-28

## 2023-10-28 RX ORDER — PROPOFOL 10 MG/ML
INJECTION, EMULSION INTRAVENOUS
Status: COMPLETED
Start: 2023-10-28 | End: 2023-10-28

## 2023-10-28 RX ORDER — CHLORHEXIDINE GLUCONATE ORAL RINSE 1.2 MG/ML
15 SOLUTION DENTAL 2 TIMES DAILY
Status: DISPENSED | OUTPATIENT
Start: 2023-10-28

## 2023-10-28 RX ORDER — FUROSEMIDE 10 MG/ML
60 INJECTION INTRAMUSCULAR; INTRAVENOUS ONCE
Status: COMPLETED | OUTPATIENT
Start: 2023-10-28 | End: 2023-10-28

## 2023-10-28 RX ADMIN — CEFEPIME 2000 MG: 2 INJECTION, POWDER, FOR SOLUTION INTRAVENOUS at 00:41

## 2023-10-28 RX ADMIN — SODIUM CHLORIDE, PRESERVATIVE FREE 10 ML: 5 INJECTION INTRAVENOUS at 20:12

## 2023-10-28 RX ADMIN — ATORVASTATIN CALCIUM 40 MG: 40 TABLET, FILM COATED ORAL at 08:55

## 2023-10-28 RX ADMIN — Medication 5 MCG/MIN: at 15:01

## 2023-10-28 RX ADMIN — SODIUM CHLORIDE, PRESERVATIVE FREE 10 ML: 5 INJECTION INTRAVENOUS at 08:57

## 2023-10-28 RX ADMIN — Medication 5 MCG/MIN: at 15:02

## 2023-10-28 RX ADMIN — HEPARIN SODIUM 5000 UNITS: 5000 INJECTION INTRAVENOUS; SUBCUTANEOUS at 22:14

## 2023-10-28 RX ADMIN — ACETAMINOPHEN 650 MG: 325 TABLET ORAL at 13:28

## 2023-10-28 RX ADMIN — METOPROLOL TARTRATE 5 MG: 5 INJECTION, SOLUTION INTRAVENOUS at 13:27

## 2023-10-28 RX ADMIN — METOPROLOL TARTRATE 5 MG: 5 INJECTION, SOLUTION INTRAVENOUS at 06:04

## 2023-10-28 RX ADMIN — ACETAMINOPHEN 650 MG: 325 TABLET ORAL at 00:42

## 2023-10-28 RX ADMIN — MIDODRINE HYDROCHLORIDE 5 MG: 5 TABLET ORAL at 20:08

## 2023-10-28 RX ADMIN — SENNOSIDES AND DOCUSATE SODIUM 1 TABLET: 50; 8.6 TABLET ORAL at 20:08

## 2023-10-28 RX ADMIN — LEVOTHYROXINE SODIUM 50 MCG: 0.05 TABLET ORAL at 06:25

## 2023-10-28 RX ADMIN — HEPARIN SODIUM 5000 UNITS: 5000 INJECTION INTRAVENOUS; SUBCUTANEOUS at 13:30

## 2023-10-28 RX ADMIN — HEPARIN SODIUM 5000 UNITS: 5000 INJECTION INTRAVENOUS; SUBCUTANEOUS at 06:04

## 2023-10-28 RX ADMIN — SODIUM CHLORIDE, PRESERVATIVE FREE 40 MG: 5 INJECTION INTRAVENOUS at 08:48

## 2023-10-28 RX ADMIN — PRIMIDONE 50 MG: 50 TABLET ORAL at 20:08

## 2023-10-28 RX ADMIN — PROPOFOL 10 MCG/KG/MIN: 10 INJECTION, EMULSION INTRAVENOUS at 15:16

## 2023-10-28 RX ADMIN — DONEPEZIL HYDROCHLORIDE 5 MG: 10 TABLET, FILM COATED ORAL at 20:08

## 2023-10-28 RX ADMIN — FUROSEMIDE 60 MG: 10 INJECTION, SOLUTION INTRAMUSCULAR; INTRAVENOUS at 09:56

## 2023-10-28 RX ADMIN — ACETAMINOPHEN 650 MG: 325 TABLET ORAL at 08:55

## 2023-10-28 RX ADMIN — SODIUM CHLORIDE, PRESERVATIVE FREE 20 MG: 5 INJECTION INTRAVENOUS at 20:09

## 2023-10-28 RX ADMIN — POTASSIUM CHLORIDE 40 MEQ: 1500 TABLET, EXTENDED RELEASE ORAL at 10:58

## 2023-10-28 RX ADMIN — LINEZOLID 600 MG: 600 INJECTION, SOLUTION INTRAVENOUS at 20:30

## 2023-10-28 RX ADMIN — LEVETIRACETAM 750 MG: 100 INJECTION, SOLUTION INTRAVENOUS at 20:02

## 2023-10-28 RX ADMIN — PRIMIDONE 50 MG: 50 TABLET ORAL at 08:55

## 2023-10-28 RX ADMIN — LACOSAMIDE 200 MG: 10 INJECTION INTRAVENOUS at 21:26

## 2023-10-28 RX ADMIN — LACOSAMIDE 200 MG: 10 INJECTION INTRAVENOUS at 08:43

## 2023-10-28 RX ADMIN — LINEZOLID 600 MG: 600 INJECTION, SOLUTION INTRAVENOUS at 09:01

## 2023-10-28 RX ADMIN — ACETAMINOPHEN 650 MG: 325 TABLET ORAL at 20:08

## 2023-10-28 RX ADMIN — CEFEPIME 2000 MG: 2 INJECTION, POWDER, FOR SOLUTION INTRAVENOUS at 13:26

## 2023-10-28 RX ADMIN — LEVETIRACETAM 750 MG: 100 INJECTION, SOLUTION INTRAVENOUS at 08:44

## 2023-10-28 RX ADMIN — CHLORHEXIDINE GLUCONATE 0.12% ORAL RINSE 15 ML: 1.2 LIQUID ORAL at 20:10

## 2023-10-28 RX ADMIN — FENTANYL CITRATE 50 MCG: 0.05 INJECTION, SOLUTION INTRAMUSCULAR; INTRAVENOUS at 20:43

## 2023-10-28 ASSESSMENT — PULMONARY FUNCTION TESTS
PIF_VALUE: 18
PIF_VALUE: 17
PIF_VALUE: 13
PIF_VALUE: 18
PIF_VALUE: 9
PIF_VALUE: 12
PIF_VALUE: 18
PIF_VALUE: 16
PIF_VALUE: 19
PIF_VALUE: 18
PIF_VALUE: 9
PIF_VALUE: 19
PIF_VALUE: 14
PIF_VALUE: 14
PIF_VALUE: 12
PIF_VALUE: 11
PIF_VALUE: 12
PIF_VALUE: 20
PIF_VALUE: 17
PIF_VALUE: 11
PIF_VALUE: 15
PIF_VALUE: 15
PIF_VALUE: 17
PIF_VALUE: 10
PIF_VALUE: 18
PIF_VALUE: 19
PIF_VALUE: 12

## 2023-10-28 ASSESSMENT — PAIN SCALES - GENERAL
PAINLEVEL_OUTOF10: 0
PAINLEVEL_OUTOF10: 0
PAINLEVEL_OUTOF10: 4

## 2023-10-28 NOTE — FLOWSHEET NOTE
Physical Therapy Cancel Note      DATE: 10/28/2023    NAME: Uzma Howard  MRN: [id number]   : 1955      Patient not seen this date for Physical Therapy due to: Other: Patient medical status declined per RN and she was just transferred to the ICU.        Electronically signed by Maritza Estrada PT on 10/28/2023 at 09:55

## 2023-10-28 NOTE — PROCEDURES
Procedure is endotracheal intubation    Indication prediagnosis: Impending respiratory failure    Post diagnosis same    EBL 0 mL    Patient was placed on the intubating sniff position    Patient was given Versed 2 mg IV push Fentanyl 100 mcg IV push propofol 25 IV push. Using a 4.0 glide scope, endotracheal tube 7.5 placed to 22 cm at the lip. Equal breath sounds were heard bilaterally. The plastic end-tidal CO2 did turn yellow. Chest x-ray revealed endotracheal tube in good position. No pneumothorax appreciated. Patient did have a drop in her blood pressure post procedure. Suspect it is because of the propofol. We did place her on Levophed and IV fluids running. At time of this note, the patient systolic blood pressure is 109. Goal is to have her blood pressure recovered so we can wean the Levophed down off as tolerated if possible    O2 saturations currently 96 to 99%    EliasAlley (Sister)   [phone]     I attempted to update the patient's sister via telephone. Attempt was not successful. (The number of reach has a voice box that is not set up yet. ..)    Ramona Diamond MD

## 2023-10-29 ENCOUNTER — APPOINTMENT (OUTPATIENT)
Dept: GENERAL RADIOLOGY | Age: 68
DRG: 321 | End: 2023-10-29
Payer: COMMERCIAL

## 2023-10-29 LAB
ANION GAP SERPL CALCULATED.3IONS-SCNC: 4 MMOL/L (ref 9–17)
ARTERIAL PATENCY WRIST A: ABNORMAL
BASOPHILS # BLD: 0 K/UL (ref 0–0.2)
BASOPHILS NFR BLD: 0 % (ref 0–2)
BDY SITE: ABNORMAL
BNP SERPL-MCNC: 594 PG/ML
BODY TEMPERATURE: 37
BUN SERPL-MCNC: 9 MG/DL (ref 8–23)
CALCIUM SERPL-MCNC: 7.6 MG/DL (ref 8.6–10.4)
CHLORIDE SERPL-SCNC: 104 MMOL/L (ref 98–107)
CO2 SERPL-SCNC: 32 MMOL/L (ref 20–31)
COHGB MFR BLD: 2.7 % (ref 0–5)
CREAT SERPL-MCNC: <0.4 MG/DL (ref 0.5–0.9)
EOSINOPHIL # BLD: 0.16 K/UL (ref 0–0.4)
EOSINOPHILS RELATIVE PERCENT: 1 % (ref 0–4)
ERYTHROCYTE [DISTWIDTH] IN BLOOD BY AUTOMATED COUNT: 13.8 % (ref 11.5–14.9)
FIO2 ON VENT: 55 %
GAS FLOW.O2 O2 DELIVERY SYS: ABNORMAL L/MIN
GAS FLOW.O2 SETTING OXYMISER: 16 L/MIN
GFR SERPL CREATININE-BSD FRML MDRD: ABNORMAL ML/MIN/1.73M2
GLUCOSE SERPL-MCNC: 119 MG/DL (ref 70–99)
HCO3 ARTERIAL: 33.5 MMOL/L (ref 22–26)
HCT VFR BLD AUTO: 32.1 % (ref 36–46)
HGB BLD-MCNC: 10.4 G/DL (ref 12–16)
LACTATE BLDV-SCNC: 1.8 MMOL/L (ref 0.5–2.2)
LYMPHOCYTES NFR BLD: 0.95 K/UL (ref 1–4.8)
LYMPHOCYTES RELATIVE PERCENT: 6 % (ref 24–44)
MCH RBC QN AUTO: 32.3 PG (ref 26–34)
MCHC RBC AUTO-ENTMCNC: 32.6 G/DL (ref 31–37)
MCV RBC AUTO: 99.1 FL (ref 80–100)
METHEMOGLOBIN: 1.4 % (ref 0–1.9)
MONOCYTES NFR BLD: 0.79 K/UL (ref 0.1–1.3)
MONOCYTES NFR BLD: 5 % (ref 1–7)
MORPHOLOGY: ABNORMAL
NEUTROPHILS NFR BLD: 88 % (ref 36–66)
NEUTS SEG NFR BLD: 13.9 K/UL (ref 1.3–9.1)
O2 SAT, ARTERIAL: 93 % (ref 95–98)
PCO2 ARTERIAL: 47 MMHG (ref 35–45)
PEEP RESPIRATORY: 8 CM[H2O]
PH ARTERIAL: 7.46 (ref 7.35–7.45)
PLATELET # BLD AUTO: 281 K/UL (ref 150–450)
PMV BLD AUTO: 7.5 FL (ref 6–12)
PO2 ARTERIAL: 92.6 MMHG (ref 80–100)
POSITIVE BASE EXCESS, ART: 9.7 MMOL/L (ref 0–2)
POTASSIUM SERPL-SCNC: 3.6 MMOL/L (ref 3.7–5.3)
PT. POSITION: ABNORMAL
RBC # BLD AUTO: 3.24 M/UL (ref 4–5.2)
RESPIRATORY RATE: 21
SODIUM SERPL-SCNC: 140 MMOL/L (ref 135–144)
TEXT FOR RESPIRATORY: ABNORMAL
TOTAL RATE: 21
VENTILATION MODE VENT: ABNORMAL
VT: 450
WBC OTHER # BLD: 15.8 K/UL (ref 3.5–11)

## 2023-10-29 PROCEDURE — 2700000000 HC OXYGEN THERAPY PER DAY

## 2023-10-29 PROCEDURE — 94761 N-INVAS EAR/PLS OXIMETRY MLT: CPT

## 2023-10-29 PROCEDURE — 85025 COMPLETE CBC W/AUTO DIFF WBC: CPT

## 2023-10-29 PROCEDURE — 6360000002 HC RX W HCPCS: Performed by: NURSE PRACTITIONER

## 2023-10-29 PROCEDURE — 2580000003 HC RX 258: Performed by: INTERNAL MEDICINE

## 2023-10-29 PROCEDURE — 6370000000 HC RX 637 (ALT 250 FOR IP): Performed by: INTERNAL MEDICINE

## 2023-10-29 PROCEDURE — 99233 SBSQ HOSP IP/OBS HIGH 50: CPT | Performed by: INTERNAL MEDICINE

## 2023-10-29 PROCEDURE — 94003 VENT MGMT INPAT SUBQ DAY: CPT

## 2023-10-29 PROCEDURE — C9254 INJECTION, LACOSAMIDE: HCPCS | Performed by: SURGERY

## 2023-10-29 PROCEDURE — 80048 BASIC METABOLIC PNL TOTAL CA: CPT

## 2023-10-29 PROCEDURE — 2000000000 HC ICU R&B

## 2023-10-29 PROCEDURE — 83880 ASSAY OF NATRIURETIC PEPTIDE: CPT

## 2023-10-29 PROCEDURE — 6370000000 HC RX 637 (ALT 250 FOR IP): Performed by: SURGERY

## 2023-10-29 PROCEDURE — 2500000003 HC RX 250 WO HCPCS: Performed by: INTERNAL MEDICINE

## 2023-10-29 PROCEDURE — 6360000002 HC RX W HCPCS: Performed by: SURGERY

## 2023-10-29 PROCEDURE — 83605 ASSAY OF LACTIC ACID: CPT

## 2023-10-29 PROCEDURE — 2580000003 HC RX 258: Performed by: SURGERY

## 2023-10-29 PROCEDURE — 6360000002 HC RX W HCPCS: Performed by: INTERNAL MEDICINE

## 2023-10-29 PROCEDURE — 82805 BLOOD GASES W/O2 SATURATION: CPT

## 2023-10-29 PROCEDURE — 71045 X-RAY EXAM CHEST 1 VIEW: CPT

## 2023-10-29 PROCEDURE — 36415 COLL VENOUS BLD VENIPUNCTURE: CPT

## 2023-10-29 PROCEDURE — A4216 STERILE WATER/SALINE, 10 ML: HCPCS | Performed by: INTERNAL MEDICINE

## 2023-10-29 RX ORDER — HEPARIN SODIUM 5000 [USP'U]/ML
5000 INJECTION, SOLUTION INTRAVENOUS; SUBCUTANEOUS EVERY 8 HOURS SCHEDULED
Status: DISPENSED | OUTPATIENT
Start: 2023-10-29

## 2023-10-29 RX ORDER — 0.9 % SODIUM CHLORIDE 0.9 %
500 INTRAVENOUS SOLUTION INTRAVENOUS ONCE
Status: COMPLETED | OUTPATIENT
Start: 2023-10-29 | End: 2023-10-29

## 2023-10-29 RX ADMIN — PROPOFOL 25 MCG/KG/MIN: 10 INJECTION, EMULSION INTRAVENOUS at 23:02

## 2023-10-29 RX ADMIN — DONEPEZIL HYDROCHLORIDE 5 MG: 10 TABLET, FILM COATED ORAL at 20:08

## 2023-10-29 RX ADMIN — PROPOFOL 20 MCG/KG/MIN: 10 INJECTION, EMULSION INTRAVENOUS at 05:01

## 2023-10-29 RX ADMIN — ATORVASTATIN CALCIUM 40 MG: 40 TABLET, FILM COATED ORAL at 08:10

## 2023-10-29 RX ADMIN — CHLORHEXIDINE GLUCONATE 0.12% ORAL RINSE 15 ML: 1.2 LIQUID ORAL at 08:15

## 2023-10-29 RX ADMIN — SODIUM CHLORIDE, PRESERVATIVE FREE 20 MG: 5 INJECTION INTRAVENOUS at 08:10

## 2023-10-29 RX ADMIN — SODIUM CHLORIDE: 0.9 INJECTION, SOLUTION INTRAVENOUS at 13:15

## 2023-10-29 RX ADMIN — VANCOMYCIN HYDROCHLORIDE 1500 MG: 1.5 INJECTION, POWDER, LYOPHILIZED, FOR SOLUTION INTRAVENOUS at 20:43

## 2023-10-29 RX ADMIN — LEVETIRACETAM 750 MG: 100 INJECTION, SOLUTION INTRAVENOUS at 20:06

## 2023-10-29 RX ADMIN — PRIMIDONE 50 MG: 50 TABLET ORAL at 20:10

## 2023-10-29 RX ADMIN — SODIUM CHLORIDE, PRESERVATIVE FREE 20 MG: 5 INJECTION INTRAVENOUS at 20:09

## 2023-10-29 RX ADMIN — HEPARIN SODIUM 5000 UNITS: 5000 INJECTION INTRAVENOUS; SUBCUTANEOUS at 12:58

## 2023-10-29 RX ADMIN — ACETAMINOPHEN 650 MG: 325 TABLET ORAL at 12:58

## 2023-10-29 RX ADMIN — LINEZOLID 600 MG: 600 INJECTION, SOLUTION INTRAVENOUS at 08:12

## 2023-10-29 RX ADMIN — LEVETIRACETAM 750 MG: 100 INJECTION, SOLUTION INTRAVENOUS at 08:11

## 2023-10-29 RX ADMIN — ACETAMINOPHEN 650 MG: 325 TABLET ORAL at 20:08

## 2023-10-29 RX ADMIN — LACOSAMIDE 200 MG: 10 INJECTION INTRAVENOUS at 20:40

## 2023-10-29 RX ADMIN — MIDODRINE HYDROCHLORIDE 5 MG: 5 TABLET ORAL at 12:58

## 2023-10-29 RX ADMIN — CHLORHEXIDINE GLUCONATE 0.12% ORAL RINSE 15 ML: 1.2 LIQUID ORAL at 20:08

## 2023-10-29 RX ADMIN — PROPOFOL 25 MCG/KG/MIN: 10 INJECTION, EMULSION INTRAVENOUS at 13:03

## 2023-10-29 RX ADMIN — Medication 10 MCG/MIN: at 23:01

## 2023-10-29 RX ADMIN — LACOSAMIDE 200 MG: 10 INJECTION INTRAVENOUS at 10:30

## 2023-10-29 RX ADMIN — ACETAMINOPHEN 650 MG: 325 TABLET ORAL at 08:10

## 2023-10-29 RX ADMIN — PRIMIDONE 50 MG: 50 TABLET ORAL at 08:14

## 2023-10-29 RX ADMIN — LEVOTHYROXINE SODIUM 50 MCG: 0.05 TABLET ORAL at 05:26

## 2023-10-29 RX ADMIN — HEPARIN SODIUM 5000 UNITS: 5000 INJECTION INTRAVENOUS; SUBCUTANEOUS at 05:30

## 2023-10-29 RX ADMIN — SODIUM CHLORIDE 3000 MG: 900 INJECTION INTRAVENOUS at 20:06

## 2023-10-29 RX ADMIN — HEPARIN SODIUM 5000 UNITS: 5000 INJECTION INTRAVENOUS; SUBCUTANEOUS at 21:20

## 2023-10-29 RX ADMIN — MIDODRINE HYDROCHLORIDE 5 MG: 5 TABLET ORAL at 05:26

## 2023-10-29 RX ADMIN — MIDODRINE HYDROCHLORIDE 5 MG: 5 TABLET ORAL at 08:10

## 2023-10-29 ASSESSMENT — PULMONARY FUNCTION TESTS
PIF_VALUE: 18
PIF_VALUE: 10
PIF_VALUE: 15
PIF_VALUE: 11
PIF_VALUE: 15
PIF_VALUE: 13
PIF_VALUE: 17
PIF_VALUE: 22
PIF_VALUE: 18
PIF_VALUE: 15
PIF_VALUE: 16
PIF_VALUE: 21
PIF_VALUE: 13
PIF_VALUE: 21
PIF_VALUE: 17
PIF_VALUE: 19
PIF_VALUE: 18
PIF_VALUE: 10
PIF_VALUE: 18
PIF_VALUE: 13
PIF_VALUE: 18
PIF_VALUE: 27
PIF_VALUE: 18
PIF_VALUE: 17
PIF_VALUE: 17
PIF_VALUE: 8
PIF_VALUE: 20
PIF_VALUE: 20
PIF_VALUE: 17
PIF_VALUE: 12
PIF_VALUE: 16
PIF_VALUE: 17
PIF_VALUE: 16
PIF_VALUE: 18
PIF_VALUE: 17
PIF_VALUE: 21
PIF_VALUE: 16
PIF_VALUE: 18
PIF_VALUE: 18
PIF_VALUE: 20
PIF_VALUE: 17
PIF_VALUE: 15
PIF_VALUE: 18
PIF_VALUE: 15
PIF_VALUE: 18
PIF_VALUE: 20
PIF_VALUE: 17
PIF_VALUE: 19
PIF_VALUE: 12
PIF_VALUE: 11
PIF_VALUE: 14
PIF_VALUE: 14
PIF_VALUE: 18
PIF_VALUE: 12
PIF_VALUE: 20
PIF_VALUE: 19
PIF_VALUE: 14
PIF_VALUE: 18
PIF_VALUE: 17
PIF_VALUE: 12
PIF_VALUE: 14
PIF_VALUE: 18
PIF_VALUE: 21
PIF_VALUE: 20
PIF_VALUE: 12
PIF_VALUE: 24
PIF_VALUE: 21
PIF_VALUE: 18
PIF_VALUE: 17
PIF_VALUE: 11
PIF_VALUE: 17
PIF_VALUE: 10
PIF_VALUE: 19
PIF_VALUE: 21
PIF_VALUE: 17
PIF_VALUE: 19
PIF_VALUE: 33
PIF_VALUE: 18
PIF_VALUE: 17
PIF_VALUE: 19
PIF_VALUE: 17
PIF_VALUE: 17
PIF_VALUE: 16
PIF_VALUE: 15
PIF_VALUE: 19
PIF_VALUE: 20
PIF_VALUE: 17
PIF_VALUE: 19
PIF_VALUE: 17
PIF_VALUE: 16
PIF_VALUE: 18
PIF_VALUE: 16
PIF_VALUE: 19
PIF_VALUE: 21

## 2023-10-29 ASSESSMENT — PAIN SCALES - GENERAL
PAINLEVEL_OUTOF10: 0

## 2023-10-30 ENCOUNTER — APPOINTMENT (OUTPATIENT)
Dept: CT IMAGING | Age: 68
DRG: 321 | End: 2023-10-30
Payer: COMMERCIAL

## 2023-10-30 ENCOUNTER — APPOINTMENT (OUTPATIENT)
Dept: GENERAL RADIOLOGY | Age: 68
DRG: 321 | End: 2023-10-30
Payer: COMMERCIAL

## 2023-10-30 LAB
ALBUMIN SERPL-MCNC: 1.7 G/DL (ref 3.5–5.2)
ALP SERPL-CCNC: 86 U/L (ref 35–104)
ALT SERPL-CCNC: 15 U/L (ref 5–33)
AMMONIA PLAS-SCNC: 26 UMOL/L (ref 11–51)
ANION GAP SERPL CALCULATED.3IONS-SCNC: 4 MMOL/L (ref 9–17)
ARTERIAL PATENCY WRIST A: ABNORMAL
AST SERPL-CCNC: 41 U/L
BASOPHILS # BLD: 0.1 K/UL (ref 0–0.2)
BASOPHILS NFR BLD: 1 % (ref 0–2)
BDY SITE: ABNORMAL
BILIRUB DIRECT SERPL-MCNC: 0.2 MG/DL
BILIRUB INDIRECT SERPL-MCNC: 0.2 MG/DL (ref 0–1)
BILIRUB SERPL-MCNC: 0.4 MG/DL (ref 0.3–1.2)
BODY TEMPERATURE: 37
BUN SERPL-MCNC: 11 MG/DL (ref 8–23)
CALCIUM SERPL-MCNC: 8.2 MG/DL (ref 8.6–10.4)
CHLORIDE SERPL-SCNC: 105 MMOL/L (ref 98–107)
CO2 SERPL-SCNC: 33 MMOL/L (ref 20–31)
COHGB MFR BLD: 1.7 % (ref 0–5)
CREAT SERPL-MCNC: <0.4 MG/DL (ref 0.5–0.9)
DATE LAST DOSE: NORMAL
EOSINOPHIL # BLD: 0.3 K/UL (ref 0–0.4)
EOSINOPHILS RELATIVE PERCENT: 2 % (ref 0–4)
ERYTHROCYTE [DISTWIDTH] IN BLOOD BY AUTOMATED COUNT: 13.7 % (ref 11.5–14.9)
FIO2 ON VENT: 35 %
GAS FLOW.O2 O2 DELIVERY SYS: ABNORMAL L/MIN
GAS FLOW.O2 SETTING OXYMISER: 16 L/MIN
GFR SERPL CREATININE-BSD FRML MDRD: ABNORMAL ML/MIN/1.73M2
GLUCOSE SERPL-MCNC: 109 MG/DL (ref 70–99)
HCO3 ARTERIAL: 34.4 MMOL/L (ref 22–26)
HCT VFR BLD AUTO: 32.5 % (ref 36–46)
HGB BLD-MCNC: 10.7 G/DL (ref 12–16)
LYMPHOCYTES NFR BLD: 1 K/UL (ref 1–4.8)
LYMPHOCYTES RELATIVE PERCENT: 8 % (ref 24–44)
MCH RBC QN AUTO: 32.6 PG (ref 26–34)
MCHC RBC AUTO-ENTMCNC: 32.9 G/DL (ref 31–37)
MCV RBC AUTO: 99.3 FL (ref 80–100)
METHEMOGLOBIN: 0.9 % (ref 0–1.9)
MONOCYTES NFR BLD: 0.9 K/UL (ref 0.1–1.3)
MONOCYTES NFR BLD: 7 % (ref 1–7)
NEUTROPHILS NFR BLD: 82 % (ref 36–66)
NEUTS SEG NFR BLD: 10.2 K/UL (ref 1.3–9.1)
O2 SAT, ARTERIAL: 87.8 % (ref 95–98)
PCO2 ARTERIAL: 46.2 MMHG (ref 35–45)
PEEP RESPIRATORY: 8 CM[H2O]
PH ARTERIAL: 7.48 (ref 7.35–7.45)
PLATELET # BLD AUTO: 323 K/UL (ref 150–450)
PMV BLD AUTO: 7.7 FL (ref 6–12)
PO2 ARTERIAL: 54.1 MMHG (ref 80–100)
POSITIVE BASE EXCESS, ART: 10.9 MMOL/L (ref 0–2)
POTASSIUM SERPL-SCNC: 3.7 MMOL/L (ref 3.7–5.3)
PROT SERPL-MCNC: 4.9 G/DL (ref 6.4–8.3)
PT. POSITION: ABNORMAL
RBC # BLD AUTO: 3.27 M/UL (ref 4–5.2)
RESPIRATORY RATE: 19
SODIUM SERPL-SCNC: 142 MMOL/L (ref 135–144)
TEXT FOR RESPIRATORY: ABNORMAL
TME LAST DOSE: 1000 H
TOTAL RATE: 19
VANCOMYCIN DOSE: 1000 MG
VANCOMYCIN SERPL-MCNC: 11.8 UG/ML
VENTILATION MODE VENT: ABNORMAL
VT: 450
WBC OTHER # BLD: 12.4 K/UL (ref 3.5–11)

## 2023-10-30 PROCEDURE — 82805 BLOOD GASES W/O2 SATURATION: CPT

## 2023-10-30 PROCEDURE — 85025 COMPLETE CBC W/AUTO DIFF WBC: CPT

## 2023-10-30 PROCEDURE — 99233 SBSQ HOSP IP/OBS HIGH 50: CPT | Performed by: INTERNAL MEDICINE

## 2023-10-30 PROCEDURE — C9113 INJ PANTOPRAZOLE SODIUM, VIA: HCPCS | Performed by: SURGERY

## 2023-10-30 PROCEDURE — 2580000003 HC RX 258: Performed by: SURGERY

## 2023-10-30 PROCEDURE — C9254 INJECTION, LACOSAMIDE: HCPCS | Performed by: SURGERY

## 2023-10-30 PROCEDURE — 6360000002 HC RX W HCPCS: Performed by: INTERNAL MEDICINE

## 2023-10-30 PROCEDURE — 36415 COLL VENOUS BLD VENIPUNCTURE: CPT

## 2023-10-30 PROCEDURE — 99291 CRITICAL CARE FIRST HOUR: CPT | Performed by: INTERNAL MEDICINE

## 2023-10-30 PROCEDURE — 99024 POSTOP FOLLOW-UP VISIT: CPT | Performed by: ORTHOPAEDIC SURGERY

## 2023-10-30 PROCEDURE — 6360000002 HC RX W HCPCS: Performed by: SURGERY

## 2023-10-30 PROCEDURE — 2500000003 HC RX 250 WO HCPCS: Performed by: INTERNAL MEDICINE

## 2023-10-30 PROCEDURE — 71045 X-RAY EXAM CHEST 1 VIEW: CPT

## 2023-10-30 PROCEDURE — 94003 VENT MGMT INPAT SUBQ DAY: CPT

## 2023-10-30 PROCEDURE — P9047 ALBUMIN (HUMAN), 25%, 50ML: HCPCS | Performed by: INTERNAL MEDICINE

## 2023-10-30 PROCEDURE — 82140 ASSAY OF AMMONIA: CPT

## 2023-10-30 PROCEDURE — 2700000000 HC OXYGEN THERAPY PER DAY

## 2023-10-30 PROCEDURE — 6360000004 HC RX CONTRAST MEDICATION: Performed by: SURGERY

## 2023-10-30 PROCEDURE — 80048 BASIC METABOLIC PNL TOTAL CA: CPT

## 2023-10-30 PROCEDURE — 6370000000 HC RX 637 (ALT 250 FOR IP): Performed by: INTERNAL MEDICINE

## 2023-10-30 PROCEDURE — 0202U NFCT DS 22 TRGT SARS-COV-2: CPT

## 2023-10-30 PROCEDURE — 2000000000 HC ICU R&B

## 2023-10-30 PROCEDURE — 80202 ASSAY OF VANCOMYCIN: CPT

## 2023-10-30 PROCEDURE — 94761 N-INVAS EAR/PLS OXIMETRY MLT: CPT

## 2023-10-30 PROCEDURE — 71250 CT THORAX DX C-: CPT

## 2023-10-30 PROCEDURE — 80076 HEPATIC FUNCTION PANEL: CPT

## 2023-10-30 PROCEDURE — 2580000003 HC RX 258: Performed by: INTERNAL MEDICINE

## 2023-10-30 PROCEDURE — 6370000000 HC RX 637 (ALT 250 FOR IP): Performed by: SURGERY

## 2023-10-30 PROCEDURE — A4216 STERILE WATER/SALINE, 10 ML: HCPCS | Performed by: SURGERY

## 2023-10-30 PROCEDURE — 36600 WITHDRAWAL OF ARTERIAL BLOOD: CPT

## 2023-10-30 RX ORDER — ALBUMIN (HUMAN) 12.5 G/50ML
25 SOLUTION INTRAVENOUS 2 TIMES DAILY
Status: DISCONTINUED | OUTPATIENT
Start: 2023-10-30 | End: 2023-11-01

## 2023-10-30 RX ADMIN — DIATRIZOATE MEGLUMINE AND DIATRIZOATE SODIUM 30 ML: 660; 100 LIQUID ORAL; RECTAL at 12:43

## 2023-10-30 RX ADMIN — SODIUM CHLORIDE, PRESERVATIVE FREE 20 MG: 5 INJECTION INTRAVENOUS at 09:07

## 2023-10-30 RX ADMIN — SODIUM CHLORIDE, PRESERVATIVE FREE 40 MG: 5 INJECTION INTRAVENOUS at 21:59

## 2023-10-30 RX ADMIN — ATORVASTATIN CALCIUM 40 MG: 40 TABLET, FILM COATED ORAL at 15:35

## 2023-10-30 RX ADMIN — HEPARIN SODIUM 5000 UNITS: 5000 INJECTION INTRAVENOUS; SUBCUTANEOUS at 23:39

## 2023-10-30 RX ADMIN — LEVOTHYROXINE SODIUM 50 MCG: 0.05 TABLET ORAL at 15:36

## 2023-10-30 RX ADMIN — PROPOFOL 25 MCG/KG/MIN: 10 INJECTION, EMULSION INTRAVENOUS at 09:00

## 2023-10-30 RX ADMIN — ALBUMIN (HUMAN) 25 G: 0.25 INJECTION, SOLUTION INTRAVENOUS at 20:40

## 2023-10-30 RX ADMIN — PROPOFOL 20 MCG/KG/MIN: 10 INJECTION, EMULSION INTRAVENOUS at 19:17

## 2023-10-30 RX ADMIN — SODIUM CHLORIDE 3000 MG: 900 INJECTION INTRAVENOUS at 16:00

## 2023-10-30 RX ADMIN — ACETAMINOPHEN 650 MG: 325 TABLET ORAL at 15:35

## 2023-10-30 RX ADMIN — SODIUM CHLORIDE, PRESERVATIVE FREE 10 ML: 5 INJECTION INTRAVENOUS at 09:07

## 2023-10-30 RX ADMIN — VANCOMYCIN HYDROCHLORIDE 1000 MG: 1 INJECTION, POWDER, LYOPHILIZED, FOR SOLUTION INTRAVENOUS at 09:42

## 2023-10-30 RX ADMIN — VANCOMYCIN HYDROCHLORIDE 1000 MG: 1 INJECTION, POWDER, LYOPHILIZED, FOR SOLUTION INTRAVENOUS at 20:47

## 2023-10-30 RX ADMIN — SODIUM CHLORIDE 3000 MG: 900 INJECTION INTRAVENOUS at 01:04

## 2023-10-30 RX ADMIN — HEPARIN SODIUM 5000 UNITS: 5000 INJECTION INTRAVENOUS; SUBCUTANEOUS at 06:19

## 2023-10-30 RX ADMIN — LACOSAMIDE 200 MG: 10 INJECTION INTRAVENOUS at 21:56

## 2023-10-30 RX ADMIN — LEVETIRACETAM 750 MG: 100 INJECTION, SOLUTION INTRAVENOUS at 20:11

## 2023-10-30 RX ADMIN — HEPARIN SODIUM 5000 UNITS: 5000 INJECTION INTRAVENOUS; SUBCUTANEOUS at 15:36

## 2023-10-30 RX ADMIN — MIDODRINE HYDROCHLORIDE 5 MG: 5 TABLET ORAL at 15:36

## 2023-10-30 RX ADMIN — PRIMIDONE 50 MG: 50 TABLET ORAL at 20:45

## 2023-10-30 RX ADMIN — LACOSAMIDE 200 MG: 10 INJECTION INTRAVENOUS at 10:22

## 2023-10-30 RX ADMIN — CHLORHEXIDINE GLUCONATE 0.12% ORAL RINSE 15 ML: 1.2 LIQUID ORAL at 20:44

## 2023-10-30 RX ADMIN — DONEPEZIL HYDROCHLORIDE 5 MG: 10 TABLET, FILM COATED ORAL at 20:43

## 2023-10-30 RX ADMIN — SODIUM CHLORIDE 3000 MG: 900 INJECTION INTRAVENOUS at 08:58

## 2023-10-30 RX ADMIN — ACETAMINOPHEN 650 MG: 325 TABLET ORAL at 20:43

## 2023-10-30 RX ADMIN — SODIUM CHLORIDE 3000 MG: 900 INJECTION INTRAVENOUS at 19:51

## 2023-10-30 RX ADMIN — LEVETIRACETAM 750 MG: 100 INJECTION, SOLUTION INTRAVENOUS at 08:56

## 2023-10-30 RX ADMIN — CHLORHEXIDINE GLUCONATE 0.12% ORAL RINSE 15 ML: 1.2 LIQUID ORAL at 09:07

## 2023-10-30 ASSESSMENT — PULMONARY FUNCTION TESTS
PIF_VALUE: 20
PIF_VALUE: 17
PIF_VALUE: 19
PIF_VALUE: 18
PIF_VALUE: 17
PIF_VALUE: 19
PIF_VALUE: 17
PIF_VALUE: 17
PIF_VALUE: 18
PIF_VALUE: 15
PIF_VALUE: 18
PIF_VALUE: 18
PIF_VALUE: 19
PIF_VALUE: 19
PIF_VALUE: 16
PIF_VALUE: 19
PIF_VALUE: 11
PIF_VALUE: 33
PIF_VALUE: 12
PIF_VALUE: 15
PIF_VALUE: 18
PIF_VALUE: 20
PIF_VALUE: 17
PIF_VALUE: 19
PIF_VALUE: 19
PIF_VALUE: 16
PIF_VALUE: 20
PIF_VALUE: 19
PIF_VALUE: 20
PIF_VALUE: 16
PIF_VALUE: 19
PIF_VALUE: 17
PIF_VALUE: 21
PIF_VALUE: 18
PIF_VALUE: 11
PIF_VALUE: 19
PIF_VALUE: 18
PIF_VALUE: 20
PIF_VALUE: 18
PIF_VALUE: 19
PIF_VALUE: 36
PIF_VALUE: 18
PIF_VALUE: 18
PIF_VALUE: 19
PIF_VALUE: 17
PIF_VALUE: 20
PIF_VALUE: 19
PIF_VALUE: 17
PIF_VALUE: 11
PIF_VALUE: 18
PIF_VALUE: 20
PIF_VALUE: 19
PIF_VALUE: 17
PIF_VALUE: 13
PIF_VALUE: 16
PIF_VALUE: 20
PIF_VALUE: 18
PIF_VALUE: 20
PIF_VALUE: 17
PIF_VALUE: 14
PIF_VALUE: 17
PIF_VALUE: 19
PIF_VALUE: 16
PIF_VALUE: 19
PIF_VALUE: 18
PIF_VALUE: 8
PIF_VALUE: 18
PIF_VALUE: 18
PIF_VALUE: 19
PIF_VALUE: 19
PIF_VALUE: 16
PIF_VALUE: 18
PIF_VALUE: 20
PIF_VALUE: 17
PIF_VALUE: 16
PIF_VALUE: 19
PIF_VALUE: 17
PIF_VALUE: 17
PIF_VALUE: 23
PIF_VALUE: 17
PIF_VALUE: 16
PIF_VALUE: 15
PIF_VALUE: 11
PIF_VALUE: 15
PIF_VALUE: 21
PIF_VALUE: 34
PIF_VALUE: 17
PIF_VALUE: 34
PIF_VALUE: 16
PIF_VALUE: 16
PIF_VALUE: 17
PIF_VALUE: 16
PIF_VALUE: 9
PIF_VALUE: 18
PIF_VALUE: 19
PIF_VALUE: 16
PIF_VALUE: 17
PIF_VALUE: 22

## 2023-10-30 ASSESSMENT — PAIN SCALES - GENERAL
PAINLEVEL_OUTOF10: 0

## 2023-10-31 ENCOUNTER — APPOINTMENT (OUTPATIENT)
Dept: GENERAL RADIOLOGY | Age: 68
DRG: 321 | End: 2023-10-31
Payer: COMMERCIAL

## 2023-10-31 LAB
ANION GAP SERPL CALCULATED.3IONS-SCNC: 3 MMOL/L (ref 9–17)
ARTERIAL PATENCY WRIST A: ABNORMAL
B PARAP IS1001 DNA NPH QL NAA+NON-PROBE: NOT DETECTED
B PERT DNA SPEC QL NAA+PROBE: NOT DETECTED
BASOPHILS # BLD: 0.1 K/UL (ref 0–0.2)
BASOPHILS NFR BLD: 1 % (ref 0–2)
BDY SITE: ABNORMAL
BODY TEMPERATURE: 37
BUN SERPL-MCNC: 13 MG/DL (ref 8–23)
C PNEUM DNA NPH QL NAA+NON-PROBE: NOT DETECTED
CALCIUM SERPL-MCNC: 8.1 MG/DL (ref 8.6–10.4)
CHLORIDE SERPL-SCNC: 104 MMOL/L (ref 98–107)
CO2 SERPL-SCNC: 33 MMOL/L (ref 20–31)
COHGB MFR BLD: 1.5 % (ref 0–5)
CREAT SERPL-MCNC: <0.4 MG/DL (ref 0.5–0.9)
EOSINOPHIL # BLD: 0.2 K/UL (ref 0–0.4)
EOSINOPHILS RELATIVE PERCENT: 2 % (ref 0–4)
ERYTHROCYTE [DISTWIDTH] IN BLOOD BY AUTOMATED COUNT: 13.6 % (ref 11.5–14.9)
FIO2 ON VENT: 30 %
FLUAV RNA NPH QL NAA+NON-PROBE: NOT DETECTED
FLUBV RNA NPH QL NAA+NON-PROBE: NOT DETECTED
GAS FLOW.O2 O2 DELIVERY SYS: ABNORMAL L/MIN
GAS FLOW.O2 SETTING OXYMISER: 16 L/MIN
GFR SERPL CREATININE-BSD FRML MDRD: ABNORMAL ML/MIN/1.73M2
GLUCOSE SERPL-MCNC: 118 MG/DL (ref 70–99)
HADV DNA NPH QL NAA+NON-PROBE: NOT DETECTED
HCO3 ARTERIAL: 33.6 MMOL/L (ref 22–26)
HCOV 229E RNA NPH QL NAA+NON-PROBE: NOT DETECTED
HCOV HKU1 RNA NPH QL NAA+NON-PROBE: NOT DETECTED
HCOV NL63 RNA NPH QL NAA+NON-PROBE: NOT DETECTED
HCOV OC43 RNA NPH QL NAA+NON-PROBE: NOT DETECTED
HCT VFR BLD AUTO: 29.3 % (ref 36–46)
HGB BLD-MCNC: 9.8 G/DL (ref 12–16)
HMPV RNA NPH QL NAA+NON-PROBE: NOT DETECTED
HPIV1 RNA NPH QL NAA+NON-PROBE: NOT DETECTED
HPIV2 RNA NPH QL NAA+NON-PROBE: NOT DETECTED
HPIV3 RNA NPH QL NAA+NON-PROBE: NOT DETECTED
HPIV4 RNA NPH QL NAA+NON-PROBE: NOT DETECTED
LYMPHOCYTES NFR BLD: 0.7 K/UL (ref 1–4.8)
LYMPHOCYTES RELATIVE PERCENT: 8 % (ref 24–44)
M PNEUMO DNA NPH QL NAA+NON-PROBE: NOT DETECTED
MCH RBC QN AUTO: 32.9 PG (ref 26–34)
MCHC RBC AUTO-ENTMCNC: 33.3 G/DL (ref 31–37)
MCV RBC AUTO: 99 FL (ref 80–100)
METHEMOGLOBIN: 0.9 % (ref 0–1.9)
MONOCYTES NFR BLD: 0.6 K/UL (ref 0.1–1.3)
MONOCYTES NFR BLD: 7 % (ref 1–7)
NEUTROPHILS NFR BLD: 82 % (ref 36–66)
NEUTS SEG NFR BLD: 6.5 K/UL (ref 1.3–9.1)
O2 SAT, ARTERIAL: 94.6 % (ref 95–98)
PCO2 ARTERIAL: 42.6 MMHG (ref 35–45)
PEEP RESPIRATORY: 8 CM[H2O]
PH ARTERIAL: 7.5 (ref 7.35–7.45)
PLATELET # BLD AUTO: 279 K/UL (ref 150–450)
PMV BLD AUTO: 7.5 FL (ref 6–12)
PO2 ARTERIAL: 79 MMHG (ref 80–100)
POSITIVE BASE EXCESS, ART: 10.5 MMOL/L (ref 0–2)
POTASSIUM SERPL-SCNC: 3.6 MMOL/L (ref 3.7–5.3)
PT. POSITION: ABNORMAL
RBC # BLD AUTO: 2.96 M/UL (ref 4–5.2)
RESPIRATORY RATE: 19
RSV RNA NPH QL NAA+NON-PROBE: NOT DETECTED
RV+EV RNA NPH QL NAA+NON-PROBE: NOT DETECTED
SARS-COV-2 RNA NPH QL NAA+NON-PROBE: NOT DETECTED
SODIUM SERPL-SCNC: 140 MMOL/L (ref 135–144)
SPECIMEN DESCRIPTION: NORMAL
TEXT FOR RESPIRATORY: ABNORMAL
TOTAL RATE: 19
TRIGL SERPL-MCNC: 78 MG/DL
VENTILATION MODE VENT: ABNORMAL
VT: 450
WBC OTHER # BLD: 8 K/UL (ref 3.5–11)

## 2023-10-31 PROCEDURE — 2580000003 HC RX 258: Performed by: SURGERY

## 2023-10-31 PROCEDURE — 82805 BLOOD GASES W/O2 SATURATION: CPT

## 2023-10-31 PROCEDURE — C9113 INJ PANTOPRAZOLE SODIUM, VIA: HCPCS | Performed by: SURGERY

## 2023-10-31 PROCEDURE — 2580000003 HC RX 258: Performed by: INTERNAL MEDICINE

## 2023-10-31 PROCEDURE — 6370000000 HC RX 637 (ALT 250 FOR IP): Performed by: INTERNAL MEDICINE

## 2023-10-31 PROCEDURE — 99233 SBSQ HOSP IP/OBS HIGH 50: CPT | Performed by: INTERNAL MEDICINE

## 2023-10-31 PROCEDURE — 84478 ASSAY OF TRIGLYCERIDES: CPT

## 2023-10-31 PROCEDURE — 2000000000 HC ICU R&B

## 2023-10-31 PROCEDURE — 6360000002 HC RX W HCPCS: Performed by: SURGERY

## 2023-10-31 PROCEDURE — 36600 WITHDRAWAL OF ARTERIAL BLOOD: CPT

## 2023-10-31 PROCEDURE — 99232 SBSQ HOSP IP/OBS MODERATE 35: CPT | Performed by: NURSE PRACTITIONER

## 2023-10-31 PROCEDURE — 6360000002 HC RX W HCPCS: Performed by: INTERNAL MEDICINE

## 2023-10-31 PROCEDURE — 36415 COLL VENOUS BLD VENIPUNCTURE: CPT

## 2023-10-31 PROCEDURE — A4216 STERILE WATER/SALINE, 10 ML: HCPCS | Performed by: SURGERY

## 2023-10-31 PROCEDURE — 80048 BASIC METABOLIC PNL TOTAL CA: CPT

## 2023-10-31 PROCEDURE — 94761 N-INVAS EAR/PLS OXIMETRY MLT: CPT

## 2023-10-31 PROCEDURE — 6370000000 HC RX 637 (ALT 250 FOR IP): Performed by: SURGERY

## 2023-10-31 PROCEDURE — 85025 COMPLETE CBC W/AUTO DIFF WBC: CPT

## 2023-10-31 PROCEDURE — 71045 X-RAY EXAM CHEST 1 VIEW: CPT

## 2023-10-31 PROCEDURE — 94003 VENT MGMT INPAT SUBQ DAY: CPT

## 2023-10-31 PROCEDURE — C9254 INJECTION, LACOSAMIDE: HCPCS | Performed by: SURGERY

## 2023-10-31 PROCEDURE — 2700000000 HC OXYGEN THERAPY PER DAY

## 2023-10-31 PROCEDURE — P9047 ALBUMIN (HUMAN), 25%, 50ML: HCPCS | Performed by: INTERNAL MEDICINE

## 2023-10-31 RX ORDER — MIDODRINE HYDROCHLORIDE 10 MG/1
10 TABLET ORAL 4 TIMES DAILY PRN
Status: DISPENSED | OUTPATIENT
Start: 2023-10-31

## 2023-10-31 RX ADMIN — DONEPEZIL HYDROCHLORIDE 5 MG: 10 TABLET, FILM COATED ORAL at 20:28

## 2023-10-31 RX ADMIN — LEVETIRACETAM 750 MG: 100 INJECTION, SOLUTION INTRAVENOUS at 20:25

## 2023-10-31 RX ADMIN — MIDODRINE HYDROCHLORIDE 5 MG: 5 TABLET ORAL at 08:37

## 2023-10-31 RX ADMIN — SODIUM CHLORIDE 3000 MG: 900 INJECTION INTRAVENOUS at 03:54

## 2023-10-31 RX ADMIN — ACETAMINOPHEN 650 MG: 325 TABLET ORAL at 07:11

## 2023-10-31 RX ADMIN — ACETAMINOPHEN 650 MG: 325 TABLET ORAL at 20:28

## 2023-10-31 RX ADMIN — SODIUM CHLORIDE 3000 MG: 900 INJECTION INTRAVENOUS at 13:48

## 2023-10-31 RX ADMIN — CHLORHEXIDINE GLUCONATE 0.12% ORAL RINSE 15 ML: 1.2 LIQUID ORAL at 08:37

## 2023-10-31 RX ADMIN — SODIUM CHLORIDE 3000 MG: 900 INJECTION INTRAVENOUS at 19:14

## 2023-10-31 RX ADMIN — VANCOMYCIN HYDROCHLORIDE 1000 MG: 1 INJECTION, POWDER, LYOPHILIZED, FOR SOLUTION INTRAVENOUS at 21:08

## 2023-10-31 RX ADMIN — MIDODRINE HYDROCHLORIDE 10 MG: 10 TABLET ORAL at 18:21

## 2023-10-31 RX ADMIN — ACETAMINOPHEN 650 MG: 325 TABLET ORAL at 03:54

## 2023-10-31 RX ADMIN — LEVETIRACETAM 750 MG: 100 INJECTION, SOLUTION INTRAVENOUS at 08:11

## 2023-10-31 RX ADMIN — PRIMIDONE 50 MG: 50 TABLET ORAL at 08:38

## 2023-10-31 RX ADMIN — VANCOMYCIN HYDROCHLORIDE 1000 MG: 1 INJECTION, POWDER, LYOPHILIZED, FOR SOLUTION INTRAVENOUS at 08:31

## 2023-10-31 RX ADMIN — HEPARIN SODIUM 5000 UNITS: 5000 INJECTION INTRAVENOUS; SUBCUTANEOUS at 13:50

## 2023-10-31 RX ADMIN — CHLORHEXIDINE GLUCONATE 0.12% ORAL RINSE 15 ML: 1.2 LIQUID ORAL at 20:28

## 2023-10-31 RX ADMIN — LACOSAMIDE 200 MG: 10 INJECTION INTRAVENOUS at 21:14

## 2023-10-31 RX ADMIN — PROPOFOL 20 MCG/KG/MIN: 10 INJECTION, EMULSION INTRAVENOUS at 03:56

## 2023-10-31 RX ADMIN — MIDODRINE HYDROCHLORIDE 5 MG: 5 TABLET ORAL at 13:39

## 2023-10-31 RX ADMIN — LEVOTHYROXINE SODIUM 50 MCG: 0.05 TABLET ORAL at 07:11

## 2023-10-31 RX ADMIN — ACETAMINOPHEN 650 MG: 325 TABLET ORAL at 13:50

## 2023-10-31 RX ADMIN — HEPARIN SODIUM 5000 UNITS: 5000 INJECTION INTRAVENOUS; SUBCUTANEOUS at 06:27

## 2023-10-31 RX ADMIN — ATORVASTATIN CALCIUM 40 MG: 40 TABLET, FILM COATED ORAL at 08:38

## 2023-10-31 RX ADMIN — ALBUMIN (HUMAN) 25 G: 0.25 INJECTION, SOLUTION INTRAVENOUS at 08:33

## 2023-10-31 RX ADMIN — LACOSAMIDE 200 MG: 10 INJECTION INTRAVENOUS at 09:28

## 2023-10-31 RX ADMIN — PROPOFOL 15 MCG/KG/MIN: 10 INJECTION, EMULSION INTRAVENOUS at 18:18

## 2023-10-31 RX ADMIN — SODIUM CHLORIDE 3000 MG: 900 INJECTION INTRAVENOUS at 06:51

## 2023-10-31 RX ADMIN — ALBUMIN (HUMAN) 25 G: 0.25 INJECTION, SOLUTION INTRAVENOUS at 20:27

## 2023-10-31 RX ADMIN — SODIUM CHLORIDE, PRESERVATIVE FREE 40 MG: 5 INJECTION INTRAVENOUS at 12:42

## 2023-10-31 ASSESSMENT — PULMONARY FUNCTION TESTS
PIF_VALUE: 32
PIF_VALUE: 17
PIF_VALUE: 16
PIF_VALUE: 14
PIF_VALUE: 9
PIF_VALUE: 18
PIF_VALUE: 14
PIF_VALUE: 18
PIF_VALUE: 20
PIF_VALUE: 13
PIF_VALUE: 19
PIF_VALUE: 19
PIF_VALUE: 18
PIF_VALUE: 15
PIF_VALUE: 16
PIF_VALUE: 30
PIF_VALUE: 21
PIF_VALUE: 19
PIF_VALUE: 18
PIF_VALUE: 17
PIF_VALUE: 18
PIF_VALUE: 17

## 2023-10-31 ASSESSMENT — PAIN SCALES - GENERAL: PAINLEVEL_OUTOF10: 0

## 2023-11-01 ENCOUNTER — APPOINTMENT (OUTPATIENT)
Dept: INTERVENTIONAL RADIOLOGY/VASCULAR | Age: 68
DRG: 321 | End: 2023-11-01
Attending: INTERNAL MEDICINE
Payer: COMMERCIAL

## 2023-11-01 ENCOUNTER — APPOINTMENT (OUTPATIENT)
Dept: GENERAL RADIOLOGY | Age: 68
DRG: 321 | End: 2023-11-01
Payer: COMMERCIAL

## 2023-11-01 PROBLEM — Z51.5 ENCOUNTER FOR PALLIATIVE CARE: Status: ACTIVE | Noted: 2023-11-01

## 2023-11-01 PROBLEM — Z99.11 VENTILATOR DEPENDENCE (HCC): Status: ACTIVE | Noted: 2023-11-01

## 2023-11-01 PROBLEM — I95.9 HYPOTENSION: Status: ACTIVE | Noted: 2023-11-01

## 2023-11-01 LAB
ANION GAP SERPL CALCULATED.3IONS-SCNC: 5 MMOL/L (ref 9–17)
ARTERIAL PATENCY WRIST A: ABNORMAL
BASOPHILS # BLD: 0.1 K/UL (ref 0–0.2)
BASOPHILS NFR BLD: 1 % (ref 0–2)
BDY SITE: ABNORMAL
BUN SERPL-MCNC: 12 MG/DL (ref 8–23)
CALCIUM SERPL-MCNC: 8 MG/DL (ref 8.6–10.4)
CHLORIDE SERPL-SCNC: 102 MMOL/L (ref 98–107)
CO2 SERPL-SCNC: 31 MMOL/L (ref 20–31)
COHGB MFR BLD: 1.7 % (ref 0–5)
CREAT SERPL-MCNC: <0.4 MG/DL (ref 0.5–0.9)
DATE LAST DOSE: NORMAL
EOSINOPHIL # BLD: 0.2 K/UL (ref 0–0.4)
EOSINOPHILS RELATIVE PERCENT: 2 % (ref 0–4)
ERYTHROCYTE [DISTWIDTH] IN BLOOD BY AUTOMATED COUNT: 13.6 % (ref 11.5–14.9)
FIO2 ON VENT: 30 %
GAS FLOW.O2 O2 DELIVERY SYS: ABNORMAL L/MIN
GAS FLOW.O2 SETTING OXYMISER: 16 L/MIN
GFR SERPL CREATININE-BSD FRML MDRD: ABNORMAL ML/MIN/1.73M2
GLUCOSE SERPL-MCNC: 98 MG/DL (ref 70–99)
HCO3 ARTERIAL: 33.5 MMOL/L (ref 22–26)
HCT VFR BLD AUTO: 27.9 % (ref 36–46)
HGB BLD-MCNC: 9.2 G/DL (ref 12–16)
LYMPHOCYTES NFR BLD: 0.9 K/UL (ref 1–4.8)
LYMPHOCYTES RELATIVE PERCENT: 12 % (ref 24–44)
MCH RBC QN AUTO: 33.1 PG (ref 26–34)
MCHC RBC AUTO-ENTMCNC: 33 G/DL (ref 31–37)
MCV RBC AUTO: 100.4 FL (ref 80–100)
METHEMOGLOBIN: 0.9 % (ref 0–1.9)
MONOCYTES NFR BLD: 0.7 K/UL (ref 0.1–1.3)
MONOCYTES NFR BLD: 10 % (ref 1–7)
NEUTROPHILS NFR BLD: 75 % (ref 36–66)
NEUTS SEG NFR BLD: 5.2 K/UL (ref 1.3–9.1)
O2 SAT, ARTERIAL: 81.8 % (ref 95–98)
PCO2 ARTERIAL: 44.4 MMHG (ref 35–45)
PEEP RESPIRATORY: 8 CM[H2O]
PH ARTERIAL: 7.49 (ref 7.35–7.45)
PLATELET # BLD AUTO: 279 K/UL (ref 150–450)
PMV BLD AUTO: 7.9 FL (ref 6–12)
PO2 ARTERIAL: 48.7 MMHG (ref 80–100)
POSITIVE BASE EXCESS, ART: 10 MMOL/L (ref 0–2)
POTASSIUM SERPL-SCNC: 3.5 MMOL/L (ref 3.7–5.3)
PT. POSITION: ABNORMAL
RBC # BLD AUTO: 2.78 M/UL (ref 4–5.2)
SODIUM SERPL-SCNC: 138 MMOL/L (ref 135–144)
TEXT FOR RESPIRATORY: ABNORMAL
TME LAST DOSE: 803 H
VANCOMYCIN DOSE: NORMAL MG
VANCOMYCIN SERPL-MCNC: 14 UG/ML
VENTILATION MODE VENT: ABNORMAL
VT: 450
WBC OTHER # BLD: 7 K/UL (ref 3.5–11)

## 2023-11-01 PROCEDURE — 6370000000 HC RX 637 (ALT 250 FOR IP): Performed by: SURGERY

## 2023-11-01 PROCEDURE — 6360000002 HC RX W HCPCS: Performed by: INTERNAL MEDICINE

## 2023-11-01 PROCEDURE — 94003 VENT MGMT INPAT SUBQ DAY: CPT

## 2023-11-01 PROCEDURE — 82805 BLOOD GASES W/O2 SATURATION: CPT

## 2023-11-01 PROCEDURE — 99232 SBSQ HOSP IP/OBS MODERATE 35: CPT | Performed by: NURSE PRACTITIONER

## 2023-11-01 PROCEDURE — C9113 INJ PANTOPRAZOLE SODIUM, VIA: HCPCS | Performed by: SURGERY

## 2023-11-01 PROCEDURE — 2000000000 HC ICU R&B

## 2023-11-01 PROCEDURE — 99233 SBSQ HOSP IP/OBS HIGH 50: CPT | Performed by: INTERNAL MEDICINE

## 2023-11-01 PROCEDURE — 36600 WITHDRAWAL OF ARTERIAL BLOOD: CPT

## 2023-11-01 PROCEDURE — A4216 STERILE WATER/SALINE, 10 ML: HCPCS | Performed by: SURGERY

## 2023-11-01 PROCEDURE — 6360000002 HC RX W HCPCS: Performed by: SURGERY

## 2023-11-01 PROCEDURE — 2580000003 HC RX 258: Performed by: SURGERY

## 2023-11-01 PROCEDURE — 36415 COLL VENOUS BLD VENIPUNCTURE: CPT

## 2023-11-01 PROCEDURE — 71045 X-RAY EXAM CHEST 1 VIEW: CPT

## 2023-11-01 PROCEDURE — 2580000003 HC RX 258: Performed by: INTERNAL MEDICINE

## 2023-11-01 PROCEDURE — 94761 N-INVAS EAR/PLS OXIMETRY MLT: CPT

## 2023-11-01 PROCEDURE — 80202 ASSAY OF VANCOMYCIN: CPT

## 2023-11-01 PROCEDURE — 3E043XZ INTRODUCTION OF VASOPRESSOR INTO CENTRAL VEIN, PERCUTANEOUS APPROACH: ICD-10-PCS | Performed by: INTERNAL MEDICINE

## 2023-11-01 PROCEDURE — 6370000000 HC RX 637 (ALT 250 FOR IP): Performed by: INTERNAL MEDICINE

## 2023-11-01 PROCEDURE — 80048 BASIC METABOLIC PNL TOTAL CA: CPT

## 2023-11-01 PROCEDURE — 2700000000 HC OXYGEN THERAPY PER DAY

## 2023-11-01 PROCEDURE — C9254 INJECTION, LACOSAMIDE: HCPCS | Performed by: SURGERY

## 2023-11-01 PROCEDURE — P9047 ALBUMIN (HUMAN), 25%, 50ML: HCPCS | Performed by: INTERNAL MEDICINE

## 2023-11-01 PROCEDURE — 85025 COMPLETE CBC W/AUTO DIFF WBC: CPT

## 2023-11-01 PROCEDURE — 2500000003 HC RX 250 WO HCPCS: Performed by: INTERNAL MEDICINE

## 2023-11-01 RX ORDER — FUROSEMIDE 10 MG/ML
20 INJECTION INTRAMUSCULAR; INTRAVENOUS DAILY
Status: DISCONTINUED | OUTPATIENT
Start: 2023-11-01 | End: 2023-11-01

## 2023-11-01 RX ORDER — FUROSEMIDE 10 MG/ML
20 INJECTION INTRAMUSCULAR; INTRAVENOUS 2 TIMES DAILY
Status: DISCONTINUED | OUTPATIENT
Start: 2023-11-02 | End: 2023-11-02

## 2023-11-01 RX ORDER — ALBUMIN (HUMAN) 12.5 G/50ML
25 SOLUTION INTRAVENOUS 3 TIMES DAILY
Status: COMPLETED | OUTPATIENT
Start: 2023-11-01 | End: 2023-11-05

## 2023-11-01 RX ADMIN — ACETAMINOPHEN 650 MG: 325 TABLET ORAL at 02:55

## 2023-11-01 RX ADMIN — HEPARIN SODIUM 5000 UNITS: 5000 INJECTION INTRAVENOUS; SUBCUTANEOUS at 13:42

## 2023-11-01 RX ADMIN — PROPOFOL 20 MCG/KG/MIN: 10 INJECTION, EMULSION INTRAVENOUS at 03:21

## 2023-11-01 RX ADMIN — LACOSAMIDE 200 MG: 10 INJECTION INTRAVENOUS at 21:39

## 2023-11-01 RX ADMIN — PROPOFOL 25 MCG/KG/MIN: 10 INJECTION, EMULSION INTRAVENOUS at 19:51

## 2023-11-01 RX ADMIN — PROPOFOL 25 MCG/KG/MIN: 10 INJECTION, EMULSION INTRAVENOUS at 14:59

## 2023-11-01 RX ADMIN — ACETAMINOPHEN 650 MG: 325 TABLET ORAL at 06:42

## 2023-11-01 RX ADMIN — ALBUMIN (HUMAN) 25 G: 0.25 INJECTION, SOLUTION INTRAVENOUS at 20:03

## 2023-11-01 RX ADMIN — ACETAMINOPHEN 650 MG: 325 TABLET ORAL at 19:41

## 2023-11-01 RX ADMIN — ALBUMIN (HUMAN) 25 G: 0.25 INJECTION, SOLUTION INTRAVENOUS at 08:07

## 2023-11-01 RX ADMIN — CHLORHEXIDINE GLUCONATE 0.12% ORAL RINSE 15 ML: 1.2 LIQUID ORAL at 20:04

## 2023-11-01 RX ADMIN — HEPARIN SODIUM 5000 UNITS: 5000 INJECTION INTRAVENOUS; SUBCUTANEOUS at 06:41

## 2023-11-01 RX ADMIN — LACOSAMIDE 200 MG: 10 INJECTION INTRAVENOUS at 09:41

## 2023-11-01 RX ADMIN — DONEPEZIL HYDROCHLORIDE 5 MG: 10 TABLET, FILM COATED ORAL at 20:04

## 2023-11-01 RX ADMIN — LEVETIRACETAM 750 MG: 100 INJECTION, SOLUTION INTRAVENOUS at 20:54

## 2023-11-01 RX ADMIN — CHLORHEXIDINE GLUCONATE 0.12% ORAL RINSE 15 ML: 1.2 LIQUID ORAL at 08:08

## 2023-11-01 RX ADMIN — SODIUM CHLORIDE, PRESERVATIVE FREE 40 MG: 5 INJECTION INTRAVENOUS at 12:13

## 2023-11-01 RX ADMIN — SODIUM CHLORIDE 3000 MG: 900 INJECTION INTRAVENOUS at 06:47

## 2023-11-01 RX ADMIN — LEVETIRACETAM 750 MG: 100 INJECTION, SOLUTION INTRAVENOUS at 09:24

## 2023-11-01 RX ADMIN — PRIMIDONE 50 MG: 50 TABLET ORAL at 20:04

## 2023-11-01 RX ADMIN — SODIUM CHLORIDE, PRESERVATIVE FREE 40 MG: 5 INJECTION INTRAVENOUS at 00:58

## 2023-11-01 RX ADMIN — HEPARIN SODIUM 5000 UNITS: 5000 INJECTION INTRAVENOUS; SUBCUTANEOUS at 21:49

## 2023-11-01 RX ADMIN — SODIUM CHLORIDE 3000 MG: 900 INJECTION INTRAVENOUS at 13:38

## 2023-11-01 RX ADMIN — LEVOTHYROXINE SODIUM 50 MCG: 0.05 TABLET ORAL at 06:42

## 2023-11-01 RX ADMIN — SODIUM CHLORIDE 3000 MG: 900 INJECTION INTRAVENOUS at 18:49

## 2023-11-01 RX ADMIN — POTASSIUM BICARBONATE 40 MEQ: 782 TABLET, EFFERVESCENT ORAL at 09:39

## 2023-11-01 RX ADMIN — PRIMIDONE 50 MG: 50 TABLET ORAL at 08:10

## 2023-11-01 RX ADMIN — ATORVASTATIN CALCIUM 40 MG: 40 TABLET, FILM COATED ORAL at 08:08

## 2023-11-01 RX ADMIN — HEPARIN SODIUM 5000 UNITS: 5000 INJECTION INTRAVENOUS; SUBCUTANEOUS at 00:58

## 2023-11-01 RX ADMIN — SODIUM CHLORIDE 3000 MG: 900 INJECTION INTRAVENOUS at 01:02

## 2023-11-01 RX ADMIN — FUROSEMIDE 20 MG: 10 INJECTION, SOLUTION INTRAMUSCULAR; INTRAVENOUS at 13:53

## 2023-11-01 RX ADMIN — VANCOMYCIN HYDROCHLORIDE 1000 MG: 1 INJECTION, POWDER, LYOPHILIZED, FOR SOLUTION INTRAVENOUS at 19:40

## 2023-11-01 RX ADMIN — VANCOMYCIN HYDROCHLORIDE 1000 MG: 1 INJECTION, POWDER, LYOPHILIZED, FOR SOLUTION INTRAVENOUS at 08:03

## 2023-11-01 RX ADMIN — Medication 2 MCG/MIN: at 03:23

## 2023-11-01 RX ADMIN — PRIMIDONE 50 MG: 50 TABLET ORAL at 00:58

## 2023-11-01 ASSESSMENT — PULMONARY FUNCTION TESTS
PIF_VALUE: 19
PIF_VALUE: 19
PIF_VALUE: 13
PIF_VALUE: 19
PIF_VALUE: 16
PIF_VALUE: 18
PIF_VALUE: 17
PIF_VALUE: 14
PIF_VALUE: 12
PIF_VALUE: 18
PIF_VALUE: 15
PIF_VALUE: 14
PIF_VALUE: 29
PIF_VALUE: 18
PIF_VALUE: 12
PIF_VALUE: 18
PIF_VALUE: 17
PIF_VALUE: 17
PIF_VALUE: 15
PIF_VALUE: 31
PIF_VALUE: 18
PIF_VALUE: 13
PIF_VALUE: 13
PIF_VALUE: 16
PIF_VALUE: 12
PIF_VALUE: 13
PIF_VALUE: 17
PIF_VALUE: 12
PIF_VALUE: 19
PIF_VALUE: 16
PIF_VALUE: 19
PIF_VALUE: 15
PIF_VALUE: 17
PIF_VALUE: 15
PIF_VALUE: 16
PIF_VALUE: 13
PIF_VALUE: 14
PIF_VALUE: 18
PIF_VALUE: 18
PIF_VALUE: 16
PIF_VALUE: 12
PIF_VALUE: 20
PIF_VALUE: 13
PIF_VALUE: 15
PIF_VALUE: 18
PIF_VALUE: 18
PIF_VALUE: 7
PIF_VALUE: 17
PIF_VALUE: 20
PIF_VALUE: 17
PIF_VALUE: 14
PIF_VALUE: 18
PIF_VALUE: 15
PIF_VALUE: 15
PIF_VALUE: 17
PIF_VALUE: 17
PIF_VALUE: 16
PIF_VALUE: 17
PIF_VALUE: 19
PIF_VALUE: 15
PIF_VALUE: 15
PIF_VALUE: 13
PIF_VALUE: 14
PIF_VALUE: 18
PIF_VALUE: 15
PIF_VALUE: 14
PIF_VALUE: 19
PIF_VALUE: 18
PIF_VALUE: 14
PIF_VALUE: 21
PIF_VALUE: 27
PIF_VALUE: 18
PIF_VALUE: 19
PIF_VALUE: 15
PIF_VALUE: 18
PIF_VALUE: 16
PIF_VALUE: 8

## 2023-11-01 ASSESSMENT — PAIN SCALES - GENERAL
PAINLEVEL_OUTOF10: 0

## 2023-11-02 ENCOUNTER — APPOINTMENT (OUTPATIENT)
Dept: INTERVENTIONAL RADIOLOGY/VASCULAR | Age: 68
DRG: 321 | End: 2023-11-02
Attending: INTERNAL MEDICINE
Payer: COMMERCIAL

## 2023-11-02 ENCOUNTER — APPOINTMENT (OUTPATIENT)
Dept: GENERAL RADIOLOGY | Age: 68
DRG: 321 | End: 2023-11-02
Payer: COMMERCIAL

## 2023-11-02 LAB
ANION GAP SERPL CALCULATED.3IONS-SCNC: 5 MMOL/L (ref 9–17)
ANION GAP SERPL CALCULATED.3IONS-SCNC: 7 MMOL/L (ref 9–17)
ARTERIAL PATENCY WRIST A: ABNORMAL
BASOPHILS # BLD: 0 K/UL (ref 0–0.2)
BASOPHILS NFR BLD: 1 % (ref 0–2)
BDY SITE: ABNORMAL
BUN SERPL-MCNC: 10 MG/DL (ref 8–23)
BUN SERPL-MCNC: 9 MG/DL (ref 8–23)
CALCIUM SERPL-MCNC: 8.4 MG/DL (ref 8.6–10.4)
CALCIUM SERPL-MCNC: 8.9 MG/DL (ref 8.6–10.4)
CHLORIDE SERPL-SCNC: 101 MMOL/L (ref 98–107)
CHLORIDE SERPL-SCNC: 105 MMOL/L (ref 98–107)
CO2 SERPL-SCNC: 31 MMOL/L (ref 20–31)
CO2 SERPL-SCNC: 32 MMOL/L (ref 20–31)
COHGB MFR BLD: 2.2 % (ref 0–5)
CREAT SERPL-MCNC: <0.4 MG/DL (ref 0.5–0.9)
CREAT SERPL-MCNC: <0.4 MG/DL (ref 0.5–0.9)
EOSINOPHIL # BLD: 0.1 K/UL (ref 0–0.4)
EOSINOPHILS RELATIVE PERCENT: 1 % (ref 0–4)
ERYTHROCYTE [DISTWIDTH] IN BLOOD BY AUTOMATED COUNT: 13.7 % (ref 11.5–14.9)
FIO2 ON VENT: 30 %
GAS FLOW.O2 O2 DELIVERY SYS: ABNORMAL L/MIN
GAS FLOW.O2 SETTING OXYMISER: 16 L/MIN
GFR SERPL CREATININE-BSD FRML MDRD: ABNORMAL ML/MIN/1.73M2
GFR SERPL CREATININE-BSD FRML MDRD: ABNORMAL ML/MIN/1.73M2
GLUCOSE SERPL-MCNC: 115 MG/DL (ref 70–99)
GLUCOSE SERPL-MCNC: 96 MG/DL (ref 70–99)
HCO3 ARTERIAL: 32.2 MMOL/L (ref 22–26)
HCT VFR BLD AUTO: 29.3 % (ref 36–46)
HGB BLD-MCNC: 9.6 G/DL (ref 12–16)
LYMPHOCYTES NFR BLD: 0.8 K/UL (ref 1–4.8)
LYMPHOCYTES RELATIVE PERCENT: 11 % (ref 24–44)
MAGNESIUM SERPL-MCNC: 1.9 MG/DL (ref 1.6–2.6)
MCH RBC QN AUTO: 32.8 PG (ref 26–34)
MCHC RBC AUTO-ENTMCNC: 32.8 G/DL (ref 31–37)
MCV RBC AUTO: 100 FL (ref 80–100)
METHEMOGLOBIN: 1.2 % (ref 0–1.9)
MONOCYTES NFR BLD: 0.8 K/UL (ref 0.1–1.3)
MONOCYTES NFR BLD: 11 % (ref 1–7)
NEUTROPHILS NFR BLD: 76 % (ref 36–66)
NEUTS SEG NFR BLD: 5.2 K/UL (ref 1.3–9.1)
O2 SAT, ARTERIAL: 93.8 % (ref 95–98)
PCO2 ARTERIAL: 41.9 MMHG (ref 35–45)
PEEP RESPIRATORY: 8 CM[H2O]
PH ARTERIAL: 7.49 (ref 7.35–7.45)
PLATELET # BLD AUTO: 297 K/UL (ref 150–450)
PMV BLD AUTO: 8 FL (ref 6–12)
PO2 ARTERIAL: 82.4 MMHG (ref 80–100)
POSITIVE BASE EXCESS, ART: 8.9 MMOL/L (ref 0–2)
POTASSIUM SERPL-SCNC: 3.4 MMOL/L (ref 3.7–5.3)
POTASSIUM SERPL-SCNC: 3.8 MMOL/L (ref 3.7–5.3)
PT. POSITION: ABNORMAL
RBC # BLD AUTO: 2.93 M/UL (ref 4–5.2)
RESPIRATORY RATE: 16
SODIUM SERPL-SCNC: 139 MMOL/L (ref 135–144)
SODIUM SERPL-SCNC: 142 MMOL/L (ref 135–144)
TEXT FOR RESPIRATORY: ABNORMAL
TOTAL RATE: 16
TRIGL SERPL-MCNC: 82 MG/DL
VENTILATION MODE VENT: ABNORMAL
VT: 450
WBC OTHER # BLD: 6.9 K/UL (ref 3.5–11)

## 2023-11-02 PROCEDURE — 6360000002 HC RX W HCPCS: Performed by: INTERNAL MEDICINE

## 2023-11-02 PROCEDURE — 36415 COLL VENOUS BLD VENIPUNCTURE: CPT

## 2023-11-02 PROCEDURE — C9113 INJ PANTOPRAZOLE SODIUM, VIA: HCPCS | Performed by: SURGERY

## 2023-11-02 PROCEDURE — 6370000000 HC RX 637 (ALT 250 FOR IP): Performed by: SURGERY

## 2023-11-02 PROCEDURE — 2709999900 IR FLUORO GUIDED CVA DEVICE PLMT/REPLACE/REMOVAL

## 2023-11-02 PROCEDURE — 6360000002 HC RX W HCPCS: Performed by: SURGERY

## 2023-11-02 PROCEDURE — 71045 X-RAY EXAM CHEST 1 VIEW: CPT

## 2023-11-02 PROCEDURE — 2580000003 HC RX 258: Performed by: SURGERY

## 2023-11-02 PROCEDURE — 6370000000 HC RX 637 (ALT 250 FOR IP): Performed by: INTERNAL MEDICINE

## 2023-11-02 PROCEDURE — 85025 COMPLETE CBC W/AUTO DIFF WBC: CPT

## 2023-11-02 PROCEDURE — 94003 VENT MGMT INPAT SUBQ DAY: CPT

## 2023-11-02 PROCEDURE — 2700000000 HC OXYGEN THERAPY PER DAY

## 2023-11-02 PROCEDURE — 2500000003 HC RX 250 WO HCPCS: Performed by: SURGERY

## 2023-11-02 PROCEDURE — 99232 SBSQ HOSP IP/OBS MODERATE 35: CPT | Performed by: NURSE PRACTITIONER

## 2023-11-02 PROCEDURE — 80048 BASIC METABOLIC PNL TOTAL CA: CPT

## 2023-11-02 PROCEDURE — A4216 STERILE WATER/SALINE, 10 ML: HCPCS | Performed by: SURGERY

## 2023-11-02 PROCEDURE — 99233 SBSQ HOSP IP/OBS HIGH 50: CPT | Performed by: INTERNAL MEDICINE

## 2023-11-02 PROCEDURE — 84478 ASSAY OF TRIGLYCERIDES: CPT

## 2023-11-02 PROCEDURE — 87641 MR-STAPH DNA AMP PROBE: CPT

## 2023-11-02 PROCEDURE — P9047 ALBUMIN (HUMAN), 25%, 50ML: HCPCS | Performed by: INTERNAL MEDICINE

## 2023-11-02 PROCEDURE — 83735 ASSAY OF MAGNESIUM: CPT

## 2023-11-02 PROCEDURE — 82805 BLOOD GASES W/O2 SATURATION: CPT

## 2023-11-02 PROCEDURE — 2000000000 HC ICU R&B

## 2023-11-02 PROCEDURE — C9254 INJECTION, LACOSAMIDE: HCPCS | Performed by: SURGERY

## 2023-11-02 PROCEDURE — 36573 INSJ PICC RS&I 5 YR+: CPT

## 2023-11-02 PROCEDURE — 94761 N-INVAS EAR/PLS OXIMETRY MLT: CPT

## 2023-11-02 PROCEDURE — 2580000003 HC RX 258: Performed by: INTERNAL MEDICINE

## 2023-11-02 RX ORDER — FUROSEMIDE 10 MG/ML
40 INJECTION INTRAMUSCULAR; INTRAVENOUS 2 TIMES DAILY
Status: COMPLETED | OUTPATIENT
Start: 2023-11-03 | End: 2023-11-05

## 2023-11-02 RX ORDER — FUROSEMIDE 10 MG/ML
40 INJECTION INTRAMUSCULAR; INTRAVENOUS 2 TIMES DAILY
Status: DISCONTINUED | OUTPATIENT
Start: 2023-11-02 | End: 2023-11-02

## 2023-11-02 RX ORDER — FUROSEMIDE 10 MG/ML
40 INJECTION INTRAMUSCULAR; INTRAVENOUS
Status: DISCONTINUED | OUTPATIENT
Start: 2023-11-02 | End: 2023-11-02

## 2023-11-02 RX ADMIN — HEPARIN SODIUM 5000 UNITS: 5000 INJECTION INTRAVENOUS; SUBCUTANEOUS at 13:24

## 2023-11-02 RX ADMIN — SODIUM CHLORIDE: 9 INJECTION, SOLUTION INTRAVENOUS at 18:35

## 2023-11-02 RX ADMIN — ACETAMINOPHEN 650 MG: 325 TABLET ORAL at 13:23

## 2023-11-02 RX ADMIN — FUROSEMIDE 20 MG: 10 INJECTION, SOLUTION INTRAMUSCULAR; INTRAVENOUS at 08:58

## 2023-11-02 RX ADMIN — PROPOFOL 25 MCG/KG/MIN: 10 INJECTION, EMULSION INTRAVENOUS at 04:14

## 2023-11-02 RX ADMIN — SODIUM CHLORIDE 3000 MG: 900 INJECTION INTRAVENOUS at 18:39

## 2023-11-02 RX ADMIN — LEVETIRACETAM 750 MG: 100 INJECTION, SOLUTION INTRAVENOUS at 08:34

## 2023-11-02 RX ADMIN — DONEPEZIL HYDROCHLORIDE 5 MG: 10 TABLET, FILM COATED ORAL at 20:48

## 2023-11-02 RX ADMIN — SODIUM CHLORIDE, PRESERVATIVE FREE 10 ML: 5 INJECTION INTRAVENOUS at 07:37

## 2023-11-02 RX ADMIN — CHLORHEXIDINE GLUCONATE 0.12% ORAL RINSE 15 ML: 1.2 LIQUID ORAL at 20:49

## 2023-11-02 RX ADMIN — METOPROLOL TARTRATE 5 MG: 5 INJECTION, SOLUTION INTRAVENOUS at 07:44

## 2023-11-02 RX ADMIN — SODIUM CHLORIDE 3000 MG: 900 INJECTION INTRAVENOUS at 12:20

## 2023-11-02 RX ADMIN — FUROSEMIDE 40 MG: 10 INJECTION, SOLUTION INTRAMUSCULAR; INTRAVENOUS at 16:26

## 2023-11-02 RX ADMIN — LEVOTHYROXINE SODIUM 50 MCG: 0.05 TABLET ORAL at 06:22

## 2023-11-02 RX ADMIN — SODIUM CHLORIDE, PRESERVATIVE FREE 10 ML: 5 INJECTION INTRAVENOUS at 20:50

## 2023-11-02 RX ADMIN — LACOSAMIDE 200 MG: 10 INJECTION INTRAVENOUS at 21:19

## 2023-11-02 RX ADMIN — PRIMIDONE 50 MG: 50 TABLET ORAL at 20:49

## 2023-11-02 RX ADMIN — METOPROLOL TARTRATE 5 MG: 5 INJECTION, SOLUTION INTRAVENOUS at 01:49

## 2023-11-02 RX ADMIN — SODIUM CHLORIDE, PRESERVATIVE FREE 40 MG: 5 INJECTION INTRAVENOUS at 12:16

## 2023-11-02 RX ADMIN — PRIMIDONE 50 MG: 50 TABLET ORAL at 07:28

## 2023-11-02 RX ADMIN — SODIUM CHLORIDE 3000 MG: 900 INJECTION INTRAVENOUS at 01:45

## 2023-11-02 RX ADMIN — VANCOMYCIN HYDROCHLORIDE 1000 MG: 1 INJECTION, POWDER, LYOPHILIZED, FOR SOLUTION INTRAVENOUS at 20:48

## 2023-11-02 RX ADMIN — METOPROLOL TARTRATE 5 MG: 5 INJECTION, SOLUTION INTRAVENOUS at 13:24

## 2023-11-02 RX ADMIN — ACETAMINOPHEN 650 MG: 325 TABLET ORAL at 07:29

## 2023-11-02 RX ADMIN — ACETAMINOPHEN 650 MG: 325 TABLET ORAL at 01:47

## 2023-11-02 RX ADMIN — METOPROLOL TARTRATE 5 MG: 5 INJECTION, SOLUTION INTRAVENOUS at 18:39

## 2023-11-02 RX ADMIN — ALBUMIN (HUMAN) 25 G: 0.25 INJECTION, SOLUTION INTRAVENOUS at 14:32

## 2023-11-02 RX ADMIN — POTASSIUM BICARBONATE 40 MEQ: 782 TABLET, EFFERVESCENT ORAL at 07:30

## 2023-11-02 RX ADMIN — SODIUM CHLORIDE, PRESERVATIVE FREE 40 MG: 5 INJECTION INTRAVENOUS at 01:46

## 2023-11-02 RX ADMIN — VANCOMYCIN HYDROCHLORIDE 1000 MG: 1 INJECTION, POWDER, LYOPHILIZED, FOR SOLUTION INTRAVENOUS at 07:35

## 2023-11-02 RX ADMIN — ATORVASTATIN CALCIUM 40 MG: 40 TABLET, FILM COATED ORAL at 07:28

## 2023-11-02 RX ADMIN — SODIUM CHLORIDE 3000 MG: 900 INJECTION INTRAVENOUS at 06:30

## 2023-11-02 RX ADMIN — LEVETIRACETAM 750 MG: 100 INJECTION, SOLUTION INTRAVENOUS at 20:44

## 2023-11-02 RX ADMIN — CHLORHEXIDINE GLUCONATE 0.12% ORAL RINSE 15 ML: 1.2 LIQUID ORAL at 07:28

## 2023-11-02 RX ADMIN — HEPARIN SODIUM 5000 UNITS: 5000 INJECTION INTRAVENOUS; SUBCUTANEOUS at 06:22

## 2023-11-02 RX ADMIN — LACOSAMIDE 200 MG: 10 INJECTION INTRAVENOUS at 08:56

## 2023-11-02 RX ADMIN — ALBUMIN (HUMAN) 25 G: 0.25 INJECTION, SOLUTION INTRAVENOUS at 22:36

## 2023-11-02 RX ADMIN — ALBUMIN (HUMAN) 25 G: 0.25 INJECTION, SOLUTION INTRAVENOUS at 07:27

## 2023-11-02 RX ADMIN — HEPARIN SODIUM 5000 UNITS: 5000 INJECTION INTRAVENOUS; SUBCUTANEOUS at 20:48

## 2023-11-02 RX ADMIN — ACETAMINOPHEN 650 MG: 325 TABLET ORAL at 20:48

## 2023-11-02 ASSESSMENT — PULMONARY FUNCTION TESTS
PIF_VALUE: 16
PIF_VALUE: 14
PIF_VALUE: 13
PIF_VALUE: 16
PIF_VALUE: 15
PIF_VALUE: 13
PIF_VALUE: 16
PIF_VALUE: 14
PIF_VALUE: 17
PIF_VALUE: 18
PIF_VALUE: 16
PIF_VALUE: 15
PIF_VALUE: 16
PIF_VALUE: 12
PIF_VALUE: 13
PIF_VALUE: 12
PIF_VALUE: 17
PIF_VALUE: 14
PIF_VALUE: 18
PIF_VALUE: 13
PIF_VALUE: 16
PIF_VALUE: 17
PIF_VALUE: 14
PIF_VALUE: 15
PIF_VALUE: 16
PIF_VALUE: 15
PIF_VALUE: 13
PIF_VALUE: 12
PIF_VALUE: 16
PIF_VALUE: 12
PIF_VALUE: 13
PIF_VALUE: 16
PIF_VALUE: 11
PIF_VALUE: 15
PIF_VALUE: 14
PIF_VALUE: 13
PIF_VALUE: 14
PIF_VALUE: 17
PIF_VALUE: 16
PIF_VALUE: 17
PIF_VALUE: 12
PIF_VALUE: 12
PIF_VALUE: 18
PIF_VALUE: 21
PIF_VALUE: 16
PIF_VALUE: 13
PIF_VALUE: 16
PIF_VALUE: 16
PIF_VALUE: 15
PIF_VALUE: 13
PIF_VALUE: 15
PIF_VALUE: 17
PIF_VALUE: 11
PIF_VALUE: 13
PIF_VALUE: 8
PIF_VALUE: 15
PIF_VALUE: 18
PIF_VALUE: 13
PIF_VALUE: 13
PIF_VALUE: 18
PIF_VALUE: 13
PIF_VALUE: 14
PIF_VALUE: 16
PIF_VALUE: 13
PIF_VALUE: 9
PIF_VALUE: 17

## 2023-11-02 ASSESSMENT — PAIN SCALES - GENERAL: PAINLEVEL_OUTOF10: 0

## 2023-11-03 ENCOUNTER — APPOINTMENT (OUTPATIENT)
Dept: GENERAL RADIOLOGY | Age: 68
DRG: 321 | End: 2023-11-03
Payer: COMMERCIAL

## 2023-11-03 LAB
ANION GAP SERPL CALCULATED.3IONS-SCNC: 7 MMOL/L (ref 9–17)
ARTERIAL PATENCY WRIST A: ABNORMAL
BASOPHILS # BLD: 0 K/UL (ref 0–0.2)
BASOPHILS NFR BLD: 1 % (ref 0–2)
BDY SITE: ABNORMAL
BODY TEMPERATURE: 37
BUN SERPL-MCNC: 10 MG/DL (ref 8–23)
CALCIUM SERPL-MCNC: 8.4 MG/DL (ref 8.6–10.4)
CHLORIDE SERPL-SCNC: 102 MMOL/L (ref 98–107)
CO2 SERPL-SCNC: 32 MMOL/L (ref 20–31)
COHGB MFR BLD: 1.6 % (ref 0–5)
CREAT SERPL-MCNC: <0.4 MG/DL (ref 0.5–0.9)
EOSINOPHIL # BLD: 0.1 K/UL (ref 0–0.4)
EOSINOPHILS RELATIVE PERCENT: 3 % (ref 0–4)
ERYTHROCYTE [DISTWIDTH] IN BLOOD BY AUTOMATED COUNT: 14.1 % (ref 11.5–14.9)
FIO2 ON VENT: 30 %
GAS FLOW.O2 O2 DELIVERY SYS: ABNORMAL L/MIN
GAS FLOW.O2 SETTING OXYMISER: 16 L/MIN
GFR SERPL CREATININE-BSD FRML MDRD: ABNORMAL ML/MIN/1.73M2
GLUCOSE BLD-MCNC: 86 MG/DL (ref 65–105)
GLUCOSE SERPL-MCNC: 104 MG/DL (ref 70–99)
HCO3 ARTERIAL: 34.1 MMOL/L (ref 22–26)
HCT VFR BLD AUTO: 24.4 % (ref 36–46)
HCT VFR BLD AUTO: 25.7 % (ref 36–46)
HGB BLD-MCNC: 7.8 G/DL (ref 12–16)
HGB BLD-MCNC: 8.2 G/DL (ref 12–16)
LYMPHOCYTES NFR BLD: 0.7 K/UL (ref 1–4.8)
LYMPHOCYTES RELATIVE PERCENT: 13 % (ref 24–44)
MCH RBC QN AUTO: 32.3 PG (ref 26–34)
MCHC RBC AUTO-ENTMCNC: 31.8 G/DL (ref 31–37)
MCV RBC AUTO: 101.7 FL (ref 80–100)
METHEMOGLOBIN: 1.1 % (ref 0–1.9)
MONOCYTES NFR BLD: 0.7 K/UL (ref 0.1–1.3)
MONOCYTES NFR BLD: 13 % (ref 1–7)
MRSA, DNA, NASAL: NEGATIVE
NEUTROPHILS NFR BLD: 70 % (ref 36–66)
NEUTS SEG NFR BLD: 3.7 K/UL (ref 1.3–9.1)
O2 SAT, ARTERIAL: 95 % (ref 95–98)
PCO2 ARTERIAL: 44.7 MMHG (ref 35–45)
PEEP RESPIRATORY: 8 CM[H2O]
PH ARTERIAL: 7.49 (ref 7.35–7.45)
PLATELET # BLD AUTO: 248 K/UL (ref 150–450)
PMV BLD AUTO: 7.8 FL (ref 6–12)
PO2 ARTERIAL: 84 MMHG (ref 80–100)
POSITIVE BASE EXCESS, ART: 10.8 MMOL/L (ref 0–2)
POTASSIUM SERPL-SCNC: 3.4 MMOL/L (ref 3.7–5.3)
PT. POSITION: ABNORMAL
RBC # BLD AUTO: 2.4 M/UL (ref 4–5.2)
RESPIRATORY RATE: 16
SODIUM SERPL-SCNC: 141 MMOL/L (ref 135–144)
SPECIMEN DESCRIPTION: NORMAL
TEXT FOR RESPIRATORY: ABNORMAL
TOTAL RATE: 16
VENTILATION MODE VENT: ABNORMAL
VT: 450
WBC OTHER # BLD: 5.2 K/UL (ref 3.5–11)

## 2023-11-03 PROCEDURE — 6370000000 HC RX 637 (ALT 250 FOR IP): Performed by: INTERNAL MEDICINE

## 2023-11-03 PROCEDURE — 6360000002 HC RX W HCPCS: Performed by: INTERNAL MEDICINE

## 2023-11-03 PROCEDURE — 6360000002 HC RX W HCPCS: Performed by: SURGERY

## 2023-11-03 PROCEDURE — 6370000000 HC RX 637 (ALT 250 FOR IP): Performed by: SURGERY

## 2023-11-03 PROCEDURE — 36600 WITHDRAWAL OF ARTERIAL BLOOD: CPT

## 2023-11-03 PROCEDURE — 99233 SBSQ HOSP IP/OBS HIGH 50: CPT | Performed by: INTERNAL MEDICINE

## 2023-11-03 PROCEDURE — 2580000003 HC RX 258: Performed by: SURGERY

## 2023-11-03 PROCEDURE — A4216 STERILE WATER/SALINE, 10 ML: HCPCS | Performed by: SURGERY

## 2023-11-03 PROCEDURE — P9047 ALBUMIN (HUMAN), 25%, 50ML: HCPCS | Performed by: INTERNAL MEDICINE

## 2023-11-03 PROCEDURE — 82947 ASSAY GLUCOSE BLOOD QUANT: CPT

## 2023-11-03 PROCEDURE — 2580000003 HC RX 258: Performed by: INTERNAL MEDICINE

## 2023-11-03 PROCEDURE — 2500000003 HC RX 250 WO HCPCS: Performed by: SURGERY

## 2023-11-03 PROCEDURE — 94761 N-INVAS EAR/PLS OXIMETRY MLT: CPT

## 2023-11-03 PROCEDURE — 82805 BLOOD GASES W/O2 SATURATION: CPT

## 2023-11-03 PROCEDURE — 85025 COMPLETE CBC W/AUTO DIFF WBC: CPT

## 2023-11-03 PROCEDURE — 71045 X-RAY EXAM CHEST 1 VIEW: CPT

## 2023-11-03 PROCEDURE — 2500000003 HC RX 250 WO HCPCS: Performed by: INTERNAL MEDICINE

## 2023-11-03 PROCEDURE — C9113 INJ PANTOPRAZOLE SODIUM, VIA: HCPCS | Performed by: SURGERY

## 2023-11-03 PROCEDURE — 2700000000 HC OXYGEN THERAPY PER DAY

## 2023-11-03 PROCEDURE — 85014 HEMATOCRIT: CPT

## 2023-11-03 PROCEDURE — 80048 BASIC METABOLIC PNL TOTAL CA: CPT

## 2023-11-03 PROCEDURE — 36415 COLL VENOUS BLD VENIPUNCTURE: CPT

## 2023-11-03 PROCEDURE — 94003 VENT MGMT INPAT SUBQ DAY: CPT

## 2023-11-03 PROCEDURE — 85018 HEMOGLOBIN: CPT

## 2023-11-03 PROCEDURE — C9254 INJECTION, LACOSAMIDE: HCPCS | Performed by: SURGERY

## 2023-11-03 PROCEDURE — 2000000000 HC ICU R&B

## 2023-11-03 RX ORDER — LEVETIRACETAM 750 MG/1
750 TABLET ORAL 2 TIMES DAILY
Status: DISCONTINUED | OUTPATIENT
Start: 2023-11-03 | End: 2023-11-04

## 2023-11-03 RX ADMIN — SODIUM CHLORIDE: 9 INJECTION, SOLUTION INTRAVENOUS at 08:04

## 2023-11-03 RX ADMIN — PRIMIDONE 50 MG: 50 TABLET ORAL at 20:33

## 2023-11-03 RX ADMIN — ACETAMINOPHEN 650 MG: 325 TABLET ORAL at 20:33

## 2023-11-03 RX ADMIN — LACOSAMIDE 200 MG: 10 INJECTION INTRAVENOUS at 09:39

## 2023-11-03 RX ADMIN — ATORVASTATIN CALCIUM 40 MG: 40 TABLET, FILM COATED ORAL at 07:58

## 2023-11-03 RX ADMIN — DONEPEZIL HYDROCHLORIDE 5 MG: 10 TABLET, FILM COATED ORAL at 20:34

## 2023-11-03 RX ADMIN — ALBUMIN (HUMAN) 25 G: 0.25 INJECTION, SOLUTION INTRAVENOUS at 07:45

## 2023-11-03 RX ADMIN — HEPARIN SODIUM 5000 UNITS: 5000 INJECTION INTRAVENOUS; SUBCUTANEOUS at 21:58

## 2023-11-03 RX ADMIN — VANCOMYCIN HYDROCHLORIDE 1000 MG: 1 INJECTION, POWDER, LYOPHILIZED, FOR SOLUTION INTRAVENOUS at 08:24

## 2023-11-03 RX ADMIN — SODIUM CHLORIDE 3000 MG: 900 INJECTION INTRAVENOUS at 13:20

## 2023-11-03 RX ADMIN — PROPOFOL 25 MCG/KG/MIN: 10 INJECTION, EMULSION INTRAVENOUS at 15:25

## 2023-11-03 RX ADMIN — HEPARIN SODIUM 5000 UNITS: 5000 INJECTION INTRAVENOUS; SUBCUTANEOUS at 13:23

## 2023-11-03 RX ADMIN — HEPARIN SODIUM 5000 UNITS: 5000 INJECTION INTRAVENOUS; SUBCUTANEOUS at 06:04

## 2023-11-03 RX ADMIN — FUROSEMIDE 40 MG: 10 INJECTION, SOLUTION INTRAMUSCULAR; INTRAVENOUS at 18:11

## 2023-11-03 RX ADMIN — POTASSIUM CHLORIDE 10 MEQ: 7.46 INJECTION, SOLUTION INTRAVENOUS at 09:40

## 2023-11-03 RX ADMIN — ALBUMIN (HUMAN) 25 G: 0.25 INJECTION, SOLUTION INTRAVENOUS at 14:44

## 2023-11-03 RX ADMIN — ACETAMINOPHEN 650 MG: 325 TABLET ORAL at 13:22

## 2023-11-03 RX ADMIN — SENNOSIDES AND DOCUSATE SODIUM 1 TABLET: 50; 8.6 TABLET ORAL at 20:34

## 2023-11-03 RX ADMIN — SODIUM CHLORIDE, PRESERVATIVE FREE 10 ML: 5 INJECTION INTRAVENOUS at 08:15

## 2023-11-03 RX ADMIN — SODIUM CHLORIDE, PRESERVATIVE FREE 10 ML: 5 INJECTION INTRAVENOUS at 21:58

## 2023-11-03 RX ADMIN — LEVOTHYROXINE SODIUM 50 MCG: 0.05 TABLET ORAL at 06:04

## 2023-11-03 RX ADMIN — SODIUM CHLORIDE 3000 MG: 900 INJECTION INTRAVENOUS at 18:21

## 2023-11-03 RX ADMIN — LEVETIRACETAM 750 MG: 750 TABLET, FILM COATED ORAL at 18:11

## 2023-11-03 RX ADMIN — LEVETIRACETAM 750 MG: 100 INJECTION, SOLUTION INTRAVENOUS at 08:05

## 2023-11-03 RX ADMIN — ACETAMINOPHEN 650 MG: 325 TABLET ORAL at 02:36

## 2023-11-03 RX ADMIN — MIDODRINE HYDROCHLORIDE 10 MG: 10 TABLET ORAL at 09:42

## 2023-11-03 RX ADMIN — ALBUMIN (HUMAN) 25 G: 0.25 INJECTION, SOLUTION INTRAVENOUS at 20:32

## 2023-11-03 RX ADMIN — PRIMIDONE 50 MG: 50 TABLET ORAL at 08:17

## 2023-11-03 RX ADMIN — SODIUM CHLORIDE: 9 INJECTION, SOLUTION INTRAVENOUS at 07:44

## 2023-11-03 RX ADMIN — LACOSAMIDE 200 MG: 10 INJECTION INTRAVENOUS at 21:04

## 2023-11-03 RX ADMIN — SODIUM CHLORIDE 3000 MG: 900 INJECTION INTRAVENOUS at 09:10

## 2023-11-03 RX ADMIN — POTASSIUM CHLORIDE 10 MEQ: 7.46 INJECTION, SOLUTION INTRAVENOUS at 07:18

## 2023-11-03 RX ADMIN — POTASSIUM CHLORIDE 10 MEQ: 7.46 INJECTION, SOLUTION INTRAVENOUS at 08:21

## 2023-11-03 RX ADMIN — POTASSIUM CHLORIDE 10 MEQ: 7.46 INJECTION, SOLUTION INTRAVENOUS at 06:04

## 2023-11-03 RX ADMIN — METOPROLOL TARTRATE 5 MG: 5 INJECTION, SOLUTION INTRAVENOUS at 13:23

## 2023-11-03 RX ADMIN — ACETAMINOPHEN 650 MG: 325 TABLET ORAL at 07:58

## 2023-11-03 RX ADMIN — CHLORHEXIDINE GLUCONATE 0.12% ORAL RINSE 15 ML: 1.2 LIQUID ORAL at 07:58

## 2023-11-03 RX ADMIN — PROPOFOL 25 MCG/KG/MIN: 10 INJECTION, EMULSION INTRAVENOUS at 03:39

## 2023-11-03 RX ADMIN — METOPROLOL TARTRATE 5 MG: 5 INJECTION, SOLUTION INTRAVENOUS at 18:58

## 2023-11-03 RX ADMIN — FUROSEMIDE 40 MG: 10 INJECTION, SOLUTION INTRAMUSCULAR; INTRAVENOUS at 09:12

## 2023-11-03 RX ADMIN — SODIUM CHLORIDE 3000 MG: 900 INJECTION INTRAVENOUS at 01:24

## 2023-11-03 RX ADMIN — SODIUM CHLORIDE, PRESERVATIVE FREE 40 MG: 5 INJECTION INTRAVENOUS at 12:27

## 2023-11-03 RX ADMIN — SODIUM CHLORIDE, PRESERVATIVE FREE 40 MG: 5 INJECTION INTRAVENOUS at 01:24

## 2023-11-03 RX ADMIN — CHLORHEXIDINE GLUCONATE 0.12% ORAL RINSE 15 ML: 1.2 LIQUID ORAL at 20:33

## 2023-11-03 ASSESSMENT — PAIN SCALES - GENERAL
PAINLEVEL_OUTOF10: 0
PAINLEVEL_OUTOF10: 6

## 2023-11-03 ASSESSMENT — PULMONARY FUNCTION TESTS
PIF_VALUE: 12
PIF_VALUE: 15
PIF_VALUE: 18
PIF_VALUE: 12
PIF_VALUE: 15
PIF_VALUE: 16
PIF_VALUE: 16
PIF_VALUE: 17
PIF_VALUE: 12
PIF_VALUE: 17
PIF_VALUE: 14
PIF_VALUE: 17
PIF_VALUE: 13
PIF_VALUE: 18
PIF_VALUE: 15
PIF_VALUE: 15
PIF_VALUE: 11
PIF_VALUE: 16
PIF_VALUE: 16
PIF_VALUE: 13
PIF_VALUE: 18
PIF_VALUE: 13
PIF_VALUE: 18
PIF_VALUE: 18
PIF_VALUE: 17
PIF_VALUE: 12
PIF_VALUE: 16
PIF_VALUE: 18
PIF_VALUE: 17
PIF_VALUE: 18
PIF_VALUE: 16
PIF_VALUE: 16
PIF_VALUE: 14
PIF_VALUE: 19
PIF_VALUE: 19
PIF_VALUE: 16
PIF_VALUE: 19
PIF_VALUE: 16
PIF_VALUE: 12
PIF_VALUE: 15
PIF_VALUE: 12
PIF_VALUE: 18
PIF_VALUE: 18
PIF_VALUE: 16
PIF_VALUE: 19
PIF_VALUE: 17
PIF_VALUE: 18
PIF_VALUE: 13
PIF_VALUE: 17
PIF_VALUE: 16
PIF_VALUE: 15
PIF_VALUE: 11
PIF_VALUE: 17
PIF_VALUE: 15
PIF_VALUE: 13
PIF_VALUE: 16
PIF_VALUE: 18
PIF_VALUE: 19
PIF_VALUE: 17

## 2023-11-04 ENCOUNTER — APPOINTMENT (OUTPATIENT)
Dept: GENERAL RADIOLOGY | Age: 68
DRG: 321 | End: 2023-11-04
Payer: COMMERCIAL

## 2023-11-04 LAB
ANION GAP SERPL CALCULATED.3IONS-SCNC: 7 MMOL/L (ref 9–17)
ARTERIAL PATENCY WRIST A: ABNORMAL
BASOPHILS # BLD: 0 K/UL (ref 0–0.2)
BASOPHILS NFR BLD: 1 % (ref 0–2)
BDY SITE: ABNORMAL
BODY TEMPERATURE: 37
BUN SERPL-MCNC: 10 MG/DL (ref 8–23)
CALCIUM SERPL-MCNC: 8.5 MG/DL (ref 8.6–10.4)
CHLORIDE SERPL-SCNC: 103 MMOL/L (ref 98–107)
CO2 SERPL-SCNC: 32 MMOL/L (ref 20–31)
COHGB MFR BLD: 1.8 % (ref 0–5)
CREAT SERPL-MCNC: <0.4 MG/DL (ref 0.5–0.9)
EOSINOPHIL # BLD: 0.2 K/UL (ref 0–0.4)
EOSINOPHILS RELATIVE PERCENT: 5 % (ref 0–4)
ERYTHROCYTE [DISTWIDTH] IN BLOOD BY AUTOMATED COUNT: 14 % (ref 11.5–14.9)
FIO2 ON VENT: 30 %
GAS FLOW.O2 O2 DELIVERY SYS: ABNORMAL L/MIN
GAS FLOW.O2 SETTING OXYMISER: 16 L/MIN
GFR SERPL CREATININE-BSD FRML MDRD: ABNORMAL ML/MIN/1.73M2
GLUCOSE BLD-MCNC: 103 MG/DL (ref 65–105)
GLUCOSE SERPL-MCNC: 103 MG/DL (ref 70–99)
HCO3 ARTERIAL: 34.5 MMOL/L (ref 22–26)
HCT VFR BLD AUTO: 24.8 % (ref 36–46)
HGB BLD-MCNC: 8 G/DL (ref 12–16)
LYMPHOCYTES NFR BLD: 0.8 K/UL (ref 1–4.8)
LYMPHOCYTES RELATIVE PERCENT: 16 % (ref 24–44)
MCH RBC QN AUTO: 32.6 PG (ref 26–34)
MCHC RBC AUTO-ENTMCNC: 32.2 G/DL (ref 31–37)
MCV RBC AUTO: 101.3 FL (ref 80–100)
METHEMOGLOBIN: 0.8 % (ref 0–1.9)
MONOCYTES NFR BLD: 0.6 K/UL (ref 0.1–1.3)
MONOCYTES NFR BLD: 12 % (ref 1–7)
NEUTROPHILS NFR BLD: 66 % (ref 36–66)
NEUTS SEG NFR BLD: 3.2 K/UL (ref 1.3–9.1)
O2 SAT, ARTERIAL: 95.2 % (ref 95–98)
PCO2 ARTERIAL: 44.8 MMHG (ref 35–45)
PEEP RESPIRATORY: 8 CM[H2O]
PH ARTERIAL: 7.5 (ref 7.35–7.45)
PLATELET # BLD AUTO: 226 K/UL (ref 150–450)
PMV BLD AUTO: 8 FL (ref 6–12)
PO2 ARTERIAL: 84.7 MMHG (ref 80–100)
POSITIVE BASE EXCESS, ART: 11.2 MMOL/L (ref 0–2)
POTASSIUM SERPL-SCNC: 3.4 MMOL/L (ref 3.7–5.3)
PT. POSITION: ABNORMAL
RBC # BLD AUTO: 2.45 M/UL (ref 4–5.2)
RESPIRATORY RATE: 16
SODIUM SERPL-SCNC: 142 MMOL/L (ref 135–144)
TEXT FOR RESPIRATORY: ABNORMAL
TOTAL RATE: 18
VENTILATION MODE VENT: ABNORMAL
VT: 450
WBC OTHER # BLD: 4.9 K/UL (ref 3.5–11)

## 2023-11-04 PROCEDURE — 2500000003 HC RX 250 WO HCPCS: Performed by: INTERNAL MEDICINE

## 2023-11-04 PROCEDURE — 2580000003 HC RX 258: Performed by: SURGERY

## 2023-11-04 PROCEDURE — 82805 BLOOD GASES W/O2 SATURATION: CPT

## 2023-11-04 PROCEDURE — 6370000000 HC RX 637 (ALT 250 FOR IP): Performed by: SURGERY

## 2023-11-04 PROCEDURE — 99232 SBSQ HOSP IP/OBS MODERATE 35: CPT | Performed by: NURSE PRACTITIONER

## 2023-11-04 PROCEDURE — 2580000003 HC RX 258: Performed by: INTERNAL MEDICINE

## 2023-11-04 PROCEDURE — 99233 SBSQ HOSP IP/OBS HIGH 50: CPT | Performed by: INTERNAL MEDICINE

## 2023-11-04 PROCEDURE — A4216 STERILE WATER/SALINE, 10 ML: HCPCS | Performed by: SURGERY

## 2023-11-04 PROCEDURE — 82947 ASSAY GLUCOSE BLOOD QUANT: CPT

## 2023-11-04 PROCEDURE — 6360000002 HC RX W HCPCS: Performed by: SURGERY

## 2023-11-04 PROCEDURE — P9047 ALBUMIN (HUMAN), 25%, 50ML: HCPCS | Performed by: INTERNAL MEDICINE

## 2023-11-04 PROCEDURE — 94003 VENT MGMT INPAT SUBQ DAY: CPT

## 2023-11-04 PROCEDURE — 6360000002 HC RX W HCPCS: Performed by: INTERNAL MEDICINE

## 2023-11-04 PROCEDURE — 80048 BASIC METABOLIC PNL TOTAL CA: CPT

## 2023-11-04 PROCEDURE — 36415 COLL VENOUS BLD VENIPUNCTURE: CPT

## 2023-11-04 PROCEDURE — 2000000000 HC ICU R&B

## 2023-11-04 PROCEDURE — C9113 INJ PANTOPRAZOLE SODIUM, VIA: HCPCS | Performed by: SURGERY

## 2023-11-04 PROCEDURE — 71045 X-RAY EXAM CHEST 1 VIEW: CPT

## 2023-11-04 PROCEDURE — 85025 COMPLETE CBC W/AUTO DIFF WBC: CPT

## 2023-11-04 PROCEDURE — 2700000000 HC OXYGEN THERAPY PER DAY

## 2023-11-04 PROCEDURE — C9254 INJECTION, LACOSAMIDE: HCPCS | Performed by: SURGERY

## 2023-11-04 PROCEDURE — 94761 N-INVAS EAR/PLS OXIMETRY MLT: CPT

## 2023-11-04 PROCEDURE — 36600 WITHDRAWAL OF ARTERIAL BLOOD: CPT

## 2023-11-04 PROCEDURE — 6370000000 HC RX 637 (ALT 250 FOR IP): Performed by: INTERNAL MEDICINE

## 2023-11-04 RX ORDER — LEVETIRACETAM 100 MG/ML
750 SOLUTION ORAL 2 TIMES DAILY
Status: DISPENSED | OUTPATIENT
Start: 2023-11-04

## 2023-11-04 RX ADMIN — LEVOTHYROXINE SODIUM 50 MCG: 0.05 TABLET ORAL at 07:48

## 2023-11-04 RX ADMIN — ALBUMIN (HUMAN) 25 G: 0.25 INJECTION, SOLUTION INTRAVENOUS at 15:27

## 2023-11-04 RX ADMIN — CHLORHEXIDINE GLUCONATE 0.12% ORAL RINSE 15 ML: 1.2 LIQUID ORAL at 09:20

## 2023-11-04 RX ADMIN — HEPARIN SODIUM 5000 UNITS: 5000 INJECTION INTRAVENOUS; SUBCUTANEOUS at 22:53

## 2023-11-04 RX ADMIN — ACETAMINOPHEN 650 MG: 325 TABLET ORAL at 09:19

## 2023-11-04 RX ADMIN — SODIUM CHLORIDE 3000 MG: 900 INJECTION INTRAVENOUS at 19:55

## 2023-11-04 RX ADMIN — SODIUM CHLORIDE 3000 MG: 900 INJECTION INTRAVENOUS at 00:16

## 2023-11-04 RX ADMIN — PROPOFOL 30 MCG/KG/MIN: 10 INJECTION, EMULSION INTRAVENOUS at 18:21

## 2023-11-04 RX ADMIN — CHLORHEXIDINE GLUCONATE 0.12% ORAL RINSE 15 ML: 1.2 LIQUID ORAL at 20:19

## 2023-11-04 RX ADMIN — METOPROLOL TARTRATE 5 MG: 5 INJECTION, SOLUTION INTRAVENOUS at 12:25

## 2023-11-04 RX ADMIN — LACOSAMIDE 200 MG: 10 INJECTION INTRAVENOUS at 21:10

## 2023-11-04 RX ADMIN — HEPARIN SODIUM 5000 UNITS: 5000 INJECTION INTRAVENOUS; SUBCUTANEOUS at 06:08

## 2023-11-04 RX ADMIN — HEPARIN SODIUM 5000 UNITS: 5000 INJECTION INTRAVENOUS; SUBCUTANEOUS at 15:24

## 2023-11-04 RX ADMIN — FUROSEMIDE 40 MG: 10 INJECTION, SOLUTION INTRAMUSCULAR; INTRAVENOUS at 09:19

## 2023-11-04 RX ADMIN — PROPOFOL 25 MCG/KG/MIN: 10 INJECTION, EMULSION INTRAVENOUS at 02:37

## 2023-11-04 RX ADMIN — SENNOSIDES AND DOCUSATE SODIUM 1 TABLET: 50; 8.6 TABLET ORAL at 20:20

## 2023-11-04 RX ADMIN — ATORVASTATIN CALCIUM 40 MG: 40 TABLET, FILM COATED ORAL at 09:19

## 2023-11-04 RX ADMIN — ALBUMIN (HUMAN) 25 G: 0.25 INJECTION, SOLUTION INTRAVENOUS at 20:22

## 2023-11-04 RX ADMIN — PRIMIDONE 50 MG: 50 TABLET ORAL at 09:29

## 2023-11-04 RX ADMIN — SODIUM CHLORIDE 3000 MG: 900 INJECTION INTRAVENOUS at 07:49

## 2023-11-04 RX ADMIN — POTASSIUM BICARBONATE 40 MEQ: 782 TABLET, EFFERVESCENT ORAL at 06:42

## 2023-11-04 RX ADMIN — ACETAMINOPHEN 650 MG: 325 TABLET ORAL at 20:19

## 2023-11-04 RX ADMIN — SODIUM CHLORIDE 3000 MG: 900 INJECTION INTRAVENOUS at 12:36

## 2023-11-04 RX ADMIN — LEVETIRACETAM 750 MG: 500 SOLUTION ORAL at 11:58

## 2023-11-04 RX ADMIN — FENTANYL CITRATE 50 MCG: 0.05 INJECTION, SOLUTION INTRAMUSCULAR; INTRAVENOUS at 16:25

## 2023-11-04 RX ADMIN — ALBUMIN (HUMAN) 25 G: 0.25 INJECTION, SOLUTION INTRAVENOUS at 09:15

## 2023-11-04 RX ADMIN — LACOSAMIDE 200 MG: 10 INJECTION INTRAVENOUS at 09:27

## 2023-11-04 RX ADMIN — SODIUM CHLORIDE, PRESERVATIVE FREE 10 ML: 5 INJECTION INTRAVENOUS at 20:22

## 2023-11-04 RX ADMIN — SODIUM CHLORIDE, PRESERVATIVE FREE 40 MG: 5 INJECTION INTRAVENOUS at 12:01

## 2023-11-04 RX ADMIN — FUROSEMIDE 40 MG: 10 INJECTION, SOLUTION INTRAMUSCULAR; INTRAVENOUS at 18:22

## 2023-11-04 RX ADMIN — SODIUM CHLORIDE, PRESERVATIVE FREE 40 MG: 5 INJECTION INTRAVENOUS at 00:14

## 2023-11-04 RX ADMIN — PRIMIDONE 50 MG: 50 TABLET ORAL at 20:20

## 2023-11-04 RX ADMIN — LEVETIRACETAM 750 MG: 500 SOLUTION ORAL at 20:20

## 2023-11-04 RX ADMIN — ACETAMINOPHEN 650 MG: 325 TABLET ORAL at 15:24

## 2023-11-04 RX ADMIN — ACETAMINOPHEN 650 MG: 325 TABLET ORAL at 02:37

## 2023-11-04 RX ADMIN — DONEPEZIL HYDROCHLORIDE 5 MG: 10 TABLET, FILM COATED ORAL at 20:20

## 2023-11-04 ASSESSMENT — PULMONARY FUNCTION TESTS
PIF_VALUE: 12
PIF_VALUE: 15
PIF_VALUE: 13
PIF_VALUE: 16
PIF_VALUE: 12
PIF_VALUE: 15
PIF_VALUE: 16
PIF_VALUE: 13
PIF_VALUE: 15
PIF_VALUE: 15
PIF_VALUE: 18
PIF_VALUE: 15
PIF_VALUE: 15
PIF_VALUE: 19
PIF_VALUE: 15
PIF_VALUE: 15
PIF_VALUE: 12
PIF_VALUE: 15
PIF_VALUE: 15
PIF_VALUE: 16
PIF_VALUE: 15
PIF_VALUE: 17
PIF_VALUE: 15
PIF_VALUE: 16
PIF_VALUE: 15
PIF_VALUE: 16
PIF_VALUE: 12
PIF_VALUE: 12
PIF_VALUE: 19

## 2023-11-04 ASSESSMENT — PAIN SCALES - GENERAL
PAINLEVEL_OUTOF10: 5
PAINLEVEL_OUTOF10: 0
PAINLEVEL_OUTOF10: 1

## 2023-11-05 ENCOUNTER — APPOINTMENT (OUTPATIENT)
Dept: GENERAL RADIOLOGY | Age: 68
DRG: 321 | End: 2023-11-05
Payer: COMMERCIAL

## 2023-11-05 LAB
ANION GAP SERPL CALCULATED.3IONS-SCNC: 7 MMOL/L (ref 9–17)
ARTERIAL PATENCY WRIST A: ABNORMAL
BASOPHILS # BLD: 0 K/UL (ref 0–0.2)
BASOPHILS NFR BLD: 1 % (ref 0–2)
BDY SITE: ABNORMAL
BODY TEMPERATURE: 37
BUN SERPL-MCNC: 9 MG/DL (ref 8–23)
CALCIUM SERPL-MCNC: 8.8 MG/DL (ref 8.6–10.4)
CHLORIDE SERPL-SCNC: 100 MMOL/L (ref 98–107)
CO2 SERPL-SCNC: 34 MMOL/L (ref 20–31)
COHGB MFR BLD: 2 % (ref 0–5)
CREAT SERPL-MCNC: <0.4 MG/DL (ref 0.5–0.9)
EOSINOPHIL # BLD: 0.3 K/UL (ref 0–0.4)
EOSINOPHILS RELATIVE PERCENT: 5 % (ref 0–4)
ERYTHROCYTE [DISTWIDTH] IN BLOOD BY AUTOMATED COUNT: 14.4 % (ref 11.5–14.9)
FIO2 ON VENT: 30 %
GAS FLOW.O2 O2 DELIVERY SYS: ABNORMAL L/MIN
GAS FLOW.O2 SETTING OXYMISER: 16 L/MIN
GFR SERPL CREATININE-BSD FRML MDRD: ABNORMAL ML/MIN/1.73M2
GLUCOSE SERPL-MCNC: 82 MG/DL (ref 70–99)
HCO3 ARTERIAL: 35.6 MMOL/L (ref 22–26)
HCT VFR BLD AUTO: 25.5 % (ref 36–46)
HGB BLD-MCNC: 8.3 G/DL (ref 12–16)
LYMPHOCYTES NFR BLD: 0.8 K/UL (ref 1–4.8)
LYMPHOCYTES RELATIVE PERCENT: 17 % (ref 24–44)
MCH RBC QN AUTO: 33.3 PG (ref 26–34)
MCHC RBC AUTO-ENTMCNC: 32.8 G/DL (ref 31–37)
MCV RBC AUTO: 101.7 FL (ref 80–100)
METHEMOGLOBIN: 0.9 % (ref 0–1.9)
MONOCYTES NFR BLD: 0.5 K/UL (ref 0.1–1.3)
MONOCYTES NFR BLD: 11 % (ref 1–7)
NEUTROPHILS NFR BLD: 66 % (ref 36–66)
NEUTS SEG NFR BLD: 3.1 K/UL (ref 1.3–9.1)
O2 SAT, ARTERIAL: 93.6 % (ref 95–98)
PCO2 ARTERIAL: 48.1 MMHG (ref 35–45)
PEEP RESPIRATORY: 8 CM[H2O]
PH ARTERIAL: 7.48 (ref 7.35–7.45)
PLATELET # BLD AUTO: 263 K/UL (ref 150–450)
PMV BLD AUTO: 8 FL (ref 6–12)
PO2 ARTERIAL: 78.5 MMHG (ref 80–100)
POSITIVE BASE EXCESS, ART: 12 MMOL/L (ref 0–2)
POTASSIUM SERPL-SCNC: 3.4 MMOL/L (ref 3.7–5.3)
PT. POSITION: ABNORMAL
RBC # BLD AUTO: 2.5 M/UL (ref 4–5.2)
RESPIRATORY RATE: 16
SODIUM SERPL-SCNC: 141 MMOL/L (ref 135–144)
TEXT FOR RESPIRATORY: ABNORMAL
TOTAL RATE: 18
VENTILATION MODE VENT: ABNORMAL
VT: 400
WBC OTHER # BLD: 4.8 K/UL (ref 3.5–11)

## 2023-11-05 PROCEDURE — 87106 FUNGI IDENTIFICATION YEAST: CPT

## 2023-11-05 PROCEDURE — P9047 ALBUMIN (HUMAN), 25%, 50ML: HCPCS | Performed by: INTERNAL MEDICINE

## 2023-11-05 PROCEDURE — 6370000000 HC RX 637 (ALT 250 FOR IP): Performed by: INTERNAL MEDICINE

## 2023-11-05 PROCEDURE — 2580000003 HC RX 258: Performed by: SURGERY

## 2023-11-05 PROCEDURE — 6360000002 HC RX W HCPCS: Performed by: INTERNAL MEDICINE

## 2023-11-05 PROCEDURE — 2580000003 HC RX 258: Performed by: INTERNAL MEDICINE

## 2023-11-05 PROCEDURE — 87102 FUNGUS ISOLATION CULTURE: CPT

## 2023-11-05 PROCEDURE — 87300 AG DETECTION POLYVAL IF: CPT

## 2023-11-05 PROCEDURE — 6360000002 HC RX W HCPCS: Performed by: SURGERY

## 2023-11-05 PROCEDURE — 85025 COMPLETE CBC W/AUTO DIFF WBC: CPT

## 2023-11-05 PROCEDURE — 88305 TISSUE EXAM BY PATHOLOGIST: CPT

## 2023-11-05 PROCEDURE — 87206 SMEAR FLUORESCENT/ACID STAI: CPT

## 2023-11-05 PROCEDURE — 87015 SPECIMEN INFECT AGNT CONCNTJ: CPT

## 2023-11-05 PROCEDURE — 6370000000 HC RX 637 (ALT 250 FOR IP): Performed by: SURGERY

## 2023-11-05 PROCEDURE — 87140 CULTURE TYPE IMMUNOFLUORESC: CPT

## 2023-11-05 PROCEDURE — 88112 CYTOPATH CELL ENHANCE TECH: CPT

## 2023-11-05 PROCEDURE — 87252 VIRUS INOCULATION TISSUE: CPT

## 2023-11-05 PROCEDURE — 87255 GENET VIRUS ISOLATE HSV: CPT

## 2023-11-05 PROCEDURE — 99291 CRITICAL CARE FIRST HOUR: CPT | Performed by: INTERNAL MEDICINE

## 2023-11-05 PROCEDURE — 0B9F8ZX DRAINAGE OF RIGHT LOWER LUNG LOBE, VIA NATURAL OR ARTIFICIAL OPENING ENDOSCOPIC, DIAGNOSTIC: ICD-10-PCS | Performed by: INTERNAL MEDICINE

## 2023-11-05 PROCEDURE — C9113 INJ PANTOPRAZOLE SODIUM, VIA: HCPCS | Performed by: SURGERY

## 2023-11-05 PROCEDURE — 87116 MYCOBACTERIA CULTURE: CPT

## 2023-11-05 PROCEDURE — 82805 BLOOD GASES W/O2 SATURATION: CPT

## 2023-11-05 PROCEDURE — 89051 BODY FLUID CELL COUNT: CPT

## 2023-11-05 PROCEDURE — 71045 X-RAY EXAM CHEST 1 VIEW: CPT

## 2023-11-05 PROCEDURE — A4216 STERILE WATER/SALINE, 10 ML: HCPCS | Performed by: SURGERY

## 2023-11-05 PROCEDURE — 94761 N-INVAS EAR/PLS OXIMETRY MLT: CPT

## 2023-11-05 PROCEDURE — 2000000000 HC ICU R&B

## 2023-11-05 PROCEDURE — 99232 SBSQ HOSP IP/OBS MODERATE 35: CPT | Performed by: NURSE PRACTITIONER

## 2023-11-05 PROCEDURE — 36415 COLL VENOUS BLD VENIPUNCTURE: CPT

## 2023-11-05 PROCEDURE — 94003 VENT MGMT INPAT SUBQ DAY: CPT

## 2023-11-05 PROCEDURE — C9254 INJECTION, LACOSAMIDE: HCPCS | Performed by: SURGERY

## 2023-11-05 PROCEDURE — 36600 WITHDRAWAL OF ARTERIAL BLOOD: CPT

## 2023-11-05 PROCEDURE — 2700000000 HC OXYGEN THERAPY PER DAY

## 2023-11-05 PROCEDURE — 87205 SMEAR GRAM STAIN: CPT

## 2023-11-05 PROCEDURE — 87070 CULTURE OTHR SPECIMN AEROBIC: CPT

## 2023-11-05 PROCEDURE — 80048 BASIC METABOLIC PNL TOTAL CA: CPT

## 2023-11-05 RX ADMIN — PROPOFOL 35 MCG/KG/MIN: 10 INJECTION, EMULSION INTRAVENOUS at 09:54

## 2023-11-05 RX ADMIN — LEVOTHYROXINE SODIUM 50 MCG: 0.05 TABLET ORAL at 06:36

## 2023-11-05 RX ADMIN — SODIUM CHLORIDE 3000 MG: 900 INJECTION INTRAVENOUS at 01:37

## 2023-11-05 RX ADMIN — PROPOFOL 35 MCG/KG/MIN: 10 INJECTION, EMULSION INTRAVENOUS at 01:47

## 2023-11-05 RX ADMIN — ACETAMINOPHEN 650 MG: 325 TABLET ORAL at 20:48

## 2023-11-05 RX ADMIN — PROPOFOL 35 MCG/KG/MIN: 10 INJECTION, EMULSION INTRAVENOUS at 18:47

## 2023-11-05 RX ADMIN — LACOSAMIDE 200 MG: 10 INJECTION INTRAVENOUS at 20:49

## 2023-11-05 RX ADMIN — ACETAMINOPHEN 650 MG: 325 TABLET ORAL at 09:58

## 2023-11-05 RX ADMIN — SODIUM CHLORIDE 3000 MG: 900 INJECTION INTRAVENOUS at 12:48

## 2023-11-05 RX ADMIN — POTASSIUM BICARBONATE 40 MEQ: 782 TABLET, EFFERVESCENT ORAL at 05:59

## 2023-11-05 RX ADMIN — HEPARIN SODIUM 5000 UNITS: 5000 INJECTION INTRAVENOUS; SUBCUTANEOUS at 22:04

## 2023-11-05 RX ADMIN — SODIUM CHLORIDE, PRESERVATIVE FREE 40 MG: 5 INJECTION INTRAVENOUS at 12:46

## 2023-11-05 RX ADMIN — DONEPEZIL HYDROCHLORIDE 5 MG: 10 TABLET, FILM COATED ORAL at 20:48

## 2023-11-05 RX ADMIN — SODIUM CHLORIDE 3000 MG: 900 INJECTION INTRAVENOUS at 06:35

## 2023-11-05 RX ADMIN — HEPARIN SODIUM 5000 UNITS: 5000 INJECTION INTRAVENOUS; SUBCUTANEOUS at 05:59

## 2023-11-05 RX ADMIN — FENTANYL CITRATE 50 MCG: 0.05 INJECTION, SOLUTION INTRAMUSCULAR; INTRAVENOUS at 15:00

## 2023-11-05 RX ADMIN — PRIMIDONE 50 MG: 50 TABLET ORAL at 20:48

## 2023-11-05 RX ADMIN — LEVETIRACETAM 750 MG: 500 SOLUTION ORAL at 09:58

## 2023-11-05 RX ADMIN — SODIUM CHLORIDE, PRESERVATIVE FREE 40 MG: 5 INJECTION INTRAVENOUS at 01:36

## 2023-11-05 RX ADMIN — ALBUMIN (HUMAN) 25 G: 0.25 INJECTION, SOLUTION INTRAVENOUS at 10:01

## 2023-11-05 RX ADMIN — LACOSAMIDE 200 MG: 10 INJECTION INTRAVENOUS at 11:08

## 2023-11-05 RX ADMIN — ACETAMINOPHEN 650 MG: 325 TABLET ORAL at 01:36

## 2023-11-05 RX ADMIN — FUROSEMIDE 40 MG: 10 INJECTION, SOLUTION INTRAMUSCULAR; INTRAVENOUS at 19:12

## 2023-11-05 RX ADMIN — FUROSEMIDE 40 MG: 10 INJECTION, SOLUTION INTRAMUSCULAR; INTRAVENOUS at 09:57

## 2023-11-05 RX ADMIN — HEPARIN SODIUM 5000 UNITS: 5000 INJECTION INTRAVENOUS; SUBCUTANEOUS at 13:39

## 2023-11-05 RX ADMIN — PRIMIDONE 50 MG: 50 TABLET ORAL at 09:58

## 2023-11-05 RX ADMIN — CHLORHEXIDINE GLUCONATE 0.12% ORAL RINSE 15 ML: 1.2 LIQUID ORAL at 20:48

## 2023-11-05 RX ADMIN — ATORVASTATIN CALCIUM 40 MG: 40 TABLET, FILM COATED ORAL at 09:58

## 2023-11-05 RX ADMIN — SODIUM CHLORIDE, PRESERVATIVE FREE 10 ML: 5 INJECTION INTRAVENOUS at 20:47

## 2023-11-05 RX ADMIN — LEVETIRACETAM 750 MG: 500 SOLUTION ORAL at 20:48

## 2023-11-05 RX ADMIN — SENNOSIDES AND DOCUSATE SODIUM 1 TABLET: 50; 8.6 TABLET ORAL at 20:48

## 2023-11-05 RX ADMIN — CHLORHEXIDINE GLUCONATE 0.12% ORAL RINSE 15 ML: 1.2 LIQUID ORAL at 09:57

## 2023-11-05 ASSESSMENT — PULMONARY FUNCTION TESTS
PIF_VALUE: 13
PIF_VALUE: 15
PIF_VALUE: 28
PIF_VALUE: 17
PIF_VALUE: 18
PIF_VALUE: 13
PIF_VALUE: 12
PIF_VALUE: 16
PIF_VALUE: 16
PIF_VALUE: 20
PIF_VALUE: 15
PIF_VALUE: 12
PIF_VALUE: 19
PIF_VALUE: 8
PIF_VALUE: 15
PIF_VALUE: 16
PIF_VALUE: 13
PIF_VALUE: 19
PIF_VALUE: 14
PIF_VALUE: 15
PIF_VALUE: 13
PIF_VALUE: 16
PIF_VALUE: 16
PIF_VALUE: 18
PIF_VALUE: 17

## 2023-11-05 ASSESSMENT — PAIN SCALES - GENERAL: PAINLEVEL_OUTOF10: 0

## 2023-11-05 NOTE — PROCEDURES
Bronchoscopy Procedure Note    Date of Operation: 11/5/2023    Pre-op Diagnosis: Right lower lobe infiltrate    Post-op Diagnosis: Mucous plugging right lower lobe    Surgeon: Hayden Sandhoff, MD    Assistants: None    Anesthesia: General endotracheal anesthesia, patient already on a ventilator with propofol drip: Patient also received 50 mcg of fentanyl    Operation: Flexible fiberoptic bronchoscopy, diagnostic BAL right lower lobe      Specimen: BAL right lower lobe    Estimated Blood Loss: None      Complications: None    Indications and History:  The patient is a 76 y.o. female with respiratory failure/pneumonia. The risks, benefits, complications, treatment options and expected outcomes were discussed with the patient's sister [id number] Orozco [facility]. The possibilities of reaction to medication, pulmonary aspiration, perforation of a viscus, bleeding, failure to diagnose a condition and creating a complication requiring transfusion or operation were discussed with the patient who freely signed the consent. Description of Procedure: The patient was seen in the CDU and the site of surgery properly noted/marked. The patient was seen in the intensive care unit, identified as Dwayne Morse and the procedure verified as Flexible Fiberoptic Bronchoscopy. A Time Out was held and the above information confirmed. The scope was then passed into the trachea by the endotracheal tube. There were secretions at the end of the endotracheal tube that were clear but thick in nature. Inspection of the tracheal lumen was accomplished. The scope was sequentially passed into the left main and then left upper and lower bronchi and segmental bronchi. No significant secretions or endobronchial lesions on the left side    The scope was then withdrawn and advanced into the right main bronchus and then into the RUL, RML, and RLL bronchi and segmental bronchi. The right upper lobe was clear.   The right middle lobe was slightly inflamed

## 2023-11-06 ENCOUNTER — APPOINTMENT (OUTPATIENT)
Dept: GENERAL RADIOLOGY | Age: 68
DRG: 321 | End: 2023-11-06
Payer: COMMERCIAL

## 2023-11-06 LAB
ANION GAP SERPL CALCULATED.3IONS-SCNC: 6 MMOL/L (ref 9–17)
APPEARANCE FLD: NORMAL
ARTERIAL PATENCY WRIST A: ABNORMAL
BASOPHILS # BLD: 0.1 K/UL (ref 0–0.2)
BASOPHILS NFR BLD: 1 % (ref 0–2)
BDY SITE: ABNORMAL
BODY FLD TYPE: NORMAL
BODY TEMPERATURE: 37
BUN SERPL-MCNC: 9 MG/DL (ref 8–23)
CALCIUM SERPL-MCNC: 8.7 MG/DL (ref 8.6–10.4)
CASE NUMBER:: NORMAL
CHLORIDE SERPL-SCNC: 99 MMOL/L (ref 98–107)
CO2 SERPL-SCNC: 35 MMOL/L (ref 20–31)
COHGB MFR BLD: 2 % (ref 0–5)
COLOR FLD: NORMAL
CREAT SERPL-MCNC: <0.4 MG/DL (ref 0.5–0.9)
EOSINOPHIL # BLD: 0.3 K/UL (ref 0–0.4)
EOSINOPHILS RELATIVE PERCENT: 6 % (ref 0–4)
ERYTHROCYTE [DISTWIDTH] IN BLOOD BY AUTOMATED COUNT: 14.6 % (ref 11.5–14.9)
FIO2 ON VENT: 30 %
GAS FLOW.O2 O2 DELIVERY SYS: ABNORMAL L/MIN
GAS FLOW.O2 SETTING OXYMISER: 16 L/MIN
GFR SERPL CREATININE-BSD FRML MDRD: ABNORMAL ML/MIN/1.73M2
GLUCOSE SERPL-MCNC: 93 MG/DL (ref 70–99)
HCO3 ARTERIAL: 35.8 MMOL/L (ref 22–26)
HCT VFR BLD AUTO: 28.3 % (ref 36–46)
HGB BLD-MCNC: 9.4 G/DL (ref 12–16)
LYMPHOCYTES NFR BLD: 0.7 K/UL (ref 1–4.8)
LYMPHOCYTES RELATIVE PERCENT: 15 % (ref 24–44)
MANUAL DIF COMMENT FLD-IMP: NORMAL
MCH RBC QN AUTO: 33.4 PG (ref 26–34)
MCHC RBC AUTO-ENTMCNC: 33 G/DL (ref 31–37)
MCV RBC AUTO: 101.1 FL (ref 80–100)
METHEMOGLOBIN: 0.9 % (ref 0–1.9)
MICROORGANISM SPEC CULT: ABNORMAL
MICROORGANISM SPEC CULT: NORMAL
MICROORGANISM/AGENT SPEC: ABNORMAL
MICROORGANISM/AGENT SPEC: NORMAL
MONOCYTES NFR BLD: 0.7 K/UL (ref 0.1–1.3)
MONOCYTES NFR BLD: 13 % (ref 1–7)
NEUTROPHILS NFR BLD: 65 % (ref 36–66)
NEUTS SEG NFR BLD: 3.2 K/UL (ref 1.3–9.1)
O2 SAT, ARTERIAL: 95.1 % (ref 95–98)
PCO2 ARTERIAL: 47.8 MMHG (ref 35–45)
PEEP RESPIRATORY: 8 CM[H2O]
PH ARTERIAL: 7.48 (ref 7.35–7.45)
PLATELET # BLD AUTO: 268 K/UL (ref 150–450)
PMV BLD AUTO: 8.1 FL (ref 6–12)
PO2 ARTERIAL: 88.6 MMHG (ref 80–100)
POSITIVE BASE EXCESS, ART: 12.3 MMOL/L (ref 0–2)
POTASSIUM SERPL-SCNC: 3.6 MMOL/L (ref 3.7–5.3)
PT. POSITION: ABNORMAL
RBC # BLD AUTO: 2.8 M/UL (ref 4–5.2)
RBC # FLD: 9 CELLS/UL
RESPIRATORY RATE: 16
SODIUM SERPL-SCNC: 140 MMOL/L (ref 135–144)
SPECIMEN DESCRIPTION: ABNORMAL
SPECIMEN DESCRIPTION: NORMAL
SPECIMEN DESCRIPTION: NORMAL
TEXT FOR RESPIRATORY: ABNORMAL
TOTAL RATE: 18
VENTILATION MODE VENT: ABNORMAL
VT: 400
WBC # FLD: 301 CELLS/UL
WBC OTHER # BLD: 4.9 K/UL (ref 3.5–11)

## 2023-11-06 PROCEDURE — 36415 COLL VENOUS BLD VENIPUNCTURE: CPT

## 2023-11-06 PROCEDURE — C9254 INJECTION, LACOSAMIDE: HCPCS | Performed by: SURGERY

## 2023-11-06 PROCEDURE — 80048 BASIC METABOLIC PNL TOTAL CA: CPT

## 2023-11-06 PROCEDURE — 36600 WITHDRAWAL OF ARTERIAL BLOOD: CPT

## 2023-11-06 PROCEDURE — 2000000000 HC ICU R&B

## 2023-11-06 PROCEDURE — A4216 STERILE WATER/SALINE, 10 ML: HCPCS | Performed by: SURGERY

## 2023-11-06 PROCEDURE — 2580000003 HC RX 258: Performed by: SURGERY

## 2023-11-06 PROCEDURE — P9047 ALBUMIN (HUMAN), 25%, 50ML: HCPCS | Performed by: INTERNAL MEDICINE

## 2023-11-06 PROCEDURE — 6360000002 HC RX W HCPCS: Performed by: SURGERY

## 2023-11-06 PROCEDURE — 6370000000 HC RX 637 (ALT 250 FOR IP): Performed by: SURGERY

## 2023-11-06 PROCEDURE — 94761 N-INVAS EAR/PLS OXIMETRY MLT: CPT

## 2023-11-06 PROCEDURE — 6360000002 HC RX W HCPCS: Performed by: INTERNAL MEDICINE

## 2023-11-06 PROCEDURE — 94003 VENT MGMT INPAT SUBQ DAY: CPT

## 2023-11-06 PROCEDURE — 82805 BLOOD GASES W/O2 SATURATION: CPT

## 2023-11-06 PROCEDURE — C9113 INJ PANTOPRAZOLE SODIUM, VIA: HCPCS | Performed by: SURGERY

## 2023-11-06 PROCEDURE — 99291 CRITICAL CARE FIRST HOUR: CPT | Performed by: INTERNAL MEDICINE

## 2023-11-06 PROCEDURE — 2700000000 HC OXYGEN THERAPY PER DAY

## 2023-11-06 PROCEDURE — 99233 SBSQ HOSP IP/OBS HIGH 50: CPT | Performed by: INTERNAL MEDICINE

## 2023-11-06 PROCEDURE — 6370000000 HC RX 637 (ALT 250 FOR IP): Performed by: INTERNAL MEDICINE

## 2023-11-06 PROCEDURE — 71045 X-RAY EXAM CHEST 1 VIEW: CPT

## 2023-11-06 PROCEDURE — 85025 COMPLETE CBC W/AUTO DIFF WBC: CPT

## 2023-11-06 PROCEDURE — 99232 SBSQ HOSP IP/OBS MODERATE 35: CPT | Performed by: PHYSICAL MEDICINE & REHABILITATION

## 2023-11-06 RX ORDER — LACOSAMIDE 10 MG/ML
200 SOLUTION ORAL 2 TIMES DAILY
Status: DISPENSED | OUTPATIENT
Start: 2023-11-06

## 2023-11-06 RX ORDER — LACOSAMIDE 10 MG/ML
200 SOLUTION ORAL 2 TIMES DAILY
Status: DISCONTINUED | OUTPATIENT
Start: 2023-11-06 | End: 2023-11-06

## 2023-11-06 RX ORDER — LACOSAMIDE 10 MG/ML
50 SOLUTION ORAL 2 TIMES DAILY
Status: DISCONTINUED | OUTPATIENT
Start: 2023-11-06 | End: 2023-11-06

## 2023-11-06 RX ORDER — FUROSEMIDE 10 MG/ML
40 INJECTION INTRAMUSCULAR; INTRAVENOUS DAILY
Status: DISPENSED | OUTPATIENT
Start: 2023-11-06

## 2023-11-06 RX ORDER — ALBUMIN (HUMAN) 12.5 G/50ML
25 SOLUTION INTRAVENOUS 2 TIMES DAILY
Status: COMPLETED | OUTPATIENT
Start: 2023-11-06 | End: 2023-11-07

## 2023-11-06 RX ADMIN — ACETAMINOPHEN 650 MG: 325 TABLET ORAL at 02:49

## 2023-11-06 RX ADMIN — ACETAMINOPHEN 650 MG: 325 TABLET ORAL at 13:39

## 2023-11-06 RX ADMIN — SODIUM CHLORIDE, PRESERVATIVE FREE 10 ML: 5 INJECTION INTRAVENOUS at 08:06

## 2023-11-06 RX ADMIN — SENNOSIDES AND DOCUSATE SODIUM 1 TABLET: 50; 8.6 TABLET ORAL at 19:50

## 2023-11-06 RX ADMIN — ALBUMIN (HUMAN) 25 G: 0.25 INJECTION, SOLUTION INTRAVENOUS at 16:17

## 2023-11-06 RX ADMIN — LACOSAMIDE 200 MG: 10 INJECTION INTRAVENOUS at 09:19

## 2023-11-06 RX ADMIN — LEVETIRACETAM 750 MG: 500 SOLUTION ORAL at 19:51

## 2023-11-06 RX ADMIN — HEPARIN SODIUM 5000 UNITS: 5000 INJECTION INTRAVENOUS; SUBCUTANEOUS at 06:26

## 2023-11-06 RX ADMIN — PRIMIDONE 50 MG: 50 TABLET ORAL at 08:05

## 2023-11-06 RX ADMIN — HEPARIN SODIUM 5000 UNITS: 5000 INJECTION INTRAVENOUS; SUBCUTANEOUS at 13:39

## 2023-11-06 RX ADMIN — SODIUM CHLORIDE, PRESERVATIVE FREE 10 ML: 5 INJECTION INTRAVENOUS at 19:50

## 2023-11-06 RX ADMIN — PROPOFOL 25 MCG/KG/MIN: 10 INJECTION, EMULSION INTRAVENOUS at 14:18

## 2023-11-06 RX ADMIN — SODIUM CHLORIDE, PRESERVATIVE FREE 40 MG: 5 INJECTION INTRAVENOUS at 00:05

## 2023-11-06 RX ADMIN — HEPARIN SODIUM 5000 UNITS: 5000 INJECTION INTRAVENOUS; SUBCUTANEOUS at 22:42

## 2023-11-06 RX ADMIN — LACOSAMIDE 200 MG: 100 SOLUTION ORAL at 19:50

## 2023-11-06 RX ADMIN — DONEPEZIL HYDROCHLORIDE 5 MG: 10 TABLET, FILM COATED ORAL at 19:51

## 2023-11-06 RX ADMIN — FUROSEMIDE 40 MG: 10 INJECTION, SOLUTION INTRAMUSCULAR; INTRAVENOUS at 17:06

## 2023-11-06 RX ADMIN — PRIMIDONE 50 MG: 50 TABLET ORAL at 19:51

## 2023-11-06 RX ADMIN — ATORVASTATIN CALCIUM 40 MG: 40 TABLET, FILM COATED ORAL at 08:03

## 2023-11-06 RX ADMIN — LEVOTHYROXINE SODIUM 50 MCG: 0.05 TABLET ORAL at 06:26

## 2023-11-06 RX ADMIN — CHLORHEXIDINE GLUCONATE 0.12% ORAL RINSE 15 ML: 1.2 LIQUID ORAL at 19:50

## 2023-11-06 RX ADMIN — PROPOFOL 30 MCG/KG/MIN: 10 INJECTION, EMULSION INTRAVENOUS at 03:29

## 2023-11-06 RX ADMIN — CHLORHEXIDINE GLUCONATE 0.12% ORAL RINSE 15 ML: 1.2 LIQUID ORAL at 08:03

## 2023-11-06 RX ADMIN — ACETAMINOPHEN 650 MG: 325 TABLET ORAL at 19:50

## 2023-11-06 RX ADMIN — ACETAMINOPHEN 650 MG: 325 TABLET ORAL at 08:03

## 2023-11-06 RX ADMIN — SODIUM CHLORIDE, PRESERVATIVE FREE 40 MG: 5 INJECTION INTRAVENOUS at 13:02

## 2023-11-06 RX ADMIN — LEVETIRACETAM 750 MG: 500 SOLUTION ORAL at 08:05

## 2023-11-06 ASSESSMENT — PULMONARY FUNCTION TESTS
PIF_VALUE: 11
PIF_VALUE: 12
PIF_VALUE: 13
PIF_VALUE: 16
PIF_VALUE: 16
PIF_VALUE: 13
PIF_VALUE: 13
PIF_VALUE: 14
PIF_VALUE: 13
PIF_VALUE: 15
PIF_VALUE: 17
PIF_VALUE: 16
PIF_VALUE: 18
PIF_VALUE: 12
PIF_VALUE: 10
PIF_VALUE: 15
PIF_VALUE: 13
PIF_VALUE: 11
PIF_VALUE: 12
PIF_VALUE: 11
PIF_VALUE: 17
PIF_VALUE: 13
PIF_VALUE: 15
PIF_VALUE: 12
PIF_VALUE: 14
PIF_VALUE: 11
PIF_VALUE: 11
PIF_VALUE: 12
PIF_VALUE: 14
PIF_VALUE: 12
PIF_VALUE: 15
PIF_VALUE: 20
PIF_VALUE: 18
PIF_VALUE: 12
PIF_VALUE: 13
PIF_VALUE: 18
PIF_VALUE: 14
PIF_VALUE: 13
PIF_VALUE: 13
PIF_VALUE: 10
PIF_VALUE: 9
PIF_VALUE: 11
PIF_VALUE: 17
PIF_VALUE: 8
PIF_VALUE: 15
PIF_VALUE: 10
PIF_VALUE: 16
PIF_VALUE: 12
PIF_VALUE: 11
PIF_VALUE: 17
PIF_VALUE: 9
PIF_VALUE: 13
PIF_VALUE: 17

## 2023-11-07 ENCOUNTER — APPOINTMENT (OUTPATIENT)
Dept: CT IMAGING | Age: 68
DRG: 321 | End: 2023-11-07
Payer: COMMERCIAL

## 2023-11-07 ENCOUNTER — APPOINTMENT (OUTPATIENT)
Dept: GENERAL RADIOLOGY | Age: 68
DRG: 321 | End: 2023-11-07
Payer: COMMERCIAL

## 2023-11-07 LAB
ANION GAP SERPL CALCULATED.3IONS-SCNC: 8 MMOL/L (ref 9–17)
ARTERIAL PATENCY WRIST A: ABNORMAL
BASOPHILS # BLD: 0 K/UL (ref 0–0.2)
BASOPHILS NFR BLD: 1 % (ref 0–2)
BDY SITE: ABNORMAL
BUN SERPL-MCNC: 10 MG/DL (ref 8–23)
CALCIUM SERPL-MCNC: 9 MG/DL (ref 8.6–10.4)
CHLORIDE SERPL-SCNC: 98 MMOL/L (ref 98–107)
CO2 SERPL-SCNC: 33 MMOL/L (ref 20–31)
COHGB MFR BLD: 2.2 % (ref 0–5)
CREAT SERPL-MCNC: <0.4 MG/DL (ref 0.5–0.9)
EOSINOPHIL # BLD: 0.3 K/UL (ref 0–0.4)
EOSINOPHILS RELATIVE PERCENT: 7 % (ref 0–4)
ERYTHROCYTE [DISTWIDTH] IN BLOOD BY AUTOMATED COUNT: 15.5 % (ref 11.5–14.9)
FIO2 ON VENT: 30 %
GAS FLOW.O2 O2 DELIVERY SYS: ABNORMAL L/MIN
GAS FLOW.O2 SETTING OXYMISER: 12 L/MIN
GFR SERPL CREATININE-BSD FRML MDRD: ABNORMAL ML/MIN/1.73M2
GLUCOSE SERPL-MCNC: 105 MG/DL (ref 70–99)
HCO3 ARTERIAL: 35.7 MMOL/L (ref 22–26)
HCT VFR BLD AUTO: 28.8 % (ref 36–46)
HGB BLD-MCNC: 9.4 G/DL (ref 12–16)
LYMPHOCYTES NFR BLD: 0.8 K/UL (ref 1–4.8)
LYMPHOCYTES RELATIVE PERCENT: 17 % (ref 24–44)
MCH RBC QN AUTO: 33.2 PG (ref 26–34)
MCHC RBC AUTO-ENTMCNC: 32.6 G/DL (ref 31–37)
MCV RBC AUTO: 102 FL (ref 80–100)
METHEMOGLOBIN: 0.8 % (ref 0–1.9)
MONOCYTES NFR BLD: 0.5 K/UL (ref 0.1–1.3)
MONOCYTES NFR BLD: 12 % (ref 1–7)
NEUTROPHILS NFR BLD: 63 % (ref 36–66)
NEUTS SEG NFR BLD: 2.8 K/UL (ref 1.3–9.1)
O2 SAT, ARTERIAL: 90.3 % (ref 95–98)
PCO2 ARTERIAL: 47.1 MMHG (ref 35–45)
PEEP RESPIRATORY: 8 CM[H2O]
PH ARTERIAL: 7.49 (ref 7.35–7.45)
PLATELET # BLD AUTO: 249 K/UL (ref 150–450)
PMV BLD AUTO: 8.1 FL (ref 6–12)
PO2 ARTERIAL: 65.8 MMHG (ref 80–100)
POSITIVE BASE EXCESS, ART: 12.4 MMOL/L (ref 0–2)
POTASSIUM SERPL-SCNC: 3.4 MMOL/L (ref 3.7–5.3)
PT. POSITION: ABNORMAL
RBC # BLD AUTO: 2.83 M/UL (ref 4–5.2)
RESPIRATORY RATE: 14
SODIUM SERPL-SCNC: 139 MMOL/L (ref 135–144)
SURGICAL PATHOLOGY REPORT: NORMAL
TEXT FOR RESPIRATORY: ABNORMAL
TOTAL RATE: 14
VENTILATION MODE VENT: ABNORMAL
VT: 400
WBC OTHER # BLD: 4.4 K/UL (ref 3.5–11)

## 2023-11-07 PROCEDURE — 71045 X-RAY EXAM CHEST 1 VIEW: CPT

## 2023-11-07 PROCEDURE — 2580000003 HC RX 258: Performed by: SURGERY

## 2023-11-07 PROCEDURE — P9047 ALBUMIN (HUMAN), 25%, 50ML: HCPCS | Performed by: INTERNAL MEDICINE

## 2023-11-07 PROCEDURE — 36415 COLL VENOUS BLD VENIPUNCTURE: CPT

## 2023-11-07 PROCEDURE — 99291 CRITICAL CARE FIRST HOUR: CPT | Performed by: INTERNAL MEDICINE

## 2023-11-07 PROCEDURE — 6370000000 HC RX 637 (ALT 250 FOR IP): Performed by: SURGERY

## 2023-11-07 PROCEDURE — 94003 VENT MGMT INPAT SUBQ DAY: CPT

## 2023-11-07 PROCEDURE — C9113 INJ PANTOPRAZOLE SODIUM, VIA: HCPCS | Performed by: SURGERY

## 2023-11-07 PROCEDURE — 85025 COMPLETE CBC W/AUTO DIFF WBC: CPT

## 2023-11-07 PROCEDURE — 6360000002 HC RX W HCPCS: Performed by: INTERNAL MEDICINE

## 2023-11-07 PROCEDURE — 6360000002 HC RX W HCPCS: Performed by: SURGERY

## 2023-11-07 PROCEDURE — 82805 BLOOD GASES W/O2 SATURATION: CPT

## 2023-11-07 PROCEDURE — 71250 CT THORAX DX C-: CPT

## 2023-11-07 PROCEDURE — A4216 STERILE WATER/SALINE, 10 ML: HCPCS | Performed by: SURGERY

## 2023-11-07 PROCEDURE — 2000000000 HC ICU R&B

## 2023-11-07 PROCEDURE — 80048 BASIC METABOLIC PNL TOTAL CA: CPT

## 2023-11-07 PROCEDURE — 6370000000 HC RX 637 (ALT 250 FOR IP): Performed by: INTERNAL MEDICINE

## 2023-11-07 PROCEDURE — 99233 SBSQ HOSP IP/OBS HIGH 50: CPT | Performed by: INTERNAL MEDICINE

## 2023-11-07 RX ADMIN — CHLORHEXIDINE GLUCONATE 0.12% ORAL RINSE 15 ML: 1.2 LIQUID ORAL at 08:04

## 2023-11-07 RX ADMIN — SODIUM CHLORIDE, PRESERVATIVE FREE 10 ML: 5 INJECTION INTRAVENOUS at 21:19

## 2023-11-07 RX ADMIN — PRIMIDONE 50 MG: 50 TABLET ORAL at 08:05

## 2023-11-07 RX ADMIN — HEPARIN SODIUM 5000 UNITS: 5000 INJECTION INTRAVENOUS; SUBCUTANEOUS at 06:01

## 2023-11-07 RX ADMIN — SODIUM CHLORIDE, PRESERVATIVE FREE 40 MG: 5 INJECTION INTRAVENOUS at 12:13

## 2023-11-07 RX ADMIN — PROPOFOL 10 MCG/KG/MIN: 10 INJECTION, EMULSION INTRAVENOUS at 14:19

## 2023-11-07 RX ADMIN — LEVETIRACETAM 750 MG: 500 SOLUTION ORAL at 21:20

## 2023-11-07 RX ADMIN — HEPARIN SODIUM 5000 UNITS: 5000 INJECTION INTRAVENOUS; SUBCUTANEOUS at 21:30

## 2023-11-07 RX ADMIN — HEPARIN SODIUM 5000 UNITS: 5000 INJECTION INTRAVENOUS; SUBCUTANEOUS at 14:08

## 2023-11-07 RX ADMIN — SODIUM CHLORIDE, PRESERVATIVE FREE 10 ML: 5 INJECTION INTRAVENOUS at 08:06

## 2023-11-07 RX ADMIN — LACOSAMIDE 200 MG: 100 SOLUTION ORAL at 08:04

## 2023-11-07 RX ADMIN — FUROSEMIDE 40 MG: 10 INJECTION, SOLUTION INTRAMUSCULAR; INTRAVENOUS at 09:06

## 2023-11-07 RX ADMIN — CHLORHEXIDINE GLUCONATE 0.12% ORAL RINSE 15 ML: 1.2 LIQUID ORAL at 21:18

## 2023-11-07 RX ADMIN — SODIUM CHLORIDE, PRESERVATIVE FREE 40 MG: 5 INJECTION INTRAVENOUS at 00:27

## 2023-11-07 RX ADMIN — ACETAMINOPHEN 650 MG: 325 TABLET ORAL at 02:50

## 2023-11-07 RX ADMIN — LACOSAMIDE 200 MG: 100 SOLUTION ORAL at 21:18

## 2023-11-07 RX ADMIN — POTASSIUM BICARBONATE 40 MEQ: 782 TABLET, EFFERVESCENT ORAL at 06:01

## 2023-11-07 RX ADMIN — LEVOTHYROXINE SODIUM 50 MCG: 0.05 TABLET ORAL at 06:01

## 2023-11-07 RX ADMIN — ALBUMIN (HUMAN) 25 G: 0.25 INJECTION, SOLUTION INTRAVENOUS at 08:03

## 2023-11-07 RX ADMIN — LEVETIRACETAM 750 MG: 500 SOLUTION ORAL at 08:04

## 2023-11-07 RX ADMIN — SENNOSIDES AND DOCUSATE SODIUM 1 TABLET: 50; 8.6 TABLET ORAL at 21:18

## 2023-11-07 RX ADMIN — ACETAMINOPHEN 650 MG: 325 TABLET ORAL at 14:07

## 2023-11-07 RX ADMIN — ACETAMINOPHEN 650 MG: 325 TABLET ORAL at 21:18

## 2023-11-07 RX ADMIN — ACETAMINOPHEN 650 MG: 325 TABLET ORAL at 08:04

## 2023-11-07 RX ADMIN — PRIMIDONE 50 MG: 50 TABLET ORAL at 21:18

## 2023-11-07 RX ADMIN — PROPOFOL 20 MCG/KG/MIN: 10 INJECTION, EMULSION INTRAVENOUS at 01:32

## 2023-11-07 RX ADMIN — DONEPEZIL HYDROCHLORIDE 5 MG: 10 TABLET, FILM COATED ORAL at 21:18

## 2023-11-07 RX ADMIN — ATORVASTATIN CALCIUM 40 MG: 40 TABLET, FILM COATED ORAL at 08:05

## 2023-11-07 ASSESSMENT — PULMONARY FUNCTION TESTS
PIF_VALUE: 12
PIF_VALUE: 13
PIF_VALUE: 12
PIF_VALUE: 13
PIF_VALUE: 12
PIF_VALUE: 12
PIF_VALUE: 13
PIF_VALUE: 25
PIF_VALUE: 12
PIF_VALUE: 14
PIF_VALUE: 14
PIF_VALUE: 12
PIF_VALUE: 21
PIF_VALUE: 12
PIF_VALUE: 12
PIF_VALUE: 13
PIF_VALUE: 18
PIF_VALUE: 13
PIF_VALUE: 12
PIF_VALUE: 12
PIF_VALUE: 13
PIF_VALUE: 13
PIF_VALUE: 10
PIF_VALUE: 12
PIF_VALUE: 13
PIF_VALUE: 12
PIF_VALUE: 13
PIF_VALUE: 24
PIF_VALUE: 12
PIF_VALUE: 12
PIF_VALUE: 13
PIF_VALUE: 8
PIF_VALUE: 12
PIF_VALUE: 12
PIF_VALUE: 13
PIF_VALUE: 12
PIF_VALUE: 13
PIF_VALUE: 12
PIF_VALUE: 12
PIF_VALUE: 14
PIF_VALUE: 12
PIF_VALUE: 12
PIF_VALUE: 13
PIF_VALUE: 12
PIF_VALUE: 13
PIF_VALUE: 12
PIF_VALUE: 12
PIF_VALUE: 18
PIF_VALUE: 12
PIF_VALUE: 15
PIF_VALUE: 7

## 2023-11-08 ENCOUNTER — APPOINTMENT (OUTPATIENT)
Dept: GENERAL RADIOLOGY | Age: 68
DRG: 321 | End: 2023-11-08
Payer: COMMERCIAL

## 2023-11-08 ENCOUNTER — APPOINTMENT (OUTPATIENT)
Dept: INTERVENTIONAL RADIOLOGY/VASCULAR | Age: 68
DRG: 321 | End: 2023-11-08
Payer: COMMERCIAL

## 2023-11-08 LAB
ANION GAP SERPL CALCULATED.3IONS-SCNC: 6 MMOL/L (ref 9–17)
ARTERIAL PATENCY WRIST A: ABNORMAL
BASOPHILS # BLD: 0 K/UL (ref 0–0.2)
BASOPHILS NFR BLD: 0 % (ref 0–2)
BDY SITE: ABNORMAL
BODY TEMPERATURE: 37
BUN SERPL-MCNC: 11 MG/DL (ref 8–23)
CALCIUM SERPL-MCNC: 9 MG/DL (ref 8.6–10.4)
CASE NUMBER:: NORMAL
CHLORIDE SERPL-SCNC: 102 MMOL/L (ref 98–107)
CO2 SERPL-SCNC: 34 MMOL/L (ref 20–31)
COHGB MFR BLD: 2.6 % (ref 0–5)
CREAT SERPL-MCNC: <0.4 MG/DL (ref 0.5–0.9)
EOSINOPHIL # BLD: 0.2 K/UL (ref 0–0.4)
EOSINOPHILS RELATIVE PERCENT: 3 % (ref 0–4)
ERYTHROCYTE [DISTWIDTH] IN BLOOD BY AUTOMATED COUNT: 15 % (ref 11.5–14.9)
FIO2 ON VENT: 30 %
GAS FLOW.O2 O2 DELIVERY SYS: ABNORMAL L/MIN
GAS FLOW.O2 SETTING OXYMISER: 12 L/MIN
GFR SERPL CREATININE-BSD FRML MDRD: ABNORMAL ML/MIN/1.73M2
GLUCOSE SERPL-MCNC: 106 MG/DL (ref 70–99)
HCO3 ARTERIAL: 35.2 MMOL/L (ref 22–26)
HCT VFR BLD AUTO: 28.2 % (ref 36–46)
HGB BLD-MCNC: 9.2 G/DL (ref 12–16)
LDH FLD L TO P-CCNC: 180 U/L
LYMPHOCYTES NFR BLD: 0.9 K/UL (ref 1–4.8)
LYMPHOCYTES RELATIVE PERCENT: 15 % (ref 24–44)
MCH RBC QN AUTO: 33.7 PG (ref 26–34)
MCHC RBC AUTO-ENTMCNC: 32.8 G/DL (ref 31–37)
MCV RBC AUTO: 102.7 FL (ref 80–100)
METHEMOGLOBIN: 0.5 % (ref 0–1.9)
MICROORGANISM SPEC CULT: ABNORMAL
MICROORGANISM SPEC CULT: ABNORMAL
MICROORGANISM/AGENT SPEC: ABNORMAL
MICROORGANISM/AGENT SPEC: ABNORMAL
MONOCYTES NFR BLD: 0.7 K/UL (ref 0.1–1.3)
MONOCYTES NFR BLD: 13 % (ref 1–7)
NEUTROPHILS NFR BLD: 69 % (ref 36–66)
NEUTS SEG NFR BLD: 3.9 K/UL (ref 1.3–9.1)
O2 SAT, ARTERIAL: 95.7 % (ref 95–98)
PCO2 ARTERIAL: 48.8 MMHG (ref 35–45)
PEEP RESPIRATORY: 8 CM[H2O]
PH ARTERIAL: 7.47 (ref 7.35–7.45)
PH FLUID: 7.5
PLATELET # BLD AUTO: 239 K/UL (ref 150–450)
PMV BLD AUTO: 8 FL (ref 6–12)
PO2 ARTERIAL: 87.8 MMHG (ref 80–100)
POSITIVE BASE EXCESS, ART: 11.5 MMOL/L (ref 0–2)
POTASSIUM SERPL-SCNC: 3.7 MMOL/L (ref 3.7–5.3)
PROT FLD-MCNC: 3.7 G/DL
PT. POSITION: ABNORMAL
RBC # BLD AUTO: 2.74 M/UL (ref 4–5.2)
RESPIRATORY RATE: 12
SODIUM SERPL-SCNC: 142 MMOL/L (ref 135–144)
SPECIMEN DESCRIPTION: ABNORMAL
SPECIMEN DESCRIPTION: NORMAL
SPECIMEN TYPE: NORMAL
TEXT FOR RESPIRATORY: ABNORMAL
TOTAL RATE: 15
TRIGL SERPL-MCNC: 79 MG/DL
VENTILATION MODE VENT: ABNORMAL
VT: 400
WBC OTHER # BLD: 5.7 K/UL (ref 3.5–11)

## 2023-11-08 PROCEDURE — 87205 SMEAR GRAM STAIN: CPT

## 2023-11-08 PROCEDURE — 6360000002 HC RX W HCPCS: Performed by: SURGERY

## 2023-11-08 PROCEDURE — 94003 VENT MGMT INPAT SUBQ DAY: CPT

## 2023-11-08 PROCEDURE — 83986 ASSAY PH BODY FLUID NOS: CPT

## 2023-11-08 PROCEDURE — 36415 COLL VENOUS BLD VENIPUNCTURE: CPT

## 2023-11-08 PROCEDURE — 82805 BLOOD GASES W/O2 SATURATION: CPT

## 2023-11-08 PROCEDURE — 87070 CULTURE OTHR SPECIMN AEROBIC: CPT

## 2023-11-08 PROCEDURE — 2000000000 HC ICU R&B

## 2023-11-08 PROCEDURE — 6370000000 HC RX 637 (ALT 250 FOR IP): Performed by: INTERNAL MEDICINE

## 2023-11-08 PROCEDURE — 99232 SBSQ HOSP IP/OBS MODERATE 35: CPT | Performed by: NURSE PRACTITIONER

## 2023-11-08 PROCEDURE — 6360000002 HC RX W HCPCS: Performed by: INTERNAL MEDICINE

## 2023-11-08 PROCEDURE — 84478 ASSAY OF TRIGLYCERIDES: CPT

## 2023-11-08 PROCEDURE — 99291 CRITICAL CARE FIRST HOUR: CPT | Performed by: INTERNAL MEDICINE

## 2023-11-08 PROCEDURE — 83615 LACTATE (LD) (LDH) ENZYME: CPT

## 2023-11-08 PROCEDURE — 88342 IMHCHEM/IMCYTCHM 1ST ANTB: CPT

## 2023-11-08 PROCEDURE — 84157 ASSAY OF PROTEIN OTHER: CPT

## 2023-11-08 PROCEDURE — 36600 WITHDRAWAL OF ARTERIAL BLOOD: CPT

## 2023-11-08 PROCEDURE — 71045 X-RAY EXAM CHEST 1 VIEW: CPT

## 2023-11-08 PROCEDURE — C1729 CATH, DRAINAGE: HCPCS

## 2023-11-08 PROCEDURE — 88112 CYTOPATH CELL ENHANCE TECH: CPT

## 2023-11-08 PROCEDURE — 80048 BASIC METABOLIC PNL TOTAL CA: CPT

## 2023-11-08 PROCEDURE — 85025 COMPLETE CBC W/AUTO DIFF WBC: CPT

## 2023-11-08 PROCEDURE — 2580000003 HC RX 258: Performed by: SURGERY

## 2023-11-08 PROCEDURE — 99233 SBSQ HOSP IP/OBS HIGH 50: CPT | Performed by: INTERNAL MEDICINE

## 2023-11-08 PROCEDURE — 6370000000 HC RX 637 (ALT 250 FOR IP): Performed by: SURGERY

## 2023-11-08 PROCEDURE — 88341 IMHCHEM/IMCYTCHM EA ADD ANTB: CPT

## 2023-11-08 PROCEDURE — 32555 ASPIRATE PLEURA W/ IMAGING: CPT

## 2023-11-08 PROCEDURE — 88305 TISSUE EXAM BY PATHOLOGIST: CPT

## 2023-11-08 PROCEDURE — 87075 CULTR BACTERIA EXCEPT BLOOD: CPT

## 2023-11-08 PROCEDURE — C9113 INJ PANTOPRAZOLE SODIUM, VIA: HCPCS | Performed by: SURGERY

## 2023-11-08 PROCEDURE — A4216 STERILE WATER/SALINE, 10 ML: HCPCS | Performed by: SURGERY

## 2023-11-08 RX ADMIN — ACETAMINOPHEN 650 MG: 325 TABLET ORAL at 14:02

## 2023-11-08 RX ADMIN — LACOSAMIDE 200 MG: 100 SOLUTION ORAL at 21:25

## 2023-11-08 RX ADMIN — HEPARIN SODIUM 5000 UNITS: 5000 INJECTION INTRAVENOUS; SUBCUTANEOUS at 06:23

## 2023-11-08 RX ADMIN — SODIUM CHLORIDE, PRESERVATIVE FREE 40 MG: 5 INJECTION INTRAVENOUS at 01:19

## 2023-11-08 RX ADMIN — HEPARIN SODIUM 5000 UNITS: 5000 INJECTION INTRAVENOUS; SUBCUTANEOUS at 21:26

## 2023-11-08 RX ADMIN — PROPOFOL 20 MCG/KG/MIN: 10 INJECTION, EMULSION INTRAVENOUS at 10:23

## 2023-11-08 RX ADMIN — CHLORHEXIDINE GLUCONATE 0.12% ORAL RINSE 15 ML: 1.2 LIQUID ORAL at 07:43

## 2023-11-08 RX ADMIN — LEVOTHYROXINE SODIUM 50 MCG: 0.05 TABLET ORAL at 06:22

## 2023-11-08 RX ADMIN — FUROSEMIDE 40 MG: 10 INJECTION, SOLUTION INTRAMUSCULAR; INTRAVENOUS at 07:44

## 2023-11-08 RX ADMIN — LEVETIRACETAM 750 MG: 500 SOLUTION ORAL at 07:44

## 2023-11-08 RX ADMIN — ACETAMINOPHEN 650 MG: 325 TABLET ORAL at 21:26

## 2023-11-08 RX ADMIN — SODIUM CHLORIDE, PRESERVATIVE FREE 10 ML: 5 INJECTION INTRAVENOUS at 21:26

## 2023-11-08 RX ADMIN — LACOSAMIDE 200 MG: 100 SOLUTION ORAL at 07:43

## 2023-11-08 RX ADMIN — PROPOFOL 25 MCG/KG/MIN: 10 INJECTION, EMULSION INTRAVENOUS at 01:18

## 2023-11-08 RX ADMIN — ACETAMINOPHEN 650 MG: 325 TABLET ORAL at 06:22

## 2023-11-08 RX ADMIN — LEVETIRACETAM 750 MG: 500 SOLUTION ORAL at 21:25

## 2023-11-08 RX ADMIN — SODIUM CHLORIDE, PRESERVATIVE FREE 40 MG: 5 INJECTION INTRAVENOUS at 12:34

## 2023-11-08 RX ADMIN — PRIMIDONE 50 MG: 50 TABLET ORAL at 07:44

## 2023-11-08 RX ADMIN — CHLORHEXIDINE GLUCONATE 0.12% ORAL RINSE 15 ML: 1.2 LIQUID ORAL at 21:25

## 2023-11-08 RX ADMIN — DONEPEZIL HYDROCHLORIDE 5 MG: 10 TABLET, FILM COATED ORAL at 21:25

## 2023-11-08 RX ADMIN — HEPARIN SODIUM 5000 UNITS: 5000 INJECTION INTRAVENOUS; SUBCUTANEOUS at 14:02

## 2023-11-08 RX ADMIN — ATORVASTATIN CALCIUM 40 MG: 40 TABLET, FILM COATED ORAL at 07:44

## 2023-11-08 RX ADMIN — PRIMIDONE 50 MG: 50 TABLET ORAL at 21:27

## 2023-11-08 RX ADMIN — SODIUM CHLORIDE, PRESERVATIVE FREE 10 ML: 5 INJECTION INTRAVENOUS at 07:44

## 2023-11-08 ASSESSMENT — PULMONARY FUNCTION TESTS
PIF_VALUE: 7
PIF_VALUE: 12
PIF_VALUE: 12
PIF_VALUE: 15
PIF_VALUE: 13
PIF_VALUE: 8
PIF_VALUE: 15
PIF_VALUE: 9
PIF_VALUE: 14
PIF_VALUE: 15
PIF_VALUE: 13
PIF_VALUE: 12
PIF_VALUE: 13
PIF_VALUE: 10
PIF_VALUE: 12
PIF_VALUE: 13
PIF_VALUE: 12
PIF_VALUE: 15
PIF_VALUE: 14
PIF_VALUE: 10
PIF_VALUE: 9
PIF_VALUE: 13
PIF_VALUE: 11
PIF_VALUE: 13
PIF_VALUE: 15
PIF_VALUE: 8
PIF_VALUE: 14
PIF_VALUE: 9
PIF_VALUE: 11
PIF_VALUE: 15
PIF_VALUE: 12
PIF_VALUE: 14
PIF_VALUE: 12
PIF_VALUE: 14
PIF_VALUE: 15
PIF_VALUE: 14
PIF_VALUE: 15
PIF_VALUE: 9
PIF_VALUE: 14
PIF_VALUE: 8
PIF_VALUE: 15
PIF_VALUE: 15
PIF_VALUE: 7
PIF_VALUE: 12
PIF_VALUE: 9
PIF_VALUE: 11
PIF_VALUE: 9
PIF_VALUE: 11
PIF_VALUE: 8
PIF_VALUE: 7
PIF_VALUE: 12
PIF_VALUE: 13
PIF_VALUE: 13
PIF_VALUE: 12
PIF_VALUE: 15
PIF_VALUE: 12
PIF_VALUE: 13
PIF_VALUE: 13
PIF_VALUE: 15
PIF_VALUE: 16
PIF_VALUE: 13
PIF_VALUE: 8
PIF_VALUE: 14
PIF_VALUE: 9
PIF_VALUE: 12
PIF_VALUE: 12
PIF_VALUE: 15
PIF_VALUE: 24
PIF_VALUE: 11
PIF_VALUE: 12

## 2023-11-08 ASSESSMENT — PAIN SCALES - GENERAL: PAINLEVEL_OUTOF10: 0

## 2023-11-09 ENCOUNTER — APPOINTMENT (OUTPATIENT)
Dept: GENERAL RADIOLOGY | Age: 68
DRG: 321 | End: 2023-11-09
Payer: COMMERCIAL

## 2023-11-09 LAB
ANION GAP SERPL CALCULATED.3IONS-SCNC: 6 MMOL/L (ref 9–17)
ARTERIAL PATENCY WRIST A: ABNORMAL
BASOPHILS # BLD: 0 K/UL (ref 0–0.2)
BASOPHILS NFR BLD: 1 % (ref 0–2)
BDY SITE: ABNORMAL
BODY TEMPERATURE: 37
BUN SERPL-MCNC: 12 MG/DL (ref 8–23)
CALCIUM SERPL-MCNC: 9.2 MG/DL (ref 8.6–10.4)
CHLORIDE SERPL-SCNC: 102 MMOL/L (ref 98–107)
CO2 SERPL-SCNC: 35 MMOL/L (ref 20–31)
COHGB MFR BLD: 2.7 % (ref 0–5)
CREAT SERPL-MCNC: <0.4 MG/DL (ref 0.5–0.9)
EOSINOPHIL # BLD: 0.3 K/UL (ref 0–0.4)
EOSINOPHILS RELATIVE PERCENT: 4 % (ref 0–4)
ERYTHROCYTE [DISTWIDTH] IN BLOOD BY AUTOMATED COUNT: 15.4 % (ref 11.5–14.9)
FIO2 ON VENT: 30 %
GAS FLOW.O2 O2 DELIVERY SYS: ABNORMAL L/MIN
GAS FLOW.O2 SETTING OXYMISER: 12 L/MIN
GFR SERPL CREATININE-BSD FRML MDRD: ABNORMAL ML/MIN/1.73M2
GLUCOSE SERPL-MCNC: 106 MG/DL (ref 70–99)
HCO3 ARTERIAL: 36.4 MMOL/L (ref 22–26)
HCT VFR BLD AUTO: 30 % (ref 36–46)
HGB BLD-MCNC: 9.6 G/DL (ref 12–16)
LDH SERPL-CCNC: 274 U/L (ref 135–214)
LYMPHOCYTES NFR BLD: 0.8 K/UL (ref 1–4.8)
LYMPHOCYTES RELATIVE PERCENT: 14 % (ref 24–44)
MCH RBC QN AUTO: 32.7 PG (ref 26–34)
MCHC RBC AUTO-ENTMCNC: 31.9 G/DL (ref 31–37)
MCV RBC AUTO: 102.7 FL (ref 80–100)
METHEMOGLOBIN: 0.7 % (ref 0–1.9)
MONOCYTES NFR BLD: 0.7 K/UL (ref 0.1–1.3)
MONOCYTES NFR BLD: 12 % (ref 1–7)
NEUTROPHILS NFR BLD: 69 % (ref 36–66)
NEUTS SEG NFR BLD: 4.1 K/UL (ref 1.3–9.1)
O2 SAT, ARTERIAL: 94.7 % (ref 95–98)
PCO2 ARTERIAL: 51.9 MMHG (ref 35–45)
PEEP RESPIRATORY: 8 CM[H2O]
PH ARTERIAL: 7.46 (ref 7.35–7.45)
PLATELET # BLD AUTO: 279 K/UL (ref 150–450)
PMV BLD AUTO: 8.2 FL (ref 6–12)
PO2 ARTERIAL: 86.4 MMHG (ref 80–100)
POSITIVE BASE EXCESS, ART: 12.5 MMOL/L (ref 0–2)
POTASSIUM SERPL-SCNC: 3.6 MMOL/L (ref 3.7–5.3)
PROT SERPL-MCNC: 5.9 G/DL (ref 6.4–8.3)
PT. POSITION: ABNORMAL
RBC # BLD AUTO: 2.92 M/UL (ref 4–5.2)
RESPIRATORY RATE: 12
SODIUM SERPL-SCNC: 143 MMOL/L (ref 135–144)
TEXT FOR RESPIRATORY: ABNORMAL
TOTAL RATE: 16
VENTILATION MODE VENT: ABNORMAL
VT: 400
WBC OTHER # BLD: 6 K/UL (ref 3.5–11)

## 2023-11-09 PROCEDURE — 83615 LACTATE (LD) (LDH) ENZYME: CPT

## 2023-11-09 PROCEDURE — 80048 BASIC METABOLIC PNL TOTAL CA: CPT

## 2023-11-09 PROCEDURE — 85025 COMPLETE CBC W/AUTO DIFF WBC: CPT

## 2023-11-09 PROCEDURE — 99233 SBSQ HOSP IP/OBS HIGH 50: CPT | Performed by: INTERNAL MEDICINE

## 2023-11-09 PROCEDURE — 94003 VENT MGMT INPAT SUBQ DAY: CPT

## 2023-11-09 PROCEDURE — 2700000000 HC OXYGEN THERAPY PER DAY

## 2023-11-09 PROCEDURE — 2580000003 HC RX 258: Performed by: SURGERY

## 2023-11-09 PROCEDURE — 36600 WITHDRAWAL OF ARTERIAL BLOOD: CPT

## 2023-11-09 PROCEDURE — 36415 COLL VENOUS BLD VENIPUNCTURE: CPT

## 2023-11-09 PROCEDURE — 84155 ASSAY OF PROTEIN SERUM: CPT

## 2023-11-09 PROCEDURE — 71045 X-RAY EXAM CHEST 1 VIEW: CPT

## 2023-11-09 PROCEDURE — 82805 BLOOD GASES W/O2 SATURATION: CPT

## 2023-11-09 PROCEDURE — 6360000002 HC RX W HCPCS: Performed by: SURGERY

## 2023-11-09 PROCEDURE — 88112 CYTOPATH CELL ENHANCE TECH: CPT

## 2023-11-09 PROCEDURE — 88305 TISSUE EXAM BY PATHOLOGIST: CPT

## 2023-11-09 PROCEDURE — 6370000000 HC RX 637 (ALT 250 FOR IP): Performed by: INTERNAL MEDICINE

## 2023-11-09 PROCEDURE — 94761 N-INVAS EAR/PLS OXIMETRY MLT: CPT

## 2023-11-09 PROCEDURE — 6360000002 HC RX W HCPCS: Performed by: INTERNAL MEDICINE

## 2023-11-09 PROCEDURE — 2000000000 HC ICU R&B

## 2023-11-09 PROCEDURE — A4216 STERILE WATER/SALINE, 10 ML: HCPCS | Performed by: SURGERY

## 2023-11-09 PROCEDURE — C9113 INJ PANTOPRAZOLE SODIUM, VIA: HCPCS | Performed by: SURGERY

## 2023-11-09 PROCEDURE — 6370000000 HC RX 637 (ALT 250 FOR IP): Performed by: SURGERY

## 2023-11-09 RX ADMIN — ATORVASTATIN CALCIUM 40 MG: 40 TABLET, FILM COATED ORAL at 08:59

## 2023-11-09 RX ADMIN — FUROSEMIDE 40 MG: 10 INJECTION, SOLUTION INTRAMUSCULAR; INTRAVENOUS at 08:59

## 2023-11-09 RX ADMIN — ACETAMINOPHEN 650 MG: 325 TABLET ORAL at 15:07

## 2023-11-09 RX ADMIN — ACETAMINOPHEN 650 MG: 325 TABLET ORAL at 21:14

## 2023-11-09 RX ADMIN — ACETAMINOPHEN 650 MG: 325 TABLET ORAL at 02:58

## 2023-11-09 RX ADMIN — SODIUM CHLORIDE, PRESERVATIVE FREE 10 ML: 5 INJECTION INTRAVENOUS at 08:59

## 2023-11-09 RX ADMIN — HEPARIN SODIUM 5000 UNITS: 5000 INJECTION INTRAVENOUS; SUBCUTANEOUS at 06:24

## 2023-11-09 RX ADMIN — CHLORHEXIDINE GLUCONATE 0.12% ORAL RINSE 15 ML: 1.2 LIQUID ORAL at 08:59

## 2023-11-09 RX ADMIN — CHLORHEXIDINE GLUCONATE 0.12% ORAL RINSE 15 ML: 1.2 LIQUID ORAL at 21:14

## 2023-11-09 RX ADMIN — HEPARIN SODIUM 5000 UNITS: 5000 INJECTION INTRAVENOUS; SUBCUTANEOUS at 21:14

## 2023-11-09 RX ADMIN — SODIUM CHLORIDE, PRESERVATIVE FREE 10 ML: 5 INJECTION INTRAVENOUS at 23:23

## 2023-11-09 RX ADMIN — PROPOFOL 25 MCG/KG/MIN: 10 INJECTION, EMULSION INTRAVENOUS at 15:10

## 2023-11-09 RX ADMIN — PROPOFOL 20 MCG/KG/MIN: 10 INJECTION, EMULSION INTRAVENOUS at 01:08

## 2023-11-09 RX ADMIN — DONEPEZIL HYDROCHLORIDE 5 MG: 10 TABLET, FILM COATED ORAL at 21:43

## 2023-11-09 RX ADMIN — LACOSAMIDE 200 MG: 100 SOLUTION ORAL at 08:59

## 2023-11-09 RX ADMIN — HEPARIN SODIUM 5000 UNITS: 5000 INJECTION INTRAVENOUS; SUBCUTANEOUS at 15:07

## 2023-11-09 RX ADMIN — LEVOTHYROXINE SODIUM 50 MCG: 0.05 TABLET ORAL at 06:24

## 2023-11-09 RX ADMIN — LEVETIRACETAM 750 MG: 500 SOLUTION ORAL at 09:00

## 2023-11-09 RX ADMIN — LACOSAMIDE 200 MG: 100 SOLUTION ORAL at 21:14

## 2023-11-09 RX ADMIN — SODIUM CHLORIDE, PRESERVATIVE FREE 40 MG: 5 INJECTION INTRAVENOUS at 01:08

## 2023-11-09 RX ADMIN — PRIMIDONE 50 MG: 50 TABLET ORAL at 21:42

## 2023-11-09 RX ADMIN — PRIMIDONE 50 MG: 50 TABLET ORAL at 09:00

## 2023-11-09 RX ADMIN — SODIUM CHLORIDE, PRESERVATIVE FREE 40 MG: 5 INJECTION INTRAVENOUS at 12:24

## 2023-11-09 RX ADMIN — ACETAMINOPHEN 650 MG: 325 TABLET ORAL at 08:59

## 2023-11-09 RX ADMIN — LEVETIRACETAM 750 MG: 500 SOLUTION ORAL at 21:42

## 2023-11-09 ASSESSMENT — PULMONARY FUNCTION TESTS
PIF_VALUE: 12
PIF_VALUE: 15
PIF_VALUE: 11
PIF_VALUE: 17
PIF_VALUE: 9
PIF_VALUE: 14
PIF_VALUE: 22
PIF_VALUE: 8
PIF_VALUE: 13
PIF_VALUE: 13
PIF_VALUE: 9
PIF_VALUE: 12
PIF_VALUE: 7
PIF_VALUE: 21
PIF_VALUE: 12
PIF_VALUE: 13
PIF_VALUE: 18
PIF_VALUE: 11
PIF_VALUE: 9
PIF_VALUE: 13
PIF_VALUE: 12
PIF_VALUE: 13
PIF_VALUE: 8
PIF_VALUE: 11
PIF_VALUE: 13
PIF_VALUE: 16
PIF_VALUE: 13
PIF_VALUE: 13
PIF_VALUE: 12

## 2023-11-09 ASSESSMENT — PAIN SCALES - GENERAL
PAINLEVEL_OUTOF10: 1
PAINLEVEL_OUTOF10: 2
PAINLEVEL_OUTOF10: 0
PAINLEVEL_OUTOF10: 1

## 2023-11-10 ENCOUNTER — APPOINTMENT (OUTPATIENT)
Dept: INTERVENTIONAL RADIOLOGY/VASCULAR | Age: 68
DRG: 321 | End: 2023-11-10
Payer: COMMERCIAL

## 2023-11-10 ENCOUNTER — APPOINTMENT (OUTPATIENT)
Dept: GENERAL RADIOLOGY | Age: 68
DRG: 321 | End: 2023-11-10
Payer: COMMERCIAL

## 2023-11-10 LAB
ANION GAP SERPL CALCULATED.3IONS-SCNC: 9 MMOL/L (ref 9–17)
APPEARANCE FLD: NORMAL
ARTERIAL PATENCY WRIST A: ABNORMAL
BASOPHILS # BLD: 0 K/UL (ref 0–0.2)
BASOPHILS NFR BLD: 0 % (ref 0–2)
BDY SITE: ABNORMAL
BLASTS NFR FLD: ABNORMAL %
BODY FLD TYPE: NORMAL
BODY TEMPERATURE: 37
BUN SERPL-MCNC: 15 MG/DL (ref 8–23)
CALCIUM SERPL-MCNC: 8.9 MG/DL (ref 8.6–10.4)
CASE NUMBER:: NORMAL
CHLORIDE SERPL-SCNC: 100 MMOL/L (ref 98–107)
CO2 SERPL-SCNC: 33 MMOL/L (ref 20–31)
COHGB MFR BLD: 2.4 % (ref 0–5)
COLOR FLD: NORMAL
CREAT SERPL-MCNC: <0.4 MG/DL (ref 0.5–0.9)
EOSINOPHIL # BLD: 0.1 K/UL (ref 0–0.4)
EOSINOPHIL NFR FLD: ABNORMAL %
EOSINOPHILS RELATIVE PERCENT: 2 % (ref 0–4)
ERYTHROCYTE [DISTWIDTH] IN BLOOD BY AUTOMATED COUNT: 16.2 % (ref 11.5–14.9)
FIO2 ON VENT: 35 %
GAS FLOW.O2 O2 DELIVERY SYS: ABNORMAL L/MIN
GAS FLOW.O2 SETTING OXYMISER: 12 L/MIN
GFR SERPL CREATININE-BSD FRML MDRD: ABNORMAL ML/MIN/1.73M2
GLUCOSE SERPL-MCNC: 100 MG/DL (ref 70–99)
HCO3 ARTERIAL: 35.9 MMOL/L (ref 22–26)
HCT VFR BLD AUTO: 30.2 % (ref 36–46)
HGB BLD-MCNC: 10.2 G/DL (ref 12–16)
LDH FLD L TO P-CCNC: 174 U/L
LYMPHOCYTES NFR BLD: 0.7 K/UL (ref 1–4.8)
LYMPHOCYTES NFR FLD: 70 %
LYMPHOCYTES RELATIVE PERCENT: 9 % (ref 24–44)
MANUAL DIF COMMENT FLD-IMP: ABNORMAL
MCH RBC QN AUTO: 34.8 PG (ref 26–34)
MCHC RBC AUTO-ENTMCNC: 33.7 G/DL (ref 31–37)
MCV RBC AUTO: 103.4 FL (ref 80–100)
METHEMOGLOBIN: 0.5 % (ref 0–1.9)
MONOCYTES NFR BLD: 0.9 K/UL (ref 0.1–1.3)
MONOCYTES NFR BLD: 11 % (ref 1–7)
MONOCYTES NFR FLD: 14 %
NEUTROPHILS NFR BLD: 78 % (ref 36–66)
NEUTROPHILS NFR FLD: 16 %
NEUTS SEG NFR BLD: 6.3 K/UL (ref 1.3–9.1)
O2 SAT, ARTERIAL: 94.7 % (ref 95–98)
PCO2 ARTERIAL: 55.9 MMHG (ref 35–45)
PEEP RESPIRATORY: 8 CM[H2O]
PH ARTERIAL: 7.42 (ref 7.35–7.45)
PH FLUID: 7.5
PLATELET # BLD AUTO: 222 K/UL (ref 150–450)
PMV BLD AUTO: 7.9 FL (ref 6–12)
PO2 ARTERIAL: 83.2 MMHG (ref 80–100)
POSITIVE BASE EXCESS, ART: 11.4 MMOL/L (ref 0–2)
POTASSIUM SERPL-SCNC: 3.3 MMOL/L (ref 3.7–5.3)
PROT FLD-MCNC: 3.8 G/DL
PT. POSITION: ABNORMAL
RBC # BLD AUTO: 2.92 M/UL (ref 4–5.2)
RBC # FLD: 2553 CELLS/UL
RESPIRATORY RATE: 12
SODIUM SERPL-SCNC: 142 MMOL/L (ref 135–144)
SPECIMEN DESCRIPTION: NORMAL
SPECIMEN TYPE: NORMAL
SURGICAL PATHOLOGY REPORT: NORMAL
TEXT FOR RESPIRATORY: ABNORMAL
TOTAL RATE: 14
UNIDENT CELLS NFR FLD: ABNORMAL %
VENTILATION MODE VENT: ABNORMAL
VT: 400
WBC # FLD: 297 CELLS/UL
WBC OTHER # BLD: 8.1 K/UL (ref 3.5–11)

## 2023-11-10 PROCEDURE — 84157 ASSAY OF PROTEIN OTHER: CPT

## 2023-11-10 PROCEDURE — 2700000000 HC OXYGEN THERAPY PER DAY

## 2023-11-10 PROCEDURE — 94003 VENT MGMT INPAT SUBQ DAY: CPT

## 2023-11-10 PROCEDURE — 6360000002 HC RX W HCPCS: Performed by: INTERNAL MEDICINE

## 2023-11-10 PROCEDURE — 83986 ASSAY PH BODY FLUID NOS: CPT

## 2023-11-10 PROCEDURE — 6370000000 HC RX 637 (ALT 250 FOR IP): Performed by: INTERNAL MEDICINE

## 2023-11-10 PROCEDURE — 99233 SBSQ HOSP IP/OBS HIGH 50: CPT | Performed by: INTERNAL MEDICINE

## 2023-11-10 PROCEDURE — 82805 BLOOD GASES W/O2 SATURATION: CPT

## 2023-11-10 PROCEDURE — 32555 ASPIRATE PLEURA W/ IMAGING: CPT

## 2023-11-10 PROCEDURE — C9113 INJ PANTOPRAZOLE SODIUM, VIA: HCPCS | Performed by: SURGERY

## 2023-11-10 PROCEDURE — 71045 X-RAY EXAM CHEST 1 VIEW: CPT

## 2023-11-10 PROCEDURE — 83615 LACTATE (LD) (LDH) ENZYME: CPT

## 2023-11-10 PROCEDURE — 2580000003 HC RX 258: Performed by: SURGERY

## 2023-11-10 PROCEDURE — 6370000000 HC RX 637 (ALT 250 FOR IP): Performed by: SURGERY

## 2023-11-10 PROCEDURE — 85025 COMPLETE CBC W/AUTO DIFF WBC: CPT

## 2023-11-10 PROCEDURE — 6360000002 HC RX W HCPCS: Performed by: SURGERY

## 2023-11-10 PROCEDURE — 36415 COLL VENOUS BLD VENIPUNCTURE: CPT

## 2023-11-10 PROCEDURE — 2000000000 HC ICU R&B

## 2023-11-10 PROCEDURE — A4216 STERILE WATER/SALINE, 10 ML: HCPCS | Performed by: SURGERY

## 2023-11-10 PROCEDURE — 80048 BASIC METABOLIC PNL TOTAL CA: CPT

## 2023-11-10 PROCEDURE — 94761 N-INVAS EAR/PLS OXIMETRY MLT: CPT

## 2023-11-10 PROCEDURE — 36600 WITHDRAWAL OF ARTERIAL BLOOD: CPT

## 2023-11-10 PROCEDURE — 2709999900 IR GUIDED THORACENTESIS PLEURAL

## 2023-11-10 RX ADMIN — HEPARIN SODIUM 5000 UNITS: 5000 INJECTION INTRAVENOUS; SUBCUTANEOUS at 20:20

## 2023-11-10 RX ADMIN — LEVOTHYROXINE SODIUM 50 MCG: 0.05 TABLET ORAL at 06:17

## 2023-11-10 RX ADMIN — CHLORHEXIDINE GLUCONATE 0.12% ORAL RINSE 15 ML: 1.2 LIQUID ORAL at 20:20

## 2023-11-10 RX ADMIN — SODIUM CHLORIDE, PRESERVATIVE FREE 40 MG: 5 INJECTION INTRAVENOUS at 00:24

## 2023-11-10 RX ADMIN — PRIMIDONE 50 MG: 50 TABLET ORAL at 09:21

## 2023-11-10 RX ADMIN — PROPOFOL 10 MCG/KG/MIN: 10 INJECTION, EMULSION INTRAVENOUS at 23:16

## 2023-11-10 RX ADMIN — HEPARIN SODIUM 5000 UNITS: 5000 INJECTION INTRAVENOUS; SUBCUTANEOUS at 16:45

## 2023-11-10 RX ADMIN — SODIUM CHLORIDE, PRESERVATIVE FREE 10 ML: 5 INJECTION INTRAVENOUS at 20:20

## 2023-11-10 RX ADMIN — ATORVASTATIN CALCIUM 40 MG: 40 TABLET, FILM COATED ORAL at 09:21

## 2023-11-10 RX ADMIN — SODIUM CHLORIDE, PRESERVATIVE FREE 40 MG: 5 INJECTION INTRAVENOUS at 23:19

## 2023-11-10 RX ADMIN — ACETAMINOPHEN 650 MG: 325 TABLET ORAL at 20:21

## 2023-11-10 RX ADMIN — LACOSAMIDE 200 MG: 100 SOLUTION ORAL at 09:20

## 2023-11-10 RX ADMIN — PROPOFOL 25 MCG/KG/MIN: 10 INJECTION, EMULSION INTRAVENOUS at 00:30

## 2023-11-10 RX ADMIN — PROPOFOL 25 MCG/KG/MIN: 10 INJECTION, EMULSION INTRAVENOUS at 09:12

## 2023-11-10 RX ADMIN — PRIMIDONE 50 MG: 50 TABLET ORAL at 20:22

## 2023-11-10 RX ADMIN — DONEPEZIL HYDROCHLORIDE 5 MG: 10 TABLET, FILM COATED ORAL at 20:21

## 2023-11-10 RX ADMIN — LEVETIRACETAM 750 MG: 500 SOLUTION ORAL at 20:22

## 2023-11-10 RX ADMIN — LACOSAMIDE 200 MG: 100 SOLUTION ORAL at 20:20

## 2023-11-10 RX ADMIN — LEVETIRACETAM 750 MG: 500 SOLUTION ORAL at 09:21

## 2023-11-10 RX ADMIN — HEPARIN SODIUM 5000 UNITS: 5000 INJECTION INTRAVENOUS; SUBCUTANEOUS at 06:17

## 2023-11-10 RX ADMIN — CHLORHEXIDINE GLUCONATE 0.12% ORAL RINSE 15 ML: 1.2 LIQUID ORAL at 09:21

## 2023-11-10 RX ADMIN — ACETAMINOPHEN 650 MG: 325 TABLET ORAL at 02:54

## 2023-11-10 RX ADMIN — FUROSEMIDE 40 MG: 10 INJECTION, SOLUTION INTRAMUSCULAR; INTRAVENOUS at 11:53

## 2023-11-10 RX ADMIN — METOPROLOL TARTRATE 25 MG: 25 TABLET, FILM COATED ORAL at 20:21

## 2023-11-10 RX ADMIN — SODIUM CHLORIDE, PRESERVATIVE FREE 40 MG: 5 INJECTION INTRAVENOUS at 12:49

## 2023-11-10 RX ADMIN — ACETAMINOPHEN 650 MG: 325 TABLET ORAL at 09:20

## 2023-11-10 ASSESSMENT — PULMONARY FUNCTION TESTS
PIF_VALUE: 13
PIF_VALUE: 18
PIF_VALUE: 19
PIF_VALUE: 15
PIF_VALUE: 18
PIF_VALUE: 13
PIF_VALUE: 11
PIF_VALUE: 18
PIF_VALUE: 11
PIF_VALUE: 18
PIF_VALUE: 10
PIF_VALUE: 11
PIF_VALUE: 18
PIF_VALUE: 29
PIF_VALUE: 18
PIF_VALUE: 11
PIF_VALUE: 14
PIF_VALUE: 11
PIF_VALUE: 16
PIF_VALUE: 12
PIF_VALUE: 12
PIF_VALUE: 11
PIF_VALUE: 15

## 2023-11-10 NOTE — OR NURSING
Patient brought to the IR suite via cart, after consent obtained with daughter, Tony Danielle via telephone, patient placed on the procedure table, monitoring equipment/O2 hooked up and rhythm strip printed. Patient then positioned/prepped/draped.

## 2023-11-10 NOTE — BRIEF OP NOTE
Brief Postoperative Note for Thoracentesis    Carol Bravo  YOB: 1955  [id number]    Pre-operative Diagnosis: Left Pleural effusion      Post-operative Diagnosis: Same    Procedure: Ultrasound guided Thoracentesis    Anesthesia: 1% Lidocaine     Surgeons/Assistants: Audie Santoyo MD    Complications: None    Specimens: were obtained    Ultrasound guided left thoracentesis performed. 700 ml palmira yellow fluid obtained. Dressing applied.         Electronically signed by Audie Santoyo MD on 11/10/2023 at 11:14 AM

## 2023-11-11 ENCOUNTER — APPOINTMENT (OUTPATIENT)
Dept: GENERAL RADIOLOGY | Age: 68
DRG: 321 | End: 2023-11-11
Payer: COMMERCIAL

## 2023-11-11 ENCOUNTER — ANESTHESIA EVENT (OUTPATIENT)
Dept: OPERATING ROOM | Age: 68
DRG: 950 | End: 2023-11-11
Payer: COMMERCIAL

## 2023-11-11 LAB
ABSOLUTE BANDS #: 0.18 K/UL (ref 0–1)
ANION GAP SERPL CALCULATED.3IONS-SCNC: 7 MMOL/L (ref 9–17)
ARTERIAL PATENCY WRIST A: ABNORMAL
ATYPICAL LYMPHOCYTE ABSOLUTE COUNT: 0.09 K/UL
ATYPICAL LYMPHOCYTES: 1 %
BANDS: 2 % (ref 0–10)
BASOPHILS # BLD: 0 K/UL (ref 0–0.2)
BASOPHILS NFR BLD: 0 % (ref 0–2)
BDY SITE: ABNORMAL
BUN SERPL-MCNC: 17 MG/DL (ref 8–23)
CALCIUM SERPL-MCNC: 9 MG/DL (ref 8.6–10.4)
CHLORIDE SERPL-SCNC: 101 MMOL/L (ref 98–107)
CO2 SERPL-SCNC: 36 MMOL/L (ref 20–31)
COHGB MFR BLD: 2.5 % (ref 0–5)
CREAT SERPL-MCNC: <0.4 MG/DL (ref 0.5–0.9)
EOSINOPHIL # BLD: 0.45 K/UL (ref 0–0.4)
EOSINOPHILS RELATIVE PERCENT: 5 % (ref 0–4)
ERYTHROCYTE [DISTWIDTH] IN BLOOD BY AUTOMATED COUNT: 16.2 % (ref 11.5–14.9)
FIO2 ON VENT: 35 %
GAS FLOW.O2 O2 DELIVERY SYS: ABNORMAL L/MIN
GAS FLOW.O2 SETTING OXYMISER: 12 L/MIN
GFR SERPL CREATININE-BSD FRML MDRD: ABNORMAL ML/MIN/1.73M2
GLUCOSE SERPL-MCNC: 111 MG/DL (ref 70–99)
HCO3 ARTERIAL: 36.8 MMOL/L (ref 22–26)
HCT VFR BLD AUTO: 29.7 % (ref 36–46)
HGB BLD-MCNC: 9.6 G/DL (ref 12–16)
LYMPHOCYTES NFR BLD: 1.34 K/UL (ref 1–4.8)
LYMPHOCYTES RELATIVE PERCENT: 15 % (ref 24–44)
MCH RBC QN AUTO: 32.9 PG (ref 26–34)
MCHC RBC AUTO-ENTMCNC: 32.4 G/DL (ref 31–37)
MCV RBC AUTO: 101.4 FL (ref 80–100)
METHEMOGLOBIN: 0.6 % (ref 0–1.9)
MICROORGANISM SPEC CULT: NORMAL
MONOCYTES NFR BLD: 0.98 K/UL (ref 0.1–1.3)
MONOCYTES NFR BLD: 11 % (ref 1–7)
MORPHOLOGY: ABNORMAL
MORPHOLOGY: ABNORMAL
NEUTROPHILS NFR BLD: 66 % (ref 36–66)
NEUTS SEG NFR BLD: 5.86 K/UL (ref 1.3–9.1)
O2 SAT, ARTERIAL: 89.8 % (ref 95–98)
PCO2 ARTERIAL: 53.5 MMHG (ref 35–45)
PEEP RESPIRATORY: 8 CM[H2O]
PH ARTERIAL: 7.45 (ref 7.35–7.45)
PLATELET # BLD AUTO: 253 K/UL (ref 150–450)
PMV BLD AUTO: 8.2 FL (ref 6–12)
PO2 ARTERIAL: 61.2 MMHG (ref 80–100)
POSITIVE BASE EXCESS, ART: 12.7 MMOL/L (ref 0–2)
POTASSIUM SERPL-SCNC: 3.2 MMOL/L (ref 3.7–5.3)
PT. POSITION: ABNORMAL
RBC # BLD AUTO: 2.92 M/UL (ref 4–5.2)
RESPIRATORY RATE: 12
SODIUM SERPL-SCNC: 144 MMOL/L (ref 135–144)
SPECIMEN DESCRIPTION: NORMAL
TEXT FOR RESPIRATORY: ABNORMAL
TOTAL RATE: 12
VENTILATION MODE VENT: ABNORMAL
VT: 400
WBC OTHER # BLD: 8.9 K/UL (ref 3.5–11)

## 2023-11-11 PROCEDURE — 71045 X-RAY EXAM CHEST 1 VIEW: CPT

## 2023-11-11 PROCEDURE — 6360000002 HC RX W HCPCS: Performed by: INTERNAL MEDICINE

## 2023-11-11 PROCEDURE — 6370000000 HC RX 637 (ALT 250 FOR IP): Performed by: INTERNAL MEDICINE

## 2023-11-11 PROCEDURE — 99024 POSTOP FOLLOW-UP VISIT: CPT | Performed by: ORTHOPAEDIC SURGERY

## 2023-11-11 PROCEDURE — 99291 CRITICAL CARE FIRST HOUR: CPT | Performed by: INTERNAL MEDICINE

## 2023-11-11 PROCEDURE — 94003 VENT MGMT INPAT SUBQ DAY: CPT

## 2023-11-11 PROCEDURE — 6360000002 HC RX W HCPCS: Performed by: SURGERY

## 2023-11-11 PROCEDURE — 85025 COMPLETE CBC W/AUTO DIFF WBC: CPT

## 2023-11-11 PROCEDURE — 2580000003 HC RX 258: Performed by: SURGERY

## 2023-11-11 PROCEDURE — 6370000000 HC RX 637 (ALT 250 FOR IP): Performed by: SURGERY

## 2023-11-11 PROCEDURE — 36600 WITHDRAWAL OF ARTERIAL BLOOD: CPT

## 2023-11-11 PROCEDURE — C9113 INJ PANTOPRAZOLE SODIUM, VIA: HCPCS | Performed by: SURGERY

## 2023-11-11 PROCEDURE — 2700000000 HC OXYGEN THERAPY PER DAY

## 2023-11-11 PROCEDURE — A4216 STERILE WATER/SALINE, 10 ML: HCPCS | Performed by: SURGERY

## 2023-11-11 PROCEDURE — 80048 BASIC METABOLIC PNL TOTAL CA: CPT

## 2023-11-11 PROCEDURE — 99233 SBSQ HOSP IP/OBS HIGH 50: CPT | Performed by: INTERNAL MEDICINE

## 2023-11-11 PROCEDURE — 36415 COLL VENOUS BLD VENIPUNCTURE: CPT

## 2023-11-11 PROCEDURE — 82805 BLOOD GASES W/O2 SATURATION: CPT

## 2023-11-11 PROCEDURE — 2000000000 HC ICU R&B

## 2023-11-11 PROCEDURE — 94761 N-INVAS EAR/PLS OXIMETRY MLT: CPT

## 2023-11-11 RX ADMIN — PRIMIDONE 50 MG: 50 TABLET ORAL at 08:43

## 2023-11-11 RX ADMIN — HEPARIN SODIUM 5000 UNITS: 5000 INJECTION INTRAVENOUS; SUBCUTANEOUS at 13:56

## 2023-11-11 RX ADMIN — METOPROLOL TARTRATE 25 MG: 25 TABLET, FILM COATED ORAL at 21:20

## 2023-11-11 RX ADMIN — LEVETIRACETAM 750 MG: 500 SOLUTION ORAL at 21:18

## 2023-11-11 RX ADMIN — CHLORHEXIDINE GLUCONATE 0.12% ORAL RINSE 15 ML: 1.2 LIQUID ORAL at 21:23

## 2023-11-11 RX ADMIN — HEPARIN SODIUM 5000 UNITS: 5000 INJECTION INTRAVENOUS; SUBCUTANEOUS at 05:26

## 2023-11-11 RX ADMIN — ACETAMINOPHEN 650 MG: 325 TABLET ORAL at 13:56

## 2023-11-11 RX ADMIN — SODIUM CHLORIDE, PRESERVATIVE FREE 10 ML: 5 INJECTION INTRAVENOUS at 13:46

## 2023-11-11 RX ADMIN — SODIUM CHLORIDE, PRESERVATIVE FREE 40 MG: 5 INJECTION INTRAVENOUS at 13:46

## 2023-11-11 RX ADMIN — FENTANYL CITRATE 25 MCG: 0.05 INJECTION, SOLUTION INTRAMUSCULAR; INTRAVENOUS at 16:39

## 2023-11-11 RX ADMIN — LEVETIRACETAM 750 MG: 500 SOLUTION ORAL at 08:43

## 2023-11-11 RX ADMIN — ACETAMINOPHEN 650 MG: 325 TABLET ORAL at 21:17

## 2023-11-11 RX ADMIN — HEPARIN SODIUM 5000 UNITS: 5000 INJECTION INTRAVENOUS; SUBCUTANEOUS at 21:20

## 2023-11-11 RX ADMIN — ACETAMINOPHEN 650 MG: 325 TABLET ORAL at 03:52

## 2023-11-11 RX ADMIN — METOPROLOL TARTRATE 25 MG: 25 TABLET, FILM COATED ORAL at 08:42

## 2023-11-11 RX ADMIN — LEVOTHYROXINE SODIUM 50 MCG: 0.05 TABLET ORAL at 05:26

## 2023-11-11 RX ADMIN — DONEPEZIL HYDROCHLORIDE 5 MG: 10 TABLET, FILM COATED ORAL at 21:20

## 2023-11-11 RX ADMIN — FUROSEMIDE 40 MG: 10 INJECTION, SOLUTION INTRAMUSCULAR; INTRAVENOUS at 08:42

## 2023-11-11 RX ADMIN — POTASSIUM BICARBONATE 40 MEQ: 782 TABLET, EFFERVESCENT ORAL at 06:26

## 2023-11-11 RX ADMIN — PRIMIDONE 50 MG: 50 TABLET ORAL at 21:20

## 2023-11-11 RX ADMIN — LACOSAMIDE 200 MG: 100 SOLUTION ORAL at 08:42

## 2023-11-11 RX ADMIN — LACOSAMIDE 200 MG: 100 SOLUTION ORAL at 21:19

## 2023-11-11 RX ADMIN — SODIUM CHLORIDE, PRESERVATIVE FREE 10 ML: 5 INJECTION INTRAVENOUS at 21:22

## 2023-11-11 RX ADMIN — CHLORHEXIDINE GLUCONATE 0.12% ORAL RINSE 15 ML: 1.2 LIQUID ORAL at 08:00

## 2023-11-11 RX ADMIN — ACETAMINOPHEN 650 MG: 325 TABLET ORAL at 08:42

## 2023-11-11 RX ADMIN — PROPOFOL 10 MCG/KG/MIN: 10 INJECTION, EMULSION INTRAVENOUS at 18:17

## 2023-11-11 RX ADMIN — ATORVASTATIN CALCIUM 40 MG: 40 TABLET, FILM COATED ORAL at 08:42

## 2023-11-11 ASSESSMENT — PULMONARY FUNCTION TESTS
PIF_VALUE: 11
PIF_VALUE: 9
PIF_VALUE: 10
PIF_VALUE: 9
PIF_VALUE: 9
PIF_VALUE: 12
PIF_VALUE: 10
PIF_VALUE: 14
PIF_VALUE: 12
PIF_VALUE: 16
PIF_VALUE: 11
PIF_VALUE: 8
PIF_VALUE: 15
PIF_VALUE: 6
PIF_VALUE: 13
PIF_VALUE: 14
PIF_VALUE: 21
PIF_VALUE: 11
PIF_VALUE: 13
PIF_VALUE: 9
PIF_VALUE: 15
PIF_VALUE: 13
PIF_VALUE: 16
PIF_VALUE: 13
PIF_VALUE: 8
PIF_VALUE: 9
PIF_VALUE: 15
PIF_VALUE: 12
PIF_VALUE: 14
PIF_VALUE: 7
PIF_VALUE: 12
PIF_VALUE: 14
PIF_VALUE: 10

## 2023-11-11 ASSESSMENT — PAIN SCALES - GENERAL
PAINLEVEL_OUTOF10: 2
PAINLEVEL_OUTOF10: 0

## 2023-11-11 NOTE — CARE COORDINATION
Case Management Assessment  Initial Evaluation    Date/Time of Evaluation: 10/16/2023 12:13 PM  Assessment Completed by: Shantel Tabor RN    If patient is discharged prior to next notation, then this note serves as note for discharge by case management. Patient Name: Dwayne Morse                   YOB: 1955  Diagnosis: Epidural hematoma ([address]) [S06. 4XAA]  Fall, initial encounter [id number]]  Closed fracture of spinous process of cervical vertebra, initial encounter ([address]) [S12. 9XXA]                   Date / Time: 10/13/2023  4:26 PM    Patient Admission Status: Inpatient   Readmission Risk (Low < 19, Mod (19-27), High > 27): Readmission Risk Score: 9.3    Current PCP: Ruben Swain MD  PCP verified by ? Yes    Chart Reviewed: Yes      History Provided by: Child/Family (Wing Socorro Austin)  Patient Orientation: Alert and Oriented    Patient Cognition: Alert    Hospitalization in the last 30 days (Readmission):  No    If yes, Readmission Assessment in  Navigator will be completed. Advance Directives:      Code Status: Full Code   Patient's Primary Decision Maker is: Legal Next of Kin      Discharge Planning:    Patient lives with: Family Members Type of Home: Skilled Nursing Facility  Primary Care Giver:    Patient Support Systems include: Family Members   Current Financial resources: Medicaid  Current community resources: Transportation  Current services prior to admission: Transportation            Current DME:              Type of Home Care services:  None    ADLS  Prior functional level: Independent in ADLs/IADLs  Current functional level: Independent in ADLs/IADLs    PT AM-PAC: 6 /24  OT AM-PAC:   /24    Family can provide assistance at DC: Yes  Would you like Case Management to discuss the discharge plan with any other family members/significant others, and if so, who?  Yes (Wing Socorro Felt)  Plans to Return to Present Housing: Yes  Other Identified Issues/Barriers to RETURNING to
DISCHARGE PLANNING NOTE:    Patient transferred to ICU 2002 d/t increased shortness of breath. Patient accepted at [facility] and authorization approved through Monday, 10/30/23. Judy from [facility] updated on transfer to ICU. Patient's sister Lexi Amos, agreeable to [facility], [facility] when medically appropriate. Will continue to follow for additional discharge needs. If patient is discharged prior to next notation, then this note serves as note for discharge by case management.     Electronically signed by Bubba Vega RN on 10/28/2023 at 11:46 AM
DISCHARGE PLANNING NOTE:    Pt is on the vent. Spoke with Facundo Bennett from [facility] who states [facility] is valid through end of day 10/30.     Electronically signed by Norma Bass RN on 10/29/2023 at 8:17 AM
ONGOING DISCHARGE  NOTE:        Writer reviewed notes, Patient is to discharge to a facility vs [facility]     Pre cert has not been started. Will start pre cert once facility is chosen. Will continue to follow for additional discharge needs. If patient is discharged prior to next notation, then this note serves as note for discharge by case management.       Electronically signed by Reshma Garcia RN on 10/22/2023 at 3:27 PM
ONGOING DISCHARGE  NOTE:      Writer reviewed notes, Patient is to discharge to a facility vs [facility]    Pre cert has not been started. Will start pre cert once facility is chosen. Will continue to follow for additional discharge needs. If patient is discharged prior to next notation, then this note serves as note for discharge by case management.     Electronically signed by Paula De La Cruz RN on 10/21/2023 at 9:43 AM
ONGOING DISCHARGE PLAN:     Patient Remains on the VENT, 30% FIO2. Pulmonary on board. Palliative Care on board. Patient originally from home with sister, Jeff Schrader, who is her caregiver. Had agreed to [facility] or [facility] for SNF. Plan remains for patient to DC to [address] She has been accepted. Esdras Wikc, from [facility] is ready to re-start precert when off vent. Peg tube placed 10/23. Remains on IV Unasyn. ID is following. PICC line placed 11/2. Remains on IV Lasix. Cardio on board. Will continue to follow for additional discharge needs. If patient is discharged prior to next notation, then this note serves as note for discharge by case management.     Electronically signed by Bossman Umaña RN on 11/5/2023 at 11:28 AM
ONGOING DISCHARGE PLAN:     VENT, FIO2 30%. Code status changed to DNR-CCA (no reintubation). POD #8 PEG tube placement, okay to use per surgery. ID following-IV vanco, IV unasyn. Auth for [address]  good through 10/30; Marcy Acuña with [facility] able to extend authorization. Emelia Edmond NP, updated this writer that the patient's sister had some questions regarding [facility] that she would like to speak with the liaison for. Marcy Acuña with [facility] updated and will reach out. Will continue to follow for additional discharge needs. If patient is discharged prior to next notation, then this note serves as note for discharge by case management.     Electronically signed by Day Griffin RN on 10/31/2023 at 11:50 AM
ONGOING DISCHARGE PLAN:     VENT, FIO2 30%. Code status changed to DNR-CCA (no reintubation). POD #9 PEG tube placement, okay to use per surgery. ID following-IV vanco, IV unasyn. Patient accepted to [address], would need to restart authorization when medically ready. Palliative, pulmonology, cardiology, wound, and orthopedic surgery on board. Will continue to follow for additional discharge needs. If patient is discharged prior to next notation, then this note serves as note for discharge by case management.     Electronically signed by Leticia Block RN on 11/1/2023 at 1:11 PM
ONGOING DISCHARGE PLAN:    Lj Mendez good thru end of day 11/12. Vent will need to go to [facility] location, Hunter Older can go to [place] location. VENT 30%, DAY 12    11/8 IR Right thoracentesis 900 mL    IV lasix 40 mg daily    Will continue to follow for additional discharge needs. If patient is discharged prior to next notation, then this note serves as note for discharge by case management.     Electronically signed by Cameron Saint, RN on 11/9/2023 at 3:56 PM
ONGOING DISCHARGE PLAN:    Patient Remains on the VENT, 30% FIO2. Pulmonary on board. Palliative Care on board. Plan remains for Pt. To DC to [address] She has been accepted. Esdras Wick, from [facility] is ready to [place] when off VENT. Pt. Is POD # 10 Peg Tube Placement. Remains on IV Vanco/Unasyn. ID is following. Per Nursing. Pt. Will be getting a PICC line today. Pt is from Home w/ Her Sister, Jeff Schrader, she is her Caregiver. Remains on IV Lasix. Cardio on board. Will continue to follow for additional discharge needs. If patient is discharged prior to next notation, then this note serves as note for discharge by case management.     Electronically signed by Joleen Byrne RN on 11/2/2023 at 1:57 PM
ONGOING DISCHARGE PLAN:    Patient Remains on the VENT, 30% FIO2. Was on Wean Trial. Per Notes, No Extubation today. Pulmonary on board. Pt is from Home w/ Her Sister, Hudson Khan, she is her Caregiver. She is currently admitted to the Hospital & is in Room 2088. Pt's ICU Nurse informed. Writer spoke to Hudson Khan:      Plan remains for Pt. To DC to [address] She has been accepted. Mark Brooks, from [facility] is ready to [facility] when off VENT. Pt. Is POD # 11 Peg Tube Placement. Pt remains on IV Unasyn. ID is following. Has PICC. Remains on IV Lasix. Cardio on board. Writer spoke to Corky ''AMY'' , Junior Martinez, with her Permission & he was updated on DC plan. Will continue to follow for additional discharge needs. If patient is discharged prior to next notation, then this note serves as note for discharge by case management.     Electronically signed by Jelani Sharma RN on 11/3/2023 at 2:50 PM
ONGOING DISCHARGE PLAN:    Patient is alert and oriented x4. Pt gave permission for writer to speak to [facility]. Spoke with Bridgette Davenport regarding discharge plan and she is in agreement for [place], [place], for it is close to home. Referral sent today & writer asked Mukesh Dominguez to start Pre-Cert. Pt does meet criteria. Pt. Is POD #4, Peg tube placement/TF. 10/18 C3-7 Cervical Laminectomy/Fusion. Remains ON IV Cefepime/Zyvox. Pt. Will have Med 1 Care at home. She lives w/ Bridgette Davenport & she is her caregiver. Will continue to follow for additional discharge needs. If patient is discharged prior to next notation, then this note serves as note for discharge by case management.     Electronically signed by Luz Zimmer RN on 10/27/2023 at 11:37 AM
ONGOING DISCHARGE PLAN:    Patient remains on VENT, 30% FIO2. Per Nursing, Pt. Is to remain Extubated, for she is getting Peg Tube placed today. DC plan is undecided at this time. Pt's Sister, Jeri Polo, has been making decisions. She lives w/ [facility]. Med 1 Care following. Plan was for SNF VS ARU, if she would Qualify. Per Eddy Wolf, from [facility], she meets LTACH Criteria. Pt. Is S/P Cervical Laminectomy/Fusion W/ Ortho. Remains on IV Cefepime/Zyvox/Flagyl. Will need to get good DC plan when more stable    Will continue to follow for additional discharge needs. If patient is discharged prior to next notation, then this note serves as note for discharge by case management.     Electronically signed by Froy Madrigal RN on 10/23/2023 at 2:18 PM
ONGOING DISCHARGE PLAN:    Patient was transfer from [facility] to ICU during the night for Respiratory Distress. Pt. Is Now on the VENT, 100 % FIO2. Pt. Is POD #2, Cervical Laminectomy/Fusion w/ Ortho. Remains on IV Flagyl/ Cefepime. IV Lasix X1. Per Nursing, Pt. Is to start on Levophed. BP 86/60, 95/60. Per previous Notes, Pt's Sister/Caregiver, Diana Gonzalez, Wanted pt. To DC to ARU. Will get good DC plan when more stable. Will continue to follow for additional discharge needs. If patient is discharged prior to next notation, then this note serves as note for discharge by case management.     Electronically signed by Jaya Ireland RN on 10/20/2023 at 3:45 PM
ONGOING DISCHARGE PLAN:    Review of medical records. Ortho consulted for MRI results; recommend cervical decompression and instrumented fusion. [facility] has accepted; will start the precert once medically ready for discharge. PT/OT recommend SNF. IV rocephin-uti. Will continue to follow for additional discharge needs. If patient is discharged prior to next notation, then this note serves as note for discharge by case management.     Electronically signed by He Wilson RN on 10/17/2023 at 2:11 PM
ONGOING DISCHARGE PLAN:    VENT 30%  Intubated/sedated, unable to pass wean trials    11/5 Bronch-Mucus plugging    ID following-completed IV unasyn    Dr Clark Chester ordered IV lasix daily after IV albumin    PEG tube feeds at goal    PT/OT when able to participate  PM&R following    Ethyl Maw with [facility] can start auth when off Vent    Will continue to follow for additional discharge needs. If patient is discharged prior to next notation, then this note serves as note for discharge by case management.     Electronically signed by Paul Lacy RN on 11/6/2023 at 4:05 PM
ONGOING DISCHARGE PLAN:    VENT 30% (day 11)    [facility] submitted for Auth 11/7, approved thru end of day 11/12. Vent will need to go to [facility] location, [facility] Franks can go to [place] location. Dr Amparo Marley notified of acceptance at [facility]. He stated family would be amenable to [facility] if needed. IR Left thoracentesis ordered    40 IV lasix daily    Will continue to follow for additional discharge needs. If patient is discharged prior to next notation, then this note serves as note for discharge by case management.     Electronically signed by Laura Ricks RN on 11/8/2023 at 3:19 PM
ONGOING DISCHARGE PLAN:    VENT, FIO2 35%. Code status changed to DNR-CCA (no reintubation once weaned). POD #7 PEG tube placement, okay to use per surgery. ID following-IV vanco, IV unasyn. Auth for [facility] through 10/30; [facility] with [place] updated and will attempt to extend authorization. Will continue to follow for additional discharge needs. If patient is discharged prior to next notation, then this note serves as note for discharge by case management.     Electronically signed by Bossman Umaña RN on 10/30/2023 at 2:41 PM
Writer is following for potential discharge to [facility]. Pt is scheduled for surgery 10/18. Need updated PT/OT notes for facility to start authorization.   Electronically signed by MARIANNE Green on 10/18/2023 at 2:15 PM
Writer is following for potential discharge to [facility]. Per JESSICA Kumari will start authorization once pt is off the vent.   Electronically signed by MARIANNE Bautista on 11/7/2023 at 3:46 PM
Writer spoke to [employer], Keiko Bell. She would Like [facility] for VNS.
Writer spoke to Lady Jay with [facility] regarding pt meeting criteria for LTACH. Writer attempted to call pt sister Cameron Apt at home phone number regarding this. No answer, voicemail left. Cell phone number in pt demographic listed under cell phone is incorrect, connects to [employer]. Writer placed call to [place] pt sister. No answer, voicemail left.   Electronically signed by MARIANNE Bautista on 10/26/2023 at 4:03 PM
Writer was notified, by Chad Mohs From [facility], [phone], that they can accept [facility] w/ Vent. Writer did notify her that plan per Surgery notes, is for [facility].
lists were provided:     Acute Inpatient Rehabilitation Facilities via telephone.      Electronically signed by Stephen Rodriguez RN on 10/19/2023 at 11:26 AM

## 2023-11-11 NOTE — PLAN OF CARE
Please have patient or POA answer all MRI Screening questions in Epic. Exam will be scheduled after form has been completed and reviewed. Thank you.
Problem: Discharge Planning  Goal: Discharge to home or other facility with appropriate resources  10/16/2023 0507 by Andrew Marquez RN  Outcome: Progressing     Problem: Pain  Goal: Verbalizes/displays adequate comfort level or baseline comfort level  10/16/2023 0507 by Andrew Marquez RN  Outcome: Progressing     Problem: Skin/Tissue Integrity  Goal: Absence of new skin breakdown  Description: 1. Monitor for areas of redness and/or skin breakdown  2. Assess vascular access sites hourly  3. Every 4-6 hours minimum:  Change oxygen saturation probe site  4. Every 4-6 hours:  If on nasal continuous positive airway pressure, respiratory therapy assess nares and determine need for appliance change or resting period.   10/16/2023 0507 by Andrew Marquez RN  Outcome: Progressing     Problem: Safety - Adult  Goal: Free from fall injury  10/16/2023 0507 by Andrew Marquez RN  Outcome: Progressing     Problem: ABCDS Injury Assessment  Goal: Absence of physical injury  10/16/2023 0507 by Andrew Marquez RN  Outcome: Progressing     Problem: Chronic Conditions and Co-morbidities  Goal: Patient's chronic conditions and co-morbidity symptoms are monitored and maintained or improved  10/16/2023 0507 by Andrew Marquez RN  Outcome: Progressing     Problem: Nutrition Deficit:  Goal: Optimize nutritional status  Outcome: Progressing
Problem: Discharge Planning  Goal: Discharge to home or other facility with appropriate resources  10/16/2023 1842 by Abbie Jonas RN  Outcome: Progressing  Flowsheets (Taken 10/16/2023 1842)  Discharge to home or other facility with appropriate resources:   Identify barriers to discharge with patient and caregiver   Arrange for needed discharge resources and transportation as appropriate   Identify discharge learning needs (meds, wound care, etc)   Arrange for interpreters to assist at discharge as needed   Refer to discharge planning if patient needs post-hospital services based on physician order or complex needs related to functional status, cognitive ability or social support system  Note: Discharge planners following for further discharge needs   10/16/2023 0507 by Andrew Marquez RN  Outcome: Progressing     Problem: Pain  Goal: Verbalizes/displays adequate comfort level or baseline comfort level  10/16/2023 1842 by Abbie Jonas RN  Outcome: Progressing  Flowsheets (Taken 10/16/2023 1842)  Verbalizes/displays adequate comfort level or baseline comfort level:   Encourage patient to monitor pain and request assistance   Assess pain using appropriate pain scale   Administer analgesics based on type and severity of pain and evaluate response   Implement non-pharmacological measures as appropriate and evaluate response   Consider cultural and social influences on pain and pain management   Notify Licensed Independent Practitioner if interventions unsuccessful or patient reports new pain  Note: Patients pain level decreased with prescribed pain medications. 10/16/2023 0507 by Andrew Marquez RN  Outcome: Progressing     Problem: Skin/Tissue Integrity  Goal: Absence of new skin breakdown  Description: 1. Monitor for areas of redness and/or skin breakdown  2. Assess vascular access sites hourly  3. Every 4-6 hours minimum:  Change oxygen saturation probe site  4.   Every 4-6 hours:  If on nasal continuous
Problem: Discharge Planning  Goal: Discharge to home or other facility with appropriate resources  10/17/2023 0358 by Yoselyn Espino RN  Outcome: Progressing     Problem: Pain  Goal: Verbalizes/displays adequate comfort level or baseline comfort level  10/17/2023 0358 by Dimas Hobson RN  Outcome: Progressing     Problem: Skin/Tissue Integrity  Goal: Absence of new skin breakdown  Description: 1. Monitor for areas of redness and/or skin breakdown  2. Assess vascular access sites hourly  3. Every 4-6 hours minimum:  Change oxygen saturation probe site  4. Every 4-6 hours:  If on nasal continuous positive airway pressure, respiratory therapy assess nares and determine need for appliance change or resting period.   10/17/2023 0358 by Yoselyn Espino RN  Outcome: Progressing     Problem: Safety - Adult  Goal: Free from fall injury  10/17/2023 0358 by Yoselyn Espino RN  Outcome: Progressing     Problem: ABCDS Injury Assessment  Goal: Absence of physical injury  10/17/2023 0358 by Yoselyn Espino RN  Outcome: Progressing     Problem: Chronic Conditions and Co-morbidities  Goal: Patient's chronic conditions and co-morbidity symptoms are monitored and maintained or improved  10/17/2023 0358 by Yoselyn Espino RN  Outcome: Progressing     Problem: Nutrition Deficit:  Goal: Optimize nutritional status  10/17/2023 0358 by Yoselyn Espino RN  Outcome: Progressing
Problem: Discharge Planning  Goal: Discharge to home or other facility with appropriate resources  10/20/2023 0313 by Fredrick Lindo RN  Outcome: Progressing  10/19/2023 1716 by Yahaira Borden RN  Outcome: Progressing     Problem: Pain  Goal: Verbalizes/displays adequate comfort level or baseline comfort level  10/20/2023 0313 by Fredrick Lindo RN  Outcome: Progressing  10/19/2023 1716 by Yahaira Borden RN  Outcome: Progressing     Problem: Skin/Tissue Integrity  Goal: Absence of new skin breakdown  Description: 1. Monitor for areas of redness and/or skin breakdown  2. Assess vascular access sites hourly  3. Every 4-6 hours minimum:  Change oxygen saturation probe site  4. Every 4-6 hours:  If on nasal continuous positive airway pressure, respiratory therapy assess nares and determine need for appliance change or resting period.   10/20/2023 0313 by Fredrick Lindo RN  Outcome: Progressing  10/19/2023 1716 by Yahaira Borden RN  Outcome: Progressing     Problem: Safety - Adult  Goal: Free from fall injury  10/20/2023 0313 by Fredrick Lindo RN  Outcome: Progressing  10/19/2023 1716 by Yahaira Borden RN  Outcome: Progressing     Problem: ABCDS Injury Assessment  Goal: Absence of physical injury  10/20/2023 0313 by Fredrick Lindo RN  Outcome: Progressing  10/19/2023 1716 by Yahaira Borden RN  Outcome: Progressing     Problem: Chronic Conditions and Co-morbidities  Goal: Patient's chronic conditions and co-morbidity symptoms are monitored and maintained or improved  10/20/2023 0313 by Fredrick Lindo RN  Outcome: Progressing  10/19/2023 1716 by Yahaira Borden RN  Outcome: Progressing     Problem: Nutrition Deficit:  Goal: Optimize nutritional status  10/20/2023 0313 by Fredrick Lindo RN  Outcome: Progressing  10/19/2023 1716 by Yahaira Borden RN  Outcome: Progressing  Flowsheets (Taken 10/19/2023 1608 by Mattie Carrel, LUIS M, LD)  Nutrient intake appropriate for improving, restoring, or maintaining nutritional
Problem: Discharge Planning  Goal: Discharge to home or other facility with appropriate resources  10/23/2023 1210 by Arnulfo Hannon RN  Outcome: Progressing  10/23/2023 0558 by Mariam Lassiter RN  Outcome: Progressing     Problem: Pain  Goal: Verbalizes/displays adequate comfort level or baseline comfort level  10/23/2023 1210 by Arnulfo Hannon RN  Outcome: Progressing  Flowsheets (Taken 10/23/2023 0558 by Mariam Lassiter RN)  Verbalizes/displays adequate comfort level or baseline comfort level:   Encourage patient to monitor pain and request assistance   Assess pain using appropriate pain scale   Administer analgesics based on type and severity of pain and evaluate response  10/23/2023 0558 by Mariam Lassiter RN  Outcome: Progressing  Flowsheets (Taken 10/23/2023 9236)  Verbalizes/displays adequate comfort level or baseline comfort level:   Encourage patient to monitor pain and request assistance   Assess pain using appropriate pain scale   Administer analgesics based on type and severity of pain and evaluate response  Note: Pt Continued to assess pain level with appropriate pain scale. Problem: Skin/Tissue Integrity  Goal: Absence of new skin breakdown  Description: 1. Monitor for areas of redness and/or skin breakdown  2. Assess vascular access sites hourly  3. Every 4-6 hours minimum:  Change oxygen saturation probe site  4. Every 4-6 hours:  If on nasal continuous positive airway pressure, respiratory therapy assess nares and determine need for appliance change or resting period.   10/23/2023 1210 by Arnulfo Hannon RN  Outcome: Progressing  10/23/2023 0558 by Mariam Lassiter RN  Outcome: Progressing     Problem: Safety - Adult  Goal: Free from fall injury  Outcome: Progressing     Problem: ABCDS Injury Assessment  Goal: Absence of physical injury  Outcome: Progressing     Problem: Chronic Conditions and Co-morbidities  Goal: Patient's chronic conditions and co-morbidity
Problem: Discharge Planning  Goal: Discharge to home or other facility with appropriate resources  10/25/2023 0616 by Clary Dallas RN  Outcome: Progressing  Flowsheets  Taken 10/25/2023 0400  Discharge to home or other facility with appropriate resources: Identify barriers to discharge with patient and caregiver  Taken 10/25/2023 0000  Discharge to home or other facility with appropriate resources: Identify barriers to discharge with patient and caregiver  Taken 10/24/2023 2000  Discharge to home or other facility with appropriate resources: Identify barriers to discharge with patient and caregiver     Problem: Pain  Goal: Verbalizes/displays adequate comfort level or baseline comfort level  10/25/2023 0616 by Clary Dallas RN  Outcome: Progressing  Flowsheets  Taken 10/25/2023 0400  Verbalizes/displays adequate comfort level or baseline comfort level: Encourage patient to monitor pain and request assistance  Taken 10/25/2023 0000  Verbalizes/displays adequate comfort level or baseline comfort level:   Encourage patient to monitor pain and request assistance   Assess pain using appropriate pain scale  Taken 10/24/2023 2000  Verbalizes/displays adequate comfort level or baseline comfort level:   Encourage patient to monitor pain and request assistance   Assess pain using appropriate pain scale     Problem: Skin/Tissue Integrity  Goal: Absence of new skin breakdown  Description: 1. Monitor for areas of redness and/or skin breakdown  2. Assess vascular access sites hourly  3. Every 4-6 hours minimum:  Change oxygen saturation probe site  4. Every 4-6 hours:  If on nasal continuous positive airway pressure, respiratory therapy assess nares and determine need for appliance change or resting period.   10/25/2023 0616 by Clary Dallas RN  Outcome: Progressing     Problem: Safety - Adult  Goal: Free from fall injury  10/25/2023 0616 by Clary Dallas RN  Outcome: Progressing  Flowsheets (Taken 10/24/2023
Problem: Discharge Planning  Goal: Discharge to home or other facility with appropriate resources  10/25/2023 1848 by Rena Newman RN  Outcome: Progressing  Flowsheets  Taken 10/25/2023 1200  Discharge to home or other facility with appropriate resources: Refer to discharge planning if patient needs post-hospital services based on physician order or complex needs related to functional status, cognitive ability or social support system  Taken 10/25/2023 0900  Discharge to home or other facility with appropriate resources: Refer to discharge planning if patient needs post-hospital services based on physician order or complex needs related to functional status, cognitive ability or social support system  10/25/2023 0616 by Manjinder Albrecht RN  Outcome: Progressing  Flowsheets  Taken 10/25/2023 0400  Discharge to home or other facility with appropriate resources: Identify barriers to discharge with patient and caregiver  Taken 10/25/2023 0000  Discharge to home or other facility with appropriate resources: Identify barriers to discharge with patient and caregiver  Taken 10/24/2023 2000  Discharge to home or other facility with appropriate resources: Identify barriers to discharge with patient and caregiver     Problem: Pain  Goal: Verbalizes/displays adequate comfort level or baseline comfort level  10/25/2023 1848 by Rena Newman RN  Outcome: Progressing  Flowsheets  Taken 10/25/2023 1715  Verbalizes/displays adequate comfort level or baseline comfort level:   Encourage patient to monitor pain and request assistance   Assess pain using appropriate pain scale   Administer analgesics based on type and severity of pain and evaluate response  Taken 10/25/2023 1229  Verbalizes/displays adequate comfort level or baseline comfort level:   Encourage patient to monitor pain and request assistance   Assess pain using appropriate pain scale   Administer analgesics based on type and severity of pain and evaluate
Problem: Discharge Planning  Goal: Discharge to home or other facility with appropriate resources  10/26/2023 0732 by Crystal Mccracken RN  Outcome: Progressing  Flowsheets (Taken 10/25/2023 1200 by Coty Sol RN)  Discharge to home or other facility with appropriate resources: Refer to discharge planning if patient needs post-hospital services based on physician order or complex needs related to functional status, cognitive ability or social support system  10/25/2023 1848 by Coty Sol RN  Outcome: Progressing  Flowsheets  Taken 10/25/2023 1200  Discharge to home or other facility with appropriate resources: Refer to discharge planning if patient needs post-hospital services based on physician order or complex needs related to functional status, cognitive ability or social support system  Taken 10/25/2023 0900  Discharge to home or other facility with appropriate resources: Refer to discharge planning if patient needs post-hospital services based on physician order or complex needs related to functional status, cognitive ability or social support system     Problem: Pain  Goal: Verbalizes/displays adequate comfort level or baseline comfort level  10/26/2023 0732 by Crystal Mccracken RN  Outcome: Progressing  [address] (Taken 10/25/2023 1715 by Coty Sol RN)  Verbalizes/displays adequate comfort level or baseline comfort level:   Encourage patient to monitor pain and request assistance   Assess pain using appropriate pain scale   Administer analgesics based on type and severity of pain and evaluate response  10/25/2023 1848 by Coty Sol RN  Outcome: Progressing  Flowsheets  Taken 10/25/2023 1715  Verbalizes/displays adequate comfort level or baseline comfort level:   Encourage patient to monitor pain and request assistance   Assess pain using appropriate pain scale   Administer analgesics based on type and severity of pain and evaluate response  Taken 10/25/2023
Problem: Discharge Planning  Goal: Discharge to home or other facility with appropriate resources  10/29/2023 0536 by Moises Carlton RN  Outcome: Progressing  10/28/2023 1655 by Willard Valero RN  Outcome: Progressing     Problem: Pain  Goal: Verbalizes/displays adequate comfort level or baseline comfort level  10/29/2023 0536 by Moises Carlton RN  Outcome: Progressing  10/28/2023 1655 by Willard Valero RN  Outcome: Progressing     Problem: Skin/Tissue Integrity  Goal: Absence of new skin breakdown  10/29/2023 0536 by Moises Carlton RN  Outcome: Progressing  10/28/2023 1655 by Willard Valero RN  Outcome: Progressing     Problem: Safety - Adult  Goal: Free from fall injury  10/29/2023 0536 by Moises Carlton RN  Outcome: Progressing  10/28/2023 1655 by Willard Valero RN  Outcome: Progressing     Problem: ABCDS Injury Assessment  Goal: Absence of physical injury  10/29/2023 0536 by Moises Carlton RN  Outcome: Progressing  10/28/2023 1655 by Willard Valero RN  Outcome: Progressing     Problem: Chronic Conditions and Co-morbidities  Goal: Patient's chronic conditions and co-morbidity symptoms are monitored and maintained or improved  10/29/2023 0536 by Moises Carlton RN  Outcome: Progressing  10/28/2023 1655 by Willard Valero RN  Outcome: Progressing     Problem: Nutrition Deficit:  Goal: Optimize nutritional status  10/29/2023 0536 by Moises Carlton RN  Outcome: Progressing  10/28/2023 1655 by Willard Valero RN  Outcome: Progressing     Problem: Safety - Medical Restraint  Goal: Remains free of injury from restraints (Restraint for Interference with Medical Device)  10/29/2023 0536 by Moises Carlton RN  Outcome: Progressing  10/28/2023 1655 by Willard Valero RN  Outcome: Progressing     Problem: Respiratory - Adult  Goal: Achieves optimal ventilation and oxygenation  10/29/2023 0536 by Moises Carlton RN  Outcome: Progressing  10/28/2023 1655 by
Problem: Discharge Planning  Goal: Discharge to home or other facility with appropriate resources  10/29/2023 1511 by Louie Simmons RN  Outcome: Progressing  Flowsheets (Taken 10/29/2023 1511)  Discharge to home or other facility with appropriate resources:   Identify barriers to discharge with patient and caregiver   Refer to discharge planning if patient needs post-hospital services based on physician order or complex needs related to functional status, cognitive ability or social support system     Problem: Pain  Goal: Verbalizes/displays adequate comfort level or baseline comfort level  10/29/2023 1511 by Louie Simmons RN  Outcome: Progressing  Flowsheets (Taken 10/29/2023 1511)  Verbalizes/displays adequate comfort level or baseline comfort level:   Encourage patient to monitor pain and request assistance   Implement non-pharmacological measures as appropriate and evaluate response   Assess pain using appropriate pain scale     Problem: Skin/Tissue Integrity  Goal: Absence of new skin breakdown  Description: 1. Monitor for areas of redness and/or skin breakdown  2. Assess vascular access sites hourly  3. Every 4-6 hours minimum:  Change oxygen saturation probe site  4. Every 4-6 hours:  If on nasal continuous positive airway pressure, respiratory therapy assess nares and determine need for appliance change or resting period.   10/29/2023 1511 by Louie Simmons RN  Outcome: Progressing     Problem: Safety - Adult  Goal: Free from fall injury  10/29/2023 1511 by Louie Simmons RN  Outcome: Progressing  Flowsheets (Taken 10/29/2023 1511)  Free From Fall Injury:   Based on caregiver fall risk screen, instruct family/caregiver to ask for assistance with transferring infant if caregiver noted to have fall risk factors   Instruct family/caregiver on patient safety     Problem: ABCDS Injury Assessment  Goal: Absence of physical injury  10/29/2023 1511 by Louie Simmons RN  Outcome: Progressing  Flowsheets (Taken
Problem: Discharge Planning  Goal: Discharge to home or other facility with appropriate resources  10/30/2023 1734 by Cesario Moore RN  Outcome: Progressing  Flowsheets (Taken 10/30/2023 1734)  Discharge to home or other facility with appropriate resources: Refer to discharge planning if patient needs post-hospital services based on physician order or complex needs related to functional status, cognitive ability or social support system     Problem: Pain  Goal: Verbalizes/displays adequate comfort level or baseline comfort level  10/30/2023 1734 by Cesario Moore RN  Outcome: Progressing  Flowsheets (Taken 10/30/2023 1734)  Verbalizes/displays adequate comfort level or baseline comfort level:   Implement non-pharmacological measures as appropriate and evaluate response   Assess pain using appropriate pain scale     Problem: Skin/Tissue Integrity  Goal: Absence of new skin breakdown  Description: 1. Monitor for areas of redness and/or skin breakdown  2. Assess vascular access sites hourly  3. Every 4-6 hours minimum:  Change oxygen saturation probe site  4. Every 4-6 hours:  If on nasal continuous positive airway pressure, respiratory therapy assess nares and determine need for appliance change or resting period.   10/30/2023 1734 by Cesario Moore RN  Outcome: Progressing     Problem: Safety - Adult  Goal: Free from fall injury  10/30/2023 1734 by Cesario Moore RN  Outcome: Progressing  Flowsheets (Taken 10/30/2023 1734)  Free From Fall Injury:   Sandy Hoover family/caregiver on patient safety   Based on caregiver fall risk screen, instruct family/caregiver to ask for assistance with transferring infant if caregiver noted to have fall risk factors     Problem: ABCDS Injury Assessment  Goal: Absence of physical injury  10/30/2023 1734 by Cesario Moore RN  Outcome: Progressing  Flowsheets (Taken 10/30/2023 1734)  Absence of Physical Injury: Implement safety measures based on patient assessment     Problem: Chronic
Problem: Discharge Planning  Goal: Discharge to home or other facility with appropriate resources  10/31/2023 0530 by Marilee Villarreal RN  Outcome: Progressing  10/30/2023 1734 by Ray Knutson RN  Outcome: Progressing  Flowsheets (Taken 10/30/2023 1734)  Discharge to home or other facility with appropriate resources: Refer to discharge planning if patient needs post-hospital services based on physician order or complex needs related to functional status, cognitive ability or social support system     Problem: Pain  Goal: Verbalizes/displays adequate comfort level or baseline comfort level  10/31/2023 0530 by Marilee Villarreal RN  Outcome: Progressing  10/30/2023 1734 by Ray Knutson RN  Outcome: Progressing  Flowsheets (Taken 10/30/2023 1734)  Verbalizes/displays adequate comfort level or baseline comfort level:   Implement non-pharmacological measures as appropriate and evaluate response   Assess pain using appropriate pain scale     Problem: Skin/Tissue Integrity  Goal: Absence of new skin breakdown  Description: 1. Monitor for areas of redness and/or skin breakdown  2. Assess vascular access sites hourly  3. Every 4-6 hours minimum:  Change oxygen saturation probe site  4. Every 4-6 hours:  If on nasal continuous positive airway pressure, respiratory therapy assess nares and determine need for appliance change or resting period.   10/31/2023 0530 by Marilee Villarreal RN  Outcome: Progressing  10/30/2023 1734 by Ray Knutson RN  Outcome: Progressing     Problem: Safety - Adult  Goal: Free from fall injury  10/31/2023 0530 by Marilee Villarreal RN  Outcome: Progressing  10/30/2023 1734 by Ray Knutson RN  Outcome: Progressing  Flowsheets (Taken 10/30/2023 1734)  Free From Fall Injury:   Instruct family/caregiver on patient safety   Based on caregiver fall risk screen, instruct family/caregiver to ask for assistance with transferring infant if caregiver noted to have fall risk factors     Problem: ABCDS Injury
Problem: Discharge Planning  Goal: Discharge to home or other facility with appropriate resources  11/1/2023 0511 by Luly Triplett RN  Outcome: Progressing  10/31/2023 1935 by Beka Meehan RN  Outcome: Progressing     Problem: Pain  Goal: Verbalizes/displays adequate comfort level or baseline comfort level  11/1/2023 0511 by Luly Triplett RN  Outcome: Progressing  10/31/2023 1935 by Beka Meehan RN  Outcome: Progressing     Problem: Skin/Tissue Integrity  Goal: Absence of new skin breakdown  Description: 1. Monitor for areas of redness and/or skin breakdown  2. Assess vascular access sites hourly  3. Every 4-6 hours minimum:  Change oxygen saturation probe site  4. Every 4-6 hours:  If on nasal continuous positive airway pressure, respiratory therapy assess nares and determine need for appliance change or resting period.   11/1/2023 0511 by Luly Triplett RN  Outcome: Progressing  10/31/2023 1935 by Beka Meehan RN  Outcome: Progressing     Problem: Safety - Adult  Goal: Free from fall injury  11/1/2023 0511 by Luly Triplett RN  Outcome: Progressing  10/31/2023 1935 by Beka Meehan RN  Outcome: Progressing     Problem: ABCDS Injury Assessment  Goal: Absence of physical injury  11/1/2023 0511 by Luly Triplett RN  Outcome: Progressing  10/31/2023 1935 by Beka Meehan RN  Outcome: Progressing     Problem: Chronic Conditions and Co-morbidities  Goal: Patient's chronic conditions and co-morbidity symptoms are monitored and maintained or improved  11/1/2023 0511 by Luly Triplett RN  Outcome: Progressing  10/31/2023 1935 by Beka Meehan RN  Outcome: Progressing     Problem: Nutrition Deficit:  Goal: Optimize nutritional status  11/1/2023 0511 by Luly Triplett RN  Outcome: Progressing  10/31/2023 1935 by Beka Meehan RN  Outcome: Progressing     Problem: Safety - Medical Restraint  Goal: Remains free of injury from restraints (Restraint for Interference with
Problem: Discharge Planning  Goal: Discharge to home or other facility with appropriate resources  11/10/2023 1817 by Cosme Carrion RN  Outcome: Progressing  Flowsheets (Taken 11/10/2023 0800)  Discharge to home or other facility with appropriate resources: Refer to discharge planning if patient needs post-hospital services based on physician order or complex needs related to functional status, cognitive ability or social support system     Problem: Safety - Medical Restraint  Goal: Remains free of injury from restraints (Restraint for Interference with Medical Device)  Description: INTERVENTIONS:  1. Determine that other, less restrictive measures have been tried or would not be effective before applying the restraint  2. Evaluate the patient's condition at the time of restraint application  3. Inform patient/family regarding the reason for restraint  4.  Q2H: Monitor safety, psychosocial status, comfort, nutrition and hydration  11/10/2023 1817 by Cosme Carrion RN  Outcome: Progressing  Flowsheets (Taken 11/10/2023 0800)  Remains free of injury from restraints (restraint for interference with medical device):   Determine that other, less restrictive measures have been tried or would not be effective before applying the restraint   Inform patient/family regarding the reason for restraint   Evaluate the patient's condition at the time of restraint application   Every 2 hours: Monitor safety, psychosocial status, comfort, nutrition and hydration     Problem: Respiratory - Adult  Goal: Achieves optimal ventilation and oxygenation  11/10/2023 1817 by Cosme Carrion RN  Outcome: Progressing  Flowsheets (Taken 11/10/2023 1817)  Achieves optimal ventilation and oxygenation:   Assess for changes in respiratory status   Position to facilitate oxygenation and minimize respiratory effort
Problem: Discharge Planning  Goal: Discharge to home or other facility with appropriate resources  11/5/2023 0610 by Jorge Alberto Mcrae RN  Outcome: Progressing  Flowsheets (Taken 11/4/2023 2000)  Discharge to home or other facility with appropriate resources:   Identify barriers to discharge with patient and caregiver   Arrange for needed discharge resources and transportation as appropriate   Refer to discharge planning if patient needs post-hospital services based on physician order or complex needs related to functional status, cognitive ability or social support system  11/4/2023 1919 by Huyen Santamaria RN  Outcome: Progressing  Flowsheets (Taken 11/4/2023 0800 by Sofya Washington RN)  Discharge to home or other facility with appropriate resources: Identify barriers to discharge with patient and caregiver     Problem: Pain  Goal: Verbalizes/displays adequate comfort level or baseline comfort level  11/5/2023 0610 by Jorge Alberto Mcrae RN  Outcome: Progressing  11/4/2023 1919 by Huyen Santamaria RN  Outcome: Progressing     Problem: Skin/Tissue Integrity  Goal: Absence of new skin breakdown  Description: 1. Monitor for areas of redness and/or skin breakdown  2. Assess vascular access sites hourly  3. Every 4-6 hours minimum:  Change oxygen saturation probe site  4. Every 4-6 hours:  If on nasal continuous positive airway pressure, respiratory therapy assess nares and determine need for appliance change or resting period.   11/5/2023 0610 by Jorge Alberto Mcrae RN  Outcome: Progressing  11/4/2023 1919 by Huyen Santamaria RN  Outcome: Progressing     Problem: Safety - Adult  Goal: Free from fall injury  11/5/2023 0610 by Jorge Alberto Mcrae RN  Outcome: Progressing  11/4/2023 1919 by Huyen Santamaria RN  Outcome: Progressing     Problem: ABCDS Injury Assessment  Goal: Absence of physical injury  11/5/2023 0610 by Jorge Alberto Mcrae RN  Outcome: Progressing  11/4/2023 1919 by
Problem: Discharge Planning  Goal: Discharge to home or other facility with appropriate resources  11/5/2023 1749 by Roxann Gonzales RN  Outcome: Progressing  Flowsheets (Taken 11/5/2023 0800)  Discharge to home or other facility with appropriate resources: Refer to discharge planning if patient needs post-hospital services based on physician order or complex needs related to functional status, cognitive ability or social support system    Problem: Pain  Goal: Verbalizes/displays adequate comfort level or baseline comfort level  11/5/2023 1749 by Roxann Gonzales RN  Outcome: Progressing  Flowsheets (Taken 11/5/2023 0800)  Verbalizes/displays adequate comfort level or baseline comfort level:   Encourage patient to monitor pain and request assistance   Assess pain using appropriate pain scale   Administer analgesics based on type and severity of pain and evaluate response      Problem: Chronic Conditions and Co-morbidities  Goal: Patient's chronic conditions and co-morbidity symptoms are monitored and maintained or improved  11/5/2023 1749 by Roxann Gonzales RN  Outcome: Progressing  Flowsheets (Taken 11/5/2023 0800)  Care Plan - Patient's Chronic Conditions and Co-Morbidity Symptoms are Monitored and Maintained or Improved: Monitor and assess patient's chronic conditions and comorbid symptoms for stability, deterioration, or improvement
Problem: Discharge Planning  Goal: Discharge to home or other facility with appropriate resources  11/6/2023 1145 by Rodriguez Nice RN  Outcome: Progressing  Flowsheets (Taken 11/6/2023 0800)  Discharge to home or other facility with appropriate resources: Identify barriers to discharge with patient and caregiver  Note: Not fit for discharge for today. Problem: Pain  Goal: Verbalizes/displays adequate comfort level or baseline comfort level  11/6/2023 1145 by Rodriguez Nice RN  Outcome: Progressing  Flowsheets  Taken 11/6/2023 1145  Verbalizes/displays adequate comfort level or baseline comfort level: Assess pain using appropriate pain scale     Problem: Skin/Tissue Integrity  Goal: Absence of new skin breakdown  Description: 1. Monitor for areas of redness and/or skin breakdown  2. Assess vascular access sites hourly  3. Every 4-6 hours minimum:  Change oxygen saturation probe site  4. Every 4-6 hours:  If on nasal continuous positive airway pressure, respiratory therapy assess nares and determine need for appliance change or resting period. 11/6/2023 1145 by Rodriguez Nice RN  Outcome: Progressing  Note: Repositioned the patient and moisturized her skin. Problem: Safety - Adult  Goal: Free from fall injury  11/6/2023 1145 by Rodriguez Nice RN  Outcome: Progressing  Flowsheets (Taken 11/6/2023 0800)  Free From Fall Injury: Instruct family/caregiver on patient safety  Note: Bed was locked and in lowest position. Raised side rails for safety.      Problem: ABCDS Injury Assessment  Goal: Absence of physical injury  11/6/2023 1145 by Rodriguez Nice RN  Outcome: Progressing  Flowsheets (Taken 11/6/2023 0800)  Absence of Physical Injury: Implement safety measures based on patient assessment     Problem: Chronic Conditions and Co-morbidities  Goal: Patient's chronic conditions and co-morbidity symptoms are monitored and maintained or improved  11/6/2023 1145 by Rodriguez Nice
Problem: Discharge Planning  Goal: Discharge to home or other facility with appropriate resources  11/7/2023 1049 by Abeba De La Rosa RN  Outcome: Progressing  Flowsheets (Taken 11/7/2023 0800)  Discharge to home or other facility with appropriate resources: Identify barriers to discharge with patient and caregiver  Note: Patient is not fit for discharge today. Problem: Pain  Goal: Verbalizes/displays adequate comfort level or baseline comfort level  11/7/2023 1049 by Abeba De La Rosa RN  Outcome: Progressing  Flowsheets (Taken 11/7/2023 0800)  Verbalizes/displays adequate comfort level or baseline comfort level: Assess pain using appropriate pain scale     Problem: Skin/Tissue Integrity  Goal: Absence of new skin breakdown  Description: 1. Monitor for areas of redness and/or skin breakdown  2. Assess vascular access sites hourly  3. Every 4-6 hours minimum:  Change oxygen saturation probe site  4. Every 4-6 hours:  If on nasal continuous positive airway pressure, respiratory therapy assess nares and determine need for appliance change or resting period. 11/7/2023 1049 by Abeba De La Rosa RN  Outcome: Progressing  Note: Repositioned then patient every 2 hours and moisturized the patient's skin. Problem: Safety - Adult  Goal: Free from fall injury  11/7/2023 1049 by Abeba De La Rosa RN  Outcome: Progressing  Flowsheets (Taken 11/7/2023 0800)  Free From Fall Injury: Instruct family/caregiver on patient safety  Note: Bed was locked and in lowest position.      Problem: ABCDS Injury Assessment  Goal: Absence of physical injury  11/7/2023 1049 by Abeba De La Rosa RN  Outcome: Progressing  Flowsheets (Taken 11/7/2023 0800)  Absence of Physical Injury: Implement safety measures based on patient assessment     Problem: Chronic Conditions and Co-morbidities  Goal: Patient's chronic conditions and co-morbidity symptoms are monitored and maintained or improved  11/7/2023 1049 by Abeba De La Rosa
Problem: Discharge Planning  Goal: Discharge to home or other facility with appropriate resources  11/8/2023 0930 by Radha Herzog RN  Outcome: Progressing  Discharge to home or other facility with appropriate resources: Identify barriers to discharge with patient and caregiver  Note: Patient is not fit for discharge today. Problem: Pain  Goal: Verbalizes/displays adequate comfort level or baseline comfort level  11/8/2023 0930 by Radha Herzog RN  Outcome: Progressing  Verbalizes/displays adequate comfort level or baseline comfort level: Assess pain using appropriate pain scale  Note: Used CPOT pain scale to assess the patient. Problem: Skin/Tissue Integrity  Goal: Absence of new skin breakdown  Description: 1. Monitor for areas of redness and/or skin breakdown  2. Assess vascular access sites hourly  3. Every 4-6 hours minimum:  Change oxygen saturation probe site  4. Every 4-6 hours:  If on nasal continuous positive airway pressure, respiratory therapy assess nares and determine need for appliance change or resting period. 11/8/2023 0930 by Radha Herzog RN  Outcome: Progressing  Note: Repositioned the patient every 2 hours, moisturized the patient's skin, checked for new skin breakdown occurrence. Problem: Safety - Adult  Goal: Free from fall injury  11/8/2023 0930 by Radha Herzog RN  Outcome: Progressing  Free From Fall Injury: Instruct family/caregiver on patient safety  Note: Bed was locked and in lowest position. Raised side rails for safety.      Problem: ABCDS Injury Assessment  Goal: Absence of physical injury  11/8/2023 0930 by Radha Herzog RN  Outcome: Progressing  Absence of Physical Injury: Implement safety measures based on patient assessment    Problem: Chronic Conditions and Co-morbidities  Goal: Patient's chronic conditions and co-morbidity symptoms are monitored and maintained or improved  11/8/2023 0930 by Radha Herzog RN  Outcome:
Problem: Discharge Planning  Goal: Discharge to home or other facility with appropriate resources  Outcome: Progressing     Problem: Pain  Goal: Verbalizes/displays adequate comfort level or baseline comfort level  Outcome: Progressing     Problem: Skin/Tissue Integrity  Goal: Absence of new skin breakdown  Description: 1. Monitor for areas of redness and/or skin breakdown  2. Assess vascular access sites hourly  3. Every 4-6 hours minimum:  Change oxygen saturation probe site  4. Every 4-6 hours:  If on nasal continuous positive airway pressure, respiratory therapy assess nares and determine need for appliance change or resting period.   Outcome: Progressing     Problem: Safety - Adult  Goal: Free from fall injury  Outcome: Progressing     Problem: ABCDS Injury Assessment  Goal: Absence of physical injury  Outcome: Progressing     Problem: Chronic Conditions and Co-morbidities  Goal: Patient's chronic conditions and co-morbidity symptoms are monitored and maintained or improved  Outcome: Progressing     Problem: Nutrition Deficit:  Goal: Optimize nutritional status  Outcome: Progressing  Flowsheets (Taken 10/19/2023 4508 by Ruben Connelly, RD, LD)  Nutrient intake appropriate for improving, restoring, or maintaining nutritional needs: Recommend, monitor, and adjust tube feedings and TPN/PPN based on assessed needs
Problem: Discharge Planning  Goal: Discharge to home or other facility with appropriate resources  Outcome: Progressing     Problem: Pain  Goal: Verbalizes/displays adequate comfort level or baseline comfort level  Outcome: Progressing     Problem: Skin/Tissue Integrity  Goal: Absence of new skin breakdown  Description: 1. Monitor for areas of redness and/or skin breakdown  2. Assess vascular access sites hourly  3. Every 4-6 hours minimum:  Change oxygen saturation probe site  4. Every 4-6 hours:  If on nasal continuous positive airway pressure, respiratory therapy assess nares and determine need for appliance change or resting period. Outcome: Progressing     Problem: Safety - Adult  Goal: Free from fall injury  Outcome: Progressing     Problem: ABCDS Injury Assessment  Goal: Absence of physical injury  Outcome: Progressing     Problem: Chronic Conditions and Co-morbidities  Goal: Patient's chronic conditions and co-morbidity symptoms are monitored and maintained or improved  Outcome: Progressing     Problem: Nutrition Deficit:  Goal: Optimize nutritional status  Outcome: Progressing     Problem: Safety - Medical Restraint  Goal: Remains free of injury from restraints (Restraint for Interference with Medical Device)  Description: INTERVENTIONS:  1. Determine that other, less restrictive measures have been tried or would not be effective before applying the restraint  2. Evaluate the patient's condition at the time of restraint application  3. Inform patient/family regarding the reason for restraint  4.  Q2H: Monitor safety, psychosocial status, comfort, nutrition and hydration  Outcome: Progressing     Problem: Respiratory - Adult  Goal: Achieves optimal ventilation and oxygenation  Outcome: Progressing
Problem: Discharge Planning  Goal: Discharge to home or other facility with appropriate resources  Outcome: Progressing     Problem: Pain  Goal: Verbalizes/displays adequate comfort level or baseline comfort level  Outcome: Progressing     Problem: Skin/Tissue Integrity  Goal: Absence of new skin breakdown  Description: 1. Monitor for areas of redness and/or skin breakdown  2. Assess vascular access sites hourly  3. Every 4-6 hours minimum:  Change oxygen saturation probe site  4. Every 4-6 hours:  If on nasal continuous positive airway pressure, respiratory therapy assess nares and determine need for appliance change or resting period. Outcome: Progressing   No new skin breakdown noted. Problem: ABCDS Injury Assessment  Goal: Absence of physical injury  Outcome: Progressing  No injuries this shift   Problem: Chronic Conditions and Co-morbidities  Goal: Patient's chronic conditions and co-morbidity symptoms are monitored and maintained or improved  Outcome: Progressing     Problem: Nutrition Deficit:  Goal: Optimize nutritional status  Outcome: Progressing     Problem: Safety - Medical Restraint  Goal: Remains free of injury from restraints (Restraint for Interference with Medical Device)  Description: INTERVENTIONS:  1. Determine that other, less restrictive measures have been tried or would not be effective before applying the restraint  2. Evaluate the patient's condition at the time of restraint application  3. Inform patient/family regarding the reason for restraint  4.  Q2H: Monitor safety, psychosocial status, comfort, nutrition and hydration  Outcome: Progressing  Flowsheets (Taken 10/31/2023 0800)  Remains free of injury from restraints (restraint for interference with medical device):   Determine that other, less restrictive measures have been tried or would not be effective before applying the restraint   Every 2 hours: Monitor safety, psychosocial status, comfort, nutrition and hydration
Problem: Discharge Planning  Goal: Discharge to home or other facility with appropriate resources  Outcome: Progressing     Problem: Pain  Goal: Verbalizes/displays adequate comfort level or baseline comfort level  Outcome: Progressing     Problem: Skin/Tissue Integrity  Goal: Absence of new skin breakdown  Outcome: Progressing     Problem: Safety - Adult  Goal: Free from fall injury  Outcome: Progressing     Problem: ABCDS Injury Assessment  Goal: Absence of physical injury  Outcome: Progressing     Problem: Chronic Conditions and Co-morbidities  Goal: Patient's chronic conditions and co-morbidity symptoms are monitored and maintained or improved  Outcome: Progressing     Problem: Nutrition Deficit:  Goal: Optimize nutritional status  Outcome: Progressing     Problem: Safety - Medical Restraint  Goal: Remains free of injury from restraints (Restraint for Interference with Medical Device)  Outcome: Progressing
Problem: Discharge Planning  Goal: Discharge to home or other facility with appropriate resources  Outcome: Progressing     Problem: Pain  Goal: Verbalizes/displays adequate comfort level or baseline comfort level  Outcome: Progressing     Problem: Skin/Tissue Integrity  Goal: Absence of new skin breakdown  Outcome: Progressing     Problem: Safety - Adult  Goal: Free from fall injury  Outcome: Progressing     Problem: ABCDS Injury Assessment  Goal: Absence of physical injury  Outcome: Progressing     Problem: Chronic Conditions and Co-morbidities  Goal: Patient's chronic conditions and co-morbidity symptoms are monitored and maintained or improved  Outcome: Progressing     Problem: Nutrition Deficit:  Goal: Optimize nutritional status  Outcome: Progressing     Problem: Safety - Medical Restraint  Goal: Remains free of injury from restraints (Restraint for Interference with Medical Device)  Outcome: Progressing
Problem: Discharge Planning  Goal: Discharge to home or other facility with appropriate resources  Outcome: Progressing     Problem: Pain  Goal: Verbalizes/displays adequate comfort level or baseline comfort level  Outcome: Progressing     Problem: Skin/Tissue Integrity  Goal: Absence of new skin breakdown  Outcome: Progressing     Problem: Safety - Adult  Goal: Free from fall injury  Outcome: Progressing     Problem: ABCDS Injury Assessment  Goal: Absence of physical injury  Outcome: Progressing     Problem: Chronic Conditions and Co-morbidities  Goal: Patient's chronic conditions and co-morbidity symptoms are monitored and maintained or improved  Outcome: Progressing     Problem: Nutrition Deficit:  Goal: Optimize nutritional status  Outcome: Progressing     Problem: Safety - Medical Restraint  Goal: Remains free of injury from restraints (Restraint for Interference with Medical Device)  Outcome: Progressing     Problem: Respiratory - Adult  Goal: Achieves optimal ventilation and oxygenation  Outcome: Progressing
Problem: Discharge Planning  Goal: Discharge to home or other facility with appropriate resources  Outcome: Progressing     Problem: Pain  Goal: Verbalizes/displays adequate comfort level or baseline comfort level  Outcome: Progressing  Flowsheets (Taken 10/23/2023 8824)  Verbalizes/displays adequate comfort level or baseline comfort level:   Encourage patient to monitor pain and request assistance   Assess pain using appropriate pain scale   Administer analgesics based on type and severity of pain and evaluate response  Note: Pt Continued to assess pain level with appropriate pain scale.        Problem: Skin/Tissue Integrity  Goal: Absence of new skin breakdown  Outcome: Progressing     Problem: Chronic Conditions and Co-morbidities  Goal: Patient's chronic conditions and co-morbidity symptoms are monitored and maintained or improved  Outcome: Progressing     Problem: Safety - Medical Restraint  Goal: Remains free of injury from restraints (Restraint for Interference with Medical Device)  Outcome: Progressing
Problem: Discharge Planning  Goal: Discharge to home or other facility with appropriate resources  Outcome: Progressing  Flowsheets  Taken 11/6/2023 2000 by Roxanne Redd RN  Discharge to home or other facility with appropriate resources:   Identify barriers to discharge with patient and caregiver   Arrange for needed discharge resources and transportation as appropriate   Refer to discharge planning if patient needs post-hospital services based on physician order or complex needs related to functional status, cognitive ability or social support system  Taken 11/6/2023 1600 by Radha Herzog RN  Discharge to home or other facility with appropriate resources: Identify barriers to discharge with patient and caregiver     Problem: Pain  Goal: Verbalizes/displays adequate comfort level or baseline comfort level  Outcome: Progressing  Flowsheets (Taken 11/6/2023 1600 by Radha Herzog RN)  Verbalizes/displays adequate comfort level or baseline comfort level: Assess pain using appropriate pain scale     Problem: Skin/Tissue Integrity  Goal: Absence of new skin breakdown  Description: 1. Monitor for areas of redness and/or skin breakdown  2. Assess vascular access sites hourly  3. Every 4-6 hours minimum:  Change oxygen saturation probe site  4. Every 4-6 hours:  If on nasal continuous positive airway pressure, respiratory therapy assess nares and determine need for appliance change or resting period.   Outcome: Progressing     Problem: Safety - Adult  Goal: Free from fall injury  Outcome: Progressing     Problem: ABCDS Injury Assessment  Goal: Absence of physical injury  Outcome: Progressing     Problem: Chronic Conditions and Co-morbidities  Goal: Patient's chronic conditions and co-morbidity symptoms are monitored and maintained or improved  Outcome: Progressing  Flowsheets  Taken 11/6/2023 2000 by Roxanne Redd [facility] - Patient's Chronic Conditions and Co-Morbidity Symptoms are Monitored
Problem: Discharge Planning  Goal: Discharge to home or other facility with appropriate resources  Outcome: Progressing  Flowsheets (Taken 10/14/2023 0800)  Discharge to home or other facility with appropriate resources:   Identify barriers to discharge with patient and caregiver   Arrange for needed discharge resources and transportation as appropriate     Problem: Pain  Goal: Verbalizes/displays adequate comfort level or baseline comfort level  Outcome: Progressing  Flowsheets  Taken 10/14/2023 1200  Verbalizes/displays adequate comfort level or baseline comfort level:   Encourage patient to monitor pain and request assistance   Assess pain using appropriate pain scale  Taken 10/14/2023 0800  Verbalizes/displays adequate comfort level or baseline comfort level: Assess pain using appropriate pain scale     Problem: Skin/Tissue Integrity  Goal: Absence of new skin breakdown  Description: 1. Monitor for areas of redness and/or skin breakdown  2. Assess vascular access sites hourly  3. Every 4-6 hours minimum:  Change oxygen saturation probe site  4. Every 4-6 hours:  If on nasal continuous positive airway pressure, respiratory therapy assess nares and determine need for appliance change or resting period.   Outcome: Progressing     Problem: Safety - Adult  Goal: Free from fall injury  Outcome: Progressing     Problem: ABCDS Injury Assessment  Goal: Absence of physical injury  Outcome: Progressing
Problem: Discharge Planning  Goal: Discharge to home or other facility with appropriate resources  Outcome: Progressing  Flowsheets (Taken 10/15/2023 1643)  Discharge to home or other facility with appropriate resources:   Identify barriers to discharge with patient and caregiver   Arrange for needed discharge resources and transportation as appropriate   Identify discharge learning needs (meds, wound care, etc)   Arrange for interpreters to assist at discharge as needed   Refer to discharge planning if patient needs post-hospital services based on physician order or complex needs related to functional status, cognitive ability or social support system  Note: Patient discharged home with all personal belongings. Discharge instructions and prescriptions given. Patient taken down to front entrance via wheelchair. Problem: Pain  Goal: Verbalizes/displays adequate comfort level or baseline comfort level  Outcome: Progressing  Flowsheets (Taken 10/15/2023 1643)  Verbalizes/displays adequate comfort level or baseline comfort level:   Encourage patient to monitor pain and request assistance   Assess pain using appropriate pain scale   Administer analgesics based on type and severity of pain and evaluate response   Implement non-pharmacological measures as appropriate and evaluate response   Consider cultural and social influences on pain and pain management  Note: Patients pain level decreased with prescribed pain medications. Problem: Skin/Tissue Integrity  Goal: Absence of new skin breakdown  Description: 1. Monitor for areas of redness and/or skin breakdown  2. Assess vascular access sites hourly  3. Every 4-6 hours minimum:  Change oxygen saturation probe site  4. Every 4-6 hours:  If on nasal continuous positive airway pressure, respiratory therapy assess nares and determine need for appliance change or resting period. Outcome: Progressing  Note: Skin assessment as charted.   No new areas of skin
Problem: Discharge Planning  Goal: Discharge to home or other facility with appropriate resources  Outcome: Progressing  Flowsheets (Taken 10/18/2023 0223)  Discharge to home or other facility with appropriate resources: Identify barriers to discharge with patient and caregiver     Problem: Pain  Goal: Verbalizes/displays adequate comfort level or baseline comfort level  10/18/2023 0225 by Alexus Lott RN  Outcome: Progressing  10/17/2023 1552 by Duran Herzog RN  Outcome: Progressing  Note: Patient denies need for prn pain medication this shift. Problem: Skin/Tissue Integrity  Goal: Absence of new skin breakdown  Description: 1. Monitor for areas of redness and/or skin breakdown  2. Assess vascular access sites hourly  3. Every 4-6 hours minimum:  Change oxygen saturation probe site  4. Every 4-6 hours:  If on nasal continuous positive airway pressure, respiratory therapy assess nares and determine need for appliance change or resting period. 10/18/2023 0225 by Alexus Lott RN  Outcome: Progressing  10/17/2023 1552 by Duran Herzog RN  Outcome: Progressing  Note: No new occurrence of skin breakdown noted during this shift. Patient turned at least every two hours. Mepilex and waffle mattress in use. Problem: Safety - Adult  Goal: Free from fall injury  10/18/2023 0225 by Alexus Lott RN  Outcome: Progressing  10/17/2023 1552 by Duran Herzog RN  Outcome: Progressing  Note: Patient remains free of incidence/ injury. Bed remains in low position. Call light within reach. Side rails up x2. Bed alarm on.       Problem: ABCDS Injury Assessment  Goal: Absence of physical injury  Outcome: Progressing     Problem: Chronic Conditions and Co-morbidities  Goal: Patient's chronic conditions and co-morbidity symptoms are monitored and maintained or improved  Outcome: Progressing  Flowsheets (Taken 10/18/2023 0223)  Care Plan - Patient's Chronic Conditions and Co-Morbidity Symptoms
Problem: Discharge Planning  Goal: Discharge to home or other facility with appropriate resources  Outcome: Progressing  Flowsheets (Taken 10/18/2023 1959)  Discharge to home or other facility with appropriate resources: Identify barriers to discharge with patient and caregiver     Problem: Pain  Goal: Verbalizes/displays adequate comfort level or baseline comfort level  Outcome: Progressing     Problem: Skin/Tissue Integrity  Goal: Absence of new skin breakdown  Description: 1. Monitor for areas of redness and/or skin breakdown  2. Assess vascular access sites hourly  3. Every 4-6 hours minimum:  Change oxygen saturation probe site  4. Every 4-6 hours:  If on nasal continuous positive airway pressure, respiratory therapy assess nares and determine need for appliance change or resting period.   Outcome: Progressing     Problem: Safety - Adult  Goal: Free from fall injury  Outcome: Progressing  Flowsheets (Taken 10/19/2023 0150)  Free From Fall Injury: Instruct family/caregiver on patient safety     Problem: ABCDS Injury Assessment  Goal: Absence of physical injury  Outcome: Progressing     Problem: Chronic Conditions and Co-morbidities  Goal: Patient's chronic conditions and co-morbidity symptoms are monitored and maintained or improved  Outcome: Progressing  Flowsheets (Taken 10/18/2023 1959)  Care Plan - Patient's Chronic Conditions and Co-Morbidity Symptoms are Monitored and Maintained or Improved: Monitor and assess patient's chronic conditions and comorbid symptoms for stability, deterioration, or improvement
Problem: Discharge Planning  Goal: Discharge to home or other facility with appropriate resources  Outcome: Progressing  Flowsheets (Taken 10/24/2023 0000)  Discharge to home or other facility with appropriate resources: Identify barriers to discharge with patient and caregiver     Problem: Pain  Goal: Verbalizes/displays adequate comfort level or baseline comfort level  Outcome: Progressing  Flowsheets  Taken 10/24/2023 0400  Verbalizes/displays adequate comfort level or baseline comfort level: Assess pain using appropriate pain scale  Taken 10/24/2023 0000  Verbalizes/displays adequate comfort level or baseline comfort level: Assess pain using appropriate pain scale     Problem: Skin/Tissue Integrity  Goal: Absence of new skin breakdown  Description: 1. Monitor for areas of redness and/or skin breakdown  2. Assess vascular access sites hourly  3. Every 4-6 hours minimum:  Change oxygen saturation probe site  4. Every 4-6 hours:  If on nasal continuous positive airway pressure, respiratory therapy assess nares and determine need for appliance change or resting period.   Outcome: Progressing     Problem: Safety - Adult  Goal: Free from fall injury  Outcome: Progressing  Flowsheets (Taken 10/24/2023 0045)  Free From Fall Injury: Instruct family/caregiver on patient safety     Problem: ABCDS Injury Assessment  Goal: Absence of physical injury  Outcome: Progressing  Flowsheets (Taken 10/24/2023 0045)  Absence of Physical Injury: Implement safety measures based on patient assessment     Problem: Chronic Conditions and Co-morbidities  Goal: Patient's chronic conditions and co-morbidity symptoms are monitored and maintained or improved  Outcome: Progressing  Flowsheets (Taken 10/24/2023 0000)  Care Plan - Patient's Chronic Conditions and Co-Morbidity Symptoms are Monitored and Maintained or Improved: Monitor and assess patient's chronic conditions and comorbid symptoms for stability, deterioration, or improvement
Problem: Discharge Planning  Goal: Discharge to home or other facility with appropriate resources  Outcome: Progressing  Flowsheets (Taken 11/1/2023 1600 by Juliano Cunningham RN)  Discharge to home or other facility with appropriate resources: Identify barriers to discharge with patient and caregiver     Problem: Pain  Goal: Verbalizes/displays adequate comfort level or baseline comfort level  Outcome: Progressing  Flowsheets (Taken 11/1/2023 1600 by Juliano Cunningham RN)  Verbalizes/displays adequate comfort level or baseline comfort level: Assess pain using appropriate pain scale     Problem: Skin/Tissue Integrity  Goal: Absence of new skin breakdown  Description: 1. Monitor for areas of redness and/or skin breakdown  2. Assess vascular access sites hourly  3. Every 4-6 hours minimum:  Change oxygen saturation probe site  4. Every 4-6 hours:  If on nasal continuous positive airway pressure, respiratory therapy assess nares and determine need for appliance change or resting period.   Outcome: Progressing     Problem: Safety - Adult  Goal: Free from fall injury  Outcome: Progressing     Problem: ABCDS Injury Assessment  Goal: Absence of physical injury  Outcome: Progressing     Problem: Chronic Conditions and Co-morbidities  Goal: Patient's chronic conditions and co-morbidity symptoms are monitored and maintained or improved  Outcome: Progressing  Flowsheets (Taken 11/1/2023 1600 by Juliano Cunningham RN)  Care Plan - Patient's Chronic Conditions and Co-Morbidity Symptoms are Monitored and Maintained or Improved:   Monitor and assess patient's chronic conditions and comorbid symptoms for stability, deterioration, or improvement   Collaborate with multidisciplinary team to address chronic and comorbid conditions and prevent exacerbation or deterioration     Problem: Nutrition Deficit:  Goal: Optimize nutritional status  Outcome: Progressing     Problem: Safety - Medical Restraint  Goal: Remains free of
Problem: Discharge Planning  Goal: Discharge to home or other facility with appropriate resources  Outcome: Progressing  Flowsheets (Taken 11/10/2023 0800)  Discharge to home or other facility with appropriate resources: Refer to discharge planning if patient needs post-hospital services based on physician order or complex needs related to functional status, cognitive ability or social support system     Problem: Safety - Medical Restraint  Goal: Remains free of injury from restraints (Restraint for Interference with Medical Device)  Description: INTERVENTIONS:  1. Determine that other, less restrictive measures have been tried or would not be effective before applying the restraint  2. Evaluate the patient's condition at the time of restraint application  3. Inform patient/family regarding the reason for restraint  4.  Q2H: Monitor safety, psychosocial status, comfort, nutrition and hydration  11/10/2023 1817 by Tommie Yang RN  Outcome: Progressing  Flowsheets (Taken 11/10/2023 0800)  Remains free of injury from restraints (restraint for interference with medical device):   Determine that other, less restrictive measures have been tried or would not be effective before applying the restraint   Inform patient/family regarding the reason for restraint   Evaluate the patient's condition at the time of restraint application   Every 2 hours: Monitor safety, psychosocial status, comfort, nutrition and hydration  11/10/2023 0436 by Leilani Catherine RN  Outcome: Progressing  Flowsheets  Taken 11/9/2023 2200 by Leilani Catherine RN  Remains free of injury from restraints (restraint for interference with medical device):   Determine that other, less restrictive measures have been tried or would not be effective before applying the restraint   Evaluate the patient's condition at the time of restraint application   Inform patient/family regarding the reason for restraint   Every 2 hours: Monitor safety, psychosocial
Problem: Discharge Planning  Goal: Discharge to home or other facility with appropriate resources  Outcome: Progressing  Flowsheets (Taken 11/11/2023 0800)  Discharge to home or other facility with appropriate resources:   Identify barriers to discharge with patient and caregiver   Refer to discharge planning if patient needs post-hospital services based on physician order or complex needs related to functional status, cognitive ability or social support system     Problem: Pain  Goal: Verbalizes/displays adequate comfort level or baseline comfort level  Outcome: Progressing  Flowsheets (Taken 11/11/2023 0800)  Verbalizes/displays adequate comfort level or baseline comfort level:   Assess pain using appropriate pain scale   Administer analgesics based on type and severity of pain and evaluate response     Problem: Skin/Tissue Integrity  Goal: Absence of new skin breakdown  Description: 1. Monitor for areas of redness and/or skin breakdown  2. Assess vascular access sites hourly  3. Every 4-6 hours minimum:  Change oxygen saturation probe site  4. Every 4-6 hours:  If on nasal continuous positive airway pressure, respiratory therapy assess nares and determine need for appliance change or resting period.   Outcome: Progressing     Problem: Safety - Adult  Goal: Free from fall injury  Outcome: Progressing  Flowsheets (Taken 11/11/2023 0800)  Free From Fall Injury: Instruct family/caregiver on patient safety     Problem: ABCDS Injury Assessment  Goal: Absence of physical injury  Outcome: Progressing  Flowsheets (Taken 11/11/2023 0800)  Absence of Physical Injury: Implement safety measures based on patient assessment     Problem: Chronic Conditions and Co-morbidities  Goal: Patient's chronic conditions and co-morbidity symptoms are monitored and maintained or improved  Outcome: Progressing  Flowsheets (Taken 11/11/2023 0800)  Care Plan - Patient's Chronic Conditions and Co-Morbidity Symptoms are Monitored and
Problem: Discharge Planning  Goal: Discharge to home or other facility with appropriate resources  Outcome: Progressing  Flowsheets (Taken 11/3/2023 0800)  Discharge to home or other facility with appropriate resources: Identify barriers to discharge with patient and caregiver  Note: Not fit for discharge today. Problem: Pain  Goal: Verbalizes/displays adequate comfort level or baseline comfort level  Outcome: Progressing  Flowsheets (Taken 11/3/2023 0800)  Verbalizes/displays adequate comfort level or baseline comfort level: Assess pain using appropriate pain scale     Problem: Skin/Tissue Integrity  Goal: Absence of new skin breakdown  Description: 1. Monitor for areas of redness and/or skin breakdown  2. Assess vascular access sites hourly  3. Every 4-6 hours minimum:  Change oxygen saturation probe site  4. Every 4-6 hours:  If on nasal continuous positive airway pressure, respiratory therapy assess nares and determine need for appliance change or resting period. Outcome: Progressing  Note: Moisturize the patient's skin and repositioning every 12 hours. Problem: Safety - Adult  Goal: Free from fall injury  Outcome: Progressing  Flowsheets (Taken 11/3/2023 0800)  Free From Fall Injury: Instruct family/caregiver on patient safety  Note: Raised side rails for safety. Bed was locked and in lowest position. Checked frequently.      Problem: ABCDS Injury Assessment  Goal: Absence of physical injury  Outcome: Progressing  Flowsheets (Taken 11/3/2023 0800)  Absence of Physical Injury: Implement safety measures based on patient assessment     Problem: Chronic Conditions and Co-morbidities  Goal: Patient's chronic conditions and co-morbidity symptoms are monitored and maintained or improved  Outcome: Progressing  Flowsheets  Taken 11/3/2023 1022  Care Plan - Patient's Chronic Conditions and Co-Morbidity Symptoms are Monitored and Maintained or Improved:   Monitor and assess patient's chronic conditions and
Problem: Discharge Planning  Goal: Discharge to home or other facility with appropriate resources  Outcome: Progressing  Flowsheets (Taken 11/3/2023 2000)  Discharge to home or other facility with appropriate resources:   Identify barriers to discharge with patient and caregiver   Arrange for needed discharge resources and transportation as appropriate   Refer to discharge planning if patient needs post-hospital services based on physician order or complex needs related to functional status, cognitive ability or social support system     Problem: Pain  Goal: Verbalizes/displays adequate comfort level or baseline comfort level  Outcome: Progressing     Problem: Skin/Tissue Integrity  Goal: Absence of new skin breakdown  Description: 1. Monitor for areas of redness and/or skin breakdown  2. Assess vascular access sites hourly  3. Every 4-6 hours minimum:  Change oxygen saturation probe site  4. Every 4-6 hours:  If on nasal continuous positive airway pressure, respiratory therapy assess nares and determine need for appliance change or resting period.   Outcome: Progressing     Problem: Safety - Adult  Goal: Free from fall injury  Outcome: Progressing     Problem: ABCDS Injury Assessment  Goal: Absence of physical injury  Outcome: Progressing     Problem: Chronic Conditions and Co-morbidities  Goal: Patient's chronic conditions and co-morbidity symptoms are monitored and maintained or improved  Outcome: Progressing  Flowsheets (Taken 11/3/2023 2000)  Care Plan - Patient's Chronic Conditions and Co-Morbidity Symptoms are Monitored and Maintained or Improved:   Monitor and assess patient's chronic conditions and comorbid symptoms for stability, deterioration, or improvement   Collaborate with multidisciplinary team to address chronic and comorbid conditions and prevent exacerbation or deterioration   Update acute care plan with appropriate goals if chronic or comorbid symptoms are exacerbated and prevent overall
Problem: Discharge Planning  Goal: Discharge to home or other facility with appropriate resources  Outcome: Progressing  Flowsheets (Taken 11/9/2023 0800)  Discharge to home or other facility with appropriate resources: Identify barriers to discharge with patient and caregiver     Problem: Pain  Goal: Verbalizes/displays adequate comfort level or baseline comfort level  Outcome: Progressing  Flowsheets (Taken 11/9/2023 1729)  Verbalizes/displays adequate comfort level or baseline comfort level:   Assess pain using appropriate pain scale   Administer analgesics based on type and severity of pain and evaluate response     Problem: Chronic Conditions and Co-morbidities  Goal: Patient's chronic conditions and co-morbidity symptoms are monitored and maintained or improved  Outcome: Progressing  Flowsheets (Taken 11/9/2023 0800)  Care Plan - Patient's Chronic Conditions and Co-Morbidity Symptoms are Monitored and Maintained or Improved: Monitor and assess patient's chronic conditions and comorbid symptoms for stability, deterioration, or improvement
Problem: Pain  Goal: Verbalizes/displays adequate comfort level or baseline comfort level  10/17/2023 1552 by Richie Chery RN  Outcome: Progressing  Note: Patient denies need for prn pain medication this shift. Problem: Skin/Tissue Integrity  Goal: Absence of new skin breakdown  Description: 1. Monitor for areas of redness and/or skin breakdown  2. Assess vascular access sites hourly  3. Every 4-6 hours minimum:  Change oxygen saturation probe site  4. Every 4-6 hours:  If on nasal continuous positive airway pressure, respiratory therapy assess nares and determine need for appliance change or resting period. 10/17/2023 1552 by Richie Chery RN  Outcome: Progressing  Note: No new occurrence of skin breakdown noted during this shift. Patient turned at least every two hours. Mepilex and waffle mattress in use.
Problem: Pain  Goal: Verbalizes/displays adequate comfort level or baseline comfort level  Outcome: Progressing     Problem: Skin/Tissue Integrity  Goal: Absence of new skin breakdown  Description: 1. Monitor for areas of redness and/or skin breakdown  2. Assess vascular access sites hourly  3. Every 4-6 hours minimum:  Change oxygen saturation probe site  4. Every 4-6 hours:  If on nasal continuous positive airway pressure, respiratory therapy assess nares and determine need for appliance change or resting period. Outcome: Progressing     Problem: Safety - Adult  Goal: Free from fall injury  Outcome: Progressing     Problem: ABCDS Injury Assessment  Goal: Absence of physical injury  Outcome: Progressing     Problem: Chronic Conditions and Co-morbidities  Goal: Patient's chronic conditions and co-morbidity symptoms are monitored and maintained or improved  Outcome: Progressing     Problem: Nutrition Deficit:  Goal: Optimize nutritional status  Outcome: Progressing     Problem: Safety - Medical Restraint  Goal: Remains free of injury from restraints (Restraint for Interference with Medical Device)  Description: INTERVENTIONS:  1. Determine that other, less restrictive measures have been tried or would not be effective before applying the restraint  2. Evaluate the patient's condition at the time of restraint application  3. Inform patient/family regarding the reason for restraint  4.  Q2H: Monitor safety, psychosocial status, comfort, nutrition and hydration  Outcome: Progressing     Problem: Respiratory - Adult  Goal: Achieves optimal ventilation and oxygenation  Outcome: Progressing
Problem: Pain  Goal: Verbalizes/displays adequate comfort level or baseline comfort level  Outcome: Progressing     Problem: Skin/Tissue Integrity  Goal: Absence of new skin breakdown  Description: 1. Monitor for areas of redness and/or skin breakdown  2. Assess vascular access sites hourly  3. Every 4-6 hours minimum:  Change oxygen saturation probe site  4. Every 4-6 hours:  If on nasal continuous positive airway pressure, respiratory therapy assess nares and determine need for appliance change or resting period. Outcome: Progressing     Problem: Safety - Adult  Goal: Free from fall injury  Outcome: Progressing     Problem: ABCDS Injury Assessment  Goal: Absence of physical injury  Outcome: Progressing     Problem: Chronic Conditions and Co-morbidities  Goal: Patient's chronic conditions and co-morbidity symptoms are monitored and maintained or improved  Outcome: Progressing  Flowsheets (Taken 10/26/2023 2000)  Care Plan - Patient's Chronic Conditions and Co-Morbidity Symptoms are Monitored and Maintained or Improved:   Monitor and assess patient's chronic conditions and comorbid symptoms for stability, deterioration, or improvement   Collaborate with multidisciplinary team to address chronic and comorbid conditions and prevent exacerbation or deterioration   Update acute care plan with appropriate goals if chronic or comorbid symptoms are exacerbated and prevent overall improvement and discharge     Problem: Nutrition Deficit:  Goal: Optimize nutritional status  Outcome: Progressing     Problem: Safety - Medical Restraint  Goal: Remains free of injury from restraints (Restraint for Interference with Medical Device)  Description: INTERVENTIONS:  1. Determine that other, less restrictive measures have been tried or would not be effective before applying the restraint  2. Evaluate the patient's condition at the time of restraint application  3. Inform patient/family regarding the reason for restraint  4.  Q2H:
Problem: Respiratory - Adult  Goal: Achieves optimal ventilation and oxygenation  Outcome: Progressing     PROVIDE ADEQUATE OXYGENATION WITH ACCEPTABLE SP02/ABG'S  [x]  IDENTIFY APPROPRIATE OXYGEN THERAPY  [x]   MONITOR SP02/ABG'S AS NEEDED   [x]   PATIENT EDUCATION AS NEEDED
Problem: Safety - Medical Restraint  Goal: Remains free of injury from restraints (Restraint for Interference with Medical Device)  Description: INTERVENTIONS:  1. Determine that other, less restrictive measures have been tried or would not be effective before applying the restraint  2. Evaluate the patient's condition at the time of restraint application  3. Inform patient/family regarding the reason for restraint  4.  Q2H: Monitor safety, psychosocial status, comfort, nutrition and hydration  Outcome: Progressing  Flowsheets  Taken 11/8/2023 2200 by Kitty Ch RN  Remains free of injury from restraints (restraint for interference with medical device): Determine that other, less restrictive measures have been tried or would not be effective before applying the restraint  Taken 11/8/2023 2000 by Kitty Ch RN  Remains free of injury from restraints (restraint for interference with medical device):   Determine that other, less restrictive measures have been tried or would not be effective before applying the restraint   Evaluate the patient's condition at the time of restraint application   Inform patient/family regarding the reason for restraint  Taken 11/8/2023 1800 by Harlan Mao RN  Remains free of injury from restraints (restraint for interference with medical device):   Determine that other, less restrictive measures have been tried or would not be effective before applying the restraint   Every 2 hours: Monitor safety, psychosocial status, comfort, nutrition and hydration  Taken 11/8/2023 1600 by Harlan Mao RN  Remains free of injury from restraints (restraint for interference with medical device):   Determine that other, less restrictive measures have been tried or would not be effective before applying the restraint   Every 2 hours: Monitor safety, psychosocial status, comfort, nutrition and hydration
Problem: Safety - Medical Restraint  Goal: Remains free of injury from restraints (Restraint for Interference with Medical Device)  Description: INTERVENTIONS:  1. Determine that other, less restrictive measures have been tried or would not be effective before applying the restraint  2. Evaluate the patient's condition at the time of restraint application  3. Inform patient/family regarding the reason for restraint  4.  Q2H: Monitor safety, psychosocial status, comfort, nutrition and hydration  Outcome: Progressing  Flowsheets  Taken 11/9/2023 2200 by Zoey Beth RN  Remains free of injury from restraints (restraint for interference with medical device):   Determine that other, less restrictive measures have been tried or would not be effective before applying the restraint   Evaluate the patient's condition at the time of restraint application   Inform patient/family regarding the reason for restraint   Every 2 hours: Monitor safety, psychosocial status, comfort, nutrition and hydration  Taken 11/9/2023 1600 by Diego Bains RN  Remains free of injury from restraints (restraint for interference with medical device): Determine that other, less restrictive measures have been tried or would not be effective before applying the restraint
Pt was wearing a life-alert device. MRI Tech removed device and placed it in the patient's personal belongings bag.
Dada Gil RN  Outcome: Progressing     Problem: Chronic Conditions and Co-morbidities  Goal: Patient's chronic conditions and co-morbidity symptoms are monitored and maintained or improved  11/4/2023 1919 by Bruce Staton RN  Outcome: Progressing  Flowsheets (Taken 11/4/2023 0800 by Diego Bains RN)  Care Plan - Patient's Chronic Conditions and Co-Morbidity Symptoms are Monitored and Maintained or Improved: Monitor and assess patient's chronic conditions and comorbid symptoms for stability, deterioration, or improvement  11/4/2023 0620 by Dada Gil RN  Outcome: Progressing  Flowsheets (Taken 11/3/2023 2000)  Care Plan - Patient's Chronic Conditions and Co-Morbidity Symptoms are Monitored and Maintained or Improved:   Monitor and assess patient's chronic conditions and comorbid symptoms for stability, deterioration, or improvement   Collaborate with multidisciplinary team to address chronic and comorbid conditions and prevent exacerbation or deterioration   Update acute care plan with appropriate goals if chronic or comorbid symptoms are exacerbated and prevent overall improvement and discharge     Problem: Nutrition Deficit:  Goal: Optimize nutritional status  11/4/2023 1919 by Bruce Staton RN  Outcome: Progressing  11/4/2023 0620 by Dada Gil RN  Outcome: Progressing     Problem: Safety - Medical Restraint  Goal: Remains free of injury from restraints (Restraint for Interference with Medical Device)  Description: INTERVENTIONS:  1. Determine that other, less restrictive measures have been tried or would not be effective before applying the restraint  2. Evaluate the patient's condition at the time of restraint application  3. Inform patient/family regarding the reason for restraint  4.  Q2H: Monitor safety, psychosocial status, comfort, nutrition and hydration  11/4/2023 1919 by Bruce Staton RN  Outcome: Progressing  Flowsheets (Taken 11/4/2023 0800 by
Patient's chronic conditions and co-morbidity symptoms are monitored and maintained or improved  11/2/2023 1048 by Viky Gomez RN  Outcome: Progressing  Flowsheets  Taken 11/2/2023 [address] - Patient's Chronic Conditions and Co-Morbidity Symptoms are Monitored and Maintained or Improved:   Monitor and assess patient's chronic conditions and comorbid symptoms for stability, deterioration, or improvement   Collaborate with multidisciplinary team to address chronic and comorbid conditions and prevent exacerbation or deterioration     Problem: Nutrition Deficit:  Goal: Optimize nutritional status  11/2/2023 1048 by Viky Gomez RN  Outcome: Progressing  Flowsheets (Taken 11/2/2023 1048)  Nutrient intake appropriate for improving, restoring, or maintaining nutritional needs: Recommend, monitor, and adjust tube feedings and TPN/PPN based on assessed needs  Note: Currently on PEG tube feeding. Problem: Safety - Medical Restraint  Goal: Remains free of injury from restraints (Restraint for Interference with Medical Device)  Description: INTERVENTIONS:  1. Determine that other, less restrictive measures have been tried or would not be effective before applying the restraint  2. Evaluate the patient's condition at the time of restraint application  3. Inform patient/family regarding the reason for restraint  4.  Q2H: Monitor safety, psychosocial status, comfort, nutrition and hydration  11/2/2023 1048 by Viky Gomez RN  Outcome: Progressing  Flowsheets  Taken 11/2/2023 1048  Remains free of injury from restraints (restraint for interference with medical device):   Determine that other, less restrictive measures have been tried or would not be effective before applying the restraint   Every 2 hours: Monitor safety, psychosocial status, comfort, nutrition and hydration  Taken 11/2/2023 1000  Remains free of injury from restraints (restraint for interference with medical device): Every 2 hours:
conditions and co-morbidity symptoms are monitored and maintained or improved  11/1/2023 1142 by Juliano Cunningham RN  Outcome: Progressing  Flowsheets (Taken 11/1/2023 0800)  Care Plan - Patient's Chronic Conditions and Co-Morbidity Symptoms are Monitored and Maintained or Improved:   Monitor and assess patient's chronic conditions and comorbid symptoms for stability, deterioration, or improvement   Collaborate with multidisciplinary team to address chronic and comorbid conditions and prevent exacerbation or deterioration  Note: Laboratory results were closely monitored, administered due medications, titrated Norepinephrine and Propofol infusions accordingly. Problem: Nutrition Deficit:  Goal: Optimize nutritional status  11/1/2023 1142 by Juliano Cunningham RN  Outcome: Progressing  Flowsheets (Taken 11/1/2023 1142)  Nutrient intake appropriate for improving, restoring, or maintaining nutritional needs: Assess nutritional status and recommend course of action  Note: Ongoing prescribed tube feeding thru PEG tube. Problem: Safety - Medical Restraint  Goal: Remains free of injury from restraints (Restraint for Interference with Medical Device)  Description: INTERVENTIONS:  1. Determine that other, less restrictive measures have been tried or would not be effective before applying the restraint  2. Evaluate the patient's condition at the time of restraint application  3. Inform patient/family regarding the reason for restraint  4.  Q2H: Monitor safety, psychosocial status, comfort, nutrition and hydration  11/1/2023 1142 by Juliano Cunningham RN  Outcome: Progressing  Flowsheets  Taken 11/1/2023 1000 by Jay Crabtree RN  Remains free of injury from restraints (restraint for interference with medical device):   Determine that other, less restrictive measures have been tried or would not be effective before applying the restraint   Evaluate the patient's condition at the time of restraint application   Inform
injury from restraints (Restraint for Interference with Medical Device)  Description: INTERVENTIONS:  1. Determine that other, less restrictive measures have been tried or would not be effective before applying the restraint  2. Evaluate the patient's condition at the time of restraint application  3. Inform patient/family regarding the reason for restraint  4.  Q2H: Monitor safety, psychosocial status, comfort, nutrition and hydration  Outcome: Progressing  Flowsheets  Taken 11/8/2023 0000 by Lu Cota RN  Remains free of injury from restraints (restraint for interference with medical device):   Determine that other, less restrictive measures have been tried or would not be effective before applying the restraint   Every 2 hours: Monitor safety, psychosocial status, comfort, nutrition and hydration  Taken 11/7/2023 1800 by Abeba De La Rosa RN  Remains free of injury from restraints (restraint for interference with medical device):   Determine that other, less restrictive measures have been tried or would not be effective before applying the restraint   Every 2 hours: Monitor safety, psychosocial status, comfort, nutrition and hydration  Taken 11/7/2023 1600 by Abeba De La Rosa RN  Remains free of injury from restraints (restraint for interference with medical device):   Determine that other, less restrictive measures have been tried or would not be effective before applying the restraint   Every 2 hours: Monitor safety, psychosocial status, comfort, nutrition and hydration     Problem: Respiratory - Adult  Goal: Achieves optimal ventilation and oxygenation  Outcome: Progressing
RN  Remains free of injury from restraints (restraint for interference with medical device):   Determine that other, less restrictive measures have been tried or would not be effective before applying the restraint   Evaluate the patient's condition at the time of restraint application  Taken 24/61/5940 1100 by Noé Barajas RN  Remains free of injury from restraints (restraint for interference with medical device):   Determine that other, less restrictive measures have been tried or would not be effective before applying the restraint   Evaluate the patient's condition at the time of restraint application  Taken 82/22/1368 1000 by Noé Barajas RN  Remains free of injury from restraints (restraint for interference with medical device):   Determine that other, less restrictive measures have been tried or would not be effective before applying the restraint   Evaluate the patient's condition at the time of restraint application  Taken 54/44/7817 0900 by Noé Barajas RN  Remains free of injury from restraints (restraint for interference with medical device):   Determine that other, less restrictive measures have been tried or would not be effective before applying the restraint   Evaluate the patient's condition at the time of restraint application  Taken 80/28/2737 0800 by Noé Barajas RN  Remains free of injury from restraints (restraint for interference with medical device):   Determine that other, less restrictive measures have been tried or would not be effective before applying the restraint   Evaluate the patient's condition at the time of restraint application  Taken 35/55/9637 0726 by Noé Barajas RN  Remains free of injury from restraints (restraint for interference with medical device):   Determine that other, less restrictive measures have been tried or would not be effective before applying the restraint   Evaluate the patient's condition at the time of restraint
have been tried or would not be effective before applying the restraint   Evaluate the patient's condition at the time of restraint application  Taken 43/4/0300 0400  Remains free of injury from restraints (restraint for interference with medical device):   Determine that other, less restrictive measures have been tried or would not be effective before applying the restraint   Evaluate the patient's condition at the time of restraint application  Taken 80/5/2920 0200  Remains free of injury from restraints (restraint for interference with medical device):   Determine that other, less restrictive measures have been tried or would not be effective before applying the restraint   Evaluate the patient's condition at the time of restraint application  Taken 72/9/9276 0001  Remains free of injury from restraints (restraint for interference with medical device):   Determine that other, less restrictive measures have been tried or would not be effective before applying the restraint   Evaluate the patient's condition at the time of restraint application  Taken 36/6/7518 0000  Remains free of injury from restraints (restraint for interference with medical device):   Determine that other, less restrictive measures have been tried or would not be effective before applying the restraint   Evaluate the patient's condition at the time of restraint application  Taken 73/4/1034 2200  Remains free of injury from restraints (restraint for interference with medical device):   Determine that other, less restrictive measures have been tried or would not be effective before applying the restraint   Evaluate the patient's condition at the time of restraint application  Taken 22/0/7410 2000  Remains free of injury from restraints (restraint for interference with medical device):   Determine that other, less restrictive measures have been tried or would not be effective before applying the restraint   Evaluate the patient's condition at the

## 2023-11-11 NOTE — ANESTHESIA PRE PROCEDURE
BILITOT 0.4 10/30/2023 05:53 PM    ALKPHOS 86 10/30/2023 05:53 PM    AST 41 10/30/2023 05:53 PM    ALT 15 10/30/2023 05:53 PM       POC Tests: No results for input(s): \"POCGLU\", \"POCNA\", \"POCK\", \"POCCL\", \"POCBUN\", \"POCHEMO\", \"POCHCT\" in the last 72 hours.     Coags:   Lab Results   Component Value Date/Time    PROTIME 14.9 10/13/2023 09:46 PM    INR 1.1 10/13/2023 09:46 PM    APTT 24.5 10/13/2023 09:46 PM       HCG (If Applicable): No results found for: \"PREGTESTUR\", \"PREGSERUM\", \"HCG\", \"HCGQUANT\"     ABGs:   Lab Results   Component Value Date/Time    PHART 7.446 11/11/2023 04:18 AM    PO2ART 61.2 11/11/2023 04:18 AM    EUE1ATY 53.5 11/11/2023 04:18 AM    CDA5ZWU 36.8 11/11/2023 04:18 AM    W1XVOBJA 89.8 11/11/2023 04:18 AM        Type & Screen (If Applicable):  No results found for: \"LABABO\", \"LABRH\"    Drug/Infectious Status (If Applicable):  Lab Results   Component Value Date/Time    HEPCAB NONREACTIVE 11/21/2016 10:44 AM       COVID-19 Screening (If Applicable):   Lab Results   Component Value Date/Time    COVID19 Not Detected 10/30/2023 05:56 PM           Anesthesia Evaluation  Patient summary reviewed and Nursing notes reviewed no history of anesthetic complications:   Airway: Mallampati: Unable to assess / NA     Neck ROM: limited    Intubated Dental:      Comment: Unable to assess    Pulmonary:normal exam  breath sounds clear to auscultation  (+) pneumonia:                            ROS comment: Acute hypoxic respiratory failure, Intubated on the vent since 10/28/23   Cardiovascular:    (+) hypertension:, hyperlipidemia      ECG reviewed  Rhythm: regular  Rate: normal  Echocardiogram reviewed                  Neuro/Psych:   (+) seizures: well controlled, CVA:, neuromuscular disease: Parkinson's disease, TIA, headaches: migraine headaches, psychiatric history:             ABDIEL comment: Admitted after fall with c spine fractures s/p Cervical spine surgery 10/18/23 GI/Hepatic/Renal:   (+) GERD:,          ROS

## 2023-11-12 ENCOUNTER — APPOINTMENT (OUTPATIENT)
Dept: GENERAL RADIOLOGY | Age: 68
DRG: 321 | End: 2023-11-12
Payer: COMMERCIAL

## 2023-11-12 ENCOUNTER — ANESTHESIA (OUTPATIENT)
Dept: OPERATING ROOM | Age: 68
DRG: 950 | End: 2023-11-12
Payer: COMMERCIAL

## 2023-11-12 VITALS
WEIGHT: 145.94 LBS | HEART RATE: 109 BPM | OXYGEN SATURATION: 96 % | SYSTOLIC BLOOD PRESSURE: 148 MMHG | HEIGHT: 70 IN | RESPIRATION RATE: 14 BRPM | TEMPERATURE: 97.9 F | BODY MASS INDEX: 20.89 KG/M2 | DIASTOLIC BLOOD PRESSURE: 80 MMHG

## 2023-11-12 LAB
ANION GAP SERPL CALCULATED.3IONS-SCNC: 7 MMOL/L (ref 9–17)
ARTERIAL PATENCY WRIST A: ABNORMAL
BASOPHILS # BLD: 0 K/UL (ref 0–0.2)
BASOPHILS NFR BLD: 1 % (ref 0–2)
BDY SITE: ABNORMAL
BUN SERPL-MCNC: 17 MG/DL (ref 8–23)
CALCIUM SERPL-MCNC: 9.1 MG/DL (ref 8.6–10.4)
CHLORIDE SERPL-SCNC: 103 MMOL/L (ref 98–107)
CO2 SERPL-SCNC: 36 MMOL/L (ref 20–31)
COHGB MFR BLD: 2.4 % (ref 0–5)
CREAT SERPL-MCNC: <0.4 MG/DL (ref 0.5–0.9)
EOSINOPHIL # BLD: 0.3 K/UL (ref 0–0.4)
EOSINOPHILS RELATIVE PERCENT: 6 % (ref 0–4)
ERYTHROCYTE [DISTWIDTH] IN BLOOD BY AUTOMATED COUNT: 15.9 % (ref 11.5–14.9)
FIO2 ON VENT: 35 %
GAS FLOW.O2 O2 DELIVERY SYS: ABNORMAL L/MIN
GAS FLOW.O2 SETTING OXYMISER: 12 L/MIN
GFR SERPL CREATININE-BSD FRML MDRD: ABNORMAL ML/MIN/1.73M2
GLUCOSE SERPL-MCNC: 98 MG/DL (ref 70–99)
HCO3 ARTERIAL: 36.5 MMOL/L (ref 22–26)
HCT VFR BLD AUTO: 30.1 % (ref 36–46)
HGB BLD-MCNC: 9.7 G/DL (ref 12–16)
LYMPHOCYTES NFR BLD: 0.9 K/UL (ref 1–4.8)
LYMPHOCYTES RELATIVE PERCENT: 15 % (ref 24–44)
MCH RBC QN AUTO: 33 PG (ref 26–34)
MCHC RBC AUTO-ENTMCNC: 32.1 G/DL (ref 31–37)
MCV RBC AUTO: 102.9 FL (ref 80–100)
METHEMOGLOBIN: 0.4 % (ref 0–1.9)
MICROORGANISM SPEC CULT: NORMAL
MICROORGANISM/AGENT SPEC: NORMAL
MONOCYTES NFR BLD: 0.7 K/UL (ref 0.1–1.3)
MONOCYTES NFR BLD: 12 % (ref 1–7)
NEUTROPHILS NFR BLD: 66 % (ref 36–66)
NEUTS SEG NFR BLD: 4 K/UL (ref 1.3–9.1)
O2 SAT, ARTERIAL: 95.1 % (ref 95–98)
PCO2 ARTERIAL: 51.1 MMHG (ref 35–45)
PEEP RESPIRATORY: 8 CM[H2O]
PH ARTERIAL: 7.46 (ref 7.35–7.45)
PLATELET # BLD AUTO: 255 K/UL (ref 150–450)
PMV BLD AUTO: 8.5 FL (ref 6–12)
PO2 ARTERIAL: 78.5 MMHG (ref 80–100)
POSITIVE BASE EXCESS, ART: 12.7 MMOL/L (ref 0–2)
POTASSIUM SERPL-SCNC: 3.1 MMOL/L (ref 3.7–5.3)
PT. POSITION: ABNORMAL
RBC # BLD AUTO: 2.93 M/UL (ref 4–5.2)
RESPIRATORY RATE: 12
SODIUM SERPL-SCNC: 146 MMOL/L (ref 135–144)
SPECIMEN DESCRIPTION: NORMAL
TEXT FOR RESPIRATORY: ABNORMAL
TOTAL RATE: 12
VENTILATION MODE VENT: ABNORMAL
VT: 400
WBC OTHER # BLD: 5.9 K/UL (ref 3.5–11)

## 2023-11-12 PROCEDURE — 36600 WITHDRAWAL OF ARTERIAL BLOOD: CPT

## 2023-11-12 PROCEDURE — 2720000010 HC SURG SUPPLY STERILE: Performed by: SURGERY

## 2023-11-12 PROCEDURE — 2580000003 HC RX 258: Performed by: SURGERY

## 2023-11-12 PROCEDURE — 3700000000 HC ANESTHESIA ATTENDED CARE: Performed by: SURGERY

## 2023-11-12 PROCEDURE — 71045 X-RAY EXAM CHEST 1 VIEW: CPT

## 2023-11-12 PROCEDURE — 6370000000 HC RX 637 (ALT 250 FOR IP): Performed by: INTERNAL MEDICINE

## 2023-11-12 PROCEDURE — 6360000002 HC RX W HCPCS: Performed by: INTERNAL MEDICINE

## 2023-11-12 PROCEDURE — 6360000002 HC RX W HCPCS: Performed by: SURGERY

## 2023-11-12 PROCEDURE — A4216 STERILE WATER/SALINE, 10 ML: HCPCS | Performed by: SURGERY

## 2023-11-12 PROCEDURE — 6370000000 HC RX 637 (ALT 250 FOR IP): Performed by: SURGERY

## 2023-11-12 PROCEDURE — 80048 BASIC METABOLIC PNL TOTAL CA: CPT

## 2023-11-12 PROCEDURE — 85025 COMPLETE CBC W/AUTO DIFF WBC: CPT

## 2023-11-12 PROCEDURE — 82805 BLOOD GASES W/O2 SATURATION: CPT

## 2023-11-12 PROCEDURE — 36415 COLL VENOUS BLD VENIPUNCTURE: CPT

## 2023-11-12 PROCEDURE — 2000000000 HC ICU R&B

## 2023-11-12 PROCEDURE — 3600000012 HC SURGERY LEVEL 2 ADDTL 15MIN: Performed by: SURGERY

## 2023-11-12 PROCEDURE — C9113 INJ PANTOPRAZOLE SODIUM, VIA: HCPCS | Performed by: SURGERY

## 2023-11-12 PROCEDURE — 99232 SBSQ HOSP IP/OBS MODERATE 35: CPT | Performed by: INTERNAL MEDICINE

## 2023-11-12 PROCEDURE — 2709999900 HC NON-CHARGEABLE SUPPLY: Performed by: SURGERY

## 2023-11-12 PROCEDURE — 2500000003 HC RX 250 WO HCPCS: Performed by: ANESTHESIOLOGY

## 2023-11-12 PROCEDURE — 3700000001 HC ADD 15 MINUTES (ANESTHESIA): Performed by: SURGERY

## 2023-11-12 PROCEDURE — 99291 CRITICAL CARE FIRST HOUR: CPT | Performed by: INTERNAL MEDICINE

## 2023-11-12 PROCEDURE — 2580000003 HC RX 258: Performed by: ANESTHESIOLOGY

## 2023-11-12 PROCEDURE — 6360000002 HC RX W HCPCS: Performed by: ANESTHESIOLOGY

## 2023-11-12 PROCEDURE — 94003 VENT MGMT INPAT SUBQ DAY: CPT

## 2023-11-12 PROCEDURE — 3600000002 HC SURGERY LEVEL 2 BASE: Performed by: SURGERY

## 2023-11-12 RX ORDER — CEFAZOLIN SODIUM 1 G/3ML
INJECTION, POWDER, FOR SOLUTION INTRAMUSCULAR; INTRAVENOUS PRN
Status: DISCONTINUED | OUTPATIENT
Start: 2023-11-12 | End: 2023-11-12 | Stop reason: SDUPTHER

## 2023-11-12 RX ORDER — MIDAZOLAM HYDROCHLORIDE 1 MG/ML
INJECTION INTRAMUSCULAR; INTRAVENOUS PRN
Status: DISCONTINUED | OUTPATIENT
Start: 2023-11-12 | End: 2023-11-12 | Stop reason: SDUPTHER

## 2023-11-12 RX ORDER — LIDOCAINE HYDROCHLORIDE 10 MG/ML
INJECTION, SOLUTION EPIDURAL; INFILTRATION; INTRACAUDAL; PERINEURAL PRN
Status: DISCONTINUED | OUTPATIENT
Start: 2023-11-12 | End: 2023-11-12 | Stop reason: SDUPTHER

## 2023-11-12 RX ORDER — POTASSIUM CHLORIDE 20 MEQ/1
40 TABLET, EXTENDED RELEASE ORAL DAILY
Status: ACTIVE | OUTPATIENT
Start: 2023-11-12

## 2023-11-12 RX ORDER — SODIUM CHLORIDE, SODIUM LACTATE, POTASSIUM CHLORIDE, CALCIUM CHLORIDE 600; 310; 30; 20 MG/100ML; MG/100ML; MG/100ML; MG/100ML
INJECTION, SOLUTION INTRAVENOUS CONTINUOUS PRN
Status: DISCONTINUED | OUTPATIENT
Start: 2023-11-12 | End: 2023-11-12 | Stop reason: SDUPTHER

## 2023-11-12 RX ORDER — FENTANYL CITRATE 50 UG/ML
INJECTION, SOLUTION INTRAMUSCULAR; INTRAVENOUS PRN
Status: DISCONTINUED | OUTPATIENT
Start: 2023-11-12 | End: 2023-11-12 | Stop reason: SDUPTHER

## 2023-11-12 RX ORDER — ROCURONIUM BROMIDE 10 MG/ML
INJECTION, SOLUTION INTRAVENOUS PRN
Status: DISCONTINUED | OUTPATIENT
Start: 2023-11-12 | End: 2023-11-12 | Stop reason: SDUPTHER

## 2023-11-12 RX ADMIN — PROPOFOL 20 MCG/KG/MIN: 10 INJECTION, EMULSION INTRAVENOUS at 06:16

## 2023-11-12 RX ADMIN — DONEPEZIL HYDROCHLORIDE 5 MG: 10 TABLET, FILM COATED ORAL at 20:44

## 2023-11-12 RX ADMIN — PRIMIDONE 50 MG: 50 TABLET ORAL at 20:41

## 2023-11-12 RX ADMIN — FENTANYL CITRATE 50 MCG: 0.05 INJECTION, SOLUTION INTRAMUSCULAR; INTRAVENOUS at 21:58

## 2023-11-12 RX ADMIN — HEPARIN SODIUM 5000 UNITS: 5000 INJECTION INTRAVENOUS; SUBCUTANEOUS at 14:56

## 2023-11-12 RX ADMIN — METOPROLOL TARTRATE 25 MG: 25 TABLET, FILM COATED ORAL at 20:44

## 2023-11-12 RX ADMIN — LACOSAMIDE 200 MG: 100 SOLUTION ORAL at 20:44

## 2023-11-12 RX ADMIN — SODIUM CHLORIDE, PRESERVATIVE FREE 10 ML: 5 INJECTION INTRAVENOUS at 09:00

## 2023-11-12 RX ADMIN — FENTANYL CITRATE 50 MCG: 0.05 INJECTION, SOLUTION INTRAMUSCULAR; INTRAVENOUS at 01:09

## 2023-11-12 RX ADMIN — POTASSIUM CHLORIDE 10 MEQ: 7.46 INJECTION, SOLUTION INTRAVENOUS at 08:38

## 2023-11-12 RX ADMIN — LEVETIRACETAM 750 MG: 500 SOLUTION ORAL at 20:40

## 2023-11-12 RX ADMIN — SODIUM CHLORIDE, PRESERVATIVE FREE 40 MG: 5 INJECTION INTRAVENOUS at 12:46

## 2023-11-12 RX ADMIN — CHLORHEXIDINE GLUCONATE 0.12% ORAL RINSE 15 ML: 1.2 LIQUID ORAL at 07:31

## 2023-11-12 RX ADMIN — LIDOCAINE HYDROCHLORIDE 25 MG: 10 INJECTION, SOLUTION EPIDURAL; INFILTRATION; INTRACAUDAL; PERINEURAL at 10:40

## 2023-11-12 RX ADMIN — ROCURONIUM BROMIDE 30 MG: 10 INJECTION, SOLUTION INTRAVENOUS at 10:43

## 2023-11-12 RX ADMIN — FUROSEMIDE 40 MG: 10 INJECTION, SOLUTION INTRAMUSCULAR; INTRAVENOUS at 08:58

## 2023-11-12 RX ADMIN — LEVETIRACETAM 750 MG: 500 SOLUTION ORAL at 12:46

## 2023-11-12 RX ADMIN — SODIUM CHLORIDE 5 ML/HR: 9 INJECTION, SOLUTION INTRAVENOUS at 07:18

## 2023-11-12 RX ADMIN — PRIMIDONE 50 MG: 50 TABLET ORAL at 12:45

## 2023-11-12 RX ADMIN — ATORVASTATIN CALCIUM 40 MG: 40 TABLET, FILM COATED ORAL at 12:43

## 2023-11-12 RX ADMIN — METOPROLOL TARTRATE 25 MG: 25 TABLET, FILM COATED ORAL at 12:44

## 2023-11-12 RX ADMIN — POTASSIUM CHLORIDE 10 MEQ: 7.46 INJECTION, SOLUTION INTRAVENOUS at 07:19

## 2023-11-12 RX ADMIN — HEPARIN SODIUM 5000 UNITS: 5000 INJECTION INTRAVENOUS; SUBCUTANEOUS at 22:19

## 2023-11-12 RX ADMIN — LACOSAMIDE 200 MG: 100 SOLUTION ORAL at 12:44

## 2023-11-12 RX ADMIN — FENTANYL CITRATE 50 MCG: 0.05 INJECTION, SOLUTION INTRAMUSCULAR; INTRAVENOUS at 08:34

## 2023-11-12 RX ADMIN — POTASSIUM CHLORIDE 10 MEQ: 7.46 INJECTION, SOLUTION INTRAVENOUS at 09:49

## 2023-11-12 RX ADMIN — FENTANYL CITRATE 50 MCG: 50 INJECTION, SOLUTION INTRAMUSCULAR; INTRAVENOUS at 10:59

## 2023-11-12 RX ADMIN — SODIUM CHLORIDE, POTASSIUM CHLORIDE, SODIUM LACTATE AND CALCIUM CHLORIDE: 600; 310; 30; 20 INJECTION, SOLUTION INTRAVENOUS at 10:33

## 2023-11-12 RX ADMIN — SODIUM CHLORIDE, PRESERVATIVE FREE 10 ML: 5 INJECTION INTRAVENOUS at 12:51

## 2023-11-12 RX ADMIN — SODIUM CHLORIDE, PRESERVATIVE FREE 10 ML: 5 INJECTION INTRAVENOUS at 20:39

## 2023-11-12 RX ADMIN — SODIUM CHLORIDE, PRESERVATIVE FREE 40 MG: 5 INJECTION INTRAVENOUS at 01:09

## 2023-11-12 RX ADMIN — MIDAZOLAM 2 MG: 1 INJECTION INTRAMUSCULAR; INTRAVENOUS at 10:38

## 2023-11-12 RX ADMIN — CEFAZOLIN 2 G: 1 INJECTION, POWDER, FOR SOLUTION INTRAMUSCULAR; INTRAVENOUS at 10:58

## 2023-11-12 RX ADMIN — CHLORHEXIDINE GLUCONATE 0.12% ORAL RINSE 15 ML: 1.2 LIQUID ORAL at 20:39

## 2023-11-12 ASSESSMENT — PULMONARY FUNCTION TESTS
PIF_VALUE: 12
PIF_VALUE: 18
PIF_VALUE: 9
PIF_VALUE: 12
PIF_VALUE: 13
PIF_VALUE: 14
PIF_VALUE: 16
PIF_VALUE: 18
PIF_VALUE: 7
PIF_VALUE: 13
PIF_VALUE: 7
PIF_VALUE: 11
PIF_VALUE: 15
PIF_VALUE: 8
PIF_VALUE: 8
PIF_VALUE: 20
PIF_VALUE: 16
PIF_VALUE: 16
PIF_VALUE: 7
PIF_VALUE: 13
PIF_VALUE: 12
PIF_VALUE: 12
PIF_VALUE: 13
PIF_VALUE: 10
PIF_VALUE: 12
PIF_VALUE: 12
PIF_VALUE: 8
PIF_VALUE: 23
PIF_VALUE: 9
PIF_VALUE: 8
PIF_VALUE: 12

## 2023-11-12 ASSESSMENT — PAIN SCALES - GENERAL
PAINLEVEL_OUTOF10: 0

## 2023-11-13 ENCOUNTER — APPOINTMENT (OUTPATIENT)
Dept: GENERAL RADIOLOGY | Age: 68
DRG: 321 | End: 2023-11-13
Payer: COMMERCIAL

## 2023-11-13 VITALS
WEIGHT: 145.94 LBS | TEMPERATURE: 99.7 F | DIASTOLIC BLOOD PRESSURE: 58 MMHG | BODY MASS INDEX: 20.89 KG/M2 | RESPIRATION RATE: 10 BRPM | OXYGEN SATURATION: 99 % | SYSTOLIC BLOOD PRESSURE: 108 MMHG | HEIGHT: 70 IN | HEART RATE: 87 BPM
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
## 2023-11-13 LAB
ABSOLUTE BANDS #: 0.29 K/UL (ref 0–1)
ALBUMIN SERPL-MCNC: 3.7 G/DL (ref 3.5–5.2)
ALP SERPL-CCNC: 76 U/L (ref 35–104)
ALT SERPL-CCNC: 25 U/L (ref 5–33)
ANION GAP SERPL CALCULATED.3IONS-SCNC: 7 MMOL/L (ref 9–17)
AST SERPL-CCNC: 38 U/L
BANDS: 4 % (ref 0–10)
BASOPHILS # BLD: 0 K/UL (ref 0–0.2)
BASOPHILS NFR BLD: 0 % (ref 0–2)
BILIRUB DIRECT SERPL-MCNC: 0.2 MG/DL
BILIRUB INDIRECT SERPL-MCNC: 0.4 MG/DL (ref 0–1)
BILIRUB SERPL-MCNC: 0.6 MG/DL (ref 0.3–1.2)
BLASTS NFR FLD: ABNORMAL %
BUN SERPL-MCNC: 12 MG/DL (ref 8–23)
CALCIUM SERPL-MCNC: 9 MG/DL (ref 8.6–10.4)
CHLORIDE SERPL-SCNC: 101 MMOL/L (ref 98–107)
CO2 SERPL-SCNC: 33 MMOL/L (ref 20–31)
CREAT SERPL-MCNC: <0.4 MG/DL (ref 0.5–0.9)
EOSINOPHIL # BLD: 0 K/UL (ref 0–0.4)
EOSINOPHIL NFR FLD: ABNORMAL %
EOSINOPHILS RELATIVE PERCENT: 0 % (ref 0–4)
ERYTHROCYTE [DISTWIDTH] IN BLOOD BY AUTOMATED COUNT: 15.3 % (ref 11.5–14.9)
GFR SERPL CREATININE-BSD FRML MDRD: ABNORMAL ML/MIN/1.73M2
GLUCOSE SERPL-MCNC: 122 MG/DL (ref 70–99)
HCT VFR BLD AUTO: 29.8 % (ref 36–46)
HGB BLD-MCNC: 9.8 G/DL (ref 12–16)
LYMPHOCYTES NFR BLD: 0.37 K/UL (ref 1–4.8)
LYMPHOCYTES NFR FLD: 70 %
LYMPHOCYTES RELATIVE PERCENT: 5 % (ref 24–44)
MAGNESIUM SERPL-MCNC: 1.8 MG/DL (ref 1.6–2.6)
MANUAL DIF COMMENT FLD-IMP: ABNORMAL
MCH RBC QN AUTO: 33.3 PG (ref 26–34)
MCHC RBC AUTO-ENTMCNC: 32.7 G/DL (ref 31–37)
MCV RBC AUTO: 101.6 FL (ref 80–100)
MICROORGANISM SPEC CULT: NORMAL
MICROORGANISM/AGENT SPEC: NORMAL
MONOCYTES NFR BLD: 0.66 K/UL (ref 0.1–1.3)
MONOCYTES NFR BLD: 9 % (ref 1–7)
MONOCYTES NFR FLD: 14 %
MORPHOLOGY: ABNORMAL
NEUTROPHILS NFR BLD: 82 % (ref 36–66)
NEUTROPHILS NFR FLD: 16 %
NEUTS SEG NFR BLD: 5.98 K/UL (ref 1.3–9.1)
PHOSPHATE SERPL-MCNC: 3.6 MG/DL (ref 2.6–4.5)
PLATELET # BLD AUTO: 254 K/UL (ref 150–450)
PMV BLD AUTO: 8.2 FL (ref 6–12)
POTASSIUM SERPL-SCNC: 3.2 MMOL/L (ref 3.7–5.3)
PROCALCITONIN SERPL-MCNC: 0.09 NG/ML
PROT SERPL-MCNC: 6.7 G/DL (ref 6.4–8.3)
RBC # BLD AUTO: 2.94 M/UL (ref 4–5.2)
SODIUM SERPL-SCNC: 141 MMOL/L (ref 135–144)
SPECIMEN DESCRIPTION: NORMAL
SURGICAL PATHOLOGY REPORT: NORMAL
TRIGL SERPL-MCNC: 65 MG/DL
UNIDENT CELLS NFR FLD: ABNORMAL %
WBC OTHER # BLD: 7.3 K/UL (ref 3.5–11)

## 2023-11-13 PROCEDURE — 6370000000 HC RX 637 (ALT 250 FOR IP): Performed by: SURGERY

## 2023-11-13 PROCEDURE — A4216 STERILE WATER/SALINE, 10 ML: HCPCS | Performed by: SURGERY

## 2023-11-13 PROCEDURE — 99291 CRITICAL CARE FIRST HOUR: CPT | Performed by: INTERNAL MEDICINE

## 2023-11-13 PROCEDURE — 83735 ASSAY OF MAGNESIUM: CPT

## 2023-11-13 PROCEDURE — 2580000003 HC RX 258: Performed by: SURGERY

## 2023-11-13 PROCEDURE — 6360000002 HC RX W HCPCS: Performed by: SURGERY

## 2023-11-13 PROCEDURE — 71045 X-RAY EXAM CHEST 1 VIEW: CPT

## 2023-11-13 PROCEDURE — 80048 BASIC METABOLIC PNL TOTAL CA: CPT

## 2023-11-13 PROCEDURE — C9113 INJ PANTOPRAZOLE SODIUM, VIA: HCPCS | Performed by: SURGERY

## 2023-11-13 PROCEDURE — 94003 VENT MGMT INPAT SUBQ DAY: CPT

## 2023-11-13 PROCEDURE — 85025 COMPLETE CBC W/AUTO DIFF WBC: CPT

## 2023-11-13 PROCEDURE — 99233 SBSQ HOSP IP/OBS HIGH 50: CPT | Performed by: INTERNAL MEDICINE

## 2023-11-13 PROCEDURE — 84478 ASSAY OF TRIGLYCERIDES: CPT

## 2023-11-13 PROCEDURE — 80076 HEPATIC FUNCTION PANEL: CPT

## 2023-11-13 PROCEDURE — 84100 ASSAY OF PHOSPHORUS: CPT

## 2023-11-13 PROCEDURE — 6370000000 HC RX 637 (ALT 250 FOR IP): Performed by: INTERNAL MEDICINE

## 2023-11-13 PROCEDURE — 84145 PROCALCITONIN (PCT): CPT

## 2023-11-13 PROCEDURE — 36415 COLL VENOUS BLD VENIPUNCTURE: CPT

## 2023-11-13 RX ORDER — MIDODRINE HYDROCHLORIDE 10 MG/1
10 TABLET ORAL 4 TIMES DAILY PRN
Qty: 40 TABLET | Refills: 0 | Status: SHIPPED | OUTPATIENT
Start: 2023-11-13

## 2023-11-13 RX ORDER — OXYCODONE HYDROCHLORIDE 5 MG/1
5 TABLET ORAL EVERY 4 HOURS PRN
Qty: 18 TABLET | Refills: 0 | Status: SHIPPED | OUTPATIENT
Start: 2023-11-13 | End: 2023-11-16

## 2023-11-13 RX ORDER — FUROSEMIDE 10 MG/ML
40 INJECTION INTRAMUSCULAR; INTRAVENOUS DAILY
Qty: 1 EACH | Refills: 1
Start: 2023-11-14

## 2023-11-13 RX ORDER — ACETAMINOPHEN 325 MG/1
650 TABLET ORAL EVERY 4 HOURS PRN
Status: DISCONTINUED | OUTPATIENT
Start: 2023-11-13 | End: 2023-11-13 | Stop reason: HOSPADM

## 2023-11-13 RX ADMIN — SODIUM CHLORIDE, PRESERVATIVE FREE 10 ML: 5 INJECTION INTRAVENOUS at 14:16

## 2023-11-13 RX ADMIN — LEVOTHYROXINE SODIUM 50 MCG: 0.05 TABLET ORAL at 05:32

## 2023-11-13 RX ADMIN — SODIUM CHLORIDE, PRESERVATIVE FREE 40 MG: 5 INJECTION INTRAVENOUS at 14:11

## 2023-11-13 RX ADMIN — HEPARIN SODIUM 5000 UNITS: 5000 INJECTION INTRAVENOUS; SUBCUTANEOUS at 05:32

## 2023-11-13 RX ADMIN — POTASSIUM BICARBONATE 50 MEQ: 977.5 TABLET, EFFERVESCENT ORAL at 10:34

## 2023-11-13 RX ADMIN — LEVETIRACETAM 750 MG: 500 SOLUTION ORAL at 08:02

## 2023-11-13 RX ADMIN — PRIMIDONE 50 MG: 50 TABLET ORAL at 08:03

## 2023-11-13 RX ADMIN — ACETAMINOPHEN 650 MG: 325 TABLET ORAL at 14:21

## 2023-11-13 RX ADMIN — LACOSAMIDE 200 MG: 100 SOLUTION ORAL at 08:03

## 2023-11-13 RX ADMIN — FUROSEMIDE 40 MG: 10 INJECTION, SOLUTION INTRAMUSCULAR; INTRAVENOUS at 08:02

## 2023-11-13 RX ADMIN — POTASSIUM BICARBONATE 40 MEQ: 782 TABLET, EFFERVESCENT ORAL at 06:50

## 2023-11-13 RX ADMIN — MIDODRINE HYDROCHLORIDE 10 MG: 10 TABLET ORAL at 10:34

## 2023-11-13 RX ADMIN — ACETAMINOPHEN 650 MG: 325 TABLET ORAL at 08:02

## 2023-11-13 RX ADMIN — METOPROLOL TARTRATE 25 MG: 25 TABLET, FILM COATED ORAL at 08:03

## 2023-11-13 RX ADMIN — ATORVASTATIN CALCIUM 40 MG: 40 TABLET, FILM COATED ORAL at 08:02

## 2023-11-13 RX ADMIN — HEPARIN SODIUM 5000 UNITS: 5000 INJECTION INTRAVENOUS; SUBCUTANEOUS at 14:11

## 2023-11-13 RX ADMIN — SODIUM CHLORIDE, PRESERVATIVE FREE 40 MG: 5 INJECTION INTRAVENOUS at 00:07

## 2023-11-13 RX ADMIN — SODIUM CHLORIDE, PRESERVATIVE FREE 10 ML: 5 INJECTION INTRAVENOUS at 08:12

## 2023-11-13 RX ADMIN — CHLORHEXIDINE GLUCONATE 0.12% ORAL RINSE 15 ML: 1.2 LIQUID ORAL at 07:30

## 2023-11-13 RX ADMIN — OXYCODONE HYDROCHLORIDE 5 MG: 5 TABLET ORAL at 05:32

## 2023-11-13 ASSESSMENT — PULMONARY FUNCTION TESTS
PIF_VALUE: 12
PIF_VALUE: 18
PIF_VALUE: 9
PIF_VALUE: 15
PIF_VALUE: 12
PIF_VALUE: 18
PIF_VALUE: 12
PIF_VALUE: 18
PIF_VALUE: 22
PIF_VALUE: 14
PIF_VALUE: 18
PIF_VALUE: 18
PIF_VALUE: 14
PIF_VALUE: 14

## 2023-11-13 ASSESSMENT — PAIN SCALES - GENERAL
PAINLEVEL_OUTOF10: 0
PAINLEVEL_OUTOF10: 2
PAINLEVEL_OUTOF10: 5
PAINLEVEL_OUTOF10: 4

## 2023-11-14 LAB
MICROORGANISM SPEC CULT: NORMAL
MICROORGANISM/AGENT SPEC: NORMAL
SPECIMEN DESCRIPTION: NORMAL

## 2023-11-15 LAB
MICROORGANISM SPEC CULT: ABNORMAL
MICROORGANISM/AGENT SPEC: ABNORMAL
SPECIMEN DESCRIPTION: ABNORMAL

## 2023-11-20 LAB
MICROORGANISM SPEC CULT: NORMAL
MICROORGANISM/AGENT SPEC: NORMAL
SPECIMEN DESCRIPTION: NORMAL

## 2023-11-27 LAB
MICROORGANISM SPEC CULT: NORMAL
MICROORGANISM/AGENT SPEC: NORMAL
SPECIMEN DESCRIPTION: NORMAL

## 2023-12-04 LAB
MICROORGANISM SPEC CULT: NORMAL
MICROORGANISM/AGENT SPEC: NORMAL
SPECIMEN DESCRIPTION: NORMAL

## 2023-12-11 LAB
MICROORGANISM SPEC CULT: NORMAL
MICROORGANISM/AGENT SPEC: NORMAL
SPECIMEN DESCRIPTION: NORMAL

## 2023-12-18 LAB
MICROORGANISM SPEC CULT: NORMAL
MICROORGANISM/AGENT SPEC: NORMAL
SPECIMEN DESCRIPTION: NORMAL

## 2024-01-24 ENCOUNTER — HOSPITAL ENCOUNTER (OUTPATIENT)
Age: 69
Setting detail: SPECIMEN
Discharge: HOME | End: 2024-01-24

## 2024-01-24 LAB
ANION GAP SERPL CALCULATED.3IONS-SCNC: 12 MMOL/L (ref 9–17)
BUN SERPL-MCNC: 18 MG/DL (ref 8–23)
CALCIUM SERPL-MCNC: 9 MG/DL (ref 8.6–10.4)
CHLORIDE SERPL-SCNC: 100 MMOL/L (ref 98–107)
CHOLEST SERPL-MCNC: 128 MG/DL
CHOLESTEROL/HDL RATIO: 2.7
CO2 SERPL-SCNC: 25 MMOL/L (ref 20–31)
CREAT SERPL-MCNC: 0.3 MG/DL (ref 0.5–0.9)
ERYTHROCYTE [DISTWIDTH] IN BLOOD BY AUTOMATED COUNT: 14.5 % (ref 11.8–14.4)
GFR SERPL CREATININE-BSD FRML MDRD: >60 ML/MIN/1.73M2
GLUCOSE SERPL-MCNC: 105 MG/DL (ref 70–99)
HCT VFR BLD AUTO: 42.1 % (ref 36.3–47.1)
HDLC SERPL-MCNC: 48 MG/DL
HGB BLD-MCNC: 13.3 G/DL (ref 11.9–15.1)
LDLC SERPL CALC-MCNC: 61 MG/DL (ref 0–130)
LEVETIRACETAM SERPL-MCNC: 7 UG/ML
MCH RBC QN AUTO: 30.4 PG (ref 25.2–33.5)
MCHC RBC AUTO-ENTMCNC: 31.6 G/DL (ref 28.4–34.8)
MCV RBC AUTO: 96.1 FL (ref 82.6–102.9)
NRBC BLD-RTO: 0 PER 100 WBC
PLATELET # BLD AUTO: 352 K/UL (ref 138–453)
PMV BLD AUTO: 10.5 FL (ref 8.1–13.5)
POTASSIUM SERPL-SCNC: 4 MMOL/L (ref 3.7–5.3)
RBC # BLD AUTO: 4.38 M/UL (ref 3.95–5.11)
SODIUM SERPL-SCNC: 137 MMOL/L (ref 135–144)
TRIGL SERPL-MCNC: 94 MG/DL
TSH SERPL DL<=0.05 MIU/L-ACNC: 10.52 UIU/ML (ref 0.3–5)
WBC OTHER # BLD: 7.4 K/UL (ref 3.5–11.3)

## 2024-01-24 PROCEDURE — P9603 ONE-WAY ALLOW PRORATED MILES: HCPCS

## 2024-01-24 PROCEDURE — 36415 COLL VENOUS BLD VENIPUNCTURE: CPT

## 2024-01-24 PROCEDURE — 80048 BASIC METABOLIC PNL TOTAL CA: CPT

## 2024-01-24 PROCEDURE — 80177 DRUG SCRN QUAN LEVETIRACETAM: CPT

## 2024-01-24 PROCEDURE — 84443 ASSAY THYROID STIM HORMONE: CPT

## 2024-01-24 PROCEDURE — 80061 LIPID PANEL: CPT

## 2024-01-24 PROCEDURE — 85027 COMPLETE CBC AUTOMATED: CPT

## 2024-02-15 ENCOUNTER — HOSPITAL ENCOUNTER (OUTPATIENT)
Age: 69
Setting detail: SPECIMEN
Discharge: HOME | End: 2024-02-15

## 2024-02-15 LAB — TSH SERPL DL<=0.05 MIU/L-ACNC: 5.27 UIU/ML (ref 0.3–5)

## 2024-02-15 PROCEDURE — P9603 ONE-WAY ALLOW PRORATED MILES: HCPCS

## 2024-02-15 PROCEDURE — 84443 ASSAY THYROID STIM HORMONE: CPT

## 2024-02-15 PROCEDURE — 36415 COLL VENOUS BLD VENIPUNCTURE: CPT

## 2024-02-21 NOTE — TELEPHONE ENCOUNTER
"  [facility]        NAME: Rebecca Shaw is a 42 y.o. female  : 1981    MRN: [id number]  DATE: 2024  TIME: 5:00 PM    Assessment and Plan   Abrasion of face, initial encounter [S00.81XA]  1. Abrasion of face, initial encounter        2. Injury of right knee, initial encounter        3. Injury of head, initial encounter          Due to patient hitting her head on concrete while being on aspirin and having headache, advised to go to the ER for further evaluation.  Neuro exam benign.  Will defer x-rays of knee at this time to ER so not to prolong head injury examination.  Patient states she will go to [facility].  She was stable upon discharge.    Patient Instructions     Proceed to  ER    Chief Complaint     Chief Complaint   Patient presents with    Fall     Fell , tripped on a sidewalk today and struck head on the side walk , Also hit right knee. , small \"nick\" type wound left palm. Abrasions noted over left frontal area over brow and into left cheek. Abrasions over right knee. Denies LOC. C/o headache and pain over left sided facial bones. Last tetanus 2019         History of Present Illness       Patient is presenting for left-sided facial pain and right knee pain after a fall on the concrete sidewalk 15 minutes PTA.  She takes aspirin.  She denies any LOC.  She states she does have a headache but no dizziness.  She denies any vision changes.  She stated her left eye feels like it does not want to close right.  She denies any nausea or vomiting.  She stated the pain is a 7/10 and has burning.  She has abrasions on the left side of her face with swelling around her eye.  She also has abrasions on her right knee and pain with movement.      Fall  Associated symptoms include headaches.       Review of Systems   Review of Systems   Constitutional: Negative.    Respiratory: Negative.     Cardiovascular: Negative.    Musculoskeletal:  Positive for arthralgias (R knee pain). Negative " Pharmacy requesting a  refill of aircept 5mg.       Medication active on med list yes      Date of last prescription 07/20/2021  with 3 refills verified on 11/02/2021    verified by GO ARREAGA      Date of last appointment 07/20/2021    Next Visit Date:  1/20/2022 for gait problem, neck pain and neck stiffness.   Skin:  Positive for wound (L side of face abrasions and R knee abrasions).   Neurological:  Positive for headaches. Negative for dizziness, syncope, weakness and light-headedness.         Current Medications       Current Outpatient Medications:     albuterol (ProAir HFA) 90 mcg/act inhaler, Inhale 2 puffs every 6 (six) hours as needed for wheezing, Disp: 8.5 g, Rfl: 0    ARIPiprazole (ABILIFY) 5 mg tablet, Take 5 mg by mouth daily, Disp: , Rfl:     aspirin (ECOTRIN LOW STRENGTH) 81 mg EC tablet, Take 1 tablet (81 mg total) by mouth daily, Disp: 90 tablet, Rfl: 1    atorvastatin (LIPITOR) 20 mg tablet, Take 1 tablet (20 mg total) by mouth daily with dinner, Disp: 90 tablet, Rfl: 1    Blood Glucose Monitoring Suppl (OneTouch Verio Reflect) w/Device KIT, Check blood sugars four times daily. Please substitute with appropriate alternative as covered by patient's insurance. Dx: E11.65, Disp: 1 kit, Rfl: 0    Continuous Blood Gluc Sensor (FreeStyle Soraya 3 Sensor) MISC, Use 1 each every 14 (fourteen) days, Disp: 6 each, Rfl: 3    diclofenac (VOLTAREN) 75 mg EC tablet, Take 1 tablet (75 mg total) by mouth 2 (two) times a day (Patient taking differently: Take 75 mg by mouth if needed), Disp: 60 tablet, Rfl: 2    divalproex sodium (DEPAKOTE ER) 250 mg 24 hr tablet, Take 750 mg by mouth daily, Disp: , Rfl:     FeroSul 325 (65 Fe) MG tablet, take 1 tablet by mouth once daily WITH BREAKFAST, Disp: 30 tablet, Rfl: 5    fludrocortisone (FLORINEF) 0.1 mg tablet, take 1 tablet by mouth twice a day, Disp: 60 tablet, Rfl: 0    glucose blood test strip, Test 4 times daily as directed., Disp: 100 strip, Rfl: 5    Insulin Glargine Solostar (Lantus SoloStar) 100 UNIT/ML SOPN, Inject 0.4 mL (40 Units total) under the skin daily at bedtime (Patient taking differently: Inject 46 Units under the skin daily at bedtime), Disp: 15 mL, Rfl: 0    insulin lispro (HumaLOG) 100 units/mL injection  pen, inject 10 units subcutaneously three times a day with meals (Patient taking differently: 12 Units 3 (three) times a day with meals), Disp: 15 mL, Rfl: 0    Insulin Pen Needle (Pen Needles) 30G X 5 MM MISC, Use 2 (two) times a day, Disp: 100 each, Rfl: 2    levothyroxine 25 mcg tablet, Take 1 tablet (25 mcg total) by mouth daily in the early morning, Disp: 30 tablet, Rfl: 0    methocarbamol (ROBAXIN) 500 mg tablet, Take 1 tablet (500 mg total) by mouth 3 (three) times a day (Patient taking differently: Take 500 mg by mouth daily as needed), Disp: 90 tablet, Rfl: 1    metoprolol succinate (TOPROL-XL) 25 mg 24 hr tablet, take 1 tablet by mouth once daily, Disp: 30 tablet, Rfl: 0    mirtazapine (REMERON) 7.5 MG tablet, Take 7.5 mg by mouth daily at bedtime, Disp: , Rfl:     OneTouch Delica Lancets 33G MISC, Check blood sugars four times daily. Please substitute with appropriate alternative as covered by patient's insurance. Dx: E11.65, Disp: 400 each, Rfl: 3    pantoprazole (PROTONIX) 20 mg tablet, take 1 tablet by mouth twice a day before meals, Disp: 60 tablet, Rfl: 1    pregabalin (LYRICA) 150 mg capsule, Take 1 capsule (150 mg total) by mouth 2 (two) times a day, Disp: 180 capsule, Rfl: 1    topiramate (TOPAMAX) 50 MG tablet, Take 1 tablet (50 mg total) by mouth 2 (two) times a day, Disp: 60 tablet, Rfl: 2    traZODone (DESYREL) 100 mg tablet, Take 200 mg by mouth daily at bedtime, Disp: , Rfl:     venlafaxine (EFFEXOR-XR) 150 mg 24 hr capsule, Take 1 capsule (150 mg total) by mouth daily, Disp: , Rfl:     venlafaxine (EFFEXOR-XR) 75 mg 24 hr capsule, Take 1 capsule (75 mg total) by mouth daily, Disp: , Rfl:     Current Allergies     Allergies as of 02/21/2024 - Reviewed 02/21/2024   Allergen Reaction Noted    Orange oil - food allergy Anaphylaxis 09/15/2023    Oxycodone-acetaminophen Itching and Anaphylaxis 03/26/2015    Orange (diagnostic) - food allergy Hives and Itching 11/03/2021    Orange fruit [citrus -  "food allergy] Hives 10/26/2021            The following portions of the patient's history were reviewed and updated as appropriate: allergies, current medications, past family history, past medical history, past social history, past surgical history and problem list.     Past Medical History:   Diagnosis Date    Anxiety     Resolved:16    Colon polyp     Depression 2018    Diabetes mellitus (HCC)     GERD (gastroesophageal reflux disease)     Migraine     Mood disorder (HCC)     Neuropathy     Obesity     Peripheral neuropathy     PTSD (post-traumatic stress disorder)     Sacroiliitis (HCC) 10/19/2018    Sleep difficulties        Past Surgical History:   Procedure Laterality Date    CARDIAC CATHETERIZATION       SECTION      CHOLECYSTECTOMY      FOOT SURGERY Right     TUBAL LIGATION         Family History   Problem Relation Age of Onset    Heart failure Mother     Diabetes Mother     Heart failure Father     Diabetes Father     Diabetes Brother          Medications have been verified.        Objective   /76   Pulse 96   Temp (!) 97 °F (36.1 °C)   Resp 18   Ht 5' 3\" (1.6 m)   Wt 97.5 kg (215 lb)   LMP 02/15/2024   SpO2 98%   BMI 38.09 kg/m²        Physical Exam     Physical Exam  Constitutional:       Appearance: Normal appearance.   Eyes:      General:         Right eye: No discharge.         Left eye: No discharge.      Extraocular Movements: Extraocular movements intact.      Conjunctiva/sclera: Conjunctivae normal.      Pupils: Pupils are equal, round, and reactive to light.      Comments: Swelling around R eye   Cardiovascular:      Rate and Rhythm: Normal rate.      Pulses: Normal pulses.   Pulmonary:      Effort: Pulmonary effort is normal.   Musculoskeletal:         General: Swelling, tenderness and signs of injury present. No deformity.      Comments: Limited R knee ROM due to pain   Skin:     General: Skin is warm and dry.      Findings: Lesion (L side of face and R knee " abrasions) present.   Neurological:      General: No focal deficit present.      Mental Status: She is alert and oriented to person, place, and time. Mental status is at baseline.      Cranial Nerves: No cranial nerve deficit.      Sensory: No sensory deficit.      Coordination: Coordination normal.

## 2024-04-15 ENCOUNTER — HOSPITAL ENCOUNTER (OUTPATIENT)
Age: 69
Setting detail: SPECIMEN
Discharge: HOME | End: 2024-04-15

## 2024-04-15 LAB
ALBUMIN SERPL-MCNC: 3.3 G/DL (ref 3.5–5.2)
ALBUMIN/GLOB SERPL: 1 {RATIO} (ref 1–2.5)
ALP SERPL-CCNC: 56 U/L (ref 35–104)
ALT SERPL-CCNC: 6 U/L (ref 10–35)
AST SERPL-CCNC: 21 U/L (ref 10–35)
BILIRUB DIRECT SERPL-MCNC: <0.2 MG/DL (ref 0–0.3)
BILIRUB INDIRECT SERPL-MCNC: 0.1 MG/DL (ref 0–1)
BILIRUB SERPL-MCNC: 0.2 MG/DL (ref 0–1.2)
GLOBULIN SER CALC-MCNC: 2.5 G/DL
PROT SERPL-MCNC: 5.8 G/DL (ref 6.6–8.7)

## 2024-04-15 PROCEDURE — P9603 ONE-WAY ALLOW PRORATED MILES: HCPCS

## 2024-04-15 PROCEDURE — 36415 COLL VENOUS BLD VENIPUNCTURE: CPT

## 2024-04-15 PROCEDURE — 80076 HEPATIC FUNCTION PANEL: CPT

## 2024-07-12 ENCOUNTER — HOSPITAL ENCOUNTER (OUTPATIENT)
Age: 69
Setting detail: SPECIMEN
Discharge: HOME | End: 2024-07-12

## 2024-07-12 LAB
ANION GAP SERPL CALCULATED.3IONS-SCNC: 9 MMOL/L (ref 9–16)
BUN SERPL-MCNC: 8 MG/DL (ref 8–23)
CALCIUM SERPL-MCNC: 9 MG/DL (ref 8.6–10.4)
CHLORIDE SERPL-SCNC: 102 MMOL/L (ref 98–107)
CO2 SERPL-SCNC: 26 MMOL/L (ref 20–31)
CREAT SERPL-MCNC: 0.3 MG/DL (ref 0.5–0.9)
ERYTHROCYTE [DISTWIDTH] IN BLOOD BY AUTOMATED COUNT: 14.7 % (ref 11.8–14.4)
GFR, ESTIMATED: >90 ML/MIN/1.73M2
GLUCOSE SERPL-MCNC: 88 MG/DL (ref 74–99)
HCT VFR BLD AUTO: 36.8 % (ref 36.3–47.1)
HGB BLD-MCNC: 11.6 G/DL (ref 11.9–15.1)
MCH RBC QN AUTO: 28.4 PG (ref 25.2–33.5)
MCHC RBC AUTO-ENTMCNC: 31.5 G/DL (ref 28.4–34.8)
MCV RBC AUTO: 90.2 FL (ref 82.6–102.9)
NRBC BLD-RTO: 0 PER 100 WBC
PLATELET # BLD AUTO: 247 K/UL (ref 138–453)
PMV BLD AUTO: 11 FL (ref 8.1–13.5)
POTASSIUM SERPL-SCNC: 3.8 MMOL/L (ref 3.7–5.3)
RBC # BLD AUTO: 4.08 M/UL (ref 3.95–5.11)
SODIUM SERPL-SCNC: 137 MMOL/L (ref 136–145)
TSH SERPL DL<=0.05 MIU/L-ACNC: 5.01 UIU/ML (ref 0.27–4.2)
WBC OTHER # BLD: 4.9 K/UL (ref 3.5–11.3)

## 2024-07-12 PROCEDURE — 84443 ASSAY THYROID STIM HORMONE: CPT

## 2024-07-12 PROCEDURE — 85027 COMPLETE CBC AUTOMATED: CPT

## 2024-07-12 PROCEDURE — P9603 ONE-WAY ALLOW PRORATED MILES: HCPCS

## 2024-07-12 PROCEDURE — 80048 BASIC METABOLIC PNL TOTAL CA: CPT

## 2024-07-12 PROCEDURE — 36415 COLL VENOUS BLD VENIPUNCTURE: CPT

## 2024-07-16 ENCOUNTER — HOSPITAL ENCOUNTER (OUTPATIENT)
Age: 69
Setting detail: SPECIMEN
Discharge: HOME | End: 2024-07-16

## 2024-07-16 LAB
ANION GAP SERPL CALCULATED.3IONS-SCNC: 9 MMOL/L (ref 9–16)
BUN SERPL-MCNC: 8 MG/DL (ref 8–23)
CALCIUM SERPL-MCNC: 9 MG/DL (ref 8.6–10.4)
CHLORIDE SERPL-SCNC: 102 MMOL/L (ref 98–107)
CO2 SERPL-SCNC: 25 MMOL/L (ref 20–31)
CREAT SERPL-MCNC: 0.3 MG/DL (ref 0.5–0.9)
ERYTHROCYTE [DISTWIDTH] IN BLOOD BY AUTOMATED COUNT: 14.9 % (ref 11.8–14.4)
GFR, ESTIMATED: >90 ML/MIN/1.73M2
GLUCOSE SERPL-MCNC: 85 MG/DL (ref 74–99)
HCT VFR BLD AUTO: 37.4 % (ref 36.3–47.1)
HGB BLD-MCNC: 12.1 G/DL (ref 11.9–15.1)
MCH RBC QN AUTO: 28.7 PG (ref 25.2–33.5)
MCHC RBC AUTO-ENTMCNC: 32.4 G/DL (ref 28.4–34.8)
MCV RBC AUTO: 88.8 FL (ref 82.6–102.9)
NRBC BLD-RTO: 0 PER 100 WBC
PLATELET # BLD AUTO: 309 K/UL (ref 138–453)
PMV BLD AUTO: 10.6 FL (ref 8.1–13.5)
POTASSIUM SERPL-SCNC: 4 MMOL/L (ref 3.7–5.3)
RBC # BLD AUTO: 4.21 M/UL (ref 3.95–5.11)
SODIUM SERPL-SCNC: 136 MMOL/L (ref 136–145)
WBC OTHER # BLD: 6.7 K/UL (ref 3.5–11.3)

## 2024-07-16 PROCEDURE — 85027 COMPLETE CBC AUTOMATED: CPT

## 2024-07-16 PROCEDURE — 36415 COLL VENOUS BLD VENIPUNCTURE: CPT

## 2024-07-16 PROCEDURE — P9603 ONE-WAY ALLOW PRORATED MILES: HCPCS

## 2024-07-16 PROCEDURE — 80048 BASIC METABOLIC PNL TOTAL CA: CPT

## 2024-07-24 ENCOUNTER — HOSPITAL ENCOUNTER (OUTPATIENT)
Age: 69
Setting detail: SPECIMEN
Discharge: HOME | End: 2024-07-24
Payer: MEDICAID

## 2024-07-24 LAB
ANION GAP SERPL CALCULATED.3IONS-SCNC: 7 MMOL/L (ref 9–16)
BUN SERPL-MCNC: 14 MG/DL (ref 8–23)
CALCIUM SERPL-MCNC: 9.1 MG/DL (ref 8.6–10.4)
CHLORIDE SERPL-SCNC: 102 MMOL/L (ref 98–107)
CO2 SERPL-SCNC: 28 MMOL/L (ref 20–31)
CREAT SERPL-MCNC: 0.4 MG/DL (ref 0.5–0.9)
ERYTHROCYTE [DISTWIDTH] IN BLOOD BY AUTOMATED COUNT: 15.9 % (ref 11.8–14.4)
GFR, ESTIMATED: >90 ML/MIN/1.73M2
GLUCOSE SERPL-MCNC: 87 MG/DL (ref 74–99)
HCT VFR BLD AUTO: 38.9 % (ref 36.3–47.1)
HGB BLD-MCNC: 12.2 G/DL (ref 11.9–15.1)
LEVETIRACETAM SERPL-MCNC: 35 UG/ML
MCH RBC QN AUTO: 28.6 PG (ref 25.2–33.5)
MCHC RBC AUTO-ENTMCNC: 31.4 G/DL (ref 28.4–34.8)
MCV RBC AUTO: 91.1 FL (ref 82.6–102.9)
NRBC BLD-RTO: 0 PER 100 WBC
PLATELET # BLD AUTO: 288 K/UL (ref 138–453)
PMV BLD AUTO: 10.4 FL (ref 8.1–13.5)
POTASSIUM SERPL-SCNC: 4.3 MMOL/L (ref 3.7–5.3)
RBC # BLD AUTO: 4.27 M/UL (ref 3.95–5.11)
SODIUM SERPL-SCNC: 137 MMOL/L (ref 136–145)
TSH SERPL DL<=0.05 MIU/L-ACNC: 4.5 UIU/ML (ref 0.27–4.2)
WBC OTHER # BLD: 9.4 K/UL (ref 3.5–11.3)

## 2024-07-24 PROCEDURE — P9603 ONE-WAY ALLOW PRORATED MILES: HCPCS

## 2024-07-24 PROCEDURE — 80048 BASIC METABOLIC PNL TOTAL CA: CPT

## 2024-07-24 PROCEDURE — 80177 DRUG SCRN QUAN LEVETIRACETAM: CPT

## 2024-07-24 PROCEDURE — 85027 COMPLETE CBC AUTOMATED: CPT

## 2024-07-24 PROCEDURE — 36415 COLL VENOUS BLD VENIPUNCTURE: CPT

## 2024-07-24 PROCEDURE — 84443 ASSAY THYROID STIM HORMONE: CPT

## 2025-02-19 NOTE — CONSULTS
[facility]   Consult Note                 Date:   1/15/2018  Date of admission:  1/14/2018  1:00 PM  MRN:   [id number]  YOB: 1955    Reason for Consult:  Elevated Trop. HISTORY OF PRESENT ILLNESS:    The patient is a 58 y.o.  female who is admitted to the hospital , was called due to second trop elevated . Pt is intubated sedated . Has 2 witnessed sz , high temp. Eventually intubated in ER . Being treated for sepsis , resp . Failure , hypotension , on Levo  At this time along with versed drip . Past Medical History:   has a past medical history of Ankle fracture, left; Anxiety; Arthritis; ETOH abuse; Frequency of urination; GERD (gastroesophageal reflux disease); Head injury; Headache(784.0); Hypertension; Hypotension; Hypothyroidism; Numbness and tingling; Osteoporosis; Peripheral vascular disease ([facility] [address] Pneumonia; Reflux; Seizures ([facility] [address] Shingles; Stroke [facility]); TIA (transient ischemic attack); Tremor; UTI (urinary tract infection); Varicose veins; and Wears glasses. Past Surgical History:   has a past surgical history that includes Bunionectomy (Right); Tubal ligation; other surgical history (2/23/15); and Hammer toe surgery (Left, 07/06/2016). Home Medications:    Prior to Admission medications    Medication Sig Start Date End Date Taking?  Authorizing Provider   amLODIPine (NORVASC) 10 MG tablet Take 10 mg by mouth daily   Yes Historical Provider, MD   levothyroxine (SYNTHROID) 50 MCG tablet Take 1 tablet by mouth daily  Patient taking differently: Take 112 mcg by mouth daily  11/7/17  Yes Yoandy Ordoñez MD   atorvastatin (LIPITOR) 40 MG tablet Take 1 tablet by mouth daily 11/7/17  Yes Yoandy Ordoñez MD   PARoxetine (PAXIL) 30 MG tablet Take 1 tablet by mouth daily  Patient taking differently: Take 25 mg by mouth daily  10/30/17  Yes Virl Closs, MD   levETIRAcetam (KEPPRA) 1000 MG tablet Take 1 tablet by mouth 2 times daily 9/12/17  Yes Virl Closs, MD   SUMAtriptan (IMITREX) 100 MG tablet TAKE ONE TABLET BY MOUTH once daily as needed for migraine headache 8/1/17  Yes Jonathan Byrne MD   aspirin EC 81 MG EC tablet Take 1 tablet by mouth daily 6/12/17  Yes Matt Najjar, MD   omeprazole (PRILOSEC) 20 MG delayed release capsule Take 1 capsule by mouth daily 6/12/17  Yes Matt Najjar, MD   oxybutynin (DITROPAN) 5 MG tablet Take 1 tablet by mouth 3 times daily 6/12/17  Yes Matt Najjar, MD   hydrochlorothiazide (HYDRODIURIL) 12.5 MG tablet TAKE ONE TABLET BY MOUTH ONCE DAILY 11/7/17   Jonathan Byrne MD       Current Medications: Scheduled Meds:   piperacillin-tazobactam (ZOSYN) 3.375 g in dextrose 5% IVPB extended infusion (mini-bag)  3.375 g Intravenous Q8H    vancomycin (VANCOCIN) intermittent dosing (placeholder)   Other RX Placeholder    vancomycin  1,250 mg Intravenous Q12H    PARoxetine  30 mg Oral Daily    sodium chloride flush  10 mL Intravenous 2 times per day    famotidine (PEPCID) injection  20 mg Intravenous BID    enoxaparin  40 mg Subcutaneous Daily    acyclovir  10 mg/kg Intravenous Q8H    levothyroxine  25 mcg Intravenous QAM AC    levetiracetam  1,000 mg Intravenous Q12H    chlorhexidine  15 mL Mouth/Throat BID     Continuous Infusions:   IV infusion builder      midazolam 2 mg/hr (01/14/18 1740)    sodium chloride 100 mL/hr at 01/15/18 0421    norepinephrine 4 mcg/min (01/15/18 0714)     PRN Meds:.acetaminophen, potassium chloride, sodium chloride flush, docusate sodium, ondansetron, albuterol sulfate HFA, fentanNYL     Allergies:  Review of patient's allergies indicates no known allergies. Social History:   reports that she has never smoked. She has never used smokeless tobacco. She reports that she does not drink alcohol or use drugs. Family History: family history includes Cervical Cancer in her mother; Heart Disease in her father, maternal grandfather, and paternal grandfather.      Review of Systems IMPRESSION:    Patient Active Problem List   Diagnosis    Hypothyroidism    Hypertension    Seizure disorder ([facility] [address]    Urge incontinence    Anxiety    GERD (gastroesophageal reflux disease)    H/O: CVA (cerebrovascular accident)    Headache    Hammer toe of left foot    Tremor of both hands    Altered mental status    UTI (urinary tract infection)    Septic shock (HCC)    Elevated troponin           RECOMMENDATIONS:  ELEVATED TROP  POSSIBLE TYPE 2 , SEPSIS , SZ , HYPOXIA . WILL GET ECHO FOR LVEF , WALL MOTION ABNORMALITIES . HYPOTENSION , DUE TO SEPSIS , ALSO ON VERSED DRIP , IF CAN BE CHANGED TO DIFFERENT MED  FOR SZ   CONTINUE REST       Discussed with medical staff  .     Kari Guzman [id number]        [phone] normal/ROM intact/normal gait/strength 5/5 bilateral upper extremities/strength 5/5 bilateral lower extremities negative

## (undated) DEVICE — GEL US 20GM NONIRRITATING OVERWRAPPED FILE PCH TRNSMIT

## (undated) DEVICE — BITEBLOCK 54FR W/ DENT RIM BLOX

## (undated) DEVICE — SUPERSET STRAIGHT CATHETER MOUNT 22F-22M/15F >= 70MM-150MM: Brand: SUPERSET STRAIGHT CATHETER MOUNT 22F-22M/15F >= 70MM-150MM

## (undated) DEVICE — SINGLE PORT MANIFOLD: Brand: NEPTUNE 2

## (undated) DEVICE — DRILL BIT FOR 3.5MM SCREW

## (undated) DEVICE — SOLUTION IRRIG 1000ML 0.9% SOD CHL USP POUR PLAS BTL

## (undated) DEVICE — TRAP SPEC COLL 40CC MUCOUS CALIB UNIV CONN FOR OPN SUCT

## (undated) DEVICE — DEFENDO AIR WATER SUCTION AND BIOPSY VALVE KIT FOR  OLYMPUS: Brand: DEFENDO AIR/WATER/SUCTION AND BIOPSY VALVE

## (undated) DEVICE — SOLUTION IRRIG 1000ML STRL H2O USP PLAS POUR BTL

## (undated) DEVICE — TUBE TRACH AD L100MM OD12.3MM ID7MM PROX EXTN CUF HI VOL LO

## (undated) DEVICE — SOLUTION SCRB 4OZ 4% CHG H2O AIDED FOR PREOPERATIVE SKIN

## (undated) DEVICE — ST CHARLES DR BEEKS SPINE: Brand: MEDLINE INDUSTRIES, INC.

## (undated) DEVICE — SWABSTICK MEDICATED 10% POVIDONE IOD PVP SGL ANTISEP SAT

## (undated) DEVICE — ST. CHARLES BASIC SET UP: Brand: MEDLINE INDUSTRIES, INC.

## (undated) DEVICE — TUBING, SUCTION, 3/16" X 10', STRAIGHT: Brand: MEDLINE

## (undated) DEVICE — SYRINGE IRRIG 60ML SFT PLIABLE BLB EZ TO GRP 1 HND USE W/

## (undated) DEVICE — 5.0MM X-COARSE DIAMOND

## (undated) DEVICE — DRAPE,T,LAPARO,TRANS,STERILE: Brand: MEDLINE

## (undated) DEVICE — ST CHARLES BRONCHOSCOPY PACK: Brand: MEDLINE INDUSTRIES, INC.

## (undated) DEVICE — SUTURE ETHBND EXCEL SZ 0 L30IN NONABSORBABLE GRN CT1 L36MM X424H

## (undated) DEVICE — ENDO KIT W/SYRINGE: Brand: MEDLINE INDUSTRIES, INC.

## (undated) DEVICE — BLANKET WRM W40.2XL55.9IN IORT LO BODY + MISTRAL AIR

## (undated) DEVICE — SHEET,DRAPE,53X77,STERILE: Brand: MEDLINE

## (undated) DEVICE — AIRLIFE™ NASAL OXYGEN CANNULA CURVED, FLARED TIP, WITH 7 FEET (2.1 M) CRUSH RESISTANT TUBING, OVER-THE-EAR STYLE: Brand: AIRLIFE™

## (undated) DEVICE — HOLDER TRACH TB W1IN FIT UPTO 19.5IN NK 2 PC ADJ BLU

## (undated) DEVICE — GLOVE ORANGE PI 7 1/2   MSG9075

## (undated) DEVICE — SUTURE PROL SZ 2-0 L30IN NONABSORBABLE BLU L26MM CT-2 1/2 8411H

## (undated) DEVICE — PREMIUM DRY TRAY LF: Brand: MEDLINE INDUSTRIES, INC.

## (undated) DEVICE — Device

## (undated) DEVICE — Z DISCONTINUED USE 2218423 SUTURE ETHILON SZ 3-0 L18IN NONABSORBABLE BLK FS-1 L24MM 3/8 663H

## (undated) DEVICE — BUR RND DIA COARSE 5.0MM

## (undated) DEVICE — SUTURE VICRYL + SZ 3 0 L54IN ABSRB UD POLYGLACTIN 910 W VCP285G

## (undated) DEVICE — GLOVE ORTHO 7 1/2   MSG9475

## (undated) DEVICE — DUP USE 393023 JELLY LUBRICATING EZ 2OZ

## (undated) DEVICE — SUTURE VICRYL + SZ 3-0 L27IN ABSRB UD L26MM SH 1/2 CIR VCP416H

## (undated) DEVICE — SOLUTION IV IRRIG POUR BRL 0.9% SODIUM CHL 2F7124

## (undated) DEVICE — SURGIFOAM SPNG SZ 100

## (undated) DEVICE — YANKAUER,BULB TIP,W/O VENT,RIGID,STERILE: Brand: MEDLINE

## (undated) DEVICE — POUCH INSTR W6.75XL11.5IN FRST 2 PKT ADH FOR ORTH AND

## (undated) DEVICE — GLOVE ORANGE PI 7   MSG9070

## (undated) DEVICE — DRESSING HYDROCOLLOID BORDER 35X10 IN ALUM PRIMASEAL

## (undated) DEVICE — MIC* SAFETY PERCUTANEOUS ENDOSCOPIC GASTROSTOMY PEG KIT - 20 FR - PUSH OTW: Brand: MIC PEG TUBE

## (undated) DEVICE — SPONGE DRN W4XL4IN RAYON/POLYESTER 6 PLY NONWOVEN PRECUT 2 PER PK

## (undated) DEVICE — GLOVE SURG SZ 8 L12IN FNGR THK79MIL GRN LTX FREE

## (undated) DEVICE — OINTMENT ANTIBIOTIC TRIPLE 0.9G PK

## (undated) DEVICE — GOWN,AURORA,NONREINFORCED,LARGE: Brand: MEDLINE

## (undated) DEVICE — SEALER LAP SM L18.8CM OPN JAW HAND/FOOT SWCH FORCETRIAD

## (undated) DEVICE — GLOVE ORTHO 8   MSG9480

## (undated) DEVICE — SUTURE ETHBND EXCEL SZ 1 L30IN NONABSORBABLE GRN L36MM CT-1 X425H